# Patient Record
Sex: MALE | Race: WHITE | Employment: UNEMPLOYED | URBAN - METROPOLITAN AREA
[De-identification: names, ages, dates, MRNs, and addresses within clinical notes are randomized per-mention and may not be internally consistent; named-entity substitution may affect disease eponyms.]

---

## 2018-05-03 ENCOUNTER — HOSPITAL ENCOUNTER (OUTPATIENT)
Age: 68
Setting detail: SPECIMEN
Discharge: HOME | End: 2018-05-03
Payer: COMMERCIAL

## 2018-05-03 LAB
ALBUMIN SERPL-MCNC: 3.7 G/DL (ref 3.5–5.2)
ALBUMIN/GLOBULIN RATIO: 1.3 (ref 1–2.5)
ALP BLD-CCNC: 91 U/L (ref 40–129)
ALT SERPL-CCNC: 37 U/L (ref 5–41)
ANION GAP SERPL CALCULATED.3IONS-SCNC: 11 MMOL/L (ref 9–17)
AST SERPL-CCNC: 44 U/L
BILIRUB SERPL-MCNC: 0.43 MG/DL (ref 0.3–1.2)
BILIRUBIN DIRECT: 0.18 MG/DL
BILIRUBIN, INDIRECT: 0.25 MG/DL (ref 0–1)
BUN BLDV-MCNC: 11 MG/DL (ref 8–23)
BUN/CREAT BLD: ABNORMAL (ref 9–20)
CALCIUM SERPL-MCNC: 9.2 MG/DL (ref 8.6–10.4)
CHLORIDE BLD-SCNC: 93 MMOL/L (ref 98–107)
CHOLESTEROL, FASTING: 130 MG/DL
CHOLESTEROL/HDL RATIO: 2.4
CO2: 24 MMOL/L (ref 20–31)
CREAT SERPL-MCNC: 0.94 MG/DL (ref 0.7–1.2)
ESTIMATED AVERAGE GLUCOSE: 108 MG/DL
GFR AFRICAN AMERICAN: >60 ML/MIN
GFR NON-AFRICAN AMERICAN: >60 ML/MIN
GFR SERPL CREATININE-BSD FRML MDRD: ABNORMAL ML/MIN/{1.73_M2}
GFR SERPL CREATININE-BSD FRML MDRD: ABNORMAL ML/MIN/{1.73_M2}
GLOBULIN: ABNORMAL G/DL (ref 1.5–3.8)
GLUCOSE BLD-MCNC: 86 MG/DL (ref 70–99)
HBA1C MFR BLD: 5.4 % (ref 4–6)
HCT VFR BLD CALC: 41.4 % (ref 40.7–50.3)
HDLC SERPL-MCNC: 55 MG/DL
HEMOGLOBIN: 13.7 G/DL (ref 13–17)
LDL CHOLESTEROL: 62 MG/DL (ref 0–130)
MCH RBC QN AUTO: 32.3 PG (ref 25.2–33.5)
MCHC RBC AUTO-ENTMCNC: 33.1 G/DL (ref 28.4–34.8)
MCV RBC AUTO: 97.6 FL (ref 82.6–102.9)
NRBC AUTOMATED: 0 PER 100 WBC
PDW BLD-RTO: 13.2 % (ref 11.8–14.4)
PLATELET # BLD: 231 K/UL (ref 138–453)
PMV BLD AUTO: 9.2 FL (ref 8.1–13.5)
POTASSIUM SERPL-SCNC: 4.8 MMOL/L (ref 3.7–5.3)
PROSTATE SPECIFIC ANTIGEN: 1.14 UG/L
RBC # BLD: 4.24 M/UL (ref 4.21–5.77)
SODIUM BLD-SCNC: 128 MMOL/L (ref 135–144)
TOTAL PROTEIN: 6.6 G/DL (ref 6.4–8.3)
TRIGLYCERIDE, FASTING: 66 MG/DL
TSH SERPL DL<=0.05 MIU/L-ACNC: 1.88 MIU/L (ref 0.3–5)
VLDLC SERPL CALC-MCNC: NORMAL MG/DL (ref 1–30)
WBC # BLD: 6.5 K/UL (ref 3.5–11.3)

## 2018-05-17 ENCOUNTER — HOSPITAL ENCOUNTER (OUTPATIENT)
Age: 68
Setting detail: SPECIMEN
Discharge: HOME | End: 2018-05-17
Payer: COMMERCIAL

## 2018-05-17 LAB
ANION GAP SERPL CALCULATED.3IONS-SCNC: 12 MMOL/L (ref 9–17)
BUN BLDV-MCNC: 8 MG/DL (ref 8–23)
BUN/CREAT BLD: ABNORMAL (ref 9–20)
CALCIUM SERPL-MCNC: 9.3 MG/DL (ref 8.6–10.4)
CHLORIDE BLD-SCNC: 95 MMOL/L (ref 98–107)
CO2: 24 MMOL/L (ref 20–31)
CREAT SERPL-MCNC: 0.81 MG/DL (ref 0.7–1.2)
GFR AFRICAN AMERICAN: >60 ML/MIN
GFR NON-AFRICAN AMERICAN: >60 ML/MIN
GFR SERPL CREATININE-BSD FRML MDRD: ABNORMAL ML/MIN/{1.73_M2}
GFR SERPL CREATININE-BSD FRML MDRD: ABNORMAL ML/MIN/{1.73_M2}
GLUCOSE BLD-MCNC: 92 MG/DL (ref 70–99)
POTASSIUM SERPL-SCNC: 5.1 MMOL/L (ref 3.7–5.3)
SODIUM BLD-SCNC: 131 MMOL/L (ref 135–144)

## 2018-05-31 ENCOUNTER — HOSPITAL ENCOUNTER (OUTPATIENT)
Age: 68
Setting detail: SPECIMEN
Discharge: HOME | End: 2018-05-31
Payer: COMMERCIAL

## 2018-05-31 LAB
ANION GAP SERPL CALCULATED.3IONS-SCNC: 13 MMOL/L (ref 9–17)
BUN BLDV-MCNC: 8 MG/DL (ref 8–23)
BUN/CREAT BLD: ABNORMAL (ref 9–20)
CALCIUM SERPL-MCNC: 9.7 MG/DL (ref 8.6–10.4)
CHLORIDE BLD-SCNC: 97 MMOL/L (ref 98–107)
CO2: 23 MMOL/L (ref 20–31)
CREAT SERPL-MCNC: 0.81 MG/DL (ref 0.7–1.2)
GFR AFRICAN AMERICAN: >60 ML/MIN
GFR NON-AFRICAN AMERICAN: >60 ML/MIN
GFR SERPL CREATININE-BSD FRML MDRD: ABNORMAL ML/MIN/{1.73_M2}
GFR SERPL CREATININE-BSD FRML MDRD: ABNORMAL ML/MIN/{1.73_M2}
GLUCOSE BLD-MCNC: 122 MG/DL (ref 70–99)
POTASSIUM SERPL-SCNC: 5.2 MMOL/L (ref 3.7–5.3)
SODIUM BLD-SCNC: 133 MMOL/L (ref 135–144)

## 2018-06-06 ENCOUNTER — HOSPITAL ENCOUNTER (OUTPATIENT)
Age: 68
Setting detail: SPECIMEN
Discharge: HOME | End: 2018-06-06
Payer: COMMERCIAL

## 2018-06-06 LAB
ANION GAP SERPL CALCULATED.3IONS-SCNC: 11 MMOL/L (ref 9–17)
BUN BLDV-MCNC: 10 MG/DL (ref 8–23)
BUN/CREAT BLD: ABNORMAL (ref 9–20)
CALCIUM SERPL-MCNC: 9.3 MG/DL (ref 8.6–10.4)
CHLORIDE BLD-SCNC: 97 MMOL/L (ref 98–107)
CO2: 25 MMOL/L (ref 20–31)
CREAT SERPL-MCNC: 0.89 MG/DL (ref 0.7–1.2)
GFR AFRICAN AMERICAN: >60 ML/MIN
GFR NON-AFRICAN AMERICAN: >60 ML/MIN
GFR SERPL CREATININE-BSD FRML MDRD: ABNORMAL ML/MIN/{1.73_M2}
GFR SERPL CREATININE-BSD FRML MDRD: ABNORMAL ML/MIN/{1.73_M2}
GLUCOSE BLD-MCNC: 141 MG/DL (ref 70–99)
POTASSIUM SERPL-SCNC: 4.6 MMOL/L (ref 3.7–5.3)
SODIUM BLD-SCNC: 133 MMOL/L (ref 135–144)

## 2019-04-09 ENCOUNTER — HOSPITAL ENCOUNTER (OUTPATIENT)
Age: 69
Setting detail: SPECIMEN
Discharge: HOME | End: 2019-04-09
Payer: COMMERCIAL

## 2019-04-11 LAB — TESTOSTERONE TOTAL: 1071 NG/DL (ref 220–1000)

## 2020-03-23 ENCOUNTER — APPOINTMENT (OUTPATIENT)
Dept: GENERAL RADIOLOGY | Age: 70
DRG: 640 | End: 2020-03-23
Payer: COMMERCIAL

## 2020-03-23 ENCOUNTER — HOSPITAL ENCOUNTER (INPATIENT)
Age: 70
LOS: 8 days | Discharge: NURSING, REHAB OR LONG-TERM CARE | DRG: 640 | End: 2020-03-31
Attending: EMERGENCY MEDICINE
Payer: COMMERCIAL

## 2020-03-23 ENCOUNTER — APPOINTMENT (OUTPATIENT)
Dept: CT IMAGING | Age: 70
DRG: 640 | End: 2020-03-23
Payer: COMMERCIAL

## 2020-03-23 LAB
ABSOLUTE EOS #: 0.3 K/UL (ref 0–0.44)
ABSOLUTE IMMATURE GRANULOCYTE: 0.12 K/UL (ref 0–0.3)
ABSOLUTE LYMPH #: 0.73 K/UL (ref 1.1–3.7)
ABSOLUTE MONO #: 1.06 K/UL (ref 0.1–1.2)
ACETAMINOPHEN LEVEL: <5 UG/ML (ref 10–30)
AMMONIA: 67 UMOL/L (ref 16–60)
ANION GAP SERPL CALCULATED.3IONS-SCNC: 15 MMOL/L (ref 9–17)
ANION GAP SERPL CALCULATED.3IONS-SCNC: 17 MMOL/L (ref 9–17)
ANION GAP SERPL CALCULATED.3IONS-SCNC: 21 MMOL/L (ref 9–17)
BASOPHILS # BLD: 0 % (ref 0–2)
BASOPHILS ABSOLUTE: <0.03 K/UL (ref 0–0.2)
BUN BLDV-MCNC: 5 MG/DL (ref 8–23)
BUN BLDV-MCNC: 6 MG/DL (ref 8–23)
BUN/CREAT BLD: ABNORMAL (ref 9–20)
BUN/CREAT BLD: ABNORMAL (ref 9–20)
CALCIUM SERPL-MCNC: 8.8 MG/DL (ref 8.6–10.4)
CALCIUM SERPL-MCNC: 9.2 MG/DL (ref 8.6–10.4)
CHLORIDE BLD-SCNC: 68 MMOL/L (ref 98–107)
CHLORIDE BLD-SCNC: 69 MMOL/L (ref 98–107)
CHLORIDE BLD-SCNC: 73 MMOL/L (ref 98–107)
CO2: 15 MMOL/L (ref 20–31)
CO2: 18 MMOL/L (ref 20–31)
CO2: 19 MMOL/L (ref 20–31)
CORTISOL COLLECTION INFO: ABNORMAL
CORTISOL: 23.4 UG/DL (ref 2.7–18.4)
CREAT SERPL-MCNC: 0.72 MG/DL (ref 0.7–1.2)
CREAT SERPL-MCNC: 0.82 MG/DL (ref 0.7–1.2)
DIFFERENTIAL TYPE: ABNORMAL
EOSINOPHILS RELATIVE PERCENT: 3 % (ref 1–4)
ETHANOL PERCENT: 0.02 %
ETHANOL: 17 MG/DL
GFR AFRICAN AMERICAN: >60 ML/MIN
GFR AFRICAN AMERICAN: >60 ML/MIN
GFR NON-AFRICAN AMERICAN: >60 ML/MIN
GFR NON-AFRICAN AMERICAN: >60 ML/MIN
GFR SERPL CREATININE-BSD FRML MDRD: ABNORMAL ML/MIN/{1.73_M2}
GLUCOSE BLD-MCNC: 77 MG/DL (ref 70–99)
GLUCOSE BLD-MCNC: 99 MG/DL (ref 70–99)
HCT VFR BLD CALC: 32.4 % (ref 40.7–50.3)
HEMOGLOBIN: 10.2 G/DL (ref 13–17)
IMMATURE GRANULOCYTES: 1 %
LYMPHOCYTES # BLD: 7 % (ref 24–43)
MCH RBC QN AUTO: 23.9 PG (ref 25.2–33.5)
MCHC RBC AUTO-ENTMCNC: 31.5 G/DL (ref 28.4–34.8)
MCV RBC AUTO: 76.1 FL (ref 82.6–102.9)
MONOCYTES # BLD: 10 % (ref 3–12)
MYOGLOBIN: 1480 NG/ML (ref 28–72)
NRBC AUTOMATED: 0 PER 100 WBC
OSMOLALITY URINE: 199 MOSM/KG (ref 80–1300)
PDW BLD-RTO: 19 % (ref 11.8–14.4)
PLATELET # BLD: 213 K/UL (ref 138–453)
PLATELET ESTIMATE: ABNORMAL
PMV BLD AUTO: 8.9 FL (ref 8.1–13.5)
POTASSIUM SERPL-SCNC: 3.6 MMOL/L (ref 3.7–5.3)
POTASSIUM SERPL-SCNC: 3.7 MMOL/L (ref 3.7–5.3)
POTASSIUM SERPL-SCNC: 4 MMOL/L (ref 3.7–5.3)
RBC # BLD: 4.26 M/UL (ref 4.21–5.77)
RBC # BLD: ABNORMAL 10*6/UL
SALICYLATE LEVEL: <1 MG/DL (ref 3–10)
SEG NEUTROPHILS: 79 % (ref 36–65)
SEGMENTED NEUTROPHILS ABSOLUTE COUNT: 8.93 K/UL (ref 1.5–8.1)
SODIUM BLD-SCNC: 104 MMOL/L (ref 135–144)
SODIUM BLD-SCNC: 105 MMOL/L (ref 135–144)
SODIUM BLD-SCNC: 106 MMOL/L (ref 135–144)
SODIUM,UR: <20 MMOL/L
TOTAL CK: 852 U/L (ref 39–308)
TOXIC TRICYCLIC SC,BLOOD: NEGATIVE
TROPONIN INTERP: NORMAL
TROPONIN T: NORMAL NG/ML
TROPONIN, HIGH SENSITIVITY: 7 NG/L (ref 0–22)
TSH SERPL DL<=0.05 MIU/L-ACNC: 0.94 MIU/L (ref 0.3–5)
WBC # BLD: 11.2 K/UL (ref 3.5–11.3)
WBC # BLD: ABNORMAL 10*3/UL

## 2020-03-23 PROCEDURE — 82550 ASSAY OF CK (CPK): CPT

## 2020-03-23 PROCEDURE — 90715 TDAP VACCINE 7 YRS/> IM: CPT | Performed by: STUDENT IN AN ORGANIZED HEALTH CARE EDUCATION/TRAINING PROGRAM

## 2020-03-23 PROCEDURE — 85025 COMPLETE CBC W/AUTO DIFF WBC: CPT

## 2020-03-23 PROCEDURE — 80051 ELECTROLYTE PANEL: CPT

## 2020-03-23 PROCEDURE — 6360000002 HC RX W HCPCS: Performed by: STUDENT IN AN ORGANIZED HEALTH CARE EDUCATION/TRAINING PROGRAM

## 2020-03-23 PROCEDURE — 36620 INSERTION CATHETER ARTERY: CPT

## 2020-03-23 PROCEDURE — 71045 X-RAY EXAM CHEST 1 VIEW: CPT

## 2020-03-23 PROCEDURE — 84443 ASSAY THYROID STIM HORMONE: CPT

## 2020-03-23 PROCEDURE — 36415 COLL VENOUS BLD VENIPUNCTURE: CPT

## 2020-03-23 PROCEDURE — 2580000003 HC RX 258: Performed by: EMERGENCY MEDICINE

## 2020-03-23 PROCEDURE — 51701 INSERT BLADDER CATHETER: CPT

## 2020-03-23 PROCEDURE — 84300 ASSAY OF URINE SODIUM: CPT

## 2020-03-23 PROCEDURE — 2580000003 HC RX 258: Performed by: STUDENT IN AN ORGANIZED HEALTH CARE EDUCATION/TRAINING PROGRAM

## 2020-03-23 PROCEDURE — G0480 DRUG TEST DEF 1-7 CLASSES: HCPCS

## 2020-03-23 PROCEDURE — 72125 CT NECK SPINE W/O DYE: CPT

## 2020-03-23 PROCEDURE — 93005 ELECTROCARDIOGRAM TRACING: CPT | Performed by: STUDENT IN AN ORGANIZED HEALTH CARE EDUCATION/TRAINING PROGRAM

## 2020-03-23 PROCEDURE — 80048 BASIC METABOLIC PNL TOTAL CA: CPT

## 2020-03-23 PROCEDURE — 82140 ASSAY OF AMMONIA: CPT

## 2020-03-23 PROCEDURE — 99285 EMERGENCY DEPT VISIT HI MDM: CPT

## 2020-03-23 PROCEDURE — 90471 IMMUNIZATION ADMIN: CPT | Performed by: STUDENT IN AN ORGANIZED HEALTH CARE EDUCATION/TRAINING PROGRAM

## 2020-03-23 PROCEDURE — 2500000003 HC RX 250 WO HCPCS: Performed by: STUDENT IN AN ORGANIZED HEALTH CARE EDUCATION/TRAINING PROGRAM

## 2020-03-23 PROCEDURE — 82533 TOTAL CORTISOL: CPT

## 2020-03-23 PROCEDURE — 80307 DRUG TEST PRSMV CHEM ANLYZR: CPT

## 2020-03-23 PROCEDURE — 83874 ASSAY OF MYOGLOBIN: CPT

## 2020-03-23 PROCEDURE — 2000000000 HC ICU R&B

## 2020-03-23 PROCEDURE — 83935 ASSAY OF URINE OSMOLALITY: CPT

## 2020-03-23 PROCEDURE — 70450 CT HEAD/BRAIN W/O DYE: CPT

## 2020-03-23 PROCEDURE — 84484 ASSAY OF TROPONIN QUANT: CPT

## 2020-03-23 RX ORDER — ACETAMINOPHEN 325 MG/1
650 TABLET ORAL EVERY 6 HOURS PRN
Status: DISCONTINUED | OUTPATIENT
Start: 2020-03-23 | End: 2020-03-31 | Stop reason: HOSPADM

## 2020-03-23 RX ORDER — SODIUM CHLORIDE 0.9 % (FLUSH) 0.9 %
10 SYRINGE (ML) INJECTION EVERY 12 HOURS SCHEDULED
Status: DISCONTINUED | OUTPATIENT
Start: 2020-03-23 | End: 2020-03-31 | Stop reason: HOSPADM

## 2020-03-23 RX ORDER — SODIUM CHLORIDE 9 MG/ML
INJECTION, SOLUTION INTRAVENOUS CONTINUOUS
Status: DISCONTINUED | OUTPATIENT
Start: 2020-03-23 | End: 2020-03-24

## 2020-03-23 RX ORDER — 0.9 % SODIUM CHLORIDE 0.9 %
1000 INTRAVENOUS SOLUTION INTRAVENOUS ONCE
Status: COMPLETED | OUTPATIENT
Start: 2020-03-23 | End: 2020-03-23

## 2020-03-23 RX ORDER — ONDANSETRON 2 MG/ML
4 INJECTION INTRAMUSCULAR; INTRAVENOUS EVERY 6 HOURS PRN
Status: DISCONTINUED | OUTPATIENT
Start: 2020-03-23 | End: 2020-03-31 | Stop reason: HOSPADM

## 2020-03-23 RX ORDER — PROMETHAZINE HYDROCHLORIDE 25 MG/1
12.5 TABLET ORAL EVERY 6 HOURS PRN
Status: DISCONTINUED | OUTPATIENT
Start: 2020-03-23 | End: 2020-03-31 | Stop reason: HOSPADM

## 2020-03-23 RX ORDER — SODIUM CHLORIDE 0.9 % (FLUSH) 0.9 %
10 SYRINGE (ML) INJECTION PRN
Status: DISCONTINUED | OUTPATIENT
Start: 2020-03-23 | End: 2020-03-31 | Stop reason: HOSPADM

## 2020-03-23 RX ORDER — ACETAMINOPHEN 650 MG/1
650 SUPPOSITORY RECTAL EVERY 6 HOURS PRN
Status: DISCONTINUED | OUTPATIENT
Start: 2020-03-23 | End: 2020-03-31 | Stop reason: HOSPADM

## 2020-03-23 RX ORDER — POLYETHYLENE GLYCOL 3350 17 G/17G
17 POWDER, FOR SOLUTION ORAL DAILY PRN
Status: DISCONTINUED | OUTPATIENT
Start: 2020-03-23 | End: 2020-03-31 | Stop reason: HOSPADM

## 2020-03-23 RX ADMIN — SODIUM CHLORIDE, PRESERVATIVE FREE 10 ML: 5 INJECTION INTRAVENOUS at 21:59

## 2020-03-23 RX ADMIN — TETANUS TOXOID, REDUCED DIPHTHERIA TOXOID AND ACELLULAR PERTUSSIS VACCINE, ADSORBED 0.5 ML: 5; 2.5; 8; 8; 2.5 SUSPENSION INTRAMUSCULAR at 17:39

## 2020-03-23 RX ADMIN — SODIUM CHLORIDE 1000 ML: 9 INJECTION, SOLUTION INTRAVENOUS at 18:35

## 2020-03-23 RX ADMIN — THIAMINE HYDROCHLORIDE: 100 INJECTION, SOLUTION INTRAMUSCULAR; INTRAVENOUS at 23:01

## 2020-03-23 RX ADMIN — SODIUM CHLORIDE: 9 INJECTION, SOLUTION INTRAVENOUS at 21:45

## 2020-03-23 ASSESSMENT — ENCOUNTER SYMPTOMS
COUGH: 0
SHORTNESS OF BREATH: 0
CHEST TIGHTNESS: 0
DIARRHEA: 0
CONSTIPATION: 0
WHEEZING: 0
ABDOMINAL PAIN: 0
BACK PAIN: 0
NAUSEA: 0
VOMITING: 0
PHOTOPHOBIA: 0

## 2020-03-23 ASSESSMENT — PAIN DESCRIPTION - ORIENTATION: ORIENTATION: LEFT

## 2020-03-23 ASSESSMENT — PAIN DESCRIPTION - LOCATION: LOCATION: LEG

## 2020-03-23 ASSESSMENT — PAIN SCALES - GENERAL: PAINLEVEL_OUTOF10: 7

## 2020-03-23 ASSESSMENT — PAIN DESCRIPTION - PAIN TYPE: TYPE: ACUTE PAIN

## 2020-03-23 NOTE — ED PROVIDER NOTES
[facility] ED  Emergency Department Encounter  Emergency Medicine Resident     Pt Name: Rogelio Templeton  MRN: [id number]  [place] 1950  Date of evaluation: 3/23/20  PCP:  Marcela Puentes DO    CHIEF COMPLAINT       Chief Complaint   Patient presents with   Nicolás Brooks     Slid out of wheelchair falling to ground. left leg and toe pain, reports loc. ems arrived for lift assist pt is aox3     Alcohol Intoxication       HISTORY OFPRESENT ILLNESS  (Location/Symptom, Timing/Onset, Context/Setting, Quality, Duration, Modifying Factors,Severity.)      Rogelio Templeton is a 78 y.o. male who presents with fall with loss of conscious. Patient fell out of his wheelchair and was out for an unknown amount of time, however the patient does estimate about 30 minutes. EMS was called for lift help, but found the patient slurring speech and highly intoxicated. They will need to come in to be evaluated. Patient's only complaint is knee pain. There is an abrasion on bilateral knees, on the low left side of his scalp, and a bruise on the left side of his chest.  There are multiple skin tears. Patient slurring his speech and highly intoxicated. When asked about his medical history. Patient states he has narcolepsy and forgets the other things. Patient is a daily drinker. Patient does not take blood thinners other than Plavix. No aspirin. Patient does have a defibrillator that he believes is [employer]    PAST MEDICAL / SURGICAL / SOCIAL / FAMILY HISTORY      has a past medical history of ADHD (attention deficit hyperactivity disorder), ADHD (attention deficit hyperactivity disorder), Atypical chest pain, Chronic bronchitis ([address] Hypertension, Hyponatremia, Meniere's disease, Narcolepsy, Nasal fracture, PND (post-nasal drip), SOB (shortness of breath), Tibia fracture, and Transaminasemia.      has a past surgical history that includes cyst removal; Ankle surgery; knee surgery (Right); and Coronary angioplasty with stent (N/A, 10/10/2015). Social History     Socioeconomic History    Marital status:      Spouse name: Not on file    Number of children: Not on file    Years of education: Not on file    Highest education level: Not on file   Occupational History     Employer: NOT EMPLOYED   Social Needs    Financial resource strain: Not on file    Food insecurity     Worry: Not on file     Inability: Not on file    Transportation needs     Medical: Not on file     Non-medical: Not on file   Tobacco Use    Smoking status: Current Every Day Smoker     Packs/day: 1.00     Years: 38.00     Pack years: 38.00     Types: Cigarettes    Smokeless tobacco: Never Used    Tobacco comment: e-cigs   Substance and Sexual Activity    Alcohol use: Yes     Comment: daily    Drug use: No    Sexual activity: Not on file   Lifestyle    Physical activity     Days per week: Not on file     Minutes per session: Not on file    Stress: Not on file   Relationships    Social connections     Talks on phone: Not on file     Gets together: Not on file     Attends [employer] service: Not on file     Active member of club or organization: Not on file     Attends meetings of clubs or organizations: Not on file     Relationship status: Not on file    Intimate partner violence     Fear of current or ex partner: Not on file     Emotionally abused: Not on file     Physically abused: Not on file     Forced sexual activity: Not on file   Other Topics Concern    Not on file   Social History Narrative    Not on file       Family History   Problem Relation Age of Onset    Heart Disease Mother         heart attack    Heart Disease Father         Allergies:  Motrin [ibuprofen micronized]    Home Medications:  Prior to Admission medications    Medication Sig Start Date End Date Taking?  Authorizing Provider   meclizine (ANTIVERT) 25 MG tablet TAKE 1 TAB BY MOUTH THREE TIMES A DAY AS NEEDED 7/6/16   Simone Lynn PA-C Multiple Vitamins-Minerals (CERTAVITE SENIOR/ANTIOXIDANT) TABS TAKE 1 TAB BY MOUTH ONCE A DAY 6/22/16   Iris Lynn PA-C   GLUCOSAMINE-CHONDROITIN -400 MG tablet TAKE 1 TAB BY MOUTH TWICE A DAY 6/7/16   Iris Lynn PA-C   carvedilol (COREG) 3.125 MG tablet TAKE 1 TAB BY MOUTH TWICE A DAY WITH MEALS 6/7/16   Iris Lynn PA-C   traMADol (ULTRAM) 50 MG tablet TAKE 1 TAB BY MOUTH EVERY 6 HOURS AS NEEDED 4/26/16 Maylon Guest, DO   Armodafinil (NUVIGIL) 150 MG TABS tablet Take 1 tablet by mouth daily 4/26/16 Maylon Guest, DO   loratadine (CLARITIN) 10 MG tablet TAKE 1 TAB BY MOUTH ONCE A DAY 3/24/16   Maylon Guest, DO   spironolactone (ALDACTONE) 25 MG tablet Take 1 tablet by mouth daily 3/24/16   Maylon Guest, DO   levothyroxine (SYNTHROID) 25 MCG tablet Take 1 tablet by mouth Daily 3/24/16   Maylon Guest, DO   amiodarone (CORDARONE) 200 MG tablet Take 1 tablet by mouth 2 times daily 3/24/16   Maylon Guest, DO   QUEtiapine (SEROQUEL) 25 MG tablet Take 1 tablet by mouth nightly 3/24/16   Maylon Guest, DO   HYDROcodone-acetaminophen [facility]) 5-325 MG per tablet Take 1 tablet by mouth every 6 hours as needed for Pain 3/24/16   Maylon Guest, DO   melatonin 3 MG TABS tablet Take 3 mg by mouth daily    Historical Provider, MD   aspirin 81 MG chewable tablet Take 1 tablet by mouth daily 11/10/15   Gutierrez Sheppard MD   atorvastatin (LIPITOR) 40 MG tablet Take 1 tablet by mouth nightly 11/10/15   Gutierrez Sheppard MD   lisinopril (PRINIVIL;ZESTRIL) 5 MG tablet Take 1 tablet by mouth daily 11/10/15   Gutierrez Sheppard MD   cyanocobalamin 50 MCG tablet Take 1 tablet by mouth daily 11/10/15   Gutierrez Sheppard MD   clopidogrel (PLAVIX) 75 MG tablet Take 1 tablet by mouth daily 11/10/15   Gutierrez Sheppard MD   thiamine 100 MG tablet Take 1 tablet by mouth daily 11/10/15   Gutierrez Sheppard MD   Magnesium Oxide 250 MG TABS Take 1 tablet by mouth daily 11/10/15   Gutierrez Sheppard MD   meclizine (ANTIVERT) 25 MG tablet Take 0.5 tablets by mouth 3 times daily as needed 11/10/15   Sven Velez MD   furosemide (LASIX) 20 MG tablet Take 1 tablet by mouth daily 11/10/15   Sven Velez MD   LORazepam (ATIVAN) 0.5 MG tablet Take 1 tablet by mouth every 6 hours as needed for Anxiety 11/10/15   Sven Velez MD   [address] Adult diapers dispense quantity allowed by insurance 3/31/15   Katiana Lynn PA-C   Diapers & Supplies MISC Wipes  dispense quantity allowed by insurance 3/31/15   Iris Lynn PA-C   PROAIR  (90 BASE) MCG/ACT inhaler inhale 2 puffs every 4 to 6 hours if needed 11/29/14   Iris Lynn PA-C   mometasone (NASONEX) 50 MCG/ACT nasal spray 2 sprays by Nasal route daily. 12/23/13   Anjali Messina MD   docusate sodium (COLACE) 100 MG capsule Take 100 mg by mouth 2 times daily. Historical Provider, MD   folic acid (FOLVITE) 1 MG tablet Take 1 mg by mouth daily. Historical Provider, MD   chlorhexidine (PERIDEX) 0.12 % solution Take 15 mLs by mouth 2 times daily. Historical Provider, MD   SALINE MIST SPRAY NA by Nasal route. Historical Provider, MD   Multiple Vitamin (MULTIVITAMIN PO) Take  by mouth. Historical Provider, MD       REVIEW OFSYSTEMS    (2-9 systems for level 4, 10 or more for level 5)      Review of Systems   Constitutional: Positive for fatigue. Negative for chills, diaphoresis and fever. Eyes: Negative for photophobia and visual disturbance. Respiratory: Negative for cough, chest tightness, shortness of breath and wheezing. Cardiovascular: Negative for chest pain, palpitations and leg swelling. Gastrointestinal: Negative for abdominal pain, constipation, diarrhea, nausea and vomiting. Endocrine: Negative for polydipsia, polyphagia and polyuria. Genitourinary: Negative for difficulty urinating, dysuria, flank pain and hematuria. Musculoskeletal: Positive for arthralgias (bilateral knee pain).  Negative for back Neurological:      General: No focal deficit present. Mental Status: He is alert and oriented to person, place, and time. Mental status is at baseline. Cranial Nerves: No cranial nerve deficit. Motor: No weakness. Comments: Slurred speech, but patient able to answer questions appropriately. Psychiatric:         Mood and Affect: Mood normal.         Behavior: Behavior normal.         DIFFERENTIAL  DIAGNOSIS     PLAN (LABS / IMAGING / EKG):  Orders Placed This Encounter   Procedures    XR CHEST PORTABLE    CT Head WO Contrast    CT Cervical Spine WO Contrast    TOX SCR, BLD, ED    CBC Auto Differential    Basic Metabolic Panel w/ Reflex to MG    Troponin    BASIC METABOLIC PANEL    OSMOLALITY, URINE    SODIUM, URINE, RANDOM    CK    Myoglobin, Serum    AMMONIA    BASIC METABOLIC PANEL    Diet NPO Effective Now    Straight cath    Inpatient consult to Nephrology    Inpatient consult to Trauma Surgery    Inpatient consult to Critical Care    Pacer Interrogate    EKG 12 Lead    PATIENT STATUS (FROM ED OR OR/PROCEDURAL) Inpatient       MEDICATIONS ORDERED:  Orders Placed This Encounter   Medications    Tetanus-Diphth-Acell Pertussis (BOOSTRIX) injection 0.5 mL    0.9 % sodium chloride bolus    folic acid 1 mg, thiamine (B-1) 100 mg in dextrose 5 % 50 mL IVPB    0.9 % sodium chloride infusion       DDX: Alcohol intoxication, subdural hematoma, intracranial bleed, cervical spine injury, electrolyte abnormality, cardiac event    Initial MDM/Plan: 78 y.o. male who presents with fall from wheelchair. Patient states he was unconscious, unknown on time and he estimates 30 minutes. Patient only complains of knee pain and mild headache. Patient does have slurred speech and states he feels tired overall. Patient admits to drinking today, but states he only had one 24 ounce beer. Patient states he drinks daily.   Patient arrives alert and oriented and in no acute distress, but has significant slurred speech. Patient answers questions properly, however is difficult to understand at times and has to at be asked to repeat himself. Plan for lab work, EKG, CT head and neck, chest x-ray. We will continue to evaluate. DIAGNOSTIC RESULTS / EMERGENCYDEPARTMENT COURSE / MDM     LABS:  Labs Reviewed   TOX SCR, BLD, ED - Abnormal; Notable for the following components:       Result Value    Ethanol 17 (*)     Ethanol percent 2.296 (*)     Salicylate Lvl <1 (*)     Acetaminophen Level <5 (*)     All other components within normal limits   CBC WITH AUTO DIFFERENTIAL - Abnormal; Notable for the following components:    Hemoglobin 10.2 (*)     Hematocrit 32.4 (*)     MCV 76.1 (*)     MCH 23.9 (*)     RDW 19.0 (*)     Seg Neutrophils 79 (*)     Lymphocytes 7 (*)     Immature Granulocytes 1 (*)     Segs Absolute 8.93 (*)     Absolute Lymph # 0.73 (*)     All other components within normal limits   BASIC METABOLIC PANEL W/ REFLEX TO MG FOR LOW K - Abnormal; Notable for the following components:    BUN 5 (*)     Sodium 104 (*)     Chloride 68 (*)     CO2 15 (*)     Anion Gap 21 (*)     All other components within normal limits   BASIC METABOLIC PANEL - Abnormal; Notable for the following components:    BUN 6 (*)     Sodium 105 (*)     Chloride 69 (*)     CO2 19 (*)     All other components within normal limits   CK - Abnormal; Notable for the following components:     Total  (*)     All other components within normal limits   MYOGLOBIN, SERUM - Abnormal; Notable for the following components:    Myoglobin 1,480 (*)     All other components within normal limits   AMMONIA - Abnormal; Notable for the following components:    Ammonia 67 (*)     All other components within normal limits   TROPONIN   OSMOLALITY, URINE   SODIUM, URINE, RANDOM   BASIC METABOLIC PANEL   BASIC METABOLIC PANEL   BASIC METABOLIC PANEL   BASIC METABOLIC PANEL   BASIC METABOLIC PANEL   BASIC METABOLIC PANEL         RADIOLOGY:  Ct Head Wo Contrast    Result Date: 3/23/2020  EXAMINATION: CT OF THE HEAD WITHOUT CONTRAST; CT OF THE CERVICAL SPINE WITHOUT CONTRAST 3/23/2020 6:01 pm TECHNIQUE: CT of the head was performed without the administration of intravenous contrast. Dose modulation, iterative reconstruction, and/or weight based adjustment of the mA/kV was utilized to reduce the radiation dose to as low as reasonably achievable.; CT of the cervical spine was performed without the administration of intravenous contrast. Multiplanar reformatted images are provided for review. Dose modulation, iterative reconstruction, and/or weight based adjustment of the mA/kV was utilized to reduce the radiation dose to as low as reasonably achievable. COMPARISON: 11/03/2015 HISTORY: ORDERING SYSTEM PROVIDED HISTORY: fall, +LOC, on plavix, alcoholic TECHNOLOGIST PROVIDED HISTORY: fall, +LOC, on plavix, alcoholic Reason for Exam: fall; head injury Acuity: Acute Type of Exam: Initial FINDINGS: BRAIN/VENTRICLES: There is no acute intracranial hemorrhage, mass effect or midline shift. No abnormal extra-axial fluid collection. The gray-white differentiation is maintained without evidence of an acute infarct. There is no evidence of hydrocephalus. Scattered and confluent areas of subcortical/periventricular white matter hypodensities are most compatible with chronic small vessel disease. Bilateral basal ganglia lacunar infarctions. ORBITS: The visualized portion of the orbits demonstrate no acute abnormality. SINUSES: The visualized paranasal sinuses and mastoid air cells demonstrate no acute abnormality. SOFT TISSUES/SKULL:  No acute abnormality of the visualized skull or soft tissues. CERVICAL SPINE: Cervical spine maintains its normal lordotic curvature. Vertebral body heights and intervertebral disc space heights are preserved. No evidence for acute fracture or malalignment. Medial positioning of the bilateral common carotid arteries.   Retroesophageal intervertebral disc space heights are preserved. No evidence for acute fracture or malalignment. Medial positioning of the bilateral common carotid arteries. Retroesophageal soft tissue sampling should be avoided in this patient. No acute intracranial abnormality. No evidence for fracture or malalignment of the cervical spine. Xr Chest Portable    Result Date: 3/23/2020  EXAMINATION: ONE XRAY VIEW OF THE CHEST 3/23/2020 6:38 pm COMPARISON: Chest 11/07/2015 HISTORY: Acute chest pain, patient fell FINDINGS: Calcifications involving the aorta reflect atherosclerosis. The cardiomediastinal and hilar silhouettes appear otherwise unremarkable. Chronic appearing coarse interstitial densities predominate perihilar regions and lung bases, typical of sequela from smoking or other previous infectious/inflammatory process. The lungs appear otherwise clear. No pleural fluid evident. No pneumothorax is seen. No acute osseous abnormality is identified. Pacemaker lead overlies the expected location of the right ventricle with power pack overlying the left axilla. No acute pulmonary disease. Chronic appearing coarse interstitial densities predominate perihilar regions and lung bases, typical of sequela from smoking or other previous infectious/inflammatory process. Calcific atherosclerotic disease aorta.          EKG  EKG Interpretation    Interpreted by emergency department physician    Rhythm: normal sinus   Rate: normal  Axis: normal  Ectopy: none  Conduction: normal  ST Segments: no acute change  T Waves: no acute change  Q Waves: none    Clinical Impression: non-specific EKG    Myra Wilkes    All EKG's are interpreted by the Emergency Department Physicianwho either signs or Co-signs this chart in the absence of a cardiologist.    EMERGENCY DEPARTMENT COURSE:  ED Course as of Mar 23 2050   Mon Mar 23, 2020   1741 No arrythmias since last check in 2017, functioning appropriately    [JG]   1828 Notified by

## 2020-03-23 NOTE — ED PROVIDER NOTES
completed with a voice recognition program.  Efforts were made to edit the dictations but occasionally words are mis-transcribed.)    Yasmeen Calderon.  Fang Devi MD, [facility]  Attending Emergency Medicine Physician        Jada Sarah MD  03/23/20 [id number]

## 2020-03-23 NOTE — ED NOTES
Patient straight cath, urine labeled and sent to lab. Trauma at bedside to eval patient. Patient has redness to right upper thigh and large blister, resident at bedside to eval patient.       Charlie Benitez RN  03/23/20 [id number]

## 2020-03-23 NOTE — CONSULTS
SALINE MIST SPRAY NA by Nasal route. Historical Provider, MD   Multiple Vitamin (MULTIVITAMIN PO) Take  by mouth. Historical Provider, MD       ALLERGIES:   []  None    []   Information not available due to exam limitations documented above   Motrin [ibuprofen micronized]    PAST MEDICAL HISTORY: []  None   []   Information not available due to exam limitations documented above    has a past medical history of ADHD (attention deficit hyperactivity disorder), ADHD (attention deficit hyperactivity disorder), Atypical chest pain, Chronic bronchitis ([address] Hypertension, Hyponatremia, Meniere's disease, Narcolepsy, Nasal fracture, PND (post-nasal drip), SOB (shortness of breath), Tibia fracture, and Transaminasemia. has a past surgical history that includes cyst removal; Ankle surgery; knee surgery (Right); and Coronary angioplasty with stent (N/A, 10/10/2015). FAMILY HISTORY   []   Information not available due to exam limitations documented above    family history includes Heart Disease in his father and mother. SOCIAL HISTORY  []   Information not available due to exam limitations documented above     reports that he has been smoking cigarettes. He has a 38.00 pack-year smoking history. He has never used smokeless tobacco.   reports current alcohol use. reports no history of drug use.     PERTINENT SYSTEMIC REVIEW:    []   Information not available due to exam limitations documented above    Eyes: negative  Ears, nose, mouth, throat, and face: Positive for abrasions  Respiratory: negative  Cardiovascular: negative  Gastrointestinal: negative  Genitourinary:negative  Skin: positive for abrasions   Hematologic/lymphatic: negative    PHYSICAL EXAMINATION:     GLASCOW COMA SCALE  NEUROMUSCULAR BLOCKADE PRIOR TO ARRIVAL     [x]No        []Yes      Variable  Score   Variable  Score  Eye opening [x]Spontaneous 4 Verbal  [x]Oriented  5     []To voice  3   []Confused  4    []To pain  2   []Inapp Hematocrit 32.4 (*)     MCV 76.1 (*)     MCH 23.9 (*)     RDW 19.0 (*)     Seg Neutrophils 79 (*)     Lymphocytes 7 (*)     Immature Granulocytes 1 (*)     Segs Absolute 8.93 (*)     Absolute Lymph # 0.73 (*)     All other components within normal limits   BASIC METABOLIC PANEL W/ REFLEX TO MG FOR LOW K - Abnormal; Notable for the following components:    BUN 5 (*)     Sodium 104 (*)     Chloride 68 (*)     CO2 15 (*)     Anion Gap 21 (*)     All other components within normal limits   TROPONIN   BASIC METABOLIC PANEL   OSMOLALITY, URINE   SODIUM, URINE, RANDOM   CK   MYOGLOBIN, SERUM   AMMONIA         Ashley Obrien,   3/23/20, 7:25 PM         Attending Note    Patient seen as a trauma consult 3/23/20 for skin tear to left elbow and abrasions sustained when he fell out of wheelchair. Serum Na was 104. CT head and neck negative for trauma. Being admitted to medical ICU  I have reviewed the above TECSS note(s) and I either performed the key elements of the medical history and physical exam or was present with the resident when the key elements of the medical history and physical exam were performed. I have discussed the findings, established the care plan and recommendations with Resident Jose Judit.     Fernanda Shay MD  3/24/2020  12:47 AM

## 2020-03-23 NOTE — ED NOTES
Pt to ed from home via ems. Patient originally called out for lift assist after tipping over and sliding out of his wheelchair. Upon ems arrival patient admits to etoh and has ecchymosis to top of head, left chest, skin tear to left posterior elbow, reports loc and abrasion to right knee. Pt is aox3. Pt has slurred speech, pt admits to etoh. Pt has chronic abrasions to toes. Pt denies chest pain or sob.      Kathleen Ledbetter RN  03/23/20 [id number]

## 2020-03-24 PROBLEM — L89.211 PRESSURE INJURY OF RIGHT HIP, STAGE 1: Status: ACTIVE | Noted: 2020-03-24

## 2020-03-24 LAB
-: NORMAL
ABSOLUTE EOS #: 0.75 K/UL (ref 0–0.44)
ABSOLUTE IMMATURE GRANULOCYTE: 0.05 K/UL (ref 0–0.3)
ABSOLUTE LYMPH #: 0.8 K/UL (ref 1.1–3.7)
ABSOLUTE MONO #: 0.9 K/UL (ref 0.1–1.2)
ALBUMIN SERPL-MCNC: 3.3 G/DL (ref 3.5–5.2)
ALBUMIN/GLOBULIN RATIO: 1.3 (ref 1–2.5)
ALP BLD-CCNC: 94 U/L (ref 40–129)
ALT SERPL-CCNC: 18 U/L (ref 5–41)
ANION GAP SERPL CALCULATED.3IONS-SCNC: 10 MMOL/L (ref 9–17)
ANION GAP SERPL CALCULATED.3IONS-SCNC: 12 MMOL/L (ref 9–17)
ANION GAP SERPL CALCULATED.3IONS-SCNC: 14 MMOL/L (ref 9–17)
ANION GAP SERPL CALCULATED.3IONS-SCNC: 15 MMOL/L (ref 9–17)
ANION GAP SERPL CALCULATED.3IONS-SCNC: 15 MMOL/L (ref 9–17)
AST SERPL-CCNC: 48 U/L
BASOPHILS # BLD: 0 % (ref 0–2)
BASOPHILS ABSOLUTE: <0.03 K/UL (ref 0–0.2)
BILIRUB SERPL-MCNC: 0.4 MG/DL (ref 0.3–1.2)
BUN BLDV-MCNC: 7 MG/DL (ref 8–23)
BUN/CREAT BLD: ABNORMAL (ref 9–20)
CALCIUM SERPL-MCNC: 8.7 MG/DL (ref 8.6–10.4)
CHLORIDE BLD-SCNC: 74 MMOL/L (ref 98–107)
CHLORIDE BLD-SCNC: 75 MMOL/L (ref 98–107)
CHLORIDE BLD-SCNC: 75 MMOL/L (ref 98–107)
CHLORIDE BLD-SCNC: 76 MMOL/L (ref 98–107)
CHLORIDE BLD-SCNC: 77 MMOL/L (ref 98–107)
CHLORIDE BLD-SCNC: 78 MMOL/L (ref 98–107)
CO2: 19 MMOL/L (ref 20–31)
CO2: 20 MMOL/L (ref 20–31)
CO2: 21 MMOL/L (ref 20–31)
CREAT SERPL-MCNC: 0.71 MG/DL (ref 0.7–1.2)
DIFFERENTIAL TYPE: ABNORMAL
DIRECT EXAM: NORMAL
EKG ATRIAL RATE: 73 BPM
EKG Q-T INTERVAL: 420 MS
EKG QRS DURATION: 92 MS
EKG QTC CALCULATION (BAZETT): 466 MS
EKG R AXIS: -10 DEGREES
EKG T AXIS: 40 DEGREES
EKG VENTRICULAR RATE: 74 BPM
EOSINOPHILS RELATIVE PERCENT: 8 % (ref 1–4)
FOLATE: >20 NG/ML
GFR AFRICAN AMERICAN: >60 ML/MIN
GFR NON-AFRICAN AMERICAN: >60 ML/MIN
GFR SERPL CREATININE-BSD FRML MDRD: ABNORMAL ML/MIN/{1.73_M2}
GFR SERPL CREATININE-BSD FRML MDRD: ABNORMAL ML/MIN/{1.73_M2}
GLUCOSE BLD-MCNC: 79 MG/DL (ref 70–99)
HCT VFR BLD CALC: 25.5 % (ref 40.7–50.3)
HEMOGLOBIN: 8.7 G/DL (ref 13–17)
IMMATURE GRANULOCYTES: 1 %
IRON SATURATION: 5 % (ref 20–55)
IRON: 16 UG/DL (ref 59–158)
LYMPHOCYTES # BLD: 9 % (ref 24–43)
Lab: NORMAL
MAGNESIUM: 2 MG/DL (ref 1.6–2.6)
MCH RBC QN AUTO: 24.3 PG (ref 25.2–33.5)
MCHC RBC AUTO-ENTMCNC: 34.1 G/DL (ref 28.4–34.8)
MCV RBC AUTO: 71.2 FL (ref 82.6–102.9)
MONOCYTES # BLD: 10 % (ref 3–12)
NRBC AUTOMATED: 0 PER 100 WBC
PDW BLD-RTO: 18.9 % (ref 11.8–14.4)
PLATELET # BLD: 193 K/UL (ref 138–453)
PLATELET ESTIMATE: ABNORMAL
PMV BLD AUTO: 8.5 FL (ref 8.1–13.5)
POTASSIUM SERPL-SCNC: 3.3 MMOL/L (ref 3.7–5.3)
POTASSIUM SERPL-SCNC: 3.6 MMOL/L (ref 3.7–5.3)
POTASSIUM SERPL-SCNC: 3.6 MMOL/L (ref 3.7–5.3)
POTASSIUM SERPL-SCNC: 3.7 MMOL/L (ref 3.7–5.3)
RBC # BLD: 3.58 M/UL (ref 4.21–5.77)
RBC # BLD: ABNORMAL 10*6/UL
REASON FOR REJECTION: NORMAL
SEG NEUTROPHILS: 73 % (ref 36–65)
SEGMENTED NEUTROPHILS ABSOLUTE COUNT: 6.66 K/UL (ref 1.5–8.1)
SODIUM BLD-SCNC: 107 MMOL/L (ref 135–144)
SODIUM BLD-SCNC: 107 MMOL/L (ref 135–144)
SODIUM BLD-SCNC: 108 MMOL/L (ref 135–144)
SODIUM BLD-SCNC: 108 MMOL/L (ref 135–144)
SODIUM BLD-SCNC: 110 MMOL/L (ref 135–144)
SODIUM BLD-SCNC: 110 MMOL/L (ref 135–144)
SODIUM BLD-SCNC: 111 MMOL/L (ref 135–144)
SODIUM BLD-SCNC: 111 MMOL/L (ref 135–144)
SPECIMEN DESCRIPTION: NORMAL
TOTAL IRON BINDING CAPACITY: 333 UG/DL (ref 250–450)
TOTAL PROTEIN: 5.8 G/DL (ref 6.4–8.3)
UNSATURATED IRON BINDING CAPACITY: 317 UG/DL (ref 112–347)
VITAMIN B-12: >2000 PG/ML (ref 232–1245)
WBC # BLD: 9.2 K/UL (ref 3.5–11.3)
WBC # BLD: ABNORMAL 10*3/UL
ZZ NTE CLEAN UP: ORDERED TEST: NORMAL
ZZ NTE WITH NAME CLEAN UP: SPECIMEN SOURCE: NORMAL

## 2020-03-24 PROCEDURE — 82607 VITAMIN B-12: CPT

## 2020-03-24 PROCEDURE — 2700000000 HC OXYGEN THERAPY PER DAY

## 2020-03-24 PROCEDURE — 6360000002 HC RX W HCPCS: Performed by: EMERGENCY MEDICINE

## 2020-03-24 PROCEDURE — 83540 ASSAY OF IRON: CPT

## 2020-03-24 PROCEDURE — 6370000000 HC RX 637 (ALT 250 FOR IP): Performed by: EMERGENCY MEDICINE

## 2020-03-24 PROCEDURE — 2500000003 HC RX 250 WO HCPCS: Performed by: STUDENT IN AN ORGANIZED HEALTH CARE EDUCATION/TRAINING PROGRAM

## 2020-03-24 PROCEDURE — 99291 CRITICAL CARE FIRST HOUR: CPT | Performed by: INTERNAL MEDICINE

## 2020-03-24 PROCEDURE — 85025 COMPLETE CBC W/AUTO DIFF WBC: CPT

## 2020-03-24 PROCEDURE — 2580000003 HC RX 258: Performed by: EMERGENCY MEDICINE

## 2020-03-24 PROCEDURE — 80051 ELECTROLYTE PANEL: CPT

## 2020-03-24 PROCEDURE — 2580000003 HC RX 258: Performed by: STUDENT IN AN ORGANIZED HEALTH CARE EDUCATION/TRAINING PROGRAM

## 2020-03-24 PROCEDURE — 87804 INFLUENZA ASSAY W/OPTIC: CPT

## 2020-03-24 PROCEDURE — 36415 COLL VENOUS BLD VENIPUNCTURE: CPT

## 2020-03-24 PROCEDURE — 2000000000 HC ICU R&B

## 2020-03-24 PROCEDURE — 2500000003 HC RX 250 WO HCPCS: Performed by: EMERGENCY MEDICINE

## 2020-03-24 PROCEDURE — 6370000000 HC RX 637 (ALT 250 FOR IP): Performed by: INTERNAL MEDICINE

## 2020-03-24 PROCEDURE — 6360000002 HC RX W HCPCS: Performed by: STUDENT IN AN ORGANIZED HEALTH CARE EDUCATION/TRAINING PROGRAM

## 2020-03-24 PROCEDURE — 2580000003 HC RX 258: Performed by: INTERNAL MEDICINE

## 2020-03-24 PROCEDURE — 83735 ASSAY OF MAGNESIUM: CPT

## 2020-03-24 PROCEDURE — 83550 IRON BINDING TEST: CPT

## 2020-03-24 PROCEDURE — 82746 ASSAY OF FOLIC ACID SERUM: CPT

## 2020-03-24 PROCEDURE — 93010 ELECTROCARDIOGRAM REPORT: CPT | Performed by: INTERNAL MEDICINE

## 2020-03-24 PROCEDURE — 80053 COMPREHEN METABOLIC PANEL: CPT

## 2020-03-24 RX ORDER — LORAZEPAM 2 MG/ML
1 INJECTION INTRAMUSCULAR
Status: DISCONTINUED | OUTPATIENT
Start: 2020-03-24 | End: 2020-03-31 | Stop reason: HOSPADM

## 2020-03-24 RX ORDER — LORAZEPAM 2 MG/ML
3 INJECTION INTRAMUSCULAR
Status: DISCONTINUED | OUTPATIENT
Start: 2020-03-24 | End: 2020-03-31 | Stop reason: HOSPADM

## 2020-03-24 RX ORDER — NAPROXEN 250 MG/1
250 TABLET ORAL 2 TIMES DAILY WITH MEALS
Status: DISCONTINUED | OUTPATIENT
Start: 2020-03-24 | End: 2020-03-27

## 2020-03-24 RX ORDER — LORAZEPAM 1 MG/1
1 TABLET ORAL
Status: DISCONTINUED | OUTPATIENT
Start: 2020-03-24 | End: 2020-03-31 | Stop reason: HOSPADM

## 2020-03-24 RX ORDER — LABETALOL 20 MG/4 ML (5 MG/ML) INTRAVENOUS SYRINGE
5 EVERY 4 HOURS PRN
Status: DISCONTINUED | OUTPATIENT
Start: 2020-03-24 | End: 2020-03-26

## 2020-03-24 RX ORDER — HYDRALAZINE HYDROCHLORIDE 20 MG/ML
5 INJECTION INTRAMUSCULAR; INTRAVENOUS EVERY 6 HOURS PRN
Status: DISCONTINUED | OUTPATIENT
Start: 2020-03-24 | End: 2020-03-26

## 2020-03-24 RX ORDER — LORAZEPAM 1 MG/1
2 TABLET ORAL
Status: DISCONTINUED | OUTPATIENT
Start: 2020-03-24 | End: 2020-03-31 | Stop reason: HOSPADM

## 2020-03-24 RX ORDER — SODIUM CHLORIDE 0.9 % (FLUSH) 0.9 %
10 SYRINGE (ML) INJECTION EVERY 12 HOURS SCHEDULED
Status: DISCONTINUED | OUTPATIENT
Start: 2020-03-24 | End: 2020-03-31 | Stop reason: HOSPADM

## 2020-03-24 RX ORDER — SODIUM CHLORIDE 450 MG/100ML
INJECTION, SOLUTION INTRAVENOUS CONTINUOUS
Status: DISCONTINUED | OUTPATIENT
Start: 2020-03-24 | End: 2020-03-24

## 2020-03-24 RX ORDER — SODIUM CHLORIDE 0.9 % (FLUSH) 0.9 %
10 SYRINGE (ML) INJECTION PRN
Status: DISCONTINUED | OUTPATIENT
Start: 2020-03-24 | End: 2020-03-31 | Stop reason: HOSPADM

## 2020-03-24 RX ORDER — LORAZEPAM 2 MG/ML
2 INJECTION INTRAMUSCULAR
Status: DISCONTINUED | OUTPATIENT
Start: 2020-03-24 | End: 2020-03-31 | Stop reason: HOSPADM

## 2020-03-24 RX ORDER — LORAZEPAM 2 MG/ML
4 INJECTION INTRAMUSCULAR
Status: DISCONTINUED | OUTPATIENT
Start: 2020-03-24 | End: 2020-03-31 | Stop reason: HOSPADM

## 2020-03-24 RX ORDER — LORAZEPAM 1 MG/1
4 TABLET ORAL
Status: DISCONTINUED | OUTPATIENT
Start: 2020-03-24 | End: 2020-03-31 | Stop reason: HOSPADM

## 2020-03-24 RX ORDER — THIAMINE MONONITRATE (VIT B1) 100 MG
100 TABLET ORAL DAILY
Status: DISCONTINUED | OUTPATIENT
Start: 2020-03-24 | End: 2020-03-31 | Stop reason: HOSPADM

## 2020-03-24 RX ORDER — MULTIVITAMIN WITH FOLIC ACID 400 MCG
1 TABLET ORAL DAILY
Status: DISCONTINUED | OUTPATIENT
Start: 2020-03-24 | End: 2020-03-31 | Stop reason: HOSPADM

## 2020-03-24 RX ORDER — FOLIC ACID 1 MG/1
1 TABLET ORAL DAILY
Status: DISCONTINUED | OUTPATIENT
Start: 2020-03-25 | End: 2020-03-31 | Stop reason: HOSPADM

## 2020-03-24 RX ORDER — POTASSIUM CHLORIDE 20 MEQ/1
40 TABLET, EXTENDED RELEASE ORAL ONCE
Status: COMPLETED | OUTPATIENT
Start: 2020-03-24 | End: 2020-03-24

## 2020-03-24 RX ORDER — LABETALOL 20 MG/4 ML (5 MG/ML) INTRAVENOUS SYRINGE
5 EVERY 4 HOURS
Status: DISCONTINUED | OUTPATIENT
Start: 2020-03-24 | End: 2020-03-24

## 2020-03-24 RX ORDER — LORAZEPAM 1 MG/1
3 TABLET ORAL
Status: DISCONTINUED | OUTPATIENT
Start: 2020-03-24 | End: 2020-03-31 | Stop reason: HOSPADM

## 2020-03-24 RX ADMIN — SODIUM CHLORIDE, PRESERVATIVE FREE 10 ML: 5 INJECTION INTRAVENOUS at 20:54

## 2020-03-24 RX ADMIN — SODIUM CHLORIDE: 4.5 INJECTION, SOLUTION INTRAVENOUS at 20:55

## 2020-03-24 RX ADMIN — SODIUM CHLORIDE: 4.5 INJECTION, SOLUTION INTRAVENOUS at 08:51

## 2020-03-24 RX ADMIN — LABETALOL 20 MG/4 ML (5 MG/ML) INTRAVENOUS SYRINGE 5 MG: at 00:27

## 2020-03-24 RX ADMIN — HYDRALAZINE HYDROCHLORIDE 5 MG: 20 INJECTION INTRAMUSCULAR; INTRAVENOUS at 02:25

## 2020-03-24 RX ADMIN — THERA TABS 1 TABLET: TAB at 11:57

## 2020-03-24 RX ADMIN — NAPROXEN 250 MG: 250 TABLET ORAL at 16:53

## 2020-03-24 RX ADMIN — DEXTROSE MONOHYDRATE 500 ML: 50 INJECTION, SOLUTION INTRAVENOUS at 15:59

## 2020-03-24 RX ADMIN — POTASSIUM CHLORIDE 40 MEQ: 1500 TABLET, EXTENDED RELEASE ORAL at 21:53

## 2020-03-24 RX ADMIN — HYDRALAZINE HYDROCHLORIDE 5 MG: 20 INJECTION INTRAMUSCULAR; INTRAVENOUS at 18:24

## 2020-03-24 RX ADMIN — LORAZEPAM 1 MG: 1 TABLET ORAL at 23:25

## 2020-03-24 RX ADMIN — HYDRALAZINE HYDROCHLORIDE 5 MG: 20 INJECTION INTRAMUSCULAR; INTRAVENOUS at 09:30

## 2020-03-24 RX ADMIN — NAPROXEN 250 MG: 250 TABLET ORAL at 11:57

## 2020-03-24 RX ADMIN — ENOXAPARIN SODIUM 40 MG: 40 INJECTION SUBCUTANEOUS at 09:06

## 2020-03-24 RX ADMIN — Medication 5 MG: at 12:01

## 2020-03-24 RX ADMIN — Medication 5 MG: at 16:53

## 2020-03-24 RX ADMIN — Medication 100 MG: at 11:57

## 2020-03-24 RX ADMIN — THIAMINE HYDROCHLORIDE: 100 INJECTION, SOLUTION INTRAMUSCULAR; INTRAVENOUS at 09:06

## 2020-03-24 ASSESSMENT — PAIN SCALES - GENERAL
PAINLEVEL_OUTOF10: 6
PAINLEVEL_OUTOF10: 0
PAINLEVEL_OUTOF10: 5
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
## 2020-03-24 NOTE — ED PROVIDER NOTES
white matter hypodensities are most compatible with chronic small vessel disease. Bilateral basal ganglia lacunar infarctions. ORBITS: The visualized portion of the orbits demonstrate no acute abnormality. SINUSES: The visualized paranasal sinuses and mastoid air cells demonstrate no acute abnormality. SOFT TISSUES/SKULL:  No acute abnormality of the visualized skull or soft tissues. CERVICAL SPINE: Cervical spine maintains its normal lordotic curvature. Vertebral body heights and intervertebral disc space heights are preserved. No evidence for acute fracture or malalignment. Medial positioning of the bilateral common carotid arteries. Retroesophageal soft tissue sampling should be avoided in this patient. No acute intracranial abnormality. No evidence for fracture or malalignment of the cervical spine. Xr Chest Portable    Result Date: 3/23/2020  EXAMINATION: ONE XRAY VIEW OF THE CHEST 3/23/2020 6:38 pm COMPARISON: Chest 11/07/2015 HISTORY: Acute chest pain, patient fell FINDINGS: Calcifications involving the aorta reflect atherosclerosis. The cardiomediastinal and hilar silhouettes appear otherwise unremarkable. Chronic appearing coarse interstitial densities predominate perihilar regions and lung bases, typical of sequela from smoking or other previous infectious/inflammatory process. The lungs appear otherwise clear. No pleural fluid evident. No pneumothorax is seen. No acute osseous abnormality is identified. Pacemaker lead overlies the expected location of the right ventricle with power pack overlying the left axilla. No acute pulmonary disease. Chronic appearing coarse interstitial densities predominate perihilar regions and lung bases, typical of sequela from smoking or other previous infectious/inflammatory process. Calcific atherosclerotic disease aorta. RECENT VITALS:     Temp: 97.8 °F (36.6 °C),  Pulse: 73, Resp: 13, BP: (!) 185/88, SpO2: 100 %    This patient is a 78 y.o.  Male with fall out of wheelchair and loss of conscious. Unknown exact downtime. Patient called EMS for help getting up and they found he was having slurred speech and went to evaluate the emergency department. Patient arrives in no acute distress alert and oriented. Patient slurring speech significantly. Patient found to be hyponatremic, not intoxicated. Nephrology following, patient to receive 50 mL's of normal saline per hour and blood draws every 2 hours. Patient admitted to critical care. Awaiting bed. Central line in place right IJ. Spoke with Dr. Clint Mora from ICU team and discussed patient's presentation and course in the emergency department. OUTSTANDING TASKS / RECOMMENDATIONS:    1. Admitted. Awaiting transfer to ICU     FINAL IMPRESSION:     1. Hyponatremia    2. Traumatic rhabdomyolysis, initial encounter ([facility] [address]    3. Fall from wheelchair, initial encounter    4. Multiple contusions        DISPOSITION:         DISPOSITION:  []  Discharge   []  Transfer -    [x]  Admission -     []  Against Medical Advice   []  Eloped   FOLLOW-UP: DO Reuben Castillo [address].   [place] [id number]  [phone]           DISCHARGE MEDICATIONS: New Prescriptions    No medications on file           Massiel Bernard DO  Emergency Medicine Resident  [address] [place]  Resident  03/23/20 8648

## 2020-03-24 NOTE — ED NOTES
Report called to [facility] RN, all questions answered at this time.      Colby Good RN  03/23/20 2056

## 2020-03-24 NOTE — CONSULTS
NEPHROLOGY     REASON FOR CONSULT:     HYPONATREMIA 80      HISTORY OF PRESENTING ILLNESS                 This is a 78 y.o. male who has been admitted for evaluation of syncope. He is a poor historian. He presented to the ER with reported fall from a wheelchair with noted loss of consciousness. He was attempting to transfer from wheelchair when he fell down. Downtime was approximately 30 minutes. Patient called EMS for assistance and was noted to be intoxicated with slurred speech. He also complained of left lower extremity pain. He states that this pain is been there since his surgery to knee few months back. Also reported 1 alcoholic beverage in the morning prior to arrival.  Denied any illicit drug abuse. Has a history of multiple falls in the past according to him. In the ER noted to have stable blood pressure. Investigations revealed hyponatremia with sodium 104. Started on IV fluids. Trauma surgery was consulted for fall and pan CT scan did not reveal any acute brain pathology or fractures. Chest x-ray showed no evidence of pneumonia. Urine studies findings noted. He has been managed overnight with IV fluids normal saline 50 mils an hour. Sodium is improved to 1/10/2010 today morning. No reported in-hospital seizures or alteration of mentation. His renal function is fairly stable. Urine output recorded 450 cc since admission. On questioning he reports use of alcohol on a daily basis. Amount unspecified. Also states that he has been drink a lot of water. Cannot quantify it. Review of medication list indicates that he was not on any distal diuretics. Has been on Seroquel. No recent change in medications. Denies any history of malignancy. No reported history of endocrine issues like thyroid issues/cortisol problems. Renal function is fairly stable.         PAST MEDICAL HISTORY         Diagnosis Date    ADHD (attention deficit hyperactivity disorder)     ADHD (attention daily  cyanocobalamin 50 MCG tablet, Take 1 tablet by mouth daily  clopidogrel (PLAVIX) 75 MG tablet, Take 1 tablet by mouth daily  thiamine 100 MG tablet, Take 1 tablet by mouth daily  Magnesium Oxide 250 MG TABS, Take 1 tablet by mouth daily  meclizine (ANTIVERT) 25 MG tablet, Take 0.5 tablets by mouth 3 times daily as needed  furosemide (LASIX) 20 MG tablet, Take 1 tablet by mouth daily  LORazepam (ATIVAN) 0.5 MG tablet, Take 1 tablet by mouth every 6 hours as needed for Anxiety  Diapers & Supplies MISC, Adult diapers dispense quantity allowed by insurance  Diapers & Supplies MISC, Wipes  dispense quantity allowed by insurance  PROAIR  (90 BASE) MCG/ACT inhaler, inhale 2 puffs every 4 to 6 hours if needed  mometasone (NASONEX) 50 MCG/ACT nasal spray, 2 sprays by Nasal route daily. docusate sodium (COLACE) 100 MG capsule, Take 100 mg by mouth 2 times daily. folic acid (FOLVITE) 1 MG tablet, Take 1 mg by mouth daily. chlorhexidine (PERIDEX) 0.12 % solution, Take 15 mLs by mouth 2 times daily. SALINE MIST SPRAY NA, by Nasal route. Multiple Vitamin (MULTIVITAMIN PO), Take  by mouth.     Scheduled Meds:    thiamine  100 mg Oral Daily    multivitamin  1 tablet Oral Daily    naproxen  250 mg Oral BID WC    sodium chloride flush  10 mL Intravenous 2 times per day    thiamine and folic acid IVPB   Intravenous Daily    sodium chloride flush  10 mL Intravenous 2 times per day    enoxaparin  40 mg Subcutaneous Daily     Continuous Infusions:    sodium chloride 50 mL/hr at 03/24/20 0851     PRN Meds:  hydrALAZINE, labetalol, sodium chloride flush, LORazepam **OR** LORazepam **OR** LORazepam **OR** LORazepam **OR** LORazepam **OR** LORazepam **OR** LORazepam **OR** LORazepam, sodium chloride flush, acetaminophen **OR** acetaminophen, polyethylene glycol, promethazine **OR** ondansetron    ALLERGY     Motrin [ibuprofen micronized]       SOCIAL HISTORY     Social History     Socioeconomic History    Gastrointestinal: No abdominal pain/nausea/vomiting/diarrhoea/constipation  Genitourinary: No dysuria/pyuria/hematuria/incomplete emptying of bladder  Musculoskeletal:  No gait disturbance/weakness or joint complaints  Integumentary: No rash or pruritis. Neurological: No headache/diplopia/change in muscle strength/numbness or tingling. No change in gait, balance, coordination, mood, affect, memory, mentation, behavior. Psychiatric: No anxiety/depression. Endocrine: No temperature intolerance. No excessive thirst, fluid intake, or urination. No tremor. Hematologic/Lymphatic: No abnormal bruising or bleeding, blood clots or swolle lymph nodes. Allergic/Immunologic: No nasal congestion or hives      PHYSICAL EXAM     Vitals:    03/24/20 0615 03/24/20 0630 03/24/20 0645 03/24/20 0930   BP:    (!) 165/45   Pulse: 72 73 80    Resp: 10 9 16    Temp:       TempSrc:       SpO2:       Weight:       Height:         24HR INTAKE/OUTPUT:      Intake/Output Summary (Last 24 hours) at 3/24/2020 1017  Last data filed at 3/24/2020 0850  Gross per 24 hour   Intake 773 ml   Output 450 ml   Net 323 ml       General appearance:Awake, alert, in no acute distress  Skin: warm and dry, no rash or erythema  Eyes: conjunctivae normal and sclera anicteric  ENT: no thrush no pharyngeal congestion  orodental hygiene   Neck: No JVD, Lymphadenopathty or thyromegaly  Respiratory: vesicular breath sounds,no wheeze/crackles  Cardiovascular: S1 S2 normal,no gallop or organic murmur. No carotid bruit  Abdomen:Non tender/non distended. Bowel sounds present  Extremities: No Cyanosis or Clubbing,Lower extremity edema  Neurological:Alert and oriented. No abnormalities of mood, affect, memory, mentation, or behavior are noted    INVESTIGATIONS      CBC:   Recent Labs     03/23/20  1743 03/24/20  0438   WBC 11.2 9.2   RBC 4.26 3.58*   HGB 10.2* 8.7*   HCT 32.4* 25.5*   MCV 76.1* 71.2*   MCH 23.9* 24.3*   MCHC 31.5 34.1   RDW 19.0* 18.9*    193
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
## 2020-03-24 NOTE — PLAN OF CARE
Pt seen and evaluated. Current smoker- 2 cigarettes per day. Smoking hx of 48 years. Pt denies dx of COPD. No inhalers used at home. No home O2. No sleep apnea. Cough producing white sputum. Some SOB- pt states he's had \"this cold for while. \"    RR- 14  BS- Rhonchi, no wheezing    No current need for respiratory intervention.
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
## 2020-03-24 NOTE — PROGRESS NOTES
RN updated crit care resident, Dr Allyson Us, regarding new art line placed by RT and BP.  Orders received for PRN labetalol and hydrazine

## 2020-03-24 NOTE — CARE COORDINATION
SBIRT completed    Pt reports daily drinking, one 24oz beer  Last drank yest  He denies drug use  He denies depression hx  He reports no prior tx or AA involvement  Pt declines resources            Alcohol Screening and Brief Intervention        Recent Labs     03/23/20  1743   ALC 17*       Alcohol Pre-screening  (MEN ONLY) How many times in the past year have you had 5 or more drinks in a day?: None       Alcohol Screening Audit       Drug Pre-Screening   How many times in the past year have you used a recreational drug or used a prescription medication for nonmedical reasons?: None    Drug Screening DAST       Mood Pre-Screening (PHQ-2)  During the past two weeks, have you been bothered by little interest or pleasure in doing things?: No  During the past two weeks, have you been bothered by feeling down, depressed, or hopeless?: No    Mood Pre-Screening (PHQ-9)         I have interviewed Steve Vinson, [id number] regarding  Her alcohol consumption/drug use and risk for excessive use. Screenings were negative, pt did have positive tox for alcohol. Patient  Declined intervention at this time. Please see social work note regarding intervention.     Deferred []    Completed on: 3/24/2020   Neeraj Zhou, MSW, LSW

## 2020-03-25 LAB
ANION GAP SERPL CALCULATED.3IONS-SCNC: 10 MMOL/L (ref 9–17)
ANION GAP SERPL CALCULATED.3IONS-SCNC: 10 MMOL/L (ref 9–17)
ANION GAP SERPL CALCULATED.3IONS-SCNC: 11 MMOL/L (ref 9–17)
BUN BLDV-MCNC: 4 MG/DL (ref 8–23)
BUN/CREAT BLD: ABNORMAL (ref 9–20)
CALCIUM SERPL-MCNC: 8.8 MG/DL (ref 8.6–10.4)
CHLORIDE BLD-SCNC: 78 MMOL/L (ref 98–107)
CHLORIDE BLD-SCNC: 78 MMOL/L (ref 98–107)
CHLORIDE BLD-SCNC: 80 MMOL/L (ref 98–107)
CHLORIDE BLD-SCNC: 80 MMOL/L (ref 98–107)
CHLORIDE BLD-SCNC: 81 MMOL/L (ref 98–107)
CO2: 20 MMOL/L (ref 20–31)
CO2: 20 MMOL/L (ref 20–31)
CO2: 21 MMOL/L (ref 20–31)
CREAT SERPL-MCNC: 0.67 MG/DL (ref 0.7–1.2)
GFR AFRICAN AMERICAN: >60 ML/MIN
GFR NON-AFRICAN AMERICAN: >60 ML/MIN
GFR SERPL CREATININE-BSD FRML MDRD: ABNORMAL ML/MIN/{1.73_M2}
GFR SERPL CREATININE-BSD FRML MDRD: ABNORMAL ML/MIN/{1.73_M2}
GLUCOSE BLD-MCNC: 101 MG/DL (ref 70–99)
MYOGLOBIN: 984 NG/ML (ref 28–72)
OSMOLALITY URINE: 278 MOSM/KG (ref 80–1300)
PHOSPHORUS: 2.9 MG/DL (ref 2.5–4.5)
POTASSIUM SERPL-SCNC: 3.9 MMOL/L (ref 3.7–5.3)
POTASSIUM SERPL-SCNC: 4 MMOL/L (ref 3.7–5.3)
POTASSIUM SERPL-SCNC: 4.1 MMOL/L (ref 3.7–5.3)
POTASSIUM SERPL-SCNC: 4.1 MMOL/L (ref 3.7–5.3)
POTASSIUM SERPL-SCNC: 4.2 MMOL/L (ref 3.7–5.3)
SODIUM BLD-SCNC: 109 MMOL/L (ref 135–144)
SODIUM BLD-SCNC: 110 MMOL/L (ref 135–144)
SODIUM BLD-SCNC: 111 MMOL/L (ref 135–144)
SODIUM BLD-SCNC: 111 MMOL/L (ref 135–144)
SODIUM BLD-SCNC: 112 MMOL/L (ref 135–144)
SODIUM,UR: 20 MMOL/L
TOTAL CK: 274 U/L (ref 39–308)

## 2020-03-25 PROCEDURE — 83874 ASSAY OF MYOGLOBIN: CPT

## 2020-03-25 PROCEDURE — 2500000003 HC RX 250 WO HCPCS

## 2020-03-25 PROCEDURE — 6360000002 HC RX W HCPCS: Performed by: EMERGENCY MEDICINE

## 2020-03-25 PROCEDURE — 6370000000 HC RX 637 (ALT 250 FOR IP): Performed by: EMERGENCY MEDICINE

## 2020-03-25 PROCEDURE — 6370000000 HC RX 637 (ALT 250 FOR IP): Performed by: STUDENT IN AN ORGANIZED HEALTH CARE EDUCATION/TRAINING PROGRAM

## 2020-03-25 PROCEDURE — 99291 CRITICAL CARE FIRST HOUR: CPT | Performed by: INTERNAL MEDICINE

## 2020-03-25 PROCEDURE — 84100 ASSAY OF PHOSPHORUS: CPT

## 2020-03-25 PROCEDURE — 84300 ASSAY OF URINE SODIUM: CPT

## 2020-03-25 PROCEDURE — 2000000000 HC ICU R&B

## 2020-03-25 PROCEDURE — 80051 ELECTROLYTE PANEL: CPT

## 2020-03-25 PROCEDURE — 83935 ASSAY OF URINE OSMOLALITY: CPT

## 2020-03-25 PROCEDURE — 2580000003 HC RX 258: Performed by: EMERGENCY MEDICINE

## 2020-03-25 PROCEDURE — 80048 BASIC METABOLIC PNL TOTAL CA: CPT

## 2020-03-25 PROCEDURE — 99213 OFFICE O/P EST LOW 20 MIN: CPT

## 2020-03-25 PROCEDURE — 2500000003 HC RX 250 WO HCPCS: Performed by: STUDENT IN AN ORGANIZED HEALTH CARE EDUCATION/TRAINING PROGRAM

## 2020-03-25 PROCEDURE — 82550 ASSAY OF CK (CPK): CPT

## 2020-03-25 PROCEDURE — 2700000000 HC OXYGEN THERAPY PER DAY

## 2020-03-25 RX ORDER — QUETIAPINE FUMARATE 25 MG/1
25 TABLET, FILM COATED ORAL NIGHTLY
Status: DISCONTINUED | OUTPATIENT
Start: 2020-03-25 | End: 2020-03-31 | Stop reason: HOSPADM

## 2020-03-25 RX ORDER — UREA 10 %
3 LOTION (ML) TOPICAL NIGHTLY PRN
Status: DISCONTINUED | OUTPATIENT
Start: 2020-03-25 | End: 2020-03-31 | Stop reason: HOSPADM

## 2020-03-25 RX ORDER — ARMODAFINIL 150 MG/1
150 TABLET ORAL DAILY
Status: DISCONTINUED | OUTPATIENT
Start: 2020-03-25 | End: 2020-03-25

## 2020-03-25 RX ORDER — SPIRONOLACTONE 25 MG/1
25 TABLET ORAL DAILY
Status: DISCONTINUED | OUTPATIENT
Start: 2020-03-25 | End: 2020-03-31 | Stop reason: HOSPADM

## 2020-03-25 RX ORDER — LEVOTHYROXINE SODIUM 0.03 MG/1
25 TABLET ORAL DAILY
Status: DISCONTINUED | OUTPATIENT
Start: 2020-03-25 | End: 2020-03-31 | Stop reason: HOSPADM

## 2020-03-25 RX ORDER — ATORVASTATIN CALCIUM 40 MG/1
40 TABLET, FILM COATED ORAL NIGHTLY
Status: DISCONTINUED | OUTPATIENT
Start: 2020-03-25 | End: 2020-03-31 | Stop reason: HOSPADM

## 2020-03-25 RX ORDER — ARMODAFINIL 150 MG/1
150 TABLET ORAL DAILY
Status: DISCONTINUED | OUTPATIENT
Start: 2020-03-26 | End: 2020-03-31 | Stop reason: HOSPADM

## 2020-03-25 RX ORDER — CLOPIDOGREL BISULFATE 75 MG/1
75 TABLET ORAL DAILY
Status: DISCONTINUED | OUTPATIENT
Start: 2020-03-25 | End: 2020-03-31 | Stop reason: HOSPADM

## 2020-03-25 RX ORDER — CARVEDILOL 3.12 MG/1
3.12 TABLET ORAL 2 TIMES DAILY WITH MEALS
Status: DISCONTINUED | OUTPATIENT
Start: 2020-03-25 | End: 2020-03-31 | Stop reason: HOSPADM

## 2020-03-25 RX ORDER — ASPIRIN 81 MG/1
81 TABLET, CHEWABLE ORAL DAILY
Status: DISCONTINUED | OUTPATIENT
Start: 2020-03-25 | End: 2020-03-31 | Stop reason: HOSPADM

## 2020-03-25 RX ORDER — DOCUSATE SODIUM 100 MG/1
100 CAPSULE, LIQUID FILLED ORAL 2 TIMES DAILY
Status: DISCONTINUED | OUTPATIENT
Start: 2020-03-25 | End: 2020-03-31 | Stop reason: HOSPADM

## 2020-03-25 RX ORDER — LISINOPRIL 5 MG/1
5 TABLET ORAL DAILY
Status: DISCONTINUED | OUTPATIENT
Start: 2020-03-25 | End: 2020-03-27

## 2020-03-25 RX ORDER — AMIODARONE HYDROCHLORIDE 200 MG/1
200 TABLET ORAL 2 TIMES DAILY
Status: DISCONTINUED | OUTPATIENT
Start: 2020-03-25 | End: 2020-03-31 | Stop reason: HOSPADM

## 2020-03-25 RX ORDER — LABETALOL HYDROCHLORIDE 5 MG/ML
INJECTION, SOLUTION INTRAVENOUS
Status: COMPLETED
Start: 2020-03-25 | End: 2020-03-25

## 2020-03-25 RX ADMIN — AMIODARONE HYDROCHLORIDE 200 MG: 200 TABLET ORAL at 21:46

## 2020-03-25 RX ADMIN — NAPROXEN 250 MG: 250 TABLET ORAL at 08:12

## 2020-03-25 RX ADMIN — FOLIC ACID 1 MG: 1 TABLET ORAL at 08:12

## 2020-03-25 RX ADMIN — SODIUM CHLORIDE, PRESERVATIVE FREE 10 ML: 5 INJECTION INTRAVENOUS at 08:14

## 2020-03-25 RX ADMIN — QUETIAPINE FUMARATE 25 MG: 25 TABLET ORAL at 21:46

## 2020-03-25 RX ADMIN — Medication 5 MG: at 13:59

## 2020-03-25 RX ADMIN — ENOXAPARIN SODIUM 40 MG: 40 INJECTION SUBCUTANEOUS at 08:12

## 2020-03-25 RX ADMIN — CLOPIDOGREL 75 MG: 75 TABLET, FILM COATED ORAL at 11:42

## 2020-03-25 RX ADMIN — DESMOPRESSIN ACETATE 40 MG: 0.2 TABLET ORAL at 21:46

## 2020-03-25 RX ADMIN — ASPIRIN 81 MG: 81 TABLET, CHEWABLE ORAL at 11:41

## 2020-03-25 RX ADMIN — THERA TABS 1 TABLET: TAB at 08:13

## 2020-03-25 RX ADMIN — Medication 2 MCG/MIN: at 23:54

## 2020-03-25 RX ADMIN — SODIUM CHLORIDE, PRESERVATIVE FREE 10 ML: 5 INJECTION INTRAVENOUS at 21:48

## 2020-03-25 RX ADMIN — SODIUM CHLORIDE, PRESERVATIVE FREE 10 ML: 5 INJECTION INTRAVENOUS at 20:10

## 2020-03-25 RX ADMIN — Medication 3 MG: at 22:59

## 2020-03-25 RX ADMIN — DOCUSATE SODIUM 100 MG: 100 CAPSULE, LIQUID FILLED ORAL at 21:46

## 2020-03-25 RX ADMIN — LEVOTHYROXINE SODIUM 25 MCG: 25 TABLET ORAL at 11:42

## 2020-03-25 RX ADMIN — NAPROXEN 250 MG: 250 TABLET ORAL at 17:34

## 2020-03-25 RX ADMIN — CARVEDILOL 3.12 MG: 3.12 TABLET, FILM COATED ORAL at 11:42

## 2020-03-25 RX ADMIN — Medication 100 MG: at 08:12

## 2020-03-25 RX ADMIN — LISINOPRIL 5 MG: 5 TABLET ORAL at 11:42

## 2020-03-25 RX ADMIN — CARVEDILOL 3.12 MG: 3.12 TABLET, FILM COATED ORAL at 17:34

## 2020-03-25 RX ADMIN — SPIRONOLACTONE 25 MG: 25 TABLET ORAL at 11:41

## 2020-03-25 RX ADMIN — AMIODARONE HYDROCHLORIDE 200 MG: 200 TABLET ORAL at 11:41

## 2020-03-25 RX ADMIN — DOCUSATE SODIUM 100 MG: 100 CAPSULE, LIQUID FILLED ORAL at 11:42

## 2020-03-25 ASSESSMENT — PAIN SCALES - GENERAL
PAINLEVEL_OUTOF10: 0
PAINLEVEL_OUTOF10: 0
PAINLEVEL_OUTOF10: 6

## 2020-03-25 NOTE — PROGRESS NOTES
SUBJECTIVE    Patient seen and examined. He was sitting comfortably alert and oriented x3. Morning sodium was 111. Sodium correction was 7 mEq over the. Of last 36 hours. Patient is currently asymptomatic. Patient states he usually drinks 1 can of beer a day. Has a normal TSH  His blood pressure is elevated without any significant tachycardia  OBJECTIVE      CURRENT TEMPERATURE:  Temp: 98.1 °F (36.7 °C)  MAXIMUM TEMPERATURE OVER 24HRS:  Temp (24hrs), Av.2 °F (36.8 °C), Min:97.9 °F (36.6 °C), Max:98.6 °F (37 °C)    CURRENT RESPIRATORY RATE:  Resp: 18  CURRENT PULSE:  Pulse: 71  CURRENT BLOOD PRESSURE:  BP: (!) 166/68  24HR BLOOD PRESSURE RANGE:  Systolic (71TXS), LMX:573 , Min:147 , OAX:186   ; Diastolic (97MFB), IJW:16, Min:59, Max:68    24HR INTAKE/OUTPUT:      Intake/Output Summary (Last 24 hours) at 3/25/2020 1007  Last data filed at 3/25/2020 7397  Gross per 24 hour   Intake 530 ml   Output 580 ml   Net -50 ml     WEIGHT :  Patient Vitals for the past 96 hrs (Last 3 readings):   Weight   20 2125 181 lb (82.1 kg)   20 1718 160 lb (72.6 kg)     PHYSICAL EXAM      GENERAL APPEARANCE: Awake and alert x3   eYES: conjunctivae normal and sclera anicteric  ENT: No thrush or pharyngeal congestion  NECK: No JVD. PULMONARY: lungs are clear to auscultation. No  Wheezing, no ronchi . normal  Breath sounds. CADRDIOVASCULAR: S1 and S2 normally heard NO S3 and NO S4 . No rubs and no murmur.    ABDOMEN: soft nontender, bowel sounds present, no organomegaly,  no ascites   EXTREMITIES: No edema    CURRENT MEDICATIONS      hydrALAZINE (APRESOLINE) injection 5 mg, Q6H PRN  labetalol (NORMODYNE;TRANDATE) injection syringe 5 mg, Q4H PRN  vitamin B-1 (THIAMINE) tablet 100 mg, Daily  multivitamin 1 tablet, Daily  naproxen (NAPROSYN) tablet 250 mg, BID WC  sodium chloride flush 0.9 % injection 10 mL, 2 times per day  sodium chloride flush 0.9 % injection 10 mL, PRN  LORazepam (ATIVAN) tablet 1 mg, Q1H PRN
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
## 2020-03-26 LAB
ABSOLUTE EOS #: 1.46 K/UL (ref 0–0.44)
ABSOLUTE IMMATURE GRANULOCYTE: 0.15 K/UL (ref 0–0.3)
ABSOLUTE LYMPH #: 1.08 K/UL (ref 1.1–3.7)
ABSOLUTE MONO #: 0.98 K/UL (ref 0.1–1.2)
ANION GAP SERPL CALCULATED.3IONS-SCNC: 10 MMOL/L (ref 9–17)
ANION GAP SERPL CALCULATED.3IONS-SCNC: 11 MMOL/L (ref 9–17)
ANION GAP SERPL CALCULATED.3IONS-SCNC: 12 MMOL/L (ref 9–17)
ANION GAP SERPL CALCULATED.3IONS-SCNC: 9 MMOL/L (ref 9–17)
BASOPHILS # BLD: 1 % (ref 0–2)
BASOPHILS ABSOLUTE: 0.06 K/UL (ref 0–0.2)
BUN BLDV-MCNC: 7 MG/DL (ref 8–23)
BUN/CREAT BLD: ABNORMAL (ref 9–20)
CALCIUM IONIZED: 1.14 MMOL/L (ref 1.13–1.33)
CALCIUM SERPL-MCNC: 9 MG/DL (ref 8.6–10.4)
CHLORIDE BLD-SCNC: 82 MMOL/L (ref 98–107)
CHLORIDE BLD-SCNC: 83 MMOL/L (ref 98–107)
CHLORIDE BLD-SCNC: 84 MMOL/L (ref 98–107)
CHLORIDE BLD-SCNC: 84 MMOL/L (ref 98–107)
CHLORIDE BLD-SCNC: 85 MMOL/L (ref 98–107)
CHLORIDE BLD-SCNC: 88 MMOL/L (ref 98–107)
CO2: 19 MMOL/L (ref 20–31)
CO2: 20 MMOL/L (ref 20–31)
CO2: 21 MMOL/L (ref 20–31)
CREAT SERPL-MCNC: 0.9 MG/DL (ref 0.7–1.2)
DIFFERENTIAL TYPE: ABNORMAL
EOSINOPHILS RELATIVE PERCENT: 14 % (ref 1–4)
GFR AFRICAN AMERICAN: >60 ML/MIN
GFR NON-AFRICAN AMERICAN: >60 ML/MIN
GFR SERPL CREATININE-BSD FRML MDRD: ABNORMAL ML/MIN/{1.73_M2}
GFR SERPL CREATININE-BSD FRML MDRD: ABNORMAL ML/MIN/{1.73_M2}
GLUCOSE BLD-MCNC: 122 MG/DL (ref 70–99)
HCT VFR BLD CALC: 25.5 % (ref 40.7–50.3)
HEMOGLOBIN: 8.2 G/DL (ref 13–17)
IMMATURE GRANULOCYTES: 1 %
LYMPHOCYTES # BLD: 10 % (ref 24–43)
MAGNESIUM: 2 MG/DL (ref 1.6–2.6)
MCH RBC QN AUTO: 23.6 PG (ref 25.2–33.5)
MCHC RBC AUTO-ENTMCNC: 32.2 G/DL (ref 28.4–34.8)
MCV RBC AUTO: 73.5 FL (ref 82.6–102.9)
MONOCYTES # BLD: 9 % (ref 3–12)
NRBC AUTOMATED: 0 PER 100 WBC
OSMOLALITY URINE: 240 MOSM/KG (ref 80–1300)
PDW BLD-RTO: 19.3 % (ref 11.8–14.4)
PHOSPHORUS: 2.6 MG/DL (ref 2.5–4.5)
PLATELET # BLD: 250 K/UL (ref 138–453)
PLATELET ESTIMATE: ABNORMAL
PMV BLD AUTO: 8.8 FL (ref 8.1–13.5)
POTASSIUM SERPL-SCNC: 4 MMOL/L (ref 3.7–5.3)
POTASSIUM SERPL-SCNC: 4.1 MMOL/L (ref 3.7–5.3)
POTASSIUM SERPL-SCNC: 4.1 MMOL/L (ref 3.7–5.3)
POTASSIUM SERPL-SCNC: 4.4 MMOL/L (ref 3.7–5.3)
POTASSIUM SERPL-SCNC: 4.4 MMOL/L (ref 3.7–5.3)
POTASSIUM SERPL-SCNC: 4.5 MMOL/L (ref 3.7–5.3)
RBC # BLD: 3.47 M/UL (ref 4.21–5.77)
RBC # BLD: ABNORMAL 10*6/UL
SEG NEUTROPHILS: 65 % (ref 36–65)
SEGMENTED NEUTROPHILS ABSOLUTE COUNT: 7.08 K/UL (ref 1.5–8.1)
SODIUM BLD-SCNC: 111 MMOL/L (ref 135–144)
SODIUM BLD-SCNC: 114 MMOL/L (ref 135–144)
SODIUM BLD-SCNC: 115 MMOL/L (ref 135–144)
SODIUM BLD-SCNC: 115 MMOL/L (ref 135–144)
SODIUM BLD-SCNC: 116 MMOL/L (ref 135–144)
SODIUM BLD-SCNC: 119 MMOL/L (ref 135–144)
SODIUM,UR: <20 MMOL/L
WBC # BLD: 10.8 K/UL (ref 3.5–11.3)
WBC # BLD: ABNORMAL 10*3/UL

## 2020-03-26 PROCEDURE — 2060000000 HC ICU INTERMEDIATE R&B

## 2020-03-26 PROCEDURE — 76937 US GUIDE VASCULAR ACCESS: CPT

## 2020-03-26 PROCEDURE — 6360000002 HC RX W HCPCS: Performed by: INTERNAL MEDICINE

## 2020-03-26 PROCEDURE — 6360000002 HC RX W HCPCS: Performed by: EMERGENCY MEDICINE

## 2020-03-26 PROCEDURE — 37799 UNLISTED PX VASCULAR SURGERY: CPT

## 2020-03-26 PROCEDURE — 97162 PT EVAL MOD COMPLEX 30 MIN: CPT

## 2020-03-26 PROCEDURE — 99211 OFF/OP EST MAY X REQ PHY/QHP: CPT

## 2020-03-26 PROCEDURE — 99291 CRITICAL CARE FIRST HOUR: CPT | Performed by: INTERNAL MEDICINE

## 2020-03-26 PROCEDURE — 6370000000 HC RX 637 (ALT 250 FOR IP): Performed by: STUDENT IN AN ORGANIZED HEALTH CARE EDUCATION/TRAINING PROGRAM

## 2020-03-26 PROCEDURE — 2500000003 HC RX 250 WO HCPCS: Performed by: EMERGENCY MEDICINE

## 2020-03-26 PROCEDURE — 6370000000 HC RX 637 (ALT 250 FOR IP): Performed by: EMERGENCY MEDICINE

## 2020-03-26 PROCEDURE — 83735 ASSAY OF MAGNESIUM: CPT

## 2020-03-26 PROCEDURE — 82330 ASSAY OF CALCIUM: CPT

## 2020-03-26 PROCEDURE — 6370000000 HC RX 637 (ALT 250 FOR IP): Performed by: INTERNAL MEDICINE

## 2020-03-26 PROCEDURE — 85025 COMPLETE CBC W/AUTO DIFF WBC: CPT

## 2020-03-26 PROCEDURE — 2580000003 HC RX 258: Performed by: EMERGENCY MEDICINE

## 2020-03-26 PROCEDURE — 80048 BASIC METABOLIC PNL TOTAL CA: CPT

## 2020-03-26 PROCEDURE — 36415 COLL VENOUS BLD VENIPUNCTURE: CPT

## 2020-03-26 PROCEDURE — 84100 ASSAY OF PHOSPHORUS: CPT

## 2020-03-26 PROCEDURE — 84300 ASSAY OF URINE SODIUM: CPT

## 2020-03-26 PROCEDURE — 2580000003 HC RX 258: Performed by: INTERNAL MEDICINE

## 2020-03-26 PROCEDURE — 97535 SELF CARE MNGMENT TRAINING: CPT | Performed by: OCCUPATIONAL THERAPIST

## 2020-03-26 PROCEDURE — 83935 ASSAY OF URINE OSMOLALITY: CPT

## 2020-03-26 PROCEDURE — 97166 OT EVAL MOD COMPLEX 45 MIN: CPT | Performed by: OCCUPATIONAL THERAPIST

## 2020-03-26 PROCEDURE — 97530 THERAPEUTIC ACTIVITIES: CPT

## 2020-03-26 PROCEDURE — 80051 ELECTROLYTE PANEL: CPT

## 2020-03-26 RX ORDER — SODIUM CHLORIDE 1000 MG
3 TABLET, SOLUBLE MISCELLANEOUS ONCE
Status: COMPLETED | OUTPATIENT
Start: 2020-03-26 | End: 2020-03-26

## 2020-03-26 RX ORDER — SODIUM CHLORIDE 9 MG/ML
INJECTION, SOLUTION INTRAVENOUS CONTINUOUS
Status: DISCONTINUED | OUTPATIENT
Start: 2020-03-26 | End: 2020-03-27

## 2020-03-26 RX ORDER — FUROSEMIDE 10 MG/ML
20 INJECTION INTRAMUSCULAR; INTRAVENOUS ONCE
Status: COMPLETED | OUTPATIENT
Start: 2020-03-26 | End: 2020-03-26

## 2020-03-26 RX ADMIN — SODIUM CHLORIDE, PRESERVATIVE FREE 10 ML: 5 INJECTION INTRAVENOUS at 09:30

## 2020-03-26 RX ADMIN — CLOPIDOGREL 75 MG: 75 TABLET, FILM COATED ORAL at 09:24

## 2020-03-26 RX ADMIN — NAPROXEN 250 MG: 250 TABLET ORAL at 09:23

## 2020-03-26 RX ADMIN — FUROSEMIDE 20 MG: 10 INJECTION, SOLUTION INTRAMUSCULAR; INTRAVENOUS at 01:33

## 2020-03-26 RX ADMIN — Medication 100 MG: at 09:23

## 2020-03-26 RX ADMIN — FOLIC ACID 1 MG: 1 TABLET ORAL at 09:24

## 2020-03-26 RX ADMIN — QUETIAPINE FUMARATE 25 MG: 25 TABLET ORAL at 20:02

## 2020-03-26 RX ADMIN — DOCUSATE SODIUM 100 MG: 100 CAPSULE, LIQUID FILLED ORAL at 09:24

## 2020-03-26 RX ADMIN — DESMOPRESSIN ACETATE 40 MG: 0.2 TABLET ORAL at 20:03

## 2020-03-26 RX ADMIN — AMIODARONE HYDROCHLORIDE 200 MG: 200 TABLET ORAL at 20:03

## 2020-03-26 RX ADMIN — ASPIRIN 81 MG: 81 TABLET, CHEWABLE ORAL at 09:24

## 2020-03-26 RX ADMIN — SODIUM CHLORIDE: 9 INJECTION, SOLUTION INTRAVENOUS at 19:59

## 2020-03-26 RX ADMIN — AMIODARONE HYDROCHLORIDE 200 MG: 200 TABLET ORAL at 09:24

## 2020-03-26 RX ADMIN — NAPROXEN 250 MG: 250 TABLET ORAL at 17:26

## 2020-03-26 RX ADMIN — MUPIROCIN: 20 OINTMENT TOPICAL at 09:29

## 2020-03-26 RX ADMIN — LEVOTHYROXINE SODIUM 25 MCG: 25 TABLET ORAL at 08:03

## 2020-03-26 RX ADMIN — THERA TABS 1 TABLET: TAB at 09:23

## 2020-03-26 RX ADMIN — ENOXAPARIN SODIUM 40 MG: 40 INJECTION SUBCUTANEOUS at 09:24

## 2020-03-26 RX ADMIN — Medication 10 ML: at 17:39

## 2020-03-26 RX ADMIN — SODIUM CHLORIDE TAB 1 GM 3 G: 1 TAB at 17:26

## 2020-03-26 RX ADMIN — SODIUM CHLORIDE, PRESERVATIVE FREE 10 ML: 5 INJECTION INTRAVENOUS at 20:04

## 2020-03-26 RX ADMIN — Medication 5 MG: at 17:37

## 2020-03-26 ASSESSMENT — PAIN SCALES - GENERAL
PAINLEVEL_OUTOF10: 0
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
## 2020-03-26 NOTE — PROGRESS NOTES
Physical Therapy    Facility/Department: [address] MICU  Initial Assessment    NAME: Sharif Henao  : 1950  MRN: [id number]    Date of Service: 3/26/2020    Discharge Recommendations:    Further therapy recommended at discharge. PT Equipment Recommendations  Equipment Needed: (CTA pending progression of mobility)    Assessment   Body structures, Functions, Activity limitations: Decreased functional mobility ; Decreased balance;Decreased safe awareness;Decreased endurance;Decreased ROM; Decreased strength;Decreased posture  Assessment: Pt required modA to perform bed mobility, performed STS CGA. Functional mobility limited due to dizziness this date. Pt is expected to require skilled physical therapy to address functional mobility deficits and return prior level of function. Prognosis: Good  Decision Making: Medium Complexity  PT Education: Plan of Care;PT Role;Goals  REQUIRES PT FOLLOW UP: Yes  Activity Tolerance  Activity Tolerance: Patient limited by endurance; Patient limited by fatigue  Activity Tolerance: Limited due to dizziness       Patient Diagnosis(es): The primary encounter diagnosis was Hyponatremia. Diagnoses of Traumatic rhabdomyolysis, initial encounter [facility]), Fall from wheelchair, initial encounter, and Multiple contusions were also pertinent to this visit. has a past medical history of ADHD (attention deficit hyperactivity disorder), ADHD (attention deficit hyperactivity disorder), Atypical chest pain, Chronic bronchitis ([address] Hypertension, Hyponatremia, Meniere's disease, Narcolepsy, Nasal fracture, PND (post-nasal drip), SOB (shortness of breath), Tibia fracture, and Transaminasemia. has a past surgical history that includes cyst removal; Ankle surgery; knee surgery (Right); and Coronary angioplasty with stent (N/A, 10/10/2015). Restrictions  Position Activity Restriction  Other position/activity restrictions:  Up with assistance   Vision/Hearing  Vision: Impaired  Vision Exceptions: assessment- PT/ OT coeval      Sensation  Overall Sensation Status: WFL  Bed mobility  Supine to Sit: Moderate assistance  Sit to Supine: Moderate assistance;2 Person assistance  Comment: Bed mobility performed with HOB elevated and use of bed rails  Transfers  Sit to Stand: Contact guard assistance  Stand to sit: Contact guard assistance  Comment: Pt reports significant dizziness upon standing with BUE with RW, able to stand ~30 seconds before needing to sit due to dizziness.    Ambulation  Ambulation?: No(Unable to attempt due to dizziness)  Stairs/Curb  Stairs?: No     Balance  Posture: Fair  Sitting - Static: Good;-  Sitting - Dynamic: Good;-  Standing - Static: Fair;+  Standing - Dynamic: Fair;-  Comments: Standing balance assessed w/ RW        Plan   Plan  Times per week: 5-6x/week  Current Treatment Recommendations: Strengthening, Transfer Training, Endurance Training, Patient/Caregiver Education & Training, ROM, Balance Training, Gait Training, Home Exercise Program, Functional Mobility Training, Stair training, Safety Education & Training  Safety Devices  Type of devices: Gait belt, Left in bed, Call light within reach, Nurse notified, Patient at risk for falls  Restraints  Initially in place: No    AM-PAC Score     AM-PAC Inpatient Mobility without Stair Climbing Raw Score : 14 (03/26/20 1617)  AM-PAC Inpatient without Stair Climbing T-Scale Score : 40.85 (03/26/20 1617)  Mobility Inpatient CMS 0-100% Score: 53.33 (03/26/20 1617)  Mobility Inpatient without Stair CMS G-Code Modifier : CK (03/26/20 1617)       Goals  Short term goals  Time Frame for Short term goals: 14  Short term goal 1: Pt to perform bed mobility CGA  Short term goal 2: Demonstrate stand pivot transfer CGA  Short term goal 3: Manual propel standard WC 100ft SBA  Short term goal 4: Tolerate 30 minutes of therapy to demo increased endurance  Patient Goals   Patient goals : Did not state       Therapy Time   Individual Concurrent Group

## 2020-03-26 NOTE — PROGRESS NOTES
Dr. Valeria Beasley with nephrology called and notified sodium level 111. New orders received to give 20 mg IV lasix once and recheck electrolytes at 0500.

## 2020-03-26 NOTE — PROGRESS NOTES
medicine team assigned to the patient by medicine admitting resident will be following up the patient from now onwards on the floor.      Alivia Banuelos DO  Neuro Critical Care  3/26/2020, 4:56 PM

## 2020-03-26 NOTE — PROGRESS NOTES
receives assistance for navigating steps)  Entrance Stairs - Number of Steps: 4  Entrance Stairs - Rails: Left  Bathroom Shower/Tub: Tub/Shower unit  Bathroom Toilet: Standard  Bathroom Equipment: Shower chair, Grab bars in shower  Bathroom Accessibility: Accessible  Home Equipment: Rolling walker, Cane, [address] Help From: Family  ADL Assistance: Independent  Homemaking Assistance: Independent  Homemaking Responsibilities: Yes(pt reports has a friend that assists with homemaking responsibilities)  Ambulation Assistance: Needs assistance(pt reports stand pivot at baseline)  Transfer Assistance: Independent  Active : No  Mode of Transportation: Cab  Additional Comments: meals on wheels     Objective   Vision: Impaired  Vision Exceptions: Wears glasses for reading  Hearing: Within functional limits    Orientation  Overall Orientation Status: Within Functional Limits     Balance  Sitting Balance: Contact guard assistance  Standing Balance: Contact guard assistance  Standing Balance  Time: ~5 mintues  Activity: static standing  Comment: pt reported dizziness and requested to return sitting, unable to attempt functional mobility due to dizziness  ADL  Feeding: Minimal assistance;Setup  Grooming: Setup;Minimal assistance  UE Bathing: Moderate assistance  LE Bathing: Maximum assistance  UE Dressing: Moderate assistance  LE Dressing: Maximum assistance  Toileting: Maximum assistance  Tone RUE  RUE Tone: Normotonic  Tone LUE  LUE Tone: Normotonic  Coordination  Movements Are Fluid And Coordinated: Yes     Bed mobility  Supine to Sit: Moderate assistance;2 Person assistance  Sit to Supine:  Moderate assistance;2 Person assistance  Transfers  Sit to stand: Minimal assistance  Stand to sit: Minimal assistance     Cognition  Overall Cognitive Status: WFL        Sensation  Overall Sensation Status: WFL        LUE AROM (degrees)  LUE AROM : WFL  Left Hand AROM (degrees)  Left Hand AROM: WFL  RUE AROM

## 2020-03-26 NOTE — PROGRESS NOTES
Never Used    Tobacco comment: e-cigs   Substance Use Topics    Alcohol use:  Yes     Alcohol/week: 16.0 standard drinks     Types: 16 Cans of beer per week     Comment: daily    Drug use: No       ALLERGIES    Allergies   Allergen Reactions    Motrin [Ibuprofen Micronized]      Pt states he had blood in stool from motrin           Objective:      /67   Pulse 64   Temp 97.5 °F (36.4 °C) (Oral)   Resp 16   Ht 5' 10\" (1.778 m)   Wt 175 lb 7.8 oz (79.6 kg)   SpO2 96%   BMI 25.18 kg/m²   Reynaldo Risk Score: Reynaldo Scale Score: 12    LABS    CBC:   Lab Results   Component Value Date    WBC 10.8 03/26/2020    RBC 3.47 03/26/2020    RBC 3.71 01/19/2012    HGB 8.2 03/26/2020     CMP:  Albumin:    Lab Results   Component Value Date    LABALBU 3.3 03/24/2020    LABALBU 4.6 01/19/2012     PT/INR:    Lab Results   Component Value Date    PROTIME 10.0 11/09/2015    PROTIME 9.6 12/10/2011    INR 1.0 11/09/2015     HgBA1c:    Lab Results   Component Value Date    LABA1C 5.4 05/03/2018     PTT: No components found for: LABPTT      Assessment:     Patient Active Problem List   Diagnosis    Knee pain, right    Vertigo    Meniere's disease    ADHD (attention deficit hyperactivity disorder)    Narcolepsy    Hyponatremia    PND (post-nasal drip)    Transaminasemia    NSTEMI (non-ST elevated myocardial infarction) ([address]    Altered mental status    Cardiomyopathy ([address]    HTN (hypertension)    Metabolic encephalopathy    Non-traumatic rhabdomyolysis    Urinary tract infection with hematuria    Dysphagia    Chronic obstructive pulmonary disease (HCC)    Dementia with behavioral disturbance (HCC)    Status post insertion of percutaneous endoscopic gastrostomy (PEG) tube ([address]    S/p bare metal coronary artery stent    Pressure injury of right hip, stage 1       Measurements:     03/26/20 0940   Wound 03/24/20 Buttocks Left   Date First Assessed/Time First Assessed: 03/24/20 (c) 1915   Present on Hospital incontinence to the buttocks, cait-area, sacrococcygeal area. Moisture wicking under pads vs cloth backed briefs     Right forearm and lateral hip: silicone foam. Change every 3 days  Right knee, bilateral #1 toes, and scalp abrasions: antibiotic ointment daily. Cover with dry dressing prn drainage.      Specialty Bed Required : Yes: Isolibrium in use   [x]? Low Air Loss   [x]? Pressure Redistribution  []? Fluid Immersion  []? Bariatric  []? Total Pressure Relief  []? Other:      Discharge Plan:  Placement for patient upon discharge: skilled nursing   Hospice Care: No   Patient appropriate for Outpatient [address]: No     Patient/Caregiver Teaching:     []? Indicates understanding                [x]? Needs reinforcement  []? Unsuccessful                                  []? Verbal Understanding  []? Demonstrated understanding        []? No evidence of learning  []?  Refused teaching                           []? N/A             Electronically signed by Noralyn Ahumada, RN, CWON on 3/26/2020 at 11:04 AM

## 2020-03-26 NOTE — PROGRESS NOTES
SUBJECTIVE    Patient seen and examined. Patient was lying flat. He was alert and oriented x3. Exact urine output is not available due to incontinence. Most recent sodium is 114. It peaked up to 116 in last 24 hours  Patient is completely asymptomatic  Patient is on soft diet. Blood pressure is now better controlled and tachycardia improved  OBJECTIVE      CURRENT TEMPERATURE:  Temp: 97.5 °F (36.4 °C)  MAXIMUM TEMPERATURE OVER 24HRS:  Temp (24hrs), Av.7 °F (36.5 °C), Min:97.5 °F (36.4 °C), Max:97.9 °F (36.6 °C)    CURRENT RESPIRATORY RATE:  Resp: 16  CURRENT PULSE:  Pulse: 63  CURRENT BLOOD PRESSURE:  BP: (!) 119/56  24HR BLOOD PRESSURE RANGE:  Systolic (37FIV), GTW:755 , Min:64 , AHK:458   ; Diastolic (04RJJ), PQT:31, Min:34, Max:84    24HR INTAKE/OUTPUT:      Intake/Output Summary (Last 24 hours) at 3/26/2020 1003  Last data filed at 3/26/2020 0930  Gross per 24 hour   Intake 209 ml   Output 400 ml   Net -191 ml     WEIGHT :  Patient Vitals for the past 96 hrs (Last 3 readings):   Weight   20 0556 175 lb 7.8 oz (79.6 kg)   20 2125 181 lb (82.1 kg)   20 1718 160 lb (72.6 kg)     PHYSICAL EXAM      GENERAL APPEARANCE: Alert and awake x3  NECK: No JVD   pULMONARY: Bilateral air entry and clear to auscultation.    CADRDIOVASCULAR: S1 and S2 normally heard NO S3  ABDOMEN: soft nontender, bowel sounds present, no organomegaly,  no ascites   EXTREMITIES: No edema    CURRENT MEDICATIONS      mupirocin (BACTROBAN) 2 % ointment, Daily  [Held by provider] carvedilol (COREG) tablet 3.125 mg, BID WC  clopidogrel (PLAVIX) tablet 75 mg, Daily  levothyroxine (SYNTHROID) tablet 25 mcg, Daily  [Held by provider] lisinopril (PRINIVIL;ZESTRIL) tablet 5 mg, Daily  QUEtiapine (SEROQUEL) tablet 25 mg, Nightly  [Held by provider] spironolactone (ALDACTONE) tablet 25 mg, Daily  docusate sodium (COLACE) capsule 100 mg, BID  amiodarone (CORDARONE) tablet 200 mg, BID  atorvastatin (LIPITOR) tablet 40 mg, Nightly  Armodafinil (NUVIGIL) tablet 150 mg, Daily  aspirin chewable tablet 81 mg, Daily  melatonin tablet 3 mg, Nightly PRN  norepinephrine (LEVOPHED) 16 mg in dextrose 5% 250 mL infusion, Continuous  hydrALAZINE (APRESOLINE) injection 5 mg, Q6H PRN  labetalol (NORMODYNE;TRANDATE) injection syringe 5 mg, Q4H PRN  vitamin B-1 (THIAMINE) tablet 100 mg, Daily  multivitamin 1 tablet, Daily  naproxen (NAPROSYN) tablet 250 mg, BID WC  sodium chloride flush 0.9 % injection 10 mL, 2 times per day  sodium chloride flush 0.9 % injection 10 mL, PRN  LORazepam (ATIVAN) tablet 1 mg, Q1H PRN    Or  LORazepam (ATIVAN) injection 1 mg, Q1H PRN    Or  LORazepam (ATIVAN) tablet 2 mg, Q1H PRN    Or  LORazepam (ATIVAN) injection 2 mg, Q1H PRN    Or  LORazepam (ATIVAN) tablet 3 mg, Q1H PRN    Or  LORazepam (ATIVAN) injection 3 mg, Q1H PRN    Or  LORazepam (ATIVAN) tablet 4 mg, Q1H PRN    Or  LORazepam (ATIVAN) injection 4 mg, W6X PRN  folic acid (FOLVITE) tablet 1 mg, Daily  sodium chloride flush 0.9 % injection 10 mL, 2 times per day  sodium chloride flush 0.9 % injection 10 mL, PRN  acetaminophen (TYLENOL) tablet 650 mg, Q6H PRN    Or  acetaminophen (TYLENOL) suppository 650 mg, Q6H PRN  polyethylene glycol (GLYCOLAX) packet 17 g, Daily PRN  promethazine (PHENERGAN) tablet 12.5 mg, Q6H PRN    Or  ondansetron (ZOFRAN) injection 4 mg, Q6H PRN  enoxaparin (LOVENOX) injection 40 mg, Daily          LABS      CBC:   Recent Labs     03/23/20  1743 03/24/20  0438 03/26/20  0556   WBC 11.2 9.2 10.8   RBC 4.26 3.58* 3.47*   HGB 10.2* 8.7* 8.2*   HCT 32.4* 25.5* 25.5*   MCV 76.1* 71.2* 73.5*   MCH 23.9* 24.3* 23.6*   MCHC 31.5 34.1 32.2   RDW 19.0* 18.9* 19.3*    193 250   MPV 8.9 8.5 8.8      BMP:   Recent Labs     03/24/20  0438  03/25/20  0800  03/26/20  0000 03/26/20  0556 03/26/20  0844   *   < > 111*   < > 111* 116* 114*   K 3.3*   < > 4.0   < > 4.4 4.5 4.0   CL 75*   < > 80*   < > 82* 84* 83*   CO2 19*   < > 20   < > 20 21 19*   BUN 7*  --  4*  --   --  7*  --    CREATININE 0.71  --  0.67*  --   --  0.90  --    GLUCOSE 79  --  101*  --   --  122*  --    CALCIUM 8.7  --  8.8  --   --  9.0  --     < > = values in this interval not displayed. RADIOLOGY      Reviewed as available. ASSESSMENT      1. Hyponatremia multifactorial in nature due to volume depletion further complicated by poor solute intake. Patient receive a dose of Lasix last night. His sodium improved and peaked at 116 although the most recent sodium is 114    2. Syncope due to alcohol intoxication further complicated by dehydration and hyponatremia  3. Mild rhabdo: Improved  4. Ischemic cardiomyopathy  5. Hypertension: Blood pressure is under good control  PLAN      1. Continue to check sodium every 4 hours  2. Start Ensure 1 can 3 times daily with meals  3. We will repeat urine studies this evening  4. Call with sodium results  5. Sodium target by midnight in the range of 120-122   please do not hesitate to call with questions.   This note is created with the assistance of a speech-recognition program. While intending to generate a document that actually reflects the content of the visit, no guarantees can be provided that every mistake has been identified and corrected by editing  Electronically signed by Clara Muse MD on 3/26/2020 at 10:03 AM      Electronically signed by Clara Muse MD on 3/26/2020 at 10:03 AM

## 2020-03-27 PROBLEM — F10.121 ALCOHOL INTOXICATION DELIRIUM (HCC): Status: ACTIVE | Noted: 2020-03-27

## 2020-03-27 PROBLEM — G93.41 ACUTE METABOLIC ENCEPHALOPATHY: Status: ACTIVE | Noted: 2020-03-27

## 2020-03-27 LAB
ABSOLUTE EOS #: 1.35 K/UL (ref 0–0.44)
ABSOLUTE IMMATURE GRANULOCYTE: 0.16 K/UL (ref 0–0.3)
ABSOLUTE LYMPH #: 1.33 K/UL (ref 1.1–3.7)
ABSOLUTE MONO #: 1.16 K/UL (ref 0.1–1.2)
ANION GAP SERPL CALCULATED.3IONS-SCNC: 10 MMOL/L (ref 9–17)
ANION GAP SERPL CALCULATED.3IONS-SCNC: 12 MMOL/L (ref 9–17)
ANION GAP SERPL CALCULATED.3IONS-SCNC: 13 MMOL/L (ref 9–17)
ANION GAP SERPL CALCULATED.3IONS-SCNC: 8 MMOL/L (ref 9–17)
BASOPHILS # BLD: 1 % (ref 0–2)
BASOPHILS ABSOLUTE: 0.07 K/UL (ref 0–0.2)
BUN BLDV-MCNC: 8 MG/DL (ref 8–23)
BUN/CREAT BLD: ABNORMAL (ref 9–20)
CALCIUM IONIZED: 1.25 MMOL/L (ref 1.13–1.33)
CALCIUM SERPL-MCNC: 9.2 MG/DL (ref 8.6–10.4)
CHLORIDE BLD-SCNC: 88 MMOL/L (ref 98–107)
CHLORIDE BLD-SCNC: 89 MMOL/L (ref 98–107)
CHLORIDE BLD-SCNC: 90 MMOL/L (ref 98–107)
CHLORIDE BLD-SCNC: 92 MMOL/L (ref 98–107)
CO2: 20 MMOL/L (ref 20–31)
CO2: 20 MMOL/L (ref 20–31)
CO2: 21 MMOL/L (ref 20–31)
CO2: 22 MMOL/L (ref 20–31)
CO2: 22 MMOL/L (ref 20–31)
CREAT SERPL-MCNC: 0.72 MG/DL (ref 0.7–1.2)
DIFFERENTIAL TYPE: ABNORMAL
EOSINOPHILS RELATIVE PERCENT: 12 % (ref 1–4)
GFR AFRICAN AMERICAN: >60 ML/MIN
GFR NON-AFRICAN AMERICAN: >60 ML/MIN
GFR SERPL CREATININE-BSD FRML MDRD: ABNORMAL ML/MIN/{1.73_M2}
GFR SERPL CREATININE-BSD FRML MDRD: ABNORMAL ML/MIN/{1.73_M2}
GLUCOSE BLD-MCNC: 101 MG/DL (ref 70–99)
HCT VFR BLD CALC: 27.2 % (ref 40.7–50.3)
HEMOGLOBIN: 8.5 G/DL (ref 13–17)
IMMATURE GRANULOCYTES: 1 %
LYMPHOCYTES # BLD: 12 % (ref 24–43)
MAGNESIUM: 2 MG/DL (ref 1.6–2.6)
MCH RBC QN AUTO: 23.9 PG (ref 25.2–33.5)
MCHC RBC AUTO-ENTMCNC: 31.3 G/DL (ref 28.4–34.8)
MCV RBC AUTO: 76.6 FL (ref 82.6–102.9)
MONOCYTES # BLD: 11 % (ref 3–12)
NRBC AUTOMATED: 0 PER 100 WBC
PDW BLD-RTO: 19.9 % (ref 11.8–14.4)
PHOSPHORUS: 2.4 MG/DL (ref 2.5–4.5)
PLATELET # BLD: 251 K/UL (ref 138–453)
PLATELET ESTIMATE: ABNORMAL
PMV BLD AUTO: 8.6 FL (ref 8.1–13.5)
POTASSIUM SERPL-SCNC: 4.2 MMOL/L (ref 3.7–5.3)
POTASSIUM SERPL-SCNC: 4.3 MMOL/L (ref 3.7–5.3)
POTASSIUM SERPL-SCNC: 4.5 MMOL/L (ref 3.7–5.3)
POTASSIUM SERPL-SCNC: 4.5 MMOL/L (ref 3.7–5.3)
RBC # BLD: 3.55 M/UL (ref 4.21–5.77)
RBC # BLD: ABNORMAL 10*6/UL
SEG NEUTROPHILS: 63 % (ref 36–65)
SEGMENTED NEUTROPHILS ABSOLUTE COUNT: 6.99 K/UL (ref 1.5–8.1)
SODIUM BLD-SCNC: 118 MMOL/L (ref 135–144)
SODIUM BLD-SCNC: 120 MMOL/L (ref 135–144)
SODIUM BLD-SCNC: 122 MMOL/L (ref 135–144)
SODIUM BLD-SCNC: 123 MMOL/L (ref 135–144)
SODIUM BLD-SCNC: 123 MMOL/L (ref 135–144)
SODIUM BLD-SCNC: 124 MMOL/L (ref 135–144)
SODIUM BLD-SCNC: 125 MMOL/L (ref 135–144)
WBC # BLD: 11.1 K/UL (ref 3.5–11.3)
WBC # BLD: ABNORMAL 10*3/UL

## 2020-03-27 PROCEDURE — 82330 ASSAY OF CALCIUM: CPT

## 2020-03-27 PROCEDURE — 2700000000 HC OXYGEN THERAPY PER DAY

## 2020-03-27 PROCEDURE — 94760 N-INVAS EAR/PLS OXIMETRY 1: CPT

## 2020-03-27 PROCEDURE — 6360000002 HC RX W HCPCS: Performed by: INTERNAL MEDICINE

## 2020-03-27 PROCEDURE — 85025 COMPLETE CBC W/AUTO DIFF WBC: CPT

## 2020-03-27 PROCEDURE — 36415 COLL VENOUS BLD VENIPUNCTURE: CPT

## 2020-03-27 PROCEDURE — 99233 SBSQ HOSP IP/OBS HIGH 50: CPT | Performed by: INTERNAL MEDICINE

## 2020-03-27 PROCEDURE — 6370000000 HC RX 637 (ALT 250 FOR IP): Performed by: STUDENT IN AN ORGANIZED HEALTH CARE EDUCATION/TRAINING PROGRAM

## 2020-03-27 PROCEDURE — 6360000002 HC RX W HCPCS: Performed by: EMERGENCY MEDICINE

## 2020-03-27 PROCEDURE — 6370000000 HC RX 637 (ALT 250 FOR IP): Performed by: EMERGENCY MEDICINE

## 2020-03-27 PROCEDURE — 83735 ASSAY OF MAGNESIUM: CPT

## 2020-03-27 PROCEDURE — 80051 ELECTROLYTE PANEL: CPT

## 2020-03-27 PROCEDURE — 2060000000 HC ICU INTERMEDIATE R&B

## 2020-03-27 PROCEDURE — 84100 ASSAY OF PHOSPHORUS: CPT

## 2020-03-27 PROCEDURE — 97535 SELF CARE MNGMENT TRAINING: CPT

## 2020-03-27 PROCEDURE — 80048 BASIC METABOLIC PNL TOTAL CA: CPT

## 2020-03-27 PROCEDURE — 2500000003 HC RX 250 WO HCPCS: Performed by: INTERNAL MEDICINE

## 2020-03-27 PROCEDURE — 6370000000 HC RX 637 (ALT 250 FOR IP): Performed by: INTERNAL MEDICINE

## 2020-03-27 PROCEDURE — 2580000003 HC RX 258: Performed by: EMERGENCY MEDICINE

## 2020-03-27 RX ORDER — CARVEDILOL 3.12 MG/1
3.12 TABLET ORAL ONCE
Status: COMPLETED | OUTPATIENT
Start: 2020-03-27 | End: 2020-03-27

## 2020-03-27 RX ORDER — LABETALOL HYDROCHLORIDE 5 MG/ML
20 INJECTION, SOLUTION INTRAVENOUS
Status: DISCONTINUED | OUTPATIENT
Start: 2020-03-27 | End: 2020-03-31 | Stop reason: HOSPADM

## 2020-03-27 RX ORDER — LISINOPRIL 10 MG/1
10 TABLET ORAL DAILY
Status: DISCONTINUED | OUTPATIENT
Start: 2020-03-27 | End: 2020-03-31 | Stop reason: HOSPADM

## 2020-03-27 RX ORDER — FUROSEMIDE 10 MG/ML
20 INJECTION INTRAMUSCULAR; INTRAVENOUS ONCE
Status: COMPLETED | OUTPATIENT
Start: 2020-03-27 | End: 2020-03-27

## 2020-03-27 RX ORDER — LISINOPRIL 5 MG/1
5 TABLET ORAL 2 TIMES DAILY
Status: DISCONTINUED | OUTPATIENT
Start: 2020-03-27 | End: 2020-03-27

## 2020-03-27 RX ADMIN — DOCUSATE SODIUM 100 MG: 100 CAPSULE, LIQUID FILLED ORAL at 08:17

## 2020-03-27 RX ADMIN — Medication 20 MG: at 19:59

## 2020-03-27 RX ADMIN — LISINOPRIL 10 MG: 10 TABLET ORAL at 15:38

## 2020-03-27 RX ADMIN — ENOXAPARIN SODIUM 40 MG: 40 INJECTION SUBCUTANEOUS at 08:20

## 2020-03-27 RX ADMIN — Medication 100 MG: at 08:18

## 2020-03-27 RX ADMIN — Medication 20 MG: at 15:38

## 2020-03-27 RX ADMIN — DOCUSATE SODIUM 100 MG: 100 CAPSULE, LIQUID FILLED ORAL at 21:44

## 2020-03-27 RX ADMIN — SPIRONOLACTONE 25 MG: 25 TABLET ORAL at 08:18

## 2020-03-27 RX ADMIN — MUPIROCIN: 20 OINTMENT TOPICAL at 09:00

## 2020-03-27 RX ADMIN — LISINOPRIL 5 MG: 5 TABLET ORAL at 08:17

## 2020-03-27 RX ADMIN — FUROSEMIDE 20 MG: 10 INJECTION, SOLUTION INTRAMUSCULAR; INTRAVENOUS at 12:11

## 2020-03-27 RX ADMIN — AMIODARONE HYDROCHLORIDE 200 MG: 200 TABLET ORAL at 21:44

## 2020-03-27 RX ADMIN — AMIODARONE HYDROCHLORIDE 200 MG: 200 TABLET ORAL at 08:17

## 2020-03-27 RX ADMIN — CARVEDILOL 3.12 MG: 3.12 TABLET, FILM COATED ORAL at 16:48

## 2020-03-27 RX ADMIN — THERA TABS 1 TABLET: TAB at 08:18

## 2020-03-27 RX ADMIN — FOLIC ACID 1 MG: 1 TABLET ORAL at 08:17

## 2020-03-27 RX ADMIN — NAPROXEN 250 MG: 250 TABLET ORAL at 08:55

## 2020-03-27 RX ADMIN — CARVEDILOL 3.12 MG: 3.12 TABLET, FILM COATED ORAL at 12:21

## 2020-03-27 RX ADMIN — CLOPIDOGREL 75 MG: 75 TABLET, FILM COATED ORAL at 08:17

## 2020-03-27 RX ADMIN — ASPIRIN 81 MG: 81 TABLET, CHEWABLE ORAL at 08:17

## 2020-03-27 RX ADMIN — SODIUM CHLORIDE, PRESERVATIVE FREE 10 ML: 5 INJECTION INTRAVENOUS at 21:43

## 2020-03-27 RX ADMIN — DESMOPRESSIN ACETATE 40 MG: 0.2 TABLET ORAL at 21:44

## 2020-03-27 RX ADMIN — QUETIAPINE FUMARATE 25 MG: 25 TABLET ORAL at 21:44

## 2020-03-27 RX ADMIN — LEVOTHYROXINE SODIUM 25 MCG: 25 TABLET ORAL at 08:10

## 2020-03-27 ASSESSMENT — PAIN SCALES - GENERAL
PAINLEVEL_OUTOF10: 0

## 2020-03-27 NOTE — PROGRESS NOTES
INTERNAL MEDICINE                                                                             ICU PATIENT TRANSFER NOTE        Patient:  Betzy Franco  YOB: 1950  MRN: [id number]     Acct: [de-identified]     Admit date: 3/23/2020    Code Status:-  Full code     Reason for ICU Admission:-   Altered mental status, alcohol intoxication, LOC, fall and hyponatremia l. SUPPORT DEVICES: [] Ventilator [] BIPAP  [x] Nasal Cannula [] Room Air    Consultations:- [] Cardiology [x] Nephrology  [] Hemo onco  [] GI                               [] ID [] ENT  [] Rheum [] Endo   []Physiotherapy                                 Others:-     NUTRITION:  [] NPO [] Tube Feeding (Specify: ) [] TPN  [x] PO    Central Lines:- [x] No   [] Yes           If yes - Days/Date of Insertion. Pt seen,examined and Chart reviewed. ICU COURSE :-     This is a 61-year-old male with past medical history of altered mental status, cardiomyopathy, dementia, dysphagia, hypertension, Ménière's disease, metabolic encephalopathy and vertigo    Patient presented to the ER after a fall from wheelchair with LOC. Downtime: 30 minutes. Patient called EMS. Patient was noted to be intoxicated with slurred speech. Patient reported 1 alcoholic beverage on the morning prior to arrival to the ED. Patient has history of hyponatremia. In the ED, labs: Sodium 104. Chloride 68. Patient alert oriented x3. Trauma consulted for history of fall. CT head, cervical spine were negative. Chest x-ray: No acute changes. CK: 852. Nephrology was consulted in the ED. Patient was started on normal saline 50 mL/hour. With frequent sodium checks. In the ICU, patient's hyponatremia was gradually corrected with free water restriction of 1.2 L / 24 hours.   And was on 50 mL/hour normal saline for

## 2020-03-27 NOTE — PROGRESS NOTES
Senior Note    Pt PMH hyponatremia, pacemaker, transfer from the ICU initially presented as a fall from wheelchair with LOC, intoxicated, alcoholic. In Ed significant hyponatremia Na 104, hypochloremic 68. Pan scan done showed negative for traumatic injury. Nephrology consulted and pt transferred to ICU. NS started at 50, Na check q4. Changed to 1/2NS but back to NS given minimal change in Na. CIWA started. Single dose Lasix given, slow improvement in Na to 119. At this time patient deemed stable for stepdown.      Hyponatremia: likely beer potomania, fluid restriction 1.2L, NS 50, single dose lasix given, NA currently 119, nephrology consulted, continue to monitor, will consider additional lasix dosage 20 IV if sodium refractory, electrolyte q4 for 2 more measurements then will decrease frequency pending nephro recs, target 120-122 this PM  vfib arrest s/p AICD placement  CHFrEF: EF 20%, 10/12/2015, on aldactone,   CAD s/p stent 11/9/2015: on aspirin, plavix, lipitor  Paroxysmal afib: amiodarone CHADSVASC 1, no ac due to fall risk  HTN: lisiopril   Hypothyroidism: on synthroid

## 2020-03-27 NOTE — PLAN OF CARE
Problem: Falls - Risk of:  Goal: Will remain free from falls  Description: Will remain free from falls  3/27/2020 0327 by Foreign Fuchs RN  Outcome: Ongoing  3/26/2020 1357 by Chelsea Rangel RN  Outcome: Met This Shift   Pt assessed as a fall risk this shift. Pt remains free from falls at this time. Fall precautions in place, including falling star sign on door and fall risk band on pt. Floor free from obstacles, and bed is locked and in lowest position. Adequate lighting provided. Call light within reach; pt encouraged to call before getting OOB for any need. Bed alarm activated. Will continue to monitor needs during hourly rounding. Problem: Pain:  Goal: Pain level will decrease  Description: Pain level will decrease  3/27/2020 0327 by Foreign Fuchs RN  Outcome: Ongoing  3/26/2020 1357 by Chelsea Rangel RN  Outcome: Ongoing   Pt's pain assessed frequently with hourly rounding; assessed all pain characteristics including level, type, location, frequency, and onset. Pt medicated by RN per PRN orders. Non-pharmacologic interventions offered to pt as well. Pt states pain is tolerable at this time. Will continue to monitor.   Electronically signed by Foreign Fuchs RN on 3/27/2020 at 3:27 AM

## 2020-03-27 NOTE — PROGRESS NOTES
complete  UE Bathing: Moderate assistance(pt would need assist w/BUE and back)  LE Bathing: Maximum assistance(pt able to wash tops of legs only)  UE Dressing: Moderate assistance(pt would need assist w/gown)  LE Dressing: Maximum assistance(pt would need assist w/footies)  Toileting: Maximum assistance      Pt had completed bathing earlier this date. Pt able to demon ability to complete (see above for LOF). Pt setup for grooming and pt able to wash face and rinse mouth with mouthwash w/SBA. Setup for shaving and pt needed vc's to initiate. BP check going and BP wash 192/96, Rechecked 222/86 RN notified. Tx discontinued. Balance  Sitting Balance:   Sitting assistance: Stand by assistance (sitting up in bed, HOB elevated at tray table)  Standing Balance: Unable to assess (BP elevated)      Plan   Plan  Times per week: 4x/wk    Goals  Short term goals  Time Frame for Short term goals: By discharge pt will. Jocelyne Cargo term goal 1: demo supine<>sit transfers with min A  Short term goal 2: demo sit<>stand transfers with supervision  Short term goal 3: demo UB ADL task with set up and mod I  Short term goal 4: demo LB ADL task with set up and mod A  Short term goal 5: demo 10 minutes sitting balance with supervision      Therapy Time   Individual Concurrent Group Co-treatment   Time In  1350         Time Out  1420         Minutes  30 total tx time                 [address], [place]

## 2020-03-27 NOTE — PROGRESS NOTES
SUBJECTIVE      Patient seen and examined. No acute issues overnight, lying comfortably in bed. Most recent sodium is 120. Patient is completely asymptomatic  Patient is on soft diet. Hemodynamically stable. OBJECTIVE      CURRENT TEMPERATURE:  Temp: 99.2 °F (37.3 °C)  MAXIMUM TEMPERATURE OVER 24HRS:  Temp (24hrs), Av.2 °F (36.8 °C), Min:97.5 °F (36.4 °C), Max:99.2 °F (37.3 °C)    CURRENT RESPIRATORY RATE:  Resp: 17  CURRENT PULSE:  Pulse: 71  CURRENT BLOOD PRESSURE:  BP: (!) 151/69  24HR BLOOD PRESSURE RANGE:  Systolic (28KLJ), IRINEO:156 , Min:109 , OQR:964   ; Diastolic (90WYD), RLO:57, Min:44, Max:89    24HR INTAKE/OUTPUT:      Intake/Output Summary (Last 24 hours) at 3/27/2020 1015  Last data filed at 3/27/2020 0600  Gross per 24 hour   Intake 938 ml   Output 125 ml   Net 813 ml     WEIGHT :  Patient Vitals for the past 96 hrs (Last 3 readings):   Weight   20 0556 175 lb 7.8 oz (79.6 kg)   20 2125 181 lb (82.1 kg)   20 1718 160 lb (72.6 kg)     PHYSICAL EXAM      GENERAL APPEARANCE: Alert and awake x3  NECK: No JVD   pULMONARY: Bilateral air entry and clear to auscultation.    CADRDIOVASCULAR: S1 and S2 normally heard NO S3  ABDOMEN: soft nontender, bowel sounds present, no organomegaly,  no ascites   EXTREMITIES: No edema    CURRENT MEDICATIONS      mupirocin (BACTROBAN) 2 % ointment, Daily  0.9 % sodium chloride infusion, Continuous  [Held by provider] carvedilol (COREG) tablet 3.125 mg, BID WC  clopidogrel (PLAVIX) tablet 75 mg, Daily  levothyroxine (SYNTHROID) tablet 25 mcg, Daily  lisinopril (PRINIVIL;ZESTRIL) tablet 5 mg, Daily  QUEtiapine (SEROQUEL) tablet 25 mg, Nightly  spironolactone (ALDACTONE) tablet 25 mg, Daily  docusate sodium (COLACE) capsule 100 mg, BID  amiodarone (CORDARONE) tablet 200 mg, BID  atorvastatin (LIPITOR) tablet 40 mg, Nightly  Armodafinil (NUVIGIL) tablet 150 mg, Daily  aspirin chewable tablet 81 mg, Daily  melatonin tablet 3 mg, Nightly PRN  vitamin B-1 (THIAMINE) tablet 100 mg, Daily  multivitamin 1 tablet, Daily  naproxen (NAPROSYN) tablet 250 mg, BID WC  sodium chloride flush 0.9 % injection 10 mL, 2 times per day  sodium chloride flush 0.9 % injection 10 mL, PRN  LORazepam (ATIVAN) tablet 1 mg, Q1H PRN    Or  LORazepam (ATIVAN) injection 1 mg, Q1H PRN    Or  LORazepam (ATIVAN) tablet 2 mg, Q1H PRN    Or  LORazepam (ATIVAN) injection 2 mg, Q1H PRN    Or  LORazepam (ATIVAN) tablet 3 mg, Q1H PRN    Or  LORazepam (ATIVAN) injection 3 mg, Q1H PRN    Or  LORazepam (ATIVAN) tablet 4 mg, Q1H PRN    Or  LORazepam (ATIVAN) injection 4 mg, W7Y PRN  folic acid (FOLVITE) tablet 1 mg, Daily  sodium chloride flush 0.9 % injection 10 mL, 2 times per day  sodium chloride flush 0.9 % injection 10 mL, PRN  acetaminophen (TYLENOL) tablet 650 mg, Q6H PRN    Or  acetaminophen (TYLENOL) suppository 650 mg, Q6H PRN  polyethylene glycol (GLYCOLAX) packet 17 g, Daily PRN  promethazine (PHENERGAN) tablet 12.5 mg, Q6H PRN    Or  ondansetron (ZOFRAN) injection 4 mg, Q6H PRN  enoxaparin (LOVENOX) injection 40 mg, Daily          LABS      CBC:   Recent Labs     03/26/20  0556 03/27/20  0410   WBC 10.8 11.1   RBC 3.47* 3.55*   HGB 8.2* 8.5*   HCT 25.5* 27.2*   MCV 73.5* 76.6*   MCH 23.6* 23.9*   MCHC 32.2 31.3   RDW 19.3* 19.9*    251   MPV 8.8 8.6      BMP:   Recent Labs     03/25/20  0800  03/26/20  0556  03/26/20  2330 03/27/20  0410 03/27/20  0841   *   < > 116*   < > 118* 120* 123*   K 4.0   < > 4.5   < > 4.5 4.3 4.2   CL 80*   < > 84*   < > 90* 90* 89*   CO2 20   < > 21   < > 20 20 22   BUN 4*  --  7*  --   --  8  --    CREATININE 0.67*  --  0.90  --   --  0.72  --    GLUCOSE 101*  --  122*  --   --  101*  --    CALCIUM 8.8  --  9.0  --   --  9.2  --     < > = values in this interval not displayed. ASSESSMENT      1. Hyponatremia multifactorial in nature due to volume depletion further complicated by poor solute intake.  the most recent sodium is

## 2020-03-28 LAB
ABSOLUTE EOS #: 0.89 K/UL (ref 0–0.4)
ABSOLUTE IMMATURE GRANULOCYTE: 0 K/UL (ref 0–0.3)
ABSOLUTE LYMPH #: 1.11 K/UL (ref 1–4.8)
ABSOLUTE MONO #: 1.22 K/UL (ref 0.1–0.8)
ANION GAP SERPL CALCULATED.3IONS-SCNC: 13 MMOL/L (ref 9–17)
ANION GAP SERPL CALCULATED.3IONS-SCNC: 13 MMOL/L (ref 9–17)
ANION GAP SERPL CALCULATED.3IONS-SCNC: 14 MMOL/L (ref 9–17)
BASOPHILS # BLD: 1 % (ref 0–2)
BASOPHILS ABSOLUTE: 0.11 K/UL (ref 0–0.2)
BUN BLDV-MCNC: 11 MG/DL (ref 8–23)
BUN/CREAT BLD: ABNORMAL (ref 9–20)
CALCIUM IONIZED: 1.35 MMOL/L (ref 1.13–1.33)
CALCIUM SERPL-MCNC: 9.9 MG/DL (ref 8.6–10.4)
CHLORIDE BLD-SCNC: 91 MMOL/L (ref 98–107)
CHLORIDE BLD-SCNC: 92 MMOL/L (ref 98–107)
CHLORIDE BLD-SCNC: 94 MMOL/L (ref 98–107)
CO2: 19 MMOL/L (ref 20–31)
CO2: 21 MMOL/L (ref 20–31)
CO2: 22 MMOL/L (ref 20–31)
CREAT SERPL-MCNC: 0.75 MG/DL (ref 0.7–1.2)
DIFFERENTIAL TYPE: ABNORMAL
EOSINOPHILS RELATIVE PERCENT: 8 % (ref 1–4)
GFR AFRICAN AMERICAN: >60 ML/MIN
GFR NON-AFRICAN AMERICAN: >60 ML/MIN
GFR SERPL CREATININE-BSD FRML MDRD: ABNORMAL ML/MIN/{1.73_M2}
GFR SERPL CREATININE-BSD FRML MDRD: ABNORMAL ML/MIN/{1.73_M2}
GLUCOSE BLD-MCNC: 100 MG/DL (ref 70–99)
HCT VFR BLD CALC: 32.7 % (ref 40.7–50.3)
HEMOGLOBIN: 9.5 G/DL (ref 13–17)
IMMATURE GRANULOCYTES: 0 %
LYMPHOCYTES # BLD: 10 % (ref 24–44)
MAGNESIUM: 2.1 MG/DL (ref 1.6–2.6)
MCH RBC QN AUTO: 23.6 PG (ref 25.2–33.5)
MCHC RBC AUTO-ENTMCNC: 29.1 G/DL (ref 28.4–34.8)
MCV RBC AUTO: 81.3 FL (ref 82.6–102.9)
MONOCYTES # BLD: 11 % (ref 1–7)
MORPHOLOGY: ABNORMAL
NRBC AUTOMATED: 0 PER 100 WBC
PDW BLD-RTO: 20.8 % (ref 11.8–14.4)
PHOSPHORUS: 3.4 MG/DL (ref 2.5–4.5)
PLATELET # BLD: 279 K/UL (ref 138–453)
PLATELET ESTIMATE: ABNORMAL
PMV BLD AUTO: 8.6 FL (ref 8.1–13.5)
POTASSIUM SERPL-SCNC: 4.3 MMOL/L (ref 3.7–5.3)
POTASSIUM SERPL-SCNC: 4.4 MMOL/L (ref 3.7–5.3)
POTASSIUM SERPL-SCNC: 4.4 MMOL/L (ref 3.7–5.3)
RBC # BLD: 4.02 M/UL (ref 4.21–5.77)
RBC # BLD: ABNORMAL 10*6/UL
SEG NEUTROPHILS: 70 % (ref 36–66)
SEGMENTED NEUTROPHILS ABSOLUTE COUNT: 7.77 K/UL (ref 1.8–7.7)
SODIUM BLD-SCNC: 124 MMOL/L (ref 135–144)
SODIUM BLD-SCNC: 127 MMOL/L (ref 135–144)
SODIUM BLD-SCNC: 128 MMOL/L (ref 135–144)
WBC # BLD: 11.1 K/UL (ref 3.5–11.3)
WBC # BLD: ABNORMAL 10*3/UL

## 2020-03-28 PROCEDURE — 6360000002 HC RX W HCPCS: Performed by: EMERGENCY MEDICINE

## 2020-03-28 PROCEDURE — 6370000000 HC RX 637 (ALT 250 FOR IP): Performed by: NURSE PRACTITIONER

## 2020-03-28 PROCEDURE — 6370000000 HC RX 637 (ALT 250 FOR IP): Performed by: STUDENT IN AN ORGANIZED HEALTH CARE EDUCATION/TRAINING PROGRAM

## 2020-03-28 PROCEDURE — 80051 ELECTROLYTE PANEL: CPT

## 2020-03-28 PROCEDURE — 6370000000 HC RX 637 (ALT 250 FOR IP): Performed by: EMERGENCY MEDICINE

## 2020-03-28 PROCEDURE — 80048 BASIC METABOLIC PNL TOTAL CA: CPT

## 2020-03-28 PROCEDURE — 6370000000 HC RX 637 (ALT 250 FOR IP): Performed by: INTERNAL MEDICINE

## 2020-03-28 PROCEDURE — 85025 COMPLETE CBC W/AUTO DIFF WBC: CPT

## 2020-03-28 PROCEDURE — 36415 COLL VENOUS BLD VENIPUNCTURE: CPT

## 2020-03-28 PROCEDURE — 84100 ASSAY OF PHOSPHORUS: CPT

## 2020-03-28 PROCEDURE — 82330 ASSAY OF CALCIUM: CPT

## 2020-03-28 PROCEDURE — 83735 ASSAY OF MAGNESIUM: CPT

## 2020-03-28 PROCEDURE — 2060000000 HC ICU INTERMEDIATE R&B

## 2020-03-28 PROCEDURE — 2580000003 HC RX 258: Performed by: EMERGENCY MEDICINE

## 2020-03-28 PROCEDURE — 99233 SBSQ HOSP IP/OBS HIGH 50: CPT | Performed by: INTERNAL MEDICINE

## 2020-03-28 RX ORDER — FUROSEMIDE 20 MG/1
20 TABLET ORAL DAILY
Status: DISCONTINUED | OUTPATIENT
Start: 2020-03-28 | End: 2020-03-31 | Stop reason: HOSPADM

## 2020-03-28 RX ADMIN — QUETIAPINE FUMARATE 25 MG: 25 TABLET ORAL at 20:07

## 2020-03-28 RX ADMIN — LISINOPRIL 10 MG: 10 TABLET ORAL at 09:13

## 2020-03-28 RX ADMIN — MUPIROCIN: 20 OINTMENT TOPICAL at 09:15

## 2020-03-28 RX ADMIN — DOCUSATE SODIUM 100 MG: 100 CAPSULE, LIQUID FILLED ORAL at 09:13

## 2020-03-28 RX ADMIN — AMIODARONE HYDROCHLORIDE 200 MG: 200 TABLET ORAL at 20:07

## 2020-03-28 RX ADMIN — ASPIRIN 81 MG: 81 TABLET, CHEWABLE ORAL at 09:12

## 2020-03-28 RX ADMIN — AMIODARONE HYDROCHLORIDE 200 MG: 200 TABLET ORAL at 09:12

## 2020-03-28 RX ADMIN — SODIUM CHLORIDE, PRESERVATIVE FREE 10 ML: 5 INJECTION INTRAVENOUS at 09:15

## 2020-03-28 RX ADMIN — FOLIC ACID 1 MG: 1 TABLET ORAL at 09:12

## 2020-03-28 RX ADMIN — LEVOTHYROXINE SODIUM 25 MCG: 25 TABLET ORAL at 05:54

## 2020-03-28 RX ADMIN — Medication 100 MG: at 09:12

## 2020-03-28 RX ADMIN — FUROSEMIDE 20 MG: 20 TABLET ORAL at 18:07

## 2020-03-28 RX ADMIN — CARVEDILOL 3.12 MG: 3.12 TABLET, FILM COATED ORAL at 09:13

## 2020-03-28 RX ADMIN — DESMOPRESSIN ACETATE 40 MG: 0.2 TABLET ORAL at 20:07

## 2020-03-28 RX ADMIN — ENOXAPARIN SODIUM 40 MG: 40 INJECTION SUBCUTANEOUS at 09:15

## 2020-03-28 RX ADMIN — SPIRONOLACTONE 25 MG: 25 TABLET ORAL at 09:12

## 2020-03-28 RX ADMIN — CARVEDILOL 3.12 MG: 3.12 TABLET, FILM COATED ORAL at 18:06

## 2020-03-28 RX ADMIN — THERA TABS 1 TABLET: TAB at 09:13

## 2020-03-28 RX ADMIN — SODIUM CHLORIDE, PRESERVATIVE FREE 10 ML: 5 INJECTION INTRAVENOUS at 20:07

## 2020-03-28 RX ADMIN — LORAZEPAM 1 MG: 1 TABLET ORAL at 17:07

## 2020-03-28 RX ADMIN — CLOPIDOGREL 75 MG: 75 TABLET, FILM COATED ORAL at 09:13

## 2020-03-28 RX ADMIN — Medication 3 MG: at 20:07

## 2020-03-28 RX ADMIN — SODIUM CHLORIDE, PRESERVATIVE FREE 10 ML: 5 INJECTION INTRAVENOUS at 20:08

## 2020-03-28 ASSESSMENT — PAIN SCALES - GENERAL: PAINLEVEL_OUTOF10: 0

## 2020-03-28 NOTE — PROGRESS NOTES
Primary paged regarding pt BP of 152/87, recheck on other arm 164/83. HR fluctating between 58-60. PRN order state not to admin with HR under 60. Primary ok with continueing to monitor and administering no medications at this time.       Electronically signed by Mark Dong RN on 3/28/2020 at 4:53 AM

## 2020-03-28 NOTE — PROGRESS NOTES
[facility]  Internal Medicine Teaching Residency Program  Inpatient Daily Progress Note  ______________________________________________________________________________    Patient: Rocky Britt  YOB: 1950   [id number]    Acct: [de-identified]     Room: 6993/1048-86  Admit date: 3/23/2020  Today's date: 03/28/20  Number of days in the hospital: 5    SUBJECTIVE   Admitting Diagnosis: Acute metabolic encephalopathy  CC: Altered mental status, alcohol intoxication, LOC, fall and hyponatremia     Patient seen and examined in his room. No acute events overnight. Patient does not have any complaints. Afebrile and hemodynamically stable    Labs: Sodium: 127      BRIEF HISTORY     This is a 82-year-old male with past medical history of altered mental status, cardiomyopathy, dementia, dysphagia, hypertension, Ménière's disease, metabolic encephalopathy and vertigo     Patient presented to the ER after a fall from wheelchair with LOC. Downtime: 30 minutes. Patient called EMS. Patient was noted to be intoxicated with slurred speech. Patient reported 1 alcoholic beverage on the morning prior to arrival to the ED. Patient has history of hyponatremia.     In the ED, labs: Sodium 104. Chloride 68. Patient alert oriented x3. Trauma consulted for history of fall. CT head, cervical spine were negative. Chest x-ray: No acute changes. CK: 852.     Nephrology was consulted in the ED. Patient was started on normal saline 50 mL/hour. With frequent sodium checks.     In the ICU, patient's hyponatremia was gradually corrected with free water restriction of 1.2 L / 24 hours. And was on 50 mL/hour normal saline for rhabdomyolysis. Patient had an ammonia of 67. Patient a dose of 20 mg IV Lasix. Patient started on clear liquid diet with fluid restriction 1.2 L / 24 hours. On day 3, patient was stable. And transferred out to floor.     OBJECTIVE     Vital 10 mL, PRN  acetaminophen, 650 mg, Q6H PRN    Or  acetaminophen, 650 mg, Q6H PRN  polyethylene glycol, 17 g, Daily PRN  promethazine, 12.5 mg, Q6H PRN    Or  ondansetron, 4 mg, Q6H PRN        Diagnostic Labs:  CBC:   Recent Labs     03/26/20  0556 03/27/20  0410 03/28/20  0310   WBC 10.8 11.1 11.1   RBC 3.47* 3.55* 4.02*   HGB 8.2* 8.5* 9.5*   HCT 25.5* 27.2* 32.7*   MCV 73.5* 76.6* 81.3*   RDW 19.3* 19.9* 20.8*    251 279     BMP:   Recent Labs     03/26/20  0556  03/27/20 0410 03/28/20 0310 03/28/20  0726 03/28/20  1125   *   < > 120*   < > 124* 127* 128*   K 4.5   < > 4.3   < > 4.4 4.3 4.4   CL 84*   < > 90*   < > 91* 92* 94*   CO2 21   < > 20   < > 19* 22 21   PHOS 2.6  --  2.4*  --  3.4  --   --    BUN 7*  --  8  --  11  --   --    CREATININE 0.90  --  0.72  --  0.75  --   --     < > = values in this interval not displayed. ASSESSMENT & PLAN   1. Severe hyponatremia: Suspected chronic:  Sodium: 128 today. Free water restriction to 1200 mL/day. Lasix 20 mg IV. 2. Rhabdomyolysis due to fall: Resolved. 3. Metabolic Encephalopathy due to alcohol intoxication. Resolving   4. alcohol abuse with intoxication. CIWA, folate and thiamine   5. Essential hypertension: Lisinopril 5 mg daily. Coreg twice daily. Daily  6. Hypothyroidism: Continue Synthroid 25 mcg daily  7. V. Fib arrest, old s/p AICD  8. Paroxysmal A. Fib: In junctional rhythm. Rate controlled. On amiodarone 200 mg twice daily. Anticoagulation on hold per cardiology due to fall risk. 9. Chronic systolic CHF EF in 7228: 80% on Aldactone 25 mg daily and lisinopril 5 mg daily. Coreg 3.125 mg twice daily.     DVT prophylaxis: Lovenox 40 Mg subcutaneous daily  GI prophylaxis: Not indicated    Refugio Sun MD  Internal Medicine Resident, PGY-1  [facility];  [place], [place]  3/28/2020, 3:07 PM      Attending Physician Statement  I have discussed the case, including pertinent history and exam findings with the

## 2020-03-28 NOTE — PROGRESS NOTES
Physical Therapy  DATE: 3/28/2020    NAME: Marga Bence  MRN: [id number]   : 1950    Patient not seen this date for Physical Therapy due to:  [] Blood transfusion in progress  [] Hemodialysis  [x]  Patient Declined Not feeling well. Will check back as time allows. [] Spine Precautions   [] Strict Bedrest  [] Surgery/ Procedure  [] Testing      [] Other        [] PT being discontinued at this time. Patient independent. No further needs. [] PT being discontinued at this time as the patient has been transferred to palliative care. No further needs.     Marion Estradaing, PTA

## 2020-03-28 NOTE — PROGRESS NOTES
SUBJECTIVE      Following for hyponatremia. . Has normal renal function. SBP elevated 160's. Heart rate tri. Urine output 250 which is likely inaccurate . C/O dry cough    OBJECTIVE      CURRENT TEMPERATURE:  Temp: 98.4 °F (36.9 °C)  MAXIMUM TEMPERATURE OVER 24HRS:  Temp (24hrs), Av °F (36.7 °C), Min:97.7 °F (36.5 °C), Max:98.4 °F (36.9 °C)    CURRENT RESPIRATORY RATE:  Resp: 16  CURRENT PULSE:  Pulse: 58  CURRENT BLOOD PRESSURE:  BP: (!) 149/69  24HR BLOOD PRESSURE RANGE:  Systolic (22JTV), QEM:139 , Min:126 , XVN:869   ; Diastolic (02QDX), QEP:90, Min:67, Max:87    24HR INTAKE/OUTPUT:      Intake/Output Summary (Last 24 hours) at 3/28/2020 1404  Last data filed at 3/28/2020 0450  Gross per 24 hour   Intake 1202 ml   Output 250 ml   Net 952 ml     WEIGHT :  Patient Vitals for the past 96 hrs (Last 3 readings):   Weight   20 0451 187 lb 6.3 oz (85 kg)   20 0556 175 lb 7.8 oz (79.6 kg)     PHYSICAL EXAM      GENERAL APPEARANCE: Alert and awake x3  NECK: No JVD   pULMONARY: Bilateral air entry and clear to auscultation.    CADRDIOVASCULAR: S1 and S2 normally heard NO S3  ABDOMEN: soft nontender, bowel sounds present, no organomegaly,  no ascites   EXTREMITIES: Generalized edema    CURRENT MEDICATIONS      lisinopril (PRINIVIL;ZESTRIL) tablet 10 mg, Daily  labetalol (NORMODYNE;TRANDATE) injection 20 mg, Q2H PRN  mupirocin (BACTROBAN) 2 % ointment, Daily  carvedilol (COREG) tablet 3.125 mg, BID WC  clopidogrel (PLAVIX) tablet 75 mg, Daily  levothyroxine (SYNTHROID) tablet 25 mcg, Daily  QUEtiapine (SEROQUEL) tablet 25 mg, Nightly  spironolactone (ALDACTONE) tablet 25 mg, Daily  docusate sodium (COLACE) capsule 100 mg, BID  amiodarone (CORDARONE) tablet 200 mg, BID  atorvastatin (LIPITOR) tablet 40 mg, Nightly  Armodafinil (NUVIGIL) tablet 150 mg, Daily  aspirin chewable tablet 81 mg, Daily  melatonin tablet 3 mg, Nightly PRN  vitamin B-1 (THIAMINE) tablet 100 mg, Daily  multivitamin 1 tablet, Daily  sodium chloride flush 0.9 % injection 10 mL, 2 times per day  sodium chloride flush 0.9 % injection 10 mL, PRN  LORazepam (ATIVAN) injection 4 mg, P8D PRN  folic acid (FOLVITE) tablet 1 mg, Daily  sodium chloride flush 0.9 % injection 10 mL, 2 times per day  sodium chloride flush 0.9 % injection 10 mL, PRN  acetaminophen (TYLENOL) tablet 650 mg, Q6H PRN       LABS      CBC:   Recent Labs     03/26/20  0556 03/27/20 0410 03/28/20 0310   WBC 10.8 11.1 11.1   RBC 3.47* 3.55* 4.02*   HGB 8.2* 8.5* 9.5*   HCT 25.5* 27.2* 32.7*   MCV 73.5* 76.6* 81.3*   MCH 23.6* 23.9* 23.6*   MCHC 32.2 31.3 29.1   RDW 19.3* 19.9* 20.8*    251 279   MPV 8.8 8.6 8.6      BMP:   Recent Labs     03/26/20  0556  03/27/20 0410 03/28/20 0310 03/28/20  0726 03/28/20  1125   *   < > 120*   < > 124* 127* 128*   K 4.5   < > 4.3   < > 4.4 4.3 4.4   CL 84*   < > 90*   < > 91* 92* 94*   CO2 21   < > 20   < > 19* 22 21   BUN 7*  --  8  --  11  --   --    CREATININE 0.90  --  0.72  --  0.75  --   --    GLUCOSE 122*  --  101*  --  100*  --   --    CALCIUM 9.0  --  9.2  --  9.9  --   --     < > = values in this interval not displayed. ASSESSMENT      1. Hyponatremia multifactorial in nature due to volume depletion further complicated by poor solute intake. the most recent sodium is 128 .  2. Syncope due to alcohol intoxication further complicated by dehydration and hyponatremia  3. Mild rhabdo: Improved  4. Ischemic cardiomyopathy  5. Hypertension: Blood pressure not well controlled    PLAN      1. Change sodium checks to Q 12H  2. Add Lasix 20mg PO daily  3. Will continue to follow. Attending Physician Statement  I have discussed the care of [facility], including pertinent history and exam findings with the resident/fellow. I have reviewed the key elements of all parts of the encounter with the resident/fellow. I have seen and examined the patient with the resident/fellow.   I agree with the assessment

## 2020-03-29 LAB
ABSOLUTE EOS #: 0.7 K/UL (ref 0–0.4)
ABSOLUTE IMMATURE GRANULOCYTE: 0.23 K/UL (ref 0–0.3)
ABSOLUTE LYMPH #: 1.39 K/UL (ref 1–4.8)
ABSOLUTE MONO #: 0.93 K/UL (ref 0.1–0.8)
ANION GAP SERPL CALCULATED.3IONS-SCNC: 10 MMOL/L (ref 9–17)
ANION GAP SERPL CALCULATED.3IONS-SCNC: 11 MMOL/L (ref 9–17)
ANION GAP SERPL CALCULATED.3IONS-SCNC: 9 MMOL/L (ref 9–17)
BASOPHILS # BLD: 1 % (ref 0–2)
BASOPHILS ABSOLUTE: 0.12 K/UL (ref 0–0.2)
BUN BLDV-MCNC: 18 MG/DL (ref 8–23)
BUN/CREAT BLD: ABNORMAL (ref 9–20)
CALCIUM IONIZED: 1.22 MMOL/L (ref 1.13–1.33)
CALCIUM SERPL-MCNC: 9.9 MG/DL (ref 8.6–10.4)
CHLORIDE BLD-SCNC: 92 MMOL/L (ref 98–107)
CHLORIDE BLD-SCNC: 92 MMOL/L (ref 98–107)
CHLORIDE BLD-SCNC: 94 MMOL/L (ref 98–107)
CO2: 20 MMOL/L (ref 20–31)
CO2: 21 MMOL/L (ref 20–31)
CO2: 22 MMOL/L (ref 20–31)
CREAT SERPL-MCNC: 1 MG/DL (ref 0.7–1.2)
DIFFERENTIAL TYPE: ABNORMAL
EOSINOPHILS RELATIVE PERCENT: 6 % (ref 1–4)
GFR AFRICAN AMERICAN: >60 ML/MIN
GFR NON-AFRICAN AMERICAN: >60 ML/MIN
GFR SERPL CREATININE-BSD FRML MDRD: ABNORMAL ML/MIN/{1.73_M2}
GFR SERPL CREATININE-BSD FRML MDRD: ABNORMAL ML/MIN/{1.73_M2}
GLUCOSE BLD-MCNC: 106 MG/DL (ref 70–99)
HCT VFR BLD CALC: 26.7 % (ref 40.7–50.3)
HEMOGLOBIN: 8.3 G/DL (ref 13–17)
IMMATURE GRANULOCYTES: 2 %
LYMPHOCYTES # BLD: 12 % (ref 24–44)
MAGNESIUM: 2.1 MG/DL (ref 1.6–2.6)
MCH RBC QN AUTO: 24.6 PG (ref 25.2–33.5)
MCHC RBC AUTO-ENTMCNC: 31.1 G/DL (ref 28.4–34.8)
MCV RBC AUTO: 79 FL (ref 82.6–102.9)
MONOCYTES # BLD: 8 % (ref 1–7)
MORPHOLOGY: ABNORMAL
NRBC AUTOMATED: 0 PER 100 WBC
PDW BLD-RTO: 20.6 % (ref 11.8–14.4)
PHOSPHORUS: 3.2 MG/DL (ref 2.5–4.5)
PLATELET # BLD: ABNORMAL K/UL (ref 138–453)
PLATELET ESTIMATE: ABNORMAL
PLATELET, FLUORESCENCE: 374 K/UL (ref 138–453)
PLATELET, IMMATURE FRACTION: 1.2 % (ref 1.1–10.3)
PMV BLD AUTO: ABNORMAL FL (ref 8.1–13.5)
POTASSIUM SERPL-SCNC: 4.5 MMOL/L (ref 3.7–5.3)
POTASSIUM SERPL-SCNC: 4.7 MMOL/L (ref 3.7–5.3)
POTASSIUM SERPL-SCNC: 4.8 MMOL/L (ref 3.7–5.3)
RBC # BLD: 3.38 M/UL (ref 4.21–5.77)
RBC # BLD: ABNORMAL 10*6/UL
SEG NEUTROPHILS: 71 % (ref 36–66)
SEGMENTED NEUTROPHILS ABSOLUTE COUNT: 8.23 K/UL (ref 1.8–7.7)
SODIUM BLD-SCNC: 123 MMOL/L (ref 135–144)
SODIUM BLD-SCNC: 123 MMOL/L (ref 135–144)
SODIUM BLD-SCNC: 125 MMOL/L (ref 135–144)
WBC # BLD: 11.6 K/UL (ref 3.5–11.3)
WBC # BLD: ABNORMAL 10*3/UL

## 2020-03-29 PROCEDURE — 97535 SELF CARE MNGMENT TRAINING: CPT

## 2020-03-29 PROCEDURE — 6370000000 HC RX 637 (ALT 250 FOR IP): Performed by: STUDENT IN AN ORGANIZED HEALTH CARE EDUCATION/TRAINING PROGRAM

## 2020-03-29 PROCEDURE — 80051 ELECTROLYTE PANEL: CPT

## 2020-03-29 PROCEDURE — 97530 THERAPEUTIC ACTIVITIES: CPT

## 2020-03-29 PROCEDURE — 82330 ASSAY OF CALCIUM: CPT

## 2020-03-29 PROCEDURE — 85055 RETICULATED PLATELET ASSAY: CPT

## 2020-03-29 PROCEDURE — 6370000000 HC RX 637 (ALT 250 FOR IP): Performed by: NURSE PRACTITIONER

## 2020-03-29 PROCEDURE — 83735 ASSAY OF MAGNESIUM: CPT

## 2020-03-29 PROCEDURE — 2580000003 HC RX 258: Performed by: EMERGENCY MEDICINE

## 2020-03-29 PROCEDURE — 36415 COLL VENOUS BLD VENIPUNCTURE: CPT

## 2020-03-29 PROCEDURE — 6370000000 HC RX 637 (ALT 250 FOR IP): Performed by: INTERNAL MEDICINE

## 2020-03-29 PROCEDURE — 85025 COMPLETE CBC W/AUTO DIFF WBC: CPT

## 2020-03-29 PROCEDURE — 2060000000 HC ICU INTERMEDIATE R&B

## 2020-03-29 PROCEDURE — 84100 ASSAY OF PHOSPHORUS: CPT

## 2020-03-29 PROCEDURE — 99233 SBSQ HOSP IP/OBS HIGH 50: CPT | Performed by: INTERNAL MEDICINE

## 2020-03-29 PROCEDURE — 80048 BASIC METABOLIC PNL TOTAL CA: CPT

## 2020-03-29 PROCEDURE — 6370000000 HC RX 637 (ALT 250 FOR IP): Performed by: EMERGENCY MEDICINE

## 2020-03-29 PROCEDURE — 6360000002 HC RX W HCPCS: Performed by: EMERGENCY MEDICINE

## 2020-03-29 RX ORDER — TOLVAPTAN 15 MG/1
15 TABLET ORAL ONCE
Status: COMPLETED | OUTPATIENT
Start: 2020-03-29 | End: 2020-03-29

## 2020-03-29 RX ADMIN — CARVEDILOL 3.12 MG: 3.12 TABLET, FILM COATED ORAL at 08:06

## 2020-03-29 RX ADMIN — SODIUM CHLORIDE, PRESERVATIVE FREE 10 ML: 5 INJECTION INTRAVENOUS at 20:16

## 2020-03-29 RX ADMIN — MUPIROCIN: 20 OINTMENT TOPICAL at 08:07

## 2020-03-29 RX ADMIN — CLOPIDOGREL 75 MG: 75 TABLET, FILM COATED ORAL at 08:06

## 2020-03-29 RX ADMIN — LISINOPRIL 10 MG: 10 TABLET ORAL at 08:07

## 2020-03-29 RX ADMIN — DOCUSATE SODIUM 100 MG: 100 CAPSULE, LIQUID FILLED ORAL at 20:15

## 2020-03-29 RX ADMIN — LORAZEPAM 1 MG: 1 TABLET ORAL at 13:07

## 2020-03-29 RX ADMIN — AMIODARONE HYDROCHLORIDE 200 MG: 200 TABLET ORAL at 20:15

## 2020-03-29 RX ADMIN — TOLVAPTAN 15 MG: 15 TABLET ORAL at 13:03

## 2020-03-29 RX ADMIN — SPIRONOLACTONE 25 MG: 25 TABLET ORAL at 08:07

## 2020-03-29 RX ADMIN — LEVOTHYROXINE SODIUM 25 MCG: 25 TABLET ORAL at 08:07

## 2020-03-29 RX ADMIN — DOCUSATE SODIUM 100 MG: 100 CAPSULE, LIQUID FILLED ORAL at 08:07

## 2020-03-29 RX ADMIN — Medication 3 MG: at 20:17

## 2020-03-29 RX ADMIN — DESMOPRESSIN ACETATE 40 MG: 0.2 TABLET ORAL at 20:15

## 2020-03-29 RX ADMIN — ASPIRIN 81 MG: 81 TABLET, CHEWABLE ORAL at 08:07

## 2020-03-29 RX ADMIN — THERA TABS 1 TABLET: TAB at 08:07

## 2020-03-29 RX ADMIN — FUROSEMIDE 20 MG: 20 TABLET ORAL at 08:07

## 2020-03-29 RX ADMIN — Medication 100 MG: at 08:07

## 2020-03-29 RX ADMIN — CARVEDILOL 3.12 MG: 3.12 TABLET, FILM COATED ORAL at 16:50

## 2020-03-29 RX ADMIN — QUETIAPINE FUMARATE 25 MG: 25 TABLET ORAL at 20:15

## 2020-03-29 RX ADMIN — ENOXAPARIN SODIUM 40 MG: 40 INJECTION SUBCUTANEOUS at 08:08

## 2020-03-29 RX ADMIN — SODIUM CHLORIDE, PRESERVATIVE FREE 10 ML: 5 INJECTION INTRAVENOUS at 08:08

## 2020-03-29 RX ADMIN — AMIODARONE HYDROCHLORIDE 200 MG: 200 TABLET ORAL at 08:07

## 2020-03-29 RX ADMIN — FOLIC ACID 1 MG: 1 TABLET ORAL at 08:05

## 2020-03-29 ASSESSMENT — PAIN DESCRIPTION - ORIENTATION: ORIENTATION: RIGHT

## 2020-03-29 ASSESSMENT — PAIN DESCRIPTION - LOCATION: LOCATION: KNEE

## 2020-03-29 ASSESSMENT — PAIN SCALES - GENERAL: PAINLEVEL_OUTOF10: 7

## 2020-03-29 ASSESSMENT — PAIN DESCRIPTION - PAIN TYPE: TYPE: ACUTE PAIN

## 2020-03-29 NOTE — PLAN OF CARE
Problem: Falls - Risk of:  Goal: Will remain free from falls  Description: Will remain free from falls  3/29/2020 0314 by Latanya Benitez RN  Outcome: Met This Shift     Problem: Falls - Risk of:  Goal: Absence of physical injury  Description: Absence of physical injury  3/29/2020 0314 by Latanya Benitez RN  Outcome: Met This Shift   Patient remained free from injury. Patient verbalized understanding of need for the safety precautions. Demonstrates proper use of assistive devices. Bed remains in the lowest position. Call light remains within reach. [facility] in use.

## 2020-03-29 NOTE — PROGRESS NOTES
[facility]  Internal Medicine Teaching Residency Program  Inpatient Daily Progress Note  ______________________________________________________________________________    Patient: Kyle Singer  YOB: 1950   [id number]    Acct: [de-identified]     Room: 8022/1609-57  Admit date: 3/23/2020  Today's date: 03/29/20  Number of days in the hospital: 6    SUBJECTIVE   Admitting Diagnosis: Acute metabolic encephalopathy  CC: Altered mental status, alcohol intoxication, LOC, fall and hyponatremia     Patient seen and examined in his room. No acute events overnight. He does not have any complaints. Patient stated that he wants to go home today. Afebrile hemodynamically stable. Sodium: 125     patient received 1 dose of p.o. Lasix    Urine output: 1.4 L / 24 hours. BRIEF HISTORY     This is a 71-year-old male with past medical history of altered mental status, cardiomyopathy, dementia, dysphagia, hypertension, Ménière's disease, metabolic encephalopathy and vertigo     Patient presented to the ER after a fall from wheelchair with LOC. Downtime: 30 minutes. Patient called EMS. Patient was noted to be intoxicated with slurred speech. Patient reported 1 alcoholic beverage on the morning prior to arrival to the ED. Patient has history of hyponatremia.     In the ED, labs: Sodium 104. Chloride 68. Patient alert oriented x3. Trauma consulted for history of fall. CT head, cervical spine were negative. Chest x-ray: No acute changes. CK: 852.     Nephrology was consulted in the ED. Patient was started on normal saline 50 mL/hour. With frequent sodium checks.     In the ICU, patient's hyponatremia was gradually corrected with free water restriction of 1.2 L / 24 hours. And was on 50 mL/hour normal saline for rhabdomyolysis. Patient had an ammonia of 67. Patient a dose of 20 mg IV Lasix.   Patient started on clear liquid diet with fluid restriction PRN    Or  LORazepam, 3 mg, Q1H PRN    Or  LORazepam, 3 mg, Q1H PRN    Or  LORazepam, 4 mg, Q1H PRN    Or  LORazepam, 4 mg, Q1H PRN  sodium chloride flush, 10 mL, PRN  acetaminophen, 650 mg, Q6H PRN    Or  acetaminophen, 650 mg, Q6H PRN  polyethylene glycol, 17 g, Daily PRN  promethazine, 12.5 mg, Q6H PRN    Or  ondansetron, 4 mg, Q6H PRN        Diagnostic Labs:  CBC:   Recent Labs     03/27/20 0410 03/28/20  0310 03/29/20  0505   WBC 11.1 11.1 11.6*   RBC 3.55* 4.02* 3.38*   HGB 8.5* 9.5* 8.3*   HCT 27.2* 32.7* 26.7*   MCV 76.6* 81.3* 79.0*   RDW 19.9* 20.8* 20.6*    279 See Reflexed IPF Result     BMP:   Recent Labs     03/27/20  0410  03/28/20 0310 03/28/20  1125 03/28/20  2335 03/29/20  0505   *   < > 124*   < > 128* 123* 125*   K 4.3   < > 4.4   < > 4.4 4.7 4.5   CL 90*   < > 91*   < > 94* 92* 94*   CO2 20   < > 19*   < > 21 22 21   PHOS 2.4*  --  3.4  --   --   --  3.2   BUN 8  --  11  --   --   --  18   CREATININE 0.72  --  0.75  --   --   --  1.00    < > = values in this interval not displayed. ASSESSMENT & PLAN   1. Severe hyponatremia: Suspected chronic:  Sodium: 125 today trending up. Free water restriction to 1200 mL/day. Lasix 20 mg p.o.  2. Rhabdomyolysis due to fall: Resolved. 3. Metabolic Encephalopathy due to alcohol intoxication. Resolved  4. alcohol abuse with intoxication. CIWA, folate and thiamine   5. Essential hypertension: Continue lisinopril 5 mg daily. Coreg twice daily. 6. Hypothyroidism: Continue Synthroid 25 mcg daily  7. V. Fib arrest, old s/p AICD  8. Paroxysmal A. Fib: In junctional rhythm. Rate controlled. Continue amiodarone 200 mg twice daily. Anticoagulation on hold per cardiology due to fall risk. 9. Chronic systolic CHF EF in 4436: 11% continue Aldactone 25 mg daily and lisinopril 5 mg daily.   Coreg 3.125 mg twice daily.     DVT prophylaxis: Lovenox 40 Mg subcutaneous daily  GI prophylaxis: Not indicated    Discharge planning: Home with home

## 2020-03-29 NOTE — PROGRESS NOTES
Occupational Therapy  Facility/Department: [facility] 4B STEPDOWN  Daily Treatment Note  NAME: Gennaro Dey  : 1950  MRN: [id number]    Date of Service: 3/29/2020    Discharge Recommendations:  Patient would benefit from continued therapy after discharge. Pt unsafe to return to residence sec to decreased activity tolerance and decreased safety awareness. Assessment   Performance deficits / Impairments: Decreased functional mobility ; Decreased safe awareness;Decreased balance;Decreased ADL status; Decreased cognition;Decreased posture;Decreased endurance;Decreased high-level IADLs;Decreased strength  Prognosis: Fair  OT Education: OT Role;Plan of Care;Transfer Training;Energy Conservation  Patient Education: purpose of OT; deep breathing; proper hand and foot placement  REQUIRES OT FOLLOW UP: Yes  Activity Tolerance  Activity Tolerance: Treatment limited secondary to agitation;Patient limited by fatigue  Safety Devices  Safety Devices in place: Yes  Type of devices: Nurse notified; Patient at risk for falls; Left in bed;Call light within reach; Bed alarm in place         Patient Diagnosis(es): The primary encounter diagnosis was Hyponatremia. Diagnoses of Traumatic rhabdomyolysis, initial encounter [facility]), Fall from wheelchair, initial encounter, and Multiple contusions were also pertinent to this visit. has a past medical history of ADHD (attention deficit hyperactivity disorder), ADHD (attention deficit hyperactivity disorder), Atypical chest pain, Chronic bronchitis ([address] Hypertension, Hyponatremia, Meniere's disease, Narcolepsy, Nasal fracture, PND (post-nasal drip), SOB (shortness of breath), Tibia fracture, and Transaminasemia. has a past surgical history that includes cyst removal; Ankle surgery; knee surgery (Right); and Coronary angioplasty with stent (N/A, 10/10/2015).     Restrictions  Restrictions/Precautions  Restrictions/Precautions: Fall Risk, General Precautions, Up as Tolerated  Required Braces or Orthoses?: No  Position Activity Restriction  Other position/activity restrictions: up with assist  Subjective   General  Patient assessed for rehabilitation services?: Yes  Family / Caregiver Present: No  Pain Assessment  Pain Assessment: 0-10  Pain Level: 7  Pain Type: Acute pain  Pain Location: Knee  Pain Orientation: Right  Non-Pharmaceutical Pain Intervention(s): Repositioned;Distraction  Vital Signs  BP: (!) 147/70  Patient Currently in Pain: Yes  Oxygen Therapy  SpO2: 91 %  O2 Device: None (Room air)   Orientation  Orientation  Overall Orientation Status: Within Functional Limits  Objective    ADL  Grooming: Stand by assistance;Setup; Increased time to complete(hair brushing completed supine in bed)  LE Dressing: Maximum assistance;Setup; Increased time to complete;Verbal cueing(to doff/don socks pt declinning attempt)  Balance  Sitting Balance: Stand by assistance(pt tolerated approx 2 min with intermitent rest breaks)  Standing Balance  Comment: pt declined standing after max enocuragement  Bed mobility  Rolling to Left: Moderate assistance  Rolling to Right: Moderate assistance  Supine to Sit: Minimal assistance  Scooting: Moderate assistance  Transfers  Sit to stand: Unable to assess  Stand to sit: Unable to assess  Transfer Comments: pt declinining after max encouragement  Cognition  Overall Cognitive Status: Exceptions  Safety Judgement: Decreased awareness of need for assistance;Decreased awareness of need for safety  Insights: Decreased awareness of deficits    Pt agreeable to therapy at start of session. 2L O2 donned originally, ELMORE removed OT pt maintained above 90% throughout session RN aware. ADL tasks of dressing and grooming completed see above for LOF. Pt req max encouragement throughout session, declining standing or any activity once initially seated at EOB. Increased agitation noted therapeutic use of self utilized.  Pt educated on importance of completing activities independently,

## 2020-03-29 NOTE — CARE COORDINATION
Transitional planning. Pt lives alone and plans on returning to his home with [facility], verified with [facility] that they received referral. Pt refuses SNF placement. He will need transport home- has wheelchair in his room and states that he can take a cab with his wheelchair.

## 2020-03-29 NOTE — PROGRESS NOTES
SUBJECTIVE      Patient was seen and examined. No acute issues overnight. Patient was working with the physical therapy. Denies any chest pain, shortness of breath, nausea, vomiting, diarrhea. Sodium level is fluctuating 125 today, 104 on admission. Patient was a started on Lasix 20 mg p.o. Uncontrolled blood pressure. OBJECTIVE      CURRENT TEMPERATURE:  Temp: 98.4 °F (36.9 °C)  MAXIMUM TEMPERATURE OVER 24HRS:  Temp (24hrs), Av.2 °F (36.8 °C), Min:97.5 °F (36.4 °C), Max:98.8 °F (37.1 °C)    CURRENT RESPIRATORY RATE:  Resp: 19  CURRENT PULSE:  Pulse: 72  CURRENT BLOOD PRESSURE:  BP: (!) 169/81  24HR BLOOD PRESSURE RANGE:  Systolic (61MOA), VRW:288 , Min:102 , JJT:213   ; Diastolic (02MCU), GX, Min:60, Max:81    24HR INTAKE/OUTPUT:      Intake/Output Summary (Last 24 hours) at 3/29/2020 1020  Last data filed at 3/29/2020 6586  Gross per 24 hour   Intake 820 ml   Output 1400 ml   Net -580 ml     WEIGHT :  Patient Vitals for the past 96 hrs (Last 3 readings):   Weight   20 0451 187 lb 6.3 oz (85 kg)   20 0556 175 lb 7.8 oz (79.6 kg)     PHYSICAL EXAM      GENERAL APPEARANCE: Alert and awake x3  NECK: No JVD   pULMONARY: Bilateral air entry and clear to auscultation. CADRDIOVASCULAR: S1 and S2 normally heard NO S3  ABDOMEN: soft nontender, bowel sounds present, no organomegaly,  no ascites   EXTREMITIES: Generalized edema.   Improving    CURRENT MEDICATIONS      lisinopril (PRINIVIL;ZESTRIL) tablet 10 mg, Daily  labetalol (NORMODYNE;TRANDATE) injection 20 mg, Q2H PRN  mupirocin (BACTROBAN) 2 % ointment, Daily  carvedilol (COREG) tablet 3.125 mg, BID WC  clopidogrel (PLAVIX) tablet 75 mg, Daily  levothyroxine (SYNTHROID) tablet 25 mcg, Daily  QUEtiapine (SEROQUEL) tablet 25 mg, Nightly  spironolactone (ALDACTONE) tablet 25 mg, Daily  docusate sodium (COLACE) capsule 100 mg, BID  amiodarone (CORDARONE) tablet 200 mg, BID  atorvastatin (LIPITOR) tablet 40 mg, Nightly  Armodafinil We will follow-up with BMP in a.m. Miller Durán MD  PGY-1, Internal medicine resident  [place], [place]    Attending Physician Statement  I have discussed the care of Sal Chew, including pertinent history and exam findings with the resident/fellow. I have reviewed the key elements of all parts of the encounter with the resident/fellow. I have seen and examined the patient with the resident/fellow. I agree with the assessment and plan and status of the problem list as documented.       Elaine Light MD

## 2020-03-29 NOTE — DISCHARGE INSTR - COC
Continuity of Care Form    Patient Name: Steve Vinson   :  1950  MRN:  [id number]    Admit date:  3/23/2020  Discharge date:  3/31/2020    Code Status Order: Full Code   Advance Directives:   [facility] Documentation     Date/Time Healthcare Directive Type of Healthcare Directive Copy in [address] [place] [address] [address] Agent's Name Healthcare Agent's Phone Number    20 0321  No, patient does not have an advance directive for healthcare treatment -- -- -- -- --          Admitting Physician:  No admitting provider for patient encounter.   PCP: Micheline Jones DO    Discharging Nurse: [facility] Unit/Room#: 7565/4592-53  Discharging Unit Phone Number: [phone]    Emergency Contact:   Extended Emergency Contact Information  Primary Emergency Contact: [address] Phone: [phone]  Relation: Other  Secondary Emergency Contact: Bertin Cherry  Relation: Child    Past Surgical History:  Past Surgical History:   Procedure Laterality Date    ANKLE SURGERY      open reduction internal fixation of the right ankle & tibial plateau fx    CORONARY ANGIOPLASTY WITH STENT PLACEMENT N/A 10/10/2015    CYST REMOVAL      right side of face    KNEE SURGERY Right     total knee       Immunization History:   Immunization History   Administered Date(s) Administered    Influenza Virus Vaccine 2014, 10/15/2015    Pneumococcal Conjugate 13-valent ([employer]) 2016    Tdap (Boostrix, Adacel) 2020       Active Problems:  Patient Active Problem List   Diagnosis Code    Knee pain, right M25.561    Vertigo R42    Meniere's disease H81.09    ADHD (attention deficit hyperactivity disorder) F90.9    Narcolepsy G47.419    Hyponatremia E87.1    PND (post-nasal drip) R09.82    Transaminasemia R74.0    NSTEMI (non-ST elevated myocardial infarction) ([facility] I21.4    Altered mental status R41.82    Cardiomyopathy ([facility] I42.9    HTN (hypertension) I10  Metabolic encephalopathy M48.35    Non-traumatic rhabdomyolysis M62.82    Urinary tract infection with hematuria N39.0, R31.9    Dysphagia R13.10    Chronic obstructive pulmonary disease (HCC) J44.9    Dementia with behavioral disturbance ([facility]) F03.91    Status post insertion of percutaneous endoscopic gastrostomy (PEG) tube ([facility] Z93.1    S/p bare metal coronary artery stent Z95.5    Pressure injury of right hip, stage 1 L89.211    Acute metabolic encephalopathy B84.27    Alcohol intoxication delirium ([facility]) F10.121       Isolation/Infection:   Isolation          No Isolation        Patient Infection Status     None to display          Nurse Assessment:  Last Vital Signs: /69   Pulse 75   Temp 98.2 °F (36.8 °C) (Oral)   Resp 19   Ht 5' 10\" (1.778 m)   Wt 187 lb 6.3 oz (85 kg)   SpO2 95%   BMI 26.89 kg/m²     Last documented pain score (0-10 scale): Pain Level: 7  Last Weight:   Wt Readings from Last 1 Encounters:   03/28/20 187 lb 6.3 oz (85 kg)     Mental Status:  oriented    IV Access:  - None    Nursing Mobility/ADLs:  Walking   Dependent  Transfer  Assisted  Bathing  Dependent  Dressing  Dependent  Toileting  Assisted  Feeding  Independent  Med Admin  Independent  Med Delivery   none    Wound Care Documentation and Therapy:  Pressure Ulcer 10/14/15 Chest Right;Lateral;Distal;Anterior (Active)   Number of days: 1628       Incision 11/02/15 Chest Left (Active)   Number of days: 1609       Puncture 11/09/15 Groin Right (Active)   Number of days: 1602       Wound 03/24/20 Buttocks Left (Active)   Wound Image   3/25/2020 11:05 AM   Wound Pressure Stage  1 3/29/2020  8:00 AM   Dressing Changed Changed/New 3/29/2020  4:00 PM   Dressing/Treatment Protective barrier 3/29/2020  4:00 PM   Wound Cleansed Soap and water 3/27/2020  8:00 PM   Wound Assessment Red;Pink;Bleeding;Epithelialization 3/29/2020  4:00 PM   Drainage Amount None 3/29/2020  4:00 PM   Drainage Description Serosanguinous Swallow with Video (Video Swallowing Test): not done    Treatments at the Time of Hospital Discharge:   Respiratory Treatments:   Oxygen Therapy:  is not on home oxygen therapy. Ventilator:    - No ventilator support    Rehab Therapies: Physical Therapy and Occupational Therapy  Weight Bearing Status/Restrictions: No weight bearing restirctions  Other Medical Equipment (for information only, NOT a DME order):  wheelchair  Other Treatments:     Patient's personal belongings (please select all that are sent with patient): Wheelchair, clothes    RN SIGNATURE:  Electronically signed by Yojana Peoples RN on 3/31/20 at 12:02 PM EDT    CASE MANAGEMENT/SOCIAL WORK SECTION    Inpatient Status Date: 3/23/2020    Readmission Risk Assessment Score:  Readmission Risk              Risk of Unplanned Readmission:        27         Discharging to Facility/Agency  · Name:    [facility]  · Address:  · Phone:   [phone]  · Fax:    Discharging to Facility/ Agency   · Name: [address] [address]         Phone: [phone]       Fax: [phone]          / signature: Electronically signed by Gneesis Power RN on 3/30/20 at 1:50 PM EDT    PHYSICIAN SECTION    Prognosis: Fair    Condition at Discharge: Stable    Rehab Potential (if transferring to Rehab): Fair    Recommended Labs or Other Treatments After Discharge:   - Oxygen through nasal cannula as required  -  Continue Vitamin supplementation    Physician Certification: I certify the above information and transfer of Aimee Fuller  is necessary for the continuing treatment of the diagnosis listed and that he requires [facility] for greater 30 days.      Update Admission H&P: No change in H&P    PHYSICIAN SIGNATURE:  Electronically signed by Gertrudis Pichardo MD on 3/31/20 at 12:50 PM EDT

## 2020-03-30 LAB
ANION GAP SERPL CALCULATED.3IONS-SCNC: 13 MMOL/L (ref 9–17)
BUN BLDV-MCNC: 17 MG/DL (ref 8–23)
BUN/CREAT BLD: ABNORMAL (ref 9–20)
CALCIUM SERPL-MCNC: 10.1 MG/DL (ref 8.6–10.4)
CHLORIDE BLD-SCNC: 96 MMOL/L (ref 98–107)
CO2: 20 MMOL/L (ref 20–31)
CREAT SERPL-MCNC: 0.85 MG/DL (ref 0.7–1.2)
GFR AFRICAN AMERICAN: >60 ML/MIN
GFR NON-AFRICAN AMERICAN: >60 ML/MIN
GFR SERPL CREATININE-BSD FRML MDRD: ABNORMAL ML/MIN/{1.73_M2}
GFR SERPL CREATININE-BSD FRML MDRD: ABNORMAL ML/MIN/{1.73_M2}
GLUCOSE BLD-MCNC: 105 MG/DL (ref 70–99)
POTASSIUM SERPL-SCNC: 4.8 MMOL/L (ref 3.7–5.3)
SODIUM BLD-SCNC: 129 MMOL/L (ref 135–144)

## 2020-03-30 PROCEDURE — 6370000000 HC RX 637 (ALT 250 FOR IP): Performed by: STUDENT IN AN ORGANIZED HEALTH CARE EDUCATION/TRAINING PROGRAM

## 2020-03-30 PROCEDURE — 6370000000 HC RX 637 (ALT 250 FOR IP): Performed by: EMERGENCY MEDICINE

## 2020-03-30 PROCEDURE — 2060000000 HC ICU INTERMEDIATE R&B

## 2020-03-30 PROCEDURE — 36415 COLL VENOUS BLD VENIPUNCTURE: CPT

## 2020-03-30 PROCEDURE — 6370000000 HC RX 637 (ALT 250 FOR IP): Performed by: INTERNAL MEDICINE

## 2020-03-30 PROCEDURE — 94760 N-INVAS EAR/PLS OXIMETRY 1: CPT

## 2020-03-30 PROCEDURE — 80048 BASIC METABOLIC PNL TOTAL CA: CPT

## 2020-03-30 PROCEDURE — 6360000002 HC RX W HCPCS: Performed by: EMERGENCY MEDICINE

## 2020-03-30 PROCEDURE — 99232 SBSQ HOSP IP/OBS MODERATE 35: CPT | Performed by: INTERNAL MEDICINE

## 2020-03-30 PROCEDURE — 97110 THERAPEUTIC EXERCISES: CPT

## 2020-03-30 PROCEDURE — 97530 THERAPEUTIC ACTIVITIES: CPT

## 2020-03-30 PROCEDURE — 2580000003 HC RX 258: Performed by: EMERGENCY MEDICINE

## 2020-03-30 PROCEDURE — 6370000000 HC RX 637 (ALT 250 FOR IP): Performed by: NURSE PRACTITIONER

## 2020-03-30 RX ORDER — LISINOPRIL 10 MG/1
10 TABLET ORAL DAILY
Qty: 30 TABLET | Refills: 3 | Status: ON HOLD | OUTPATIENT
Start: 2020-03-31 | End: 2021-04-12

## 2020-03-30 RX ADMIN — Medication 100 MG: at 07:50

## 2020-03-30 RX ADMIN — SPIRONOLACTONE 25 MG: 25 TABLET ORAL at 07:50

## 2020-03-30 RX ADMIN — LISINOPRIL 10 MG: 10 TABLET ORAL at 07:51

## 2020-03-30 RX ADMIN — AMIODARONE HYDROCHLORIDE 200 MG: 200 TABLET ORAL at 20:01

## 2020-03-30 RX ADMIN — Medication 3 MG: at 20:02

## 2020-03-30 RX ADMIN — FOLIC ACID 1 MG: 1 TABLET ORAL at 07:51

## 2020-03-30 RX ADMIN — DOCUSATE SODIUM 100 MG: 100 CAPSULE, LIQUID FILLED ORAL at 07:51

## 2020-03-30 RX ADMIN — CARVEDILOL 3.12 MG: 3.12 TABLET, FILM COATED ORAL at 16:38

## 2020-03-30 RX ADMIN — DOCUSATE SODIUM 100 MG: 100 CAPSULE, LIQUID FILLED ORAL at 20:01

## 2020-03-30 RX ADMIN — AMIODARONE HYDROCHLORIDE 200 MG: 200 TABLET ORAL at 07:51

## 2020-03-30 RX ADMIN — ENOXAPARIN SODIUM 40 MG: 40 INJECTION SUBCUTANEOUS at 07:51

## 2020-03-30 RX ADMIN — ASPIRIN 81 MG: 81 TABLET, CHEWABLE ORAL at 07:51

## 2020-03-30 RX ADMIN — SODIUM CHLORIDE, PRESERVATIVE FREE 10 ML: 5 INJECTION INTRAVENOUS at 07:51

## 2020-03-30 RX ADMIN — CLOPIDOGREL 75 MG: 75 TABLET, FILM COATED ORAL at 07:51

## 2020-03-30 RX ADMIN — THERA TABS 1 TABLET: TAB at 07:51

## 2020-03-30 RX ADMIN — DESMOPRESSIN ACETATE 40 MG: 0.2 TABLET ORAL at 20:01

## 2020-03-30 RX ADMIN — QUETIAPINE FUMARATE 25 MG: 25 TABLET ORAL at 20:03

## 2020-03-30 RX ADMIN — LEVOTHYROXINE SODIUM 25 MCG: 25 TABLET ORAL at 05:22

## 2020-03-30 RX ADMIN — CARVEDILOL 3.12 MG: 3.12 TABLET, FILM COATED ORAL at 07:51

## 2020-03-30 RX ADMIN — MUPIROCIN: 20 OINTMENT TOPICAL at 07:51

## 2020-03-30 RX ADMIN — FUROSEMIDE 20 MG: 20 TABLET ORAL at 07:51

## 2020-03-30 RX ADMIN — SODIUM CHLORIDE, PRESERVATIVE FREE 10 ML: 5 INJECTION INTRAVENOUS at 20:03

## 2020-03-30 ASSESSMENT — ENCOUNTER SYMPTOMS
RHINORRHEA: 0
ABDOMINAL PAIN: 0
NAUSEA: 0
PHOTOPHOBIA: 0
EYE DISCHARGE: 0
CHEST TIGHTNESS: 0
COUGH: 0
BACK PAIN: 0
VOMITING: 0
DIARRHEA: 0
SHORTNESS OF BREATH: 0

## 2020-03-30 NOTE — PROGRESS NOTES
10/10/2015). Restrictions  Restrictions/Precautions  Restrictions/Precautions: Fall Risk, General Precautions, Up as Tolerated  Required Braces or Orthoses?: No  Position Activity Restriction  Other position/activity restrictions: up with assist     Subjective   General  Chart Reviewed: Yes  Response To Previous Treatment: Patient reporting fatigue but able to participate. Family / Caregiver Present: No  Subjective  Subjective: RN and pt in agreement for PT today; Pt supine in bed upon arrival on; Pt pleasant and cooperative throughout  Pain Screening  Patient Currently in Pain: Denies  Vital Signs  Patient Currently in Pain: Denies       Orientation  Orientation  Overall Orientation Status: Within Normal Limits    Objective   Bed mobility  Supine to Sit: Minimal assistance  Sit to Supine: Minimal assistance  Scooting: Minimal assistance  Comment: HOB elevated  Transfers  Sit to Stand: Minimal Assistance; Moderate Assistance  Stand to sit: Minimal Assistance; Moderate Assistance  Bed to Chair: Unable to assess  Stand Pivot Transfers: Unable to assess  Comment: Pt refused to transfer today.  Pt performed sit<>stand 3 times today  Ambulation  Ambulation?: No(pt refused)     Exercises  Hip Flexion: Seated EOB: BLE x20  Hip Abduction: Seated EOB: BLE x20  Knee Long Arc Quad: Seated EOB: BLE x20  Ankle Pumps: Seated EOB: BLE x20       Goals  Short term goals  Time Frame for Short term goals: 15  Short term goal 1: Pt to perform bed mobility CGA  Short term goal 2: Demonstrate stand pivot transfer CGA  Short term goal 3: Manual propel standard WC 100ft SBA  Short term goal 4: Tolerate 30 minutes of therapy to demo increased endurance  Patient Goals   Patient goals : Did not state    Plan    Plan  Times per week: 5-6x/week  Current Treatment Recommendations: Strengthening, Transfer Training, Endurance Training, Patient/Caregiver Education & Training, ROM, Balance Training, Gait Training, Home Exercise Program, Functional Mobility Training, Stair training, Safety Education & Training  Safety Devices  Type of devices: Gait belt, Left in bed, Call light within reach, Nurse notified, Patient at risk for falls, All fall risk precautions in place, Bed alarm in place  Restraints  Initially in place: No     Therapy Time   Individual Concurrent Group Co-treatment   Time In 1532         Time Out 1557         Minutes 25         Timed Code Treatment Minutes: [facility] [facility], [facility]

## 2020-03-30 NOTE — PLAN OF CARE
Problem: Falls - Risk of:  Goal: Will remain free from falls  Description: Will remain free from falls  3/30/2020 0250 by Long Hwang RN  Outcome: Ongoing     Problem: Falls - Risk of:  Goal: Absence of physical injury  Description: Absence of physical injury  3/30/2020 0250 by Long Hwang RN  Outcome: Ongoing  Pt remains free from falls at this time. Floor free from obstacles, and bed is locked and in lowest position. Adequate lighting provided. Call light within reach; pt encouraged to call before getting OOB for any need. Will continue to monitor needs during hourly rounding.

## 2020-03-30 NOTE — CARE COORDINATION
Transitional Planning  Received call from Luis A Cannon ([phone]), Clemente Louie CM, concerned that patient is unsafe to return home and has been falling at home. She has tried to arrange for HHAs but has been unable to locate an agency with staff available. Uncertain whether patient's \"friends\" are truly able to help him. Notified of plan for [facility] with [facility]. She thinks it might be a better idea for patient to discharge to SNF until home help can be arranged. Notified that patient has refused, but will speak with him again. [phone] - Spoke with patient at bedside. Discussed concerned about safe discharge to home. He is agreeable to SNF for a short while. Given choice, referral sent to [facility]. eReferral sent.

## 2020-03-30 NOTE — PROGRESS NOTES
Occupational Therapy    Occupational Therapy Not Seen Note    DATE: 3/30/2020  Name: Lulú Steward  : 1950  MRN: [id number]    Patient not available for Occupational Therapy due to:    Patient Declined- Pt reports wanting to rest and not interested in doing any therapy currently. Ed pt on importance of moving and being more I, pt becoming irritated, continued to refuse. RN informed.     Electronically signed by GAGE Roach on 3/30/2020 at 9:08 AM

## 2020-03-30 NOTE — PROGRESS NOTES
systolic CHF - Controlled. Continue Aldactone 25mg QD, Lisinopril 5mg QD, Corge 3.125mg BiD. 9. Discharge planning: Patient declines SNF. States he is back to his baseline. Will discharge home with home care tomorrow    DVT prophylaxis: Lovenox 40 Mg SQ  GI prophylaxis: Not indicated       Felicitas Jaquez MD  PGY-1, Internal Medicine Resident  [address]         3/30/2020, 10:12 AM       Attending Physician Statement  I have discussed the case, including pertinent history and exam findings with the resident and the team.  I have seen and examined the patient and the key elements of the encounter have been performed by me. I agree with the assessment, plan and orders as documented by the resident. In Brief:  Doing well. NA trending up to 129. BMP in the morning.   If NA abode 132 tomorrow, ok to DC home    Yen Hernandez MD   Attending Physician, Internal Medicine Residency Program  3/30/2020, 11:20 AM

## 2020-03-31 VITALS
RESPIRATION RATE: 21 BRPM | TEMPERATURE: 98.1 F | OXYGEN SATURATION: 94 % | HEIGHT: 70 IN | WEIGHT: 170.9 LBS | HEART RATE: 69 BPM | SYSTOLIC BLOOD PRESSURE: 153 MMHG | BODY MASS INDEX: 24.47 KG/M2 | DIASTOLIC BLOOD PRESSURE: 73 MMHG

## 2020-03-31 LAB
ANION GAP SERPL CALCULATED.3IONS-SCNC: 13 MMOL/L (ref 9–17)
BUN BLDV-MCNC: 18 MG/DL (ref 8–23)
BUN/CREAT BLD: ABNORMAL (ref 9–20)
CALCIUM SERPL-MCNC: 10.8 MG/DL (ref 8.6–10.4)
CHLORIDE BLD-SCNC: 98 MMOL/L (ref 98–107)
CO2: 23 MMOL/L (ref 20–31)
CREAT SERPL-MCNC: 0.88 MG/DL (ref 0.7–1.2)
GFR AFRICAN AMERICAN: >60 ML/MIN
GFR NON-AFRICAN AMERICAN: >60 ML/MIN
GFR SERPL CREATININE-BSD FRML MDRD: ABNORMAL ML/MIN/{1.73_M2}
GFR SERPL CREATININE-BSD FRML MDRD: ABNORMAL ML/MIN/{1.73_M2}
GLUCOSE BLD-MCNC: 99 MG/DL (ref 70–99)
POTASSIUM SERPL-SCNC: 4.6 MMOL/L (ref 3.7–5.3)
SODIUM BLD-SCNC: 134 MMOL/L (ref 135–144)

## 2020-03-31 PROCEDURE — 99232 SBSQ HOSP IP/OBS MODERATE 35: CPT | Performed by: INTERNAL MEDICINE

## 2020-03-31 PROCEDURE — 6370000000 HC RX 637 (ALT 250 FOR IP): Performed by: INTERNAL MEDICINE

## 2020-03-31 PROCEDURE — 6370000000 HC RX 637 (ALT 250 FOR IP): Performed by: STUDENT IN AN ORGANIZED HEALTH CARE EDUCATION/TRAINING PROGRAM

## 2020-03-31 PROCEDURE — 97535 SELF CARE MNGMENT TRAINING: CPT

## 2020-03-31 PROCEDURE — 6370000000 HC RX 637 (ALT 250 FOR IP): Performed by: NURSE PRACTITIONER

## 2020-03-31 PROCEDURE — 2580000003 HC RX 258: Performed by: EMERGENCY MEDICINE

## 2020-03-31 PROCEDURE — 6360000002 HC RX W HCPCS: Performed by: EMERGENCY MEDICINE

## 2020-03-31 PROCEDURE — 6370000000 HC RX 637 (ALT 250 FOR IP): Performed by: EMERGENCY MEDICINE

## 2020-03-31 PROCEDURE — 80048 BASIC METABOLIC PNL TOTAL CA: CPT

## 2020-03-31 PROCEDURE — 36415 COLL VENOUS BLD VENIPUNCTURE: CPT

## 2020-03-31 RX ADMIN — Medication 100 MG: at 08:03

## 2020-03-31 RX ADMIN — THERA TABS 1 TABLET: TAB at 08:03

## 2020-03-31 RX ADMIN — LISINOPRIL 10 MG: 10 TABLET ORAL at 08:03

## 2020-03-31 RX ADMIN — ASPIRIN 81 MG: 81 TABLET, CHEWABLE ORAL at 08:03

## 2020-03-31 RX ADMIN — FUROSEMIDE 20 MG: 20 TABLET ORAL at 08:03

## 2020-03-31 RX ADMIN — CLOPIDOGREL 75 MG: 75 TABLET, FILM COATED ORAL at 08:03

## 2020-03-31 RX ADMIN — CARVEDILOL 3.12 MG: 3.12 TABLET, FILM COATED ORAL at 08:03

## 2020-03-31 RX ADMIN — ENOXAPARIN SODIUM 40 MG: 40 INJECTION SUBCUTANEOUS at 08:04

## 2020-03-31 RX ADMIN — DOCUSATE SODIUM 100 MG: 100 CAPSULE, LIQUID FILLED ORAL at 08:03

## 2020-03-31 RX ADMIN — SPIRONOLACTONE 25 MG: 25 TABLET ORAL at 08:03

## 2020-03-31 RX ADMIN — LEVOTHYROXINE SODIUM 25 MCG: 25 TABLET ORAL at 05:15

## 2020-03-31 RX ADMIN — AMIODARONE HYDROCHLORIDE 200 MG: 200 TABLET ORAL at 08:03

## 2020-03-31 RX ADMIN — FOLIC ACID 1 MG: 1 TABLET ORAL at 08:03

## 2020-03-31 RX ADMIN — MUPIROCIN: 20 OINTMENT TOPICAL at 11:15

## 2020-03-31 RX ADMIN — SODIUM CHLORIDE, PRESERVATIVE FREE 10 ML: 5 INJECTION INTRAVENOUS at 08:08

## 2020-03-31 ASSESSMENT — ENCOUNTER SYMPTOMS
EYE DISCHARGE: 0
DIARRHEA: 0
PHOTOPHOBIA: 0
COUGH: 0
BACK PAIN: 0
NAUSEA: 0
VOMITING: 0
RHINORRHEA: 0
CHEST TIGHTNESS: 0
SHORTNESS OF BREATH: 0
ABDOMINAL PAIN: 0

## 2020-03-31 ASSESSMENT — PAIN SCALES - GENERAL: PAINLEVEL_OUTOF10: 0

## 2020-03-31 NOTE — PROGRESS NOTES
Occupational Therapy  Facility/Department: [facility] 4B STEPDOWN  Daily Treatment Note  NAME: Pearly Burkitt  : 1950  MRN: [id number]    Date of Service: 3/31/2020    Discharge Recommendations:  Patient would benefit from continued therapy after discharge       Assessment   Performance deficits / Impairments: Decreased functional mobility ; Decreased safe awareness;Decreased balance;Decreased ADL status; Decreased cognition;Decreased posture;Decreased endurance;Decreased high-level IADLs;Decreased strength  Assessment: Pt would benefit from continued acute care and post acute care OT to address moderate deficits in ADL/ functional activities, endurance and functional transfers/ mobility following admission. Pt required mod A to complete sit > stand transfer and max A for LE ADL activities. Prognosis: Fair  Decision Making: Medium Complexity  OT Education: OT Role;Plan of Care;Transfer Training;Energy Conservation  Patient Education: fair return   REQUIRES OT FOLLOW UP: Yes  Activity Tolerance  Activity Tolerance: Patient Tolerated treatment well  Safety Devices  Safety Devices in place: Yes  Type of devices: Left in bed;Bed alarm in place;Call light within reach;Nurse notified;Gait belt;Patient at risk for falls(bed buddies in place)  Restraints  Initially in place: No       Patient Diagnosis(es): The primary encounter diagnosis was Hyponatremia. Diagnoses of Traumatic rhabdomyolysis, initial encounter [facility]), Fall from wheelchair, initial encounter, and Multiple contusions were also pertinent to this visit. has a past medical history of ADHD (attention deficit hyperactivity disorder), ADHD (attention deficit hyperactivity disorder), Atypical chest pain, Chronic bronchitis ([facility] Hypertension, Hyponatremia, Meniere's disease, Narcolepsy, Nasal fracture, PND (post-nasal drip), SOB (shortness of breath), Tibia fracture, and Transaminasemia.    has a past surgical history that includes cyst removal; Ankle surgery;

## 2020-03-31 NOTE — PLAN OF CARE
Problem: Falls - Risk of:  Goal: Will remain free from falls  Description: Will remain free from falls  Outcome: Completed  Goal: Absence of physical injury  Description: Absence of physical injury  Outcome: Completed     Problem: Pain:  Goal: Pain level will decrease  Description: Pain level will decrease  Outcome: Completed  Goal: Control of acute pain  Description: Control of acute pain  Outcome: Completed  Goal: Control of chronic pain  Description: Control of chronic pain  Outcome: Completed     Problem: Discharge Planning:  Goal: Discharged to appropriate level of care  Description: Discharged to appropriate level of care  Outcome: Completed     Problem: Skin Integrity:  Goal: Will show no infection signs and symptoms  Description: Will show no infection signs and symptoms  3/31/2020 1315 by Orlando Hernandez RN  Outcome: Completed  3/31/2020 0402 by Vladimir Poe RN  Outcome: Ongoing  Goal: Absence of new skin breakdown  Description: Absence of new skin breakdown  3/31/2020 1315 by Orlando Hernandez RN  Outcome: Completed  3/31/2020 0402 by Vladimir Poe RN  Outcome: Ongoing     Problem: Respiratory:  Goal: Ability to maintain a clear airway will improve  Description: Ability to maintain a clear airway will improve  Outcome: Completed  Goal: Complications related to the disease process, condition or treatment will be avoided or minimized  Description: Complications related to the disease process, condition or treatment will be avoided or minimized  Outcome: Completed     Problem: Nutrition  Goal: Optimal nutrition therapy  3/31/2020 1315 by Orlando Hernandez RN  Outcome: Completed  3/31/2020 1228 by Shantanu Marinelli RD, LD  Outcome: Ongoing  Note: Nutrition Problem: Increased nutrient needs  Intervention: Food and/or Nutrient Delivery: Continue current diet, Continue current ONS  Nutritional Goals: meet % of estimated nutrition needs with oral diet/supplements       Problem: Musculor/Skeletal Functional Status  Goal: Highest potential functional level  Outcome: Completed  Goal: Absence of falls  Outcome: Completed

## 2020-03-31 NOTE — PROGRESS NOTES
Writer called report to MOLI, LPN at [employer]. Mike Farooq that patient does have an envelope of money to please make sure it is secured and not lost or stolen.

## 2020-03-31 NOTE — PROGRESS NOTES
Orders: Standard High Calorie Oral Supplement  · ONS intake: %  · Anthropometric Measures:  · Ht: 5' 10\" (177.8 cm)   · Current Body Wt: 170 lb (77.1 kg)  · Admission Body Wt: 180 lb (81.6 kg)  · % Weight Change:  ,  5.6% wt reduction x 1 wk   · Ideal Body Wt: 166 lb (75.3 kg), % Ideal Body 108% adm/ideal  · BMI Classification: BMI 25.0 - 29.9 Overweight    Nutrition Interventions:   Continue current diet, Continue current ONS  Continued Inpatient Monitoring, Education Not Indicated    Nutrition Evaluation:   · Evaluation: Goal achieved   · Goals: meet % of estimated nutrition needs with oral diet/supplements     · Monitoring: Nutrition Progression, Meal Intake, Supplement Intake, Diet Tolerance, I&O, Skin Integrity, Wound Healing, Weight, Pertinent Labs, Monitor Bowel Function      Electronically signed by Mo Whitney RD, LD on 3/31/20 at 12:20 PM EDT    Contact Number: [phone]

## 2020-03-31 NOTE — CARE COORDINATION
Transitional Planning  Notified of potential discharge today. Call to [facility] at [facility] ([phone]) and notified of plan to discharge. She will verify with  at facility and return call. States they submitted for authorization today. Return call from [facility] at [facility], informed that insurance would like one more OT note as patient is back and forth with therapy treatments. Spoke to Christina, requested updated note. Received call from Jocelin Cantor ([phone]), Clemente Louie CM, aware of SNF pre-cert request. Will follow patient from SNF. HENS done. Patient approved for admission. [facility] transport arranged. AVS with completed JOANNA faxed to [facility]. RN to call report to [phone].       Discharge [facility] Case Management Department  Written by: Michelle Castro RN    Patient Name: Eriberto Kamara  Attending Provider: Juno Cantu MD  Admit Date: 3/23/2020  5:16 PM  MRN: [id number]  Account: [de-identified]                     : 1950  Discharge Date:  20       Disposition: [facility] [address] [facility], RN

## 2020-03-31 NOTE — PROGRESS NOTES
-  Physical Exam  Vitals signs reviewed. Constitutional:       General: He is not in acute distress. Appearance: Normal appearance. He is normal weight. He is not ill-appearing, toxic-appearing or diaphoretic. HENT:      Head: Normocephalic and atraumatic. Mouth/Throat:      Mouth: Mucous membranes are moist.   Eyes:      Pupils: Pupils are equal, round, and reactive to light. Neck:      Musculoskeletal: Normal range of motion and neck supple. No neck rigidity. Cardiovascular:      Rate and Rhythm: Normal rate and regular rhythm. Heart sounds: Normal heart sounds. No murmur. Pulmonary:      Effort: Pulmonary effort is normal. No respiratory distress. Breath sounds: No wheezing or rhonchi. Abdominal:      General: Bowel sounds are normal. There is no distension. Palpations: Abdomen is soft. Musculoskeletal: Normal range of motion. General: No deformity. Skin:     General: Skin is warm and dry. Neurological:      General: No focal deficit present. Mental Status: He is alert.        Medications:  Scheduled Medications:    furosemide  20 mg Oral Daily    lisinopril  10 mg Oral Daily    mupirocin   Topical Daily    carvedilol  3.125 mg Oral BID WC    clopidogrel  75 mg Oral Daily    levothyroxine  25 mcg Oral Daily    QUEtiapine  25 mg Oral Nightly    spironolactone  25 mg Oral Daily    docusate sodium  100 mg Oral BID    amiodarone  200 mg Oral BID    atorvastatin  40 mg Oral Nightly    Armodafinil  150 mg Oral Daily    aspirin  81 mg Oral Daily    thiamine  100 mg Oral Daily    multivitamin  1 tablet Oral Daily    sodium chloride flush  10 mL Intravenous 2 times per day    folic acid  1 mg Oral Daily    sodium chloride flush  10 mL Intravenous 2 times per day    enoxaparin  40 mg Subcutaneous Daily     Continuous Infusions:   PRN Medicationslabetalol, 20 mg, Q2H PRN  melatonin, 3 mg, Nightly PRN  sodium chloride flush, 10 mL, PRN  LORazepam, 1 mg, indicated    Jeannine Silverio MD  PGY-1, Internal Medicine Resident  [address] [facility]         3/31/2020, 7:32 AM

## 2020-03-31 NOTE — PROGRESS NOTES
40 mg, Nightly  Armodafinil (NUVIGIL) tablet 150 mg, Daily  aspirin chewable tablet 81 mg, Daily  melatonin tablet 3 mg, Nightly PRN  vitamin B-1 (THIAMINE) tablet 100 mg, Daily  multivitamin 1 tablet, Daily  sodium chloride flush 0.9 % injection 10 mL, 2 times per day  sodium chloride flush 0.9 % injection 10 mL, PRN  LORazepam (ATIVAN) injection 4 mg, Q8R PRN  folic acid (FOLVITE) tablet 1 mg, Daily  sodium chloride flush 0.9 % injection 10 mL, 2 times per day  sodium chloride flush 0.9 % injection 10 mL, PRN  acetaminophen (TYLENOL) tablet 650 mg, Q6H PRN       LABS      CBC:   Recent Labs     03/29/20  0505   WBC 11.6*   RBC 3.38*   HGB 8.3*   HCT 26.7*   MCV 79.0*   MCH 24.6*   MCHC 31.1   RDW 20.6*   PLT See Reflexed IPF Result   MPV NOT REPORTED      BMP:   Recent Labs     03/29/20  0505 03/29/20  1222 03/30/20  0628 03/31/20  0541   * 123* 129* 134*   K 4.5 4.8 4.8 4.6   CL 94* 92* 96* 98   CO2 21 20 20 23   BUN 18  --  17 18   CREATININE 1.00  --  0.85 0.88   GLUCOSE 106*  --  105* 99   CALCIUM 9.9  --  10.1 10.8*          ASSESSMENT      1. Hyponatremia multifactorial in nature due to volume depletion further complicated by poor solute intake. the most recent sodium is 134.  2.  Syncope due to alcohol intoxication further complicated by dehydration and hyponatremia  3. Mild rhabdomyolysis: Improved  4. Ischemic cardiomyopathy  5. Hypertension: Blood pressure well controlled    PLAN      1. Sodium level improved. 2.  No changes in medications  3. Continue Lasix 20 mg p.o. daily. .  4. OK for discharge any time from nephrology standpoint    Angelika Ornelas MD  PGY-1, Internal medicine resident  [address] [place] [address]    Attending Physician Statement  I have discussed the care of Meryle Adu, including pertinent history and exam findings with the resident/fellow. I have reviewed the key elements of all parts of the encounter with the resident/fellow.  I have seen and

## 2020-04-03 NOTE — DISCHARGE SUMMARY
discharged to SNF when clinically improved. Procedures/ Significant Interventions:    None    Consults:     Consults:     Final Specialist Recommendations/Findings:   IP CONSULT TO NEPHROLOGY  IP CONSULT TO TRAUMA SURGERY  IP CONSULT TO CRITICAL CARE  IP CONSULT TO IV TEAM      Any Hospital Acquired Infections: none    Discharge Functional Status:  stable    DISCHARGE PLAN     Disposition: [facility]    Patient Instructions:   Discharge Medication List as of 3/31/2020 12:56 PM      START taking these medications    Details   mupirocin (BACTROBAN) 2 % ointment Apply topically 3 times daily. , Disp-1 Tube, R-0, Normal         CONTINUE these medications which have CHANGED    Details   lisinopril (PRINIVIL;ZESTRIL) 10 MG tablet Take 1 tablet by mouth daily, Disp-30 tablet, R-3Normal         CONTINUE these medications which have NOT CHANGED    Details   !! meclizine (ANTIVERT) 25 MG tablet TAKE 1 TAB BY MOUTH THREE TIMES A DAY AS NEEDED, Disp-90 tablet, R-0Normal      Multiple Vitamins-Minerals (CERTAVITE SENIOR/ANTIOXIDANT) TABS TAKE 1 TAB BY MOUTH ONCE A DAY, Disp-30 tablet, R-5Normal      GLUCOSAMINE-CHONDROITIN -400 MG tablet TAKE 1 TAB BY MOUTH TWICE A DAY, Disp-60 tablet, R-0Normal      carvedilol (COREG) 3.125 MG tablet TAKE 1 TAB BY MOUTH TWICE A DAY WITH MEALS, Disp-60 tablet, R-0Normal      traMADol (ULTRAM) 50 MG tablet TAKE 1 TAB BY MOUTH EVERY 6 HOURS AS NEEDED, Disp-120 tablet, R-0Print      Armodafinil (NUVIGIL) 150 MG TABS tablet Take 1 tablet by mouth daily, Disp-30 tablet, R-1Print      loratadine (CLARITIN) 10 MG tablet TAKE 1 TAB BY MOUTH ONCE A DAY, Disp-30 tablet, R-3      spironolactone (ALDACTONE) 25 MG tablet Take 1 tablet by mouth daily, Disp-30 tablet, R-3      levothyroxine (SYNTHROID) 25 MCG tablet Take 1 tablet by mouth Daily, Disp-30 tablet, R-3      amiodarone (CORDARONE) 200 MG tablet Take 1 tablet by mouth 2 times daily, Disp-30 tablet, R-3      QUEtiapine (SEROQUEL) 25 MG tablet Take 1

## 2020-05-05 ENCOUNTER — HOSPITAL ENCOUNTER (OUTPATIENT)
Age: 70
Setting detail: SPECIMEN
Discharge: HOME | End: 2020-05-05
Payer: COMMERCIAL

## 2020-05-05 LAB
ALBUMIN SERPL-MCNC: 3.3 G/DL (ref 3.5–5.2)
ALBUMIN/GLOBULIN RATIO: 1.2 (ref 1–2.5)
ALP BLD-CCNC: 114 U/L (ref 40–129)
ALT SERPL-CCNC: 15 U/L (ref 5–41)
ANION GAP SERPL CALCULATED.3IONS-SCNC: 13 MMOL/L (ref 9–17)
AST SERPL-CCNC: 17 U/L
BILIRUB SERPL-MCNC: 0.17 MG/DL (ref 0.3–1.2)
BILIRUBIN DIRECT: 0.09 MG/DL
BILIRUBIN, INDIRECT: 0.08 MG/DL (ref 0–1)
BUN BLDV-MCNC: 8 MG/DL (ref 8–23)
BUN/CREAT BLD: ABNORMAL (ref 9–20)
CALCIUM SERPL-MCNC: 9.3 MG/DL (ref 8.6–10.4)
CHLORIDE BLD-SCNC: 97 MMOL/L (ref 98–107)
CHOLESTEROL/HDL RATIO: 2.4
CHOLESTEROL: 134 MG/DL
CO2: 18 MMOL/L (ref 20–31)
CREAT SERPL-MCNC: 0.8 MG/DL (ref 0.7–1.2)
ESTIMATED AVERAGE GLUCOSE: 137 MG/DL
GFR AFRICAN AMERICAN: >60 ML/MIN
GFR NON-AFRICAN AMERICAN: >60 ML/MIN
GFR SERPL CREATININE-BSD FRML MDRD: ABNORMAL ML/MIN/{1.73_M2}
GFR SERPL CREATININE-BSD FRML MDRD: ABNORMAL ML/MIN/{1.73_M2}
GLOBULIN: ABNORMAL G/DL (ref 1.5–3.8)
GLUCOSE BLD-MCNC: 99 MG/DL (ref 70–99)
HBA1C MFR BLD: 6.4 % (ref 4–6)
HCT VFR BLD CALC: 30 % (ref 40.7–50.3)
HDLC SERPL-MCNC: 56 MG/DL
HEMOGLOBIN: 9.3 G/DL (ref 13–17)
LDL CHOLESTEROL: 56 MG/DL (ref 0–130)
MCH RBC QN AUTO: 23.7 PG (ref 25.2–33.5)
MCHC RBC AUTO-ENTMCNC: 31 G/DL (ref 28.4–34.8)
MCV RBC AUTO: 76.5 FL (ref 82.6–102.9)
NRBC AUTOMATED: 0 PER 100 WBC
PDW BLD-RTO: 20.3 % (ref 11.8–14.4)
PLATELET # BLD: 301 K/UL (ref 138–453)
PMV BLD AUTO: 8.7 FL (ref 8.1–13.5)
POTASSIUM SERPL-SCNC: 5.1 MMOL/L (ref 3.7–5.3)
RBC # BLD: 3.92 M/UL (ref 4.21–5.77)
SODIUM BLD-SCNC: 128 MMOL/L (ref 135–144)
TOTAL PROTEIN: 6.1 G/DL (ref 6.4–8.3)
TRIGL SERPL-MCNC: 108 MG/DL
TSH SERPL DL<=0.05 MIU/L-ACNC: 0.77 MIU/L (ref 0.3–5)
VLDLC SERPL CALC-MCNC: NORMAL MG/DL (ref 1–30)
WBC # BLD: 7.6 K/UL (ref 3.5–11.3)

## 2021-04-11 ENCOUNTER — APPOINTMENT (OUTPATIENT)
Dept: CT IMAGING | Age: 71
DRG: 464 | End: 2021-04-11
Payer: COMMERCIAL

## 2021-04-11 ENCOUNTER — APPOINTMENT (OUTPATIENT)
Dept: GENERAL RADIOLOGY | Age: 71
DRG: 464 | End: 2021-04-11
Payer: COMMERCIAL

## 2021-04-11 ENCOUNTER — HOSPITAL ENCOUNTER (INPATIENT)
Age: 71
LOS: 5 days | Discharge: NURSING, REHAB OR LONG-TERM CARE | DRG: 464 | End: 2021-04-16
Attending: EMERGENCY MEDICINE | Admitting: SURGERY
Payer: COMMERCIAL

## 2021-04-11 DIAGNOSIS — S72.401A CLOSED FRACTURE OF DISTAL END OF RIGHT FEMUR, UNSPECIFIED FRACTURE MORPHOLOGY, INITIAL ENCOUNTER (HCC): Primary | ICD-10-CM

## 2021-04-11 DIAGNOSIS — E87.1 DILUTIONAL HYPONATREMIA: ICD-10-CM

## 2021-04-11 DIAGNOSIS — S72.453S SUPRACONDYLAR FRACTURE OF DISTAL END OF FEMUR WITHOUT INTRACONDYLAR EXTENSION, SEQUELA: ICD-10-CM

## 2021-04-11 DIAGNOSIS — F41.9 ANXIETY: ICD-10-CM

## 2021-04-11 LAB
ABSOLUTE EOS #: 0.43 K/UL (ref 0–0.44)
ABSOLUTE IMMATURE GRANULOCYTE: 0.04 K/UL (ref 0–0.3)
ABSOLUTE LYMPH #: 1.65 K/UL (ref 1.1–3.7)
ABSOLUTE MONO #: 1.44 K/UL (ref 0.1–1.2)
ANION GAP SERPL CALCULATED.3IONS-SCNC: 12 MMOL/L (ref 9–17)
BASOPHILS # BLD: 0 % (ref 0–2)
BASOPHILS ABSOLUTE: 0.03 K/UL (ref 0–0.2)
BUN BLDV-MCNC: 7 MG/DL (ref 8–23)
BUN/CREAT BLD: ABNORMAL (ref 9–20)
C-REACTIVE PROTEIN: 89.4 MG/L (ref 0–5)
CALCIUM SERPL-MCNC: 9.1 MG/DL (ref 8.6–10.4)
CHLORIDE BLD-SCNC: 93 MMOL/L (ref 98–107)
CO2: 20 MMOL/L (ref 20–31)
CREAT SERPL-MCNC: 0.84 MG/DL (ref 0.7–1.2)
DIFFERENTIAL TYPE: ABNORMAL
EOSINOPHILS RELATIVE PERCENT: 4 % (ref 1–4)
GFR AFRICAN AMERICAN: >60 ML/MIN
GFR NON-AFRICAN AMERICAN: >60 ML/MIN
GFR SERPL CREATININE-BSD FRML MDRD: ABNORMAL ML/MIN/{1.73_M2}
GFR SERPL CREATININE-BSD FRML MDRD: ABNORMAL ML/MIN/{1.73_M2}
GLUCOSE BLD-MCNC: 107 MG/DL (ref 70–99)
HCT VFR BLD CALC: 27.1 % (ref 40.7–50.3)
HEMOGLOBIN: 8.2 G/DL (ref 13–17)
IMMATURE GRANULOCYTES: 0 %
LYMPHOCYTES # BLD: 15 % (ref 24–43)
MCH RBC QN AUTO: 25.3 PG (ref 25.2–33.5)
MCHC RBC AUTO-ENTMCNC: 30.3 G/DL (ref 28.4–34.8)
MCV RBC AUTO: 83.6 FL (ref 82.6–102.9)
MONOCYTES # BLD: 13 % (ref 3–12)
MYOGLOBIN: 62 NG/ML (ref 28–72)
NRBC AUTOMATED: 0 PER 100 WBC
PDW BLD-RTO: 19.5 % (ref 11.8–14.4)
PLATELET # BLD: 282 K/UL (ref 138–453)
PLATELET ESTIMATE: ABNORMAL
PMV BLD AUTO: 8.8 FL (ref 8.1–13.5)
POTASSIUM SERPL-SCNC: 5.1 MMOL/L (ref 3.7–5.3)
RBC # BLD: 3.24 M/UL (ref 4.21–5.77)
RBC # BLD: ABNORMAL 10*6/UL
SARS-COV-2, RAPID: NOT DETECTED
SEDIMENTATION RATE, ERYTHROCYTE: 23 MM (ref 0–20)
SEG NEUTROPHILS: 68 % (ref 36–65)
SEGMENTED NEUTROPHILS ABSOLUTE COUNT: 7.3 K/UL (ref 1.5–8.1)
SODIUM BLD-SCNC: 125 MMOL/L (ref 135–144)
SPECIMEN DESCRIPTION: NORMAL
TOTAL CK: 195 U/L (ref 39–308)
WBC # BLD: 10.9 K/UL (ref 3.5–11.3)
WBC # BLD: ABNORMAL 10*3/UL

## 2021-04-11 PROCEDURE — 73552 X-RAY EXAM OF FEMUR 2/>: CPT

## 2021-04-11 PROCEDURE — 30233N1 TRANSFUSION OF NONAUTOLOGOUS RED BLOOD CELLS INTO PERIPHERAL VEIN, PERCUTANEOUS APPROACH: ICD-10-PCS | Performed by: ORTHOPAEDIC SURGERY

## 2021-04-11 PROCEDURE — 85652 RBC SED RATE AUTOMATED: CPT

## 2021-04-11 PROCEDURE — 73502 X-RAY EXAM HIP UNI 2-3 VIEWS: CPT

## 2021-04-11 PROCEDURE — 80048 BASIC METABOLIC PNL TOTAL CA: CPT

## 2021-04-11 PROCEDURE — 83874 ASSAY OF MYOGLOBIN: CPT

## 2021-04-11 PROCEDURE — 93005 ELECTROCARDIOGRAM TRACING: CPT | Performed by: STUDENT IN AN ORGANIZED HEALTH CARE EDUCATION/TRAINING PROGRAM

## 2021-04-11 PROCEDURE — 2060000002 HC BURN ICU INTERMEDIATE R&B

## 2021-04-11 PROCEDURE — 86140 C-REACTIVE PROTEIN: CPT

## 2021-04-11 PROCEDURE — 87635 SARS-COV-2 COVID-19 AMP PRB: CPT

## 2021-04-11 PROCEDURE — 6370000000 HC RX 637 (ALT 250 FOR IP): Performed by: STUDENT IN AN ORGANIZED HEALTH CARE EDUCATION/TRAINING PROGRAM

## 2021-04-11 PROCEDURE — 71045 X-RAY EXAM CHEST 1 VIEW: CPT

## 2021-04-11 PROCEDURE — 70450 CT HEAD/BRAIN W/O DYE: CPT

## 2021-04-11 PROCEDURE — 99285 EMERGENCY DEPT VISIT HI MDM: CPT

## 2021-04-11 PROCEDURE — BW28ZZZ COMPUTERIZED TOMOGRAPHY (CT SCAN) OF HEAD: ICD-10-PCS | Performed by: EMERGENCY MEDICINE

## 2021-04-11 PROCEDURE — 73562 X-RAY EXAM OF KNEE 3: CPT

## 2021-04-11 PROCEDURE — 96374 THER/PROPH/DIAG INJ IV PUSH: CPT

## 2021-04-11 PROCEDURE — 85025 COMPLETE CBC W/AUTO DIFF WBC: CPT

## 2021-04-11 PROCEDURE — 6360000002 HC RX W HCPCS: Performed by: STUDENT IN AN ORGANIZED HEALTH CARE EDUCATION/TRAINING PROGRAM

## 2021-04-11 PROCEDURE — 82550 ASSAY OF CK (CPK): CPT

## 2021-04-11 PROCEDURE — 2580000003 HC RX 258: Performed by: STUDENT IN AN ORGANIZED HEALTH CARE EDUCATION/TRAINING PROGRAM

## 2021-04-11 RX ORDER — OXYCODONE HYDROCHLORIDE 5 MG/1
5 TABLET ORAL EVERY 6 HOURS PRN
Status: DISCONTINUED | OUTPATIENT
Start: 2021-04-11 | End: 2021-04-15

## 2021-04-11 RX ORDER — SODIUM CHLORIDE 0.9 % (FLUSH) 0.9 %
5-40 SYRINGE (ML) INJECTION EVERY 12 HOURS SCHEDULED
Status: DISCONTINUED | OUTPATIENT
Start: 2021-04-11 | End: 2021-04-16 | Stop reason: HOSPADM

## 2021-04-11 RX ORDER — LORAZEPAM 0.5 MG/1
0.5 TABLET ORAL EVERY 8 HOURS
Status: DISCONTINUED | OUTPATIENT
Start: 2021-04-11 | End: 2021-04-16 | Stop reason: HOSPADM

## 2021-04-11 RX ORDER — SODIUM CHLORIDE 9 MG/ML
25 INJECTION, SOLUTION INTRAVENOUS PRN
Status: DISCONTINUED | OUTPATIENT
Start: 2021-04-11 | End: 2021-04-16 | Stop reason: HOSPADM

## 2021-04-11 RX ORDER — LANOLIN ALCOHOL/MO/W.PET/CERES
100 CREAM (GRAM) TOPICAL DAILY
Status: DISCONTINUED | OUTPATIENT
Start: 2021-04-11 | End: 2021-04-12

## 2021-04-11 RX ORDER — SODIUM CHLORIDE 0.9 % (FLUSH) 0.9 %
5-40 SYRINGE (ML) INJECTION PRN
Status: DISCONTINUED | OUTPATIENT
Start: 2021-04-11 | End: 2021-04-16 | Stop reason: HOSPADM

## 2021-04-11 RX ORDER — ARMODAFINIL 150 MG/1
150 TABLET ORAL DAILY
Status: DISCONTINUED | OUTPATIENT
Start: 2021-04-12 | End: 2021-04-11

## 2021-04-11 RX ORDER — CARVEDILOL 3.12 MG/1
3.12 TABLET ORAL 2 TIMES DAILY
Status: DISCONTINUED | OUTPATIENT
Start: 2021-04-11 | End: 2021-04-16 | Stop reason: HOSPADM

## 2021-04-11 RX ORDER — LANOLIN ALCOHOL/MO/W.PET/CERES
3 CREAM (GRAM) TOPICAL DAILY
Status: DISCONTINUED | OUTPATIENT
Start: 2021-04-11 | End: 2021-04-14

## 2021-04-11 RX ORDER — MORPHINE SULFATE 4 MG/ML
8 INJECTION, SOLUTION INTRAMUSCULAR; INTRAVENOUS ONCE
Status: COMPLETED | OUTPATIENT
Start: 2021-04-11 | End: 2021-04-11

## 2021-04-11 RX ORDER — ACETAMINOPHEN 500 MG
1000 TABLET ORAL EVERY 8 HOURS SCHEDULED
Status: DISCONTINUED | OUTPATIENT
Start: 2021-04-11 | End: 2021-04-16 | Stop reason: HOSPADM

## 2021-04-11 RX ORDER — POLYETHYLENE GLYCOL 3350 17 G/17G
17 POWDER, FOR SOLUTION ORAL DAILY
Status: DISCONTINUED | OUTPATIENT
Start: 2021-04-11 | End: 2021-04-16 | Stop reason: HOSPADM

## 2021-04-11 RX ORDER — IPRATROPIUM BROMIDE AND ALBUTEROL SULFATE 2.5; .5 MG/3ML; MG/3ML
1 SOLUTION RESPIRATORY (INHALATION) EVERY 4 HOURS PRN
Status: DISCONTINUED | OUTPATIENT
Start: 2021-04-11 | End: 2021-04-16 | Stop reason: HOSPADM

## 2021-04-11 RX ORDER — AMIODARONE HYDROCHLORIDE 200 MG/1
200 TABLET ORAL 2 TIMES DAILY
Status: DISCONTINUED | OUTPATIENT
Start: 2021-04-11 | End: 2021-04-11

## 2021-04-11 RX ORDER — QUETIAPINE FUMARATE 25 MG/1
25 TABLET, FILM COATED ORAL NIGHTLY
Status: DISCONTINUED | OUTPATIENT
Start: 2021-04-11 | End: 2021-04-11

## 2021-04-11 RX ORDER — SODIUM CHLORIDE, SODIUM LACTATE, POTASSIUM CHLORIDE, CALCIUM CHLORIDE 600; 310; 30; 20 MG/100ML; MG/100ML; MG/100ML; MG/100ML
INJECTION, SOLUTION INTRAVENOUS CONTINUOUS
Status: DISCONTINUED | OUTPATIENT
Start: 2021-04-12 | End: 2021-04-12

## 2021-04-11 RX ORDER — ATORVASTATIN CALCIUM 40 MG/1
40 TABLET, FILM COATED ORAL NIGHTLY
Status: DISCONTINUED | OUTPATIENT
Start: 2021-04-11 | End: 2021-04-16 | Stop reason: HOSPADM

## 2021-04-11 RX ORDER — LEVOTHYROXINE SODIUM 0.03 MG/1
25 TABLET ORAL DAILY
Status: DISCONTINUED | OUTPATIENT
Start: 2021-04-12 | End: 2021-04-16 | Stop reason: HOSPADM

## 2021-04-11 RX ORDER — ONDANSETRON 2 MG/ML
4 INJECTION INTRAMUSCULAR; INTRAVENOUS EVERY 6 HOURS PRN
Status: DISCONTINUED | OUTPATIENT
Start: 2021-04-11 | End: 2021-04-16 | Stop reason: HOSPADM

## 2021-04-11 RX ORDER — DOCUSATE SODIUM 100 MG/1
100 CAPSULE, LIQUID FILLED ORAL 2 TIMES DAILY
Status: DISCONTINUED | OUTPATIENT
Start: 2021-04-11 | End: 2021-04-16 | Stop reason: HOSPADM

## 2021-04-11 RX ADMIN — SODIUM CHLORIDE, POTASSIUM CHLORIDE, SODIUM LACTATE AND CALCIUM CHLORIDE: 600; 310; 30; 20 INJECTION, SOLUTION INTRAVENOUS at 23:53

## 2021-04-11 RX ADMIN — SODIUM CHLORIDE, PRESERVATIVE FREE 10 ML: 5 INJECTION INTRAVENOUS at 20:57

## 2021-04-11 RX ADMIN — MORPHINE SULFATE 8 MG: 4 INJECTION INTRAVENOUS at 17:33

## 2021-04-11 RX ADMIN — ACETAMINOPHEN 1000 MG: 500 TABLET ORAL at 20:51

## 2021-04-11 RX ADMIN — Medication 100 MG: at 20:56

## 2021-04-11 RX ADMIN — Medication 3 MG: at 20:52

## 2021-04-11 RX ADMIN — LORAZEPAM 0.5 MG: 0.5 TABLET ORAL at 20:51

## 2021-04-11 RX ADMIN — DESMOPRESSIN ACETATE 40 MG: 0.2 TABLET ORAL at 20:52

## 2021-04-11 RX ADMIN — CARVEDILOL 3.12 MG: 3.12 TABLET, FILM COATED ORAL at 20:51

## 2021-04-11 RX ADMIN — DOCUSATE SODIUM 100 MG: 100 CAPSULE, LIQUID FILLED ORAL at 20:52

## 2021-04-11 RX ADMIN — OXYCODONE HYDROCHLORIDE 5 MG: 5 TABLET ORAL at 20:52

## 2021-04-11 ASSESSMENT — ENCOUNTER SYMPTOMS
VOMITING: 0
DIARRHEA: 0
SORE THROAT: 0
ABDOMINAL PAIN: 0
SHORTNESS OF BREATH: 0
EYE PAIN: 0
NAUSEA: 0
COUGH: 0
SINUS PAIN: 0

## 2021-04-11 ASSESSMENT — PAIN SCALES - GENERAL: PAINLEVEL_OUTOF10: 8

## 2021-04-11 NOTE — ED PROVIDER NOTES
[address] [facility]     Emergency Department     Faculty Note/ Attestation      Pt Name: Latoya Interiano                                       MRN: [id number]  Acegfjaime 1950  Date of evaluation: 4/11/2021    Patients PCP:    Deniz Dejesus, DO    Attestation  I performed a history and physical examination of the patient and discussed management with the resident. I reviewed the residents note and agree with the documented findings and plan of care. Any areas of disagreement are noted on the chart. I was personally present for the key portions of any procedures. I have documented in the chart those procedures where I was not present during the key portions. I have reviewed the emergency nurses triage note. I agree with the chief complaint, past medical history, past surgical history, allergies, medications, social and family history as documented unless otherwise noted below. For Physician Assistant/ Nurse Practitioner cases/documentation I have personally evaluated this patient and have completed at least one if not all key elements of the E/M (history, physical exam, and MDM). Additional findings are as noted. Initial Screens:        [place] Coma Scale  Eye Opening: Spontaneous  Best Verbal Response: Oriented  Best Motor Response: Obeys commands  [place] Coma Scale Score: 15    Vitals:    Vitals:    04/11/21 1552 04/11/21 1602   BP: (!) 143/72 130/87   Pulse: 74    Resp: 18    Temp: 98 °F (36.7 °C)    TempSrc: Oral    SpO2: 95% 94%   Weight: 180 lb (81.6 kg)    Height: 5' 10\" (1.778 m)        CHIEF COMPLAINT       Chief Complaint   Patient presents with    Fall    Knee Pain       The pt is an intoxicated confused 72 YO who was found by EMS the pt had fallen yesterday and injured himself noting primarily knee pain but unable to elaborate more unsure about head injury but noted he laid on the ground for some time.   No fevers chills no headache no vomiting or nausea    DIAGNOSTIC RESULTS RADIOLOGY:   XR KNEE RIGHT (3 VIEWS)   Final Result   Pelvis: Para changes in the hips. No acute fracture or dislocation. Significant vascular calcification. Right knee: Total knee arthroplasty. Impacted fracture supracondylar area of   the distal right femur with dorsal displacement of the distal fracture   fragment by 11 mm. Right femur: Supracondylar fracture distal femur with dorsal displacement   distal fracture fragment by 11 mm. However, there appears be a vertical   component of a distal femoral fracture faintly visualized, nondisplaced and   suggesting a hairline fracture. XR HIP 2-3 VW W PELVIS RIGHT   Final Result   Pelvis: Para changes in the hips. No acute fracture or dislocation. Significant vascular calcification. Right knee: Total knee arthroplasty. Impacted fracture supracondylar area of   the distal right femur with dorsal displacement of the distal fracture   fragment by 11 mm. Right femur: Supracondylar fracture distal femur with dorsal displacement   distal fracture fragment by 11 mm. However, there appears be a vertical   component of a distal femoral fracture faintly visualized, nondisplaced and   suggesting a hairline fracture. XR FEMUR RIGHT (MIN 2 VIEWS)   Final Result   Pelvis: Para changes in the hips. No acute fracture or dislocation. Significant vascular calcification. Right knee: Total knee arthroplasty. Impacted fracture supracondylar area of   the distal right femur with dorsal displacement of the distal fracture   fragment by 11 mm. Right femur: Supracondylar fracture distal femur with dorsal displacement   distal fracture fragment by 11 mm. However, there appears be a vertical   component of a distal femoral fracture faintly visualized, nondisplaced and   suggesting a hairline fracture.          CT HEAD WO CONTRAST    (Results Pending)   XR CHEST (SINGLE VIEW FRONTAL)    (Results Pending)   CT KNEE RIGHT WO CONTRAST (Results Pending)     EKG Interpretation   Interpreted by Bj Nelson DO    Rhythm: Sinus with prolonged ME first AV block possible  Rate: normal  Axis: normal  Ectopy: none  Conduction: no QTC prolongation  ST Segments: normal  T Waves: normal  Q Waves: none    Clinical Impression: nonspecific      LABS:  Labs Reviewed   SEDIMENTATION RATE - Abnormal; Notable for the following components:       Result Value    Sed Rate 23 (*)     All other components within normal limits   C-REACTIVE PROTEIN - Abnormal; Notable for the following components:    CRP 89.4 (*)     All other components within normal limits   CBC WITH AUTO DIFFERENTIAL - Abnormal; Notable for the following components:    RBC 3.24 (*)     Hemoglobin 8.2 (*)     Hematocrit 27.1 (*)     RDW 19.5 (*)     Seg Neutrophils 68 (*)     Lymphocytes 15 (*)     Monocytes 13 (*)     Absolute Mono # 1.44 (*)     All other components within normal limits   BASIC METABOLIC PANEL W/ REFLEX TO MG FOR LOW K - Abnormal; Notable for the following components:    Glucose 107 (*)     BUN 7 (*)     Sodium 125 (*)     Chloride 93 (*)     All other components within normal limits   COVID-19, RAPID   CK   MYOGLOBIN, SERUM       EMERGENCY DEPARTMENT COURSE:     -------------------------  BP: 130/87, Temp: 98 °F (36.7 °C), Pulse: 74, Resp: 18  Physical Exam  Constitutional:       Appearance: He is well-developed. He is not diaphoretic. HENT:      Head: Normocephalic and atraumatic. Right Ear: External ear normal.      Left Ear: External ear normal.   Eyes:      General: No scleral icterus. Right eye: No discharge. Left eye: No discharge. Neck:      Musculoskeletal: Normal range of motion. Trachea: No tracheal deviation. Cardiovascular:      Rate and Rhythm: Normal rate. Pulses: Normal pulses. Comments: Cooler lower ext but pulses present  Pulmonary:      Effort: Pulmonary effort is normal. No respiratory distress. Breath sounds:  No

## 2021-04-11 NOTE — ED NOTES
Bed: 15  Expected date:   Expected time:   Means of arrival:   Comments:  Medic [facility], ROHIT  04/11/21 8476

## 2021-04-11 NOTE — H&P
TRAUMA HISTORY AND PHYSICAL EXAMINATION    PATIENT NAME: Latoya Interiano  YOB: 1950  MEDICAL RECORD NO. [id number]   DATE: 4/11/2021  PRIMARY CARE PHYSICIAN: Deniz Dejesus DO  PATIENT EVALUATED AT THE REQUEST OF DR.: Dr. Oscar Elizabeth     [] Trauma Priority     [x]Trauma Consult. IMPRESSION:     Patient Active Problem List   Diagnosis    Knee pain, right    Vertigo    Meniere's disease    ADHD (attention deficit hyperactivity disorder)    Narcolepsy    Hyponatremia    PND (post-nasal drip)    Transaminasemia    NSTEMI (non-ST elevated myocardial infarction) ([address]    Altered mental status    Cardiomyopathy ([address]    HTN (hypertension)    Traumatic rhabdomyolysis ([address]    Metabolic encephalopathy    Non-traumatic rhabdomyolysis    Urinary tract infection with hematuria    Dysphagia    Chronic obstructive pulmonary disease (HCC)    Dementia with behavioral disturbance (HCC)    Status post insertion of percutaneous endoscopic gastrostomy (PEG) tube ([address]    S/p bare metal coronary artery stent    Pressure injury of right hip, stage 1    Acute metabolic encephalopathy    Alcohol intoxication delirium ([address]    Accidental fall from wheelchair       MEDICAL DECISION MAKING AND PLAN:     1. Mechanical ground level fall   - Patient fell when transferring from wheelchair, landed on right side, complaining of right knee pain   - Denies hitting head or LOC   - CT head negative   - CXR negative    - X-ray with right supracondylar femur fracture   - CK, myoglobin negative    - Admit primary    2. Right supracondylar femur fracture   - Periprosthetic w/ history of right TKA 5 years ago   - Ortho consulted - follow recommendations   - Plan for OR tomorrow - NPO at midnight    - Knee immobilizer   - Multimodal pain control     - PT/OT    3. Cardiac clearance prior to OR    4. Hold AC per ortho, SCDs    5.  Hx of alcohol use   - Alcohol assessment   - Monitor for signs of withdrawal    6. COPD   - Home medications   - Duoneb    7. Cardiac history   - hx of cardiomyopathy, HTN, NSTEMI   - Cardiac clearance prior to OR   - Home meds - Amiodarone, armodafinil, lipitor, corec    8. Hypothyroid   - Synthroid 25 mcg daily          CONSULT SERVICES    [] Neurosurgery     [x] Orthopedic Surgery    [] Cardiothoracic     [] Facial Trauma    [] Plastic Surgery (Burn)    [] Pediatric Surgery     [] Internal Medicine    [] Pulmonary Medicine       HISTORY:     Chief Complaint:   \" Right leg pain\"    INJURY SUMMARY  Extremity -  fracture; closed and femur Right    GENERAL DATA  Age 73 y.o.  male   Patient information was obtained from patient. History/Exam limitations: none. Patient presented to the Emergency Department by ambulance where the patient received see Ambulance Run Sheet prior to arrival.  Injury Date: 4/11/2021   Approximate Injury Time: Last night        Transport mode:   [x]Ambulance      [] Helicopter     []Car       [] Other  Referring Hospital: None    INJURY LOCATION, (e.g., home, farm, industry, street)  Specific Details of Location (e.g., bedroom, kitchen, garage): Living room  Type of Residence (if occurred in home setting) (e.g., apartment, mobile home, single family home): single house    MECHANISM OF INJURY  N/A      Pediatric Consideration:      N/A    HISTORY:     Tamica Christian is a 73 y.o. male that presented to the Emergency Department following mechanical ground-level fall last night. Patient was transferring from his wheelchair when he tried to grab the arm of the wheelchair and miscalculated the placement. He landed on carpeted floor but was unable to get himself up. His roommate refused to help him up and called EMS after the patient had laid on the ground for several hours. EMS arrived and helped him back in his wheelchair but did not transport him to the hospital at that time.   Patient reports he had increasing right leg pain today and called EMS to bring him to the emergency department. Patient denies hitting his head or losing consciousness. He denies any other injuries and is only complaining of right leg pain. Patient reports a history of total knee arthroplasty with prosthetic 5 years ago. Loss of Consciousness [x]No   []Yes Duration(min)       [] Unknown     Total Fluids Given Prior To Arrival 0 mL    MEDICATIONS:   []  None     []  Information not available due to exam limitations documented above  Prior to Admission medications    Medication Sig Start Date End Date Taking?  Authorizing Provider   lisinopril (PRINIVIL;ZESTRIL) 10 MG tablet Take 1 tablet by mouth daily 3/31/20   Henrique Saldivar MD   meclizine (ANTIVERT) 25 MG tablet TAKE 1 TAB BY MOUTH THREE TIMES A DAY AS NEEDED 7/6/16   Iris Lynn PA-C   Multiple Vitamins-Minerals (CERTAVITE SENIOR/ANTIOXIDANT) TABS TAKE 1 TAB BY MOUTH ONCE A DAY 6/22/16   Iris Lynn PA-C   GLUCOSAMINE-CHONDROITIN -400 MG tablet TAKE 1 TAB BY MOUTH TWICE A DAY 6/7/16   Iris Lynn PA-C   carvedilol (COREG) 3.125 MG tablet TAKE 1 TAB BY MOUTH TWICE A DAY WITH MEALS 6/7/16   Iris Lynn PA-C   traMADol (ULTRAM) 50 MG tablet TAKE 1 TAB BY MOUTH EVERY 6 HOURS AS NEEDED 4/26/16   Agustina Lara, DO   Armodafinil (NUVIGIL) 150 MG TABS tablet Take 1 tablet by mouth daily 4/26/16   Agustina Lara, DO   loratadine (CLARITIN) 10 MG tablet TAKE 1 TAB BY MOUTH ONCE A DAY 3/24/16   Agustina Lara, DO   spironolactone (ALDACTONE) 25 MG tablet Take 1 tablet by mouth daily 3/24/16   Agustina Lara, DO   levothyroxine (SYNTHROID) 25 MCG tablet Take 1 tablet by mouth Daily 3/24/16   Agustina Lara, DO   amiodarone (CORDARONE) 200 MG tablet Take 1 tablet by mouth 2 times daily 3/24/16   Agustina Lara, DO   QUEtiapine (SEROQUEL) 25 MG tablet Take 1 tablet by mouth nightly 3/24/16   Agustina Lara, DO   HYDROcodone-acetaminophen (NORCO) 5-325 MG per tablet Take 1 tablet by mouth every 6 hours as needed for Pain 3/24/16   German Corrigan, DO   melatonin 3 MG TABS tablet Take 3 mg by mouth daily    Historical Provider, MD   aspirin 81 MG chewable tablet Take 1 tablet by mouth daily 11/10/15   Alcira Canchola MD   atorvastatin (LIPITOR) 40 MG tablet Take 1 tablet by mouth nightly 11/10/15   Alcira Canchola MD   cyanocobalamin 50 MCG tablet Take 1 tablet by mouth daily 11/10/15   Alcira Canchola MD   clopidogrel (PLAVIX) 75 MG tablet Take 1 tablet by mouth daily 11/10/15   Alcira Canchola MD   thiamine 100 MG tablet Take 1 tablet by mouth daily 11/10/15   Alcira Canchola MD   Magnesium Oxide 250 MG TABS Take 1 tablet by mouth daily 11/10/15   Alcira Canchola MD   meclizine (ANTIVERT) 25 MG tablet Take 0.5 tablets by mouth 3 times daily as needed 11/10/15   Alcira Canchola MD   furosemide (LASIX) 20 MG tablet Take 1 tablet by mouth daily 11/10/15   Alcira Canchola MD   LORazepam (ATIVAN) 0.5 MG tablet Take 1 tablet by mouth every 6 hours as needed for Anxiety 11/10/15   Alcira Canchola MD   [address] Adult diapers dispense quantity allowed by insurance 3/31/15   Danna Kayser Rohrs, PA-C   Diapers & Supplies MISC Wipes  dispense quantity allowed by insurance 3/31/15   Iris Lynn PA-C   PROAIR  (90 BASE) MCG/ACT inhaler inhale 2 puffs every 4 to 6 hours if needed 11/29/14   Iris Lynn PA-C   mometasone (NASONEX) 50 MCG/ACT nasal spray 2 sprays by Nasal route daily. 12/23/13   Shelby Smith MD   docusate sodium (COLACE) 100 MG capsule Take 100 mg by mouth 2 times daily. Historical Provider, MD   folic acid (FOLVITE) 1 MG tablet Take 1 mg by mouth daily. Historical Provider, MD   chlorhexidine (PERIDEX) 0.12 % solution Take 15 mLs by mouth 2 times daily. Historical Provider, MD   SALINE MIST SPRAY NA by Nasal route. Historical Provider, MD   Multiple Vitamin (MULTIVITAMIN PO) Take  by mouth.       Historical Provider, MD       ALLERGIES:   []  None    [] Information not available due to exam limitations documented above   Motrin [ibuprofen micronized]    PAST MEDICAL HISTORY: []  None   []   Information not available due to exam limitations documented above    has a past medical history of ADHD (attention deficit hyperactivity disorder), ADHD (attention deficit hyperactivity disorder), Atypical chest pain, Chronic bronchitis ([address] Hypertension, Hyponatremia, Meniere's disease, Narcolepsy, Nasal fracture, PND (post-nasal drip), SOB (shortness of breath), Tibia fracture, and Transaminasemia. has a past surgical history that includes cyst removal; Ankle surgery; knee surgery (Right); and Coronary angioplasty with stent (N/A, 10/10/2015). FAMILY HISTORY   []   Information not available due to exam limitations documented above    family history includes Heart Disease in his father and mother. SOCIAL HISTORY  []   Information not available due to exam limitations documented above     reports that he has been smoking cigarettes. He has a 38.00 pack-year smoking history. He has never used smokeless tobacco.   reports current alcohol use of about 16.0 standard drinks of alcohol per week. reports no history of drug use.     PERTINENT SYSTEMIC REVIEW:    []   Information not available due to exam limitations documented above    Constitutional: negative  Eyes: negative  Ears, nose, mouth, throat, and face: negative  Respiratory: negative  Cardiovascular: negative  Gastrointestinal: negative  Genitourinary:negative  Integument/breast: negative  Hematologic/lymphatic: negative  Musculoskeletal:negative  Neurological: negative  Behavioral/Psych: negative    PHYSICAL EXAMINATION:     GLASCOW COMA SCALE  NEUROMUSCULAR BLOCKADE PRIOR TO ARRIVAL     [x]No        []Yes      Variable  Score   Variable  Score  Eye opening [x]Spontaneous 4 Verbal  [x]Oriented  5     []To voice  3   []Confused  4    []To pain  2   []Inapp words  3    []None  1   []Incomp words 2       []None  1   Motor   [x]Obeys  6    []Localizes pain 5    []Withdraws(pain) 4    []Flexion(pain) 3  []Extension(pain) 2    []None  1     GCS Total = 15    PHYSICAL EXAMINATION    VITAL SIGNS:   Vitals:    04/11/21 1552   BP: (!) 143/72   Pulse: 74   Resp: 18   Temp: 98 °F (36.7 °C)   SpO2: 95%       Physical Exam  Vitals signs and nursing note reviewed. Constitutional:       General: He is not in acute distress. Appearance: Normal appearance. He is not toxic-appearing. Comments: Adult male lying in bed in no acute distress. He speaks in full sentences and answers questions appropriately. HENT:      Head: Normocephalic and atraumatic. Comments: No tenderness to palpation of occiput. No france signs or raccoon eyes. Right Ear: External ear normal.      Left Ear: External ear normal.      Nose: Nose normal. No congestion or rhinorrhea. Mouth/Throat:      Mouth: Mucous membranes are moist.      Pharynx: Oropharynx is clear. No oropharyngeal exudate or posterior oropharyngeal erythema. Eyes:      Extraocular Movements: Extraocular movements intact. Conjunctiva/sclera: Conjunctivae normal.      Pupils: Pupils are equal, round, and reactive to light. Neck:      Musculoskeletal: Normal range of motion and neck supple. No neck rigidity or muscular tenderness. Comments: No midline cervical spine tenderness to palpation  Cardiovascular:      Rate and Rhythm: Normal rate and regular rhythm. Heart sounds: No murmur. No friction rub. No gallop. Pulmonary:      Effort: Pulmonary effort is normal. No respiratory distress. Breath sounds: Normal breath sounds. No stridor. No wheezing, rhonchi or rales. Abdominal:      General: Abdomen is flat. There is no distension. Palpations: Abdomen is soft. There is no mass. Tenderness: There is no abdominal tenderness. There is no guarding. Musculoskeletal:         General: Tenderness and signs of injury present. No swelling. Right lower leg: No edema. Left lower leg: No edema. Comments: Patient is holding the right lower extremity and abduction and and external rotation. Lower extremity is slightly swollen from the proximal knee to mid tibia. There is associated tenderness to palpation. The right knee region is cooler to palpation when compared to the left. 2+ DP pulses bilaterally. Patient is able to wiggle his toes. There is a well-healed vertical midline surgical scar to the right knee  No midline thoracic or lumbar spinal tenderness to palpation   Skin:     General: Skin is warm and dry. Capillary Refill: Capillary refill takes less than 2 seconds. Findings: No rash. Neurological:      General: No focal deficit present. Mental Status: He is alert and oriented to person, place, and time. Comments: Alert and oriented x3, answers questions appropriately, speech clear, no facial droop  Moves all extremities spontaneously  5/5 strength flexion and extension upper extremities bilaterally.  strength 5/5 and equal  5/5 strength hip flexion, knee extension, dorsi and plantar flexion on the left. 4/5 strength dorsi and plantar flexion on the right. Patient is unable to flex at the hip on the right. Sensation intact to light touch extremities x4   Psychiatric:         Mood and Affect: Mood normal.         Behavior: Behavior normal.            RADIOLOGY  XR KNEE RIGHT (3 VIEWS)   Final Result   Pelvis: Para changes in the hips. No acute fracture or dislocation. Significant vascular calcification. Right knee: Total knee arthroplasty. Impacted fracture supracondylar area of   the distal right femur with dorsal displacement of the distal fracture   fragment by 11 mm. Right femur: Supracondylar fracture distal femur with dorsal displacement   distal fracture fragment by 11 mm.   However, there appears be a vertical   component of a distal femoral fracture faintly visualized, nondisplaced and   suggesting a hairline fracture. XR HIP 2-3 VW W PELVIS RIGHT   Final Result   Pelvis: Para changes in the hips. No acute fracture or dislocation. Significant vascular calcification. Right knee: Total knee arthroplasty. Impacted fracture supracondylar area of   the distal right femur with dorsal displacement of the distal fracture   fragment by 11 mm. Right femur: Supracondylar fracture distal femur with dorsal displacement   distal fracture fragment by 11 mm. However, there appears be a vertical   component of a distal femoral fracture faintly visualized, nondisplaced and   suggesting a hairline fracture. XR FEMUR RIGHT (MIN 2 VIEWS)   Final Result   Pelvis: Para changes in the hips. No acute fracture or dislocation. Significant vascular calcification. Right knee: Total knee arthroplasty. Impacted fracture supracondylar area of   the distal right femur with dorsal displacement of the distal fracture   fragment by 11 mm. Right femur: Supracondylar fracture distal femur with dorsal displacement   distal fracture fragment by 11 mm. However, there appears be a vertical   component of a distal femoral fracture faintly visualized, nondisplaced and   suggesting a hairline fracture.          CT HEAD WO CONTRAST    (Results Pending)         LABS    Labs Reviewed   SEDIMENTATION RATE - Abnormal; Notable for the following components:       Result Value    Sed Rate 23 (*)     All other components within normal limits   C-REACTIVE PROTEIN - Abnormal; Notable for the following components:    CRP 89.4 (*)     All other components within normal limits   CBC WITH AUTO DIFFERENTIAL - Abnormal; Notable for the following components:    RBC 3.24 (*)     Hemoglobin 8.2 (*)     Hematocrit 27.1 (*)     RDW 19.5 (*)     Seg Neutrophils 68 (*)     Lymphocytes 15 (*)     Monocytes 13 (*)     Absolute Mono # 1.44 (*)     All other components within normal limits BASIC METABOLIC PANEL W/ REFLEX TO MG FOR LOW K - Abnormal; Notable for the following components:    Glucose 107 (*)     BUN 7 (*)     Sodium 125 (*)     Chloride 93 (*)     All other components within normal limits   COVID-19, RAPID   CK   MYOGLOBIN, SERUM         Alfredo Jarvis DO  4/11/21, 5:41 PM              Trauma Attending Attestation      I have reviewed the above GCS note(s) and confirmed the key elements of the medical history and physical exam. I have seen and examined the pt. I have discussed the findings, established the care plan and recommendations with Resident, GCS RN, bedside nurse.         Mag Wolff,   4/11/2021  9:31 PM

## 2021-04-11 NOTE — FLOWSHEET NOTE
[facility] CARE DEPARTMENT - [facility]     Emergency/Trauma Note    PATIENT NAME: Massiel Cherry    Shift date: 04/11/21  Shift day: Sunday   Shift # 2    Room # 15/15   Name: Latoya Knutson            Age: 73 y.o. Gender: male          Evangelical: Mosque       Trauma/Incident type: Adult Trauma Consult  Admit Date & Time: 4/11/2021  3:41 PM    PATIENT/EVENT DESCRIPTION:  Latoya Knutson is a 73 y.o. male who arrived via EMSas a Trauma Consust. Pt to be admitted to 15/15. SPIRITUAL ASSESSMENT/INTERVENTION:   The patient was calm and approachable.  engaged in active listening.  asked if they would like prayer and informed them chaplains are available 24/7. They engaged in the conversation. Chaplains will remain available to offer spiritual and emotional support as needed. PATIENT BELONGINGS:  With patient    ANY BELONGINGS OF SIGNIFICANT VALUE NOTED:  None     REGISTRATION STAFF NOTIFIED? Yes      WHAT IS YOUR SPIRITUAL CARE PLAN FOR THIS PATIENT?:   Chaplains will remain available to offer spiritual and emotional support as needed. Electronically signed by Chace Vitale Resident, on 4/11/2021 at 6:43 PM.  [facility]  [phone]     04/11/21 1843   Encounter Summary   Services provided to: Patient   Referral/Consult From: [address] [facility] Family members   Continue Visiting   (04/11/21)   Complexity of Encounter Moderate   Length of Encounter 15 minutes   Spiritual Assessment Completed Yes   Crisis   Type Trauma   Assessment Calm; Approachable   Intervention Active listening   Outcome Engaged in conversation

## 2021-04-11 NOTE — ED NOTES
Pt to ED s/p fall. Pt was transferring from wheelchair to bed and fell on right knee. Pt had total knee replacement 5 years ago and states it is the same knee that is hurting. Pt states he has not been able to straighten knee since surgery. Pt states he fell last night and laid on ground until his roommate came home last night. Pt called 911 yesterday and declined to be seen. Pt called today d/t increased pain. Pt placed on pulse ox and BP cuff. Dr. Svitlana Renteria at bedside.      Nida Gorman, RN  04/11/21 2669

## 2021-04-11 NOTE — ED PROVIDER NOTES
strain: Not on file    Food insecurity     Worry: Not on file     Inability: Not on file    Transportation needs     Medical: Not on file     Non-medical: Not on file   Tobacco Use    Smoking status: Current Every Day Smoker     Packs/day: 1.00     Years: 38.00     Pack years: 38.00     Types: Cigarettes    Smokeless tobacco: Never Used    Tobacco comment: e-cigs   Substance and Sexual Activity    Alcohol use: Yes     Alcohol/week: 16.0 standard drinks     Types: 16 Cans of beer per week     Comment: daily    Drug use: No    Sexual activity: Not Currently   Lifestyle    Physical activity     Days per week: Not on file     Minutes per session: Not on file    Stress: Not on file   Relationships    Social connections     Talks on phone: Not on file     Gets together: Not on file     Attends Jew service: Not on file     Active member of club or organization: Not on file     Attends meetings of clubs or organizations: Not on file     Relationship status: Not on file    Intimate partner violence     Fear of current or ex partner: Not on file     Emotionally abused: Not on file     Physically abused: Not on file     Forced sexual activity: Not on file   Other Topics Concern    Not on file   Social History Narrative    Not on file       Family History   Problem Relation Age of Onset    Heart Disease Mother         heart attack    Heart Disease Father        Allergies:  Motrin [ibuprofen micronized]    Home Medications:  Prior to Admission medications    Medication Sig Start Date End Date Taking?  Authorizing Provider   lisinopril (PRINIVIL;ZESTRIL) 10 MG tablet Take 1 tablet by mouth daily 3/31/20   Henrique Ace MD   meclizine (ANTIVERT) 25 MG tablet TAKE 1 TAB BY MOUTH THREE TIMES A DAY AS NEEDED 7/6/16   Iris Lynn PA-C   Multiple Vitamins-Minerals (CERTAVITE SENIOR/ANTIOXIDANT) TABS TAKE 1 TAB BY MOUTH ONCE A DAY 6/22/16   Iris Lynn PA-C   GLUCOSAMINE-CHONDROITIN DS 500-400 MG tablet TAKE 1 TAB BY MOUTH TWICE A DAY 6/7/16   Iris Lynn PA-C   carvedilol (COREG) 3.125 MG tablet TAKE 1 TAB BY MOUTH TWICE A DAY WITH MEALS 6/7/16   Iris Lynn PA-C   traMADol (ULTRAM) 50 MG tablet TAKE 1 TAB BY MOUTH EVERY 6 HOURS AS NEEDED 4/26/16   Анна Trujillo, DO   Armodafinil (NUVIGIL) 150 MG TABS tablet Take 1 tablet by mouth daily 4/26/16 Linna Chase, DO   loratadine (CLARITIN) 10 MG tablet TAKE 1 TAB BY MOUTH ONCE A DAY 3/24/16   Анна Ronnie, DO   spironolactone (ALDACTONE) 25 MG tablet Take 1 tablet by mouth daily 3/24/16   Анна Ronnie, DO   levothyroxine (SYNTHROID) 25 MCG tablet Take 1 tablet by mouth Daily 3/24/16   Анна Ronnie, DO   amiodarone (CORDARONE) 200 MG tablet Take 1 tablet by mouth 2 times daily 3/24/16   Анна Ronnie, DO   QUEtiapine (SEROQUEL) 25 MG tablet Take 1 tablet by mouth nightly 3/24/16   Анна Ronnie, DO   HYDROcodone-acetaminophen [facility]) 5-325 MG per tablet Take 1 tablet by mouth every 6 hours as needed for Pain 3/24/16   Аннаna Chase, DO   melatonin 3 MG TABS tablet Take 3 mg by mouth daily    Historical Provider, MD   aspirin 81 MG chewable tablet Take 1 tablet by mouth daily 11/10/15   Luciano Corona MD   atorvastatin (LIPITOR) 40 MG tablet Take 1 tablet by mouth nightly 11/10/15   Luciano Corona MD   cyanocobalamin 50 MCG tablet Take 1 tablet by mouth daily 11/10/15   Luciano Corona MD   clopidogrel (PLAVIX) 75 MG tablet Take 1 tablet by mouth daily 11/10/15   Luciano Corona MD   thiamine 100 MG tablet Take 1 tablet by mouth daily 11/10/15   Luciano Corona MD   Magnesium Oxide 250 MG TABS Take 1 tablet by mouth daily 11/10/15   Luciano Corona MD   meclizine (ANTIVERT) 25 MG tablet Take 0.5 tablets by mouth 3 times daily as needed 11/10/15   Luciano Corona MD   furosemide (LASIX) 20 MG tablet Take 1 tablet by mouth daily 11/10/15   Luciano Corona MD   LORazepam (ATIVAN) 0.5 MG tablet Take 1 tablet by mouth every 6 hours as needed for Anxiety 11/10/15   Whitney Cueva MD   [address] Adult diapers dispense quantity allowed by insurance 3/31/15   Gio Lynn PA-C   Diapers & Supplies MISC Wipes  dispense quantity allowed by insurance 3/31/15   Iris Lynn PA-C   PROAIR  (90 BASE) MCG/ACT inhaler inhale 2 puffs every 4 to 6 hours if needed 11/29/14   Iris Lynn PA-C   mometasone (NASONEX) 50 MCG/ACT nasal spray 2 sprays by Nasal route daily. 12/23/13   Miranda Devi MD   docusate sodium (COLACE) 100 MG capsule Take 100 mg by mouth 2 times daily. Historical Provider, MD   folic acid (FOLVITE) 1 MG tablet Take 1 mg by mouth daily. Historical Provider, MD   chlorhexidine (PERIDEX) 0.12 % solution Take 15 mLs by mouth 2 times daily. Historical Provider, MD   SALINE MIST SPRAY NA by Nasal route. Historical Provider, MD   Multiple Vitamin (MULTIVITAMIN PO) Take  by mouth. Historical Provider, MD       REVIEW OF SYSTEMS    (2-9 systems for level 4, 10 or more forlevel 5)      Review of Systems   Constitutional: Negative for activity change, chills and fever. HENT: Negative for congestion, sinus pain and sore throat. Eyes: Negative for pain and visual disturbance. Respiratory: Negative for cough and shortness of breath. Cardiovascular: Negative for chest pain. Gastrointestinal: Negative for abdominal pain, diarrhea, nausea and vomiting. Genitourinary: Negative for difficulty urinating, dysuria and hematuria. Musculoskeletal:        Right knee pain. Skin: Negative for rash and wound. Neurological: Negative for dizziness, light-headedness and headaches. Psychiatric/Behavioral: Negative for agitation and confusion.        PHYSICAL EXAM   (up to 7 for level 4, 8 or more forlevel 5)      ED TRIAGE VITALS BP: (!) 143/72, Temp: 98 °F (36.7 °C), Pulse: 74, Resp: 18, SpO2: 95 %    Vitals:    04/11/21 1552 04/11/21 1602   BP: (!) 143/72 130/87   Pulse: 74    Resp: 18 Temp: 98 °F (36.7 °C)    TempSrc: Oral    SpO2: 95% 94%   Weight: 180 lb (81.6 kg)    Height: 5' 10\" (1.778 m)          Physical Exam  Vitals signs and nursing note reviewed. Constitutional:       Appearance: Normal appearance. HENT:      Head: Normocephalic and atraumatic. Nose: Nose normal.      Mouth/Throat:      Mouth: Mucous membranes are moist.   Eyes:      Extraocular Movements: Extraocular movements intact. Pupils: Pupils are equal, round, and reactive to light. Neck:      Musculoskeletal: Normal range of motion. Cardiovascular:      Rate and Rhythm: Normal rate and regular rhythm. Pulses: Normal pulses. Heart sounds: Normal heart sounds. Pulmonary:      Effort: Pulmonary effort is normal.      Breath sounds: Normal breath sounds. Abdominal:      General: Abdomen is flat. Palpations: Abdomen is soft. Musculoskeletal:      Comments: Gross deformity of the right knee, swelling, popliteal pulse intact, DP PT intact, strength 2 out of 5 right lower extremity, no sensory deficits right lower extremity. Skin:     General: Skin is warm and dry. Capillary Refill: Capillary refill takes less than 2 seconds. Neurological:      General: No focal deficit present. Mental Status: He is alert and oriented to person, place, and time.    Psychiatric:         Mood and Affect: Mood normal.         Behavior: Behavior normal.           DIFFERENTIAL  DIAGNOSIS     PLAN (LABS / IMAGING / EKG):  Orders Placed This Encounter   Procedures    COVID-19, Rapid    CT HEAD WO CONTRAST    XR KNEE RIGHT (3 VIEWS)    XR HIP 2-3 VW W PELVIS RIGHT    XR FEMUR RIGHT (MIN 2 VIEWS)    XR CHEST (SINGLE VIEW FRONTAL)    CT KNEE RIGHT WO CONTRAST    Sedimentation Rate    C-Reactive Protein    CBC Auto Differential    Basic Metabolic Panel w/ Reflex to MG    CK    MYOGLOBIN, SERUM    Diet NPO, After Midnight    Non weight bearing    Inpatient consult to Trauma Surgery   [facility] Inpatient consult to Orthopedic Surgery    EKG 12 Lead    PATIENT STATUS (FROM ED OR OR/PROCEDURAL) Inpatient       MEDICATIONS ORDERED:  ED Medication Orders (From admission, onward)    Start Ordered     Status Ordering Provider    04/11/21 1930 04/11/21 1858  ceFAZolin (ANCEF) 2000 mg in dextrose 5 % 50 mL IVPB  ON CALL TO O.R.     Question:  Antimicrobial Indications  Answer:  Surgical Prophylaxis    Acknowledged LIMA DIAZ    04/11/21 1730 04/11/21 1724  morphine (PF) injection 8 mg  ONCE      Last MAR action: Given - by Deno Babinski on 04/11/21 [address] [place]          DDX: Right knee dislocation, right knee pain, right femur/tibia fracture, right hip fracture    DIAGNOSTIC RESULTS / EMERGENCY DEPARTMENT COURSE / MDM     IMPRESSION & INITIAL PLAN:  10year-old male brought in by EMS today for evaluation of right knee pain status post fall at home. Unknown downtime. Patient mildly intoxicated upon arrival reports alcohol use a.m. Norco use today. Physical exam the patient's right leg is obviously deformed compared to the left. He is unable to extend it is grossly swollen with restricted range of motion secondary to pain. Popliteal pulse intact. DP PT pulses intact. Due to the patient being intoxicated patient received CT head without contrast, right hip x-ray right knee x-ray and laboratory work-up including CK and myoglobin to assess for rhabdomyolysis in setting of downtime. Lab work came back grossly normal aside from the patient being hyponatremic at 125. This is likely secondary to beer potomania. Will likely correct once patient improves diet and stop drinking alcohol in the hospital.    Right knee x-ray showed periprosthetic fracture of the right distal femur. Ortho consulted at bedside. Trauma to admit the patient. Trauma at bedside discussed case with both teams. Covid swab obtained.     LABS:  Results for orders placed or performed during the hospital encounter of 04/11/21   COVID-19, Rapid    Specimen: Nasopharyngeal Swab   Result Value Ref Range    Specimen Description . NASOPHARYNGEAL SWAB     SARS-CoV-2, Rapid Not Detected Not Detected   Sedimentation Rate   Result Value Ref Range    Sed Rate 23 (H) 0 - 20 mm   C-Reactive Protein   Result Value Ref Range    CRP 89.4 (H) 0.0 - 5.0 mg/L   CBC Auto Differential   Result Value Ref Range    WBC 10.9 3.5 - 11.3 k/uL    RBC 3.24 (L) 4.21 - 5.77 m/uL    Hemoglobin 8.2 (L) 13.0 - 17.0 g/dL    Hematocrit 27.1 (L) 40.7 - 50.3 %    MCV 83.6 82.6 - 102.9 fL    MCH 25.3 25.2 - 33.5 pg    MCHC 30.3 28.4 - 34.8 g/dL    RDW 19.5 (H) 11.8 - 14.4 %    Platelets 045 582 - 349 k/uL    MPV 8.8 8.1 - 13.5 fL    NRBC Automated 0.0 0.0 per 100 WBC    Differential Type NOT REPORTED     Seg Neutrophils 68 (H) 36 - 65 %    Lymphocytes 15 (L) 24 - 43 %    Monocytes 13 (H) 3 - 12 %    Eosinophils % 4 1 - 4 %    Basophils 0 0 - 2 %    Immature Granulocytes 0 0 %    Segs Absolute 7.30 1.50 - 8.10 k/uL    Absolute Lymph # 1.65 1.10 - 3.70 k/uL    Absolute Mono # 1.44 (H) 0.10 - 1.20 k/uL    Absolute Eos # 0.43 0.00 - 0.44 k/uL    Basophils Absolute 0.03 0.00 - 0.20 k/uL    Absolute Immature Granulocyte 0.04 0.00 - 0.30 k/uL    WBC Morphology NOT REPORTED     RBC Morphology ANISOCYTOSIS PRESENT     Platelet Estimate NOT REPORTED    Basic Metabolic Panel w/ Reflex to MG   Result Value Ref Range    Glucose 107 (H) 70 - 99 mg/dL    BUN 7 (L) 8 - 23 mg/dL    CREATININE 0.84 0.70 - 1.20 mg/dL    Bun/Cre Ratio NOT REPORTED 9 - 20    Calcium 9.1 8.6 - 10.4 mg/dL    Sodium 125 (L) 135 - 144 mmol/L    Potassium 5.1 3.7 - 5.3 mmol/L    Chloride 93 (L) 98 - 107 mmol/L    CO2 20 20 - 31 mmol/L    Anion Gap 12 9 - 17 mmol/L    GFR Non-African American >60 >60 mL/min    GFR African American >60 >60 mL/min    GFR Comment          GFR Staging NOT REPORTED    CK   Result Value Ref Range    Total  39 - 308 U/L   MYOGLOBIN, SERUM   Result Value Ref Range    Myoglobin 62 28 - 72 ng/mL       RADIOLOGY:  XR KNEE RIGHT (3 VIEWS)   Final Result   Pelvis: Para changes in the hips. No acute fracture or dislocation. Significant vascular calcification. Right knee: Total knee arthroplasty. Impacted fracture supracondylar area of   the distal right femur with dorsal displacement of the distal fracture   fragment by 11 mm. Right femur: Supracondylar fracture distal femur with dorsal displacement   distal fracture fragment by 11 mm. However, there appears be a vertical   component of a distal femoral fracture faintly visualized, nondisplaced and   suggesting a hairline fracture. XR HIP 2-3 VW W PELVIS RIGHT   Final Result   Pelvis: Para changes in the hips. No acute fracture or dislocation. Significant vascular calcification. Right knee: Total knee arthroplasty. Impacted fracture supracondylar area of   the distal right femur with dorsal displacement of the distal fracture   fragment by 11 mm. Right femur: Supracondylar fracture distal femur with dorsal displacement   distal fracture fragment by 11 mm. However, there appears be a vertical   component of a distal femoral fracture faintly visualized, nondisplaced and   suggesting a hairline fracture. XR FEMUR RIGHT (MIN 2 VIEWS)   Final Result   Pelvis: Para changes in the hips. No acute fracture or dislocation. Significant vascular calcification. Right knee: Total knee arthroplasty. Impacted fracture supracondylar area of   the distal right femur with dorsal displacement of the distal fracture   fragment by 11 mm. Right femur: Supracondylar fracture distal femur with dorsal displacement   distal fracture fragment by 11 mm. However, there appears be a vertical   component of a distal femoral fracture faintly visualized, nondisplaced and   suggesting a hairline fracture.          CT HEAD WO CONTRAST    (Results Pending)   XR CHEST (SINGLE VIEW FRONTAL)    (Results Pending)   CT KNEE RIGHT WO CONTRAST    (Results Pending)     ED Course as of Apr 11 1919   Sun Apr 11, 2021   [address] [place] last night transferring from wheelchair to couch, periprosthetic knee fracture, orthopedics consulted, trauma to admit. Administered 8 of morphine, resolution of pain. Pending admit. [CC]      ED Course User Index  [CC] Yahir Douglass MD        CONSULTS:  IP CONSULT TO TRAUMA SURGERY  IP CONSULT TO ORTHOPEDIC SURGERY    CRITICAL CARE:  See attending physician note    FINAL IMPRESSION      1. Closed fracture of distal end of right femur, unspecified fracture morphology, initial encounter ([facility] [address]    2. Dilutional hyponatremia          DISPOSITION / PLAN     DISPOSITION Admitted 04/11/2021 07:19:13 PM      PATIENT REFERRED TO:  No follow-up provider specified.     DISCHARGE MEDICATIONS:  New Prescriptions    No medications on file     Modified Medications    No medications on file        Nallely Arizmendi MD  Emergency Medicine Resident    (Please note that portions of this note were completed with a voice recognition program.  Efforts were made to edit the dictations but occasionally words are mis-transcribed.)       Nallely Arizmendi MD  Resident  04/11/21 Liam Mayers MD  Resident  04/11/21 0920

## 2021-04-11 NOTE — ED PROVIDER NOTES
[facility]  Emergency Department  Emergency Medicine Resident Sign-out     Care of Lissy Aviles was assumed from Dr. Sabrina Alarcon and is being seen for Fall and Knee Pain  . The patient's initial evaluation and plan have been discussed with the prior provider who initially evaluated the patient.      EMERGENCY DEPARTMENT COURSE / MEDICAL DECISION MAKING:       MEDICATIONS GIVEN:  Orders Placed This Encounter   Medications    morphine (PF) injection 8 mg    ceFAZolin (ANCEF) 2000 mg in dextrose 5 % 50 mL IVPB     Order Specific Question:   Antimicrobial Indications     Answer:   Surgical Prophylaxis    DISCONTD: Armodafinil (NUVIGIL) tablet 150 mg    DISCONTD: amiodarone (CORDARONE) tablet 200 mg    atorvastatin (LIPITOR) tablet 40 mg    carvedilol (COREG) tablet 3.125 mg    levothyroxine (SYNTHROID) tablet 25 mcg    melatonin tablet 3 mg    ipratropium-albuterol (DUONEB) nebulizer solution 1 ampule    DISCONTD: QUEtiapine (SEROQUEL) tablet 25 mg    thiamine tablet 100 mg    sodium chloride flush 0.9 % injection 5-40 mL    sodium chloride flush 0.9 % injection 5-40 mL    0.9 % sodium chloride infusion    polyethylene glycol (GLYCOLAX) packet 17 g    docusate sodium (COLACE) capsule 100 mg    ondansetron (ZOFRAN) injection 4 mg    acetaminophen (TYLENOL) tablet 1,000 mg    oxyCODONE (ROXICODONE) immediate release tablet 5 mg    lactated ringers infusion    LORazepam (ATIVAN) tablet 0.5 mg       LABS / RADIOLOGY:     Labs Reviewed   SEDIMENTATION RATE - Abnormal; Notable for the following components:       Result Value    Sed Rate 23 (*)     All other components within normal limits   C-REACTIVE PROTEIN - Abnormal; Notable for the following components:    CRP 89.4 (*)     All other components within normal limits   CBC WITH AUTO DIFFERENTIAL - Abnormal; Notable for the following components:    RBC 3.24 (*)     Hemoglobin 8.2 (*)     Hematocrit 27.1 (*)     RDW 19.5 (*)     Seg Neutrophils 68 (*)     Lymphocytes 15 (*)     Monocytes 13 (*)     Absolute Mono # 1.44 (*)     All other components within normal limits   BASIC METABOLIC PANEL W/ REFLEX TO MG FOR LOW K - Abnormal; Notable for the following components:    Glucose 107 (*)     BUN 7 (*)     Sodium 125 (*)     Chloride 93 (*)     All other components within normal limits   COVID-19, RAPID   CK   MYOGLOBIN, SERUM   CBC WITH AUTO DIFFERENTIAL   BASIC METABOLIC PANEL   PROTIME-INR       Xr Femur Right (min 2 Views)    Result Date: 4/11/2021  EXAMINATION: ONE XRAY VIEW OF THE PELVIS AND TWO XRAY VIEWS RIGHT HIP; THREE XRAY VIEWS OF THE RIGHT KNEE; 2 XRAY VIEWS OF THE RIGHT FEMUR 4/11/2021 5:13 pm COMPARISON: Right knee of 20 January 2014 HISTORY: ORDERING SYSTEM PROVIDED HISTORY: fall TECHNOLOGIST PROVIDED HISTORY: fall FINDINGS: Pelvis: Osteopenia complicates assessment. Both femoral heads are well circumscribed and situated within the acetabula. Mild degenerative changes are present in the hips. No acute fracture or dislocation is evident. SI joints are symmetrical.  Significant atherosclerotic calcification of the iliac and femoral arteries is noted. Right knee: A right total knee arthroplasty is noted. The patient has a supracondylar impacted fracture of the distal femur with offset of 11 mm; distal fracture fragment is displaced dorsally. No joint dislocation. Significant atherosclerotic vascular calcification is present. Right femur: Mild degenerative changes are present in the hip. A right total knee arthroplasty is noted. The patient has is fracture of the supracondylar area of the femur. Distal fracture fragment is displaced dorsally by 11 mm in relation the proximal fracture fragment. Additionally, there appears be a faint vertical lucency extending cranially into the distal femoral shaft from the fracture site best seen on the frontal view. No dislocation. Pelvis: Para changes in the hips.   No acute fracture or dislocation. Significant vascular calcification. Right knee: Total knee arthroplasty. Impacted fracture supracondylar area of the distal right femur with dorsal displacement of the distal fracture fragment by 11 mm. Right femur: Supracondylar fracture distal femur with dorsal displacement distal fracture fragment by 11 mm. However, there appears be a vertical component of a distal femoral fracture faintly visualized, nondisplaced and suggesting a hairline fracture. Xr Knee Right (3 Views)    Result Date: 4/11/2021  EXAMINATION: ONE XRAY VIEW OF THE PELVIS AND TWO XRAY VIEWS RIGHT HIP; THREE XRAY VIEWS OF THE RIGHT KNEE; 2 XRAY VIEWS OF THE RIGHT FEMUR 4/11/2021 5:13 pm COMPARISON: Right knee of 20 January 2014 HISTORY: ORDERING SYSTEM PROVIDED HISTORY: fall TECHNOLOGIST PROVIDED HISTORY: fall FINDINGS: Pelvis: Osteopenia complicates assessment. Both femoral heads are well circumscribed and situated within the acetabula. Mild degenerative changes are present in the hips. No acute fracture or dislocation is evident. SI joints are symmetrical.  Significant atherosclerotic calcification of the iliac and femoral arteries is noted. Right knee: A right total knee arthroplasty is noted. The patient has a supracondylar impacted fracture of the distal femur with offset of 11 mm; distal fracture fragment is displaced dorsally. No joint dislocation. Significant atherosclerotic vascular calcification is present. Right femur: Mild degenerative changes are present in the hip. A right total knee arthroplasty is noted. The patient has is fracture of the supracondylar area of the femur. Distal fracture fragment is displaced dorsally by 11 mm in relation the proximal fracture fragment. Additionally, there appears be a faint vertical lucency extending cranially into the distal femoral shaft from the fracture site best seen on the frontal view. No dislocation. Pelvis: Para changes in the hips.   No acute fracture or dislocation. Significant vascular calcification. Right knee: Total knee arthroplasty. Impacted fracture supracondylar area of the distal right femur with dorsal displacement of the distal fracture fragment by 11 mm. Right femur: Supracondylar fracture distal femur with dorsal displacement distal fracture fragment by 11 mm. However, there appears be a vertical component of a distal femoral fracture faintly visualized, nondisplaced and suggesting a hairline fracture. Xr Hip 2-3 Vw W Pelvis Right    Result Date: 4/11/2021  EXAMINATION: ONE XRAY VIEW OF THE PELVIS AND TWO XRAY VIEWS RIGHT HIP; THREE XRAY VIEWS OF THE RIGHT KNEE; 2 XRAY VIEWS OF THE RIGHT FEMUR 4/11/2021 5:13 pm COMPARISON: Right knee of 20 January 2014 HISTORY: ORDERING SYSTEM PROVIDED HISTORY: fall TECHNOLOGIST PROVIDED HISTORY: fall FINDINGS: Pelvis: Osteopenia complicates assessment. Both femoral heads are well circumscribed and situated within the acetabula. Mild degenerative changes are present in the hips. No acute fracture or dislocation is evident. SI joints are symmetrical.  Significant atherosclerotic calcification of the iliac and femoral arteries is noted. Right knee: A right total knee arthroplasty is noted. The patient has a supracondylar impacted fracture of the distal femur with offset of 11 mm; distal fracture fragment is displaced dorsally. No joint dislocation. Significant atherosclerotic vascular calcification is present. Right femur: Mild degenerative changes are present in the hip. A right total knee arthroplasty is noted. The patient has is fracture of the supracondylar area of the femur. Distal fracture fragment is displaced dorsally by 11 mm in relation the proximal fracture fragment. Additionally, there appears be a faint vertical lucency extending cranially into the distal femoral shaft from the fracture site best seen on the frontal view. No dislocation. Pelvis: Para changes in the hips.   No acute fracture or dislocation. Significant vascular calcification. Right knee: Total knee arthroplasty. Impacted fracture supracondylar area of the distal right femur with dorsal displacement of the distal fracture fragment by 11 mm. Right femur: Supracondylar fracture distal femur with dorsal displacement distal fracture fragment by 11 mm. However, there appears be a vertical component of a distal femoral fracture faintly visualized, nondisplaced and suggesting a hairline fracture. RECENT VITALS:     Temp: 98 °F (36.7 °C),  Pulse: 80, Resp: 18, BP: 135/71, SpO2: 94 %    This patient is a 73 y.o. Male with fall last night transferring from wheelchair to couch, periprosthetic knee fracture, orthopedics consulted, trauma to admit. Administered 8 of morphine, resolution of pain. Pending admit. OUTSTANDING TASKS / RECOMMENDATIONS:    1. Pending admit     FINAL IMPRESSION:     1. Closed fracture of distal end of right femur, unspecified fracture morphology[facility]    2. Dilutional hyponatremia        DISPOSITION:         DISPOSITION:  []  Discharge   []  Transfer -    [x]  Admission -  Trauma   []  Against Medical Advice   []  Eloped   FOLLOW-UP: No follow-up provider specified.    DISCHARGE MEDICATIONS: Current Discharge Medication List              Jazz Ch MD  Emergency Medicine Resident  [id number]       Jazz Ch MD  Resident  04/12/21 3318

## 2021-04-12 ENCOUNTER — APPOINTMENT (OUTPATIENT)
Dept: NUCLEAR MEDICINE | Age: 71
DRG: 464 | End: 2021-04-12
Payer: COMMERCIAL

## 2021-04-12 ENCOUNTER — APPOINTMENT (OUTPATIENT)
Dept: CT IMAGING | Age: 71
DRG: 464 | End: 2021-04-12
Payer: COMMERCIAL

## 2021-04-12 PROBLEM — F10.121 ALCOHOL INTOXICATION DELIRIUM (HCC): Status: RESOLVED | Noted: 2020-03-27 | Resolved: 2021-04-12

## 2021-04-12 PROBLEM — G93.41 ACUTE METABOLIC ENCEPHALOPATHY: Status: RESOLVED | Noted: 2020-03-27 | Resolved: 2021-04-12

## 2021-04-12 PROBLEM — L89.211 PRESSURE INJURY OF RIGHT HIP, STAGE 1: Status: RESOLVED | Noted: 2020-03-24 | Resolved: 2021-04-12

## 2021-04-12 LAB
ABSOLUTE EOS #: 1.15 K/UL (ref 0–0.44)
ABSOLUTE IMMATURE GRANULOCYTE: 0.05 K/UL (ref 0–0.3)
ABSOLUTE LYMPH #: 1.17 K/UL (ref 1.1–3.7)
ABSOLUTE MONO #: 1.09 K/UL (ref 0.1–1.2)
ALBUMIN SERPL-MCNC: 3.3 G/DL (ref 3.5–5.2)
ALBUMIN/GLOBULIN RATIO: 1.2 (ref 1–2.5)
ALP BLD-CCNC: 91 U/L (ref 40–129)
ALT SERPL-CCNC: 12 U/L (ref 5–41)
ANION GAP SERPL CALCULATED.3IONS-SCNC: 8 MMOL/L (ref 9–17)
AST SERPL-CCNC: 18 U/L
BASOPHILS # BLD: 1 % (ref 0–2)
BASOPHILS ABSOLUTE: 0.05 K/UL (ref 0–0.2)
BILIRUB SERPL-MCNC: 0.38 MG/DL (ref 0.3–1.2)
BILIRUBIN DIRECT: 0.16 MG/DL
BILIRUBIN URINE: NEGATIVE
BILIRUBIN, INDIRECT: 0.22 MG/DL (ref 0–1)
BLOOD BANK SPECIMEN: NORMAL
BUN BLDV-MCNC: 11 MG/DL (ref 8–23)
BUN/CREAT BLD: ABNORMAL (ref 9–20)
CALCIUM SERPL-MCNC: 9.2 MG/DL (ref 8.6–10.4)
CHLORIDE BLD-SCNC: 95 MMOL/L (ref 98–107)
CO2: 24 MMOL/L (ref 20–31)
COLOR: YELLOW
COMMENT UA: NORMAL
CREAT SERPL-MCNC: 0.92 MG/DL (ref 0.7–1.2)
DIFFERENTIAL TYPE: ABNORMAL
EKG ATRIAL RATE: 73 BPM
EKG P AXIS: 99 DEGREES
EKG P-R INTERVAL: 242 MS
EKG Q-T INTERVAL: 418 MS
EKG QRS DURATION: 104 MS
EKG QTC CALCULATION (BAZETT): 460 MS
EKG R AXIS: -26 DEGREES
EKG T AXIS: 66 DEGREES
EKG VENTRICULAR RATE: 73 BPM
EOSINOPHILS RELATIVE PERCENT: 13 % (ref 1–4)
GFR AFRICAN AMERICAN: >60 ML/MIN
GFR NON-AFRICAN AMERICAN: >60 ML/MIN
GFR SERPL CREATININE-BSD FRML MDRD: ABNORMAL ML/MIN/{1.73_M2}
GFR SERPL CREATININE-BSD FRML MDRD: ABNORMAL ML/MIN/{1.73_M2}
GLOBULIN: ABNORMAL G/DL (ref 1.5–3.8)
GLUCOSE BLD-MCNC: 100 MG/DL (ref 70–99)
GLUCOSE URINE: NEGATIVE
HCT VFR BLD CALC: 24.7 % (ref 40.7–50.3)
HEMOGLOBIN: 7.4 G/DL (ref 13–17)
IMMATURE GRANULOCYTES: 1 %
INR BLD: 0.9
KETONES, URINE: NEGATIVE
LEUKOCYTE ESTERASE, URINE: NEGATIVE
LV EF: 85 %
LVEF MODALITY: NORMAL
LYMPHOCYTES # BLD: 13 % (ref 24–43)
MCH RBC QN AUTO: 25.3 PG (ref 25.2–33.5)
MCHC RBC AUTO-ENTMCNC: 30 G/DL (ref 28.4–34.8)
MCV RBC AUTO: 84.3 FL (ref 82.6–102.9)
MONOCYTES # BLD: 12 % (ref 3–12)
NITRITE, URINE: NEGATIVE
NRBC AUTOMATED: 0 PER 100 WBC
PDW BLD-RTO: 19.6 % (ref 11.8–14.4)
PH UA: 5.5 (ref 5–8)
PLATELET # BLD: 258 K/UL (ref 138–453)
PLATELET ESTIMATE: ABNORMAL
PMV BLD AUTO: 8.9 FL (ref 8.1–13.5)
POTASSIUM SERPL-SCNC: 5.1 MMOL/L (ref 3.7–5.3)
PREALBUMIN: 14.4 MG/DL (ref 20–40)
PROTEIN UA: NEGATIVE
PROTHROMBIN TIME: 9.9 SEC (ref 9.1–12.3)
RBC # BLD: 2.93 M/UL (ref 4.21–5.77)
RBC # BLD: ABNORMAL 10*6/UL
SEG NEUTROPHILS: 61 % (ref 36–65)
SEGMENTED NEUTROPHILS ABSOLUTE COUNT: 5.58 K/UL (ref 1.5–8.1)
SODIUM BLD-SCNC: 127 MMOL/L (ref 135–144)
SPECIFIC GRAVITY UA: 1.01 (ref 1–1.03)
TOTAL PROTEIN: 6.1 G/DL (ref 6.4–8.3)
TURBIDITY: CLEAR
URINE HGB: NEGATIVE
UROBILINOGEN, URINE: NORMAL
VITAMIN D 25-HYDROXY: 77.7 NG/ML (ref 30–100)
WBC # BLD: 9.1 K/UL (ref 3.5–11.3)
WBC # BLD: ABNORMAL 10*3/UL

## 2021-04-12 PROCEDURE — 99221 1ST HOSP IP/OBS SF/LOW 40: CPT | Performed by: ORTHOPAEDIC SURGERY

## 2021-04-12 PROCEDURE — 85610 PROTHROMBIN TIME: CPT

## 2021-04-12 PROCEDURE — 2580000003 HC RX 258: Performed by: STUDENT IN AN ORGANIZED HEALTH CARE EDUCATION/TRAINING PROGRAM

## 2021-04-12 PROCEDURE — 36430 TRANSFUSION BLD/BLD COMPNT: CPT

## 2021-04-12 PROCEDURE — 2060000002 HC BURN ICU INTERMEDIATE R&B

## 2021-04-12 PROCEDURE — 6370000000 HC RX 637 (ALT 250 FOR IP): Performed by: NURSE PRACTITIONER

## 2021-04-12 PROCEDURE — 86900 BLOOD TYPING SEROLOGIC ABO: CPT

## 2021-04-12 PROCEDURE — 6370000000 HC RX 637 (ALT 250 FOR IP): Performed by: STUDENT IN AN ORGANIZED HEALTH CARE EDUCATION/TRAINING PROGRAM

## 2021-04-12 PROCEDURE — 93010 ELECTROCARDIOGRAM REPORT: CPT | Performed by: INTERNAL MEDICINE

## 2021-04-12 PROCEDURE — 6360000002 HC RX W HCPCS: Performed by: INTERNAL MEDICINE

## 2021-04-12 PROCEDURE — 86901 BLOOD TYPING SEROLOGIC RH(D): CPT

## 2021-04-12 PROCEDURE — 2580000003 HC RX 258: Performed by: INTERNAL MEDICINE

## 2021-04-12 PROCEDURE — 93017 CV STRESS TEST TRACING ONLY: CPT

## 2021-04-12 PROCEDURE — 80076 HEPATIC FUNCTION PANEL: CPT

## 2021-04-12 PROCEDURE — 73700 CT LOWER EXTREMITY W/O DYE: CPT

## 2021-04-12 PROCEDURE — 80048 BASIC METABOLIC PNL TOTAL CA: CPT

## 2021-04-12 PROCEDURE — 81003 URINALYSIS AUTO W/O SCOPE: CPT

## 2021-04-12 PROCEDURE — 78452 HT MUSCLE IMAGE SPECT MULT: CPT

## 2021-04-12 PROCEDURE — 82306 VITAMIN D 25 HYDROXY: CPT

## 2021-04-12 PROCEDURE — A9500 TC99M SESTAMIBI: HCPCS | Performed by: INTERNAL MEDICINE

## 2021-04-12 PROCEDURE — 84134 ASSAY OF PREALBUMIN: CPT

## 2021-04-12 PROCEDURE — 6360000002 HC RX W HCPCS: Performed by: NURSE PRACTITIONER

## 2021-04-12 PROCEDURE — 3430000000 HC RX DIAGNOSTIC RADIOPHARMACEUTICAL: Performed by: INTERNAL MEDICINE

## 2021-04-12 PROCEDURE — 85025 COMPLETE CBC W/AUTO DIFF WBC: CPT

## 2021-04-12 PROCEDURE — 36415 COLL VENOUS BLD VENIPUNCTURE: CPT

## 2021-04-12 PROCEDURE — 86850 RBC ANTIBODY SCREEN: CPT

## 2021-04-12 PROCEDURE — 86920 COMPATIBILITY TEST SPIN: CPT

## 2021-04-12 RX ORDER — SODIUM CHLORIDE 9 MG/ML
INJECTION, SOLUTION INTRAVENOUS CONTINUOUS
Status: DISCONTINUED | OUTPATIENT
Start: 2021-04-12 | End: 2021-04-12

## 2021-04-12 RX ORDER — METOPROLOL TARTRATE 5 MG/5ML
5 INJECTION INTRAVENOUS EVERY 5 MIN PRN
Status: DISCONTINUED | OUTPATIENT
Start: 2021-04-12 | End: 2021-04-12 | Stop reason: ALTCHOICE

## 2021-04-12 RX ORDER — FENTANYL CITRATE 50 UG/ML
25 INJECTION, SOLUTION INTRAMUSCULAR; INTRAVENOUS ONCE
Status: COMPLETED | OUTPATIENT
Start: 2021-04-12 | End: 2021-04-12

## 2021-04-12 RX ORDER — SODIUM CHLORIDE 9 MG/ML
500 INJECTION, SOLUTION INTRAVENOUS CONTINUOUS PRN
Status: DISCONTINUED | OUTPATIENT
Start: 2021-04-12 | End: 2021-04-12 | Stop reason: ALTCHOICE

## 2021-04-12 RX ORDER — SODIUM CHLORIDE 0.9 % (FLUSH) 0.9 %
5-40 SYRINGE (ML) INJECTION PRN
Status: DISCONTINUED | OUTPATIENT
Start: 2021-04-12 | End: 2021-04-12 | Stop reason: ALTCHOICE

## 2021-04-12 RX ORDER — ATROPINE SULFATE 0.1 MG/ML
0.5 INJECTION INTRAVENOUS EVERY 5 MIN PRN
Status: DISCONTINUED | OUTPATIENT
Start: 2021-04-12 | End: 2021-04-12 | Stop reason: ALTCHOICE

## 2021-04-12 RX ORDER — SODIUM CHLORIDE 9 MG/ML
INJECTION, SOLUTION INTRAVENOUS CONTINUOUS
Status: DISCONTINUED | OUTPATIENT
Start: 2021-04-13 | End: 2021-04-12

## 2021-04-12 RX ORDER — NITROGLYCERIN 0.4 MG/1
0.4 TABLET SUBLINGUAL EVERY 5 MIN PRN
Status: DISCONTINUED | OUTPATIENT
Start: 2021-04-12 | End: 2021-04-12 | Stop reason: ALTCHOICE

## 2021-04-12 RX ORDER — SODIUM CHLORIDE 9 MG/ML
INJECTION, SOLUTION INTRAVENOUS CONTINUOUS
Status: DISCONTINUED | OUTPATIENT
Start: 2021-04-12 | End: 2021-04-15

## 2021-04-12 RX ORDER — OXYCODONE HYDROCHLORIDE 5 MG/1
5 TABLET ORAL ONCE
Status: DISCONTINUED | OUTPATIENT
Start: 2021-04-12 | End: 2021-04-16 | Stop reason: HOSPADM

## 2021-04-12 RX ORDER — LANOLIN ALCOHOL/MO/W.PET/CERES
250 CREAM (GRAM) TOPICAL DAILY
Status: DISCONTINUED | OUTPATIENT
Start: 2021-04-13 | End: 2021-04-14 | Stop reason: SDUPTHER

## 2021-04-12 RX ORDER — METHOCARBAMOL 500 MG/1
500 TABLET, FILM COATED ORAL EVERY 6 HOURS
Status: DISCONTINUED | OUTPATIENT
Start: 2021-04-12 | End: 2021-04-16 | Stop reason: HOSPADM

## 2021-04-12 RX ORDER — FUROSEMIDE 20 MG/1
20 TABLET ORAL DAILY
Status: DISCONTINUED | OUTPATIENT
Start: 2021-04-12 | End: 2021-04-16 | Stop reason: HOSPADM

## 2021-04-12 RX ORDER — SODIUM CHLORIDE 9 MG/ML
INJECTION, SOLUTION INTRAVENOUS PRN
Status: DISCONTINUED | OUTPATIENT
Start: 2021-04-12 | End: 2021-04-16 | Stop reason: HOSPADM

## 2021-04-12 RX ORDER — CETIRIZINE HYDROCHLORIDE 10 MG/1
10 TABLET ORAL DAILY
Status: DISCONTINUED | OUTPATIENT
Start: 2021-04-12 | End: 2021-04-16 | Stop reason: HOSPADM

## 2021-04-12 RX ORDER — SODIUM CHLORIDE 0.9 % (FLUSH) 0.9 %
10 SYRINGE (ML) INJECTION PRN
Status: DISCONTINUED | OUTPATIENT
Start: 2021-04-12 | End: 2021-04-16 | Stop reason: HOSPADM

## 2021-04-12 RX ORDER — AMINOPHYLLINE DIHYDRATE 25 MG/ML
50 INJECTION, SOLUTION INTRAVENOUS PRN
Status: DISCONTINUED | OUTPATIENT
Start: 2021-04-12 | End: 2021-04-12 | Stop reason: ALTCHOICE

## 2021-04-12 RX ORDER — FOLIC ACID 1 MG/1
1 TABLET ORAL DAILY
Status: DISCONTINUED | OUTPATIENT
Start: 2021-04-12 | End: 2021-04-16 | Stop reason: HOSPADM

## 2021-04-12 RX ADMIN — ACETAMINOPHEN 1000 MG: 500 TABLET ORAL at 15:51

## 2021-04-12 RX ADMIN — SODIUM CHLORIDE, PRESERVATIVE FREE 10 ML: 5 INJECTION INTRAVENOUS at 12:45

## 2021-04-12 RX ADMIN — OXYCODONE HYDROCHLORIDE 5 MG: 5 TABLET ORAL at 17:31

## 2021-04-12 RX ADMIN — FOLIC ACID 1 MG: 1 TABLET ORAL at 15:51

## 2021-04-12 RX ADMIN — OXYCODONE HYDROCHLORIDE 5 MG: 5 TABLET ORAL at 23:32

## 2021-04-12 RX ADMIN — REGADENOSON 0.4 MG: 0.08 INJECTION, SOLUTION INTRAVENOUS at 12:45

## 2021-04-12 RX ADMIN — ACETAMINOPHEN 1000 MG: 500 TABLET ORAL at 20:20

## 2021-04-12 RX ADMIN — LORAZEPAM 0.5 MG: 0.5 TABLET ORAL at 15:50

## 2021-04-12 RX ADMIN — CETIRIZINE HYDROCHLORIDE 10 MG: 10 TABLET ORAL at 15:51

## 2021-04-12 RX ADMIN — Medication 100 MG: at 09:40

## 2021-04-12 RX ADMIN — TETRAKIS(2-METHOXYISOBUTYLISOCYANIDE)COPPER(I) TETRAFLUOROBORATE 11.6 MILLICURIE: 1 INJECTION, POWDER, LYOPHILIZED, FOR SOLUTION INTRAVENOUS at 15:18

## 2021-04-12 RX ADMIN — OXYCODONE HYDROCHLORIDE 5 MG: 5 TABLET ORAL at 09:38

## 2021-04-12 RX ADMIN — CARVEDILOL 3.12 MG: 3.12 TABLET, FILM COATED ORAL at 09:37

## 2021-04-12 RX ADMIN — SODIUM CHLORIDE, PRESERVATIVE FREE 10 ML: 5 INJECTION INTRAVENOUS at 20:43

## 2021-04-12 RX ADMIN — ACETAMINOPHEN 1000 MG: 500 TABLET ORAL at 06:05

## 2021-04-12 RX ADMIN — TETRAKIS(2-METHOXYISOBUTYLISOCYANIDE)COPPER(I) TETRAFLUOROBORATE 44 MILLICURIE: 1 INJECTION, POWDER, LYOPHILIZED, FOR SOLUTION INTRAVENOUS at 15:18

## 2021-04-12 RX ADMIN — LORAZEPAM 0.5 MG: 0.5 TABLET ORAL at 20:20

## 2021-04-12 RX ADMIN — FUROSEMIDE 20 MG: 20 TABLET ORAL at 15:51

## 2021-04-12 RX ADMIN — FENTANYL CITRATE 25 MCG: 50 INJECTION, SOLUTION INTRAMUSCULAR; INTRAVENOUS at 15:00

## 2021-04-12 RX ADMIN — SODIUM CHLORIDE: 9 INJECTION, SOLUTION INTRAVENOUS at 09:38

## 2021-04-12 RX ADMIN — SODIUM CHLORIDE, PRESERVATIVE FREE 10 ML: 5 INJECTION INTRAVENOUS at 14:18

## 2021-04-12 RX ADMIN — SODIUM CHLORIDE: 9 INJECTION, SOLUTION INTRAVENOUS at 22:47

## 2021-04-12 RX ADMIN — OXYCODONE HYDROCHLORIDE 5 MG: 5 TABLET ORAL at 02:52

## 2021-04-12 RX ADMIN — DESMOPRESSIN ACETATE 40 MG: 0.2 TABLET ORAL at 20:43

## 2021-04-12 RX ADMIN — DOCUSATE SODIUM 100 MG: 100 CAPSULE, LIQUID FILLED ORAL at 20:20

## 2021-04-12 RX ADMIN — METHOCARBAMOL TABLETS 500 MG: 500 TABLET, COATED ORAL at 17:31

## 2021-04-12 RX ADMIN — METHOCARBAMOL TABLETS 500 MG: 500 TABLET, COATED ORAL at 15:50

## 2021-04-12 RX ADMIN — CARVEDILOL 3.12 MG: 3.12 TABLET, FILM COATED ORAL at 20:20

## 2021-04-12 RX ADMIN — SODIUM CHLORIDE, PRESERVATIVE FREE 10 ML: 5 INJECTION INTRAVENOUS at 12:50

## 2021-04-12 RX ADMIN — LORAZEPAM 0.5 MG: 0.5 TABLET ORAL at 05:55

## 2021-04-12 RX ADMIN — LEVOTHYROXINE SODIUM 25 MCG: 25 TABLET ORAL at 06:05

## 2021-04-12 ASSESSMENT — PAIN DESCRIPTION - ONSET: ONSET: ON-GOING

## 2021-04-12 ASSESSMENT — PAIN SCALES - GENERAL
PAINLEVEL_OUTOF10: 9
PAINLEVEL_OUTOF10: 9
PAINLEVEL_OUTOF10: 10
PAINLEVEL_OUTOF10: 8
PAINLEVEL_OUTOF10: 9

## 2021-04-12 ASSESSMENT — PAIN DESCRIPTION - ORIENTATION: ORIENTATION: RIGHT

## 2021-04-12 ASSESSMENT — PAIN - FUNCTIONAL ASSESSMENT: PAIN_FUNCTIONAL_ASSESSMENT: PREVENTS OR INTERFERES WITH ALL ACTIVE AND SOME PASSIVE ACTIVITIES

## 2021-04-12 ASSESSMENT — PAIN DESCRIPTION - FREQUENCY: FREQUENCY: CONTINUOUS

## 2021-04-12 ASSESSMENT — PAIN DESCRIPTION - PROGRESSION: CLINICAL_PROGRESSION: NOT CHANGED

## 2021-04-12 NOTE — PROGRESS NOTES
PROGRESS NOTE      PATIENT NAME: Rodrick Torrez  MEDICAL RECORD NO. [id number]  DATE: 2021  SURGEON: Eva Zhao  PRIMARY CARE PHYSICIAN: Анна Trujillo,     HD: # 1    ASSESSMENT    Patient Active Problem List   Diagnosis    Knee pain, right    Vertigo    Meniere's disease    ADHD (attention deficit hyperactivity disorder)    Narcolepsy    Hyponatremia    PND (post-nasal drip)    Transaminasemia    NSTEMI (non-ST elevated myocardial infarction) ([address]    Altered mental status    Cardiomyopathy ([address]    HTN (hypertension)    Traumatic rhabdomyolysis ([address]    Metabolic encephalopathy    Non-traumatic rhabdomyolysis    Urinary tract infection with hematuria    Dysphagia    Chronic obstructive pulmonary disease (HCC)    Dementia with behavioral disturbance (HCC)    Status post insertion of percutaneous endoscopic gastrostomy (PEG) tube ([address]    S/p bare metal coronary artery stent    Pressure injury of right hip, stage 1    Acute metabolic encephalopathy    Alcohol intoxication delirium ([address]    Accidental fall from wheelchair    Supracondylar fracture of distal end of femur without intracondylar extension, sequela       MEDICAL DECISION MAKING AND PLAN    1. Neuro  1. Pain: tylenol, Alea  2. Cont home Ativan  3. Cont home melatonin  4. CIWA  2. Right distal femur fracture, to OR with ortho today  3. Cards  1. Cont home coreg and Lipitor  2. Holding home lasix  3. Consult cardiology for risk stratification  4. NPO fpr procedure  5. IVF while NPO  6. IS  7. Dispo planning      SUBJECTIVE    Delphine Cherry was seen and examined at bedside. Tolerated some p.o. intake before midnight. No nausea or emesis. Passing flatus.        OBJECTIVE  VITALS: Temp: Temp: 98.1 °F (36.7 °C)Temp  Av °F (36.7 °C)  Min: 98 °F (36.7 °C)  Max: 98.1 °F (34.2 °C) BP Systolic (33AHK), IAH:729 , Min:129 , PVI:135   Diastolic (08DAY), CW, Min:71, Max:87   Pulse Pulse  Av.3  Min: 66  Max: 80 Resp Resp Av  Min: 18  Max: 18 Pulse ox SpO2  Av.8 %  Min: 94 %  Max: 96 %  GENERAL: alert, no distress  NEURO: CNII-XII grossly intact; moving all extremities  LUNGS: normal effort; symmetric rise and fall of chest wall  HEART: regular rate and rhythm  ABDOMEN: soft, nondistended, not tender to palpation; no guarding, rebound or rigidity  EXTREMITY: Tenderness to palpation with edema of the right distal femur, decreased range of motion of right knee and hip secondary to pain    I/O last 3 completed shifts:  In: -   Out: 700 [Urine:700]    Drain/tube output: In: -   Out: 700 [Urine:700]    LAB:  CBC:   Recent Labs     21  1633 21  0536   WBC 10.9 9.1   HGB 8.2* 7.4*   HCT 27.1* 24.7*   MCV 83.6 84.3    258     BMP:   Recent Labs     21  1633 21  0536   * 127*   K 5.1 5.1   CL 93* 95*   CO2 20 24   BUN 7* 11   CREATININE 0.84 0.92   GLUCOSE 107* 100*     COAGS:   Recent Labs     21  0536   INR 0.9       RADIOLOGY:  Xr Chest (single View Frontal)    Result Date: 2021  No acute cardiopulmonary disease     Xr Femur Right (min 2 Views)    Result Date: 2021  Pelvis: Para changes in the hips. No acute fracture or dislocation. Significant vascular calcification. Right knee: Total knee arthroplasty. Impacted fracture supracondylar area of the distal right femur with dorsal displacement of the distal fracture fragment by 11 mm. Right femur: Supracondylar fracture distal femur with dorsal displacement distal fracture fragment by 11 mm. However, there appears be a vertical component of a distal femoral fracture faintly visualized, nondisplaced and suggesting a hairline fracture. Xr Knee Right (3 Views)    Result Date: 2021  Pelvis: Para changes in the hips. No acute fracture or dislocation. Significant vascular calcification. Right knee: Total knee arthroplasty.   Impacted fracture supracondylar area of the distal right femur with dorsal displacement of the distal fracture fragment by 11 mm. Right femur: Supracondylar fracture distal femur with dorsal displacement distal fracture fragment by 11 mm. However, there appears be a vertical component of a distal femoral fracture faintly visualized, nondisplaced and suggesting a hairline fracture. Ct Head Wo Contrast    Result Date: 4/11/2021  Mild central and cortical cerebral atrophy. Moderate chronic deep white matter ischemic changes No acute intracranial abnormalities are noted. Xr Hip 2-3 Vw W Pelvis Right    Result Date: 4/11/2021  Pelvis: Para changes in the hips. No acute fracture or dislocation. Significant vascular calcification. Right knee: Total knee arthroplasty. Impacted fracture supracondylar area of the distal right femur with dorsal displacement of the distal fracture fragment by 11 mm. Right femur: Supracondylar fracture distal femur with dorsal displacement distal fracture fragment by 11 mm. However, there appears be a vertical component of a distal femoral fracture faintly visualized, nondisplaced and suggesting a hairline fracture.          Sue Allred DO  4/12/2021, 7:37 AM

## 2021-04-12 NOTE — PROGRESS NOTES
Occupational Therapy Not Seen Note    DATE: 2021  Name: Elijah Mcwilliams  : 1950  MRN: [id number]    Patient not available for Occupational Therapy due to:    Strict Bedrest: Plan for OR at noon for right distal femur replacement vs ORIF/IMN    Next Scheduled Treatment: check back 2021    Electronically signed by GAGE Parish on 2021 at 10:39 AM

## 2021-04-12 NOTE — CONSULTS
Attestation signed by      Attending Physician Statement:    I have discussed the care of  Oscar Stacy , including pertinent history and exam findings, with the Cardiology fellow/resident. I have seen and examined the patient and the key elements of all parts of the encounter have been performed by me. I agree with the assessment, plan and orders as documented by the fellow/resident, after I modified exam findings and plan of treatments, and the final version is my approved version of the assessment. Additional Comments: The patient was seen and examined, agree with above. Presented with mechanical fall and hip fracture. Previously history of CABG and ischemic cardiomyopathy in 2015 s/p AICD placement. No work up and did not follow since then. Not physically active and not able to assess level of activity. ECG no acute changes. Will plan for Cardiolite stress test for risk stratification. [facility]    Consult / H&P               Today's Date: 4/12/2021  Patient Name: Oscar Stacy  Date of admission: 4/11/2021  3:41 PM  Patient's age: 73 y.o., 1950  Admission Dx: Supracondylar fracture of distal end of femur without intracondylar extension, sequela [S72.453S]    Reason for Consult:  Cardiac evaluation-preop evaluation and optimization. History of cardiomyopathy, CHF, stent, AICD    Requesting Physician: Oanh Sanabria DO    CHIEF COMPLAINT:  fall    History Obtained From: Patient and EMR    HISTORY OF PRESENT ILLNESS:      The patient is a 73 y.o. male presented to the ER following mechanical ground-level fall. Patient was being transferred from his wheelchair when he tried to grab the arm of the wheelchair and miscalculated the placement, he landed on a carpeted floor but was unable to get himself up. EMS was called.   EMS arrived and helped him back to his wheelchair but did not transport him to the hospital.  Patient had increasing leg pain and called EMS to bring him to the ER. He denied hitting his head or losing any consciousness. Patient had right supracondylar femur fracture. Plan for ORIF today. Cardiac history:  CAD: multivessel CAD (proximal OM-1, D-1 and mid- RCA) post PTCA - BMS RCA and OM  PAf- on Amiodarone  S/p AICD for V. fib arrest (2015)    ECHO 2015:   ejection fraction is 20%  Severe global hypokinesis of the left ventricle.           Past Medical History:   has a past medical history of ADHD (attention deficit hyperactivity disorder), ADHD (attention deficit hyperactivity disorder), Atypical chest pain, Chronic bronchitis ([address] Hypertension, Hyponatremia, Meniere's disease, Narcolepsy, Nasal fracture, PND (post-nasal drip), SOB (shortness of breath), Tibia fracture, and Transaminasemia. Past Surgical History:   has a past surgical history that includes cyst removal; Ankle surgery; knee surgery (Right); and Coronary angioplasty with stent (N/A, 10/10/2015). Home Medications:    Prior to Admission medications    Medication Sig Start Date End Date Taking?  Authorizing Provider   Multiple Vitamins-Minerals (CERTAVITE SENIOR/ANTIOXIDANT) TABS TAKE 1 TAB BY MOUTH ONCE A DAY 6/22/16   Iris Lynn PA-C   GLUCOSAMINE-CHONDROITIN -400 MG tablet TAKE 1 TAB BY MOUTH TWICE A DAY 6/7/16   Iris Lynn PA-C   carvedilol (COREG) 3.125 MG tablet TAKE 1 TAB BY MOUTH TWICE A DAY WITH MEALS 6/7/16   Iris Lynn PA-C   Armodafinil (NUVIGIL) 150 MG TABS tablet Take 1 tablet by mouth daily 4/26/16   Bob Alvarez DO   loratadine (CLARITIN) 10 MG tablet TAKE 1 TAB BY MOUTH ONCE A DAY 3/24/16   Bob Alvarez DO   levothyroxine (SYNTHROID) 25 MCG tablet Take 1 tablet by mouth Daily  Patient taking differently: Take 50 mcg by mouth Daily  3/24/16   Bob Alvarez, DO   QUEtiapine (SEROQUEL) 25 MG tablet Take 1 tablet by mouth nightly 3/24/16   Bob Alvarez DO   HYDROcodone-acetaminophen (NORCO) 5-325 MG per tablet Take 1 tablet by mouth every 6 hours as needed for Pain 3/24/16   Loyda Toney, DO   melatonin 3 MG TABS tablet Take 3 mg by mouth daily    Historical Provider, MD   aspirin 81 MG chewable tablet Take 1 tablet by mouth daily 11/10/15   Keyona Lambert MD   atorvastatin (LIPITOR) 40 MG tablet Take 1 tablet by mouth nightly 11/10/15   Keyona Lambert MD   clopidogrel (PLAVIX) 75 MG tablet Take 1 tablet by mouth daily 11/10/15   Keyona Lambert MD   thiamine 100 MG tablet Take 1 tablet by mouth daily 11/10/15   Keyona Lambert MD   Magnesium Oxide 250 MG TABS Take 1 tablet by mouth daily  Patient taking differently: Take 500 mg by mouth daily  11/10/15   Keyona Lambert MD   meclizine (ANTIVERT) 25 MG tablet Take 0.5 tablets by mouth 3 times daily as needed 11/10/15   Keyona Lambert MD   furosemide (LASIX) 20 MG tablet Take 1 tablet by mouth daily 11/10/15   Keyona Lambert MD   LORazepam (ATIVAN) 0.5 MG tablet Take 1 tablet by mouth every 6 hours as needed for Anxiety 11/10/15   Keyona Lambert MD   [address] Adult diapers dispense quantity allowed by insurance 3/31/15   Marilin Lynn PA-C   Diapers & Supplies MISC Wipes  dispense quantity allowed by insurance 3/31/15   Iris Lynn PA-C   PROAIR  (90 BASE) MCG/ACT inhaler inhale 2 puffs every 4 to 6 hours if needed 11/29/14   Iris Lynn PA-C   mometasone (NASONEX) 50 MCG/ACT nasal spray 2 sprays by Nasal route daily. 12/23/13   Kim Hines MD   docusate sodium (COLACE) 100 MG capsule Take 100 mg by mouth 2 times daily. Historical Provider, MD   folic acid (FOLVITE) 1 MG tablet Take 1 mg by mouth daily. Historical Provider, MD   chlorhexidine (PERIDEX) 0.12 % solution Take 15 mLs by mouth 2 times daily. Historical Provider, MD   SALINE MIST SPRAY NA by Nasal route. Historical Provider, MD   Multiple Vitamin (MULTIVITAMIN PO) Take  by mouth.       Historical Provider, MD         Allergies:  Motrin [ibuprofen micronized]    Social History:   reports that he has been smoking cigarettes. He has a 38.00 pack-year smoking history. He has never used smokeless tobacco. He reports current alcohol use of about 16.0 standard drinks of alcohol per week. He reports that he does not use drugs. Family History: family history includes Heart Disease in his father and mother. REVIEW OF SYSTEMS:    · Constitutional: there has been no unanticipated weight loss. There's been No change in energy level, No change in activity level. · Eyes: No visual changes or diplopia. No scleral icterus. · ENT: No Headaches  · Cardiovascular: No cardiac history  · Respiratory: No previous pulmonary problems, No cough  · Gastrointestinal: No abdominal pain. No change in bowel or bladder habits. · Genitourinary: No dysuria, trouble voiding, or hematuria. · Musculoskeletal: +right LE pain  ·       PHYSICAL EXAM:      /80   Pulse 66   Temp 98.1 °F (36.7 °C) (Oral)   Resp 18   Ht 5' 10\" (1.778 m)   Wt 176 lb 12.8 oz (80.2 kg)   SpO2 96%   BMI 25.37 kg/m²    Constitutional and General Appearance: alert, cooperative, no distress and appears stated age  HEENT: PERRL, no cervical lymphadenopathy. No masses palpable. Normal oral mucosa  Respiratory:  · Normal excursion and expansion without use of accessory muscles  · Resp Auscultation: Good respiratory effort. No for increased work of breathing.  On auscultation: clear to auscultation bilaterally  Cardiovascular:  · The apical impulse is not displaced  · Heart tones are crisp and normal. regular S1 and S2.  · Jugular venous pulsation Normal  · The carotid upstroke is normal in amplitude and contour without delay or bruit  · Peripheral pulses are symmetrical and full   Abdomen:   · No masses or tenderness  · Bowel sounds present  ·         Labs:     CBC:   Recent Labs     04/11/21  1633 04/12/21  0536   WBC 10.9 9.1   HGB 8.2* 7.4*   HCT 27.1* 24.7*    258     BMP:   Recent Labs     04/11/21  1633 04/12/21  0536   * 127*   K 5.1 5.1   CO2 20 24   BUN 7* 11   CREATININE 0.84 0.92   LABGLOM >60 >60   GLUCOSE 107* 100*     BNP: No results for input(s): BNP in the last 72 hours. PT/INR:   Recent Labs     04/12/21  0536   PROTIME 9.9   INR 0.9     APTT:No results for input(s): APTT in the last 72 hours. CARDIAC ENZYMES:  Recent Labs     04/11/21  1633   CKTOTAL 195     FASTING LIPID PANEL:  Lab Results   Component Value Date    HDL 56 05/05/2020    TRIG 108 05/05/2020     LIVER PROFILE:No results for input(s): AST, ALT, LABALBU in the last 72 hours.     IMPRESSION:    Mechanical fall  Right supracondylar femur fracture  S/p AICD  History of alcohol abuse  COPD    Patient Active Problem List   Diagnosis    Knee pain, right    Vertigo    Meniere's disease    ADHD (attention deficit hyperactivity disorder)    Narcolepsy    Hyponatremia    PND (post-nasal drip)    Transaminasemia    NSTEMI (non-ST elevated myocardial infarction) ([address]    Altered mental status    Cardiomyopathy ([address]    HTN (hypertension)    Traumatic rhabdomyolysis ([address]    Metabolic encephalopathy    Non-traumatic rhabdomyolysis    Urinary tract infection with hematuria    Dysphagia    Chronic obstructive pulmonary disease (HCC)    Dementia with behavioral disturbance (HCC)    Status post insertion of percutaneous endoscopic gastrostomy (PEG) tube ([address]    S/p bare metal coronary artery stent    Pressure injury of right hip, stage 1    Acute metabolic encephalopathy    Alcohol intoxication delirium ([address]    Accidental fall from wheelchair    Supracondylar fracture of distal end of femur without intracondylar extension, sequela       RECOMMENDATIONS:    Mechanical fall  Right supracondylar femur fracture  S/p AICD  Stress test.         Electronically signed by Adis Lou MD on 4/12/2021 at 8:27 [address] [place] [address] [facility]      [phone]

## 2021-04-12 NOTE — CARE COORDINATION
Case Management Initial Discharge Plan  [address],             Met with:patient to discuss discharge plans. Information verified: address, contacts, phone number, , insurance Yes    Emergency Contact/Next of Kin name & number: Isis Mora    PCP: Margoth Varela DO  Date of last visit: 2 weeks ago    Insurance Provider: Neeru Crump Phelps    Discharge Planning    Living Arrangements:    roomate  Support Systems:  Family Members    Home has 1 story  4 stairs to climb to get into front door, Location of bedroom/bathroom in home: Main    Patient able to perform ADL's:Independent    Current Services (outpatient & in home) none  DME equipment: Wheelchair, walker, cane, shower chair  DME provider:     Receiving oral anticoagulation therapy? No    If indicated:   Physician managing anticoagulation treatment: n/a  Where does patient obtain lab work for ATC treatment? n/a      Potential Assistance Needed:  N/A    Patient agreeable to home care: Yes  Freedom of choice provided:  yes    Prior SNF/Rehab Placement and Facility: Yes, [address] [place] [address] to SNF/Rehab: No  [place] of choice provided: n/a     Evaluation: no    Expected Discharge date:  21    Patient expects to be discharged to:  Home  Follow Up Appointment: Best Day/ Time:      Transportation provider: Will call cab on discharge  Transportation arrangements needed for discharge: No    Readmission Risk              Risk of Unplanned Readmission:        18             Does patient have a readmission risk score greater than 14?: Yes  If yes, follow-up appointment must be made within 7 days of discharge. Goals of Care:       Discharge Plan: Pt states goal is to go home with home care (freedom of choice list provided) however pt states he does not have a preference to home care service. Has wheelchair, walker, cane and shower chair at home. Awaiting surgery d/t needing stress on .  Will need follow up appointment with PCP        Electronically signed by Supriya Ortega RN on 4/12/21 at 5:27 PM EDT

## 2021-04-12 NOTE — CARE COORDINATION
Met with pt to complete an SBIRT. Pt is alert and oriented. He denies drinking or drug use. SBIRT is negative. Alcohol Screening and Brief Intervention        No results for input(s): ALC in the last 72 hours. Alcohol Pre-screening          Alcohol Screening Audit       Drug Pre-Screening        Drug Screening DAST       Mood Pre-Screening (PHQ-2)          Mood Pre-Screening (PHQ-9)         I have interviewed Brianna Styles, [id number] regarding  His alcohol consumption/drug use and risk for excessive use. Screenings were negative. Patient  N/A intervention at this time.    Deferred []    Completed on: 4/12/2021   CAORL Min

## 2021-04-12 NOTE — PROGRESS NOTES
Orthopedic Progress Note    Patient:  Jackie Caban  YOB: 1950     73 y.o. male    Subjective:  Patient seen and examined. Plan for surgery today with orthopedics, right femur ORIF/IMN vs DFR. No complaints or concerns. No issue overnight. Pain controlled. Denies fever, chills, HA, CP, cough, SOB, N/V. Vitals reviewed, afebrile. Objective:   Vitals:    04/11/21 2011   BP: 135/71   Pulse: 80   Resp: 18   Temp: 98 °F (36.7 °C)   SpO2: 94%     General: NAD, cooperative    Cardiovascular: Regular rate    Respiratory: Chest symmetric, no accessory muscle use, normal respirations, no audible wheezes    Right lower extremity: Patient lying comfortably on right side with knee slightly flexed. Knee immobilizer in place. Skin intact. Tender to palpation and minimal ROM. Compartments soft. EHL/FHL/TA/GS complex motor intact. Sural, saphenous, superificial/deep peroneal, and plantar nerve distribution SILT. Dorsalis pedis pulse 2+ with BCR. Recent Labs     04/11/21  1633   WBC 10.9   HGB 8.2*   HCT 27.1*      *   K 5.1   BUN 7*   CREATININE 0.84   GLUCOSE 107*      Meds: No chem AC or abx. Impression 73 y.o. male being seen after a fall from his wheel chair resulting in the following:      - Right periprosthetic distal femur fracture      -Plan for OR today pending surgical clearance/stratification by cardiology. Plan for ORIF/IMN versus distal femur replacement   -Non-weightbearing to right lower extremity   -Hgb 7.4. Typed and crossed 2u for surgery  -NPO since midnight  -Ancef on call to OR   -Knee immobilizer on  -Consent obtained and surgical site marked   -Trauma team primary  -Cardiology consulted  -Pain control per primary team recommendations.  Would recommend multimodal pain management protocol   Acetaminophen 500 mg tablet, 2 Q6h   Naproxen 500 mg tablet 1 q12h   Gabapentin 100 mg TID   Cyclobenzaprine 1 tablet q6h   Oxycodone 5 mg tablet 1-2 Q4-6h prn   Docusate

## 2021-04-12 NOTE — PROGRESS NOTES
Speech Language Pathology  [address]  Speech Language Pathology    SPEECH/COGNITIVE ASSESSMENT    NO LOC,[facility] OR CVA/TIA - ST TO DEFER AT THIS TIME      Date: 4/12/2021  Patient Name: Rodrick Torrez  YOB: 1950   AGE: 73 y.o. MRN: [id number]        PT NOT SEEN FOR SPEECH OR COGNITIVE ASSESSMENT AT THIS TIME AS NO LOC, [facility] OR CVA/TIA IS DOCUMENTED. ST TO DEFER AT THIS TIME. PLEASE RE-COSULT AS NEEDED.       Eunice Madison  4/12/2021  6:45 AM

## 2021-04-12 NOTE — CONSULTS
Bruna Charles Draft      CC/Reason for consult:  Right periprosthetic distal femur fracture    HPI: The patient is a 73 y.o. male who fell while transferring from his wheelchair on 4/10. He had acute right knee pain at that time. Patient arrived to [facility] via EMS hemodynamically stable and with no other complaints. Initial imaging by the emergency department demonstrated a right periprosthetic distal femur fracture. He denies numbness or tingling. Patient is a relatively poor historian but appears competent and fully alert and oriented. He does have a significant history for alcoholism, CHF, cardiomyopathy, AICD placement, and coronary stent. He is wheel chair dependent and notes that he had a 10-15 deg flexion contracture prior to his recent injury. Per chart history he takes Plavix and ASA, despite him stating that he has no medical problems. Past Medical History:   Diagnosis Date    ADHD (attention deficit hyperactivity disorder)     ADHD (attention deficit hyperactivity disorder)     Atypical chest pain     Chronic bronchitis (HCC)     Hypertension     Hyponatremia     Meniere's disease     Narcolepsy     Nasal fracture     PND (post-nasal drip) 4/21/2014    SOB (shortness of breath)     Tibia fracture     right tibial plateau fx, right syndesmotic fx    Transaminasemia 4/21/2014       Past Surgical History:   Procedure Laterality Date    ANKLE SURGERY      open reduction internal fixation of the right ankle & tibial plateau fx    CORONARY ANGIOPLASTY WITH STENT PLACEMENT N/A 10/10/2015    CYST REMOVAL      right side of face    KNEE SURGERY Right     total knee      Prior to Admission medications    Medication Sig Start Date End Date Taking?  Authorizing Provider   Multiple Vitamins-Minerals (CERTAVITE SENIOR/ANTIOXIDANT) TABS TAKE 1 TAB BY MOUTH ONCE A DAY 6/22/16   Iris Lynn PA-C   GLUCOSAMINE-CHONDROITIN -400 MG tablet TAKE 1 TAB BY MOUTH TWICE A DAY 6/7/16   Iris Lynn PA-C   carvedilol (COREG) 3.125 MG tablet TAKE 1 TAB BY MOUTH TWICE A DAY WITH MEALS 6/7/16   Iris Lynn PA-C   Armodafinil (NUVIGIL) 150 MG TABS tablet Take 1 tablet by mouth daily 4/26/16   Michele Cuff, DO   loratadine (CLARITIN) 10 MG tablet TAKE 1 TAB BY MOUTH ONCE A DAY 3/24/16   Michele Cuff, DO   levothyroxine (SYNTHROID) 25 MCG tablet Take 1 tablet by mouth Daily  Patient taking differently: Take 50 mcg by mouth Daily  3/24/16   Michele Cuff, DO   QUEtiapine (SEROQUEL) 25 MG tablet Take 1 tablet by mouth nightly 3/24/16   Michele Cuff, DO   HYDROcodone-acetaminophen [facility]) 5-325 MG per tablet Take 1 tablet by mouth every 6 hours as needed for Pain 3/24/16   Michele Cuff, DO   melatonin 3 MG TABS tablet Take 3 mg by mouth daily    Historical Provider, MD   aspirin 81 MG chewable tablet Take 1 tablet by mouth daily 11/10/15   Luciana Marquez MD   atorvastatin (LIPITOR) 40 MG tablet Take 1 tablet by mouth nightly 11/10/15   Luciana Marquez MD   clopidogrel (PLAVIX) 75 MG tablet Take 1 tablet by mouth daily 11/10/15   Luciana Marquez MD   thiamine 100 MG tablet Take 1 tablet by mouth daily 11/10/15   Luciana Marquez MD   Magnesium Oxide 250 MG TABS Take 1 tablet by mouth daily  Patient taking differently: Take 500 mg by mouth daily  11/10/15   Luciana Marquez MD   meclizine (ANTIVERT) 25 MG tablet Take 0.5 tablets by mouth 3 times daily as needed 11/10/15   Luciana Marquez MD   furosemide (LASIX) 20 MG tablet Take 1 tablet by mouth daily 11/10/15   Luciana Marquez MD   LORazepam (ATIVAN) 0.5 MG tablet Take 1 tablet by mouth every 6 hours as needed for Anxiety 11/10/15   Luciana Marquez MD   [address] Adult diapers dispense quantity allowed by insurance 3/31/15   Ted Lynn PA-C   Diapers & Supplies MISC Wipes  dispense quantity allowed by insurance 3/31/15   Iris Lynn PA-C   PROAIR  (90 BASE) MCG/ACT inhaler inhale 2 puffs every 4 to 6 hours if needed 11/29/14   Iris Lynn PA-C   mometasone (NASONEX) 50 MCG/ACT nasal spray 2 sprays by Nasal route daily. 12/23/13   Chucho Griffin MD   docusate sodium (COLACE) 100 MG capsule Take 100 mg by mouth 2 times daily. Historical Provider, MD   folic acid (FOLVITE) 1 MG tablet Take 1 mg by mouth daily. Historical Provider, MD   chlorhexidine (PERIDEX) 0.12 % solution Take 15 mLs by mouth 2 times daily. Historical Provider, MD   SALINE MIST SPRAY NA by Nasal route. Historical Provider, MD   Multiple Vitamin (MULTIVITAMIN PO) Take  by mouth. Historical Provider, MD      Social History     Socioeconomic History    Marital status:      Spouse name: None    Number of children: None    Years of education: None    Highest education level: None   Occupational History     Employer: NOT EMPLOYED   Social Needs    Financial resource strain: None    Food insecurity     Worry: None     Inability: None    Transportation needs     Medical: None     Non-medical: None   Tobacco Use    Smoking status: Current Every Day Smoker     Packs/day: 1.00     Years: 38.00     Pack years: 38.00     Types: Cigarettes    Smokeless tobacco: Never Used    Tobacco comment: e-cigs   Substance and Sexual Activity    Alcohol use:  Yes     Alcohol/week: 16.0 standard drinks     Types: 16 Cans of beer per week     Comment: daily    Drug use: No    Sexual activity: Not Currently   Lifestyle    Physical activity     Days per week: None     Minutes per session: None    Stress: None   Relationships    Social connections     Talks on phone: None     Gets together: None     Attends Cheondoism service: None     Active member of club or organization: None     Attends meetings of clubs or organizations: None     Relationship status: None    Intimate partner violence     Fear of current or ex partner: None     Emotionally abused: None     Physically abused: None     Forced sexual activity: None   Other Topics Concern    None   Social History Narrative    None     Family History   Problem Relation Age of Onset    Heart Disease Mother         heart attack    Heart Disease Father        Allergies: Motrin [ibuprofen micronized]    ROS:  General: - LOC. CV: No chest pain. Resp: No SOB. MSK: Right knee pain and swelling. 10 point ROS negative other than stated above    PE:  Blood pressure 129/80, pulse 66, temperature 98.1 °F (36.7 °C), temperature source Oral, resp. rate 18, height 5' 10\" (1.778 m), weight 176 lb 12.8 oz (80.2 kg), SpO2 96 %. Gen: Alert and oriented, NAD, cooperative. Head: Normocephalic, atraumatic. Cardiovascular: Regular rate. Respiratory: Chest symmetric, no accessory muscle use. RLE: Skin intact about the knee but there is noticeable discoloration over the anterolateral distal femur that appears scarred. Swelling to the distal femur with an associated effusion. TTP about the knee. Compartments soft. Ulcerations to the foot that appear chronic, non infected. EHL/FHL/TA/GS motor intact. Sural/Saph/SPN/DPN SILT. Non tender to the ipsilateral hip. DP/PT pulses 2+ with BCR. Recent Labs     04/11/21  1633 04/12/21  0536   WBC 10.9 9.1   HGB 8.2* 7.4*   HCT 27.1* 24.7*    258   INR  --  0.9   * 127*   K 5.1 5.1   BUN 7* 11   CREATININE 0.84 0.92   GLUCOSE 107* 100*   SEDRATE 23*  --    CRP 89.4*  --       Imaging   RIGHT KNEE CT/RADIOGRAPHS demonstrate a right periprosthetic distal femur fracture. Appears to be a PS knee implant, non constrained. No obvious loosening of the tibial component but this appears to have anterior slope, similar to prior images in the past. Distal femur has less that 3cm of bone seated within the distal femur component. Assessment/Plan: 73 y.o. male who fell from his wheel chair, with the following problems:    1. Right periprosthetic distal femur fracture  2. Alcoholism  3.  Prior EF <25% w/ history of VF and AICD placement      - Awaiting cardiology risk stratification/clearance for surgery today  - Plan for OR at noon for right distal femur replacement vs ORIF/IMN  - NWB to RLE  - Knee immobilizer in place  - NPO since midnight  - Pre-albumin/total protein, CMP to assess healing potential  - Recommend high protein diet after surgery  - Ancef on call to the OR  - Consent in chart, extremity marked  - Hold chemical AC in prep for surgery. INR 0.9  - Na 127  - Hgb 7.4. Patient typed and crossed 2u for surgery  - Pain control MMT  - VitD level  - Ice and elevate for pain and swelling reduction  - PT/OT post op eval and treat.  Will likely need SNF/ECF    Electronically signed by Vicente Castaneda DO, on 4/12/2021 at 9:36 AM.

## 2021-04-12 NOTE — PROGRESS NOTES
[facility]  Documentation Note                Admission Dx: Supracondylar fracture of distal end of femur without intracondylar extension, sequela [S72.878Z]    Past Medical History:   has a past medical history of ADHD (attention deficit hyperactivity disorder), ADHD (attention deficit hyperactivity disorder), Atypical chest pain, Chronic bronchitis ([facility] [address] Hypertension, Hyponatremia, Meniere's disease, Narcolepsy, Nasal fracture, PND (post-nasal drip), SOB (shortness of breath), Tibia fracture, and Transaminasemia. Previous Testing:     CATH 11/9/15: PTCA/BMS to OM and RCA     ICD 11/2/15: [employer] device placed by Dr. J Carlos Rodriguez     CATH 10/30/15: MVD. EF 25%. ECHO 10/14/15: EF 20%, severe global hypokinesis, DD, small anteiror PCE that has a chronic appearance, fibrinous clot formation is seen in this pericardial space, trivial-mild TR. Previous office/hospital visit:   Dr. Everardo Rea 11/10/15:   1. VF arrest: in the setting of multivessel CAD. He is s/p AICD placement. Plan for PCI in AM rest as discussed below. 2. CAD: multivessel CAD (proximal OM-1, D-1 and mid- RCA) post PTCA - BMS RCA and OM  3. ICMP: will need revascularization for his CAD. He is s/p AICD placement for VF arrest. On appropriate medications as above. Will need to be on Aldactone as well with CMP. 4. PAF: [id number] score of 1. Currently on Amiodarone and continues to be in NSR. Also due to his risk of falls will not add anticoagulation  5. HTN: well controlled on current medications. 6. Anemia: macrocytic. Work up per primary team.  7. Hypokalemia: keep K >4.  8. Serum magnesium: serum Mg wnl but would like to keep it >2 in current setting.     Plan --   Same meds  No Coumadin for now due to risk fall  Ok to plan DC when OK with all services    [facility]

## 2021-04-12 NOTE — PROGRESS NOTES
[I.V.:119.5]  Out: -     Radiology: See radiology report     PHYSICAL EXAM:   GCS:  4 - Opens eyes on own   6 - Follows simple motor commands  5 - Alert and oriented    Pupil size:  Left 2 mm Right 2 mm  Pupil reaction: Yes  Wiggles fingers: Left Yes Right Yes  Hand grasp:   Left normal   Right normal  Wiggles toes: Left Yes    Right Yes  Plantar flexion: Left normal  Right normal    General appearance: alert, appears stated age and cooperative  Head: Normocephalic, without obvious abnormality, atraumatic  Eyes: conjunctivae/corneas clear. PERRL, EOM's intact. Fundi benign. Neck: supple, symmetrical, trachea midline  Back: symmetric, no curvature. ROM normal. No CVA tenderness.   Lungs: clear to auscultation bilaterally  Chest wall: no tenderness  Abdomen: soft, non-tender; bowel sounds normal; no masses,  no organomegaly  Extremities: RLE in brace per ortho  Neurologic: Grossly normal    Spine:     Spine Tenderness ROM   Cervical 0 /10 Normal   Thoracic 0 /10 Normal   Lumbar 0 /10 Normal     Musculoskeletal    Joint Tenderness Swelling ROM   Right shoulder absent absent normal   Left shoulder absent absent normal   Right elbow absent absent normal   Left elbow absent absent normal   Right wrist absent absent normal   Left wrist absent absent normal   Right hand grasp absent absent normal   Left hand grasp absent absent normal   Right hip absent absent normal   Left hip absent absent normal   Right knee present absent Limited due to brace and pain   Left knee absent absent normal   Right ankle absent absent normal   Left ankle absent absent normal   Right foot absent absent normal   Left foot absent absent normal       CONSULTS: Orthopedic surgery    PROCEDURES: OR planning    INJURIES:  R femur fx      Patient Active Problem List   Diagnosis    Meniere's disease    ADHD (attention deficit hyperactivity disorder)    Narcolepsy    Hyponatremia    PND (post-nasal drip)    Transaminasemia    Cardiomyopathy ([facility] [address]  HTN (hypertension)    Chronic obstructive pulmonary disease (HCC)    Dementia with behavioral disturbance (HCC)    Status post insertion of percutaneous endoscopic gastrostomy (PEG) tube ([facility] [address]    S/p bare metal coronary artery stent    Supracondylar fracture of distal end of femur without intracondylar extension, sequela         Assessment/Plan:     1. Neuro  1. Pain: tylenol, Alea  2. Cont home Ativan  3. Cont home melatonin  4. CIWA  2. Right distal femur fracture, to OR with ortho today  3. Cards  1. Cont home coreg and Lipitor  2. Holding home lasix  3. Consult cardiology for risk stratification  4. NPO fpr procedure  5. IVF while NPO  6. IS  7.  Dispo planning

## 2021-04-12 NOTE — PROGRESS NOTES
PROGRESS NOTE/TERTIARY EXAM          PATIENT NAME: Oscar Stacy  MEDICAL RECORD NO. [id number]  DATE: 2021  SURGEON: Dr. Loren Hanna: Zaina Zepeda,     HD: # 1    ASSESSMENT    Patient Active Problem List   Diagnosis    Meniere's disease    ADHD (attention deficit hyperactivity disorder)    Narcolepsy    Hyponatremia    PND (post-nasal drip)    Transaminasemia    Cardiomyopathy ([address]    HTN (hypertension)    Chronic obstructive pulmonary disease ([address]    Dementia with behavioral disturbance ([address]    Status post insertion of percutaneous endoscopic gastrostomy (PEG) tube ([facility] [address]    S/p bare metal coronary artery stent    Supracondylar fracture of distal end of femur without intracondylar extension, sequela       MEDICAL DECISION MAKING AND PLAN    R periprosthetic distal femur fracture   Ortho consulted   Plan for OR when risk stratified per cardiology   NWB RLE with knee immobilizer in place    Alcoholism  Hyponatremia   Monitor for etoh withdrawal   Start thiamine/folic acid   Continue home ativan    CAD  Ischemic cardiomyopathy  Hx of VF arrest  PAF  HTN   Cardiology consulted   Plan for Cardiolite stress test for risk stratification for OR   Follow-up Device interrogation   Continue Lipitor, Coreg   Restart home lasix    Pain management   Continue Tylenol, Ativan   Continue Alea PRN   Will start robaxin    Hypothyroidism   Continue synthroid    GI   NPO   Will start diet after stress test/OR    DVT proph   Pending plan for OR later today         Chief Complaint: \"I have pain\"    SUBJECTIVE    Oscar Stacy was seen and examined at bedside. Patient states he is having pain. States the pain to his right thigh feels better when he is laying on his right leg.   States he is also tired because people have been coming in and out of his room since 6am.      OBJECTIVE  VITALS: Temp: Temp: 97.2 °F (36.2 °C)Temp  Av.8 °F (36.6 °C)  Min: 97.2 °F (36.2 °C)  Max: 98.1 °F (14.7 °C) BP Systolic (17TNO), JYD:941 , Min:117 , HVS:818   Diastolic (22VCQ), JMX:35, Min:46, Max:87   Pulse Pulse  Av.4  Min: 66  Max: 80 Resp Resp  Av  Min: 18  Max: 18 Pulse ox SpO2  Av.7 %  Min: 90 %  Max: 96 %  Physical Exam  Constitutional:        HENT:      Head: Normocephalic. Eyes:      Pupils: Pupils are equal, round, and reactive to light. Cardiovascular:      Rate and Rhythm: Normal rate and regular rhythm. Pulses: Normal pulses. Pulmonary:      Breath sounds: Normal breath sounds. Abdominal:      General: Bowel sounds are normal.      Palpations: Abdomen is soft. Musculoskeletal:      Right upper leg: He exhibits tenderness. Skin:     General: Skin is warm. Neurological:      Mental Status: He is alert. GCS: GCS eye subscore is 4. GCS verbal subscore is 5. GCS motor subscore is 6. Psychiatric:         Behavior: Behavior is cooperative. Spine:     Spine Tenderness ROM   Cervical 0 /10 Normal   Thoracic 0 /10 Normal   Lumbar 0 /10 Normal     Musculoskeletal    Joint Tenderness Swelling ROM   Right shoulder absent absent normal   Left shoulder absent absent normal   Right elbow absent absent normal   Left elbow absent absent normal   Right wrist absent absent normal   Left wrist absent absent normal   Right hand grasp absent absent normal   Left hand grasp absent absent normal   Right hip KENNY KENNY abnormal - KENNY   Left hip absent absent normal   Right knee KENNY KENNY abnormal - KENNY   Left knee absent absent normal   Right ankle absent absent normal   Left ankle absent absent normal   Right foot absent absent normal   Left foot absent absent normal       I/O last 3 completed shifts:  In: -   Out: 700 [Urine:700]    Drain/tube output:   In: -   Out: 1300 [Urine:1300]    LAB:  CBC:   Recent Labs     21  1633 21  0536   WBC 10.9 9.1   HGB 8.2* 7.4*   HCT 27.1* 24.7*   MCV 83.6 84.3    258     BMP:   Recent Labs     21  1633 21  0536 * 127*   K 5.1 5.1   CL 93* 95*   CO2 20 24   BUN 7* 11   CREATININE 0.84 0.92   GLUCOSE 107* 100*     COAGS:   Recent Labs     04/12/21  0536   PROT 6.1*   INR 0.9       RADIOLOGY:  CT Right knee:  Impression   1.  Redemonstration of comminuted mildly displaced periprosthetic fracture of   the distal femur as above.  Nondisplaced fracture plane extends proximally to   disrupt the posterior cortex of the distal femoral diaphysis.       2.  Small knee joint effusion.            Yobany Cardozo, TIFFANIE - CNP  4/12/21, 12:33 PM

## 2021-04-13 ENCOUNTER — ANESTHESIA EVENT (OUTPATIENT)
Dept: OPERATING ROOM | Age: 71
DRG: 464 | End: 2021-04-13
Payer: COMMERCIAL

## 2021-04-13 ENCOUNTER — ANESTHESIA (OUTPATIENT)
Dept: OPERATING ROOM | Age: 71
DRG: 464 | End: 2021-04-13
Payer: COMMERCIAL

## 2021-04-13 ENCOUNTER — APPOINTMENT (OUTPATIENT)
Dept: GENERAL RADIOLOGY | Age: 71
DRG: 464 | End: 2021-04-13
Payer: COMMERCIAL

## 2021-04-13 VITALS — DIASTOLIC BLOOD PRESSURE: 60 MMHG | OXYGEN SATURATION: 94 % | TEMPERATURE: 97.2 F | SYSTOLIC BLOOD PRESSURE: 111 MMHG

## 2021-04-13 LAB
ABSOLUTE EOS #: 0.99 K/UL (ref 0–0.44)
ABSOLUTE IMMATURE GRANULOCYTE: 0.05 K/UL (ref 0–0.3)
ABSOLUTE LYMPH #: 1.1 K/UL (ref 1.1–3.7)
ABSOLUTE MONO #: 0.89 K/UL (ref 0.1–1.2)
ANION GAP SERPL CALCULATED.3IONS-SCNC: 7 MMOL/L (ref 9–17)
BASOPHILS # BLD: 1 % (ref 0–2)
BASOPHILS ABSOLUTE: 0.05 K/UL (ref 0–0.2)
BUN BLDV-MCNC: 8 MG/DL (ref 8–23)
BUN/CREAT BLD: ABNORMAL (ref 9–20)
CALCIUM SERPL-MCNC: 9 MG/DL (ref 8.6–10.4)
CHLORIDE BLD-SCNC: 100 MMOL/L (ref 98–107)
CO2: 23 MMOL/L (ref 20–31)
CREAT SERPL-MCNC: 0.69 MG/DL (ref 0.7–1.2)
DIFFERENTIAL TYPE: ABNORMAL
EOSINOPHILS RELATIVE PERCENT: 12 % (ref 1–4)
GFR AFRICAN AMERICAN: >60 ML/MIN
GFR NON-AFRICAN AMERICAN: >60 ML/MIN
GFR SERPL CREATININE-BSD FRML MDRD: ABNORMAL ML/MIN/{1.73_M2}
GFR SERPL CREATININE-BSD FRML MDRD: ABNORMAL ML/MIN/{1.73_M2}
GLUCOSE BLD-MCNC: 106 MG/DL (ref 70–99)
HCT VFR BLD CALC: 28 % (ref 40.7–50.3)
HEMOGLOBIN: 9 G/DL (ref 13–17)
IMMATURE GRANULOCYTES: 1 %
INR BLD: 0.9
LYMPHOCYTES # BLD: 13 % (ref 24–43)
MCH RBC QN AUTO: 26.1 PG (ref 25.2–33.5)
MCHC RBC AUTO-ENTMCNC: 32.1 G/DL (ref 28.4–34.8)
MCV RBC AUTO: 81.2 FL (ref 82.6–102.9)
MONOCYTES # BLD: 11 % (ref 3–12)
NRBC AUTOMATED: 0 PER 100 WBC
PDW BLD-RTO: 18.8 % (ref 11.8–14.4)
PLATELET # BLD: ABNORMAL K/UL (ref 138–453)
PLATELET ESTIMATE: ABNORMAL
PLATELET, FLUORESCENCE: 215 K/UL (ref 138–453)
PLATELET, IMMATURE FRACTION: 1.6 % (ref 1.1–10.3)
PMV BLD AUTO: ABNORMAL FL (ref 8.1–13.5)
POTASSIUM SERPL-SCNC: 4.9 MMOL/L (ref 3.7–5.3)
PROTHROMBIN TIME: 9.7 SEC (ref 9.1–12.3)
RBC # BLD: 3.45 M/UL (ref 4.21–5.77)
RBC # BLD: ABNORMAL 10*6/UL
SEG NEUTROPHILS: 63 % (ref 36–65)
SEGMENTED NEUTROPHILS ABSOLUTE COUNT: 5.24 K/UL (ref 1.5–8.1)
SODIUM BLD-SCNC: 130 MMOL/L (ref 135–144)
WBC # BLD: 8.3 K/UL (ref 3.5–11.3)
WBC # BLD: ABNORMAL 10*3/UL

## 2021-04-13 PROCEDURE — 6370000000 HC RX 637 (ALT 250 FOR IP): Performed by: STUDENT IN AN ORGANIZED HEALTH CARE EDUCATION/TRAINING PROGRAM

## 2021-04-13 PROCEDURE — 2580000003 HC RX 258: Performed by: STUDENT IN AN ORGANIZED HEALTH CARE EDUCATION/TRAINING PROGRAM

## 2021-04-13 PROCEDURE — 2500000003 HC RX 250 WO HCPCS

## 2021-04-13 PROCEDURE — 0SRC0N9 REPLACEMENT OF RIGHT KNEE JOINT WITH PATELLOFEMORAL SYNTHETIC SUBSTITUTE, CEMENTED, OPEN APPROACH: ICD-10-PCS | Performed by: ORTHOPAEDIC SURGERY

## 2021-04-13 PROCEDURE — 6360000002 HC RX W HCPCS: Performed by: ORTHOPAEDIC SURGERY

## 2021-04-13 PROCEDURE — 86900 BLOOD TYPING SEROLOGIC ABO: CPT

## 2021-04-13 PROCEDURE — 7100000000 HC PACU RECOVERY - FIRST 15 MIN: Performed by: ORTHOPAEDIC SURGERY

## 2021-04-13 PROCEDURE — 6370000000 HC RX 637 (ALT 250 FOR IP): Performed by: NURSE PRACTITIONER

## 2021-04-13 PROCEDURE — 27487 REVISE/REPLACE KNEE JOINT: CPT | Performed by: ORTHOPAEDIC SURGERY

## 2021-04-13 PROCEDURE — 6360000002 HC RX W HCPCS: Performed by: STUDENT IN AN ORGANIZED HEALTH CARE EDUCATION/TRAINING PROGRAM

## 2021-04-13 PROCEDURE — 36415 COLL VENOUS BLD VENIPUNCTURE: CPT

## 2021-04-13 PROCEDURE — 3700000000 HC ANESTHESIA ATTENDED CARE: Performed by: ORTHOPAEDIC SURGERY

## 2021-04-13 PROCEDURE — 85610 PROTHROMBIN TIME: CPT

## 2021-04-13 PROCEDURE — 85055 RETICULATED PLATELET ASSAY: CPT

## 2021-04-13 PROCEDURE — C1776 JOINT DEVICE (IMPLANTABLE): HCPCS | Performed by: ORTHOPAEDIC SURGERY

## 2021-04-13 PROCEDURE — 2720000010 HC SURG SUPPLY STERILE: Performed by: ORTHOPAEDIC SURGERY

## 2021-04-13 PROCEDURE — 80048 BASIC METABOLIC PNL TOTAL CA: CPT

## 2021-04-13 PROCEDURE — 3600000014 HC SURGERY LEVEL 4 ADDTL 15MIN: Performed by: ORTHOPAEDIC SURGERY

## 2021-04-13 PROCEDURE — 3700000001 HC ADD 15 MINUTES (ANESTHESIA): Performed by: ORTHOPAEDIC SURGERY

## 2021-04-13 PROCEDURE — C1713 ANCHOR/SCREW BN/BN,TIS/BN: HCPCS | Performed by: ORTHOPAEDIC SURGERY

## 2021-04-13 PROCEDURE — C1769 GUIDE WIRE: HCPCS | Performed by: ORTHOPAEDIC SURGERY

## 2021-04-13 PROCEDURE — P9016 RBC LEUKOCYTES REDUCED: HCPCS

## 2021-04-13 PROCEDURE — 0QWB04Z REVISION OF INTERNAL FIXATION DEVICE IN RIGHT LOWER FEMUR, OPEN APPROACH: ICD-10-PCS | Performed by: ORTHOPAEDIC SURGERY

## 2021-04-13 PROCEDURE — 6360000002 HC RX W HCPCS: Performed by: ANESTHESIOLOGY

## 2021-04-13 PROCEDURE — 2500000003 HC RX 250 WO HCPCS: Performed by: NURSE ANESTHETIST, CERTIFIED REGISTERED

## 2021-04-13 PROCEDURE — 73562 X-RAY EXAM OF KNEE 3: CPT

## 2021-04-13 PROCEDURE — 2580000003 HC RX 258: Performed by: ORTHOPAEDIC SURGERY

## 2021-04-13 PROCEDURE — 3600000004 HC SURGERY LEVEL 4 BASE: Performed by: ORTHOPAEDIC SURGERY

## 2021-04-13 PROCEDURE — 7100000001 HC PACU RECOVERY - ADDTL 15 MIN: Performed by: ORTHOPAEDIC SURGERY

## 2021-04-13 PROCEDURE — 85025 COMPLETE CBC W/AUTO DIFF WBC: CPT

## 2021-04-13 PROCEDURE — 2060000002 HC BURN ICU INTERMEDIATE R&B

## 2021-04-13 PROCEDURE — 6360000002 HC RX W HCPCS: Performed by: NURSE ANESTHETIST, CERTIFIED REGISTERED

## 2021-04-13 PROCEDURE — 2709999900 HC NON-CHARGEABLE SUPPLY: Performed by: ORTHOPAEDIC SURGERY

## 2021-04-13 PROCEDURE — 0SPC0NZ REMOVAL OF PATELLOFEMORAL SYNTHETIC SUBSTITUTE FROM RIGHT KNEE JOINT, OPEN APPROACH: ICD-10-PCS | Performed by: ORTHOPAEDIC SURGERY

## 2021-04-13 PROCEDURE — 6360000002 HC RX W HCPCS

## 2021-04-13 DEVICE — IMPLANTABLE DEVICE: Type: IMPLANTABLE DEVICE | Site: KNEE | Status: FUNCTIONAL

## 2021-04-13 DEVICE — PIN FIX POLY LOK OSS: Type: IMPLANTABLE DEVICE | Site: KNEE | Status: FUNCTIONAL

## 2021-04-13 DEVICE — COMPONENT FEM KNEE YOKE REINF ORTH SALV SYS: Type: IMPLANTABLE DEVICE | Site: KNEE | Status: FUNCTIONAL

## 2021-04-13 DEVICE — CEMENT BONE 40GM HI VISC PALACOS R: Type: IMPLANTABLE DEVICE | Site: KNEE | Status: FUNCTIONAL

## 2021-04-13 DEVICE — BUSHING FEM KNEE ARCM POLY LO FRIC INTFACE AXLE AND REINF: Type: IMPLANTABLE DEVICE | Site: KNEE | Status: FUNCTIONAL

## 2021-04-13 DEVICE — BUSHING TIB POLY LO FRIC INTFACE OSS: Type: IMPLANTABLE DEVICE | Site: KNEE | Status: FUNCTIONAL

## 2021-04-13 DEVICE — AXLE FEM STD KNEE OSS: Type: IMPLANTABLE DEVICE | Site: KNEE | Status: FUNCTIONAL

## 2021-04-13 DEVICE — BEARING TIB THK12MM KNEE UHMWPE ORTH SALV SYS: Type: IMPLANTABLE DEVICE | Site: KNEE | Status: FUNCTIONAL

## 2021-04-13 RX ORDER — ETOMIDATE 2 MG/ML
INJECTION INTRAVENOUS PRN
Status: DISCONTINUED | OUTPATIENT
Start: 2021-04-13 | End: 2021-04-13 | Stop reason: SDUPTHER

## 2021-04-13 RX ORDER — ONDANSETRON 2 MG/ML
4 INJECTION INTRAMUSCULAR; INTRAVENOUS
Status: DISCONTINUED | OUTPATIENT
Start: 2021-04-13 | End: 2021-04-13 | Stop reason: HOSPADM

## 2021-04-13 RX ORDER — TRANEXAMIC ACID 10 MG/ML
INJECTION, SOLUTION INTRAVENOUS PRN
Status: DISCONTINUED | OUTPATIENT
Start: 2021-04-13 | End: 2021-04-13 | Stop reason: SDUPTHER

## 2021-04-13 RX ORDER — ONDANSETRON 2 MG/ML
INJECTION INTRAMUSCULAR; INTRAVENOUS PRN
Status: DISCONTINUED | OUTPATIENT
Start: 2021-04-13 | End: 2021-04-13 | Stop reason: SDUPTHER

## 2021-04-13 RX ORDER — MEPERIDINE HYDROCHLORIDE 50 MG/ML
12.5 INJECTION INTRAMUSCULAR; INTRAVENOUS; SUBCUTANEOUS EVERY 5 MIN PRN
Status: DISCONTINUED | OUTPATIENT
Start: 2021-04-13 | End: 2021-04-13 | Stop reason: HOSPADM

## 2021-04-13 RX ORDER — FENTANYL CITRATE 50 UG/ML
50 INJECTION, SOLUTION INTRAMUSCULAR; INTRAVENOUS EVERY 5 MIN PRN
Status: DISCONTINUED | OUTPATIENT
Start: 2021-04-13 | End: 2021-04-13 | Stop reason: HOSPADM

## 2021-04-13 RX ORDER — FENTANYL CITRATE 50 UG/ML
INJECTION, SOLUTION INTRAMUSCULAR; INTRAVENOUS PRN
Status: DISCONTINUED | OUTPATIENT
Start: 2021-04-13 | End: 2021-04-13 | Stop reason: SDUPTHER

## 2021-04-13 RX ORDER — PROPOFOL 10 MG/ML
INJECTION, EMULSION INTRAVENOUS PRN
Status: DISCONTINUED | OUTPATIENT
Start: 2021-04-13 | End: 2021-04-13 | Stop reason: SDUPTHER

## 2021-04-13 RX ORDER — GLYCOPYRROLATE 1 MG/5 ML
SYRINGE (ML) INTRAVENOUS PRN
Status: DISCONTINUED | OUTPATIENT
Start: 2021-04-13 | End: 2021-04-13 | Stop reason: SDUPTHER

## 2021-04-13 RX ORDER — ROCURONIUM BROMIDE 10 MG/ML
INJECTION, SOLUTION INTRAVENOUS PRN
Status: DISCONTINUED | OUTPATIENT
Start: 2021-04-13 | End: 2021-04-13 | Stop reason: SDUPTHER

## 2021-04-13 RX ORDER — LIDOCAINE HYDROCHLORIDE 10 MG/ML
INJECTION, SOLUTION EPIDURAL; INFILTRATION; INTRACAUDAL; PERINEURAL PRN
Status: DISCONTINUED | OUTPATIENT
Start: 2021-04-13 | End: 2021-04-13 | Stop reason: SDUPTHER

## 2021-04-13 RX ORDER — HYDRALAZINE HYDROCHLORIDE 20 MG/ML
INJECTION INTRAMUSCULAR; INTRAVENOUS PRN
Status: DISCONTINUED | OUTPATIENT
Start: 2021-04-13 | End: 2021-04-13 | Stop reason: SDUPTHER

## 2021-04-13 RX ORDER — NEOSTIGMINE METHYLSULFATE 5 MG/5 ML
SYRINGE (ML) INTRAVENOUS PRN
Status: DISCONTINUED | OUTPATIENT
Start: 2021-04-13 | End: 2021-04-13 | Stop reason: SDUPTHER

## 2021-04-13 RX ORDER — MAGNESIUM HYDROXIDE 1200 MG/15ML
LIQUID ORAL CONTINUOUS PRN
Status: COMPLETED | OUTPATIENT
Start: 2021-04-13 | End: 2021-04-13

## 2021-04-13 RX ORDER — VANCOMYCIN HYDROCHLORIDE 1 G/20ML
INJECTION, POWDER, LYOPHILIZED, FOR SOLUTION INTRAVENOUS PRN
Status: DISCONTINUED | OUTPATIENT
Start: 2021-04-13 | End: 2021-04-13 | Stop reason: HOSPADM

## 2021-04-13 RX ORDER — DEXAMETHASONE SODIUM PHOSPHATE 10 MG/ML
INJECTION INTRAMUSCULAR; INTRAVENOUS PRN
Status: DISCONTINUED | OUTPATIENT
Start: 2021-04-13 | End: 2021-04-13 | Stop reason: SDUPTHER

## 2021-04-13 RX ORDER — FENTANYL CITRATE 50 UG/ML
25 INJECTION, SOLUTION INTRAMUSCULAR; INTRAVENOUS EVERY 5 MIN PRN
Status: DISCONTINUED | OUTPATIENT
Start: 2021-04-13 | End: 2021-04-13 | Stop reason: HOSPADM

## 2021-04-13 RX ORDER — CEFAZOLIN SODIUM 1 G/3ML
INJECTION, POWDER, FOR SOLUTION INTRAMUSCULAR; INTRAVENOUS PRN
Status: DISCONTINUED | OUTPATIENT
Start: 2021-04-13 | End: 2021-04-13 | Stop reason: SDUPTHER

## 2021-04-13 RX ADMIN — ACETAMINOPHEN 1000 MG: 500 TABLET ORAL at 21:03

## 2021-04-13 RX ADMIN — PHENYLEPHRINE HYDROCHLORIDE 100 MCG: 10 INJECTION INTRAVENOUS at 16:31

## 2021-04-13 RX ADMIN — PHENYLEPHRINE HYDROCHLORIDE 200 MCG: 10 INJECTION INTRAVENOUS at 17:17

## 2021-04-13 RX ADMIN — ONDANSETRON 4 MG: 2 INJECTION INTRAMUSCULAR; INTRAVENOUS at 17:20

## 2021-04-13 RX ADMIN — LEVOTHYROXINE SODIUM 25 MCG: 25 TABLET ORAL at 07:46

## 2021-04-13 RX ADMIN — DESMOPRESSIN ACETATE 40 MG: 0.2 TABLET ORAL at 21:03

## 2021-04-13 RX ADMIN — ROCURONIUM BROMIDE 50 MG: 10 INJECTION INTRAVENOUS at 15:27

## 2021-04-13 RX ADMIN — DOCUSATE SODIUM 100 MG: 100 CAPSULE, LIQUID FILLED ORAL at 21:03

## 2021-04-13 RX ADMIN — PHENYLEPHRINE HYDROCHLORIDE 100 MCG: 10 INJECTION INTRAVENOUS at 15:46

## 2021-04-13 RX ADMIN — FUROSEMIDE 20 MG: 20 TABLET ORAL at 07:46

## 2021-04-13 RX ADMIN — OXYCODONE HYDROCHLORIDE 5 MG: 5 TABLET ORAL at 23:29

## 2021-04-13 RX ADMIN — OXYCODONE HYDROCHLORIDE 5 MG: 5 TABLET ORAL at 07:50

## 2021-04-13 RX ADMIN — DEXAMETHASONE SODIUM PHOSPHATE 10 MG: 10 INJECTION INTRAMUSCULAR; INTRAVENOUS at 17:20

## 2021-04-13 RX ADMIN — TRANEXAMIC ACID 1 G: 10 INJECTION, SOLUTION INTRAVENOUS at 17:18

## 2021-04-13 RX ADMIN — Medication 3 MG: at 17:23

## 2021-04-13 RX ADMIN — CEFAZOLIN 2000 MG: 1 INJECTION, POWDER, FOR SOLUTION INTRAMUSCULAR; INTRAVENOUS at 15:35

## 2021-04-13 RX ADMIN — TRANEXAMIC ACID 1 G: 10 INJECTION, SOLUTION INTRAVENOUS at 16:13

## 2021-04-13 RX ADMIN — PROPOFOL 50 MG: 10 INJECTION, EMULSION INTRAVENOUS at 16:08

## 2021-04-13 RX ADMIN — FENTANYL CITRATE 50 MCG: 50 INJECTION, SOLUTION INTRAMUSCULAR; INTRAVENOUS at 17:55

## 2021-04-13 RX ADMIN — METHOCARBAMOL TABLETS 500 MG: 500 TABLET, COATED ORAL at 23:29

## 2021-04-13 RX ADMIN — SODIUM CHLORIDE: 0.9 INJECTION, SOLUTION INTRAVENOUS at 15:19

## 2021-04-13 RX ADMIN — PHENYLEPHRINE HYDROCHLORIDE 100 MCG: 10 INJECTION INTRAVENOUS at 17:11

## 2021-04-13 RX ADMIN — SODIUM CHLORIDE: 0.9 INJECTION, SOLUTION INTRAVENOUS at 16:07

## 2021-04-13 RX ADMIN — FOLIC ACID 1 MG: 1 TABLET ORAL at 07:46

## 2021-04-13 RX ADMIN — PHENYLEPHRINE HYDROCHLORIDE 100 MCG: 10 INJECTION INTRAVENOUS at 15:41

## 2021-04-13 RX ADMIN — LORAZEPAM 0.5 MG: 0.5 TABLET ORAL at 21:03

## 2021-04-13 RX ADMIN — CETIRIZINE HYDROCHLORIDE 10 MG: 10 TABLET ORAL at 07:46

## 2021-04-13 RX ADMIN — CARVEDILOL 3.12 MG: 3.12 TABLET, FILM COATED ORAL at 21:03

## 2021-04-13 RX ADMIN — FENTANYL CITRATE 50 MCG: 50 INJECTION, SOLUTION INTRAMUSCULAR; INTRAVENOUS at 18:30

## 2021-04-13 RX ADMIN — CARVEDILOL 3.12 MG: 3.12 TABLET, FILM COATED ORAL at 07:46

## 2021-04-13 RX ADMIN — PROPOFOL 50 MG: 10 INJECTION, EMULSION INTRAVENOUS at 15:32

## 2021-04-13 RX ADMIN — LIDOCAINE HYDROCHLORIDE 50 MG: 10 INJECTION, SOLUTION EPIDURAL; INFILTRATION; INTRACAUDAL; PERINEURAL at 15:27

## 2021-04-13 RX ADMIN — LORAZEPAM 0.5 MG: 0.5 TABLET ORAL at 05:32

## 2021-04-13 RX ADMIN — HYDRALAZINE HYDROCHLORIDE 2.5 MG: 20 INJECTION INTRAMUSCULAR; INTRAVENOUS at 16:27

## 2021-04-13 RX ADMIN — ACETAMINOPHEN 1000 MG: 500 TABLET ORAL at 06:09

## 2021-04-13 RX ADMIN — SODIUM CHLORIDE: 9 INJECTION, SOLUTION INTRAVENOUS at 10:50

## 2021-04-13 RX ADMIN — DEXTROSE MONOHYDRATE 2000 MG: 50 INJECTION, SOLUTION INTRAVENOUS at 23:30

## 2021-04-13 RX ADMIN — Medication 250 MG: at 10:51

## 2021-04-13 RX ADMIN — ETOMIDATE 20 MG: 2 INJECTION, SOLUTION INTRAVENOUS at 15:27

## 2021-04-13 RX ADMIN — Medication 0.6 MG: at 17:23

## 2021-04-13 RX ADMIN — FENTANYL CITRATE 100 MCG: 50 INJECTION, SOLUTION INTRAMUSCULAR; INTRAVENOUS at 15:27

## 2021-04-13 RX ADMIN — PHENYLEPHRINE HYDROCHLORIDE 200 MCG: 10 INJECTION INTRAVENOUS at 15:38

## 2021-04-13 RX ADMIN — METHOCARBAMOL TABLETS 500 MG: 500 TABLET, COATED ORAL at 06:09

## 2021-04-13 ASSESSMENT — PULMONARY FUNCTION TESTS
PIF_VALUE: 16
PIF_VALUE: 17
PIF_VALUE: 0
PIF_VALUE: 3
PIF_VALUE: 0
PIF_VALUE: 18
PIF_VALUE: 1
PIF_VALUE: 27
PIF_VALUE: 17
PIF_VALUE: 17
PIF_VALUE: 23
PIF_VALUE: 16
PIF_VALUE: 16
PIF_VALUE: 24
PIF_VALUE: 17
PIF_VALUE: 18
PIF_VALUE: 16
PIF_VALUE: 17
PIF_VALUE: 16
PIF_VALUE: 17
PIF_VALUE: 1
PIF_VALUE: 16
PIF_VALUE: 16
PIF_VALUE: 19
PIF_VALUE: 18
PIF_VALUE: 16
PIF_VALUE: 17
PIF_VALUE: 17
PIF_VALUE: 21
PIF_VALUE: 16
PIF_VALUE: 2
PIF_VALUE: 19
PIF_VALUE: 17
PIF_VALUE: 17
PIF_VALUE: 16
PIF_VALUE: 31
PIF_VALUE: 10
PIF_VALUE: 16
PIF_VALUE: 8
PIF_VALUE: 17
PIF_VALUE: 17
PIF_VALUE: 16
PIF_VALUE: 17
PIF_VALUE: 18
PIF_VALUE: 16
PIF_VALUE: 18
PIF_VALUE: 3
PIF_VALUE: 16
PIF_VALUE: 18
PIF_VALUE: 16
PIF_VALUE: 17
PIF_VALUE: 16
PIF_VALUE: 0
PIF_VALUE: 0
PIF_VALUE: 18
PIF_VALUE: 17
PIF_VALUE: 16
PIF_VALUE: 17
PIF_VALUE: 16
PIF_VALUE: 19
PIF_VALUE: 17
PIF_VALUE: 18
PIF_VALUE: 16
PIF_VALUE: 17
PIF_VALUE: 20
PIF_VALUE: 16

## 2021-04-13 ASSESSMENT — PAIN DESCRIPTION - PROGRESSION: CLINICAL_PROGRESSION: NOT CHANGED

## 2021-04-13 ASSESSMENT — PAIN - FUNCTIONAL ASSESSMENT
PAIN_FUNCTIONAL_ASSESSMENT: PREVENTS OR INTERFERES WITH MANY ACTIVE NOT PASSIVE ACTIVITIES
PAIN_FUNCTIONAL_ASSESSMENT: 0-10

## 2021-04-13 ASSESSMENT — PAIN SCALES - GENERAL
PAINLEVEL_OUTOF10: 6
PAINLEVEL_OUTOF10: 8
PAINLEVEL_OUTOF10: 9
PAINLEVEL_OUTOF10: 8
PAINLEVEL_OUTOF10: 10
PAINLEVEL_OUTOF10: 8

## 2021-04-13 ASSESSMENT — ENCOUNTER SYMPTOMS: SHORTNESS OF BREATH: 1

## 2021-04-13 ASSESSMENT — PAIN DESCRIPTION - DESCRIPTORS: DESCRIPTORS: ACHING;CONSTANT

## 2021-04-13 ASSESSMENT — PAIN DESCRIPTION - PAIN TYPE: TYPE: SURGICAL PAIN

## 2021-04-13 ASSESSMENT — PAIN DESCRIPTION - LOCATION: LOCATION: LEG

## 2021-04-13 NOTE — PROGRESS NOTES
PROGRESS NOTE          PATIENT NAME: Kobe Johnston  MEDICAL RECORD NO. [id number]  DATE: 4/13/2021  SURGEON: Dr. David Zhou: Sarita Tena,     HD: # 2    ASSESSMENT    Patient Active Problem List   Diagnosis    Meniere's disease    ADHD (attention deficit hyperactivity disorder)    Narcolepsy    Hyponatremia    PND (post-nasal drip)    Transaminasemia    Cardiomyopathy ([address]    HTN (hypertension)    Chronic obstructive pulmonary disease ([address]    Dementia with behavioral disturbance ([address]    Status post insertion of percutaneous endoscopic gastrostomy ([facility] tube [facility]    S/p bare metal coronary artery stent    Supracondylar fracture of distal end of femur without intracondylar extension, sequela       MEDICAL DECISION MAKING AND PLAN    1. Mechanical ground level fall              - Patient fell when transferring from wheelchair, landed on right side, complaining of right knee pain   - X-ray with right supracondylar femur fracture   -  Additional scans negative              - CK, myoglobin negative               - Admit primary     2. Right supracondylar femur fracture              - Periprosthetic w/ history of right TKA 5 years ago              - Ortho consulted - plan for OR when cleared by cardiology likely today              - Knee immobilizer              - Multimodal pain control                - PT/OT     3. Cardiac clearance prior to OR   - Stress test low risk   - F/u pacemaker interrogation       - Cont home coreg and Lipitor   - Holding home lasix    4. Anemia   - Patient required 1 units PRBC overnight   - Hgb 8.2 > 7.4 > 9.0     5. Hx of alcohol use              - CIWA              - Monitor for signs of withdrawal     6. COPD              - Home medications              - Duoneb    8. Hypothyroid              - Synthroid 25 mcg daily                                SUBJECTIVE    Read Stephanie Comes is unchanged since yesterday.   Not complaining of a lot of pain Date: 4/11/2021  EXAMINATION: ONE XRAY VIEW OF THE CHEST 4/11/2021 4:21 pm COMPARISON: 03/23/2020 HISTORY: ORDERING SYSTEM PROVIDED HISTORY: surgical clearance TECHNOLOGIST PROVIDED HISTORY: surgical clearance FINDINGS: Stable ICD lead. Mild chronic bibasal interstitial lung changes. The cardiac size is normal. No acute infiltrates or pleural effusions are seen. Pulmonary vascularity appears normal. There is mild ectasia of the thoracic aorta. There are degenerative changes in the spine . No acute bony abnormalities. The hilar structures are normal.     No acute cardiopulmonary disease     Xr Femur Right (min 2 Views)    Result Date: 4/11/2021  EXAMINATION: ONE XRAY VIEW OF THE PELVIS AND TWO XRAY VIEWS RIGHT HIP; THREE XRAY VIEWS OF THE RIGHT KNEE; 2 XRAY VIEWS OF THE RIGHT FEMUR 4/11/2021 5:13 pm COMPARISON: Right knee of 20 January 2014 HISTORY: ORDERING SYSTEM PROVIDED HISTORY: fall TECHNOLOGIST PROVIDED HISTORY: fall FINDINGS: Pelvis: Osteopenia complicates assessment. Both femoral heads are well circumscribed and situated within the acetabula. Mild degenerative changes are present in the hips. No acute fracture or dislocation is evident. SI joints are symmetrical.  Significant atherosclerotic calcification of the iliac and femoral arteries is noted. Right knee: A right total knee arthroplasty is noted. The patient has a supracondylar impacted fracture of the distal femur with offset of 11 mm; distal fracture fragment is displaced dorsally. No joint dislocation. Significant atherosclerotic vascular calcification is present. Right femur: Mild degenerative changes are present in the hip. A right total knee arthroplasty is noted. The patient has is fracture of the supracondylar area of the femur. Distal fracture fragment is displaced dorsally by 11 mm in relation the proximal fracture fragment.   Additionally, there appears be a faint vertical lucency extending cranially into the distal femoral shaft from the fracture site best seen on the frontal view. No dislocation. Pelvis: Para changes in the hips. No acute fracture or dislocation. Significant vascular calcification. Right knee: Total knee arthroplasty. Impacted fracture supracondylar area of the distal right femur with dorsal displacement of the distal fracture fragment by 11 mm. Right femur: Supracondylar fracture distal femur with dorsal displacement distal fracture fragment by 11 mm. However, there appears be a vertical component of a distal femoral fracture faintly visualized, nondisplaced and suggesting a hairline fracture. Xr Knee Right (3 Views)    Result Date: 4/11/2021  EXAMINATION: ONE XRAY VIEW OF THE PELVIS AND TWO XRAY VIEWS RIGHT HIP; THREE XRAY VIEWS OF THE RIGHT KNEE; 2 XRAY VIEWS OF THE RIGHT FEMUR 4/11/2021 5:13 pm COMPARISON: Right knee of 20 January 2014 HISTORY: ORDERING SYSTEM PROVIDED HISTORY: fall TECHNOLOGIST PROVIDED HISTORY: fall FINDINGS: Pelvis: Osteopenia complicates assessment. Both femoral heads are well circumscribed and situated within the acetabula. Mild degenerative changes are present in the hips. No acute fracture or dislocation is evident. SI joints are symmetrical.  Significant atherosclerotic calcification of the iliac and femoral arteries is noted. Right knee: A right total knee arthroplasty is noted. The patient has a supracondylar impacted fracture of the distal femur with offset of 11 mm; distal fracture fragment is displaced dorsally. No joint dislocation. Significant atherosclerotic vascular calcification is present. Right femur: Mild degenerative changes are present in the hip. A right total knee arthroplasty is noted. The patient has is fracture of the supracondylar area of the femur. Distal fracture fragment is displaced dorsally by 11 mm in relation the proximal fracture fragment.   Additionally, there appears be a faint vertical lucency extending cranially into the distal femoral shaft from the fracture site best seen on the frontal view. No dislocation. Pelvis: Para changes in the hips. No acute fracture or dislocation. Significant vascular calcification. Right knee: Total knee arthroplasty. Impacted fracture supracondylar area of the distal right femur with dorsal displacement of the distal fracture fragment by 11 mm. Right femur: Supracondylar fracture distal femur with dorsal displacement distal fracture fragment by 11 mm. However, there appears be a vertical component of a distal femoral fracture faintly visualized, nondisplaced and suggesting a hairline fracture. Ct Head Wo Contrast    Result Date: 4/11/2021  EXAMINATION: CT OF THE HEAD WITHOUT CONTRAST  4/11/2021 3:46 pm TECHNIQUE: CT of the head was performed without the administration of intravenous contrast. Dose modulation, iterative reconstruction, and/or weight based adjustment of the mA/kV was utilized to reduce the radiation dose to as low as reasonably achievable. COMPARISON: 03/23/2020 HISTORY: ORDERING SYSTEM PROVIDED HISTORY: fall, etoh, elderly, intoxicated TECHNOLOGIST PROVIDED HISTORY: fall, etoh, elderly, intoxicated Decision Support Exception->Emergency Medical Condition (MA) Reason for Exam: fall, etoh, elderly, intoxicated Acuity: Acute Type of Exam: Initial FINDINGS: BRAIN/VENTRICLES: The cerebral hemispheres, brainstem, and cerebellum have a normal appearance for the patient's age. The falx is midline. The ventricles and peripheral sulci are mildly dilated. There is decreased attenuation in the periventricular white matter. There is no sign of a space occupying lesion, infarction, or hemorrhage. Partially empty sella. Orbits: Portion of the orbits demonstrate no acute abnormality. SINUSES: Soft tissue opacification in the ethmoid and frontal sinuses. The remaining imaged portions of the paranasal sinuses are clear. The mastoids and the middle ear chambers are clear.  SOFT TISSUES/SKULL:  No acute abnormality of the visualized skull or soft tissues. Vascular calcifications are seen compatible with atherosclerotic disease. Mild central and cortical cerebral atrophy. Moderate chronic deep white matter ischemic changes No acute intracranial abnormalities are noted. Ct Knee Right Wo Contrast    Result Date: 4/12/2021  EXAMINATION: CT OF THE RIGHT KNEE WITHOUT CONTRAST 4/12/2021 8:01 am TECHNIQUE: CT of the right knee was performed without the administration of intravenous contrast.  Multiplanar reformatted images are provided for review. Dose modulation, iterative reconstruction, and/or weight based adjustment of the mA/kV was utilized to reduce the radiation dose to as low as reasonably achievable. COMPARISON: Radiograph dated 04/11/2021. HISTORY ORDERING SYSTEM PROVIDED HISTORY: Pre-operative planning. TECHNOLOGIST PROVIDED HISTORY: Thin cuts and metal suppression CT, please. Pre-operative planning. Decision Support Exception->Emergency Medical Condition (MA) Reason for Exam: pre op planning FINDINGS: Bones: As seen on the radiograph dated 04/11/2021, there are components of a right total knee arthroplasty situated in gross anatomic alignment. Streak artifact emanating from the components of a right total knee arthroplasty partially limits evaluation. There is redemonstration of comminuted mildly displaced periprosthetic suprachondral fracture of the distal femur with approximately 2 cm of impaction and 1.2 cm posterior displacement of the main distal fracture fragment. The fracture plane extends to the level of the femoral component. The fracture plane extends proximally along the posterior cortex of the distal femoral diaphysis seen up to the level of the first axial image of the study. Soft Tissue: There is moderate diffuse soft tissue swelling about the knee. Diffuse vascular calcification changes are seen. There is moderate to severe atrophy of the musculature of the knee.  Joint:  Within the limitations of the study, there appears to be a small knee joint effusion. 1.  Redemonstration of comminuted mildly displaced periprosthetic fracture of the distal femur as above. Nondisplaced fracture plane extends proximally to disrupt the posterior cortex of the distal femoral diaphysis. 2.  Small knee joint effusion. Nm Cardiac Stress Test Nuclear Imaging    Result Date: 4/12/2021  EXAMINATION: MYOCARDIAL PERFUSION IMAGING 4/12/2021 2:16 pm TECHNIQUE: For the rest study, 44 mCi of Tc 99 labeled sestamibi were injected. SPECT images were acquired. Under cardiology supervision, 0.4mg [place] was infused. After pharmacologic stress, 11.6 mCi of Tc 99 labeled sestamibi were injected. SPECT images with ECG gating were acquired. COMPARISON: None Available. HISTORY: ORDERING SYSTEM PROVIDED HISTORY: Chest pain TECHNOLOGIST PROVIDED HISTORY: Reason for Exam: Chest pain Procedure Type->Rx CP Reason for Exam: chest pain ,  hypertension FINDINGS: Images interpreted utilizing [employer] system and [employer]. No significant fixed or reversible perfusion defect. The gated images show no wall motion abnormalities. Normal myocardial thickening. Perfusion scores are visually adjusted to account for artifact. Summed stress score:  0 Summed rest score:  0 Summed reversibility score:  0 Function: End diastolic volume:  30MP Left ventricular ejection fraction:  85% TID score:  0.93 (scores greater than 1.39 are considered elevated for Lexiscan stress with Tc99m) Notes concerning risk stratification: Risk stratification incorporates both clinical history and some testing results. Final risk determination is the responsibility of the ordering provider as other patient information and test results may increase or decrease the risk assessment reported for this examination. Risk stratification criteria are adapted from \"Noninvasive Risk Stratification\" criteria from [employer].   Al, ACC/AATS/AHA/ASE/ASNC/SCAI/SCCT/STS 2017 MD Sadia  4/13/2021  11:16 AM

## 2021-04-13 NOTE — OP NOTE
Operative Note      Patient: Marta Reis  YOB: 1950  MRN: [id number]    Date of Procedure: 4/13/2021    Pre-Op Diagnosis: RIGHT PERIPROSTHETIC DISTAL FEMUR FRACTURE    Post-Op Diagnosis: Right periprosthetic distal femur fracture       Procedure(s):  DISTAL FEMUR REPLACEMENT total knee arthroplasty hinged prosthesis, revision total knee arthroplasty    Surgeon(s):  Mary Pitts DO    Assistant:   Surgical Assistant: Krupa Lake  Resident: Ghada Croft DO; Reza Dickerson DO    Anesthesia: General    Estimated Blood Loss (mL): 684     Complications: None    Specimens:   * No specimens in log *    Implants:  Implant Name Type Inv. Item Serial No.  Lot No. LRB No. Used Action   CEMENT BONE 40GM HI VISC PALACOS R  CEMENT BONE 40GM HI VISC PALACOS R  [facility] [id number] Right 1 Implanted   CEMENT BONE 40GM HI VISC PALACOS R  CEMENT BONE 40GM HI VISC PALACOS R  [facility] [id number] Right 1 Implanted   CEMENT BONE 40GM HI VISC PALACOS R  CEMENT BONE 40GM HI VISC PALACOS R  [facility] [id number] Right 1 Implanted   FEMUR       Right 1 Explanted   CABLE ORTH L36IN DIA1. 8MM S STL W/ CRMP CERCLAGE CBL RDY  CABLE ORTH L36IN DIA1. 8MM S STL W/ CRMP CERCLAGE CBL RDY  [id number]  Right 1 Implanted   BEARING TIB [id number] KNEE UHMWPE ORTH SALV SYS  BEARING TIB [id number] KNEE UHMWPE ORTH SALV SYS  [employer]- [id number] Right 1 Implanted   BUSHING FEM KNEE ARCM POLY LO FRIC INTFACE AXLE AND REINF  BUSHING FEM KNEE ARCM POLY LO FRIC INTFACE AXLE AND REINF  [employer]- [id number] Right 1 Implanted   PIN FIX POLY LIBRADO OSS  PIN FIX POLY LIBRADO OSS  [employer]- [id number] Right 1 Implanted   COMPONENT FEM L7CM ELP SEG R DST KNEE RESURF ORTH SALV SYS  COMPONENT FEM L7CM ELP SEG R DST KNEE RESURF ORTH SALV SYS  [employer]- [id number] Right 1 Implanted   BASEPLATE TIB [id number] LNG KNEE CO CHROM NONMODULAR OSS  BASEPLATE TIB F98GO LNG KNEE CO CHROM NONMODULAR OSS  [facility] [employer]- [id number] Right 1 Implanted   STEM FEM L150MM OD17.5MM BOW TI POR IM REV NEUT MOD LNG  STEM FEM L150MM OD17.5MM BOW TI POR IM REV NEUT MOD LNG  [facility] [id number] Right 1 Implanted   BUSHING TIB POLY LO FRIC INTFACE OSS  BUSHING TIB POLY LO FRIC INTFACE OSS  [facility] [id number] Right 1 Implanted   AXLE FEM STD KNEE OSS  AXLE FEM STD KNEE OSS  [facility] [id number] Right 1 Implanted   COMPONENT FEM KNEE YOKE REINF ORTH SALV SYS  COMPONENT FEM KNEE YOKE REINF ORTH SALV SYS  [facility] [employer]- [id number] Right 1 Implanted         Drains:   Urethral Catheter Non-latex; Double-lumen (Active)       Findings: Per operative note    Detailed Description of Procedure:   Mr. Zuleima العراقي is a 82-year-old male who presented to [facility] surgical department for right revision total knee arthroplasty system with a distal femoral replacing hinged prosthesis. The patient sustained a periprosthetic distal femur fracture with significant displacement. Is been found to have significant osteopenia/osteoporosis and is felt that he is best served with operative intervention in the form of a revision total knee arthroplasty system with a distal femoral replacing hinged arthroplasty. The patient was identified preoperatively where consent was obtained signed and placed on the chart. The procedure was described. His questions were answered. All details of the procedure, as well as risks, benefits and alternatives, including the option of non operative versus operative treatment were discussed.   The patient understands that risks of the surgery include but are not limited to: bleeding, malunion/nonunion, loss of fixation, loss of reduction, hardware failure, angular or rotational deformity, length discrepancy, limp, transfusion, skin blistering or breakdown, progressive post traumatic degenerative joint disease, possible need for further surgery, bone grafting, infection, nerve injury, paralysis, numbness, blood vessel injury, excessive scaring, wound complication or breakdown, failure of symptoms to improve or actual deterioration in condition, significant acute and/or chronic pain, possible need for amputation, permanent loss of motion, and permanent loss of function. As well as the general complications of anesthesia, which include but are not limited to: myocardial infarction and/or heart attack, stroke, multi organ system failure or even possible death, prolonged hospital stay, blood clots, pulmonary embolism, abnormal reaction to medication, visual and neurological disturbances, constipation, ischemic bowel, bowel obstruction, bowel perforation, ileus and mental status changes. No guarantees were made. He was administered IV antibiotics preoperatively. He was also administered 1 g of IV tranexamic acid just prior to incision and a second 1 g IV tranexamic acid just after closure of incision. Patient was taken the operative suite where is placed upon upon the operative table. General anesthesia was affected. The right lower extremity was prepped and draped in usual sterile fashion. After an appropriate surgical timeout incision was made over the previous incisional scar. Sharp incision was carried through the skin and subcutaneous tissue. Full-thickness skin flaps were raised over the extensor mechanism and a medial parapatellar arthrotomy was made to the knee. A complete synovectomy was affected and a subperiosteal dissection over the proximal medial tibia was carried to the posterior medial corner. Tibial polyethylene was removed. A cable was wrapped around the distal femur approximately 3 cm proximal to the fracture in a submuscular fashion. The cable was tensioned and appropriate distal femoral cut was made.   The femoral component along with the distal femoral bone was then removed carefully dissecting it away from all soft tissue. Attention was then drawn to the rest of the prep of the femur. Sequential reaming and then calcar planing of the femur was made. Attention was then drawn to the tibia. The tibial component was removed carefully using small sawblade's and osteotomes to interrupt the bone cement interface. Once the tibial was removed preparation of the tibia was made by an appropriate tibial skin cut and then preparation of the tibial canal with the appropriate tibial drill. Trial femoral and tibial components were then put into place and the hinge was connected the knee was put through range of motion was found to be stable to arthritic range of motion. The joint line appeared to be restored appropriately. All trials were then removed and the implantable components were assembled on the back table. 3 bags of cement were mixed on the back table and the components were coated along the backside as well as cement on the tibial bone bed. This was all done after thorough irrigation and suction and drying of the bony interfaces. The implants were then placed and impacted till fully seated in the appropriate rotation. The trial polyethylene was placed the knee was put in full extension until the cement fully polymerized. The implantable Hanish polycombination were then placed and impacted till fully seated and connected at the hands with a hinged connection mechanism. The knee was put through range of motion was found to be stable throughout the arc of range of motion a full range of motion of the knee could be obtained. The knee was then thoroughly irrigated and suction. 1/2 L of irrisept irrigation was placed within the knee and kept there for 90 seconds and then suction. 1 g of vancomycin powder was placed within the knee and then knee closure occurred in layers with absorbable suture. The cutaneous layer was closed with Dermabond. Sterile dressing was applied. Anesthesia was reversed. Patient was taken to postop recovery room in stable condition. There were no immediate postoperative complications and the procedure was tolerated well.     Electronically signed by Melissa Campuzano DO on 4/13/2021 at 5:24 PM

## 2021-04-13 NOTE — CARE COORDINATION
Dispo planning     Met with pt at the bedside. Pt would like to return home at discharge. Pt normally mobilizes via wheelchair and lives with a friend Silke Kumar [phone]) who can help him 24/7 at home. Pt is also open to a referral to [facility] (S[facility]) for more rehab prior to returning home but would like to go home if he can safely do so. Will continue to follow progress.

## 2021-04-13 NOTE — ANESTHESIA PROCEDURE NOTES
Arterial Line:    An arterial line was placed using surface landmarks, in the OR for the following indication(s): continuous blood pressure monitoring and blood sampling needed. A 20 gauge (size), 4.45 cm (length), Arrow (type) catheter was placed, Seldinger technique used, into the right radial artery, secured by tape and Tegaderm. Anesthesia type: General    Events:  patient tolerated procedure well with no complications.   4/13/2021 3:45 PM4/13/2021 3:49 PM  Anesthesiologist: Garth Figueroa MD  Performed: Anesthesiologist   Preanesthetic Checklist  Completed: patient identified, IV checked, site marked, risks and benefits discussed, surgical consent, monitors and equipment checked, pre-op evaluation, timeout performed, anesthesia consent given, oxygen available and patient being monitored

## 2021-04-13 NOTE — PROGRESS NOTES
consulted. AICD tested and cardiac stress tested yesterday. Awaiting final interpretations by nuclear medicine, and final interpretations by cardiology.  -Pain control per primary team recommendations. Would recommend multimodal pain management protocol   Acetaminophen 500 mg tablet, 2 Q6h   Naproxen 500 mg tablet 1 q12h   Gabapentin 100 mg TID   Cyclobenzaprine 1 tablet q6h   Oxycodone 5 mg tablet 1-2 Q4-6h prn   Docusate 100 mg BID  -Ice and elevation for pain/swelling  -DVT ppx: EPC. Hold chemical anticoagulation today if possible  -Please page ortho with any questions or concerns    Zac Mata MD  Orthopaedic Surgery, PGY-1  [facility] [address], [place]    PGY-2 Addendum    Patient seen and examined. Agree with above assessment and exam by Dr. Governor Young. Patient has a right periprosthetic distal femur fracture. Awaiting final cards clearance after stress test yesterday. If cleared today may possibly go to OR this afternoon for a right distal femur replacement. Received 1uprbc overnight due to drop in Hgb to 7.4. Sodium 127 which appears stable over the past year. Pain controlled this morning. No acute events overnight per nursing. Patient takes off his knee immobilizer stating it feels better with it off. NPO since MN. Consent in chart, extremity marked. Please page ortho with questions.     Edson Gaxiola DO  Orthopedic Surgery Resident, PGY-2  [facility]

## 2021-04-13 NOTE — PROGRESS NOTES
[facility]  Progress Note                   Date:   4/13/2021  Patient name: Cat French  Date of admission:  4/11/2021  3:41 PM  MRN:   [id number]  YOB: 1950  PCP: Aminah Crawford DO    Reason for Admission: Supracondylar fracture of distal end of femur without intracondylar extension, sequela [R53.728S]    Subjective:       Clinical Changes /Abnormalities:  Patient seen and examined in room after discussion with RN. Denies chest pain or SOB. S/p Fall  Cardiac clearance requested for ortho surgery r/t fall and right supracondylar femur fracture. Stress and Medtronic ICD interrogation reviewed with Dr Codey Melendrez.      Review of Systems    Medications:   Scheduled Meds:   thiamine  250 mg Oral Daily    folic acid  1 mg Oral Daily    cetirizine  10 mg Oral Daily    furosemide  20 mg Oral Daily    methocarbamol  500 mg Oral Q6H    oxyCODONE  5 mg Oral Once    ceFAZolin  2,000 mg Intravenous On Call to OR    atorvastatin  40 mg Oral Nightly    carvedilol  3.125 mg Oral BID    levothyroxine  25 mcg Oral Daily    melatonin  3 mg Oral Daily    sodium chloride flush  5-40 mL Intravenous 2 times per day    polyethylene glycol  17 g Oral Daily    docusate sodium  100 mg Oral BID    acetaminophen  1,000 mg Oral 3 times per day    LORazepam  0.5 mg Oral Q8H     Continuous Infusions:   sodium chloride 125 mL/hr at 04/12/21 2247    sodium chloride      sodium chloride       CBC:   Recent Labs     04/11/21  1633 04/12/21  0536 04/13/21  0549   WBC 10.9 9.1 8.3   HGB 8.2* 7.4* 9.0*    258 See Reflexed IPF Result     BMP:    Recent Labs     04/11/21  1633 04/12/21  0536 04/13/21  0549   * 127* 130*   K 5.1 5.1 4.9   CL 93* 95* 100   CO2 20 24 23   BUN 7* 11 8   CREATININE 0.84 0.92 0.69*   GLUCOSE 107* 100* 106*     Hepatic:  Recent Labs     04/12/21  0536   AST 18   ALT 12   BILITOT 0.38   ALKPHOS 91     Troponin: No results for input(s): TROPHS in the last 72 sequela      Plan of Treatment:   1. Stress results and ICD interrogation reviewed with Dr Qamar Riggs clearance with intermediate risk for ortho surgery for right supracondylar femur fracture. 2. Keep K > 4.0 and Mag > 2.0  K 4.9  Will order AM mag  3. Will follow post op.      Electronically signed by TIFFANIE Lenz NP on 4/13/2021 at 8:48 AM  [address]  [phone]

## 2021-04-13 NOTE — ANESTHESIA PRE PROCEDURE
Department of Anesthesiology  Preprocedure Note       Name:  Cat French   Age:  73 y.o.  :  1950                                          MRN:  [id number]         Date:  2021      Surgeon: Beau Carlos):  Preston Zuleta DO    Procedure: Procedure(s):  DISTAL FEMUR REPLACEMENT    Medications prior to admission:   Prior to Admission medications    Medication Sig Start Date End Date Taking?  Authorizing Provider   Multiple Vitamins-Minerals (CERTAVITE SENIOR/ANTIOXIDANT) TABS TAKE 1 TAB BY MOUTH ONCE A DAY 16   Iris Lynn PA-C   GLUCOSAMINE-CHONDROITIN -400 MG tablet TAKE 1 TAB BY MOUTH TWICE A DAY 16   Iris Lynn PA-C   carvedilol (COREG) 3.125 MG tablet TAKE 1 TAB BY MOUTH TWICE A DAY WITH MEALS 16   Iris Lynn PA-C   Armodafinil (NUVIGIL) 150 MG TABS tablet Take 1 tablet by mouth daily 16   Aminah Ty, DO   loratadine (CLARITIN) 10 MG tablet TAKE 1 TAB BY MOUTH ONCE A DAY 3/24/16   Aminah Ty, DO   levothyroxine (SYNTHROID) 25 MCG tablet Take 1 tablet by mouth Daily  Patient taking differently: Take 50 mcg by mouth Daily  3/24/16   Aminah Ty, DO   QUEtiapine (SEROQUEL) 25 MG tablet Take 1 tablet by mouth nightly 3/24/16   Aminah Trenton, DO   HYDROcodone-acetaminophen [facility]) 5-325 MG per tablet Take 1 tablet by mouth every 6 hours as needed for Pain 3/24/16   Aminah Ty, DO   melatonin 3 MG TABS tablet Take 3 mg by mouth daily    Historical Provider, MD   aspirin 81 MG chewable tablet Take 1 tablet by mouth daily 11/10/15   Brionna Cobos MD   atorvastatin (LIPITOR) 40 MG tablet Take 1 tablet by mouth nightly 11/10/15   Brionna Cobos MD   clopidogrel (PLAVIX) 75 MG tablet Take 1 tablet by mouth daily 11/10/15   Brionna Cobos MD   thiamine 100 MG tablet Take 1 tablet by mouth daily 11/10/15   Brionna Cobos MD   Magnesium Oxide 250 MG TABS Take 1 tablet by mouth daily  Patient taking differently: Take 500 mg by mouth daily  11/10/15 APRN - CNP   500 mg at 04/13/21 0609    [MAR Hold] oxyCODONE (ROXICODONE) immediate release tablet 5 mg  5 mg Oral Once Yoli Burgess, DO        [facility] Hold] sodium chloride flush 0.9 % injection 10 mL  10 mL Intravenous PRN Fer Weeks MD   10 mL at 04/12/21 1418    [MAR Hold] sodium chloride flush 0.9 % injection 10 mL  10 mL Intravenous PRN Fer Weeks MD   10 mL at 04/12/21 1250    [MAR Hold] ceFAZolin (ANCEF) 2000 mg in dextrose 5 % 50 mL IVPB  2,000 mg Intravenous On Call to [address], DO        [facility] Hold] 0.9 % sodium chloride infusion   Intravenous Continuous E Tono Clemens  mL/hr at 04/13/21 1050 New Bag at 04/13/21 1050    [MAR Hold] 0.9 % sodium chloride infusion   Intravenous PRN Polina Mcgregor MD        [facility] Hold] atorvastatin (LIPITOR) tablet 40 mg  40 mg Oral Nightly Sheeba Cruise, DO   40 mg at 04/12/21 2043    [MAR Hold] carvedilol (COREG) tablet 3.125 mg  3.125 mg Oral BID Sheeba Cruise, DO   3.125 mg at 04/13/21 0746    [MAR Hold] levothyroxine (SYNTHROID) tablet 25 mcg  25 mcg Oral Daily Sheeba Cruise, DO   25 mcg at 04/13/21 0746    [MAR Hold] melatonin tablet 3 mg  3 mg Oral Daily Sheeba Cruise, DO   3 mg at 04/11/21 2052    [MAR Hold] ipratropium-albuterol (DUONEB) nebulizer solution 1 ampule  1 ampule Inhalation Q4H PRN Sheeba Cruise, DO        [facility] Hold] sodium chloride flush 0.9 % injection 5-40 mL  5-40 mL Intravenous 2 times per day Sheeba Cruise, DO   10 mL at 04/12/21 2043    [MAR Hold] sodium chloride flush 0.9 % injection 5-40 mL  5-40 mL Intravenous PRN Sheeba Cruise, DO        [facility] Hold] 0.9 % sodium chloride infusion  25 mL Intravenous PRN Sheeba Cruise, DO        [MAR Hold] polyethylene glycol (GLYCOLAX) packet 17 g  17 g Oral Daily Sheeba Cruise, DO        [facility] Hold] docusate sodium (COLACE) capsule 100 mg  100 mg Oral BID Sheeba Cruise, DO   100 mg at 04/12/21 2020    [MAR Hold] ondansetron (ZOFRAN) injection side of face    KNEE SURGERY Right     total knee       Social History:    Social History     Tobacco Use    Smoking status: Current Every Day Smoker     Packs/day: 1.00     Years: 38.00     Pack years: 38.00     Types: Cigarettes    Smokeless tobacco: Never Used    Tobacco comment: e-cigs   Substance Use Topics    Alcohol use: Yes     Alcohol/week: 16.0 standard drinks     Types: 16 Cans of beer per week     Comment: daily                                Ready to quit: Not Answered  Counseling given: Not Answered  Comment: e-cigs      Vital Signs (Current):   Vitals:    04/13/21 0600 04/13/21 0730 04/13/21 1142 04/13/21 1318   BP:  (!) 131/56 (!) 156/77 (!) 144/77   Pulse:  63 68 65   Resp:  18 18 20   Temp:  97.2 °F (36.2 °C) 98.5 °F (36.9 °C) 97.9 °F (36.6 °C)   TempSrc:  Oral Oral Temporal   SpO2:   92% 95%   Weight: 176 lb (79.8 kg)      Height:                                                  BP Readings from Last 3 Encounters:   04/13/21 (!) 144/77   03/31/20 (!) 153/73   03/24/16 118/80       NPO Status: Time of last liquid consumption: 2200                        Time of last solid consumption: 2100                        Date of last liquid consumption: 04/12/21                        Date of last solid food consumption: 04/12/21    BMI:   Wt Readings from Last 3 Encounters:   04/13/21 176 lb (79.8 kg)   03/31/20 170 lb 14.4 oz (77.5 kg)   03/24/16 160 lb (72.6 kg)     Body mass index is 25.25 kg/m².     CBC:   Lab Results   Component Value Date    WBC 8.3 04/13/2021    RBC 3.45 04/13/2021    RBC 3.71 01/19/2012    HGB 9.0 04/13/2021    HCT 28.0 04/13/2021    MCV 81.2 04/13/2021    RDW 18.8 04/13/2021    PLT See Reflexed IPF Result 04/13/2021     01/19/2012       CMP:   Lab Results   Component Value Date     04/13/2021    K 4.9 04/13/2021     04/13/2021    CO2 23 04/13/2021    BUN 8 04/13/2021    CREATININE 0.69 04/13/2021    GFRAA >60 04/13/2021    LABGLOM >60 04/13/2021    GLUCOSE 106 04/13/2021    GLUCOSE 95 01/19/2012    PROT 6.1 04/12/2021    CALCIUM 9.0 04/13/2021    BILITOT 0.38 04/12/2021    ALKPHOS 91 04/12/2021    AST 18 04/12/2021    ALT 12 04/12/2021       POC Tests: No results for input(s): POCGLU, POCNA, POCK, POCCL, POCBUN, POCHEMO, POCHCT in the last 72 hours. Coags:   Lab Results   Component Value Date    PROTIME 9.7 04/13/2021    PROTIME 9.6 12/10/2011    INR 0.9 04/13/2021    APTT 25.3 11/03/2015       HCG (If Applicable): No results found for: PREGTESTUR, PREGSERUM, HCG, HCGQUANT     ABGs: No results found for: PHART, PO2ART, KGD9UQX, UVB5HUF, BEART, O1AGRYGA     Type & Screen (If Applicable):  No results found for: LABABO, LABRH    Drug/Infectious Status (If Applicable):  Lab Results   Component Value Date    HEPCAB NONREACTIVE 04/21/2014       COVID-19 Screening (If Applicable):   Lab Results   Component Value Date    COVID19 Not Detected 04/11/2021     ECHO 2015:   ejection fraction is 20%  Severe global hypokinesis of the left ventricle.       MEDTRONIC ICD INTERROGATION 4/13/2021  No arrhythmias/high rate episodes detected since last interrogation 3/25/2020      EKG 4/11/2021  Sinus rhythm with 1st degree A-V block  Otherwise normal ECG  When compared with ECG of 23-MAR-2020 17:29,  Sinus rhythm has replaced Junctional rhythm        Anesthesia Evaluation    Airway: Mallampati: III  TM distance: >3 FB   Neck ROM: full  Mouth opening: > = 3 FB Dental:    (+) edentulous      Pulmonary:   (+) COPD:  shortness of breath:  decreased breath sounds,                             Cardiovascular:    (+) hypertension:, CAD:, CABG/stent:,         Rhythm: irregular                      Neuro/Psych:   (+) psychiatric history:            GI/Hepatic/Renal: Neg GI/Hepatic/Renal ROS            Endo/Other: Negative Endo/Other ROS                    Abdominal:           Vascular:                                      Anesthesia Plan      general     ASA 4     (Cardiac history:  CAD:

## 2021-04-13 NOTE — BRIEF OP NOTE
Brief Postoperative Note      Patient: Rodrick Torrez  YOB: 1950  MRN: [id number]    Date of Procedure: 4/13/2021    Pre-Op Diagnosis: RIGHT PERIPROSTHETIC DISTAL FEMUR FRACTURE    Post-Op Diagnosis: RIGHT PERIPROSTHETIC DISTAL FEMUR FRACTURE       Procedure(s):  RIGHT DISTAL FEMUR REPLACEMENT    Surgeon(s):  Mehdi Solorio DO    Assistant:  Surgical Assistant: Kevin Bermudez  Resident: Massiel Arzola DO; Yeimy Madden DO    Anesthesia: General    Estimated Blood Loss (mL): 300     Fluids: 1800cc crystalloid    UO: 046UZ     Complications: None    Specimens:   * No specimens in log *    Implants:  Implant Name Type Inv. Item Serial No.  Lot No. LRB No. Used Action   CEMENT BONE 40GM HI VISC PALACOS R  CEMENT BONE 40GM HI VISC PALACOS R  [employer] [facility] [id number] Right 1 Implanted   CEMENT BONE 40GM HI VISC PALACOS R  CEMENT BONE 40GM HI VISC PALACOS R  [facility] [id number] Right 1 Implanted   CEMENT BONE 40GM HI VISC PALACOS R  CEMENT BONE 40GM HI VISC PALACOS R  [employer] [facility] [id number] Right 1 Implanted   FEMUR       Right 1 Explanted   CABLE ORTH L36IN DIA1. 8MM S STL W/ CRMP CERCLAGE CBL RDY  CABLE ORTH L36IN DIA1. 8MM S STL W/ CRMP CERCLAGE CBL RDY  [employer]-  Right 1 Implanted   BEARING TIB [id number] KNEE UHMWPE ORTH SALV SYS  BEARING TIB ABA75MP KNEE UHMWPE ORTH SALV SYS  [employer]- [id number] Right 1 Implanted   BUSHING FEM KNEE ARCM POLY LO FRIC INTFACE AXLE AND REINF  BUSHING FEM KNEE ARCM POLY LO FRIC INTFACE AXLE AND REINF  [employer]- [id number] Right 1 Implanted   PIN FIX POLY LIBRADO OSS  PIN FIX POLY LIBRADO OSS  [employer]- [id number] Right 1 Implanted   COMPONENT FEM L7CM ELP SEG R DST KNEE RESURF ORTH SALV SYS  COMPONENT FEM L7CM ELP SEG R DST KNEE RESURF ORTH SALV SYS  [employer]- [id number] Right 1 Implanted   BASEPLATE TIB A24TV LNG KNEE CO CHROM NONMODULAR OSS  BASEPLATE TIB E71AT LNG KNEE CO CHROM NONMODULAR OSS  [employer]- [id number] Right 1 Implanted   STEM FEM L150MM OD17.5MM BOW TI POR IM REV NEUT MOD LNG  STEM FEM L150MM OD17.5MM BOW TI POR IM REV NEUT MOD LNG  [employer]- [id number] Right 1 Implanted   BUSHING TIB POLY LO FRIC INTFACE OSS  BUSHING TIB POLY LO FRIC INTFACE OSS  [employer]- [id number] Right 1 Implanted   AXLE FEM STD KNEE OSS  AXLE FEM STD KNEE OSS  [employer]- [id number] Right 1 Implanted   COMPONENT FEM KNEE YOKE REINF ORTH SALV SYS  COMPONENT FEM KNEE YOKE REINF ORTH SALV SYS  [employer]- [id number] Right 1 Implanted   POLY        Right 1 Explanted   TIBIA       Right 1 Explanted         Drains:   Urethral Catheter Non-latex; Double-lumen (Active)       Findings: See op note for details.      Electronically signed by Steffi An DO on 4/13/2021 at 5:37 PM

## 2021-04-13 NOTE — PROGRESS NOTES
Physical Therapy    DATE: 2021    NAME: Massiel Lutz  MRN: [id number]   : 1950      Patient not seen this date for Physical Therapy due to:    Surgery/Procedure: OR today with ortho per chart. PT will check back 21 for post-op needs.        Electronically signed by Nydia Jones PT on 2021 at 8:36 AM

## 2021-04-13 NOTE — DISCHARGE INSTR - COC
Continuity of Care Form    Patient Name: Lauri Palm   :  1950  MRN:  [id number]    Admit date:  2021  Discharge date:  21    Code Status Order: Full Code   Advance Directives:      Admitting Physician:  Griffin Calero DO  PCP: Arthur Francisco DO    Discharging Nurse:  Amy Rouse Unit/Room#: 0549/8565-20  Discharging Unit Phone Number: [phone]    Emergency Contact:   Extended Emergency Contact Information  Primary Emergency Contact: [address] Phone: [phone]  Relation: Other  Secondary Emergency Contact: Jo Ann, Bambi Lyon  Relation: Child    Past Surgical History:  Past Surgical History:   Procedure Laterality Date    ANKLE SURGERY      open reduction internal fixation of the right ankle & tibial plateau fx    CORONARY ANGIOPLASTY WITH STENT PLACEMENT N/A 10/10/2015    CYST REMOVAL      right side of face    KNEE SURGERY Right     total knee       Immunization History:   Immunization History   Administered Date(s) Administered    Influenza Virus Vaccine 2014, 10/15/2015    Pneumococcal Conjugate 13-valent (Sharran Leisure) 2016    Tdap (Boostrix, Adacel) 2020       Active Problems:  Patient Active Problem List   Diagnosis Code    Meniere's disease H81.09    ADHD (attention deficit hyperactivity disorder) F90.9    Narcolepsy G47.419    Hyponatremia E87.1    PND (post-nasal drip) R09.82    Transaminasemia R74.01    Cardiomyopathy ([address] I42.9    HTN (hypertension) I10    Chronic obstructive pulmonary disease ([address] J44.9    Dementia with behavioral disturbance ([address] F03.91    Status post insertion of percutaneous endoscopic gastrostomy (PEG) tube ([address] Z93.1    S/p bare metal coronary artery stent Z95.5    Supracondylar fracture of distal end of femur without intracondylar extension, sequela S72.453S       Isolation/Infection:   Isolation            No Isolation          Patient Infection Status       None to display Nurse Assessment:  Last Vital Signs: BP (!) 131/56   Pulse 63   Temp 97.2 °F (36.2 °C) (Oral)   Resp 18   Ht 5' 10\" (1.778 m)   Wt 176 lb (79.8 kg)   SpO2 92%   BMI 25.25 kg/m²     Last documented pain score (0-10 scale): Pain Level: 9  Last Weight:   Wt Readings from Last 1 Encounters:   04/13/21 176 lb (79.8 kg)     Mental Status:  oriented, alert and ANXIOUS AT TIMES    IV Access:  - None    Nursing Mobility/ADLs:  Walking   Dependent  Transfer  Dependent  Bathing  Dependent  Dressing  Dependent  Toileting  Dependent  Feeding  Assisted  Med Admin  Assisted  Med Delivery   whole    Wound Care Documentation and Therapy:  Pressure Ulcer 10/14/15 Chest Right;Lateral;Distal;Anterior (Active)   Number of days: 2007       Incision 11/02/15 Chest Left (Active)   Number of days: 1989       Puncture 11/09/15 Groin Right (Active)   Number of days: 1981       Wound 03/24/20 Buttocks Left (Active)   Number of days: 384       Wound 03/24/20 Thigh Right;Lateral (Active)   Number of days: 384       Wound 03/24/20 Knee Anterior;Right (Active)   Number of days: 384       Wound 03/25/20 Arm Right (Active)   Number of days: 383       Wound 03/25/20 Toe (Comment  which one) Right #1 (Active)   Number of days: 383       Wound 03/25/20 Toe (Comment  which one) Left #1 (Active)   Number of days: 383       Wound 03/25/20 Head Mid;Left (Active)   Number of days: 383        Elimination:  Continence:   · Bowel: Yes  · Bladder: Yes and INCONT AT TIMES  Urinary Catheter: None   Colostomy/Ileostomy/Ileal Conduit: No       Date of Last BM: 4/13/21    Intake/Output Summary (Last 24 hours) at 4/13/2021 0816  Last data filed at 4/13/2021 0600  Gross per 24 hour   Intake 119.5 ml   Output 1300 ml   Net -1180.5 ml     I/O last 3 completed shifts:   In: 119.5 [I.V.:119.5]  Out: 1300 [Urine:1300]    Safety Concerns:     History of Falls (last 30 days) and At Risk for Falls    Impairments/Disabilities:      WEAKNESS, GREATER ON RIGHT LE    Nutrition Therapy:  Current Nutrition Therapy:   - Oral Diet:  General  - NEEDS TO BE SET UP    Routes of Feeding: Oral  Liquids: No Restrictions  Daily Fluid Restriction: no  Last Modified Barium Swallow with Video (Video Swallowing Test): not done    Treatments at the Time of Hospital Discharge:   Respiratory Treatments: NONE  Oxygen Therapy:  is on oxygen at 3 L/min per nasal cannula. Ventilator:    - No ventilator support    Rehab Therapies: Physical Therapy and Occupational Therapy  Weight Bearing Status/Restrictions: No weight bearing restirctions  Other Medical Equipment (for information only, NOT a DME order):  wheelchair, walker, bath bench, bedside commode and hospital bed  Other Treatments: RIGHT KNEE DRESSING INTACT.  SHOULD REMAIN INTACT UNTIL FOLLOW-UP WITH ORTHO    Patient's personal belongings (please select all that are sent with patient):  CELL PHONE AND CELL PHONE     RN SIGNATURE:  Electronically signed by Flora Lake RN on 4/16/21 at 10:15 AM EDT    CASE MANAGEMENT/SOCIAL WORK SECTION    Inpatient Status Date: 04/11/2021    Readmission Risk Assessment Score:  Readmission Risk              Risk of Unplanned Readmission:        18           Discharging to Facility/ Agency   Name:   [facility] Details  FAX            [address] [facility] [address] [id number]       Phone: [phone]       Fax: [phone]        ·   · Address:  · Phone:  · Fax:    Dialysis Facility (if applicable)   · Name:  · Address:  · Dialysis Schedule:  · Phone:  · Fax:    / signature: Electronically signed by Qamar Hernández RN on 4/16/21 at 9:26 AM EDT    PHYSICIAN SECTION    Prognosis: Good    Condition at Discharge: Stable    Rehab Potential (if transferring to Rehab): {Prognosis:[id number]}    Recommended Labs or Other Treatments After Discharge: ***    Physician Certification: I certify the above information and transfer of John Small  is necessary for the continuing treatment of the diagnosis listed and that he requires [place] for less 30 days.      Update Admission H&P: No change in H&P    PHYSICIAN SIGNATURE:  Electronically signed by TIFFANIE Persaud CNP on 4/14/21 at 12:08 PM EDT

## 2021-04-13 NOTE — ANESTHESIA POSTPROCEDURE EVALUATION
Department of Anesthesiology  Postprocedure Note    Patient: Jarrod Medina  MRN: [id number]  YOB: 1950  Date of evaluation: 4/13/2021  Time:  7:42 PM     Procedure Summary     Date: 04/13/21 Room / Location: [address]    Anesthesia Start: 0172 Anesthesia Stop: 7904    Procedure: DISTAL FEMUR REPLACEMENT (Right ) Diagnosis: (RIGHT PERIPROSTHETIC DISTAL FEMUR FRACTURE)    Surgeons: Ludmila Tucker DO Responsible Provider: Justin Rollins MD    Anesthesia Type: general ASA Status: 4          Anesthesia Type: general    Riya Phase I: Riya Score: 9    Riya Phase II:      Last vitals: Reviewed and per EMR flowsheets.    POST-OP ANESTHESIA NOTE       BP (!) 164/86   Pulse 75   Temp 95.9 °F (35.5 °C)   Resp 15   Ht 5' 10\" (1.778 m)   Wt 176 lb (79.8 kg)   SpO2 92%   BMI 25.25 kg/m²    Pain Assessment: 0-10  Pain Level: 7           Anesthesia Post Evaluation    Patient location during evaluation: PACU  Patient participation: complete - patient participated  Level of consciousness: awake  Pain score: 7  Airway patency: patent  Nausea & Vomiting: no vomiting and no nausea  Complications: no  Cardiovascular status: hemodynamically stable  Respiratory status: acceptable  Hydration status: stable

## 2021-04-14 LAB
ABSOLUTE EOS #: <0.03 K/UL (ref 0–0.44)
ABSOLUTE IMMATURE GRANULOCYTE: 0.08 K/UL (ref 0–0.3)
ABSOLUTE LYMPH #: 0.85 K/UL (ref 1.1–3.7)
ABSOLUTE MONO #: 0.55 K/UL (ref 0.1–1.2)
ANION GAP SERPL CALCULATED.3IONS-SCNC: 9 MMOL/L (ref 9–17)
BASOPHILS # BLD: 0 % (ref 0–2)
BASOPHILS ABSOLUTE: <0.03 K/UL (ref 0–0.2)
BUN BLDV-MCNC: 7 MG/DL (ref 8–23)
BUN/CREAT BLD: ABNORMAL (ref 9–20)
CALCIUM SERPL-MCNC: 8.6 MG/DL (ref 8.6–10.4)
CHLORIDE BLD-SCNC: 100 MMOL/L (ref 98–107)
CO2: 21 MMOL/L (ref 20–31)
CREAT SERPL-MCNC: 0.73 MG/DL (ref 0.7–1.2)
DIFFERENTIAL TYPE: ABNORMAL
EOSINOPHILS RELATIVE PERCENT: 0 % (ref 1–4)
GFR AFRICAN AMERICAN: >60 ML/MIN
GFR NON-AFRICAN AMERICAN: >60 ML/MIN
GFR SERPL CREATININE-BSD FRML MDRD: ABNORMAL ML/MIN/{1.73_M2}
GFR SERPL CREATININE-BSD FRML MDRD: ABNORMAL ML/MIN/{1.73_M2}
GLUCOSE BLD-MCNC: 144 MG/DL (ref 70–99)
HCT VFR BLD CALC: 23.8 % (ref 40.7–50.3)
HCT VFR BLD CALC: 24.5 % (ref 40.7–50.3)
HCT VFR BLD CALC: 25.8 % (ref 40.7–50.3)
HEMOGLOBIN: 7 G/DL (ref 13–17)
HEMOGLOBIN: 7.2 G/DL (ref 13–17)
HEMOGLOBIN: 8 G/DL (ref 13–17)
IMMATURE GRANULOCYTES: 1 %
LYMPHOCYTES # BLD: 7 % (ref 24–43)
MCH RBC QN AUTO: 26.1 PG (ref 25.2–33.5)
MCHC RBC AUTO-ENTMCNC: 30.3 G/DL (ref 28.4–34.8)
MCV RBC AUTO: 86.2 FL (ref 82.6–102.9)
MONOCYTES # BLD: 4 % (ref 3–12)
NRBC AUTOMATED: 0 PER 100 WBC
PDW BLD-RTO: 19.1 % (ref 11.8–14.4)
PLATELET # BLD: 247 K/UL (ref 138–453)
PLATELET ESTIMATE: ABNORMAL
PMV BLD AUTO: 9.3 FL (ref 8.1–13.5)
POTASSIUM SERPL-SCNC: 4.9 MMOL/L (ref 3.7–5.3)
RBC # BLD: 2.76 M/UL (ref 4.21–5.77)
RBC # BLD: ABNORMAL 10*6/UL
SEG NEUTROPHILS: 88 % (ref 36–65)
SEGMENTED NEUTROPHILS ABSOLUTE COUNT: 11.14 K/UL (ref 1.5–8.1)
SODIUM BLD-SCNC: 130 MMOL/L (ref 135–144)
WBC # BLD: 12.6 K/UL (ref 3.5–11.3)
WBC # BLD: ABNORMAL 10*3/UL

## 2021-04-14 PROCEDURE — 97535 SELF CARE MNGMENT TRAINING: CPT

## 2021-04-14 PROCEDURE — P9016 RBC LEUKOCYTES REDUCED: HCPCS

## 2021-04-14 PROCEDURE — 2060000002 HC BURN ICU INTERMEDIATE R&B

## 2021-04-14 PROCEDURE — 97166 OT EVAL MOD COMPLEX 45 MIN: CPT

## 2021-04-14 PROCEDURE — 86900 BLOOD TYPING SEROLOGIC ABO: CPT

## 2021-04-14 PROCEDURE — 85018 HEMOGLOBIN: CPT

## 2021-04-14 PROCEDURE — 36415 COLL VENOUS BLD VENIPUNCTURE: CPT

## 2021-04-14 PROCEDURE — 6370000000 HC RX 637 (ALT 250 FOR IP): Performed by: STUDENT IN AN ORGANIZED HEALTH CARE EDUCATION/TRAINING PROGRAM

## 2021-04-14 PROCEDURE — 85025 COMPLETE CBC W/AUTO DIFF WBC: CPT

## 2021-04-14 PROCEDURE — 6360000002 HC RX W HCPCS: Performed by: NURSE PRACTITIONER

## 2021-04-14 PROCEDURE — 85014 HEMATOCRIT: CPT

## 2021-04-14 PROCEDURE — 2580000003 HC RX 258: Performed by: STUDENT IN AN ORGANIZED HEALTH CARE EDUCATION/TRAINING PROGRAM

## 2021-04-14 PROCEDURE — 6360000002 HC RX W HCPCS: Performed by: STUDENT IN AN ORGANIZED HEALTH CARE EDUCATION/TRAINING PROGRAM

## 2021-04-14 PROCEDURE — 6370000000 HC RX 637 (ALT 250 FOR IP): Performed by: NURSE PRACTITIONER

## 2021-04-14 PROCEDURE — 80048 BASIC METABOLIC PNL TOTAL CA: CPT

## 2021-04-14 RX ORDER — SODIUM CHLORIDE 9 MG/ML
INJECTION, SOLUTION INTRAVENOUS PRN
Status: DISCONTINUED | OUTPATIENT
Start: 2021-04-14 | End: 2021-04-16 | Stop reason: HOSPADM

## 2021-04-14 RX ORDER — LANOLIN ALCOHOL/MO/W.PET/CERES
9 CREAM (GRAM) TOPICAL NIGHTLY PRN
Status: DISCONTINUED | OUTPATIENT
Start: 2021-04-14 | End: 2021-04-16 | Stop reason: HOSPADM

## 2021-04-14 RX ORDER — GUAIFENESIN 600 MG/1
600 TABLET, EXTENDED RELEASE ORAL 2 TIMES DAILY
Status: DISCONTINUED | OUTPATIENT
Start: 2021-04-14 | End: 2021-04-16 | Stop reason: HOSPADM

## 2021-04-14 RX ORDER — GAUZE BANDAGE 2" X 2"
250 BANDAGE TOPICAL DAILY
Status: DISCONTINUED | OUTPATIENT
Start: 2021-04-14 | End: 2021-04-16 | Stop reason: HOSPADM

## 2021-04-14 RX ORDER — LANOLIN ALCOHOL/MO/W.PET/CERES
3 CREAM (GRAM) TOPICAL NIGHTLY PRN
Status: DISCONTINUED | OUTPATIENT
Start: 2021-04-14 | End: 2021-04-14

## 2021-04-14 RX ADMIN — GUAIFENESIN 600 MG: 600 TABLET, EXTENDED RELEASE ORAL at 20:00

## 2021-04-14 RX ADMIN — ACETAMINOPHEN 1000 MG: 500 TABLET ORAL at 13:00

## 2021-04-14 RX ADMIN — DOCUSATE SODIUM 100 MG: 100 CAPSULE, LIQUID FILLED ORAL at 07:59

## 2021-04-14 RX ADMIN — OXYCODONE HYDROCHLORIDE 5 MG: 5 TABLET ORAL at 07:58

## 2021-04-14 RX ADMIN — METHOCARBAMOL TABLETS 500 MG: 500 TABLET, COATED ORAL at 12:00

## 2021-04-14 RX ADMIN — CARVEDILOL 3.12 MG: 3.12 TABLET, FILM COATED ORAL at 20:01

## 2021-04-14 RX ADMIN — DEXTROSE MONOHYDRATE 2000 MG: 50 INJECTION, SOLUTION INTRAVENOUS at 09:00

## 2021-04-14 RX ADMIN — OXYCODONE HYDROCHLORIDE 5 MG: 5 TABLET ORAL at 14:03

## 2021-04-14 RX ADMIN — FOLIC ACID 1 MG: 1 TABLET ORAL at 07:59

## 2021-04-14 RX ADMIN — POLYETHYLENE GLYCOL 3350 17 G: 17 POWDER, FOR SOLUTION ORAL at 08:00

## 2021-04-14 RX ADMIN — FUROSEMIDE 20 MG: 20 TABLET ORAL at 07:59

## 2021-04-14 RX ADMIN — DESMOPRESSIN ACETATE 40 MG: 0.2 TABLET ORAL at 19:47

## 2021-04-14 RX ADMIN — GUAIFENESIN 600 MG: 600 TABLET, EXTENDED RELEASE ORAL at 09:00

## 2021-04-14 RX ADMIN — SODIUM CHLORIDE: 9 INJECTION, SOLUTION INTRAVENOUS at 19:36

## 2021-04-14 RX ADMIN — LORAZEPAM 0.5 MG: 0.5 TABLET ORAL at 06:00

## 2021-04-14 RX ADMIN — LORAZEPAM 0.5 MG: 0.5 TABLET ORAL at 20:00

## 2021-04-14 RX ADMIN — METHOCARBAMOL TABLETS 500 MG: 500 TABLET, COATED ORAL at 18:20

## 2021-04-14 RX ADMIN — ACETAMINOPHEN 1000 MG: 500 TABLET ORAL at 06:00

## 2021-04-14 RX ADMIN — ENOXAPARIN SODIUM 30 MG: 30 INJECTION SUBCUTANEOUS at 10:34

## 2021-04-14 RX ADMIN — CETIRIZINE HYDROCHLORIDE 10 MG: 10 TABLET ORAL at 07:59

## 2021-04-14 RX ADMIN — CARVEDILOL 3.12 MG: 3.12 TABLET, FILM COATED ORAL at 07:59

## 2021-04-14 RX ADMIN — LORAZEPAM 0.5 MG: 0.5 TABLET ORAL at 12:00

## 2021-04-14 RX ADMIN — Medication 9 MG: at 22:03

## 2021-04-14 RX ADMIN — DOCUSATE SODIUM 100 MG: 100 CAPSULE, LIQUID FILLED ORAL at 20:01

## 2021-04-14 RX ADMIN — Medication 250 MG: at 07:59

## 2021-04-14 RX ADMIN — OXYCODONE HYDROCHLORIDE 5 MG: 5 TABLET ORAL at 19:47

## 2021-04-14 RX ADMIN — SODIUM CHLORIDE, PRESERVATIVE FREE 10 ML: 5 INJECTION INTRAVENOUS at 19:48

## 2021-04-14 RX ADMIN — SODIUM CHLORIDE, PRESERVATIVE FREE 10 ML: 5 INJECTION INTRAVENOUS at 08:00

## 2021-04-14 RX ADMIN — METHOCARBAMOL TABLETS 500 MG: 500 TABLET, COATED ORAL at 06:00

## 2021-04-14 RX ADMIN — LEVOTHYROXINE SODIUM 25 MCG: 25 TABLET ORAL at 07:59

## 2021-04-14 ASSESSMENT — PAIN DESCRIPTION - LOCATION
LOCATION: KNEE
LOCATION: KNEE
LOCATION: LEG

## 2021-04-14 ASSESSMENT — PAIN - FUNCTIONAL ASSESSMENT: PAIN_FUNCTIONAL_ASSESSMENT: PREVENTS OR INTERFERES SOME ACTIVE ACTIVITIES AND ADLS

## 2021-04-14 ASSESSMENT — PAIN DESCRIPTION - ORIENTATION
ORIENTATION: RIGHT
ORIENTATION: RIGHT

## 2021-04-14 ASSESSMENT — PAIN SCALES - GENERAL
PAINLEVEL_OUTOF10: 8
PAINLEVEL_OUTOF10: 0
PAINLEVEL_OUTOF10: 8
PAINLEVEL_OUTOF10: 5
PAINLEVEL_OUTOF10: 8

## 2021-04-14 ASSESSMENT — PAIN DESCRIPTION - PAIN TYPE
TYPE: ACUTE PAIN
TYPE: ACUTE PAIN
TYPE: SURGICAL PAIN
TYPE: SURGICAL PAIN

## 2021-04-14 ASSESSMENT — PAIN DESCRIPTION - DESCRIPTORS
DESCRIPTORS: ACHING
DESCRIPTORS: ACHING;DISCOMFORT

## 2021-04-14 ASSESSMENT — PAIN DESCRIPTION - PROGRESSION: CLINICAL_PROGRESSION: NOT CHANGED

## 2021-04-14 ASSESSMENT — PAIN DESCRIPTION - FREQUENCY
FREQUENCY: CONTINUOUS
FREQUENCY: CONTINUOUS

## 2021-04-14 NOTE — PROGRESS NOTES
POST OP NOTE    SUBJECTIVE  Pt s/p R total knee arthroplasty . OBJECTIVE  VITALS:  BP (!) 148/66   Pulse 82   Temp 97.9 °F (36.6 °C) (Oral)   Resp 18   Ht 5' 10\" (1.778 m)   Wt 176 lb (79.8 kg)   SpO2 92%   BMI 25.25 kg/m²         GENERAL:  fatigued and arousable. No acute distress  CARDIOVASCULAR:  regular rate and rhythm   LUNGS:  CTA Bilaterally  ABDOMEN:   Abdomen soft, non-tender, non-distended  INCISION: Incision clean/dry/intact    ASSESSMENT  1. POD# 0 s/p R total knee arthroplasty    PLAN  1. Pain management-MMT  2. DVT proph-per ortho recs  3. GI proph-general diet  4.  PT/OT per ortho [address]  Trauma/Surgery Service  4/14/2021 at 2:17 AM

## 2021-04-14 NOTE — PROGRESS NOTES
Orthopedic Progress Note    Patient:  Lissy Aviles  YOB: 1950     73 y.o. male    Subjective:  Patient seen and examined. No concerns, or issue overnight. Patient endorses pain could be better controlled. Tolerating PO intake. Denies fever, chills, HA, CP, cough, SOB, N/V.  -BM/-Flatus/+urination  Vitals reviewed, afebrile. Objective:   Vitals:    04/13/21 2030   BP: (!) 148/66   Pulse: 82   Resp: 18   Temp: 97.9 °F (36.6 °C)   SpO2: 92%     General: NAD, cooperative    Cardiovascular: Regular rate    Respiratory: Chest symmetric, no accessory muscle use, normal respirations, no audible wheezes    Right lower extremity: Patient lying comfortably on back with margarette hose on extending to thigh and SCD equipped to calf. Knee was resting on a pillow, slightly flexed. Optifoam on, clean, dry and intact. Tolerates ROM well. Compartments soft. EHL/FHL/TA/GS complex motor intact. Sural, saphenous, superificial/deep peroneal, and plantar nerve distribution SILT. Dorsalis pedis pulse 2+ with BCR. Recent Labs     04/13/21  0549   WBC 8.3   HGB 9.0*   HCT 28.0*   PLT See Reflexed IPF Result   INR 0.9   *   K 4.9   BUN 8   CREATININE 0.69*   GLUCOSE 106*      Meds: Ancef. See chart for complete list.    Impression 73 y.o. male being seen after a fall from his wheel chair resulting in the following:      - Right periprosthetic distal femur fracture s/p distal femoral replacement on 4/13/21, POD#1      -Complete post-op antibiotics  -Weight bear as tolerated right lower extremity   -Hgb 9.0 pre-op, f/u CBC this AM  -Okay for general diet  -Optifoam on, maintain clean, dry and intact. -Trauma team primary  -Patient in pain, managed by trauma surgery.  Recommend multimodal pain management protocol:   Acetaminophen 500 mg tablet, 2 Q6h   Naproxen 500 mg tablet 1 q12h   Gabapentin 100 mg TID   Cyclobenzaprine 1 tablet q6h   Oxycodone 5 mg tablet 1-2 Q4-6h prn   Docusate 100 mg BID  -Ice and elevation for pain/swelling  -PT/OT  -DVT ppx: Okay to resume anticoagulation as medically indicated. Recommend two weeks eliquis 2.5 bid.  -Please page ortho with any questions or concerns      Elida Villarreal MD  Orthopaedic Surgery, PGY-1  [address] [place] [address]    PGY 3 Addendum  Pt seen and examined. Agree with above. Pain controlled. No events overnight. Exam as above. POD#1 right distal femur replacement. Complete abx. WBAT RLE. PT/OT. Maintain optifoam. Pain control per primary. DVT ppx: ok to resume POD#1, recommend two weeks eliquis 2.5mg bid. Dispo: Will follow. F/u Dr. Anthony Cole 10-14 days after surgery.     Britta Davis DO  5:34 AM 4/14/2021

## 2021-04-14 NOTE — PROGRESS NOTES
Max: 82 Resp Resp  Av.5  Min: 0  Max: 20 Pulse ox SpO2  Av.9 %  Min: 87 %  Max: 100 %  Physical Exam  Constitutional:        HENT:      Head: Normocephalic. Eyes:      Pupils: Pupils are equal, round, and reactive to light. Cardiovascular:      Rate and Rhythm: Normal rate and regular rhythm. Pulses: Normal pulses. Pulmonary:      Breath sounds: Normal breath sounds. Abdominal:      General: Bowel sounds are normal.      Palpations: Abdomen is soft. Musculoskeletal:      Right upper leg: He exhibits tenderness. Skin:     General: Skin is warm. Neurological:      Mental Status: He is alert. GCS: GCS eye subscore is 4. GCS verbal subscore is 5. GCS motor subscore is 6. Psychiatric:         Behavior: Behavior is cooperative. Spine:     Spine Tenderness ROM   Cervical 0 /10 Normal   Thoracic 0 /10 Normal   Lumbar 0 /10 Normal     Musculoskeletal    Joint Tenderness Swelling ROM   Right shoulder absent absent normal   Left shoulder absent absent normal   Right elbow absent absent normal   Left elbow absent absent normal   Right wrist absent absent normal   Left wrist absent absent normal   Right hand grasp absent absent normal   Left hand grasp absent absent normal   Right hip KENNY KENNY abnormal - KENNY   Left hip absent absent normal   Right knee KENNY KENNY abnormal - KENNY   Left knee absent absent normal   Right ankle absent absent normal   Left ankle absent absent normal   Right foot absent absent normal   Left foot absent absent normal       I/O last 3 completed shifts: In: 1000 [I.V.:1000]  Out: 1700 [Urine:1400; Blood:300]    Drain/tube output:   In: 1000 [I.V.:1000]  Out: 1200 [Urine:900]    LAB:  CBC:   Recent Labs     2136 21  0549 21  0615   WBC 9.1 8.3 12.6*   HGB 7.4* 9.0* 7.2*   HCT 24.7* 28.0* 23.8*   MCV 84.3 81.2* 86.2    See Reflexed IPF Result 247     BMP:   Recent Labs     21  0536 21  0549 21  0417   * 130* 130* K 5.1 4.9 4.9   CL 95* 100 100   CO2 24 23 21   BUN 11 8 7*   CREATININE 0.92 0.69* 0.73   GLUCOSE 100* 106* 144*     COAGS:   Recent Labs     04/12/21  0536 04/13/21  0549   PROT 6.1*  --    INR 0.9 0.9       RADIOLOGY:  CT Right knee:  Impression   1.  Redemonstration of comminuted mildly displaced periprosthetic fracture of   the distal femur as above.  Nondisplaced fracture plane extends proximally to   disrupt the posterior cortex of the distal femoral diaphysis.       2.  Small knee joint effusion. TIFFANIE Ball - CNP  4/14/21, 8:51 AM       Attending Note      I have reviewed the above GCS note(s) and I either performed the key elements of the medical history and physical exam or was present with the trauma team when the key elements of the medical history and physical exam were performed. I have discussed the findings, established the care plan and recommendations with the trauma service. C/o leg pain, Hb 7 range. Will recheck.     Kandace Goodson MD  4/14/2021  2:10 PM

## 2021-04-14 NOTE — PROGRESS NOTES
Physician Progress Note      Jeramie Baptiste  CSN #:                  [id number]  :                       1950  ADMIT DATE:       2021 3:41 PM  DISCH DATE:  RESPONDING  PROVIDER #:        Zamzam Goddard CNP          QUERY TEXT:    Pt admitted with periprosthetic rt femur fracture . Pt noted to have fall from   wheelchair and operative note on  documents found to have significant   osteopenia/osteoporosis . If possible, please document in progress notes and   discharge summary if you are evaluating and/or treating any of the following: The medical record reflects the following:  Risk Factors: age, fall from wheelchair  Clinical Indicators: operative report from  states found to have   significant osteopenia/osteoporosis. noted fall from wheelchair  Treatment: monitoring    thank you Luis A [facility] CCDS  [phone]  Options provided:  -- Osteoporotic rt femur periprosthetic  Fracture  -- Osteoporotic rt  femur periprosthetic   fracture following fall which would   not usually break a normal, healthy bone  -- Traumatic rt femur periprosthetic fracture  -- Other - I will add my own diagnosis  -- Disagree - Not applicable / Not valid  -- Disagree - Clinically unable to determine / Unknown  -- Refer to Clinical Documentation Reviewer    PROVIDER RESPONSE TEXT:    This patient has a traumatic fracture of rt femur . Query created by:  Ayse Adam on 2021 1:36 PM      Electronically signed by:  Zamzam Goddard CNP 2021 2:04 PM

## 2021-04-14 NOTE — CARE COORDINATION
Dispo planning     Spoke with pt at the bedside. Pt would like to go to [facility] for more rehab instead of going right home for more therapy due to his recent falls to get stronger. Updated CM for referral and to start precert once accepted.

## 2021-04-14 NOTE — PROGRESS NOTES
[facility]  Progress Note                   Date:   4/14/2021  Patient name: Jackie Caban  Date of admission:  4/11/2021  3:41 PM  MRN:   [id number]  YOB: 1950  PCP: Loyda Toney DO    Reason for Admission: Supracondylar fracture of distal end of femur without intracondylar extension, sequela [V58.489S]    Subjective:       Clinical Changes /Abnormalities:  Patient seen and examined in room after discussion with RN. No post-op CV issues/concerns. Not on tele. Denies chest pain or SOB. Review of Systems    Medications:   Scheduled Meds:   thiamine  250 mg Oral Daily    guaiFENesin  600 mg Oral BID    enoxaparin  30 mg Subcutaneous BID    folic acid  1 mg Oral Daily    cetirizine  10 mg Oral Daily    furosemide  20 mg Oral Daily    methocarbamol  500 mg Oral Q6H    oxyCODONE  5 mg Oral Once    atorvastatin  40 mg Oral Nightly    carvedilol  3.125 mg Oral BID    levothyroxine  25 mcg Oral Daily    sodium chloride flush  5-40 mL Intravenous 2 times per day    polyethylene glycol  17 g Oral Daily    docusate sodium  100 mg Oral BID    acetaminophen  1,000 mg Oral 3 times per day    LORazepam  0.5 mg Oral Q8H     Continuous Infusions:   sodium chloride 125 mL/hr at 04/13/21 1050    sodium chloride      sodium chloride       CBC:   Recent Labs     04/12/21  0536 04/13/21  0549 04/14/21  0615   WBC 9.1 8.3 12.6*   HGB 7.4* 9.0* 7.2*    See Reflexed IPF Result 247     BMP:    Recent Labs     04/12/21  0536 04/13/21  0549 04/14/21  0417   * 130* 130*   K 5.1 4.9 4.9   CL 95* 100 100   CO2 24 23 21   BUN 11 8 7*   CREATININE 0.92 0.69* 0.73   GLUCOSE 100* 106* 144*     Hepatic:  Recent Labs     04/12/21  0536   AST 18   ALT 12   BILITOT 0.38   ALKPHOS 91     Troponin: No results for input(s): TROPHS in the last 72 hours. BNP: No results for input(s): BNP in the last 72 hours.   Lipids: No results for input(s): CHOL, HDL in the last 72 mag  3. Recommend HGB >9  4. F/U in clinic upon discharge in 2 weeks.      Electronically signed by TIFFANIE Bravo CNP on 4/14/2021 at 12:17 PM  [address]  [phone]

## 2021-04-14 NOTE — PROGRESS NOTES
Physical Therapy    Facility/Department: [address] BURN UNIT  Initial Assessment    NAME: Latoya Knutson  : 1950  MRN: [id number]    Date of Service: 2021  Chief Complaint   Patient presents with   [facility] Fall    Knee Pain       Discharge Recommendations:    Further therapy recommended at discharge. PT Equipment Recommendations  Equipment Needed: No    Assessment   Body structures, Functions, Activity limitations: Decreased functional mobility ; Decreased cognition;Decreased posture;Decreased endurance;Decreased ROM; Decreased strength;Decreased balance;Decreased safe awareness; Increased pain  Assessment: Pt required maxAx2 to perform bed mobility and functional transfers while standing at the bedside. Pt is a high fall risk and is currently unsafe to perform functional mobility unassisted due to high level of assistance required and balance deficits. Recommending continued skilled physical therapy to address these deficits and return pt to prior level of function. Prognosis: Fair  Decision Making: Medium Complexity  PT Education: Goals;PT Role;Plan of Care  REQUIRES PT FOLLOW UP: Yes  Activity Tolerance  Activity Tolerance: Patient limited by endurance; Patient limited by fatigue       Patient Diagnosis(es): The primary encounter diagnosis was Closed fracture of distal end of right femur, unspecified fracture morphology, initial encounter ([facility] [address] A diagnosis of Dilutional hyponatremia was also pertinent to this visit. has a past medical history of ADHD (attention deficit hyperactivity disorder), ADHD (attention deficit hyperactivity disorder), Atypical chest pain, Chronic bronchitis ([facility] [address] Hypertension, Hyponatremia, Meniere's disease, Narcolepsy, Nasal fracture, PND (post-nasal drip), SOB (shortness of breath), Tibia fracture, and Transaminasemia. has a past surgical history that includes cyst removal; Ankle surgery; knee surgery (Right); Coronary angioplasty with stent (N/A, 10/10/2015);  Femur Surgery (Right, 04/13/2021); and Knee Arthroplasty (Right, 4/13/2021).     Restrictions  Restrictions/Precautions  Restrictions/Precautions: Weight Bearing, Fall Risk  Required Braces or Orthoses?: Yes  Lower Extremity Weight Bearing Restrictions  Right Lower Extremity Weight Bearing: Weight Bearing As Tolerated  Partial Weight Bearing Percentage Or Pounds: 100%  Position Activity Restriction  Other position/activity restrictions: S/p RIGHT DISTAL FEMUR REPLACEMENT (4/13/21)  Vision/Hearing  Vision: Impaired  Vision Exceptions: Wears glasses for reading  Hearing: Within functional limits     Subjective  General  Patient assessed for rehabilitation services?: Yes  Response To Previous Treatment: Not applicable  Family / Caregiver Present: No  Follows Commands: Within Functional Limits  Subjective  Subjective: RN and pt in agreement for PT eval; pt supine in bed upon PT arrival, pt pleasant and cooperative throughout  Pain Screening  Patient Currently in Pain: Yes  Pain Assessment  Pain Assessment: 0-10  Pain Level: 8  Pain Type: Acute pain;Surgical pain  Pain Location: Knee  Pain Descriptors: Discomfort  Non-Pharmaceutical Pain Intervention(s): Ambulation/Increased Activity;Repositioned  Response to Pain Intervention: Patient Satisfied  Multiple Pain Sites: No  Vital Signs  Patient Currently in Pain: Yes       Orientation  Orientation  Overall Orientation Status: Within Functional Limits  Social/Functional History  Social/Functional History  Lives With: Other (comment)(Pt reports living with a tenant of pt's rental home)  Type of Home: House  Home Layout: One level  Home Access: Stairs to enter with rails  Entrance Stairs - Number of Steps: 4  Entrance Stairs - Rails: Right  Bathroom Shower/Tub: Tub/Shower unit  Bathroom Toilet: Handicap height  Bathroom Equipment: Grab bars in shower, Shower chair  Bathroom Accessibility: Accessible  Home Equipment: Cane, Rolling walker, [employer]  ADL Assistance: Independent  Homemaking Assistance: Needs assistance(Pt reports an aide that assists with IADLs 5x/week for 2 hours. Pt reports aides assist with laundry and cleaning, can assist with ADLS)  Homemaking Responsibilities: No  Ambulation Assistance: Needs assistance(pt non-ambulatory at baseline, pt reports propelling manual WC independently)  Transfer Assistance: Independent  Active : No  Mode of Transportation: Cab  Occupation: Retired  Leisure & Hobbies: Watching [email or web address]  Additional Comments: [facility] aide 5x/wk 2 hours each  Cognition   Cognition  Overall Cognitive Status: WFL    Objective  Joint Mobility  Spine: WFL  ROM RLE: hip WFL; Knee 10- 90 degrees; Ankle WFL  ROM LLE: WFL  ROM RUE: WFL  ROM LUE: WFL  Strength RLE  Strength RLE: Exception  Comment: Did not formally assess due to sx intervention- AROM against gravity noted at hip and ankle  Strength LLE  Strength LLE: Exception  Comment: Grossly 4/5 at hip, knee, and ankle  Strength RUE  Strength RUE: WFL  Strength LUE  Strength LUE: WFL     Sensation  Overall Sensation Status: WFL(pt denies numbness/tingling)  Bed mobility  Supine to Sit: Maximum assistance;2 Person assistance  Sit to Supine: Maximum assistance;2 Person assistance  Comment: HOB flat to simulate home enviroment. Pt requried mod VC's for all sequencing with fair return demo. Transfers  Sit to Stand: 2 Person Assistance;Maximum Assistance  Stand to sit: 2 Person Assistance;Maximum Assistance  Comment: Pt demonstrates significant difficulty with WB on RLE due to pain and weakness. Pt required Lila to maintain standing balance for ~30 seconds. Pt reports dizziness with functional transfers this date. Pt safely returned to seated position with subsession of symptoms.   Ambulation  Ambulation?: No(unable to attempt due to poor tolerance to standing)  Stairs/Curb  Stairs?: No     Balance  Posture: Fair  Sitting - Static: Good;-  Sitting - Dynamic: Good;-  Standing - Static: Fair;-  Standing - Dynamic: Poor;+  Comments: Standing balance assessed w/ RW; pt able to sit EOB SBA        Plan   Plan  Times per week: 7x/week; 1-2/day  Current Treatment Recommendations: Strengthening, Endurance Training, Transfer Training, Patient/Caregiver Education & Training, ROM, Balance Training, Gait Training, Home Exercise Program, Functional Mobility Training, Stair training, Safety Education & Training  Safety Devices  Type of devices: Left in bed, Patient at risk for falls, Bed alarm in place, Call light within reach, Gait belt, Nurse notified  Restraints  Initially in place: No  AM-PAC Score     AM-PAC Inpatient Mobility without Stair Climbing Raw Score : 9 (04/14/21 1544)  AM-PAC Inpatient without Stair Climbing T-Scale Score : 32.44 (04/14/21 1544)  Mobility Inpatient CMS 0-100% Score: 76.07 (04/14/21 1544)  Mobility Inpatient without Stair CMS G-Code Modifier : CL (04/14/21 1544)       Goals  Short term goals  Time Frame for Short term goals: 14  Short term goal 1: Pt to perform bed mobility Lila  Short term goal 2: Pt to demonstrate functional transfers [address] [place] [address] Drive term goal 3: Propel manual WC for 100ft w/ BUE with supervision  Short term goal 4: Demonstrate standing balance of fair + to decrease fall risk  Patient Goals   Patient goals :  To get stronger       Therapy Time   Individual Concurrent Group Co-treatment   Time In [address] [place] [address] Drive         Time Out 1353         Minutes 25         Timed Code Treatment Minutes: 8 Minutes       Lidya Landeros, PT

## 2021-04-14 NOTE — PROGRESS NOTES
Occupational Therapy   Occupational Therapy Initial Assessment  Date: 2021   Patient Name: Kofi Wright  MRN: [id number]     : 1950     Chief Complaint   Patient presents with   [facility] Fall    Knee Pain     Date of Service: 2021    Discharge Recommendations:  Further therapy recommended at discharge. Patient would benefit from continued therapy after discharge  OT Equipment Recommendations  Equipment Needed: No(Pt already owns all warranted DME at this time.)    Assessment   Performance deficits / Impairments: Decreased functional mobility ; Decreased endurance;Decreased ADL status; Decreased balance;Decreased high-level IADLs  Assessment: Pt awake, alert, and agreeable to OT evaluation. Pt demo's bed mobility max A x 2 for BLE and trunk progression. Pt demo's sit <> stand transfers max A x 2 with use of RW. Pt limited by dizziness throughout OT evaluation this date. Pts RN notified of personal medication for dizziness. RN to follow up with pt. Pt requires min-max A for all ADLs at this time. Pt would benefit from skilled occupational therapy services to address safe functional transfers, safe self care, and activity tolerance to increase independence and safety with ADL and IADL tasks upon discharge. Treatment Diagnosis: Fall  Prognosis: Good  Decision Making: Medium Complexity  Patient Education: OT role, OT POC, OT goals, DME training, AE training, energy conservations training, breathing techniques, and bed mobility training. Pt was receptive to education and verbalized understanding of training completed.   REQUIRES OT FOLLOW UP: Yes  Activity Tolerance  Activity Tolerance: Patient Tolerated treatment well;Patient limited by pain  Safety Devices  Safety Devices in place: Yes  Type of devices: Left in bed;Bed alarm in place;Nurse notified;Call light within reach;Gait belt  Restraints  Initially in place: No           Patient Diagnosis(es): The primary encounter diagnosis was Closed fracture of distal end of right femur, unspecified fracture morphology, initial encounter ([facility] [address] A diagnosis of Dilutional hyponatremia was also pertinent to this visit. has a past medical history of ADHD (attention deficit hyperactivity disorder), ADHD (attention deficit hyperactivity disorder), Atypical chest pain, Chronic bronchitis ([facility] [address] Hypertension, Hyponatremia, Meniere's disease, Narcolepsy, Nasal fracture, PND (post-nasal drip), SOB (shortness of breath), Tibia fracture, and Transaminasemia. has a past surgical history that includes cyst removal; Ankle surgery; knee surgery (Right); Coronary angioplasty with stent (N/A, 10/10/2015); Femur Surgery (Right, 04/13/2021); and Knee Arthroplasty (Right, 4/13/2021). Treatment Diagnosis: Fall/R TKA      Restrictions  Restrictions/Precautions  Restrictions/Precautions: Weight Bearing, Fall Risk, Seizure  Required Braces or Orthoses?: Yes  Lower Extremity Weight Bearing Restrictions  Right Lower Extremity Weight Bearing: Weight Bearing As Tolerated  Position Activity Restriction  Other position/activity restrictions: S/p RIGHT DISTAL FEMUR REPLACEMENT (4/13/21)    Subjective   General  Patient assessed for rehabilitation services?: Yes  Family / Caregiver Present: No  Diagnosis: Fall  General Comment  Comments: Pts primary RN OK'd pt to be seen by OT prior to entry into pts room. Pt awake, alert, and agreeable to OT evaluation this date.   Patient Currently in Pain: Yes  Pain Assessment  Pain Assessment: 0-10  Pain Level: 8  Pain Type: Acute pain;Surgical pain  Pain Location: Knee  Pain Orientation: Right  Pain Descriptors: Discomfort  Non-Pharmaceutical Pain Intervention(s): Ambulation/Increased Activity;Repositioned  Response to Pain Intervention: Patient Satisfied    Social/Functional History  Social/Functional History  Lives With: Other (comment)(Pt reports living with a tenant of pt's rental home)  Type of Home: House  Home Layout: One level  Home Access: Stairs to enter with rails  Entrance Stairs - Number of Steps: 4  Entrance Stairs - Rails: Right  Bathroom Shower/Tub: Tub/Shower unit  Bathroom Toilet: Handicap height  Bathroom Equipment: Grab bars in shower, Shower chair  Bathroom Accessibility: Accessible  Home Equipment: Cane, Rolling walker, [address]  ADL Assistance: Independent  Homemaking Assistance: Needs assistance(Pt reports an aide that assists with IADLs 5x/week for 2 hours. Pt reports aides assist with laundry and cleaning, can assist with ADLS)  Homemaking Responsibilities: No  Ambulation Assistance: Needs assistance(pt non-ambulatory at baseline, pt reports propelling manual WC independently)  Transfer Assistance: Independent  Active : No  Mode of Transportation: Cab  Occupation: Retired  Leisure & Hobbies: Watching [employer]  Additional Comments: [facility] aide 5x/wk 2 hours each     Objective   Vision: Impaired  Vision Exceptions: Wears glasses for reading  Hearing: Within functional limits          Balance  Sitting Balance: Stand by assistance(Pt demo's ability to sit unsupported EOB ~15 minutes SBA for safety d/t dizziness.)  Standing Balance: Minimal assistance  Standing Balance  Time: ~1-2 minutes  Activity: static standing EOB  Comment: Pt requires RW for BUE support in standing position. Pt noted dizziness in standing position. No noted LOB. ADL  Feeding: Independent  Grooming: Modified independent ;Setup; Increased time to complete  UE Bathing: Contact guard assistance;Setup; Increased time to complete  LE Bathing: Moderate assistance;Setup; Increased time to complete  UE Dressing: Minimal assistance;Setup; Increased time to complete  LE Dressing: Moderate assistance;Setup; Increased time to complete(Pt demo's ability to don L sock using figure-4 positioning. Pt requires A for donning R sock.)  Toileting: Maximum assistance;Setup; Increased time to complete  Tone RUE  RUE Tone: Normotonic  Tone LUE  LUE Tone: Normotonic  Coordination  Movements Are Fluid And Coordinated: Yes     Bed mobility  Supine to Sit: Maximum assistance;2 Person assistance  Sit to Supine: Maximum assistance;2 Person assistance  Comment: HOB flat to simulate home environment. Pt requries max A x 2 for trunk and BLE progression. Transfers  Sit to stand: Maximum assistance;2 Person assistance  Stand to sit: Maximum assistance;2 Person assistance  Transfer Comments: Pt requires verbal cues for appropriate hand placement. Pt requires RW during standing phases of transfers.      Cognition  Overall Cognitive Status: WFL        Sensation  Overall Sensation Status: WFL(pt denies numbness/tingling)        LUE AROM (degrees)  LUE AROM : WFL  Left Hand AROM (degrees)  Left Hand AROM: WFL  RUE AROM (degrees)  RUE AROM : WFL  Right Hand AROM (degrees)  Right Hand AROM: WFL  LUE Strength  Gross LUE Strength: WFL  L Hand General: 4/5  LUE Strength Comment: Grossly 4/5  RUE Strength  Gross RUE Strength: WFL  R Hand General: 4/5  RUE Strength Comment: Grossly 4/5     Plan   Plan  Times per week: 3-5 x/wk  Current Treatment Recommendations: Patient/Caregiver Education & Training, Equipment Evaluation, Education, & procurement, Balance Training, Functional Mobility Training, Endurance Training, Safety Education & Training, Self-Care / ADL      AM-PAC Score        AM-[facility] Inpatient Daily Activity Raw Score: 17 (04/14/21 1508)  AM-PAC Inpatient ADL T-Scale Score : 37.26 (04/14/21 1508)  ADL Inpatient CMS 0-100% Score: 50.11 (04/14/21 1508)  ADL Inpatient CMS G-Code Modifier : CK (04/14/21 1508)    Goals  Short term goals  Time Frame for Short term goals: By discharge, pt will:  Short term goal 1: demo bed mobility mod A to improve positioning for participation in seated ADL tasks  Short term goal 2: demo static standing tolerance x 5 minutes with use of LRD to improve balance needed for IADL tasks  Short term goal 3: demo UB ADLs and grooming tasks mod I with setup  Short term goal 4: demo LB ADLs and toileting tasks min A with use of AE PRN and appropriate DME  Short term goal 5: demo participation in functional task x 20 minutes with < 3 rest breaks to improve activity tolerance  Short term goal 6: demo functional transfers mod A with use of LRD to improve with transfers in home environment       Therapy Time   Individual Concurrent Group Co-treatment   Time In 1327         Time Out 1356         Minutes 29         Timed Code Treatment Minutes: 15 Minutes     Co-eval with PT  Gabby Castle, S/OT

## 2021-04-15 LAB
ABO/RH: NORMAL
ABSOLUTE EOS #: 0.04 K/UL (ref 0–0.44)
ABSOLUTE IMMATURE GRANULOCYTE: 0.09 K/UL (ref 0–0.3)
ABSOLUTE LYMPH #: 1.6 K/UL (ref 1.1–3.7)
ABSOLUTE MONO #: 1.18 K/UL (ref 0.1–1.2)
ANION GAP SERPL CALCULATED.3IONS-SCNC: 8 MMOL/L (ref 9–17)
ANTIBODY SCREEN: NEGATIVE
ARM BAND NUMBER: NORMAL
BASOPHILS # BLD: 0 % (ref 0–2)
BASOPHILS ABSOLUTE: 0.03 K/UL (ref 0–0.2)
BLD PROD TYP BPU: NORMAL
BLD PROD TYP BPU: NORMAL
BUN BLDV-MCNC: 6 MG/DL (ref 8–23)
BUN/CREAT BLD: ABNORMAL (ref 9–20)
CALCIUM SERPL-MCNC: 9.2 MG/DL (ref 8.6–10.4)
CHLORIDE BLD-SCNC: 104 MMOL/L (ref 98–107)
CO2: 22 MMOL/L (ref 20–31)
CREAT SERPL-MCNC: 0.73 MG/DL (ref 0.7–1.2)
CROSSMATCH RESULT: NORMAL
CROSSMATCH RESULT: NORMAL
DIFFERENTIAL TYPE: ABNORMAL
DISPENSE STATUS BLOOD BANK: NORMAL
DISPENSE STATUS BLOOD BANK: NORMAL
EOSINOPHILS RELATIVE PERCENT: 0 % (ref 1–4)
EXPIRATION DATE: NORMAL
GFR AFRICAN AMERICAN: >60 ML/MIN
GFR NON-AFRICAN AMERICAN: >60 ML/MIN
GFR SERPL CREATININE-BSD FRML MDRD: ABNORMAL ML/MIN/{1.73_M2}
GFR SERPL CREATININE-BSD FRML MDRD: ABNORMAL ML/MIN/{1.73_M2}
GLUCOSE BLD-MCNC: 110 MG/DL (ref 70–99)
HCT VFR BLD CALC: 26.3 % (ref 40.7–50.3)
HEMOGLOBIN: 8 G/DL (ref 13–17)
IMMATURE GRANULOCYTES: 1 %
LYMPHOCYTES # BLD: 15 % (ref 24–43)
MCH RBC QN AUTO: 26.6 PG (ref 25.2–33.5)
MCHC RBC AUTO-ENTMCNC: 30.4 G/DL (ref 28.4–34.8)
MCV RBC AUTO: 87.4 FL (ref 82.6–102.9)
MONOCYTES # BLD: 11 % (ref 3–12)
NRBC AUTOMATED: 0.3 PER 100 WBC
PDW BLD-RTO: 19 % (ref 11.8–14.4)
PLATELET # BLD: 231 K/UL (ref 138–453)
PLATELET ESTIMATE: ABNORMAL
PMV BLD AUTO: 8.9 FL (ref 8.1–13.5)
POTASSIUM SERPL-SCNC: 4.2 MMOL/L (ref 3.7–5.3)
RBC # BLD: 3.01 M/UL (ref 4.21–5.77)
RBC # BLD: ABNORMAL 10*6/UL
SEG NEUTROPHILS: 73 % (ref 36–65)
SEGMENTED NEUTROPHILS ABSOLUTE COUNT: 7.92 K/UL (ref 1.5–8.1)
SODIUM BLD-SCNC: 134 MMOL/L (ref 135–144)
TRANSFUSION STATUS: NORMAL
TRANSFUSION STATUS: NORMAL
UNIT DIVISION: 0
UNIT DIVISION: 0
UNIT NUMBER: NORMAL
UNIT NUMBER: NORMAL
WBC # BLD: 10.9 K/UL (ref 3.5–11.3)
WBC # BLD: ABNORMAL 10*3/UL

## 2021-04-15 PROCEDURE — 6370000000 HC RX 637 (ALT 250 FOR IP): Performed by: STUDENT IN AN ORGANIZED HEALTH CARE EDUCATION/TRAINING PROGRAM

## 2021-04-15 PROCEDURE — 85025 COMPLETE CBC W/AUTO DIFF WBC: CPT

## 2021-04-15 PROCEDURE — 2060000002 HC BURN ICU INTERMEDIATE R&B

## 2021-04-15 PROCEDURE — 36415 COLL VENOUS BLD VENIPUNCTURE: CPT

## 2021-04-15 PROCEDURE — 6370000000 HC RX 637 (ALT 250 FOR IP): Performed by: NURSE PRACTITIONER

## 2021-04-15 PROCEDURE — 2580000003 HC RX 258: Performed by: STUDENT IN AN ORGANIZED HEALTH CARE EDUCATION/TRAINING PROGRAM

## 2021-04-15 PROCEDURE — 80048 BASIC METABOLIC PNL TOTAL CA: CPT

## 2021-04-15 PROCEDURE — 6360000002 HC RX W HCPCS: Performed by: NURSE PRACTITIONER

## 2021-04-15 RX ORDER — OXYCODONE HYDROCHLORIDE 5 MG/1
5 TABLET ORAL EVERY 8 HOURS PRN
Status: DISCONTINUED | OUTPATIENT
Start: 2021-04-15 | End: 2021-04-16

## 2021-04-15 RX ORDER — LORAZEPAM 0.5 MG/1
0.5 TABLET ORAL EVERY 6 HOURS PRN
Qty: 56 TABLET | Refills: 0 | Status: SHIPPED | OUTPATIENT
Start: 2021-04-15 | End: 2021-04-29

## 2021-04-15 RX ORDER — ACETAMINOPHEN 500 MG
500 TABLET ORAL 4 TIMES DAILY PRN
Qty: 360 TABLET | Refills: 1 | Status: SHIPPED | OUTPATIENT
Start: 2021-04-15

## 2021-04-15 RX ORDER — MECLIZINE HCL 12.5 MG/1
25 TABLET ORAL 3 TIMES DAILY PRN
Status: DISCONTINUED | OUTPATIENT
Start: 2021-04-15 | End: 2021-04-16 | Stop reason: HOSPADM

## 2021-04-15 RX ADMIN — POLYETHYLENE GLYCOL 3350 17 G: 17 POWDER, FOR SOLUTION ORAL at 09:47

## 2021-04-15 RX ADMIN — GUAIFENESIN 600 MG: 600 TABLET, EXTENDED RELEASE ORAL at 09:47

## 2021-04-15 RX ADMIN — APIXABAN 2.5 MG: 2.5 TABLET, FILM COATED ORAL at 13:37

## 2021-04-15 RX ADMIN — DESMOPRESSIN ACETATE 40 MG: 0.2 TABLET ORAL at 21:14

## 2021-04-15 RX ADMIN — DOCUSATE SODIUM 100 MG: 100 CAPSULE, LIQUID FILLED ORAL at 21:14

## 2021-04-15 RX ADMIN — FOLIC ACID 1 MG: 1 TABLET ORAL at 09:47

## 2021-04-15 RX ADMIN — OXYCODONE HYDROCHLORIDE 5 MG: 5 TABLET ORAL at 18:12

## 2021-04-15 RX ADMIN — METHOCARBAMOL TABLETS 500 MG: 500 TABLET, COATED ORAL at 00:44

## 2021-04-15 RX ADMIN — MECLIZINE HYDROCHLORIDE 25 MG: 12.5 TABLET, FILM COATED ORAL at 21:11

## 2021-04-15 RX ADMIN — METHOCARBAMOL TABLETS 500 MG: 500 TABLET, COATED ORAL at 18:12

## 2021-04-15 RX ADMIN — Medication 250 MG: at 09:46

## 2021-04-15 RX ADMIN — OXYCODONE HYDROCHLORIDE 5 MG: 5 TABLET ORAL at 01:53

## 2021-04-15 RX ADMIN — LORAZEPAM 0.5 MG: 0.5 TABLET ORAL at 21:15

## 2021-04-15 RX ADMIN — CETIRIZINE HYDROCHLORIDE 10 MG: 10 TABLET ORAL at 09:47

## 2021-04-15 RX ADMIN — OXYCODONE HYDROCHLORIDE 5 MG: 5 TABLET ORAL at 09:36

## 2021-04-15 RX ADMIN — LORAZEPAM 0.5 MG: 0.5 TABLET ORAL at 12:31

## 2021-04-15 RX ADMIN — METHOCARBAMOL TABLETS 500 MG: 500 TABLET, COATED ORAL at 06:17

## 2021-04-15 RX ADMIN — SODIUM CHLORIDE: 9 INJECTION, SOLUTION INTRAVENOUS at 01:59

## 2021-04-15 RX ADMIN — MECLIZINE HYDROCHLORIDE 25 MG: 12.5 TABLET, FILM COATED ORAL at 10:05

## 2021-04-15 RX ADMIN — LORAZEPAM 0.5 MG: 0.5 TABLET ORAL at 05:34

## 2021-04-15 RX ADMIN — CARVEDILOL 3.12 MG: 3.12 TABLET, FILM COATED ORAL at 09:47

## 2021-04-15 RX ADMIN — APIXABAN 2.5 MG: 2.5 TABLET, FILM COATED ORAL at 21:10

## 2021-04-15 RX ADMIN — DOCUSATE SODIUM 100 MG: 100 CAPSULE, LIQUID FILLED ORAL at 09:47

## 2021-04-15 RX ADMIN — METHOCARBAMOL TABLETS 500 MG: 500 TABLET, COATED ORAL at 12:31

## 2021-04-15 RX ADMIN — CARVEDILOL 3.12 MG: 3.12 TABLET, FILM COATED ORAL at 21:13

## 2021-04-15 RX ADMIN — LEVOTHYROXINE SODIUM 25 MCG: 25 TABLET ORAL at 06:17

## 2021-04-15 RX ADMIN — FUROSEMIDE 20 MG: 20 TABLET ORAL at 09:47

## 2021-04-15 RX ADMIN — GUAIFENESIN 600 MG: 600 TABLET, EXTENDED RELEASE ORAL at 21:11

## 2021-04-15 RX ADMIN — ENOXAPARIN SODIUM 30 MG: 30 INJECTION SUBCUTANEOUS at 09:47

## 2021-04-15 ASSESSMENT — PAIN SCALES - GENERAL
PAINLEVEL_OUTOF10: 9
PAINLEVEL_OUTOF10: 4
PAINLEVEL_OUTOF10: 8
PAINLEVEL_OUTOF10: 9
PAINLEVEL_OUTOF10: 7
PAINLEVEL_OUTOF10: 8
PAINLEVEL_OUTOF10: 8
PAINLEVEL_OUTOF10: 9

## 2021-04-15 ASSESSMENT — PAIN - FUNCTIONAL ASSESSMENT
PAIN_FUNCTIONAL_ASSESSMENT: PREVENTS OR INTERFERES SOME ACTIVE ACTIVITIES AND ADLS
PAIN_FUNCTIONAL_ASSESSMENT: PREVENTS OR INTERFERES SOME ACTIVE ACTIVITIES AND ADLS

## 2021-04-15 ASSESSMENT — PAIN DESCRIPTION - PROGRESSION
CLINICAL_PROGRESSION: NOT CHANGED
CLINICAL_PROGRESSION: GRADUALLY WORSENING
CLINICAL_PROGRESSION: NOT CHANGED

## 2021-04-15 ASSESSMENT — PAIN DESCRIPTION - ORIENTATION: ORIENTATION: RIGHT

## 2021-04-15 ASSESSMENT — PAIN DESCRIPTION - DESCRIPTORS
DESCRIPTORS: ACHING;DISCOMFORT
DESCRIPTORS: ACHING;DISCOMFORT

## 2021-04-15 ASSESSMENT — PAIN DESCRIPTION - FREQUENCY
FREQUENCY: CONTINUOUS
FREQUENCY: CONTINUOUS

## 2021-04-15 ASSESSMENT — PAIN DESCRIPTION - LOCATION
LOCATION: KNEE

## 2021-04-15 ASSESSMENT — PAIN DESCRIPTION - ONSET: ONSET: ON-GOING

## 2021-04-15 NOTE — CARE COORDINATION
Transitional Planning:    Called [facility] to discuss referral. Spoke with admissions, she stated that they need updated PT/OT notes and for pt to be off of ativan for atleast 24 hours prior to them being able to accept. Updated her that pt has PT/OT notes from yesterday afternoon, and that pt is not on CIWA protocol but did get IV ativan dose this am. She stated that she was going to review the patient again and call me back. 1315- spoke with Daxa Cho with [facility], updated her that the pt takes po ativan at home as a home med. She stated that she will start precert.

## 2021-04-15 NOTE — PROGRESS NOTES
Occupational Therapy    Occupational Therapy Not Seen Note    DATE: 4/15/2021  Name: Latoya Interiano  : 1950  MRN: [id number]    Patient not available for Occupational Therapy due to:    Patient Declined: after max encouragement noted wanting to sleep    Next Scheduled Treatment: Attempt at later date    Electronically signed by KENDAL Fajardo on 4/15/2021 at 1:18 PM

## 2021-04-15 NOTE — PROGRESS NOTES
PROGRESS NOTE    PATIENT NAME: Marta Reis  MEDICAL RECORD NO. [id number]  DATE: 4/15/2021  SURGEON: Parris Mac  PRIMARY CARE PHYSICIAN: Sun Fontanez DO    HD: # 4    ASSESSMENT  Patient Active Problem List   Diagnosis    Meniere's disease    ADHD (attention deficit hyperactivity disorder)    Narcolepsy    Hyponatremia    PND (post-nasal drip)    Transaminasemia    Cardiomyopathy ([address]    HTN (hypertension)    Chronic obstructive pulmonary disease ([address]    Dementia with [facility] disturbance ([facility] [address]    Status post insertion of percutaneous endoscopic gastrostomy (PEG) tube ([facility] [address]    S/p bare metal coronary artery stent    Supracondylar fracture of distal end of femur without intracondylar extension, sequela     New diagnoses: none    PLAN  1. S/p knee replacement due to periprosthetic fracture. Will resume home medications. Discharge planning      SUBJECTIVE  Patient is doing well. Pain is controlled. he is tolerating a DIET GENERAL; diet. Patient is tolerating up with assistance. Patient is passing flatus and has had a bowel movement. Patient denies nausea or vomiting.      OBJECTIVE  VITALS   Patient Vitals for the past 24 hrs:   BP Temp Temp src Pulse Resp SpO2 Weight   04/15/21 0821 (!) 166/85 98 °F (36.7 °C) Oral 69 15 91 % --   04/15/21 0600 -- -- -- -- -- -- 176 lb (79.8 kg)   04/15/21 0515 (!) 165/85 98.2 °F (36.8 °C) Oral 63 16 97 % --   04/15/21 0030 (!) 160/83 98.2 °F (36.8 °C) Oral 65 16 95 % --   04/14/21 1930 (!) 158/75 98.2 °F (36.8 °C) Oral 98 18 96 % --   04/14/21 1830 (!) 148/80 98.6 °F (37 °C) Oral 79 16 -- --   04/14/21 1815 (!) 145/76 98.7 °F (37.1 °C) Oral 74 14 95 % --   04/14/21 1800 136/72 98.9 °F (37.2 °C) Oral 75 16 94 % --   04/14/21 1513 (!) 161/78 96.9 °F (36.1 °C) Axillary 72 16 95 % --   04/14/21 1216 (!) 148/78 97.8 °F (36.6 °C) Axillary 66 16 95 % --     GENERAL: alert  NEUROLOGIC: alert, oriented, normal speech, no focal findings or movement disorder

## 2021-04-15 NOTE — PROGRESS NOTES
Gabapentin 100 mg TID              Cyclobenzaprine 1 tablet q6h              Oxycodone 5 mg tablet 1-2 Q4-6h prn              Docusate 100 mg BID  -Ice and elevation for pain/swelling  -PT/OT  -DVT ppx: Okay to resume anticoagulation as medically indicated. Recommend two weeks eliquis 2.5 bid.  -Follow up Dr. Wilbert Barber in 10-14 days   -Please page ortho with any questions or concerns    Curt Andrews DO  PGY-1, Department of [address]  5:19 AM 4/15/2021      PGY 3 Addendum  Pt seen and examined. Agree with above. Pain controlled. Exam as above. WBAT RLE. PT/OT. Pain control per primary. DVT ppx: recommend 2 weeks eliquis 2.5 bid at discharge. Dispo: ok to DC from ortho standpoint.      March Slade, DO  5:48 AM 4/15/2021

## 2021-04-15 NOTE — PROGRESS NOTES
Physical Therapy    DATE: 4/15/2021    NAME: Bharati Almonte  MRN: [id number]   : 1950      Patient not seen this date for Physical Therapy due to:    Patient Declined: PT refused despite max encouragement. RN present at the time. will check back if time allows.       Electronically signed by Aristeo Hawkins PTA on 4/15/2021 at 11:21 AM

## 2021-04-16 VITALS
BODY MASS INDEX: 25.2 KG/M2 | TEMPERATURE: 98 F | HEIGHT: 70 IN | OXYGEN SATURATION: 96 % | WEIGHT: 176 LBS | RESPIRATION RATE: 16 BRPM | DIASTOLIC BLOOD PRESSURE: 85 MMHG | SYSTOLIC BLOOD PRESSURE: 153 MMHG | HEART RATE: 69 BPM

## 2021-04-16 LAB
ABSOLUTE EOS #: 0.56 K/UL (ref 0–0.44)
ABSOLUTE IMMATURE GRANULOCYTE: 0.08 K/UL (ref 0–0.3)
ABSOLUTE LYMPH #: 1.58 K/UL (ref 1.1–3.7)
ABSOLUTE MONO #: 1.16 K/UL (ref 0.1–1.2)
ANION GAP SERPL CALCULATED.3IONS-SCNC: 7 MMOL/L (ref 9–17)
BASOPHILS # BLD: 1 % (ref 0–2)
BASOPHILS ABSOLUTE: 0.05 K/UL (ref 0–0.2)
BUN BLDV-MCNC: 7 MG/DL (ref 8–23)
BUN/CREAT BLD: ABNORMAL (ref 9–20)
CALCIUM SERPL-MCNC: 9 MG/DL (ref 8.6–10.4)
CHLORIDE BLD-SCNC: 99 MMOL/L (ref 98–107)
CO2: 24 MMOL/L (ref 20–31)
CREAT SERPL-MCNC: 0.68 MG/DL (ref 0.7–1.2)
DIFFERENTIAL TYPE: ABNORMAL
EOSINOPHILS RELATIVE PERCENT: 6 % (ref 1–4)
GFR AFRICAN AMERICAN: >60 ML/MIN
GFR NON-AFRICAN AMERICAN: >60 ML/MIN
GFR SERPL CREATININE-BSD FRML MDRD: ABNORMAL ML/MIN/{1.73_M2}
GFR SERPL CREATININE-BSD FRML MDRD: ABNORMAL ML/MIN/{1.73_M2}
GLUCOSE BLD-MCNC: 105 MG/DL (ref 70–99)
HCT VFR BLD CALC: 27.5 % (ref 40.7–50.3)
HEMOGLOBIN: 8.1 G/DL (ref 13–17)
IMMATURE GRANULOCYTES: 1 %
LYMPHOCYTES # BLD: 16 % (ref 24–43)
MCH RBC QN AUTO: 26.5 PG (ref 25.2–33.5)
MCHC RBC AUTO-ENTMCNC: 29.5 G/DL (ref 28.4–34.8)
MCV RBC AUTO: 89.9 FL (ref 82.6–102.9)
MONOCYTES # BLD: 12 % (ref 3–12)
NRBC AUTOMATED: 0.6 PER 100 WBC
PDW BLD-RTO: 19.6 % (ref 11.8–14.4)
PLATELET # BLD: 218 K/UL (ref 138–453)
PLATELET ESTIMATE: ABNORMAL
PMV BLD AUTO: 9.1 FL (ref 8.1–13.5)
POTASSIUM SERPL-SCNC: 4.2 MMOL/L (ref 3.7–5.3)
RBC # BLD: 3.06 M/UL (ref 4.21–5.77)
RBC # BLD: ABNORMAL 10*6/UL
SARS-COV-2, RAPID: NOT DETECTED
SEG NEUTROPHILS: 64 % (ref 36–65)
SEGMENTED NEUTROPHILS ABSOLUTE COUNT: 6.52 K/UL (ref 1.5–8.1)
SODIUM BLD-SCNC: 130 MMOL/L (ref 135–144)
SPECIMEN DESCRIPTION: NORMAL
WBC # BLD: 10 K/UL (ref 3.5–11.3)
WBC # BLD: ABNORMAL 10*3/UL

## 2021-04-16 PROCEDURE — 85025 COMPLETE CBC W/AUTO DIFF WBC: CPT

## 2021-04-16 PROCEDURE — 6370000000 HC RX 637 (ALT 250 FOR IP): Performed by: STUDENT IN AN ORGANIZED HEALTH CARE EDUCATION/TRAINING PROGRAM

## 2021-04-16 PROCEDURE — 87635 SARS-COV-2 COVID-19 AMP PRB: CPT

## 2021-04-16 PROCEDURE — 97535 SELF CARE MNGMENT TRAINING: CPT

## 2021-04-16 PROCEDURE — 80048 BASIC METABOLIC PNL TOTAL CA: CPT

## 2021-04-16 PROCEDURE — 2580000003 HC RX 258: Performed by: STUDENT IN AN ORGANIZED HEALTH CARE EDUCATION/TRAINING PROGRAM

## 2021-04-16 PROCEDURE — 36415 COLL VENOUS BLD VENIPUNCTURE: CPT

## 2021-04-16 PROCEDURE — 6370000000 HC RX 637 (ALT 250 FOR IP): Performed by: NURSE PRACTITIONER

## 2021-04-16 RX ORDER — OXYCODONE HYDROCHLORIDE 5 MG/1
5 TABLET ORAL EVERY 12 HOURS PRN
Status: DISCONTINUED | OUTPATIENT
Start: 2021-04-16 | End: 2021-04-16 | Stop reason: HOSPADM

## 2021-04-16 RX ORDER — OXYCODONE HYDROCHLORIDE 5 MG/1
5 TABLET ORAL EVERY 12 HOURS PRN
Qty: 10 TABLET | Refills: 0 | Status: SHIPPED | OUTPATIENT
Start: 2021-04-16 | End: 2021-04-23

## 2021-04-16 RX ORDER — METHOCARBAMOL 500 MG/1
500 TABLET, FILM COATED ORAL EVERY 6 HOURS
Qty: 40 TABLET | Refills: 0
Start: 2021-04-16 | End: 2021-04-26

## 2021-04-16 RX ADMIN — CETIRIZINE HYDROCHLORIDE 10 MG: 10 TABLET ORAL at 08:48

## 2021-04-16 RX ADMIN — APIXABAN 2.5 MG: 2.5 TABLET, FILM COATED ORAL at 08:48

## 2021-04-16 RX ADMIN — Medication 250 MG: at 08:48

## 2021-04-16 RX ADMIN — LEVOTHYROXINE SODIUM 25 MCG: 25 TABLET ORAL at 07:13

## 2021-04-16 RX ADMIN — SODIUM CHLORIDE, PRESERVATIVE FREE 10 ML: 5 INJECTION INTRAVENOUS at 08:48

## 2021-04-16 RX ADMIN — FOLIC ACID 1 MG: 1 TABLET ORAL at 08:48

## 2021-04-16 RX ADMIN — LORAZEPAM 0.5 MG: 0.5 TABLET ORAL at 07:13

## 2021-04-16 RX ADMIN — MECLIZINE HYDROCHLORIDE 25 MG: 12.5 TABLET, FILM COATED ORAL at 08:48

## 2021-04-16 RX ADMIN — METHOCARBAMOL TABLETS 500 MG: 500 TABLET, COATED ORAL at 07:13

## 2021-04-16 RX ADMIN — POLYETHYLENE GLYCOL 3350 17 G: 17 POWDER, FOR SOLUTION ORAL at 09:04

## 2021-04-16 RX ADMIN — CARVEDILOL 3.12 MG: 3.12 TABLET, FILM COATED ORAL at 08:48

## 2021-04-16 RX ADMIN — OXYCODONE HYDROCHLORIDE 5 MG: 5 TABLET ORAL at 07:12

## 2021-04-16 RX ADMIN — GUAIFENESIN 600 MG: 600 TABLET, EXTENDED RELEASE ORAL at 08:48

## 2021-04-16 RX ADMIN — METHOCARBAMOL TABLETS 500 MG: 500 TABLET, COATED ORAL at 12:56

## 2021-04-16 RX ADMIN — FUROSEMIDE 20 MG: 20 TABLET ORAL at 08:48

## 2021-04-16 RX ADMIN — LORAZEPAM 0.5 MG: 0.5 TABLET ORAL at 12:56

## 2021-04-16 RX ADMIN — DOCUSATE SODIUM 100 MG: 100 CAPSULE, LIQUID FILLED ORAL at 08:48

## 2021-04-16 ASSESSMENT — PAIN DESCRIPTION - PROGRESSION
CLINICAL_PROGRESSION: NOT CHANGED

## 2021-04-16 ASSESSMENT — PAIN DESCRIPTION - FREQUENCY: FREQUENCY: CONTINUOUS

## 2021-04-16 ASSESSMENT — PAIN DESCRIPTION - ORIENTATION
ORIENTATION: RIGHT
ORIENTATION: RIGHT

## 2021-04-16 ASSESSMENT — PAIN DESCRIPTION - DESCRIPTORS: DESCRIPTORS: ACHING;DISCOMFORT;CONSTANT

## 2021-04-16 ASSESSMENT — PAIN DESCRIPTION - PAIN TYPE: TYPE: ACUTE PAIN;SURGICAL PAIN;CHRONIC PAIN

## 2021-04-16 ASSESSMENT — PAIN SCALES - GENERAL
PAINLEVEL_OUTOF10: 8
PAINLEVEL_OUTOF10: 7

## 2021-04-16 ASSESSMENT — PAIN DESCRIPTION - LOCATION
LOCATION: KNEE
LOCATION: KNEE

## 2021-04-16 ASSESSMENT — PAIN - FUNCTIONAL ASSESSMENT: PAIN_FUNCTIONAL_ASSESSMENT: PREVENTS OR INTERFERES WITH MANY ACTIVE NOT PASSIVE ACTIVITIES

## 2021-04-16 NOTE — PLAN OF CARE
Discussed POC with patient. Patient made aware and was agreeable to a SNF for rehab. Aware that expected time for transfer to SNF is 12pm today.
Problem: Pain:  Goal: Pain level will decrease  Description: Pain level will decrease  Outcome: Ongoing  Goal: Control of acute pain  Description: Control of acute pain  Outcome: Ongoing  Goal: Control of chronic pain  Description: Control of chronic pain  Outcome: Ongoing     Problem: Skin Integrity:  Goal: Will show no infection signs and symptoms  Description: Will show no infection signs and symptoms  Outcome: Ongoing  Goal: Absence of new skin breakdown  Description: Absence of new skin breakdown  Outcome: Ongoing     Problem: Falls - Risk of:  Goal: Will remain free from falls  Description: Will remain free from falls  Outcome: Ongoing  Goal: Absence of physical injury  Description: Absence of physical injury  Outcome: Ongoing
Problem: Pain:  Goal: Pain level will decrease  Description: Pain level will decrease  Outcome: Ongoing  Goal: Control of acute pain  Description: Control of acute pain  Outcome: Ongoing  Goal: Control of chronic pain  Description: Control of chronic pain  Outcome: Ongoing     Problem: Skin Integrity:  Goal: Will show no infection signs and symptoms  Description: Will show no infection signs and symptoms  Outcome: Ongoing  Goal: Absence of new skin breakdown  Description: Absence of new skin breakdown  Outcome: Ongoing     Problem: Falls - Risk of:  Goal: Will remain free from falls  Description: Will remain free from falls  Outcome: Ongoing  Goal: Absence of physical injury  Description: Absence of physical injury  Outcome: Ongoing     Problem: Discharge Planning:  Goal: Discharged to appropriate level of care  Description: Discharged to appropriate level of care  Outcome: Ongoing     Problem: Fluid Volume - Deficit:  Goal: Absence of fluid volume deficit signs and symptoms  Description: Absence of fluid volume deficit signs and symptoms  Outcome: Ongoing     Problem: Nutrition Deficit:  Goal: Ability to achieve adequate nutritional intake will improve  Description: Ability to achieve adequate nutritional intake will improve  Outcome: Ongoing     Problem: Sleep Pattern Disturbance:  Goal: Appears well-rested  Description: Appears well-rested  Outcome: Ongoing     Problem: Violence - Risk of, Self/Other-Directed:  Goal: Knowledge of developmental care interventions  Description: Absence of violence  Outcome: Ongoing
Problem: Pain:  Goal: Pain level will decrease  Description: Pain level will decrease  Outcome: Ongoing  Goal: Control of acute pain  Description: Control of acute pain  Outcome: Ongoing  Goal: Control of chronic pain  Description: Control of chronic pain  Outcome: Ongoing     Problem: Skin Integrity:  Goal: Will show no infection signs and symptoms  Description: Will show no infection signs and symptoms  Outcome: Ongoing  Goal: Absence of new skin breakdown  Description: Absence of new skin breakdown  Outcome: Ongoing     Problem: Falls - Risk of:  Goal: Will remain free from falls  Description: Will remain free from falls  Outcome: Ongoing  Goal: Absence of physical injury  Description: Absence of physical injury  Outcome: Ongoing     Problem: Discharge Planning:  Goal: Discharged to appropriate level of care  Description: Discharged to appropriate level of care  Outcome: Ongoing     Problem: Fluid Volume - Deficit:  Goal: Absence of fluid volume deficit signs and symptoms  Description: Absence of fluid volume deficit signs and symptoms  Outcome: Ongoing     Problem: Nutrition Deficit:  Goal: Ability to achieve adequate nutritional intake will improve  Description: Ability to achieve adequate nutritional intake will improve  Outcome: Ongoing     Problem: Sleep Pattern Disturbance:  Goal: Appears well-rested  Description: Appears well-rested  Outcome: Ongoing     Problem: Violence - Risk of, Self/Other-Directed:  Goal: Knowledge of developmental care interventions  Description: Absence of violence  Outcome: Ongoing
achieve adequate nutritional intake will improve  Description: Ability to achieve adequate nutritional intake will improve  4/13/2021 0124 by Delgado No RN  Outcome: Ongoing  4/12/2021 1830 by Comfort Schneider RN  Outcome: Ongoing     Problem: Sleep Pattern Disturbance:  Goal: Appears well-rested  Description: Appears well-rested  4/13/2021 0124 by Delgado No RN  Outcome: Ongoing  4/12/2021 1830 by Comfort Schneider RN  Outcome: Ongoing     Problem: Violence - Risk of, Self/Other-Directed:  Goal: Knowledge of developmental care interventions  Description: Absence of violence  4/13/2021 0124 by Delgado No RN  Outcome: Ongoing  4/12/2021 1830 by Comfort Schneider RN  Outcome: Ongoing

## 2021-04-16 NOTE — FLOWSHEET NOTE
DATE: 2021    NAME: Lauri Palm  MRN: [id number]   : 1950      Patient not seen this date for Physical Therapy due to:    Patient Declined: Pt discharging soon and adamantly refusing PT before he leaves      Electronically signed by Zuleyka Anand PTA on 2021 at 11:55 AM

## 2021-04-16 NOTE — CARE COORDINATION
PS to [facility] from [facility] for rapid covid, precert obtained from [facility], [facility] notified for rapid covid test    PS to [facility] for med reconcilliation, [facility], Laila Winn RN notified to contact respiratory for rapid covid    [facility] notified of transport time, MAGED UGARTE in paperwork along with face sheet and H&P, RN provided with report number, transport 1pm, patients son notified of transport time (ML on VM)    Discharge Report    [facility] Case Management Department  Written by: Claudine Vazquez RN    Patient Name: Gelacio Napoles  Attending Provider: Faisal Gunderson DO  Admit Date: 2021  3:41 PM  MRN: [id number]  Account: [de-identified]                     : 1950  Discharge Date: 2021      Disposition: SNF Skilled [facility]    Claudine Vazquez RN

## 2021-04-16 NOTE — DISCHARGE SUMMARY
DISCHARGE SUMMARY:    PATIENT NAME:  Jarrod Medina  YOB: 1950  MEDICAL RECORD NO. [id number]  DATE: 04/16/21  PRIMARY CARE PHYSICIAN: German Corrigan DO  ADMIT DATE:  4/11/2021    DISCHARGE DATE:  4/16/21  DISPOSITION:  SNF  ADMITTING DIAGNOSIS:   Fall    DIAGNOSIS:   Patient Active Problem List   Diagnosis    Meniere's disease    ADHD (attention deficit hyperactivity disorder)    Narcolepsy    Hyponatremia    PND (post-nasal drip)    Transaminasemia    [facility] [facility]    HTN (hypertension)    Chronic obstructive pulmonary disease ([facility]    Dementia with behavioral disturbance ([facility]    Status post insertion of percutaneous endoscopic gastrostomy (PEG) tube ([facility]    S/p bare metal coronary artery stent    Supracondylar fracture of distal end of femur without intracondylar extension, sequela       CONSULTANTS:  Ortho    PROCEDURES:   4/13: OR-right distal femur replacement    HOSPITAL COURSE:   Jarrod Medina is a 73 y.o. male who was admitted on 4/11/2021  Hospital Course:  Fall yesterday transfer out of wheelchair, down for few hours, EMS called put him back in chair, increasing R knee pain today so presented to ED    XR impacted supracondylar distal right femur fx near prosthetic (hx of TKA)    CK, myoglobin negative  4/12: restart home meds, stress test per cardiology--> low risk. 1 u pRBC given for hbg 7.4  4/13: cardiac clearance, OR with ortho for distal femoral replacement   4/14: Hgb 8.2 > 7.4 > 9.0 > 7.2 > 7.0, transfuse 1 unit, start lovenox  4/15: started eliquis    Labs and imaging were followed daily. On day of discharge Jarrod Medina  was tolerating a regular diet  had adequate analgeia on oral medications  had no signs of complication. He was deemed medically stable for discharged to [address] [place], [place] [address]ia:        Discharge Vitals:  height is 5' 10\" (1.778 m) and weight is 176 lb (79.8 kg). His oral temperature is 98 °F (36.7 °C).  His blood pressure is 153/85 (abnormal) and his pulse is 69. His respiration is 16 and oxygen saturation is 96%. Exam on day of discharge: HENT:      Head: Normocephalic. Eyes:      Pupils: Pupils are equal, round, and reactive to light. Cardiovascular:      Rate and Rhythm: Normal rate and regular rhythm. Pulses: Normal pulses. Pulmonary:      Breath sounds: Normal breath sounds. Abdominal:      General: Bowel sounds are normal.      Palpations: Abdomen is soft. Musculoskeletal:      Right upper leg: He exhibits tenderness. Skin:     General: Skin is warm. Neurological:      Mental Status: He is alert. GCS: GCS eye subscore is 4. GCS verbal subscore is 5. GCS motor subscore is 6. Psychiatric:         Behavior: Behavior is cooperative. LABS:     Recent Labs     04/14/21  0417 04/14/21  0615 04/14/21  0615 04/14/21  1942 04/15/21  0409 04/16/21  0348   WBC  --  12.6*  --   --  10.9 10.0   HGB  --  7.2*   < > 8.0* 8.0* 8.1*   HCT  --  23.8*   < > 25.8* 26.3* 27.5*   PLT  --  247  --   --  231 218   *  --   --   --  134* 130*   K 4.9  --   --   --  4.2 4.2     --   --   --  104 99   CO2 21  --   --   --  22 24   BUN 7*  --   --   --  6* 7*   CREATININE 0.73  --   --   --  0.73 0.68*    < > = values in this interval not displayed. DIAGNOSTIC TESTS:    Xr Chest (single View Frontal)    Result Date: 4/11/2021  EXAMINATION: ONE XRAY VIEW OF THE CHEST 4/11/2021 4:21 pm COMPARISON: 03/23/2020 HISTORY: ORDERING SYSTEM PROVIDED HISTORY: surgical clearance TECHNOLOGIST PROVIDED HISTORY: surgical clearance FINDINGS: Stable ICD lead. Mild chronic bibasal interstitial lung changes. The cardiac size is normal. No acute infiltrates or pleural effusions are seen. Pulmonary vascularity appears normal. There is mild ectasia of the thoracic aorta. There are degenerative changes in the spine . No acute bony abnormalities.  The hilar structures are normal.     No acute cardiopulmonary disease     Xr Xr Knee Right (3 Views)    Result Date: 4/11/2021  EXAMINATION: ONE XRAY VIEW OF THE PELVIS AND TWO XRAY VIEWS RIGHT HIP; THREE XRAY VIEWS OF THE RIGHT KNEE; 2 XRAY VIEWS OF THE RIGHT FEMUR 4/11/2021 5:13 pm COMPARISON: Right knee of 20 January 2014 HISTORY: ORDERING SYSTEM PROVIDED HISTORY: fall TECHNOLOGIST PROVIDED HISTORY: fall FINDINGS: Pelvis: Osteopenia complicates assessment. Both femoral heads are well circumscribed and situated within the acetabula. Mild degenerative changes are present in the hips. No acute fracture or dislocation is evident. SI joints are symmetrical.  Significant atherosclerotic calcification of the iliac and femoral arteries is noted. Right knee: A right total knee arthroplasty is noted. The patient has a supracondylar impacted fracture of the distal femur with offset of 11 mm; distal fracture fragment is displaced dorsally. No joint dislocation. Significant atherosclerotic vascular calcification is present. Right femur: Mild degenerative changes are present in the hip. A right total knee arthroplasty is noted. The patient has is fracture of the supracondylar area of the femur. Distal fracture fragment is displaced dorsally by 11 mm in relation the proximal fracture fragment. Additionally, there appears be a faint vertical lucency extending cranially into the distal femoral shaft from the fracture site best seen on the frontal view. No dislocation. Pelvis: Para changes in the hips. No acute fracture or dislocation. Significant vascular calcification. Right knee: Total knee arthroplasty. Impacted fracture supracondylar area of the distal right femur with dorsal displacement of the distal fracture fragment by 11 mm. Right femur: Supracondylar fracture distal femur with dorsal displacement distal fracture fragment by 11 mm. However, there appears be a vertical component of a distal femoral fracture faintly visualized, nondisplaced and suggesting a hairline fracture. based adjustment of the mA/kV was utilized to reduce the radiation dose to as low as reasonably achievable. COMPARISON: Radiograph dated 04/11/2021. HISTORY ORDERING SYSTEM PROVIDED HISTORY: Pre-operative planning. TECHNOLOGIST PROVIDED HISTORY: Thin cuts and metal suppression CT, please. Pre-operative planning. Decision Support Exception->Emergency Medical Condition (MA) Reason for Exam: pre op planning FINDINGS: Bones: As seen on the radiograph dated 04/11/2021, there are components of a right total knee arthroplasty situated in gross anatomic alignment. Streak artifact emanating from the components of a right total knee arthroplasty partially limits evaluation. There is redemonstration of comminuted mildly displaced periprosthetic suprachondral fracture of the distal femur with approximately 2 cm of impaction and 1.2 cm posterior displacement of the main distal fracture fragment. The fracture plane extends to the level of the femoral component. The fracture plane extends proximally along the posterior cortex of the distal femoral diaphysis seen up to the level of the first axial image of the study. Soft Tissue: There is moderate diffuse soft tissue swelling about the knee. Diffuse vascular calcification changes are seen. There is moderate to severe atrophy of the musculature of the knee. Joint:  Within the limitations of the study, there appears to be a small knee joint effusion. 1.  Redemonstration of comminuted mildly displaced periprosthetic fracture of the distal femur as above. Nondisplaced fracture plane extends proximally to disrupt the posterior cortex of the distal femoral diaphysis. 2.  Small knee joint effusion. Nm Cardiac Stress Test Nuclear Imaging    Result Date: 4/12/2021  EXAMINATION: MYOCARDIAL PERFUSION IMAGING 4/12/2021 2:16 pm TECHNIQUE: For the rest study, 44 mCi of Tc 99 labeled sestamibi were injected. SPECT images were acquired.  Under cardiology supervision, 0.4mg Lark Brood was infused. After pharmacologic stress, 11.6 mCi of Tc 99 labeled sestamibi were injected. SPECT images with ECG gating were acquired. COMPARISON: None Available. HISTORY: ORDERING SYSTEM PROVIDED HISTORY: Chest pain TECHNOLOGIST PROVIDED HISTORY: Reason for Exam: Chest pain Procedure Type->Rx CP Reason for Exam: chest pain ,  hypertension FINDINGS: Images interpreted utilizing [employer] and [employer]. No significant fixed or reversible perfusion defect. The gated images show no wall motion abnormalities. Normal myocardial thickening. Perfusion scores are visually adjusted to account for artifact. Summed stress score:  0 Summed rest score:  0 Summed reversibility score:  0 Function: End diastolic volume:  76ZA Left ventricular ejection fraction:  85% TID score:  0.93 (scores greater than 1.39 are considered elevated for Lexiscan stress with Tc99m) Notes concerning risk stratification: Risk stratification incorporates both clinical history and some testing results. Final risk determination is the responsibility of the ordering provider as other patient information and test results may increase or decrease the risk assessment reported for this examination. Risk stratification criteria are adapted from \"Noninvasive Risk Stratification\" criteria from [employer]. Al, ACC/AATS/AHA/ASE/ASNC/SCAI/SCCT/STS 2017 Appropriate Use Criteria For Coronary Revascularization in Patients With Stable Ischemic Heart Disease [facility] Volume 69, Issue 17, May 2017 High risk (>3% annual death or MI) 1. Severe resting LV dysfunction (LVEF <35%) not readily explained by non coronary causes 2. Resting perfusion abnormalities greater than 10% of the myocardium in patients without prior history or evidence of MI 3. Stress-induced perfusion abnormalities encumbering greater than or equal to 10% myocardium or stress segmental scores indicating multiple vascular territories with abnormalities 4.  Stress-induced LV dilatation (TID ratio greater than 1.19 for exercise and greater than 1.39 for regadenoson) Intermediate risk (1% to 3% annual death or MI) 1. Mild/moderate resting LV dysfunction (LVEF 35% to 49%) not readily explained by non coronary causes. 2. Resting perfusion abnormalities in 5%-9.9% of the myocardium in patients without a history or prior evidence of MI 3. Stress-induced perfusion abnormality encumbering 5%-9.9% of the myocardium or stress segmental scores indicating 1 vascular territory with abnormalities but without LV dilation 4. Small wall motion abnormality involving 1-2 segments and only 1 coronary bed. Low Risk (Less than 1% annual death or MI) 1. Normal or small myocardial perfusion defect at rest or with stress encumbering less than 5% of the myocardium. No stress-induced ischemia. No infarct. Normal LVEF. Risk stratification: Low     Xr Hip 2-3 Vw W Pelvis Right    Result Date: 4/11/2021  EXAMINATION: ONE XRAY VIEW OF THE PELVIS AND TWO XRAY VIEWS RIGHT HIP; THREE XRAY VIEWS OF THE RIGHT KNEE; 2 XRAY VIEWS OF THE RIGHT FEMUR 4/11/2021 5:13 pm COMPARISON: Right knee of 20 January 2014 HISTORY: ORDERING SYSTEM PROVIDED HISTORY: fall TECHNOLOGIST PROVIDED HISTORY: fall FINDINGS: Pelvis: Osteopenia complicates assessment. Both femoral heads are well circumscribed and situated within the acetabula. Mild degenerative changes are present in the hips. No acute fracture or dislocation is evident. SI joints are symmetrical.  Significant atherosclerotic calcification of the iliac and femoral arteries is noted. Right knee: A right total knee arthroplasty is noted. The patient has a supracondylar impacted fracture of the distal femur with offset of 11 mm; distal fracture fragment is displaced dorsally. No joint dislocation. Significant atherosclerotic vascular calcification is present. Right femur: Mild degenerative changes are present in the hip. A right total knee arthroplasty is noted.   The patient has is fracture of the supracondylar area of the femur. Distal fracture fragment is displaced dorsally by 11 mm in relation the proximal fracture fragment. Additionally, there appears be a faint vertical lucency extending cranially into the distal femoral shaft from the fracture site best seen on the frontal view. No dislocation. Pelvis: Para changes in the hips. No acute fracture or dislocation. Significant vascular calcification. Right knee: Total knee arthroplasty. Impacted fracture supracondylar area of the distal right femur with dorsal displacement of the distal fracture fragment by 11 mm. Right femur: Supracondylar fracture distal femur with dorsal displacement distal fracture fragment by 11 mm. However, there appears be a vertical component of a distal femoral fracture faintly visualized, nondisplaced and suggesting a hairline fracture. DISCHARGE INSTRUCTIONS     Discharge Medications:        Medication List      START taking these medications    acetaminophen 500 MG tablet  Commonly known as: TYLENOL  Take 1 tablet by mouth 4 times daily as needed for Pain     apixaban 2.5 MG Tabs tablet  Commonly known as: Eliquis  Take 1 tablet by mouth 2 times daily for 14 days     methocarbamol 500 MG tablet  Commonly known as: ROBAXIN  Take 1 tablet by mouth every 6 hours for 10 days     oxyCODONE 5 MG immediate release tablet  Commonly known as: ROXICODONE  Take 1 tablet by mouth every 12 hours as needed for Pain for up to 7 days.         CHANGE how you take these medications    levothyroxine 25 MCG tablet  Commonly known as: SYNTHROID  Take 1 tablet by mouth Daily  What changed: how much to take     Magnesium Oxide 250 MG Tabs tablet  Commonly known as: MAGNESIUM-OXIDE  Take 1 tablet by mouth daily  What changed: how much to take        CONTINUE taking these medications    Armodafinil 150 MG Tabs tablet  Commonly known as: NUVIGIL  Take 1 tablet by mouth daily     aspirin 81 MG chewable tablet  Take 1 tablet by mouth daily     atorvastatin 40 MG tablet  Commonly known as: LIPITOR  Take 1 tablet by mouth nightly     carvedilol 3.125 MG tablet  Commonly known as: COREG  TAKE 1 TAB BY MOUTH TWICE A DAY WITH MEALS     [employer] Senior/Antioxidant Tabs  TAKE 1 TAB BY MOUTH ONCE A DAY     chlorhexidine 0.12 % solution  Commonly known as: PERIDEX     clopidogrel 75 MG tablet  Commonly known as: PLAVIX  Take 1 tablet by mouth daily     * Diapers & Supplies Misc  Adult diapers dispense quantity allowed by insurance     * Diapers & Supplies Misc  Wipes  dispense quantity allowed by insurance     docusate sodium 100 MG capsule  Commonly known as: COLACE     folic acid 1 MG tablet  Commonly known as: FOLVITE     furosemide 20 MG tablet  Commonly known as: LASIX  Take 1 tablet by mouth daily     Glucosamine-Chondroitin -400 MG tablet  Generic drug: glucosamine-chondroitin  TAKE 1 TAB BY MOUTH TWICE A DAY     loratadine 10 MG tablet  Commonly known as: CLARITIN  TAKE 1 TAB BY MOUTH ONCE A DAY     LORazepam 0.5 MG tablet  Commonly known as: ATIVAN  Take 1 tablet by mouth every 6 hours as needed for Anxiety for up to 14 days. meclizine 25 MG tablet  Commonly known as: ANTIVERT  Take 0.5 tablets by mouth 3 times daily as needed     melatonin 3 MG Tabs tablet     mometasone 50 MCG/ACT nasal spray  Commonly known as: Nasonex  2 sprays by Nasal route daily. MULTIVITAMIN PO     ProAir  (90 Base) MCG/ACT inhaler  Generic drug: albuterol sulfate HFA  inhale 2 puffs every 4 to 6 hours if needed     QUEtiapine 25 MG tablet  Commonly known as: SEROQUEL  Take 1 tablet by mouth nightly     SALINE MIST SPRAY NA     thiamine 100 MG tablet  Take 1 tablet by mouth daily         * This list has 2 medication(s) that are the same as other medications prescribed for you. Read the directions carefully, and ask your doctor or other care provider to review them with you.             STOP taking these medications    HYDROcodone-acetaminophen 5-325

## 2021-04-16 NOTE — PROGRESS NOTES
Occupational Therapy   Occupational Therapy Treatment Note  Date: 2021   Patient Name: Preston Lei  MRN: [id number]     : 1950    Date of Service: 2021    Discharge Recommendations:  Patient would benefit from continued therapy after discharge  OT Equipment Recommendations  Equipment Needed: No  Copied from Chart: The pt is an intoxicated confused 72 YO who was found by EMS the pt had fallen yesterday and injured himself noting primarily knee pain but unable to elaborate more unsure about head injury but noted he laid on the ground for some time. No fevers chills no headache no vomiting or nausea  Right periprosthetic distal femur fracture s/p distal femoral replacement on 21,  Assessment    Pt initially refused tx due to leaving for SNF later this date. Pt called out wanting to use the urinal, OT assisted. Pt lying supine in bed upon entrance to room. Pt completed bed mobility with max assistance. Pt sat at eob 10 minutes with fair unsupported sitting balance using BUE's for support. Please refer to below assist levels for adl completion. Pt ed on OT POC, safety awareness tech, proper hand placement for transfers, with fair return. Pt retired supine in bed with call light and phone in reach, bed alarm activated. All needs met upon exit. Performance deficits / Impairments: Decreased functional mobility ; Decreased endurance;Decreased ADL status; Decreased balance;Decreased high-level IADLs  Treatment Diagnosis: Fall/R TKA  OT Education: OT Role;Plan of Care  Patient Education: OT role, OT POC, OT goals, DME training, AE training, energy conservations training, breathing techniques, and bed mobility training. Pt was receptive to education and verbalized understanding of training completed.   REQUIRES OT FOLLOW UP: Yes  Activity Tolerance  Activity Tolerance: Patient Tolerated treatment well;Patient limited by pain  Safety Devices  Safety Devices in place: Yes  Type of devices: Left in bed;Bed alarm in place;Call light within reach;Gait belt  Restraints  Initially in place: No         Patient Diagnosis(es): The primary encounter diagnosis was Closed fracture of distal end of right femur, unspecified fracture morphology, initial encounter ([facility] [address] Diagnoses of Dilutional hyponatremia, Anxiety, and Supracondylar fracture of distal end of femur without intracondylar extension, sequela were also pertinent to this visit. has a past medical history of ADHD (attention deficit hyperactivity disorder), ADHD (attention deficit hyperactivity disorder), Atypical chest pain, Chronic bronchitis ([facility] [address] Hypertension, Hyponatremia, Meniere's disease, Narcolepsy, Nasal fracture, PND (post-nasal drip), SOB (shortness of breath), Tibia fracture, and Transaminasemia. has a past surgical history that includes cyst removal; Ankle surgery; knee surgery (Right); Coronary angioplasty with stent (N/A, 10/10/2015); Femur Surgery (Right, 04/13/2021); and Knee Arthroplasty (Right, 4/13/2021). Treatment Diagnosis: Fall/R TKA      Restrictions  Restrictions/Precautions  Restrictions/Precautions: Weight Bearing, Fall Risk, Seizure  Required Braces or Orthoses?: Yes  Lower Extremity Weight Bearing Restrictions  Right Lower Extremity Weight Bearing: Weight Bearing As Tolerated  Partial Weight Bearing Percentage Or Pounds: 100%  Position Activity Restriction  Other position/activity restrictions: S/p RIGHT DISTAL FEMUR REPLACEMENT (4/13/21)    Subjective   General  Patient assessed for rehabilitation services?: Yes  Family / Caregiver Present: No  Diagnosis: Fall  General Comment  Comments: Pts primary RN OK'd pt to be seen by OT prior to entry into pts room. Pt awake, alert, and agreeable to OT evaluation this date.   Patient Currently in Pain: Yes  Pain Assessment  Pain Assessment: 0-10  Pain Level: 7  Pain Type: Acute pain;Surgical pain  Pain Location: Knee  Pain Orientation: Right  Non-Pharmaceutical Pain Intervention(s): Repositioned; Ambulation/Increased Activity; Therapeutic presence;Distraction  Vital Signs  Patient Currently in Pain: Yes  Oxygen Therapy  O2 Device: Nasal cannula  O2 Flow Rate (L/min): 3 L/min  Social/Functional History  Social/Functional History  Lives With: Other (comment)(Pt reports living with a tenant of pt's rental home)  Type of Home: House  Home Layout: One level  Home Access: Stairs to enter with rails  Entrance Stairs - Number of Steps: 4  Entrance Stairs - Rails: Right  Bathroom Shower/Tub: Tub/Shower unit  Bathroom Toilet: Handicap height  Bathroom Equipment: Grab bars in shower, Shower chair  Bathroom Accessibility: Accessible  Home Equipment: Cane, Rolling walker, [employer]  ADL Assistance: Independent  Homemaking Assistance: Needs assistance(Pt reports an aide that assists with IADLs 5x/week for 2 hours. Pt reports aides assist with laundry and cleaning, can assist with ADLS)  Homemaking Responsibilities: No  Ambulation Assistance: Needs assistance(pt non-ambulatory at baseline, pt reports propelling manual WC independently)  Transfer Assistance: Independent  Active : No  Mode of Transportation: Cab  Occupation: Retired  Leisure & Hobbies: Watching [employer]  Additional Comments: [place] aide 5x/wk 2 hours each day     Objective        Orientation  Overall Orientation Status: Within Functional Limits     Balance  Sitting Balance: Contact guard assistance  Standing Balance  Comment: pt refused to stand this date due to not wanting to fatigue prior to transferring to SNF  ADL  Feeding: Independent  Grooming: Modified independent ;Setup; Increased time to complete  UE Bathing: Contact guard assistance;Setup; Increased time to complete  Additional Comments: pt declined to complete lower body bathing, does not want to fatigue for transfer to SNF later this date     Bed mobility  Supine to Sit: Maximum assistance  Sit to Supine: Maximum assistance  Comment: HOB flat to simulate home enviroment.  Pt

## 2021-04-29 DIAGNOSIS — S72.453S SUPRACONDYLAR FRACTURE OF DISTAL END OF FEMUR WITHOUT INTRACONDYLAR EXTENSION, SEQUELA: Primary | ICD-10-CM

## 2021-04-30 ENCOUNTER — OFFICE VISIT (OUTPATIENT)
Dept: ORTHOPEDIC SURGERY | Age: 71
End: 2021-04-30

## 2021-04-30 VITALS — HEIGHT: 70 IN | BODY MASS INDEX: 25.2 KG/M2 | WEIGHT: 176 LBS

## 2021-04-30 DIAGNOSIS — S72.453S SUPRACONDYLAR FRACTURE OF DISTAL END OF FEMUR WITHOUT INTRACONDYLAR EXTENSION, SEQUELA: Primary | ICD-10-CM

## 2021-04-30 PROCEDURE — 99024 POSTOP FOLLOW-UP VISIT: CPT | Performed by: ORTHOPAEDIC SURGERY

## 2021-04-30 ASSESSMENT — ENCOUNTER SYMPTOMS
APNEA: 0
CHEST TIGHTNESS: 0
ABDOMINAL PAIN: 0
COUGH: 0
VOMITING: 0

## 2021-04-30 NOTE — PROGRESS NOTES
[facility]  [address] [address]  Dept: [phone]  Dept Fax: [phone]        Postoperative follow-up note    Subjective:   Tamica Christian is a 73y.o. year old male who presents to our office today for postoperative followup regarding his   1. Supracondylar fracture of distal end of femur without intracondylar extension, sequela    . Chief Complaint   Patient presents with    Post-Op Check     right distal femur replacement DOS:4/13/2021       Tamica Christian  is a 73y.o. year old male who presents to our office today for postoperative follow up after having undergone a right distal femur replacement on 4/13/21. The patient denies fevers, chills, nausea, vomiting, diarrhea. The patient has started physical therapy. The patient states that he was not able to bed his knee prior to surgery. After surgery he has been working on range of motion of the right knee. The patient states that therapy can get his right knee full straight. Patient feels like he would like his pain medications decreased in frequency. Patient currently staying at [facility] rehab facility. Review of Systems   Constitutional: Negative for chills and fever. Respiratory: Negative for apnea, cough and chest tightness. Cardiovascular: Negative for chest pain and palpitations. Gastrointestinal: Negative for abdominal pain and vomiting. Genitourinary: Negative for difficulty urinating. Musculoskeletal: Positive for arthralgias (right knee). Negative for gait problem, joint swelling and myalgias. Neurological: Negative for dizziness, weakness and numbness. I have reviewed the CC, HPI, ROS, PMH, FHX, Social History, and if not present in this note, I have reviewed in the patient's chart. I agree with the documentation provided by other staff and have reviewed their documentation prior to providing my signature indicating agreement.     Objective : situated within the acetabula. Mild degenerative changes are present in the hips. No acute fracture or dislocation is evident. SI joints are symmetrical.  Significant atherosclerotic calcification of the iliac and femoral arteries is noted. Right knee: A right total knee arthroplasty is noted. The patient has a supracondylar impacted fracture of the distal femur with offset of 11 mm; distal fracture fragment is displaced dorsally. No joint dislocation. Significant atherosclerotic vascular calcification is present. Right femur: Mild degenerative changes are present in the hip. A right total knee arthroplasty is noted. The patient has is fracture of the supracondylar area of the femur. Distal fracture fragment is displaced dorsally by 11 mm in relation the proximal fracture fragment. Additionally, there appears be a faint vertical lucency extending cranially into the distal femoral shaft from the fracture site best seen on the frontal view. No dislocation. Pelvis: Para changes in the hips. No acute fracture or dislocation. Significant vascular calcification. Right knee: Total knee arthroplasty. Impacted fracture supracondylar area of the distal right femur with dorsal displacement of the distal fracture fragment by 11 mm. Right femur: Supracondylar fracture distal femur with dorsal displacement distal fracture fragment by 11 mm. However, there appears be a vertical component of a distal femoral fracture faintly visualized, nondisplaced and suggesting a hairline fracture. Xr Knee Right (3 Views)    Result Date: 4/13/2021  EXAMINATION: THREE XRAY VIEWS OF THE RIGHT KNEE 4/13/2021 6:57 pm COMPARISON: April 11, 2021 HISTORY: ORDERING SYSTEM PROVIDED HISTORY: post op, PACU please TECHNOLOGIST PROVIDED HISTORY: AP, lateral, sunrise post op, PACU please FINDINGS: New total knee arthroplasty in anatomic alignment. Distal femur fracture fragment has been resected.   Subcutaneous gas and gas in the joint vertical component of a distal femoral fracture faintly visualized, nondisplaced and suggesting a hairline fracture. Xr Knee Right (min 4 Views)    Result Date: 4/30/2021  History: Right distal femoral replacing hinged total knee arthroplasty revision secondary to fracture Findings: Nonweightbearing AP, lateral, merchant view x-ray of the right knee done the office today shows distal femoral replacing hinged arthroplasty in good position without complications. No further evidence of fracture, subluxation, dislocation, radiopaque foreign body, radiopaque tumors noted. Atherosclerotic disease is seen pervasively. Impression: Right distal femoral replacing hinged total knee arthroplasty revision in good position as described above. Ct Head Wo Contrast    Result Date: 4/11/2021  EXAMINATION: CT OF THE HEAD WITHOUT CONTRAST  4/11/2021 3:46 pm TECHNIQUE: CT of the head was performed without the administration of intravenous contrast. Dose modulation, iterative reconstruction, and/or weight based adjustment of the mA/kV was utilized to reduce the radiation dose to as low as reasonably achievable. COMPARISON: 03/23/2020 HISTORY: ORDERING SYSTEM PROVIDED HISTORY: fall, etoh, elderly, intoxicated TECHNOLOGIST PROVIDED HISTORY: fall, etoh, elderly, intoxicated Decision Support Exception->Emergency Medical Condition (MA) Reason for Exam: fall, etoh, elderly, intoxicated Acuity: Acute Type of Exam: Initial FINDINGS: BRAIN/VENTRICLES: The cerebral hemispheres, brainstem, and cerebellum have a normal appearance for the patient's age. The falx is midline. The ventricles and peripheral sulci are mildly dilated. There is decreased attenuation in the periventricular white matter. There is no sign of a space occupying lesion, infarction, or hemorrhage. Partially empty sella. Orbits: Portion of the orbits demonstrate no acute abnormality. SINUSES: Soft tissue opacification in the ethmoid and frontal sinuses.   The remaining imaged portions of the paranasal sinuses are clear. The mastoids and the middle ear chambers are clear. SOFT TISSUES/SKULL:  No acute abnormality of the visualized skull or soft tissues. Vascular calcifications are seen compatible with atherosclerotic disease. Mild central and cortical cerebral atrophy. Moderate chronic deep white matter ischemic changes No acute intracranial abnormalities are noted. Ct Knee Right Wo Contrast    Result Date: 4/15/2021  EXAMINATION: CT OF THE RIGHT KNEE WITHOUT CONTRAST 4/12/2021 8:01 am TECHNIQUE: CT of the right knee was performed without the administration of intravenous contrast.  Multiplanar reformatted images are provided for review. Dose modulation, iterative reconstruction, and/or weight based adjustment of the mA/kV was utilized to reduce the radiation dose to as low as reasonably achievable. COMPARISON: Radiograph dated 04/11/2021. HISTORY ORDERING SYSTEM PROVIDED HISTORY: Pre-operative planning. TECHNOLOGIST PROVIDED HISTORY: Thin cuts and metal suppression CT, please. Pre-operative planning. Decision Support Exception->Emergency Medical Condition (MA) Reason for Exam: pre op planning FINDINGS: Bones: As seen on the radiograph dated 04/11/2021, there are components of a right total knee arthroplasty situated in gross anatomic alignment. Streak artifact emanating from the components of a right total knee arthroplasty partially limits evaluation. There is redemonstration of comminuted mildly displaced periprosthetic suprachondral fracture of the distal femur with approximately 2 cm of impaction and 1.2 cm posterior displacement of the main distal fracture fragment. The fracture plane extends to the level of the femoral component. The fracture plane extends proximally along the posterior cortex of the distal femoral diaphysis seen up to the level of the first axial image of the study. Soft Tissue: There is moderate diffuse soft tissue swelling about the knee. Diffuse vascular calcification changes are seen. There is moderate to severe atrophy of the musculature of the knee. Joint:  Within the limitations of the study, there appears to be a small knee joint effusion. 1.  Redemonstration of comminuted mildly displaced periprosthetic fracture of the distal femur as above. Nondisplaced fracture plane extends proximally to disrupt the posterior cortex of the distal femoral diaphysis. 2.  Small knee joint effusion. Nm Cardiac Stress Test Nuclear Imaging    Result Date: 4/12/2021  EXAMINATION: MYOCARDIAL PERFUSION IMAGING 4/12/2021 2:16 pm TECHNIQUE: For the rest study, 44 mCi of Tc 99 labeled sestamibi were injected. SPECT images were acquired. Under cardiology supervision, 0.4mg Luciana Zurita was infused. After pharmacologic stress, 11.6 mCi of Tc 99 labeled sestamibi were injected. SPECT images with ECG gating were acquired. COMPARISON: None Available. HISTORY: ORDERING SYSTEM PROVIDED HISTORY: Chest pain TECHNOLOGIST PROVIDED HISTORY: Reason for Exam: Chest pain Procedure Type->Rx CP Reason for Exam: chest pain ,  hypertension FINDINGS: Images interpreted utilizing [employer] system and [employer]. No significant fixed or reversible perfusion defect. The gated images show no wall motion abnormalities. Normal myocardial thickening. Perfusion scores are visually adjusted to account for artifact. Summed stress score:  0 Summed rest score:  0 Summed reversibility score:  0 Function: End diastolic volume:  85UE Left ventricular ejection fraction:  85% TID score:  0.93 (scores greater than 1.39 are considered elevated for Lexiscan stress with Tc99m) Notes concerning risk stratification: Risk stratification incorporates both clinical history and some testing results. Final risk determination is the responsibility of the ordering provider as other patient information and test results may increase or decrease the risk assessment reported for this examination.  Risk stratification criteria are adapted from \"Noninvasive Risk Stratification\" criteria from [facility]. Al, ACC/AATS/AHA/ASE/ASNC/SCAI/SCCT/STS 2017 Appropriate Use Criteria For Coronary Revascularization in Patients With Stable Ischemic Heart Disease [facility] Volume 69, Issue 17, May 2017 High risk (>3% annual death or MI) 1. Severe resting LV dysfunction (LVEF <35%) not readily explained by non coronary causes 2. Resting perfusion abnormalities greater than 10% of the myocardium in patients without prior history or evidence of MI 3. Stress-induced perfusion abnormalities encumbering greater than or equal to 10% myocardium or stress segmental scores indicating multiple vascular territories with abnormalities 4. Stress-induced LV dilatation (TID ratio greater than 1.19 for exercise and greater than 1.39 for regadenoson) Intermediate risk (1% to 3% annual death or MI) 1. Mild/moderate resting LV dysfunction (LVEF 35% to 49%) not readily explained by non coronary causes. 2. Resting perfusion abnormalities in 5%-9.9% of the myocardium in patients without a history or prior evidence of MI 3. Stress-induced perfusion abnormality encumbering 5%-9.9% of the myocardium or stress segmental scores indicating 1 vascular territory with abnormalities but without LV dilation 4. Small wall motion abnormality involving 1-2 segments and only 1 coronary bed. Low Risk (Less than 1% annual death or MI) 1. Normal or small myocardial perfusion defect at rest or with stress encumbering less than 5% of the myocardium. No stress-induced ischemia. No infarct. Normal LVEF.  Risk stratification: Low     Xr Hip 2-3 Vw W Pelvis Right    Result Date: 4/11/2021  EXAMINATION: ONE XRAY VIEW OF THE PELVIS AND TWO XRAY VIEWS RIGHT HIP; THREE XRAY VIEWS OF THE RIGHT KNEE; 2 XRAY VIEWS OF THE RIGHT FEMUR 4/11/2021 5:13 pm COMPARISON: Right knee of 20 January 2014 HISTORY: ORDERING SYSTEM PROVIDED HISTORY: fall TECHNOLOGIST PROVIDED HISTORY: fall FINDINGS: Pelvis: Osteopenia complicates assessment. Both femoral heads are well circumscribed and situated within the acetabula. Mild degenerative changes are present in the hips. No acute fracture or dislocation is evident. SI joints are symmetrical.  Significant atherosclerotic calcification of the iliac and femoral arteries is noted. Right knee: A right total knee arthroplasty is noted. The patient has a supracondylar impacted fracture of the distal femur with offset of 11 mm; distal fracture fragment is displaced dorsally. No joint dislocation. Significant atherosclerotic vascular calcification is present. Right femur: Mild degenerative changes are present in the hip. A right total knee arthroplasty is noted. The patient has is fracture of the supracondylar area of the femur. Distal fracture fragment is displaced dorsally by 11 mm in relation the proximal fracture fragment. Additionally, there appears be a faint vertical lucency extending cranially into the distal femoral shaft from the fracture site best seen on the frontal view. No dislocation. Pelvis: Para changes in the hips. No acute fracture or dislocation. Significant vascular calcification. Right knee: Total knee arthroplasty. Impacted fracture supracondylar area of the distal right femur with dorsal displacement of the distal fracture fragment by 11 mm. Right femur: Supracondylar fracture distal femur with dorsal displacement distal fracture fragment by 11 mm. However, there appears be a vertical component of a distal femoral fracture faintly visualized, nondisplaced and suggesting a hairline fracture. Assessment:      1. Supracondylar fracture of distal end of femur without intracondylar extension, sequela         Plan:     Reviewed x-rays with the patient today in the office. Discussed with the patient that his hardware is stable at this time.  I advised the patient to continue to work with formal therapy aggressively on extension of the

## 2022-02-19 ENCOUNTER — HOSPITAL ENCOUNTER (OUTPATIENT)
Age: 72
Setting detail: SPECIMEN
Discharge: HOME | End: 2022-02-19

## 2022-02-19 LAB
ALBUMIN SERPL-MCNC: 3.3 G/DL (ref 3.5–5.2)
ALBUMIN/GLOBULIN RATIO: 1.2 (ref 1–2.5)
ALP BLD-CCNC: 173 U/L (ref 40–129)
ALT SERPL-CCNC: 14 U/L (ref 5–41)
ANION GAP SERPL CALCULATED.3IONS-SCNC: 12 MMOL/L (ref 9–17)
AST SERPL-CCNC: 32 U/L
BILIRUB SERPL-MCNC: 0.33 MG/DL (ref 0.3–1.2)
BILIRUBIN DIRECT: 0.17 MG/DL
BILIRUBIN, INDIRECT: 0.16 MG/DL (ref 0–1)
BUN BLDV-MCNC: 7 MG/DL (ref 8–23)
BUN/CREAT BLD: ABNORMAL (ref 9–20)
CALCIUM SERPL-MCNC: 9 MG/DL (ref 8.6–10.4)
CHLORIDE BLD-SCNC: 100 MMOL/L (ref 98–107)
CHOLESTEROL, FASTING: 168 MG/DL
CHOLESTEROL/HDL RATIO: 3.7
CO2: 23 MMOL/L (ref 20–31)
CREAT SERPL-MCNC: 0.78 MG/DL (ref 0.7–1.2)
GFR AFRICAN AMERICAN: >60 ML/MIN
GFR NON-AFRICAN AMERICAN: >60 ML/MIN
GFR SERPL CREATININE-BSD FRML MDRD: ABNORMAL ML/MIN/{1.73_M2}
GFR SERPL CREATININE-BSD FRML MDRD: ABNORMAL ML/MIN/{1.73_M2}
GLOBULIN: ABNORMAL G/DL (ref 1.5–3.8)
GLUCOSE BLD-MCNC: 95 MG/DL (ref 70–99)
HCT VFR BLD CALC: 31.8 % (ref 40.7–50.3)
HDLC SERPL-MCNC: 45 MG/DL
HEMOGLOBIN: 9.8 G/DL (ref 13–17)
LDL CHOLESTEROL: 97 MG/DL (ref 0–130)
MCH RBC QN AUTO: 26.7 PG (ref 25.2–33.5)
MCHC RBC AUTO-ENTMCNC: 30.8 G/DL (ref 28.4–34.8)
MCV RBC AUTO: 86.6 FL (ref 82.6–102.9)
NRBC AUTOMATED: 0 PER 100 WBC
PDW BLD-RTO: 18.6 % (ref 11.8–14.4)
PLATELET # BLD: 317 K/UL (ref 138–453)
PMV BLD AUTO: 9.5 FL (ref 8.1–13.5)
POTASSIUM SERPL-SCNC: 6 MMOL/L (ref 3.7–5.3)
RBC # BLD: 3.67 M/UL (ref 4.21–5.77)
SODIUM BLD-SCNC: 135 MMOL/L (ref 135–144)
TOTAL PROTEIN: 6.1 G/DL (ref 6.4–8.3)
TRIGLYCERIDE, FASTING: 128 MG/DL
TSH SERPL DL<=0.05 MIU/L-ACNC: 5.18 MIU/L (ref 0.3–5)
VITAMIN B-12: 626 PG/ML (ref 232–1245)
VITAMIN D 25-HYDROXY: 106.6 NG/ML (ref 30–100)
VLDLC SERPL CALC-MCNC: NORMAL MG/DL (ref 1–30)
WBC # BLD: 8.5 K/UL (ref 3.5–11.3)

## 2022-02-20 LAB
ESTIMATED AVERAGE GLUCOSE: 126 MG/DL
HBA1C MFR BLD: 6 % (ref 4–6)

## 2022-03-05 ENCOUNTER — APPOINTMENT (OUTPATIENT)
Dept: CT IMAGING | Age: 72
DRG: 480 | End: 2022-03-05
Payer: COMMERCIAL

## 2022-03-05 ENCOUNTER — HOSPITAL ENCOUNTER (INPATIENT)
Age: 72
LOS: 9 days | Discharge: NURSING, REHAB OR LONG-TERM CARE | DRG: 480 | End: 2022-03-14
Attending: EMERGENCY MEDICINE | Admitting: INTERNAL MEDICINE
Payer: COMMERCIAL

## 2022-03-05 ENCOUNTER — APPOINTMENT (OUTPATIENT)
Dept: GENERAL RADIOLOGY | Age: 72
DRG: 480 | End: 2022-03-05
Payer: COMMERCIAL

## 2022-03-05 DIAGNOSIS — S72.141A CLOSED INTERTROCHANTERIC FRACTURE OF HIP, RIGHT, INITIAL ENCOUNTER (HCC): ICD-10-CM

## 2022-03-05 DIAGNOSIS — E87.1 HYPONATREMIA: Primary | ICD-10-CM

## 2022-03-05 DIAGNOSIS — R53.1 GENERALIZED WEAKNESS: ICD-10-CM

## 2022-03-05 DIAGNOSIS — G47.429 NARCOLEPSY DUE TO UNDERLYING CONDITION WITHOUT CATAPLEXY: ICD-10-CM

## 2022-03-05 DIAGNOSIS — R09.02 OXYGEN DESATURATION: ICD-10-CM

## 2022-03-05 DIAGNOSIS — N20.0 NEPHROLITHIASIS: ICD-10-CM

## 2022-03-05 DIAGNOSIS — F41.9 ANXIETY: ICD-10-CM

## 2022-03-05 LAB
-: ABNORMAL
ABSOLUTE EOS #: 0.29 K/UL (ref 0–0.44)
ABSOLUTE IMMATURE GRANULOCYTE: 0.13 K/UL (ref 0–0.3)
ABSOLUTE LYMPH #: 1.88 K/UL (ref 1.1–3.7)
ABSOLUTE MONO #: 1.4 K/UL (ref 0.1–1.2)
ALBUMIN SERPL-MCNC: 3.4 G/DL (ref 3.5–5.2)
ALBUMIN/GLOBULIN RATIO: 1 (ref 1–2.5)
ALP BLD-CCNC: 200 U/L (ref 40–129)
ALT SERPL-CCNC: 15 U/L (ref 5–41)
ANION GAP SERPL CALCULATED.3IONS-SCNC: 12 MMOL/L (ref 9–17)
ANION GAP SERPL CALCULATED.3IONS-SCNC: 15 MMOL/L (ref 9–17)
AST SERPL-CCNC: 32 U/L
BASOPHILS # BLD: 0 % (ref 0–2)
BASOPHILS ABSOLUTE: 0.05 K/UL (ref 0–0.2)
BILIRUB SERPL-MCNC: 0.28 MG/DL (ref 0.3–1.2)
BILIRUBIN URINE: NEGATIVE
BUN BLDV-MCNC: 4 MG/DL (ref 8–23)
BUN BLDV-MCNC: 5 MG/DL (ref 8–23)
CALCIUM SERPL-MCNC: 8.8 MG/DL (ref 8.6–10.4)
CALCIUM SERPL-MCNC: 8.9 MG/DL (ref 8.6–10.4)
CHLORIDE BLD-SCNC: 96 MMOL/L (ref 98–107)
CHLORIDE BLD-SCNC: 97 MMOL/L (ref 98–107)
CO2: 16 MMOL/L (ref 20–31)
CO2: 19 MMOL/L (ref 20–31)
COLOR: YELLOW
CREAT SERPL-MCNC: 0.79 MG/DL (ref 0.7–1.2)
CREAT SERPL-MCNC: 0.8 MG/DL (ref 0.7–1.2)
EOSINOPHILS RELATIVE PERCENT: 2 % (ref 1–4)
EPITHELIAL CELLS UA: ABNORMAL /HPF (ref 0–5)
GFR AFRICAN AMERICAN: >60 ML/MIN
GFR AFRICAN AMERICAN: >60 ML/MIN
GFR NON-AFRICAN AMERICAN: >60 ML/MIN
GFR NON-AFRICAN AMERICAN: >60 ML/MIN
GFR SERPL CREATININE-BSD FRML MDRD: ABNORMAL ML/MIN/{1.73_M2}
GFR SERPL CREATININE-BSD FRML MDRD: ABNORMAL ML/MIN/{1.73_M2}
GLUCOSE BLD-MCNC: 119 MG/DL (ref 70–99)
GLUCOSE BLD-MCNC: 130 MG/DL (ref 70–99)
GLUCOSE URINE: NEGATIVE
HCT VFR BLD CALC: 31.7 % (ref 40.7–50.3)
HEMOGLOBIN: 9.8 G/DL (ref 13–17)
IMMATURE GRANULOCYTES: 1 %
KETONES, URINE: NEGATIVE
LEUKOCYTE ESTERASE, URINE: ABNORMAL
LIPASE: 13 U/L (ref 13–60)
LYMPHOCYTES # BLD: 15 % (ref 24–43)
MCH RBC QN AUTO: 26.2 PG (ref 25.2–33.5)
MCHC RBC AUTO-ENTMCNC: 30.9 G/DL (ref 28.4–34.8)
MCV RBC AUTO: 84.8 FL (ref 82.6–102.9)
MONOCYTES # BLD: 11 % (ref 3–12)
NITRITE, URINE: NEGATIVE
NRBC AUTOMATED: 0 PER 100 WBC
PDW BLD-RTO: 18.9 % (ref 11.8–14.4)
PH UA: 5 (ref 5–8)
PLATELET # BLD: 329 K/UL (ref 138–453)
PMV BLD AUTO: 8.7 FL (ref 8.1–13.5)
POTASSIUM SERPL-SCNC: 4.7 MMOL/L (ref 3.7–5.3)
POTASSIUM SERPL-SCNC: 4.7 MMOL/L (ref 3.7–5.3)
PRO-BNP: 454 PG/ML
PROTEIN UA: NEGATIVE
RBC # BLD: 3.74 M/UL (ref 4.21–5.77)
RBC # BLD: ABNORMAL 10*6/UL
RBC UA: ABNORMAL /HPF (ref 0–4)
SARS-COV-2, RAPID: NOT DETECTED
SEG NEUTROPHILS: 71 % (ref 36–65)
SEGMENTED NEUTROPHILS ABSOLUTE COUNT: 8.51 K/UL (ref 1.5–8.1)
SODIUM BLD-SCNC: 127 MMOL/L (ref 135–144)
SODIUM BLD-SCNC: 128 MMOL/L (ref 135–144)
SPECIFIC GRAVITY UA: 1.04 (ref 1–1.03)
SPECIMEN DESCRIPTION: NORMAL
TOTAL PROTEIN: 6.8 G/DL (ref 6.4–8.3)
TROPONIN, HIGH SENSITIVITY: 6 NG/L (ref 0–22)
TURBIDITY: CLEAR
URINE HGB: NEGATIVE
UROBILINOGEN, URINE: NORMAL
WBC # BLD: 12.3 K/UL (ref 3.5–11.3)
WBC UA: ABNORMAL /HPF (ref 0–5)

## 2022-03-05 PROCEDURE — 36415 COLL VENOUS BLD VENIPUNCTURE: CPT

## 2022-03-05 PROCEDURE — 6370000000 HC RX 637 (ALT 250 FOR IP): Performed by: STUDENT IN AN ORGANIZED HEALTH CARE EDUCATION/TRAINING PROGRAM

## 2022-03-05 PROCEDURE — 1200000000 HC SEMI PRIVATE

## 2022-03-05 PROCEDURE — 84439 ASSAY OF FREE THYROXINE: CPT

## 2022-03-05 PROCEDURE — 80048 BASIC METABOLIC PNL TOTAL CA: CPT

## 2022-03-05 PROCEDURE — 6360000004 HC RX CONTRAST MEDICATION: Performed by: STUDENT IN AN ORGANIZED HEALTH CARE EDUCATION/TRAINING PROGRAM

## 2022-03-05 PROCEDURE — 80053 COMPREHEN METABOLIC PANEL: CPT

## 2022-03-05 PROCEDURE — 85025 COMPLETE CBC W/AUTO DIFF WBC: CPT

## 2022-03-05 PROCEDURE — 2580000003 HC RX 258: Performed by: STUDENT IN AN ORGANIZED HEALTH CARE EDUCATION/TRAINING PROGRAM

## 2022-03-05 PROCEDURE — 71260 CT THORAX DX C+: CPT

## 2022-03-05 PROCEDURE — 87635 SARS-COV-2 COVID-19 AMP PRB: CPT

## 2022-03-05 PROCEDURE — 94761 N-INVAS EAR/PLS OXIMETRY MLT: CPT

## 2022-03-05 PROCEDURE — 84484 ASSAY OF TROPONIN QUANT: CPT

## 2022-03-05 PROCEDURE — 93005 ELECTROCARDIOGRAM TRACING: CPT | Performed by: STUDENT IN AN ORGANIZED HEALTH CARE EDUCATION/TRAINING PROGRAM

## 2022-03-05 PROCEDURE — 83690 ASSAY OF LIPASE: CPT

## 2022-03-05 PROCEDURE — 99285 EMERGENCY DEPT VISIT HI MDM: CPT

## 2022-03-05 PROCEDURE — 99223 1ST HOSP IP/OBS HIGH 75: CPT | Performed by: INTERNAL MEDICINE

## 2022-03-05 PROCEDURE — 71045 X-RAY EXAM CHEST 1 VIEW: CPT

## 2022-03-05 PROCEDURE — 81001 URINALYSIS AUTO W/SCOPE: CPT

## 2022-03-05 PROCEDURE — 83880 ASSAY OF NATRIURETIC PEPTIDE: CPT

## 2022-03-05 RX ORDER — ONDANSETRON 4 MG/1
4 TABLET, ORALLY DISINTEGRATING ORAL EVERY 8 HOURS PRN
Status: DISCONTINUED | OUTPATIENT
Start: 2022-03-05 | End: 2022-03-14 | Stop reason: HOSPADM

## 2022-03-05 RX ORDER — M-VIT,TX,IRON,MINS/CALC/FOLIC 27MG-0.4MG
1 TABLET ORAL DAILY
Status: DISCONTINUED | OUTPATIENT
Start: 2022-03-06 | End: 2022-03-14 | Stop reason: HOSPADM

## 2022-03-05 RX ORDER — LEVOTHYROXINE SODIUM 0.05 MG/1
50 TABLET ORAL DAILY
Status: DISCONTINUED | OUTPATIENT
Start: 2022-03-06 | End: 2022-03-14 | Stop reason: HOSPADM

## 2022-03-05 RX ORDER — HYDROCODONE BITARTRATE AND ACETAMINOPHEN 5; 325 MG/1; MG/1
1 TABLET ORAL ONCE
Status: COMPLETED | OUTPATIENT
Start: 2022-03-05 | End: 2022-03-05

## 2022-03-05 RX ORDER — CARVEDILOL 3.12 MG/1
3.12 TABLET ORAL 2 TIMES DAILY WITH MEALS
Status: DISCONTINUED | OUTPATIENT
Start: 2022-03-06 | End: 2022-03-13

## 2022-03-05 RX ORDER — LANOLIN ALCOHOL/MO/W.PET/CERES
100 CREAM (GRAM) TOPICAL DAILY
Status: DISCONTINUED | OUTPATIENT
Start: 2022-03-06 | End: 2022-03-14 | Stop reason: HOSPADM

## 2022-03-05 RX ORDER — ATORVASTATIN CALCIUM 40 MG/1
40 TABLET, FILM COATED ORAL NIGHTLY
Status: DISCONTINUED | OUTPATIENT
Start: 2022-03-05 | End: 2022-03-14 | Stop reason: HOSPADM

## 2022-03-05 RX ORDER — SODIUM CHLORIDE 9 MG/ML
25 INJECTION, SOLUTION INTRAVENOUS PRN
Status: DISCONTINUED | OUTPATIENT
Start: 2022-03-05 | End: 2022-03-14 | Stop reason: HOSPADM

## 2022-03-05 RX ORDER — ONDANSETRON 2 MG/ML
4 INJECTION INTRAMUSCULAR; INTRAVENOUS EVERY 6 HOURS PRN
Status: DISCONTINUED | OUTPATIENT
Start: 2022-03-05 | End: 2022-03-14 | Stop reason: HOSPADM

## 2022-03-05 RX ORDER — ACETAMINOPHEN 650 MG/1
650 SUPPOSITORY RECTAL EVERY 6 HOURS PRN
Status: DISCONTINUED | OUTPATIENT
Start: 2022-03-05 | End: 2022-03-14 | Stop reason: HOSPADM

## 2022-03-05 RX ORDER — ALBUTEROL SULFATE 90 UG/1
2 AEROSOL, METERED RESPIRATORY (INHALATION) EVERY 4 HOURS PRN
Status: DISCONTINUED | OUTPATIENT
Start: 2022-03-05 | End: 2022-03-14 | Stop reason: HOSPADM

## 2022-03-05 RX ORDER — POTASSIUM CHLORIDE 7.45 MG/ML
10 INJECTION INTRAVENOUS PRN
Status: DISCONTINUED | OUTPATIENT
Start: 2022-03-05 | End: 2022-03-14 | Stop reason: HOSPADM

## 2022-03-05 RX ORDER — CLOPIDOGREL BISULFATE 75 MG/1
75 TABLET ORAL DAILY
Status: DISCONTINUED | OUTPATIENT
Start: 2022-03-06 | End: 2022-03-14 | Stop reason: HOSPADM

## 2022-03-05 RX ORDER — 0.9 % SODIUM CHLORIDE 0.9 %
1000 INTRAVENOUS SOLUTION INTRAVENOUS ONCE
Status: COMPLETED | OUTPATIENT
Start: 2022-03-05 | End: 2022-03-05

## 2022-03-05 RX ORDER — ACETAMINOPHEN 500 MG
500 TABLET ORAL 4 TIMES DAILY PRN
Status: DISCONTINUED | OUTPATIENT
Start: 2022-03-05 | End: 2022-03-05

## 2022-03-05 RX ORDER — SODIUM CHLORIDE 0.9 % (FLUSH) 0.9 %
10 SYRINGE (ML) INJECTION PRN
Status: DISCONTINUED | OUTPATIENT
Start: 2022-03-05 | End: 2022-03-14 | Stop reason: HOSPADM

## 2022-03-05 RX ORDER — POLYETHYLENE GLYCOL 3350 17 G/17G
17 POWDER, FOR SOLUTION ORAL DAILY PRN
Status: DISCONTINUED | OUTPATIENT
Start: 2022-03-05 | End: 2022-03-14 | Stop reason: HOSPADM

## 2022-03-05 RX ORDER — MAGNESIUM SULFATE 1 G/100ML
1000 INJECTION INTRAVENOUS PRN
Status: DISCONTINUED | OUTPATIENT
Start: 2022-03-05 | End: 2022-03-14 | Stop reason: HOSPADM

## 2022-03-05 RX ORDER — ACETAMINOPHEN 325 MG/1
650 TABLET ORAL EVERY 6 HOURS PRN
Status: DISCONTINUED | OUTPATIENT
Start: 2022-03-05 | End: 2022-03-14 | Stop reason: HOSPADM

## 2022-03-05 RX ORDER — FUROSEMIDE 20 MG/1
20 TABLET ORAL DAILY
Status: DISCONTINUED | OUTPATIENT
Start: 2022-03-06 | End: 2022-03-06

## 2022-03-05 RX ORDER — ARMODAFINIL 150 MG/1
150 TABLET ORAL DAILY
Status: DISCONTINUED | OUTPATIENT
Start: 2022-03-06 | End: 2022-03-14 | Stop reason: HOSPADM

## 2022-03-05 RX ORDER — QUETIAPINE FUMARATE 25 MG/1
25 TABLET, FILM COATED ORAL NIGHTLY
Status: DISCONTINUED | OUTPATIENT
Start: 2022-03-05 | End: 2022-03-14 | Stop reason: HOSPADM

## 2022-03-05 RX ORDER — SODIUM CHLORIDE 0.9 % (FLUSH) 0.9 %
5-40 SYRINGE (ML) INJECTION EVERY 12 HOURS SCHEDULED
Status: DISCONTINUED | OUTPATIENT
Start: 2022-03-05 | End: 2022-03-14 | Stop reason: HOSPADM

## 2022-03-05 RX ORDER — SODIUM CHLORIDE 9 MG/ML
INJECTION, SOLUTION INTRAVENOUS CONTINUOUS
Status: DISCONTINUED | OUTPATIENT
Start: 2022-03-05 | End: 2022-03-09

## 2022-03-05 RX ORDER — POTASSIUM CHLORIDE 20 MEQ/1
40 TABLET, EXTENDED RELEASE ORAL PRN
Status: DISCONTINUED | OUTPATIENT
Start: 2022-03-05 | End: 2022-03-14 | Stop reason: HOSPADM

## 2022-03-05 RX ADMIN — SODIUM CHLORIDE 1000 ML: 9 INJECTION, SOLUTION INTRAVENOUS at 18:55

## 2022-03-05 RX ADMIN — HYDROCODONE BITARTRATE AND ACETAMINOPHEN 1 TABLET: 5; 325 TABLET ORAL at 18:56

## 2022-03-05 RX ADMIN — IOPAMIDOL 75 ML: 755 INJECTION, SOLUTION INTRAVENOUS at 18:06

## 2022-03-05 ASSESSMENT — ENCOUNTER SYMPTOMS
DIARRHEA: 0
SORE THROAT: 0
ABDOMINAL PAIN: 0
VOMITING: 0
RHINORRHEA: 0
NAUSEA: 0
BACK PAIN: 0
SHORTNESS OF BREATH: 0
COUGH: 0

## 2022-03-05 ASSESSMENT — PAIN DESCRIPTION - PAIN TYPE
TYPE: ACUTE PAIN
TYPE: ACUTE PAIN

## 2022-03-05 ASSESSMENT — PAIN - FUNCTIONAL ASSESSMENT: PAIN_FUNCTIONAL_ASSESSMENT: 0-10

## 2022-03-05 ASSESSMENT — PAIN SCALES - GENERAL
PAINLEVEL_OUTOF10: 8
PAINLEVEL_OUTOF10: 5
PAINLEVEL_OUTOF10: 7

## 2022-03-05 ASSESSMENT — PAIN DESCRIPTION - LOCATION
LOCATION: KNEE
LOCATION: BUTTOCKS;KNEE

## 2022-03-05 NOTE — ED NOTES
Patient returned from CT, patient resting comfortably with no complaints of pain or discomfort. Will continue to monitor.       Earl MoyExcela [facility]  03/05/22 0795

## 2022-03-05 NOTE — ED NOTES
The following labs labeled with pt sticker and tubed to lab:     [] Blue     [] Lavender   [] on ice  [] Green/yellow  [] Green/black [] on ice  [] Yellow  [] Red  [] Pink      [x] COVID-19 swab    [x] Rapid  [] PCR  [] Flu swab  [] Peds Viral Panel     [] Urine Sample  [] Pelvic Cultures  [] Blood Cultures            Junior Peoples RN  03/05/22 4522

## 2022-03-05 NOTE — ED PROVIDER NOTES
[facility] HANDOFF       Handoff taken on the following patient from prior Attending Physician:Dr. Gerri Richter  Pt Name: Herberth Murray  PCP:  Blanca Rouse DO    Attestation  I was available and discussed any additional care issues that arose and coordinated the management plans with the resident(s) caring for the patient during my duty period. Any areas of disagreement with resident's documentation of care or procedures are noted on the chart. I was personally present for the key portions of any/all procedures during my duty period. I have documented in the chart those procedures where I was not present during the key portions. CHIEF COMPLAINT       Chief Complaint   Patient presents with    Fatigue         CURRENT MEDICATIONS     Previous Medications  Previous Medications    ACETAMINOPHEN (TYLENOL) 500 MG TABLET    Take 1 tablet by mouth 4 times daily as needed for Pain    ARMODAFINIL (NUVIGIL) 150 MG TABS TABLET    Take 1 tablet by mouth daily    ATORVASTATIN (LIPITOR) 40 MG TABLET    Take 1 tablet by mouth nightly    CARVEDILOL (COREG) 3.125 MG TABLET    TAKE 1 TAB BY MOUTH TWICE A DAY WITH MEALS    CHLORHEXIDINE (PERIDEX) 0.12 % SOLUTION    Take 15 mLs by mouth 2 times daily. CLOPIDOGREL (PLAVIX) 75 MG TABLET    Take 1 tablet by mouth daily    DIAPERS & SUPPLIES [facility]    Adult diapers dispense quantity allowed by insurance    DIAPERS & SUPPLIES MISC    Wipes  dispense quantity allowed by insurance    DOCUSATE SODIUM (COLACE) 100 MG CAPSULE    Take 100 mg by mouth 2 times daily. FOLIC ACID (FOLVITE) 1 MG TABLET    Take 1 mg by mouth daily.       FUROSEMIDE (LASIX) 20 MG TABLET    Take 1 tablet by mouth daily    GLUCOSAMINE-CHONDROITIN -400 MG TABLET    TAKE 1 TAB BY MOUTH TWICE A DAY    LEVOTHYROXINE (SYNTHROID) 25 MCG TABLET    Take 1 tablet by mouth Daily    LORATADINE (CLARITIN) 10 MG TABLET    TAKE 1 TAB BY MOUTH ONCE A DAY    MAGNESIUM OXIDE 250 MG TABS    Take 1 tablet by mouth daily    MECLIZINE (ANTIVERT) 25 MG TABLET    Take 0.5 tablets by mouth 3 times daily as needed    MELATONIN 3 MG TABS TABLET    Take 3 mg by mouth daily    MOMETASONE (NASONEX) 50 MCG/ACT NASAL SPRAY    2 sprays by Nasal route daily. MULTIPLE VITAMIN (MULTIVITAMIN PO)    Take  by mouth. MULTIPLE VITAMINS-MINERALS (CERTAVITE SENIOR/ANTIOXIDANT) TABS    TAKE 1 TAB BY MOUTH ONCE A DAY    PROAIR  (90 BASE) MCG/ACT INHALER    inhale 2 puffs every 4 to 6 hours if needed    QUETIAPINE (SEROQUEL) 25 MG TABLET    Take 1 tablet by mouth nightly    SALINE MIST SPRAY NA    by Nasal route. THIAMINE 100 MG TABLET    Take 1 tablet by mouth daily       Encounter Medications  No orders of the defined types were placed in this encounter. ALLERGIES     is allergic to motrin [ibuprofen micronized].       RECENT VITALS:   Temp: 98.3 °F (36.8 °C),  Pulse: 88, Resp: 22, BP: (!) 161/74    RADIOLOGY:   XR CHEST PORTABLE   Final Result   Marginal inspiration, without evidence of acute cardiopulmonary disease         CT CHEST PULMONARY EMBOLISM W CONTRAST    (Results Pending)       LABS:  Labs Reviewed   CBC WITH AUTO DIFFERENTIAL - Abnormal; Notable for the following components:       Result Value    WBC 12.3 (*)     RBC 3.74 (*)     Hemoglobin 9.8 (*)     Hematocrit 31.7 (*)     RDW 18.9 (*)     Seg Neutrophils 71 (*)     Lymphocytes 15 (*)     Immature Granulocytes 1 (*)     Segs Absolute 8.51 (*)     Absolute Mono # 1.40 (*)     All other components within normal limits   COMPREHENSIVE METABOLIC PANEL - Abnormal; Notable for the following components:    Glucose 119 (*)     BUN 4 (*)     Sodium 128 (*)     Chloride 97 (*)     CO2 16 (*)     Alkaline Phosphatase 200 (*)     Total Bilirubin 0.28 (*)     Albumin 3.4 (*)     All other components within normal limits   BRAIN NATRIURETIC PEPTIDE - Abnormal; Notable for the following components:    Pro- (*)     All other components within normal limits   COVID-19, RAPID   LIPASE   TROPONIN   URINALYSIS WITH MICROSCOPIC           PLAN/ TASKS OUTSTANDING     Pt with Fatigue, R knee pain, low spo2, requiring o2. Admit after CT PE.        (Please note that portions of this note were completed with a voice recognition program.  Efforts were made to edit the dictations but occasionally words are mis-transcribed.)    Joseluis Cartwright MD, MD,   Attending Emergency Physician       Joseluis Cartwright MD  03/05/22 [phone]

## 2022-03-05 NOTE — ED NOTES
Pt. To ER room 27 via stretcher with EMS from home  Pt. Presents following falling out of bed onto his bottom x2; pt states he is wheelchair bound and usually transfers himself without difficulty  Pt. States he has been feeling weak; has a oxygen saturation of 86% on arrival to ED, pt placed on [email or web address] maintaining well with a saturation of 97% ; pt states he has a pulse ox at home that reads 86-88%  Pt. States his insurance will not cover home help for him and his girlfriend is an LPN that helps him when she is not working  Per EMS, pt was in [place] living conditions and deemed his home unsafe   Pt. Arrives A/Ox4, RR even and unlabored, NAD  Pt. C/o buttocks and right knee pain  Pt.  Vitals stable  Awaiting orders  Will continue to monitor and reassess      Debora Medeiros RN  03/05/22 2 Holly Del Toro RN  03/05/22 0786

## 2022-03-05 NOTE — ED PROVIDER NOTES
[facility] ED  Emergency Department Encounter  EmergencyMedicine Resident     Pt Name:Cristino Modi  MRN: [id number]  Birthdate 1950  Date of evaluation: 3/5/22  PCP:  Kwame Carpenter DO    This patient was evaluated in the Emergency Department for symptoms described in the history of present illness. The patient was evaluated in the context of the global COVID-19 pandemic, which necessitated consideration that the patient might be at risk for infection with the SARS-CoV-2 virus that causes COVID-19. Institutional protocols and algorithms that pertain to the evaluation of patients at risk for COVID-19 are in a state of rapid change based on information released by regulatory bodies including the CDC and federal and state organizations. These policies and algorithms were followed during the patient's care in the ED. CHIEF COMPLAINT       Chief Complaint   Patient presents with    Fatigue       HISTORY OF PRESENT ILLNESS  (Location/Symptom, Timing/Onset, Context/Setting, Quality, Duration, Modifying Factors, Severity.)      Sis Jones is a 81 y.o. male who presents as a wellness check. Per EMS, they were called as patient was suspected to be living in an unsafe conditions. Patient states he has been having generalized weakness and fatigue for the past several days. He had a right knee surgery last month, and states has not been very mobile since that time. He uses a wheelchair at home. He does endorse that he has had several falls onto his backside recently, denies head trauma or LOC. Patient states he has a portable pulse oximeter at home, and that his pulse ox at home has been between 85 and 87% on room air. He was not evaluated by a physician for this, as he stated he believed this was normal.  Patient is on anticoagulation.   He denies chest pain, shortness of breath, headache, lightheadedness, changes in vision, syncope, abdominal pain, nausea, vomiting, diarrhea, fevers, chills, dysuria, hematuria, numbness, tingling, weakness. PAST MEDICAL / SURGICAL / SOCIAL / FAMILY HISTORY      has a past medical history of ADHD (attention deficit hyperactivity disorder), ADHD (attention deficit hyperactivity disorder), Atypical chest pain, Chronic bronchitis ([facility] [address] Hypertension, Hyponatremia, Meniere's disease, Narcolepsy, Nasal fracture, PND (post-nasal drip), SOB (shortness of breath), Tibia fracture, and Transaminasemia. has a past surgical history that includes cyst removal; Ankle surgery; knee surgery (Right); Coronary angioplasty with stent (N/A, 10/10/2015); Femur Surgery (Right, 04/13/2021); and Knee Arthroplasty (Right, 4/13/2021). Social History     Socioeconomic History    Marital status:      Spouse name: Not on file    Number of children: Not on file    Years of education: Not on file    Highest education level: Not on file   Occupational History     Employer: NOT EMPLOYED   Tobacco Use    Smoking status: Current Every Day Smoker     Packs/day: 1.00     Years: 38.00     Pack years: 38.00     Types: Cigarettes    Smokeless tobacco: Never Used    Tobacco comment: e-cigs   Vaping Use    Vaping Use: Every day    Substances: Always   Substance and Sexual Activity    Alcohol use: Yes     Alcohol/week: 16.0 standard drinks     Types: 16 Cans of beer per week     Comment: daily    Drug use: No    Sexual activity: Not Currently   Other Topics Concern    Not on file   Social History Narrative    Not on file     Social Determinants of Health     Financial Resource Strain:     Difficulty of Paying Living Expenses: Not on file   Food Insecurity:     Worried About Running Out of Food in the Last Year: Not on file    Diane of Food in the Last Year: Not on file   Transportation Needs:     Lack of Transportation (Medical): Not on file    Lack of Transportation (Non-Medical):  Not on file   Physical Activity:     Days of Exercise per Week: Not on file    Minutes of Exercise per Session: Not on file   Stress:     Feeling of Stress : Not on file   Social Connections:     Frequency of Communication with Friends and Family: Not on file    Frequency of Social Gatherings with Friends and Family: Not on file    Attends Jehovah's witness Services: Not on file    Active Member of [address] [place] [address] or Organizations: Not on file    Attends Club or Organization Meetings: Not on file    Marital Status: Not on file   Intimate Partner Violence:     Fear of Current or Ex-Partner: Not on file    Emotionally Abused: Not on file    Physically Abused: Not on file    Sexually Abused: Not on file   Housing Stability:     Unable to Pay for Housing in the Last Year: Not on file    Number of [place] in the Last Year: Not on file    Unstable Housing in the Last Year: Not on file       Family History   Problem Relation Age of Onset    Heart Disease Mother         heart attack    Heart Disease Father        Allergies:  Motrin [ibuprofen micronized]    Home Medications:  Prior to Admission medications    Medication Sig Start Date End Date Taking?  Authorizing Provider   acetaminophen (TYLENOL) 500 MG tablet Take 1 tablet by mouth 4 times daily as needed for Pain 4/15/21   Lee Bautista,    Multiple Vitamins-Minerals (CERTAVITE SENIOR/ANTIOXIDANT) TABS TAKE 1 TAB BY MOUTH ONCE A DAY 6/22/16   Iris Lynn PA-C   GLUCOSAMINE-CHONDROITIN -400 MG tablet TAKE 1 TAB BY MOUTH TWICE A DAY 6/7/16   Iris Lynn PA-C   carvedilol (COREG) 3.125 MG tablet TAKE 1 TAB BY MOUTH TWICE A DAY WITH MEALS 6/7/16   Iris Lynn PA-C   Armodafinil (NUVIGIL) 150 MG TABS tablet Take 1 tablet by mouth daily 4/26/16   Darryl Blake DO   loratadine (CLARITIN) 10 MG tablet TAKE 1 TAB BY MOUTH ONCE A DAY 3/24/16   Darryl Blake DO   levothyroxine (SYNTHROID) 25 MCG tablet Take 1 tablet by mouth Daily  Patient taking differently: Take 50 mcg by mouth Daily  3/24/16   Darryl Blake DO QUEtiapine (SEROQUEL) 25 MG tablet Take 1 tablet by mouth nightly 3/24/16   Deniz Dejesus, DO   melatonin 3 MG TABS tablet Take 3 mg by mouth daily    Historical Provider, MD   atorvastatin (LIPITOR) 40 MG tablet Take 1 tablet by mouth nightly 11/10/15   Kaur Still MD   clopidogrel (PLAVIX) 75 MG tablet Take 1 tablet by mouth daily 11/10/15   Kaur Still MD   thiamine 100 MG tablet Take 1 tablet by mouth daily 11/10/15   Kaur Still MD   Magnesium Oxide 250 MG TABS Take 1 tablet by mouth daily  Patient taking differently: Take 500 mg by mouth daily  11/10/15   Kaur Still MD   meclizine (ANTIVERT) 25 MG tablet Take 0.5 tablets by mouth 3 times daily as needed 11/10/15   Kaur Still MD   furosemide (LASIX) 20 MG tablet Take 1 tablet by mouth daily 11/10/15   Kaur Still MD   [address] Adult diapers dispense quantity allowed by insurance 3/31/15   Gracie Lynn PA-C   Diapers & Supplies MISC Wipes  dispense quantity allowed by insurance 3/31/15   Iris Lynn PA-C   PROAIR  (90 BASE) MCG/ACT inhaler inhale 2 puffs every 4 to 6 hours if needed 11/29/14   Iris Lynn PA-C   mometasone (NASONEX) 50 MCG/ACT nasal spray 2 sprays by Nasal route daily. 12/23/13   Gayathri Suh MD   docusate sodium (COLACE) 100 MG capsule Take 100 mg by mouth 2 times daily. Historical Provider, MD   folic acid (FOLVITE) 1 MG tablet Take 1 mg by mouth daily. Historical Provider, MD   chlorhexidine (PERIDEX) 0.12 % solution Take 15 mLs by mouth 2 times daily. Historical Provider, MD   SALINE MIST SPRAY NA by Nasal route. Historical Provider, MD   Multiple Vitamin (MULTIVITAMIN PO) Take  by mouth. Historical Provider, MD       REVIEW OF SYSTEMS    (2-9 systems for level 4, 10 or more for level 5)      Review of Systems   Constitutional: Positive for fatigue. Negative for activity change, appetite change, chills and fever.    HENT: Negative for congestion, rhinorrhea and sore throat. Eyes: Negative for visual disturbance. Respiratory: Negative for cough and shortness of breath. Cardiovascular: Positive for leg swelling. Negative for chest pain and palpitations. Gastrointestinal: Negative for abdominal pain, diarrhea, nausea and vomiting. Genitourinary: Negative for dysuria. Musculoskeletal: Negative for arthralgias, back pain, gait problem and myalgias. Skin: Negative for pallor and rash. Neurological: Positive for weakness. Negative for dizziness, syncope, light-headedness, numbness and headaches. All other systems reviewed and are negative. PHYSICAL EXAM   (up to 7 for level 4, 8 or more for level 5)      INITIAL VITALS:   BP (!) 161/74   Pulse 88   Temp 98.3 °F (36.8 °C) (Oral)   Resp 22   SpO2 92%     Physical Exam  Vitals reviewed. Constitutional:       Appearance: He is not toxic-appearing. Comments: SPO2 86% on room air. Based on 2 L O2, SPO2 increased to 92-94%   HENT:      Head: Normocephalic and atraumatic. Right Ear: Tympanic membrane normal.      Left Ear: Tympanic membrane normal.      Nose: Nose normal.      Mouth/Throat:      Mouth: Mucous membranes are dry. Pharynx: Oropharynx is clear. Eyes:      Extraocular Movements: Extraocular movements intact. Pupils: Pupils are equal, round, and reactive to light. Cardiovascular:      Rate and Rhythm: Normal rate and regular rhythm. Pulses: Normal pulses. Heart sounds: Normal heart sounds. Pulmonary:      Effort: Pulmonary effort is normal.      Breath sounds: Normal breath sounds. No stridor. No wheezing or rhonchi. Abdominal:      Palpations: Abdomen is soft. Tenderness: There is no abdominal tenderness. There is no guarding or rebound. Musculoskeletal:         General: Normal range of motion. Cervical back: Normal range of motion and neck supple. Right lower leg: Edema (2+) present. Left lower leg: Edema (2+) present. Skin:     Capillary Refill: Capillary refill takes less than 2 seconds. Coloration: Skin is not pale. Findings: No rash. Neurological:      General: No focal deficit present. Mental Status: He is alert and oriented to person, place, and time. Sensory: No sensory deficit. Motor: Weakness (Bilateral upper extremities 4/5, bilateral lower extremities 4/5. Normal tone. Neurovascularly intact. Sensation intact.) present. DIFFERENTIAL  DIAGNOSIS     PLAN (LABS / IMAGING / EKG):  Orders Placed This Encounter   Procedures    COVID-19, Rapid    XR CHEST PORTABLE    CT CHEST PULMONARY EMBOLISM W CONTRAST    CBC with Auto Differential    Comprehensive Metabolic Panel    Lipase    Troponin    Urinalysis with Microscopic    Brain Natriuretic Peptide    Inpatient consult to Hospitalist    Initiate Oxygen Therapy Protocol    Pacer Interrogate    EKG 12 Lead       MEDICATIONS ORDERED:  Orders Placed This Encounter   Medications    iopamidol (ISOVUE-370) 76 % injection 75 mL    0.9 % sodium chloride bolus    HYDROcodone-acetaminophen (NORCO) 5-325 MG per tablet 1 tablet     DDX: Cardiac, anemia, electrolytes, infection, change in medications, hypothyroid, rheumatalgic, depression, dehydration    Evaluate for: orthostatic symptoms, conjunctiva pallor, PERRLA, EOMI, lung crackles, neuro exam, reflexes, guaiac stool    DIAGNOSTIC RESULTS / EMERGENCY DEPARTMENT COURSE / MDM   LAB RESULTS:  Results for orders placed or performed during the hospital encounter of 03/05/22   COVID-19, Rapid    Specimen: Nasopharyngeal Swab   Result Value Ref Range    Specimen Description . NASOPHARYNGEAL SWAB     SARS-CoV-2, Rapid Not Detected Not Detected   CBC with Auto Differential   Result Value Ref Range    WBC 12.3 (H) 3.5 - 11.3 k/uL    RBC 3.74 (L) 4.21 - 5.77 m/uL    Hemoglobin 9.8 (L) 13.0 - 17.0 g/dL    Hematocrit 31.7 (L) 40.7 - 50.3 %    MCV 84.8 82.6 - 102.9 fL    MCH 26.2 25.2 - 33.5 pg MCHC 30.9 28.4 - 34.8 g/dL    RDW 18.9 (H) 11.8 - 14.4 %    Platelets 160 547 - 944 k/uL    MPV 8.7 8.1 - 13.5 fL    NRBC Automated 0.0 0.0 per 100 WBC    Seg Neutrophils 71 (H) 36 - 65 %    Lymphocytes 15 (L) 24 - 43 %    Monocytes 11 3 - 12 %    Eosinophils % 2 1 - 4 %    Basophils 0 0 - 2 %    Immature Granulocytes 1 (H) 0 %    Segs Absolute 8.51 (H) 1.50 - 8.10 k/uL    Absolute Lymph # 1.88 1.10 - 3.70 k/uL    Absolute Mono # 1.40 (H) 0.10 - 1.20 k/uL    Absolute Eos # 0.29 0.00 - 0.44 k/uL    Basophils Absolute 0.05 0.00 - 0.20 k/uL    Absolute Immature Granulocyte 0.13 0.00 - 0.30 k/uL    RBC Morphology ANISOCYTOSIS PRESENT    Comprehensive Metabolic Panel   Result Value Ref Range    Glucose 119 (H) 70 - 99 mg/dL    BUN 4 (L) 8 - 23 mg/dL    CREATININE 0.80 0.70 - 1.20 mg/dL    Calcium 8.9 8.6 - 10.4 mg/dL    Sodium 128 (L) 135 - 144 mmol/L    Potassium 4.7 3.7 - 5.3 mmol/L    Chloride 97 (L) 98 - 107 mmol/L    CO2 16 (L) 20 - 31 mmol/L    Anion Gap 15 9 - 17 mmol/L    Alkaline Phosphatase 200 (H) 40 - 129 U/L    ALT 15 5 - 41 U/L    AST 32 <40 U/L    Total Bilirubin 0.28 (L) 0.3 - 1.2 mg/dL    Total Protein 6.8 6.4 - 8.3 g/dL    Albumin 3.4 (L) 3.5 - 5.2 g/dL    Albumin/Globulin Ratio 1.0 1.0 - 2.5    GFR Non-African American >60 >60 mL/min    GFR African American >60 >60 mL/min    GFR Comment         Lipase   Result Value Ref Range    Lipase 13 13 - 60 U/L   Troponin   Result Value Ref Range    Troponin, High Sensitivity 6 0 - 22 ng/L   Brain Natriuretic Peptide   Result Value Ref Range    Pro- (H) <300 pg/mL       IMPRESSION: 74-year-old male who presents by EMS after a wellness check and concern for safety at home. Patient states he has had generalized fatigue and weakness for the past several weeks. He states his portable pulse oximeter at home has been measuring his oxygenation at 86 to 87% on room air, he was not evaluated by physician for this.   On arrival, patient 86% on room air, placed on 2 L O2, subsequent increase in SPO2 to 94%. Patient did have recent knee operation, states has been taking his anticoagulation regularly. Patient found to be hyponatremic with a sodium of 128. Leukocytosis with WBC 12.3. Vitals otherwise stable. Patient is nontoxic. Lung sounds diminished bilaterally, no wheezing crackles rhonchi or rales. Lower extremity edema 2+ bilaterally. EKG normal sinus with left axis deviation. Covid negative. CT PE showing no PE, but positive for emphysematous changes, atelectasis, possible cirrhosis, and nephrolithiasis. Will admit to [facility] for management of hyponatremia and exploration of leukocytosis with new onset oxygen requirement. RADIOLOGY:  XR CHEST PORTABLE    Result Date: 3/5/2022  EXAMINATION: ONE XRAY VIEW OF THE CHEST 3/5/2022 1:43 pm COMPARISON: April levin 2021 HISTORY: ORDERING SYSTEM PROVIDED HISTORY: sob TECHNOLOGIST PROVIDED HISTORY: sob Reason for Exam: port upright FINDINGS: Marginal inspiration is present. No focal area of consolidation, pleural effusion, or pneumothorax is present. Heart size appears normal.  ICD is in place. Vascular markings are distinct. No acute osseous abnormality is present. Marginal inspiration, without evidence of acute cardiopulmonary disease     CT CHEST PULMONARY EMBOLISM W CONTRAST    Result Date: 3/5/2022  EXAMINATION: CTA OF THE CHEST 3/5/2022 3:07 pm TECHNIQUE: CTA of the chest was performed after the administration of intravenous contrast.  Multiplanar reformatted images are provided for review. MIP images are provided for review. Dose modulation, iterative reconstruction, and/or weight based adjustment of the mA/kV was utilized to reduce the radiation dose to as low as reasonably achievable.  COMPARISON: Chest x-ray dated March 5, 2022 HISTORY: ORDERING SYSTEM PROVIDED HISTORY: sob, new O2 requirement TECHNOLOGIST PROVIDED HISTORY: sob, new O2 requirement Decision Support Exception - unselect if not a suspected Diffuse emphysematous changes present throughout the lungs, with an upper lobe predominance  4. Extensive coronary arterial calcifications  5. Cholelithiasis, without evidence of cholecystitis  6. Nodular contour to the liver, suggesting cirrhosis [JG]      ED Course User Index  [JG] Ting Goncalves MD     CONSULTS:  IP CONSULT TO HOSPITALIST      FINAL IMPRESSION      1. Hyponatremia    2. Generalized weakness    3. Nephrolithiasis    4. Oxygen desaturation          DISPOSITION / PLAN     DISPOSITION Decision To Admit 03/05/2022 07:17:07 PM      PATIENT REFERRED TO:  No follow-up provider specified.     DISCHARGE MEDICATIONS:  New Prescriptions    No medications on file       Ting Goncalves MD  Emergency Medicine Resident    (Please note that portions of thisnote were completed with a voice recognition program.  Efforts were made to edit the dictations but occasionally words are mis-transcribed.)       Ting Goncalves MD  Resident  03/05/22 [id number]

## 2022-03-05 NOTE — ED PROVIDER NOTES
[facility]     Emergency Department     Faculty Attestation    I performed a history and physical examination of the patient and discussed management with the resident. I reviewed the residents note and agree with the documented findings and plan of care. Any areas of disagreement are noted on the chart. I was personally present for the key portions of any procedures. I have documented in the chart those procedures where I was not present during the key portions. I have reviewed the emergency nurses triage note. I agree with the chief complaint, past medical history, past surgical history, allergies, medications, social and family history as documented unless otherwise noted below. For Physician Assistant/ Nurse Practitioner cases/documentation I have personally evaluated this patient and have completed at least one if not all key elements of the E/M (history, physical exam, and MDM). Additional findings are as noted. This patient was evaluated in the Emergency Department for symptoms described in the history of present illness. He/she was evaluated in the context of the global COVID-19 pandemic, which necessitated consideration that the patient might be at risk for infection with the SARS-CoV-2 virus that causes COVID-19. Institutional protocols and algorithms that pertain to the evaluation of patients at risk for COVID-19 are in a state of rapid change based on information released by regulatory bodies including the CDC and federal and state organizations. These policies and algorithms were followed during the patient's care in the ED. Patient here with weakness fatigue for the past several days. States he had knee surgery last month and has not been moving around much. He does have a portable pox oximeter at home and has been checking his sats they have been 85-87 but did not come to the emergency department. No chest pain with this. No fevers.   On exam nontoxic well-appearing watching TV. Lungs diminished throughout but no respiratory distress speaking full sentences sats have improved on supplemental oxygen to 94-95%. Abdomen soft no focal tenderness. Does have right anterior knee scar. Distal edema greater on the right than the left but pitting bilaterally. No focal calf tenderness. Will check labs EKG chest x-ray COVID less obvious etiology for hypoxia will do PE study    EKG interpretation: Narrow complex regular rhythm at 82 with a left axis deviation. Wavy baseline throughout precludes definitive P wave evaluation. No acute ST or T changes.       Critical Care     none    Bonnie Chapa MD, [facility]  Attending Emergency  Physician             Bonnie Chapa MD  03/05/22 [id number]

## 2022-03-05 NOTE — ED NOTES
interrogation completed by this nurse. Patient tolerated procedure, no c/o pain or discomfort at this time.       Heydi CrespoEn[facility]  03/05/22 [id number]

## 2022-03-06 ENCOUNTER — APPOINTMENT (OUTPATIENT)
Dept: GENERAL RADIOLOGY | Age: 72
DRG: 480 | End: 2022-03-06
Payer: COMMERCIAL

## 2022-03-06 PROBLEM — S72.001A FRACTURE OF FEMORAL NECK, RIGHT, CLOSED (HCC): Status: ACTIVE | Noted: 2022-03-06

## 2022-03-06 LAB
ANION GAP SERPL CALCULATED.3IONS-SCNC: 10 MMOL/L (ref 9–17)
ANION GAP SERPL CALCULATED.3IONS-SCNC: 13 MMOL/L (ref 9–17)
BUN BLDV-MCNC: 7 MG/DL (ref 8–23)
BUN BLDV-MCNC: 8 MG/DL (ref 8–23)
CALCIUM SERPL-MCNC: 8.8 MG/DL (ref 8.6–10.4)
CALCIUM SERPL-MCNC: 9.1 MG/DL (ref 8.6–10.4)
CHLORIDE BLD-SCNC: 100 MMOL/L (ref 98–107)
CHLORIDE BLD-SCNC: 98 MMOL/L (ref 98–107)
CO2: 18 MMOL/L (ref 20–31)
CO2: 19 MMOL/L (ref 20–31)
CORTISOL: 18.9 UG/DL (ref 2.7–18.4)
CREAT SERPL-MCNC: 0.8 MG/DL (ref 0.7–1.2)
CREAT SERPL-MCNC: 0.81 MG/DL (ref 0.7–1.2)
GFR AFRICAN AMERICAN: >60 ML/MIN
GFR AFRICAN AMERICAN: >60 ML/MIN
GFR NON-AFRICAN AMERICAN: >60 ML/MIN
GFR NON-AFRICAN AMERICAN: >60 ML/MIN
GFR SERPL CREATININE-BSD FRML MDRD: ABNORMAL ML/MIN/{1.73_M2}
GFR SERPL CREATININE-BSD FRML MDRD: ABNORMAL ML/MIN/{1.73_M2}
GLUCOSE BLD-MCNC: 109 MG/DL (ref 70–99)
GLUCOSE BLD-MCNC: 112 MG/DL (ref 70–99)
INR BLD: 1.1
POTASSIUM SERPL-SCNC: 5.2 MMOL/L (ref 3.7–5.3)
POTASSIUM SERPL-SCNC: 5.4 MMOL/L (ref 3.7–5.3)
PROTHROMBIN TIME: 11.9 SEC (ref 9.1–12.3)
SODIUM BLD-SCNC: 129 MMOL/L (ref 135–144)
SODIUM BLD-SCNC: 129 MMOL/L (ref 135–144)
THYROXINE, FREE: 1.47 NG/DL (ref 0.93–1.7)
VITAMIN D 25-HYDROXY: 72.3 NG/ML

## 2022-03-06 PROCEDURE — 73552 X-RAY EXAM OF FEMUR 2/>: CPT

## 2022-03-06 PROCEDURE — 6360000002 HC RX W HCPCS: Performed by: NURSE PRACTITIONER

## 2022-03-06 PROCEDURE — 6370000000 HC RX 637 (ALT 250 FOR IP): Performed by: NURSE PRACTITIONER

## 2022-03-06 PROCEDURE — 6370000000 HC RX 637 (ALT 250 FOR IP): Performed by: INTERNAL MEDICINE

## 2022-03-06 PROCEDURE — 73502 X-RAY EXAM HIP UNI 2-3 VIEWS: CPT

## 2022-03-06 PROCEDURE — 99222 1ST HOSP IP/OBS MODERATE 55: CPT | Performed by: NURSE PRACTITIONER

## 2022-03-06 PROCEDURE — 93005 ELECTROCARDIOGRAM TRACING: CPT | Performed by: INTERNAL MEDICINE

## 2022-03-06 PROCEDURE — 80048 BASIC METABOLIC PNL TOTAL CA: CPT

## 2022-03-06 PROCEDURE — 99223 1ST HOSP IP/OBS HIGH 75: CPT | Performed by: INTERNAL MEDICINE

## 2022-03-06 PROCEDURE — 99233 SBSQ HOSP IP/OBS HIGH 50: CPT | Performed by: INTERNAL MEDICINE

## 2022-03-06 PROCEDURE — 2580000003 HC RX 258: Performed by: NURSE PRACTITIONER

## 2022-03-06 PROCEDURE — 1200000000 HC SEMI PRIVATE

## 2022-03-06 PROCEDURE — 82306 VITAMIN D 25 HYDROXY: CPT

## 2022-03-06 PROCEDURE — 36415 COLL VENOUS BLD VENIPUNCTURE: CPT

## 2022-03-06 PROCEDURE — 6360000002 HC RX W HCPCS: Performed by: INTERNAL MEDICINE

## 2022-03-06 PROCEDURE — 73610 X-RAY EXAM OF ANKLE: CPT

## 2022-03-06 PROCEDURE — 73630 X-RAY EXAM OF FOOT: CPT

## 2022-03-06 PROCEDURE — 82533 TOTAL CORTISOL: CPT

## 2022-03-06 PROCEDURE — 85610 PROTHROMBIN TIME: CPT

## 2022-03-06 PROCEDURE — 73560 X-RAY EXAM OF KNEE 1 OR 2: CPT

## 2022-03-06 RX ORDER — HYDROCODONE BITARTRATE AND ACETAMINOPHEN 5; 325 MG/1; MG/1
1 TABLET ORAL EVERY 4 HOURS PRN
Status: DISCONTINUED | OUTPATIENT
Start: 2022-03-06 | End: 2022-03-06

## 2022-03-06 RX ORDER — HYDROCODONE BITARTRATE AND ACETAMINOPHEN 5; 325 MG/1; MG/1
1 TABLET ORAL EVERY 6 HOURS PRN
Status: DISCONTINUED | OUTPATIENT
Start: 2022-03-06 | End: 2022-03-06

## 2022-03-06 RX ORDER — MEGESTROL ACETATE 40 MG/ML
200 SUSPENSION ORAL DAILY
Status: DISPENSED | OUTPATIENT
Start: 2022-03-06 | End: 2022-03-09

## 2022-03-06 RX ORDER — MORPHINE SULFATE 4 MG/ML
2 INJECTION, SOLUTION INTRAMUSCULAR; INTRAVENOUS
Status: DISCONTINUED | OUTPATIENT
Start: 2022-03-06 | End: 2022-03-14 | Stop reason: HOSPADM

## 2022-03-06 RX ORDER — MORPHINE SULFATE 2 MG/ML
2 INJECTION, SOLUTION INTRAMUSCULAR; INTRAVENOUS
Status: DISCONTINUED | OUTPATIENT
Start: 2022-03-06 | End: 2022-03-06

## 2022-03-06 RX ORDER — OXYCODONE HYDROCHLORIDE AND ACETAMINOPHEN 5; 325 MG/1; MG/1
1 TABLET ORAL EVERY 4 HOURS PRN
Status: DISCONTINUED | OUTPATIENT
Start: 2022-03-06 | End: 2022-03-14 | Stop reason: HOSPADM

## 2022-03-06 RX ADMIN — CARVEDILOL 3.12 MG: 3.12 TABLET, FILM COATED ORAL at 08:44

## 2022-03-06 RX ADMIN — HYDROCODONE BITARTRATE AND ACETAMINOPHEN 1 TABLET: 5; 325 TABLET ORAL at 08:45

## 2022-03-06 RX ADMIN — CARVEDILOL 3.12 MG: 3.12 TABLET, FILM COATED ORAL at 16:31

## 2022-03-06 RX ADMIN — QUETIAPINE FUMARATE 25 MG: 25 TABLET ORAL at 20:13

## 2022-03-06 RX ADMIN — MORPHINE SULFATE 2 MG: 4 INJECTION INTRAVENOUS at 21:43

## 2022-03-06 RX ADMIN — SODIUM CHLORIDE, PRESERVATIVE FREE 10 ML: 5 INJECTION INTRAVENOUS at 20:13

## 2022-03-06 RX ADMIN — QUETIAPINE FUMARATE 25 MG: 25 TABLET ORAL at 00:47

## 2022-03-06 RX ADMIN — MEGESTROL ACETATE 200 MG: 40 SUSPENSION ORAL at 06:32

## 2022-03-06 RX ADMIN — CLOPIDOGREL 75 MG: 75 TABLET, FILM COATED ORAL at 08:44

## 2022-03-06 RX ADMIN — SODIUM CHLORIDE, PRESERVATIVE FREE 10 ML: 5 INJECTION INTRAVENOUS at 21:33

## 2022-03-06 RX ADMIN — SODIUM CHLORIDE, PRESERVATIVE FREE 5 ML: 5 INJECTION INTRAVENOUS at 08:44

## 2022-03-06 RX ADMIN — OXYCODONE HYDROCHLORIDE AND ACETAMINOPHEN 1 TABLET: 5; 325 TABLET ORAL at 13:38

## 2022-03-06 RX ADMIN — DESMOPRESSIN ACETATE 40 MG: 0.2 TABLET ORAL at 20:13

## 2022-03-06 RX ADMIN — ENOXAPARIN SODIUM 40 MG: 100 INJECTION SUBCUTANEOUS at 08:43

## 2022-03-06 RX ADMIN — LEVOTHYROXINE SODIUM 50 MCG: 50 TABLET ORAL at 06:32

## 2022-03-06 RX ADMIN — DESMOPRESSIN ACETATE 40 MG: 0.2 TABLET ORAL at 00:46

## 2022-03-06 RX ADMIN — Medication 1 TABLET: at 08:44

## 2022-03-06 RX ADMIN — SODIUM CHLORIDE, PRESERVATIVE FREE 10 ML: 5 INJECTION INTRAVENOUS at 00:46

## 2022-03-06 RX ADMIN — OXYCODONE HYDROCHLORIDE AND ACETAMINOPHEN 1 TABLET: 5; 325 TABLET ORAL at 20:12

## 2022-03-06 RX ADMIN — Medication 100 MG: at 08:44

## 2022-03-06 ASSESSMENT — PAIN SCALES - GENERAL
PAINLEVEL_OUTOF10: 8
PAINLEVEL_OUTOF10: 9
PAINLEVEL_OUTOF10: 7
PAINLEVEL_OUTOF10: 10
PAINLEVEL_OUTOF10: 8

## 2022-03-06 ASSESSMENT — PAIN DESCRIPTION - LOCATION: LOCATION: KNEE

## 2022-03-06 ASSESSMENT — PAIN DESCRIPTION - PAIN TYPE: TYPE: ACUTE PAIN

## 2022-03-06 NOTE — ED NOTES
Pt provided with urinal, urine sample collected & sent to lab  Pt placed on bedpan     Josie Winn RN  03/05/22 [id number]

## 2022-03-06 NOTE — CONSULTS
Orthopaedic Surgery Consult  (Dr. Jono Arias)      CC/Reason for consult:  Right hip pain s/p wheelchair fall    HPI:      The patient is a 81 y.o.  male who currently is  in the inpatient unit at [facility]. David's after a fall from his wheelchair, now complains about right hip pain. According to the patient and charting, the patient presented to the [facility] emergency department on 3/5/2022 after falling from his wheelchair onto his bottom. Patient states that this the second time this happened in the past few days. Patient is currently wheelchair dependent and states that he is not ambulated without assistance of roughly 5 years. Patient states that he usually transfers himself without any difficulty, but after the fall he was unable to move his right leg due to the significant bout of pain. Patient is currently admitted to the [facility] inpatient unit for hyponatremia. Patient states that any movement or touching the hip causes significant pain. Patient is a past orthopedic surgery history of a distal femoral replacement by Dr. Saran Bell  For a periprosthetic fracture on 4/13/2021 after right total knee was done by Dr Fara Babcock in 2013. Patient is currently on Lovenox and clopidogrel for anticoagulation. Past medical history of the patient includes CHF, defibrillator placement, COPD, hypertension, malnutrition. Patient endorses dysesthesias to the right lower extremity, specifically at the knee and foot, but is at baseline.     Past Medical History:    Past Medical History:   Diagnosis Date    ADHD (attention deficit hyperactivity disorder)     ADHD (attention deficit hyperactivity disorder)     Atypical chest pain     Chronic bronchitis (HCC)     Hypertension     Hyponatremia     Meniere's disease     Narcolepsy     Nasal fracture     PND (post-nasal drip) 4/21/2014    SOB (shortness of breath)     Tibia fracture     right tibial plateau fx, right syndesmotic fx    Transaminasemia 4/21/2014     Past Surgical History:    Past Surgical History:   Procedure Laterality Date    ANKLE SURGERY      open reduction internal fixation of the right ankle & tibial plateau fx    CORONARY ANGIOPLASTY WITH STENT PLACEMENT N/A 10/10/2015    CYST REMOVAL      right side of face    FEMUR SURGERY Right 04/13/2021     DISTAL FEMUR REPLACEMENT (Right )    KNEE ARTHROPLASTY Right 4/13/2021    DISTAL FEMUR REPLACEMENT performed by Michael Jung DO at [address] [place] [address] Right     total knee     Medications Prior to Admission:   Prior to Admission medications    Medication Sig Start Date End Date Taking?  Authorizing Provider   acetaminophen (TYLENOL) 500 MG tablet Take 1 tablet by mouth 4 times daily as needed for Pain 4/15/21   Vandana Bennett DO   Multiple Vitamins-Minerals (CERTAVITE SENIOR/ANTIOXIDANT) TABS TAKE 1 TAB BY MOUTH ONCE A DAY 6/22/16   Iris Lynn PA-C   GLUCOSAMINE-CHONDROITIN -400 MG tablet TAKE 1 TAB BY MOUTH TWICE A DAY 6/7/16   Iris Lynn PA-C   carvedilol (COREG) 3.125 MG tablet TAKE 1 TAB BY MOUTH TWICE A DAY WITH MEALS 6/7/16   Iris Lynn PA-C   Armodafinil (NUVIGIL) 150 MG TABS tablet Take 1 tablet by mouth daily 4/26/16   Waylan Borne, DO   loratadine (CLARITIN) 10 MG tablet TAKE 1 TAB BY MOUTH ONCE A DAY 3/24/16   Dimalan Borne, DO   levothyroxine (SYNTHROID) 25 MCG tablet Take 1 tablet by mouth Daily  Patient taking differently: Take 50 mcg by mouth Daily  3/24/16   Waylan Borne, DO   QUEtiapine (SEROQUEL) 25 MG tablet Take 1 tablet by mouth nightly 3/24/16   Waylan Borne, DO   melatonin 3 MG TABS tablet Take 3 mg by mouth daily    Historical Provider, MD   atorvastatin (LIPITOR) 40 MG tablet Take 1 tablet by mouth nightly 11/10/15   Meena Wilcox MD   clopidogrel (PLAVIX) 75 MG tablet Take 1 tablet by mouth daily 11/10/15   Meena Wilcox MD   thiamine 100 MG tablet Take 1 tablet by mouth daily 11/10/15   Meena Wilcox MD   Magnesium Oxide 250 MG TABS Take 1 tablet by mouth daily  Patient taking differently: Take 500 mg by mouth daily  11/10/15   Leticia Black MD   meclizine (ANTIVERT) 25 MG tablet Take 0.5 tablets by mouth 3 times daily as needed 11/10/15   Leticia Black MD   furosemide (LASIX) 20 MG tablet Take 1 tablet by mouth daily 11/10/15   Leticia Black MD   [address] Adult diapers dispense quantity allowed by insurance 3/31/15   Luzma Lynn PA-C   Diapers & Supplies MISC Wipes  dispense quantity allowed by insurance 3/31/15   Iris Lynn PA-C   PROAIR  (90 BASE) MCG/ACT inhaler inhale 2 puffs every 4 to 6 hours if needed 11/29/14   Iris Lynn PA-C   mometasone (NASONEX) 50 MCG/ACT nasal spray 2 sprays by Nasal route daily. 12/23/13   Parvez Vilchis MD   docusate sodium (COLACE) 100 MG capsule Take 100 mg by mouth 2 times daily. Historical Provider, MD   folic acid (FOLVITE) 1 MG tablet Take 1 mg by mouth daily. Historical Provider, MD   chlorhexidine (PERIDEX) 0.12 % solution Take 15 mLs by mouth 2 times daily. Historical Provider, MD   SALINE MIST SPRAY NA by Nasal route. Historical Provider, MD   Multiple Vitamin (MULTIVITAMIN PO) Take  by mouth. Historical Provider, MD     Allergies:    Motrin [ibuprofen micronized]  Social History:   Social History     Socioeconomic History    Marital status:      Spouse name: None    Number of children: None    Years of education: None    Highest education level: None   Occupational History     Employer: NOT EMPLOYED   Tobacco Use    Smoking status: Current Every Day Smoker     Packs/day: 1.00     Years: 38.00     Pack years: 38.00     Types: Cigarettes    Smokeless tobacco: Never Used    Tobacco comment: e-cigs   Vaping Use    Vaping Use: Every day    Substances: Always   Substance and Sexual Activity    Alcohol use:  Yes     Alcohol/week: 16.0 standard drinks     Types: 16 Cans of beer per week     Comment: daily    Drug use: No    Sexual activity: Not Currently   Other Topics Concern    None   Social History Narrative    None     Social Determinants of Health     Financial Resource Strain:     Difficulty of Paying Living Expenses: Not on file   Food Insecurity:     Worried About [address] in the Last Year: Not on file    Diane of Food in the Last Year: Not on file   Transportation Needs:     Lack of Transportation (Medical): Not on file    Lack of Transportation (Non-Medical): Not on file   Physical Activity:     Days of Exercise per Week: Not on file    Minutes of Exercise per Session: Not on file   Stress:     Feeling of Stress : Not on file   Social Connections:     Frequency of Communication with Friends and Family: Not on file    Frequency of Social Gatherings with Friends and Family: Not on file    Attends Anabaptist Services: Not on file    Active Member of Clubs or Organizations: Not on file    Attends Club or Organization Meetings: Not on file    Marital Status: Not on file   Intimate Partner Violence:     Fear of Current or Ex-Partner: Not on file    Emotionally Abused: Not on file    Physically Abused: Not on file    Sexually Abused: Not on file   Housing Stability:     Unable to Pay for Housing in the Last Year: Not on file    Number of [place] in the Last Year: Not on file    Unstable Housing in the Last Year: Not on file     Family History:  Family History   Problem Relation Age of Onset    Heart Disease Mother         heart attack    Heart Disease Father        ROS:   Constitutional: Negative for fever and chills. Respiratory: Negative for cough. Cardiovascular: Negative for chest pain. Musculoskeletal: Positive for right hip pain. Skin: Negative for itching and rash. Neurological: Negative for numbness, tingling, weakness. PE:  Blood pressure 125/60, pulse 86, temperature 98.4 °F (36.9 °C), temperature source Oral, resp. rate 18, height 5' 10\" (1.778 m), weight 180 lb (81.6 kg), SpO2 92 %.     Gen: Alert and oriente, NAD, cooperative. Head: Normocephalic, atraumatic. Cardiovascular: Regular rate. Respiratory: Chest symmetric, no accessory muscle use. Pelvis: Stable to anterior and lateral compression. RLE: Right lower extremity shortened and externally rotated. Bony crepitus and significant tenderness palpation of the right hip. Patient unable to perform any range of motion examination to the right lower extremity due to pain. Compartments soft and easily compressible. EHL/FHL/TA/GS complex motor intact. Sural/saphenous/SPN/DPN/plantar nerve distribution SILT. Patient has significant groin pain with log roll maneuver, knee appears stable to varus and valgus stress test at 0 and 30 degrees. DP and PT pulses 2+ with BCR. LLE: Skin intact. No ecchymoses, abrasions, deformity, or lacerations. Tenderness to palpation about the hip and foot No bony crepitus. Compartments soft and easily compressible. EHL/FHL/TA/GS complex motor intact. Sural/saphenous/SPN/DPN/plantar nerve distribution SILT. Patient has no groin pain with log roll maneuver. Patient is able to perform a striaght leg test. Lachman 1a, knee appears stable to varus and valgus stress test at 0 and 30 degrees. DP and PT pulses 2+ with BCR. Labs:  Recent Labs     03/05/22  1604 03/05/22  2318 03/06/22  0432   WBC 12.3*  --   --    HGB 9.8*  --   --    HCT 31.7*  --   --      --   --    INR  --   --  1.1   *   < > 129*   K 4.7   < > 5.4*   BUN 4*   < > 7*   CREATININE 0.80   < > 0.80   GLUCOSE 119*   < > 112*    < > = values in this interval not displayed. Imaging:   Multiple views of the right hip demonstrating a comminuted displaced intertrochanteric fracture. No reverse obliquity, subtrochanteric extension, or lateral wall comminution.       Assessment/Plan: 81 y.o. male who fell from wheelchair, being seen for:    -Right femur intertrochanteric fracture    -To OR with Dr. Jojo Hallman 3/7 for cephalomedullary nail fixation  -Will appreciate a clearance note by cardiology team prior to surgery  -WB status: NWB RLE  -Diet: Okay for diet per orthopedic standpoint, NPO at midnight  -Please hold all chemical anticoagulation for surgical intervention tomorrow, okay to continue POD1  -Consent in chart, extremity marked for surgery  -Ancef OCTOR  -F/u VitD level  -Pain control/DVT/ABX per primary  -Ice and elevate extremity for pain and swelling  -PT/OT after surgical intervention  -Please contact ortho with any questions    José Manuel Bronson DO  Resident Physician, PGY-1   Orthopaedic Surgery  2:36 PM 3/6/2022      PGY-3 Addendum    Patient seen and examined. Agree with subjective and objective portions from Dr. Mildred Orona. The patient is a 81 y.o. male with a right IT fracture after having sustained a fall from wheelchair. Patient has significant medical history requiring cardiac clearance. Patient does not ambulate and is wheelchair dependent. Presents a significant fall risk as he has had a periprosthetic distal femur fracture in the past for a fall as well. NPO MN. Consent obtained and marked. Patient will go to OR on 3/7/22 for operative fixation of his right hip. Catarina Yap DO PGY-3  Orthopedic Surgery Resident  [facility], [facility]    Attending Physician Statement  I have discussed the care of Tom Powell  , including pertinent history and exam findings with the resident. I have seen and examined the patient and the key elements of all parts of the encounter have been performed by me. I agree with the assessment, plan and orders as documented by the resident. Will need surgical IT nailing after medical clearance and stabilization.

## 2022-03-06 NOTE — H&P
[facility]@    [address] [place] [address]    HISTORY AND PHYSICAL EXAMINATION            Date:   3/5/2022  Patient name:  Bharati Almonte  Date of admission:  3/5/2022  3:47 PM  MRN:   [id number]  Account:  [de-identified]  YOB: 1950  PCP:    Sanju Pedraza DO  Room:   [address] [place] [address]  Code Status:    Full Code    Chief Complaint:     Patient's chief complaint is generalized weakness leading to mechanical fall  History Obtained From:     Patient at bedside    History of Present Illness:     53-year-old wheelchair-bound  male with underlying HFrEF, Severe knee OA, narcolepsy who presents to the hospital with concern for generalized weakness. Patient reports since his knee replacement surgery a few years ago he was never able to ambulate at baseline requiring him to be overall wheelchair-bound. He says over the past month his appetite's been poor and he feels he is becoming weaker. He says every time he goes from the wheelchair to his bed he struggles to lift himself over and ends up falling. He denies any loss of consciousness, head trauma, joint swelling, shortness of breath, palpitations or dizziness. He does not report any weight loss, night sweats and says over the past few years his appetite is not like it used to be. He pays out-of-pocket for home aide to come and cook for him, he reports he has access to food and can manage himself but just does not have energy to do so. Patient reports compliance to all his meds. Says he needs physical therapy but does not want placement in a nursing home. Patient denies any other symptoms at this time.     Past Medical History:     Past Medical History:   Diagnosis Date    ADHD (attention deficit hyperactivity disorder)     ADHD (attention deficit hyperactivity disorder)     Atypical chest pain     Chronic bronchitis (HCC)     Hypertension     Hyponatremia     Meniere's disease     Narcolepsy     Nasal fracture     PND (post-nasal drip) 4/21/2014    SOB (shortness of breath)     Tibia fracture     right tibial plateau fx, right syndesmotic fx    Transaminasemia 4/21/2014        Past Surgical History:     Past Surgical History:   Procedure Laterality Date    ANKLE SURGERY      open reduction internal fixation of the right ankle & tibial plateau fx    CORONARY ANGIOPLASTY WITH STENT PLACEMENT N/A 10/10/2015    CYST REMOVAL      right side of face    FEMUR SURGERY Right 04/13/2021     DISTAL FEMUR REPLACEMENT (Right )    KNEE ARTHROPLASTY Right 4/13/2021    DISTAL FEMUR REPLACEMENT performed by Brannon Watkins DO at [place] [id number] Right     total knee        Medications Prior to Admission:     Prior to Admission medications    Medication Sig Start Date End Date Taking?  Authorizing Provider   acetaminophen (TYLENOL) 500 MG tablet Take 1 tablet by mouth 4 times daily as needed for Pain 4/15/21   Jose Antonio Urban, DO   Multiple Vitamins-Minerals (CERTAVITE SENIOR/ANTIOXIDANT) TABS TAKE 1 TAB BY MOUTH ONCE A DAY 6/22/16   Iris Lynn PA-C   GLUCOSAMINE-CHONDROITIN -400 MG tablet TAKE 1 TAB BY MOUTH TWICE A DAY 6/7/16   Iris Lynn PA-C   carvedilol (COREG) 3.125 MG tablet TAKE 1 TAB BY MOUTH TWICE A DAY WITH MEALS 6/7/16   Iris Lynn PA-C   Armodafinil (NUVIGIL) 150 MG TABS tablet Take 1 tablet by mouth daily 4/26/16   Arthur Regulus, DO   loratadine (CLARITIN) 10 MG tablet TAKE 1 TAB BY MOUTH ONCE A DAY 3/24/16   Arthur Regulus, DO   levothyroxine (SYNTHROID) 25 MCG tablet Take 1 tablet by mouth Daily  Patient taking differently: Take 50 mcg by mouth Daily  3/24/16   [place] Regulus, DO   QUEtiapine (SEROQUEL) 25 MG tablet Take 1 tablet by mouth nightly 3/24/16   Arthur Regulus, DO   melatonin 3 MG TABS tablet Take 3 mg by mouth daily    Historical Provider, MD   atorvastatin (LIPITOR) 40 MG tablet Take 1 tablet by mouth nightly 11/10/15   [place] Maxim Gonzalez MD   clopidogrel (PLAVIX) 75 MG tablet Take 1 tablet by mouth daily 11/10/15   Carrie Villarreal MD   thiamine 100 MG tablet Take 1 tablet by mouth daily 11/10/15   Carrie Villarreal MD   Magnesium Oxide 250 MG TABS Take 1 tablet by mouth daily  Patient taking differently: Take 500 mg by mouth daily  11/10/15   Carrie Villarreal MD   meclizine (ANTIVERT) 25 MG tablet Take 0.5 tablets by mouth 3 times daily as needed 11/10/15   Carrie Villarreal MD   furosemide (LASIX) 20 MG tablet Take 1 tablet by mouth daily 11/10/15   Carrie Villarreal MD   [address] Adult diapers dispense quantity allowed by insurance 3/31/15   Sal Lynn PA-C   Diapers & Supplies MISC Wipes  dispense quantity allowed by insurance 3/31/15   Iris Lynn PA-C   PROAIR  (90 BASE) MCG/ACT inhaler inhale 2 puffs every 4 to 6 hours if needed 11/29/14   Iris Lynn PA-C   mometasone (NASONEX) 50 MCG/ACT nasal spray 2 sprays by Nasal route daily. 12/23/13   Dinora Anne MD   docusate sodium (COLACE) 100 MG capsule Take 100 mg by mouth 2 times daily. Historical Provider, MD   folic acid (FOLVITE) 1 MG tablet Take 1 mg by mouth daily. Historical Provider, MD   chlorhexidine (PERIDEX) 0.12 % solution Take 15 mLs by mouth 2 times daily. Historical Provider, MD   SALINE MIST SPRAY NA by Nasal route. Historical Provider, MD   Multiple Vitamin (MULTIVITAMIN PO) Take  by mouth. Historical Provider, MD        Allergies:     Motrin [ibuprofen micronized]    Social History:     Tobacco:    reports that he has been smoking cigarettes. He has a 38.00 pack-year smoking history. He has never used smokeless tobacco.  Alcohol:      reports current alcohol use of about 16.0 standard drinks of alcohol per week. Drug Use:  reports no history of drug use.     Family History:     Family History   Problem Relation Age of Onset    Heart Disease Mother         heart attack    Heart Disease Father        Review of Systems:     Positive and Negative as described in HPI. CONSTITUTIONAL:  negative for fevers, chills, sweats, fatigue, weight loss  HEENT:  negative for vision, hearing changes, runny nose, throat pain  RESPIRATORY:  negative for shortness of breath, cough, congestion, wheezing  CARDIOVASCULAR:  negative for chest pain, palpitations  GASTROINTESTINAL:  negative for nausea, vomiting, diarrhea, constipation, change in bowel habits, abdominal pain   GENITOURINARY:  negative for difficulty of urination, burning with urination, frequency   INTEGUMENT:  negative for rash, skin lesions, easy bruising   HEMATOLOGIC/LYMPHATIC:  negative for swelling/edema   ALLERGIC/IMMUNOLOGIC:  negative for urticaria , itching  ENDOCRINE:  negative increase in drinking, increase in urination, hot or cold intolerance  MUSCULOSKELETAL:  negative joint pains, muscle aches, swelling of joints  NEUROLOGICAL:  negative for headaches, dizziness, lightheadedness, numbness, pain, tingling extremities  BEHAVIOR/PSYCH:  negative for depression, anxiety    Physical Exam:   BP (!) 163/83   Pulse 83   Temp 97.7 °F (36.5 °C) (Axillary)   Resp 17   Ht 5' 10\" (1.778 m)   Wt 180 lb (81.6 kg)   SpO2 93%   BMI 25.83 kg/m²   Temp (24hrs), Av °F (36.7 °C), Min:97.7 °F (36.5 °C), Max:98.3 °F (36.8 °C)    No results for input(s): POCGLU in the last 72 hours. Intake/Output Summary (Last 24 hours) at 3/5/2022 2307  Last data filed at 3/5/2022 1955  Gross per 24 hour   Intake 1000 ml   Output --   Net 1000 ml       General Appearance: Chronically ill and frail appearing. Soft-spoken but completing full sentences without difficulty. Has a hard time hearing. Appears slightly disheveled.   Pleasant and AAO 3  Mental status: oriented to person, place, and time  Head: normocephalic, atraumatic  Eye: no icterus, redness, pupils equal and reactive, extraocular eye movements intact, conjunctiva clear  Ear: normal external ear, no discharge, hearing intact  Nose: no drainage noted  Mouth: mucous membranes dry, missing teeth, dentures are not in. Neck: supple, no carotid bruits, thyroid not palpable  Lungs: Bilateral equal air entry, clear to ausculation, no wheezing, rales or rhonchi, normal effort  Cardiovascular: normal rate, regular rhythm, no murmur, gallop, rub. Limbs nonedematous and normothermic  Abdomen: Soft, nontender, nondistended, normal bowel sounds, no hepatomegaly or splenomegaly  Neurologic: There are no new focal motor or sensory deficits, normal muscle tone and bulk, no abnormal sensation, normal speech, cranial nerves II through XII grossly intact  Skin: No gross lesions, rashes, bruising or bleeding on exposed skin area. No pressure sores on the backside  Extremities: peripheral pulses palpable, no pedal edema or calf pain with palpation. Right knee has a well-healed scar, there is some chronic skin changes that are at baseline. No joint swelling, crepitus or restricted range of motion different than baseline.   Psych: Mood congruent    Investigations:      Laboratory Testing:  Recent Results (from the past 24 hour(s))   CBC with Auto Differential    Collection Time: 03/05/22  4:04 PM   Result Value Ref Range    WBC 12.3 (H) 3.5 - 11.3 k/uL    RBC 3.74 (L) 4.21 - 5.77 m/uL    Hemoglobin 9.8 (L) 13.0 - 17.0 g/dL    Hematocrit 31.7 (L) 40.7 - 50.3 %    MCV 84.8 82.6 - 102.9 fL    MCH 26.2 25.2 - 33.5 pg    MCHC 30.9 28.4 - 34.8 g/dL    RDW 18.9 (H) 11.8 - 14.4 %    Platelets 272 124 - 685 k/uL    MPV 8.7 8.1 - 13.5 fL    NRBC Automated 0.0 0.0 per 100 WBC    Seg Neutrophils 71 (H) 36 - 65 %    Lymphocytes 15 (L) 24 - 43 %    Monocytes 11 3 - 12 %    Eosinophils % 2 1 - 4 %    Basophils 0 0 - 2 %    Immature Granulocytes 1 (H) 0 %    Segs Absolute 8.51 (H) 1.50 - 8.10 k/uL    Absolute Lymph # 1.88 1.10 - 3.70 k/uL    Absolute Mono # 1.40 (H) 0.10 - 1.20 k/uL    Absolute Eos # 0.29 0.00 - 0.44 k/uL    Basophils Absolute 0.05 0.00 - 0.20 k/uL    Absolute Immature Granulocyte 0.13 0.00 - 0.30 k/uL    RBC Morphology ANISOCYTOSIS PRESENT    Comprehensive Metabolic Panel    Collection Time: 03/05/22  4:04 PM   Result Value Ref Range    Glucose 119 (H) 70 - 99 mg/dL    BUN 4 (L) 8 - 23 mg/dL    CREATININE 0.80 0.70 - 1.20 mg/dL    Calcium 8.9 8.6 - 10.4 mg/dL    Sodium 128 (L) 135 - 144 mmol/L    Potassium 4.7 3.7 - 5.3 mmol/L    Chloride 97 (L) 98 - 107 mmol/L    CO2 16 (L) 20 - 31 mmol/L    Anion Gap 15 9 - 17 mmol/L    Alkaline Phosphatase 200 (H) 40 - 129 U/L    ALT 15 5 - 41 U/L    AST 32 <40 U/L    Total Bilirubin 0.28 (L) 0.3 - 1.2 mg/dL    Total Protein 6.8 6.4 - 8.3 g/dL    Albumin 3.4 (L) 3.5 - 5.2 g/dL    Albumin/Globulin Ratio 1.0 1.0 - 2.5    GFR Non-African American >60 >60 mL/min    GFR African American >60 >60 mL/min    GFR Comment         Lipase    Collection Time: 03/05/22  4:04 PM   Result Value Ref Range    Lipase 13 13 - 60 U/L   Troponin    Collection Time: 03/05/22  4:04 PM   Result Value Ref Range    Troponin, High Sensitivity 6 0 - 22 ng/L   Brain Natriuretic Peptide    Collection Time: 03/05/22  4:04 PM   Result Value Ref Range    Pro- (H) <300 pg/mL   COVID-19, Rapid    Collection Time: 03/05/22  4:11 PM    Specimen: Nasopharyngeal Swab   Result Value Ref Range    Specimen Description . NASOPHARYNGEAL SWAB     SARS-CoV-2, Rapid Not Detected Not Detected   Urinalysis with Microscopic    Collection Time: 03/05/22  9:23 PM   Result Value Ref Range    Color, UA Yellow Yellow    Turbidity UA Clear Clear    Glucose, Ur NEGATIVE NEGATIVE    Bilirubin Urine NEGATIVE NEGATIVE    Ketones, Urine NEGATIVE NEGATIVE    Specific Gravity, UA 1.044 (H) 1.005 - 1.030    Urine Hgb NEGATIVE NEGATIVE    pH, UA 5.0 5.0 - 8.0    Protein, UA NEGATIVE NEGATIVE    Urobilinogen, Urine Normal Normal    Nitrite, Urine NEGATIVE NEGATIVE    Leukocyte Esterase, Urine SMALL (A) NEGATIVE    -          WBC, UA 20 TO 50 0 - 5 /HPF    RBC, UA 0 TO 2 0 - 4 /HPF    Epithelial Cells UA None 0 - 5 /HPF       Imaging/Diagnostics:  XR CHEST PORTABLE    Result Date: 3/5/2022  Marginal inspiration, without evidence of acute cardiopulmonary disease     CT CHEST PULMONARY EMBOLISM W CONTRAST    Result Date: 3/5/2022  1. No evidence of pulmonary embolism 2. Dependent atelectasis, right lung base 3. Diffuse emphysematous changes present throughout the lungs, with an upper lobe predominance 4. Extensive coronary arterial calcifications 5. Cholelithiasis, without evidence of cholecystitis 6. Nodular contour to the liver, suggesting cirrhosis       Assessment :      #Generalized weakness  -Etiology multifactorial; 81ear-old chorniclly ill appearing male with significant comorbidities, living alone barely eating, sedentary lifestyle, sits in a wheelchair majority of the day struggling to keep up with his ADLs and IADLs. Pays out-of-pocket for an aide to come cook for him during the week. -Dehydrated in appearance on exam  -Albumin 3.4, sodium 127 but was on Lasix, appears dry and is near his baseline  -Calcium within normal limit  -His last TSH was slightly above high limit of normal, will follow up on free T4 []  PLAN  -Nutritional consult, PT/OT , case mgt []  -Hold patient's Lasix temporarily and give gentle hydration, however will need to be cautious given his reduced EF. Avoid standing IVFs. -We start patient on megestrol to increase his appetite. Denies any history of strokes or hormonal malignancy that he knows of. [x]  -F/U on AM cortisol []  -Investigate for underlying malignancy? CT chest did not reveal any nodules or findings to suggest lung malignancy. He did report nodularity of the liver however LFTs and albumin showing normal synthetic function. US of liver in 2012 report Mildly coarsened echotexture of the liver without focal sonographic   abnormality identified. Calcium wnml. PSA 2018 and 2012 unrevealing . Anemia is at baseline.  denies weight loss, dysphagia, change in color stool. Unsure if any family hx of cancer. Resume work up as outpatient but will continue to evaluate peripherally while being managed. #Ambulatory gait dysfunction  -Etiology is multifactorial in the setting of chronic knee arthritis status post 2 right knee replacement.  -There could be an element of dysautonomia given that he is always in a wheelchair however he denies any orthostatic symptoms.  -Reports that his knee is chronically discolored and that the appearance is no different than baseline. Physical exam unremarkable. No focal deficits appreciated. -Patient says he uses Norco at home but has been out.  -Fall precautions  PLAN  -Palliative care consult for symptom control, goals of care discussion given the fact that he needs pain meds as significant comorbidities and underlying heart failure. Patient would benefit from being managed with a palliative philosophy of care while he still able to make these decisions. #Heart failure with reduced EF  -Compensated, not in exacerbation at this time. Euvolemic to dry on exam.   -Echo in 2015 EF 20%, hypokinesis, but no reports of valvular abnormalities  -Status post ICD  -carvedilol 3.125 twice daily, atorvastatin 40 mg, Lasix 20 mg  -On Plavix 75 mg and amiodarone. PLAN  -Concern for bleed potential given fall risk and on plavix. Will need to discuss risks with patient and have his PCP evaluate further on discharge []  -May Benefit from low-dose lisinopril for guideline directed medical therapy given low EF  -stable, monitor volume status-->resume lasix (consider discharging on lower home dose to avoid dehydration?) and stop gentle iVF when applicable []      #Emphysema  -Stable on room air.  Home meds include ProAir air inhaler and fluticasone  -Chronic long standing tobacco smoker  -CT chest showing a upper lobe predominance for emphysematous changes  -Patient is on amiodarone, monitor for amiodarone-induced toxicities/adverse effect []  -Outpatient referral to pulmonologist if he doesn't have one  -Counseled the patient's on the importance of nicotine cessation and provided a pamphlet for him to read. As needed nicotine patch if needed      #Hypothyroidism  -On Levothyroxine 50 mg at home  -Recent TSH was slightly over normal limits.   Follow-up on T4 []  -Patient is on amiodarone, need to monitor thyroid function []      #Narcolepsy  -on Methylphenidate 10 mg, resume     VTE ppx: lovenox

## 2022-03-06 NOTE — ED NOTES
The following labs labeled with pt sticker and tubed to lab:     [] Blue     [] Lavender   [] on ice  [] Green/yellow  [] Green/black [] on ice  [] Yellow  [] Red  [] Pink      [] COVID-19 swab    [] Rapid  [] PCR  [] Flu swab  [] Peds Viral Panel     [x] Urine Sample  [] Pelvic Cultures  [] Blood Cultures            Eddie Kumar RN  03/05/22 1084

## 2022-03-06 NOTE — CONSULTS
Palliative Care Inpatient Consult    NAME:  Jose Dunbar  MEDICAL RECORD NUMBER:  [id number]  AGE: 81 y.o. GENDER: male  : 1950  TODAY'S DATE:  3/6/2022    Reasons for Consultation:    Goals of care   Code status discussion  Symptom management    Members of PC team contributing to this consultation are :  Hua De Los Santos CNP  Palliative care   Plan      Palliative Interaction:  I went to bedside and seen patient. I introduced myself and my palliative care role. Patient is  and has 5 biological children. 1 child is , then 2 sons Serafin Hatfield and Chen Cummings and 2 daughter Jesus Junior and Robert Nunes. He has not completed DPOA or living will paperwork but he wants to and to make his girl friend Bernarda Reiser his POA. Spiritual care is consulted. I discussed code status levels and explained each level completely. Patient states he wants to remain a full code status. Patient lives alone and has aid that helps to cook him meals. He is very weak and has fallen a couple of times getting into bed. Emotional support provided and Palliative care will continue to follow for emotional support and updates as needed. Education/support to patient  Discharge planning/helping to coordinate care  Communications with primary service  Pharmacologic pain management  Providing support for coping/adaptation/distress of patient  Discussing meaning/purpose   Caregiver support/education  Continue with current plan of care  Code status clarified: Full Code  Code status clarified: [facility]  Code status clarified: DNRCCA      History of Present Illness     The patient is a 81 y.o. Non- / non  male who presents with Fatigue      Referred to Palliative Care by   [x] Physician   [] Nursing  [] Family Request   [] Other:       He was admitted to the primary service for Hyponatremia [E87.1]  Nephrolithiasis [N20.0]  Generalized weakness [R53.1]  Oxygen desaturation [R09.02].  His hospital course has been associated with Hyponatremia. The patient has a complicated medical history and has been hospitalized since 3/5/2022  3:47 PM.    Karmen Cason is a 81year old male that has ADHD, COPD, Hypertension, hyponatremia, meniere's disease, narcolepsy. Patient SP right knee replacement a couple years ago patient has not returned to baseline and uses wheelchair. Patient with generalized weakness and poor appetite. He was admitted to hospital for hyponatremia and a fall trying to get from wheel chair to bed. Patient lives alone and hires an aid to cook meals for him. He has AICD and is on plavix which is a concern due to his frequent falls. Patient lives at home alone and does not want to go to a facility for strengthening. Patient labs from today include result 18.9, PT 11.9, INR 1.1, glucose 112, BUN 7, creatinine 0.80, calcium 8.8, sodium 129, potassium 5.4, chloride 98. Patient has the following scans during hospitalization right knee x-ray right hip x-ray right femur x-ray CT chest, and chest x-ray and results are listed below. Patient has the following consults hospitalist due to hyponatremia and new oxygen demand, palliative care due to goals of care CODE STATUS discussion and symptom management and pulmonary due to new oxygen demands. Palliative care will continue to follow patient and reach out to patient's family to provide emotional support and updates as needed. Right knee x-ray  Comminuted intertrochanteric fracture right femoral neck       No other acute bony or joint abnormality         Right hip x-ray  Comminuted intertrochanteric fracture right femoral neck       No other acute bony or joint abnormality             Right femur x-ray  Comminuted intertrochanteric fracture right femoral neck       No other acute bony or joint abnormality           CT chest  1. No evidence of pulmonary embolism   2. Dependent atelectasis, right lung base   3.  Diffuse emphysematous changes present throughout the lungs, with an upper   lobe predominance   4. Extensive coronary arterial calcifications   5. Cholelithiasis, without evidence of cholecystitis   6. Nodular contour to the liver, suggesting cirrhosis             Chest x-ray  Marginal inspiration, without evidence of acute cardiopulmonary disease               Active Hospital Problems    Diagnosis Date Noted    Hyponatremia [E87.1]      Priority: High       PAST MEDICAL HISTORY      Diagnosis Date    ADHD (attention deficit hyperactivity disorder)     ADHD (attention deficit hyperactivity disorder)     Atypical chest pain     Chronic bronchitis (HCC)     Hypertension     Hyponatremia     Meniere's disease     Narcolepsy     Nasal fracture     PND (post-nasal drip) 4/21/2014    SOB (shortness of breath)     Tibia fracture     right tibial plateau fx, right syndesmotic fx    Transaminasemia 4/21/2014       PAST SURGICAL HISTORY  Past Surgical History:   Procedure Laterality Date    ANKLE SURGERY      open reduction internal fixation of the right ankle & tibial plateau fx    CORONARY ANGIOPLASTY WITH STENT PLACEMENT N/A 10/10/2015    CYST REMOVAL      right side of face    FEMUR SURGERY Right 04/13/2021     DISTAL FEMUR REPLACEMENT (Right )    KNEE ARTHROPLASTY Right 4/13/2021    DISTAL FEMUR REPLACEMENT performed by Christy Parsons DO at [facility] Right     total knee       SOCIAL HISTORY  Social History     Tobacco Use    Smoking status: Current Every Day Smoker     Packs/day: 1.00     Years: 38.00     Pack years: 38.00     Types: Cigarettes    Smokeless tobacco: Never Used    Tobacco comment: e-cigs   Vaping Use    Vaping Use: Every day    Substances: Always   Substance Use Topics    Alcohol use:  Yes     Alcohol/week: 16.0 standard drinks     Types: 16 Cans of beer per week     Comment: daily    Drug use: No       ALLERGIES  Allergies   Allergen Reactions    Motrin [Ibuprofen Micronized]      Pt states he had blood in stool from motrin MEDICATIONS  Current Medications    megestrol  200 mg Oral Daily    Armodafinil  150 mg Oral Daily    atorvastatin  40 mg Oral Nightly    carvedilol  3.125 mg Oral BID WC    clopidogrel  75 mg Oral Daily    levothyroxine  50 mcg Oral Daily    therapeutic multivitamin-minerals  1 tablet Oral Daily    QUEtiapine  25 mg Oral Nightly    thiamine  100 mg Oral Daily    sodium chloride flush  5-40 mL IntraVENous 2 times per day    enoxaparin  40 mg SubCUTAneous Daily     HYDROcodone 5 mg - acetaminophen, albuterol sulfate HFA, sodium chloride flush, sodium chloride, potassium chloride **OR** potassium alternative oral replacement **OR** potassium chloride, magnesium sulfate, ondansetron **OR** ondansetron, polyethylene glycol, acetaminophen **OR** acetaminophen  IV Drips/Infusions   sodium chloride      [Held by provider] sodium chloride Stopped (03/06/22 0048)     Home Medications  No current facility-administered medications on file prior to encounter.      Current Outpatient Medications on File Prior to Encounter   Medication Sig Dispense Refill    acetaminophen (TYLENOL) 500 MG tablet Take 1 tablet by mouth 4 times daily as needed for Pain 360 tablet 1    Multiple Vitamins-Minerals (CERTAVITE SENIOR/ANTIOXIDANT) TABS TAKE 1 TAB BY MOUTH ONCE A DAY 30 tablet 5    GLUCOSAMINE-CHONDROITIN -400 MG tablet TAKE 1 TAB BY MOUTH TWICE A DAY 60 tablet 0    carvedilol (COREG) 3.125 MG tablet TAKE 1 TAB BY MOUTH TWICE A DAY WITH MEALS 60 tablet 0    Armodafinil (NUVIGIL) 150 MG TABS tablet Take 1 tablet by mouth daily 30 tablet 1    loratadine (CLARITIN) 10 MG tablet TAKE 1 TAB BY MOUTH ONCE A DAY 30 tablet 3    levothyroxine (SYNTHROID) 25 MCG tablet Take 1 tablet by mouth Daily (Patient taking differently: Take 50 mcg by mouth Daily ) 30 tablet 3    QUEtiapine (SEROQUEL) 25 MG tablet Take 1 tablet by mouth nightly 60 tablet 3    melatonin 3 MG TABS tablet Take 3 mg by mouth daily      atorvastatin (LIPITOR) 40 MG tablet Take 1 tablet by mouth nightly 30 tablet 3    clopidogrel (PLAVIX) 75 MG tablet Take 1 tablet by mouth daily 30 tablet 3    thiamine 100 MG tablet Take 1 tablet by mouth daily 30 tablet 3    Magnesium Oxide 250 MG TABS Take 1 tablet by mouth daily (Patient taking differently: Take 500 mg by mouth daily ) 30 tablet 3    meclizine (ANTIVERT) 25 MG tablet Take 0.5 tablets by mouth 3 times daily as needed 90 tablet 3    furosemide (LASIX) 20 MG tablet Take 1 tablet by mouth daily 60 tablet 3    Diapers & Supplies MISC Adult diapers dispense quantity allowed by insurance 100 each 11    Diapers & Supplies MISC Wipes  dispense quantity allowed by insurance 100 each 11    PROAIR  (90 BASE) MCG/ACT inhaler inhale 2 puffs every 4 to 6 hours if needed 8.5 g 2    mometasone (NASONEX) 50 MCG/ACT nasal spray 2 sprays by Nasal route daily. 1 Inhaler 3    docusate sodium (COLACE) 100 MG capsule Take 100 mg by mouth 2 times daily.  folic acid (FOLVITE) 1 MG tablet Take 1 mg by mouth daily.  chlorhexidine (PERIDEX) 0.12 % solution Take 15 mLs by mouth 2 times daily.  SALINE MIST SPRAY NA by Nasal route.  Multiple Vitamin (MULTIVITAMIN PO) Take  by mouth.            Data         /60   Pulse 86   Temp 98.4 °F (36.9 °C) (Oral)   Resp 18   Ht 5' 10\" (1.778 m)   Wt 180 lb (81.6 kg)   SpO2 92%   BMI 25.83 kg/m²     Wt Readings from Last 3 Encounters:   03/05/22 180 lb (81.6 kg)   04/30/21 176 lb (79.8 kg)   04/15/21 176 lb (79.8 kg)        Code Status: Full Code     ADVANCED CARE PLANNING:  Patient has capacity for medical decisions: yes  [address]: wants to complete spiritual care consulted  Living Will: no     Personal, Social, and Family History  Marital Status:   Living situation: alone  Importance of gabriela/Quaker/spiritual beliefs: [] Very [] Somewhat [] Not   Psychological Distress: moderate  Does patient understand diagnosis/treatment? yes  Does caregiver understand diagnosis/treatment? not asked    Past Medical History:   Diagnosis Date    ADHD (attention deficit hyperactivity disorder)     ADHD (attention deficit hyperactivity disorder)     Atypical chest pain     Chronic bronchitis (HCC)     Hypertension     Hyponatremia     Meniere's disease     Narcolepsy     Nasal fracture     PND (post-nasal drip) 4/21/2014    SOB (shortness of breath)     Tibia fracture     right tibial plateau fx, right syndesmotic fx    Transaminasemia 4/21/2014         Family History   Problem Relation Age of Onset    Heart Disease Mother         heart attack    Heart Disease Father        Social History     Tobacco Use    Smoking status: Current Every Day Smoker     Packs/day: 1.00     Years: 38.00     Pack years: 38.00     Types: Cigarettes    Smokeless tobacco: Never Used    Tobacco comment: e-cigs   Vaping Use    Vaping Use: Every day    Substances: Always   Substance Use Topics    Alcohol use: Yes     Alcohol/week: 16.0 standard drinks     Types: 16 Cans of beer per week     Comment: daily    Drug use: No           Assessment        REVIEW OF SYSTEMS  Constitutional: alert and oriented follow commands and answers questions   Eyes: no eye pain or blurred vision  ENT: no hearing loss, congestion, or difficulty swallowing   Respiratory: Lungs diminished respirations relaxed oxygen per NC   Cardiovascular: no chest pain or pressure, no palpitations, no diaphoresis   Gastrointestinal: no nausea, vomiting,abdominal pain, diarrhea or constipation, no melena   Genitourinary: per urinal and some incontinence   Musculoskeletal: generalized weakness and discomfort   Skin: right knee discoloration Right leg with edema   Neurological: alert and oriented follow commands and answers questions     PHYSICAL ASSESSMENT:  Constitutional:alert and oriented follow commands and answers questions   Head: Normocephalic and atraumatic. Eyes: EOM are normal. Pupils are equal, round. Neck: Normal range of motion. Neck supple. No tracheal deviation present. Cardiovascular: Normal rate and regular rhythm, S1, S2, no murmur. AICD  Pulmonary/Chest: lungs diminished respirations relaxed oxygen per nc   Abdomen: Soft. No tenderness, not distended, no ascites, no organomegaly. Musculoskeletal: generalized weakness and discomfort   Neurological: CN II-XII grossly intact, no focal neurological deficits. Skin: right knee discoloration Right leg with edema     Palliative Performance Scale:  ___60%  Ambulation reduced; Significant disease; Can't do hobbies/housework; intake normal or reduced; occasional assist; LOC full/confusion  ___50%  Mainly sit/lie; Extensive disease; Can't do any work; Considerable assist; intake normal or reduced; LOC full/confusion  _x__40%  Mainly in bed; Extensive disease; Mainly assist; intake normal or reduced; LOC full/confusion   ___30%  Bed Bound; Extensive disease; Total care; intake reduced; LOC full/confusion  ___20%  Bed Bound; Extensive disease; Total care; intake minimal; Drowsy/coma  ___10%  Bed Bound; Extensive disease; Total care; Mouth care only; Drowsy/coma  ___0       Death      Principle Problem/Diagnosis:  Hyponatremia    Additional Assessments:   Principal Problem:    Hyponatremia  Active Problems:    Narcolepsy    HTN (hypertension)    Dementia with behavioral disturbance (HCC)    Fracture of femoral neck, right, closed ([facility] [address]  Resolved Problems:    * No resolved hospital problems.  *    1- Symptom management/ pain control     Pain Assessment:  Tylenol and norco               Anxiety:  none                          Dyspnea:  oxygen per NC                           Fatigue:  generalized weakness and discomfort     Other:      2- Goals of care evaluation   The patient goals of care are improve or maintain function/quality of life   Goals of care discussed with:    [x] Patient independently    [] Patient and Family    [] Family or Healthcare DPOA independently    [] Unable to discuss with patient, family/DPOA not present    3- Code Status  Full Code    4- Other recommendations   - We will continue to provide comfort and support to the patient and the family    Palliative Care will continue to follow Mr. Cherry's care as needed. Thank you for allowing Palliative Care to participate in the care of Mr. Cherry . This note has been dictated by dragon, typing errors may be a possibility. The total time I spent in seeing the patient, discussing goals of care, advanced directives, code status, greater than 50% time in counseling and other major issues was more than 60 minutes      Electronically signed by   TIFFANIE Price NP  Palliative Care Team  on 3/6/2022 at 1:21 PM    Palliative care office: [phone]    Please call with any palliative questions or concerns. Palliative Care Team is available via perfect serve or via phone.

## 2022-03-06 NOTE — CONSULTS
PULMONARY & CRITICAL CARE MEDICINE CONSULT NOTE     Patient:  Rivas Simmons  MRN: [id number]  Admit date: 3/5/2022  Primary Care Physician: Jaxson Robison DO  Consulting Physician: Remington Onofre MD    HISTORY     CHIEF COMPLAINT/REASON FOR CONSULT:    New oxygen requirement concern for undiagnosed COPD    HISTORY OF PRESENT ILLNESS:  The patient is a 81 y.o. male with past medical history of heart failure with reduced ejection fraction EF 20% s/p AICD, knee osteoarthritis presents with complaints of generalized weakness. Patient is wheelchair-bound since his knee surgery for knee osteoarthritis. Pulmonology consulted for new oxygen requirement and suspicion for COPD    Patient has cough with whitish sputum, exertional shortness of breath. Denies hemoptysis/loss of weight loss of appetite/chest pain/fever    Afebrile hemodynamically stable on 2 L oxygen via nasal cannula    WBC 12.3    On my examination, clear bilateral air entry. No pedal edema. Not in respiratory distress. Chest x-ray: No acute pulmonary disease  CT PE negative for PE.   Diffuse emphysematous changes with upper lobe predominance    PAST MEDICAL HISTORY:        Diagnosis Date    ADHD (attention deficit hyperactivity disorder)     ADHD (attention deficit hyperactivity disorder)     Atypical chest pain     Chronic bronchitis (HCC)     Hypertension     Hyponatremia     Meniere's disease     Narcolepsy     Nasal fracture     PND (post-nasal drip) 4/21/2014    SOB (shortness of breath)     Tibia fracture     right tibial plateau fx, right syndesmotic fx    Transaminasemia 4/21/2014     PAST SURGICAL HISTORY:        Procedure Laterality Date    ANKLE SURGERY      open reduction internal fixation of the right ankle & tibial plateau fx    CORONARY ANGIOPLASTY WITH STENT PLACEMENT N/A 10/10/2015    CYST REMOVAL      right side of face    FEMUR SURGERY Right 04/13/2021     DISTAL FEMUR REPLACEMENT (Right )    KNEE ARTHROPLASTY Right 4/13/2021    DISTAL FEMUR REPLACEMENT performed by Jhonatan Aviles DO at [facility] [place] [id number] Right     total knee     FAMILY HISTORY:       Problem Relation Age of Onset    Heart Disease Mother         heart attack    Heart Disease Father      SOCIAL HISTORY:   TOBACCO:   reports that he has been smoking cigarettes. He has a 38.00 pack-year smoking history. He has never used smokeless tobacco.  ETOH:  reports current alcohol use of about 16.0 standard drinks of alcohol per week. DRUGS: reports no history of drug use. ALLERGIES:    Allergies   Allergen Reactions    Motrin [Ibuprofen Micronized]      Pt states he had blood in stool from motrin         HOME MEDICATIONS:  Prior to Admission medications    Medication Sig Start Date End Date Taking?  Authorizing Provider   acetaminophen (TYLENOL) 500 MG tablet Take 1 tablet by mouth 4 times daily as needed for Pain 4/15/21   Lit Fuentes, DO   Multiple Vitamins-Minerals (CERTAVITE SENIOR/ANTIOXIDANT) TABS TAKE 1 TAB BY MOUTH ONCE A DAY 6/22/16   Iris Lynn PA-C   GLUCOSAMINE-CHONDROITIN -400 MG tablet TAKE 1 TAB BY MOUTH TWICE A DAY 6/7/16   Iris Lynn PA-C   carvedilol (COREG) 3.125 MG tablet TAKE 1 TAB BY MOUTH TWICE A DAY WITH MEALS 6/7/16   Iris Lynn PA-C   Armodafinil (NUVIGIL) 150 MG TABS tablet Take 1 tablet by mouth daily 4/26/16   Betito Cason DO   loratadine (CLARITIN) 10 MG tablet TAKE 1 TAB BY MOUTH ONCE A DAY 3/24/16   Betito Cason DO   levothyroxine (SYNTHROID) 25 MCG tablet Take 1 tablet by mouth Daily  Patient taking differently: Take 50 mcg by mouth Daily  3/24/16   Betito Cason DO   QUEtiapine (SEROQUEL) 25 MG tablet Take 1 tablet by mouth nightly 3/24/16   Betito Cason DO   melatonin 3 MG TABS tablet Take 3 mg by mouth daily    Historical Provider, MD   atorvastatin (LIPITOR) 40 MG tablet Take 1 tablet by mouth nightly 11/10/15   Tee Angel MD   clopidogrel (PLAVIX) 75 MG tablet Take 1 tablet by mouth daily 11/10/15   Chuy Dangelo MD   thiamine 100 MG tablet Take 1 tablet by mouth daily 11/10/15   Chuy Dangelo MD   Magnesium Oxide 250 MG TABS Take 1 tablet by mouth daily  Patient taking differently: Take 500 mg by mouth daily  11/10/15   Chuy Dangelo MD   meclizine (ANTIVERT) 25 MG tablet Take 0.5 tablets by mouth 3 times daily as needed 11/10/15   Chuy Dangelo MD   furosemide (LASIX) 20 MG tablet Take 1 tablet by mouth daily 11/10/15   Chuy Dangelo MD   [address] Adult diapers dispense quantity allowed by insurance 3/31/15   Caroline Lynn PA-C   Diapers & Supplies MISC Wipes  dispense quantity allowed by insurance 3/31/15   Iris Lynn PA-C   PROAIR  (90 BASE) MCG/ACT inhaler inhale 2 puffs every 4 to 6 hours if needed 11/29/14   Iris Lynn PA-C   mometasone (NASONEX) 50 MCG/ACT nasal spray 2 sprays by Nasal route daily. 12/23/13   Yasmany Mckeon MD   docusate sodium (COLACE) 100 MG capsule Take 100 mg by mouth 2 times daily. Historical Provider, MD   folic acid (FOLVITE) 1 MG tablet Take 1 mg by mouth daily. Historical Provider, MD   chlorhexidine (PERIDEX) 0.12 % solution Take 15 mLs by mouth 2 times daily. Historical Provider, MD   SALINE MIST SPRAY NA by Nasal route. Historical Provider, MD   Multiple Vitamin (MULTIVITAMIN PO) Take  by mouth.       Historical Provider, MD     IMMUNIZATIONS:  Most Recent Immunizations   Administered Date(s) Administered    Influenza Virus Vaccine 10/15/2015    Pneumococcal Conjugate 13-valent (Hayde Crape) 02/23/2016    Tdap (Boostrix, Adacel) 03/23/2020       REVIEW OF SYSTEMS:  General: negative for chills, fatigue or fever  ENT: negative for headaches, nasal congestion, sore throat or visual changes  Allergy and Immunology: negative for postnasal drip or seasonal allergies  Hematological and Lymphatic: negative for bleeding problems, swollen lymph nodes  Respiratory: swelling   Extremities - peripheral pulses normal, no pedal edema, no clubbing or cyanosis   Skin - normal coloration and turgor, no rashes, no suspicious skin lesions noted    DATA REVIEW     Medications: Current Inpatient  Scheduled Meds:   megestrol  200 mg Oral Daily    Armodafinil  150 mg Oral Daily    atorvastatin  40 mg Oral Nightly    carvedilol  3.125 mg Oral BID WC    clopidogrel  75 mg Oral Daily    levothyroxine  50 mcg Oral Daily    therapeutic multivitamin-minerals  1 tablet Oral Daily    QUEtiapine  25 mg Oral Nightly    thiamine  100 mg Oral Daily    sodium chloride flush  5-40 mL IntraVENous 2 times per day    enoxaparin  40 mg SubCUTAneous Daily     Continuous Infusions:   sodium chloride      [Held by provider] sodium chloride Stopped (03/06/22 0048)     INPUT/OUTPUT:  In: 1000   Out: -     LABS:-  ABGs:   No results found for: PH, PCO2, PO2, HCO3, O2SAT  No results for input(s): PHART, PO2ART, PPA0WSQ, TRS6QJB, BEART, Y4LGWJAP in the last 72 hours. Lab Results   Component Value Date    POCPH 7.54 (H) 10/24/2015    POCPCO2 35 10/24/2015    POCPO2 71 (L) 10/24/2015    POCHCO3 29.9 (H) 10/24/2015    AFUN1VYZ 96 10/24/2015     CBC:   Recent Labs     03/05/22  1604   WBC 12.3*   HGB 9.8*   HCT 31.7*   MCV 84.8      LYMPHOPCT 15*   RBC 3.74*   MCH 26.2   MCHC 30.9   RDW 18.9*     BMP:   Recent Labs     03/05/22  1604 03/05/22  2318 03/06/22  0432   * 127* 129*   K 4.7 4.7 5.4*   CL 97* 96* 98   CO2 16* 19* 18*   BUN 4* 5* 7*   CREATININE 0.80 0.79 0.80   GLUCOSE 119* 130* 112*     Liver Function Test:   Recent Labs     03/05/22  1604   PROT 6.8   LABALBU 3.4*   ALT 15   AST 32   ALKPHOS 200*   BILITOT 0.28*     Amylase/Lipase:  Recent Labs     03/05/22  1604   LIPASE 13     Coagulation Profile:   Recent Labs     03/06/22  0432   INR 1.1   PROTIME 11.9     Cardiac Enzymes:  No results for input(s): CKTOTAL, CKMB, CKMBINDEX, TROPONINI in the last 72 hours.   Lactic Acid:  No results found for: LACTA  BNP:   Lab Results   Component Value Date     (H) 10/09/2012     D-Dimer:  No results found for: DDIMER  Others:   Lab Results   Component Value Date    TSH 5.18 (H) 02/19/2022    Z1OFIKM 7.1 02/18/2016    FT3 1.97 (L) 11/03/2015     Lab Results   Component Value Date    SEDRATE 23 (H) 04/11/2021    CRP 89.4 (H) 04/11/2021     Lab Results   Component Value Date    URICACID 3.6 10/09/2012     Lab Results   Component Value Date    IRON 16 (L) 03/24/2020    TIBC 333 03/24/2020    FERRITIN 1,182 (H) 10/14/2015     No results found for: SPEP, UPEP  Lab Results   Component Value Date    PSA 1.14 05/03/2018     Microbiology:    Pathology:    Radiology:  XR CHEST PORTABLE    Result Date: 3/5/2022  EXAMINATION: ONE XRAY VIEW OF THE CHEST 3/5/2022 1:43 pm COMPARISON: April levin 2021 HISTORY: ORDERING SYSTEM PROVIDED HISTORY: sob TECHNOLOGIST PROVIDED HISTORY: sob Reason for Exam: port upright FINDINGS: Marginal inspiration is present. No focal area of consolidation, pleural effusion, or pneumothorax is present. Heart size appears normal.  ICD is in place. Vascular markings are distinct. No acute osseous abnormality is present. Marginal inspiration, without evidence of acute cardiopulmonary disease     CT CHEST PULMONARY EMBOLISM W CONTRAST    Result Date: 3/5/2022  EXAMINATION: CTA OF THE CHEST 3/5/2022 3:07 pm TECHNIQUE: CTA of the chest was performed after the administration of intravenous contrast.  Multiplanar reformatted images are provided for review. MIP images are provided for review. Dose modulation, iterative reconstruction, and/or weight based adjustment of the mA/kV was utilized to reduce the radiation dose to as low as reasonably achievable.  COMPARISON: Chest x-ray dated March 5, 2022 HISTORY: ORDERING SYSTEM PROVIDED HISTORY: sob, new O2 requirement TECHNOLOGIST PROVIDED HISTORY: sob, new O2 requirement Decision Support Exception - unselect if not a suspected or confirmed emergency medical condition->Emergency Medical Condition (MA) Reason for Exam: fatigue FINDINGS: Pulmonary Arteries: Pulmonary arteries are adequately opacified for evaluation. No evidence of intraluminal filling defect to suggest pulmonary embolism. Main pulmonary artery is enlarged, measuring 32 mm, suggesting pulmonary arterial hypertension. Mediastinum: No evidence of mediastinal lymphadenopathy. Extensive coronary arterial calcifications are present. Small pericardial effusion is noted. The heart and pericardium demonstrate no acute abnormality. There is no acute abnormality of the thoracic aorta. Lungs/pleura: Diffuse emphysematous changes are present throughout the lungs. Subsegmental atelectasis is present within the right lung base. No focal area of consolidation, pleural effusion, or pneumothorax is present. Upper Abdomen: Images through the upper abdomen demonstrate cholelithiasis, without evidence of cholecystitis. Nodular contour to the liver is present, suggesting cirrhosis. Soft Tissues/Bones: No acute bone or soft tissue abnormality. 1. No evidence of pulmonary embolism 2. Dependent atelectasis, right lung base 3. Diffuse emphysematous changes present throughout the lungs, with an upper lobe predominance 4. Extensive coronary arterial calcifications 5. Cholelithiasis, without evidence of cholecystitis 6. Nodular contour to the liver, suggesting cirrhosis       Pulmonary Function test:    Polysomnogram:    Echocardiogram:   No results found for this or any previous visit. No results found for this or any previous visit. Cardiac Catheterization:   No results found for this or any previous visit. ASSESSMENT AND PLAN     Assessment:     This is a 62-year-old male with past medical history of systolic CHF EF 39% 7352, s/p AICD, knee osteoarthritis s/p knee replacement hypothyroidism and narcolepsy presents with generalized weakness    Pulmonology consulted for new oxygen requirement and suspicion for undiagnosed COPD      Plan:    I personally interviewed/examined the patient; reviewed interval history, interpreted all available radiographic and laboratory data at the time of service. Not in acute exacerbation of COPD  Recommend DuoNeb and albuterol as needed  Pulmonary function test outpatient  Establish care with pulmonologist outpatient  Oxygen supplementation if needed to maintain SPO2 88-92%      It was my pleasure to evaluate [address] today. We will continue to follow. I would like to thank you for allowing me to participate in the care of this patient. Please feel free to call with any further questions or concerns. Mariya King MD  PGY-3, Internal Medicine Resident  [address] [place] [address] St  3/6/2022 11:22 AM    Please note that this chart was generated using voice recognition Dragon dictation software. Although every effort was made to ensure the accuracy of this automated transcription, some errors in transcription may have occurred.

## 2022-03-06 NOTE — CARE COORDINATION
Case Management Initial Discharge Plan  [address],             Met with:patient to discuss discharge plans. Information verified: address, contacts, phone number, , insurance Yes  Insurance Provider: [facility]    Emergency Contact/Next of Kin name & number: Skylar Kessler, friend at [phone]  Who are involved in patient's support system? Friend and \"son lives away\"    PCP: Jose De Jesus Art DO  Date of last visit: last week      Discharge Planning    Living Arrangements:  Alone     Home has 1 stories  4 stairs to climb to get into front door, Location of bedroom/bathroom in home main    Patient able to perform ADL's:Assisted    Current Services (outpatient & in home) none  DME equipment: walker, w/c   DME provider: unknown    Is patient receiving oral anticoagulation therapy? Yes    If indicated:   Physician managing anticoagulation treatment: Evelio Krueger  Where does patient obtain lab work for ATC treatment? [facility]      Potential Assistance Needed:  Other ([facility])    Patient agreeable to home care: No  [place] of choice provided:  no    Prior SNF/Rehab Placement and Facility: yes,  A [facility], does not want to return, encouraged to choose another SNF  Agreeable to SNF/Rehab: Yes  [place] of choice provided: yes     Evaluation: yes    Expected Discharge date:       Patient expects to be discharged to:        If home: is the family and/or caregiver wiling & able to provide support at home? no  Who will be providing this support? none    Follow Up Appointment: Best Day/ Time:      Transportation provider: [facility] or [facility]  Transportation arrangements needed for discharge: Yes    Readmission Risk              Risk of Unplanned Readmission:  20             Does patient have a readmission risk score greater than 14?: Yes  If yes, follow-up appointment must be made within 7 days of

## 2022-03-06 NOTE — ACP (ADVANCE CARE PLANNING)
Advance Care Planning     Advance Care Planning (ACP) Physician/NP/PA Conversation    Date of Conversation: 3/6/2022  Conducted with: Patient with Decision Making Capacity    Healthcare Decision Maker:  Patient is  and has 5 biological children. 1 child is , then 2 sons Kassidy Lacey and Michaela Cisneros and 2 daughter Jake Ojeda. He has not completed DPOA or living will paperwork but he wants to and to make his girl friend Enid Fowler his POA. Spiritual care is consulted. Care Preferences:    Hospitalization: \"If your health worsens and it becomes clear that your chance of recovery is unlikely, what would be your preference regarding hospitalization? \"  Hospitalized     Ventilation: \"If you were unable to breath on your own and your chance of recovery was unlikely, what would be your preference about the use of a ventilator (breathing machine) if it was available to you? \"  Full code     Resuscitation: \"In the event your heart stopped as a result of an underlying serious health condition, would you want attempts made to restart your heart, or would you prefer a natural death? \"  Full code     Conversation Outcomes / Follow-Up Plan:  Palliative Interaction:  I went to bedside and seen patient. I introduced myself and my palliative care role.     Patient is  and has 5 biological children. 1 child is , then 2 sons Kassidy Lacey and Michaela Cisneros and 2 daughter Jake Ojeda. He has not completed DPOA or living will paperwork but he wants to and to make his girl friend Enid Fowler his POA. Spiritual care is consulted.     I discussed code status levels and explained each level completely. Patient states he wants to remain a full code status.     Patient lives alone and has aid that helps to cook him meals. He is very weak and has fallen a couple of times getting into bed.     Emotional support provided and Palliative care will continue to follow for emotional support and updates as needed.     Length of Voluntary ACP Conversation in minutes:  16 minutes    TIFFANIE Mena NP

## 2022-03-06 NOTE — ED NOTES
Report given to floor Nurse [facility]  All questions and concerns answered by this nurse. Patient update on care plan.         Thien Diop, [facility]  03/05/22 2830
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
## 2022-03-06 NOTE — PROGRESS NOTES
Comprehensive Nutrition Assessment    Type and Reason for Visit:  Consult (poor po intake)    Nutrition Recommendations/Plan:   -Continue with current diet, monitor/encourage adequate intake  -Add Ensure ONS to meal trays to maximize nutrition    Nutrition Assessment:  Admitted d/t weakness, s/p fall. Per notes, pt has home aide that cooks meals. At time of visit, breakfast at bedside untouched, pt reports doesn't feel like eating. States appetite has been decreased for months. Reports no weight changes. Would like nutrition supplement on trays    Malnutrition Assessment:  Malnutrition Status: At risk for malnutrition (Comment)    Context:  Acute Illness     Findings of the 6 clinical characteristics of malnutrition:  Energy Intake:  7 - 50% or less of estimated energy requirements for 5 or more days  Weight Loss:  No significant weight loss     Body Fat Loss:  No significant body fat loss     Muscle Mass Loss:  No significant muscle mass loss    Fluid Accumulation:  1 - Mild Extremities   Strength:  Not Performed    Estimated Daily Nutrient Needs:  Energy (kcal):  5920-4944 kcal/d (22-25 kcal/kg); Weight Used for Energy Requirements:  Current     Protein (g):   gm pro/d (1.3-1.5gm/kg); Weight Used for Protein Requirements:  Ideal        Fluid (ml/day):  or per MD; Method Used for Fluid Requirements:  1 ml/kcal      Nutrition Related Findings:  labs/meds reviewed, trace edema LE      Wounds:   (necrotic toes)       Current Nutrition Therapies:    ADULT DIET; Regular  ADULT ORAL NUTRITION SUPPLEMENT; Breakfast, Lunch, Dinner; Standard High Calorie/High Protein Oral Supplement    Anthropometric Measures:  · Height: 5' 10\" (177.8 cm)  · Current Body Weight: 179 lb 14.3 oz (81.6 kg)     · Ideal Body Weight: 166 lbs; % Ideal Body Weight 108.4 %   · BMI: 25.8   · BMI Categories: Overweight (BMI 25.0-29. 9)       Nutrition Diagnosis:   · Inadequate oral intake related to other (comment) (decreased appetite reported) as evidenced by poor intake prior to admission      Nutrition Interventions:   Food and/or Nutrient Delivery:  Continue Current Diet,Start Oral Nutrition Supplement  Nutrition Education/Counseling:  No recommendation at this time   Coordination of Nutrition Care:  Continue to monitor while inpatient    Goals:  Meet 50% or greater of estimated nutrition needs       Nutrition Monitoring and Evaluation:   Behavioral-Environmental Outcomes:  None Identified   Food/Nutrient Intake Outcomes:  Food and Nutrient Intake,Supplement Intake  Physical Signs/Symptoms Outcomes:  Biochemical Data,Nutrition Focused Physical Findings,Skin,Weight,Meal Time Behavior     Discharge Planning:     Too soon to determine     Electronically signed by Kathrine Puente RD, LD on 3/6/22 at 10:35 AM EST    Contact: [phone]

## 2022-03-06 NOTE — PLAN OF CARE
Nutrition Problem #1: Inadequate oral intake  Intervention: Food and/or Nutrient Delivery: Continue Current Diet,Start Oral Nutrition Supplement  Nutritional Goals: Meet 50% or greater of estimated nutrition needs

## 2022-03-07 ENCOUNTER — TELEPHONE (OUTPATIENT)
Dept: PULMONOLOGY | Age: 72
End: 2022-03-07

## 2022-03-07 LAB
ANION GAP SERPL CALCULATED.3IONS-SCNC: 9 MMOL/L (ref 9–17)
BUN BLDV-MCNC: 10 MG/DL (ref 8–23)
CALCIUM SERPL-MCNC: 9.3 MG/DL (ref 8.6–10.4)
CHLORIDE BLD-SCNC: 100 MMOL/L (ref 98–107)
CO2: 22 MMOL/L (ref 20–31)
CREAT SERPL-MCNC: 0.86 MG/DL (ref 0.7–1.2)
EKG ATRIAL RATE: 81 BPM
EKG ATRIAL RATE: 86 BPM
EKG Q-T INTERVAL: 408 MS
EKG Q-T INTERVAL: 416 MS
EKG QRS DURATION: 100 MS
EKG QRS DURATION: 92 MS
EKG QTC CALCULATION (BAZETT): 470 MS
EKG QTC CALCULATION (BAZETT): 476 MS
EKG R AXIS: -45 DEGREES
EKG R AXIS: -57 DEGREES
EKG T AXIS: 46 DEGREES
EKG T AXIS: 59 DEGREES
EKG VENTRICULAR RATE: 77 BPM
EKG VENTRICULAR RATE: 82 BPM
GFR AFRICAN AMERICAN: >60 ML/MIN
GFR NON-AFRICAN AMERICAN: >60 ML/MIN
GFR SERPL CREATININE-BSD FRML MDRD: ABNORMAL ML/MIN/{1.73_M2}
GLUCOSE BLD-MCNC: 99 MG/DL (ref 70–99)
HCT VFR BLD CALC: 29 % (ref 40.7–50.3)
HEMOGLOBIN: 8.9 G/DL (ref 13–17)
LV EF: 68 %
LVEF MODALITY: NORMAL
MCH RBC QN AUTO: 26.3 PG (ref 25.2–33.5)
MCHC RBC AUTO-ENTMCNC: 30.7 G/DL (ref 28.4–34.8)
MCV RBC AUTO: 85.8 FL (ref 82.6–102.9)
NRBC AUTOMATED: 0 PER 100 WBC
PDW BLD-RTO: 19.4 % (ref 11.8–14.4)
PLATELET # BLD: 286 K/UL (ref 138–453)
PMV BLD AUTO: 9 FL (ref 8.1–13.5)
POTASSIUM SERPL-SCNC: 4.5 MMOL/L (ref 3.7–5.3)
RBC # BLD: 3.38 M/UL (ref 4.21–5.77)
SODIUM BLD-SCNC: 131 MMOL/L (ref 135–144)
WBC # BLD: 11.4 K/UL (ref 3.5–11.3)

## 2022-03-07 PROCEDURE — 2580000003 HC RX 258: Performed by: INTERNAL MEDICINE

## 2022-03-07 PROCEDURE — 93970 EXTREMITY STUDY: CPT

## 2022-03-07 PROCEDURE — 93010 ELECTROCARDIOGRAM REPORT: CPT | Performed by: INTERNAL MEDICINE

## 2022-03-07 PROCEDURE — 2580000003 HC RX 258: Performed by: NURSE PRACTITIONER

## 2022-03-07 PROCEDURE — 6370000000 HC RX 637 (ALT 250 FOR IP): Performed by: NURSE PRACTITIONER

## 2022-03-07 PROCEDURE — 86920 COMPATIBILITY TEST SPIN: CPT

## 2022-03-07 PROCEDURE — 85027 COMPLETE CBC AUTOMATED: CPT

## 2022-03-07 PROCEDURE — 6370000000 HC RX 637 (ALT 250 FOR IP): Performed by: INTERNAL MEDICINE

## 2022-03-07 PROCEDURE — 6360000002 HC RX W HCPCS: Performed by: INTERNAL MEDICINE

## 2022-03-07 PROCEDURE — 36415 COLL VENOUS BLD VENIPUNCTURE: CPT

## 2022-03-07 PROCEDURE — 86901 BLOOD TYPING SEROLOGIC RH(D): CPT

## 2022-03-07 PROCEDURE — 94640 AIRWAY INHALATION TREATMENT: CPT

## 2022-03-07 PROCEDURE — 1200000000 HC SEMI PRIVATE

## 2022-03-07 PROCEDURE — 93306 TTE W/DOPPLER COMPLETE: CPT

## 2022-03-07 PROCEDURE — 80048 BASIC METABOLIC PNL TOTAL CA: CPT

## 2022-03-07 PROCEDURE — 86900 BLOOD TYPING SEROLOGIC ABO: CPT

## 2022-03-07 PROCEDURE — 99232 SBSQ HOSP IP/OBS MODERATE 35: CPT | Performed by: INTERNAL MEDICINE

## 2022-03-07 PROCEDURE — 86850 RBC ANTIBODY SCREEN: CPT

## 2022-03-07 RX ORDER — TRAMADOL HYDROCHLORIDE 50 MG/1
50 TABLET ORAL EVERY 6 HOURS PRN
Status: DISCONTINUED | OUTPATIENT
Start: 2022-03-07 | End: 2022-03-14 | Stop reason: HOSPADM

## 2022-03-07 RX ORDER — SODIUM CHLORIDE 1000 MG
1 TABLET, SOLUBLE MISCELLANEOUS 2 TIMES DAILY WITH MEALS
Status: DISCONTINUED | OUTPATIENT
Start: 2022-03-07 | End: 2022-03-14 | Stop reason: HOSPADM

## 2022-03-07 RX ORDER — CHOLECALCIFEROL (VITAMIN D3) 125 MCG
1000 CAPSULE ORAL DAILY
Status: DISCONTINUED | OUTPATIENT
Start: 2022-03-07 | End: 2022-03-14 | Stop reason: HOSPADM

## 2022-03-07 RX ORDER — FLUTICASONE PROPIONATE 50 MCG
1 SPRAY, SUSPENSION (ML) NASAL DAILY
Status: ON HOLD | COMMUNITY
End: 2022-03-07

## 2022-03-07 RX ORDER — GUAIFENESIN 400 MG/1
400 TABLET ORAL
Status: ON HOLD | COMMUNITY
End: 2022-03-07

## 2022-03-07 RX ORDER — LORAZEPAM 1 MG/1
1 TABLET ORAL EVERY 8 HOURS PRN
Status: DISCONTINUED | OUTPATIENT
Start: 2022-03-07 | End: 2022-03-14 | Stop reason: HOSPADM

## 2022-03-07 RX ORDER — FLUTICASONE PROPIONATE 50 MCG
1 SPRAY, SUSPENSION (ML) NASAL DAILY
Status: DISCONTINUED | OUTPATIENT
Start: 2022-03-07 | End: 2022-03-14 | Stop reason: HOSPADM

## 2022-03-07 RX ORDER — FOLIC ACID 1 MG/1
1 TABLET ORAL DAILY
Status: DISCONTINUED | OUTPATIENT
Start: 2022-03-07 | End: 2022-03-14 | Stop reason: HOSPADM

## 2022-03-07 RX ORDER — ASCORBIC ACID 500 MG
500 TABLET ORAL
COMMUNITY

## 2022-03-07 RX ORDER — LORAZEPAM 1 MG/1
1 TABLET ORAL 3 TIMES DAILY PRN
Status: ON HOLD | COMMUNITY
End: 2022-03-07

## 2022-03-07 RX ORDER — SODIUM CHLORIDE 1000 MG
1 TABLET, SOLUBLE MISCELLANEOUS DAILY
COMMUNITY

## 2022-03-07 RX ORDER — TRAMADOL HYDROCHLORIDE 50 MG/1
50 TABLET ORAL
Status: ON HOLD | COMMUNITY
End: 2022-03-10 | Stop reason: SDUPTHER

## 2022-03-07 RX ORDER — AMIODARONE HYDROCHLORIDE 200 MG/1
TABLET ORAL
COMMUNITY

## 2022-03-07 RX ORDER — HYDROCODONE BITARTRATE AND ACETAMINOPHEN 5; 325 MG/1; MG/1
1 TABLET ORAL
Status: ON HOLD | COMMUNITY
End: 2022-03-07

## 2022-03-07 RX ORDER — ASCORBIC ACID 500 MG
500 TABLET ORAL DAILY
Status: DISCONTINUED | OUTPATIENT
Start: 2022-03-07 | End: 2022-03-14 | Stop reason: HOSPADM

## 2022-03-07 RX ORDER — MECLIZINE HCL 12.5 MG/1
12.5 TABLET ORAL 3 TIMES DAILY PRN
Status: DISCONTINUED | OUTPATIENT
Start: 2022-03-07 | End: 2022-03-14 | Stop reason: HOSPADM

## 2022-03-07 RX ORDER — LANOLIN ALCOHOL/MO/W.PET/CERES
3 CREAM (GRAM) TOPICAL NIGHTLY PRN
Status: DISCONTINUED | OUTPATIENT
Start: 2022-03-07 | End: 2022-03-14 | Stop reason: HOSPADM

## 2022-03-07 RX ORDER — FLUTICASONE PROPIONATE 110 UG/1
1 AEROSOL, METERED RESPIRATORY (INHALATION) 2 TIMES DAILY
Status: DISCONTINUED | OUTPATIENT
Start: 2022-03-07 | End: 2022-03-14 | Stop reason: HOSPADM

## 2022-03-07 RX ORDER — METHYLPHENIDATE HYDROCHLORIDE 10 MG/1
10 TABLET ORAL
COMMUNITY
End: 2022-03-17 | Stop reason: ALTCHOICE

## 2022-03-07 RX ORDER — CHLORHEXIDINE GLUCONATE 0.12 MG/ML
15 RINSE ORAL 2 TIMES DAILY
Status: DISCONTINUED | OUTPATIENT
Start: 2022-03-07 | End: 2022-03-14 | Stop reason: HOSPADM

## 2022-03-07 RX ORDER — PANTOPRAZOLE SODIUM 40 MG/1
40 TABLET, DELAYED RELEASE ORAL
Status: DISCONTINUED | OUTPATIENT
Start: 2022-03-08 | End: 2022-03-14 | Stop reason: HOSPADM

## 2022-03-07 RX ORDER — GUAIFENESIN 600 MG/1
1200 TABLET, EXTENDED RELEASE ORAL 2 TIMES DAILY
Status: DISCONTINUED | OUTPATIENT
Start: 2022-03-07 | End: 2022-03-14 | Stop reason: HOSPADM

## 2022-03-07 RX ORDER — DOCUSATE SODIUM 100 MG/1
100 CAPSULE, LIQUID FILLED ORAL 2 TIMES DAILY
Status: DISCONTINUED | OUTPATIENT
Start: 2022-03-07 | End: 2022-03-14 | Stop reason: HOSPADM

## 2022-03-07 RX ADMIN — SODIUM CHLORIDE: 9 INJECTION, SOLUTION INTRAVENOUS at 18:38

## 2022-03-07 RX ADMIN — SODIUM CHLORIDE, PRESERVATIVE FREE 10 ML: 5 INJECTION INTRAVENOUS at 10:00

## 2022-03-07 RX ADMIN — CARVEDILOL 3.12 MG: 3.12 TABLET, FILM COATED ORAL at 17:31

## 2022-03-07 RX ADMIN — CARVEDILOL 3.12 MG: 3.12 TABLET, FILM COATED ORAL at 10:21

## 2022-03-07 RX ADMIN — MORPHINE SULFATE 2 MG: 4 INJECTION INTRAVENOUS at 09:13

## 2022-03-07 RX ADMIN — LORAZEPAM 1 MG: 1 TABLET ORAL at 22:17

## 2022-03-07 RX ADMIN — QUETIAPINE FUMARATE 25 MG: 25 TABLET ORAL at 22:17

## 2022-03-07 RX ADMIN — SODIUM CHLORIDE, PRESERVATIVE FREE 10 ML: 5 INJECTION INTRAVENOUS at 00:51

## 2022-03-07 RX ADMIN — FLUTICASONE PROPIONATE 1 PUFF: 110 AEROSOL, METERED RESPIRATORY (INHALATION) at 19:20

## 2022-03-07 RX ADMIN — SODIUM CHLORIDE, PRESERVATIVE FREE 10 ML: 5 INJECTION INTRAVENOUS at 22:12

## 2022-03-07 RX ADMIN — MORPHINE SULFATE 2 MG: 4 INJECTION INTRAVENOUS at 00:51

## 2022-03-07 RX ADMIN — DOCUSATE SODIUM 100 MG: 100 CAPSULE ORAL at 22:18

## 2022-03-07 RX ADMIN — CHLORHEXIDINE GLUCONATE 0.12% ORAL RINSE 15 ML: 1.2 LIQUID ORAL at 22:16

## 2022-03-07 RX ADMIN — MORPHINE SULFATE 2 MG: 4 INJECTION INTRAVENOUS at 19:13

## 2022-03-07 RX ADMIN — Medication 3 MG: at 22:17

## 2022-03-07 RX ADMIN — SODIUM CHLORIDE: 9 INJECTION, SOLUTION INTRAVENOUS at 09:00

## 2022-03-07 RX ADMIN — MORPHINE SULFATE 2 MG: 4 INJECTION INTRAVENOUS at 04:20

## 2022-03-07 RX ADMIN — MORPHINE SULFATE 2 MG: 4 INJECTION INTRAVENOUS at 14:38

## 2022-03-07 RX ADMIN — SODIUM CHLORIDE, PRESERVATIVE FREE 10 ML: 5 INJECTION INTRAVENOUS at 04:21

## 2022-03-07 RX ADMIN — LEVOTHYROXINE SODIUM 50 MCG: 50 TABLET ORAL at 10:21

## 2022-03-07 RX ADMIN — MORPHINE SULFATE 2 MG: 4 INJECTION INTRAVENOUS at 22:12

## 2022-03-07 ASSESSMENT — PAIN SCALES - GENERAL
PAINLEVEL_OUTOF10: 10
PAINLEVEL_OUTOF10: 8
PAINLEVEL_OUTOF10: 8
PAINLEVEL_OUTOF10: 10
PAINLEVEL_OUTOF10: 10
PAINLEVEL_OUTOF10: 8

## 2022-03-07 NOTE — TELEPHONE ENCOUNTER
----- Message from Hector Ross sent at 3/7/2022  8:19 AM EST -----  Benny Scales, please see message from Dr Fortunato Cast and call to schedule. Thank you.   ----- Message -----  From: Chin Ritter MD  Sent: 3/6/2022   4:06 PM EST  To: Hcetor Ross, #    Hi Delfina and Clinic team, I hope you're well! Please call this patient for follow-up visit.     Patient seen previously by none of the docs  Diagnosis: dyspnea, r/o COPD  Seen by me (y/n): inpatient  Recommended follow up timeline: 6-8 weeks  Other tests needed: PFT      Other history:  CHF, CT shows emphysematous changes

## 2022-03-07 NOTE — PROGRESS NOTES
[facility])  Occupational Therapy Not Seen Note    DATE: 3/7/2022    NAME: Rivas Calabrese  MRN: [id number]   : 1950      Patient not seen this date for Occupational Therapy due to:     Other: dopplers ordered, and plan for OR with ortho today     Next Scheduled Treatment: check back 3/8/2022    Electronically signed by GAGE Grider on 3/7/2022 at 11:00 AM

## 2022-03-07 NOTE — PROGRESS NOTES
Physical Therapy        Physical Therapy Cancel Note      DATE: 3/7/2022    NAME: Stephanie Peñaloza  MRN: [id number]   : 1950      Patient not seen this date for Physical Therapy due to: Other: BLE venous dopplers ordered, await results. OR today with ortho.       Electronically signed by Víctor Garcia PT on 5536 at 9:16 AM

## 2022-03-07 NOTE — CONSULTS
Attestation signed by      Attending Physician Statement:    I have discussed the care of  Saba Bills , including pertinent history and exam findings, with the Cardiology fellow/resident. I have seen and examined the patient and the key elements of all parts of the encounter have been performed by me. I agree with the assessment, plan and orders as documented by the fellow/resident, after I modified exam findings and plan of treatments, and the final version is my approved version of the assessment. Additional Comments:   No chest pain or SOB  Limited functional capacity  Normal LVEF on TTE  Negative stress test a year ago  Junctional rhythm on ECG  Device interrogation prior to surgery to turn therapies off, VVI 50 bpm  Patient is at intermediate risk for MACE. [facility]   Consultation Note               Today's Date: 3/7/2022  Patient Name: Saba Bills  Date of admission: 3/5/2022  3:47 PM  Patient's age: 81 y. o., 1950  Admission Dx: Hyponatremia [E87.1]  Nephrolithiasis [N20.0]  Generalized weakness [R53.1]  Oxygen desaturation [R09.02]    Reason for Consult:  Pre-op risk stratification    Requesting Physician: No admitting provider for patient encounter. CHIEF COMPLAINT:    Chief Complaint   Patient presents with    Fatigue       History Obtained From:  patient, electronic medical record    HISTORY OF PRESENT ILLNESS:      The patient is a 81 y.o.  male who was admitted to the hospital for generalized weakness, loss of appetite since his knee replacement surgery few years ago. He denies any chest pain, shortness of breath, orthopnea, dizziness. Patient also reports a fall from his wheelchair and sustained a right IT fracture. Plan is to go to the OR for operative fixation of his right hip. Cardiology consultation was requested for preoperative stratification.       Past Medical History:   has a past medical history of ADHD (attention deficit hyperactivity disorder), ADHD (attention deficit hyperactivity disorder), Atypical chest pain, Chronic bronchitis ([address] Hypertension, Hyponatremia, Meniere's disease, Narcolepsy, Nasal fracture, PND (post-nasal drip), SOB (shortness of breath), Tibia fracture, and Transaminasemia. Past Surgical History:   has a past surgical history that includes cyst removal; Ankle surgery; knee surgery (Right); Coronary angioplasty with stent (N/A, 10/10/2015); Femur Surgery (Right, 04/13/2021); and Knee Arthroplasty (Right, 4/13/2021). Home Medications:    Prior to Admission medications    Medication Sig Start Date End Date Taking?  Authorizing Provider   acetaminophen (TYLENOL) 500 MG tablet Take 1 tablet by mouth 4 times daily as needed for Pain 4/15/21   Otto Piña, DO   Multiple Vitamins-Minerals (CERTAVITE SENIOR/ANTIOXIDANT) TABS TAKE 1 TAB BY MOUTH ONCE A DAY 6/22/16   Iris Lynn PA-C   GLUCOSAMINE-CHONDROITIN -400 MG tablet TAKE 1 TAB BY MOUTH TWICE A DAY 6/7/16   Iris Lynn PA-C   carvedilol (COREG) 3.125 MG tablet TAKE 1 TAB BY MOUTH TWICE A DAY WITH MEALS 6/7/16   Iris Lynn PA-C   Armodafinil (NUVIGIL) 150 MG TABS tablet Take 1 tablet by mouth daily 4/26/16   Eusebio Vu DO   loratadine (CLARITIN) 10 MG tablet TAKE 1 TAB BY MOUTH ONCE A DAY 3/24/16   Eusebio Vu DO   levothyroxine (SYNTHROID) 25 MCG tablet Take 1 tablet by mouth Daily  Patient taking differently: Take 50 mcg by mouth Daily  3/24/16   Eusebio Vu DO   QUEtiapine (SEROQUEL) 25 MG tablet Take 1 tablet by mouth nightly 3/24/16   Eusebio Vu DO   melatonin 3 MG TABS tablet Take 3 mg by mouth daily    Historical Provider, MD   atorvastatin (LIPITOR) 40 MG tablet Take 1 tablet by mouth nightly 11/10/15   Tam Padgett MD   clopidogrel (PLAVIX) 75 MG tablet Take 1 tablet by mouth daily 11/10/15   Tam Padgett MD   thiamine 100 MG tablet Take 1 tablet by mouth daily 11/10/15   Tam Padgett MD Magnesium Oxide 250 MG TABS Take 1 tablet by mouth daily  Patient taking differently: Take 500 mg by mouth daily  11/10/15   Carrie Villarreal MD   meclizine (ANTIVERT) 25 MG tablet Take 0.5 tablets by mouth 3 times daily as needed 11/10/15   Carrie Villarreal MD   furosemide (LASIX) 20 MG tablet Take 1 tablet by mouth daily 11/10/15   Carrie Villarreal MD   [address] Adult diapers dispense quantity allowed by insurance 3/31/15   Sal Lynn PA-C   Diapers & Supplies MISC Wipes  dispense quantity allowed by insurance 3/31/15   Iris Lynn PA-C   PROAIR  (90 BASE) MCG/ACT inhaler inhale 2 puffs every 4 to 6 hours if needed 11/29/14   Iris Lynn PA-C   mometasone (NASONEX) 50 MCG/ACT nasal spray 2 sprays by Nasal route daily. 12/23/13   Dinora Anne MD   docusate sodium (COLACE) 100 MG capsule Take 100 mg by mouth 2 times daily. Historical Provider, MD   folic acid (FOLVITE) 1 MG tablet Take 1 mg by mouth daily. Historical Provider, MD   chlorhexidine (PERIDEX) 0.12 % solution Take 15 mLs by mouth 2 times daily. Historical Provider, MD   SALINE MIST SPRAY NA by Nasal route. Historical Provider, MD   Multiple Vitamin (MULTIVITAMIN PO) Take  by mouth.       Historical Provider, MD      Current Facility-Administered Medications: megestrol (MEGACE) 40 MG/ML suspension 200 mg, 200 mg, Oral, Daily  oxyCODONE-acetaminophen (PERCOCET) 5-325 MG per tablet 1 tablet, 1 tablet, Oral, Q4H PRN  ceFAZolin (ANCEF) 2000 mg in dextrose 5 % 50 mL IVPB, 2,000 mg, IntraVENous, On Call to OR  morphine (PF) injection 2 mg, 2 mg, IntraVENous, Q3H PRN  Armodafinil (NUVIGIL) tablet 150 mg, 150 mg, Oral, Daily  atorvastatin (LIPITOR) tablet 40 mg, 40 mg, Oral, Nightly  carvedilol (COREG) tablet 3.125 mg, 3.125 mg, Oral, BID WC  [Held by provider] clopidogrel (PLAVIX) tablet 75 mg, 75 mg, Oral, Daily  levothyroxine (SYNTHROID) tablet 50 mcg, 50 mcg, Oral, Daily  therapeutic multivitamin-minerals 1 tablet, 1 tablet, Oral, Daily  albuterol sulfate  (90 Base) MCG/ACT inhaler 2 puff, 2 puff, Inhalation, Q4H PRN  QUEtiapine (SEROQUEL) tablet 25 mg, 25 mg, Oral, Nightly  thiamine tablet 100 mg, 100 mg, Oral, Daily  sodium chloride flush 0.9 % injection 5-40 mL, 5-40 mL, IntraVENous, 2 times per day  sodium chloride flush 0.9 % injection 10 mL, 10 mL, IntraVENous, PRN  0.9 % sodium chloride infusion, 25 mL, IntraVENous, PRN  potassium chloride (KLOR-CON M) extended release tablet 40 mEq, 40 mEq, Oral, PRN **OR** potassium bicarb-citric acid (EFFER-K) effervescent tablet 40 mEq, 40 mEq, Oral, PRN **OR** potassium chloride 10 mEq/100 mL IVPB (Peripheral Line), 10 mEq, IntraVENous, PRN  magnesium sulfate 1000 mg in dextrose 5% 100 mL IVPB, 1,000 mg, IntraVENous, PRN  enoxaparin (LOVENOX) injection 40 mg, 40 mg, SubCUTAneous, Daily  ondansetron (ZOFRAN-ODT) disintegrating tablet 4 mg, 4 mg, Oral, Q8H PRN **OR** ondansetron (ZOFRAN) injection 4 mg, 4 mg, IntraVENous, Q6H PRN  polyethylene glycol (GLYCOLAX) packet 17 g, 17 g, Oral, Daily PRN  acetaminophen (TYLENOL) tablet 650 mg, 650 mg, Oral, Q6H PRN **OR** acetaminophen (TYLENOL) suppository 650 mg, 650 mg, Rectal, Q6H PRN  0.9 % sodium chloride infusion, , IntraVENous, Continuous      Allergies:  Motrin [ibuprofen micronized]      Social History:   reports that he has been smoking cigarettes. He has a 38.00 pack-year smoking history. He has never used smokeless tobacco. He reports current alcohol use of about 16.0 standard drinks of alcohol per week. He reports that he does not use drugs. Family History: family history includes Heart Disease in his father and mother. No h/o sudden cardiac death. No for premature CAD      REVIEW OF SYSTEMS:    · Constitutional: there has been no unanticipated weight loss. · Eyes: No visual changes or diplopia.    · ENT: No Headaches  · Cardiovascular: see above  · Respiratory: No previous pulmonary problems, No cough  · Gastrointestinal: No abdominal pain. No change in bowel or bladder habits. · Genitourinary: No dysuria, trouble voiding, or hematuria. · Musculoskeletal:  No gait disturbance, No weakness or joint complaints. · Integumentary: No rash or pruritis. · Neurological: No headache, diplopia      PHYSICAL EXAM:      /62   Pulse 84   Temp 97.3 °F (36.3 °C) (Axillary)   Resp 18   Ht 5' 10\" (1.778 m)   Wt 180 lb (81.6 kg)   SpO2 94%   BMI 25.83 kg/m²    Constitutional and General Appearance: Alert, no distress  Respiratory:  · No increased work of breathing. · Clear to auscultation bilaterally. No wheeze or crackles. Cardiovascular:  · Normal S1 and S2.   · Jugular venous pulsation Normal  Abdomen:   · Soft  · No tenderness  Extremities:  · No lower extremity edema  Neurologic:  · Alert and oriented. · Moves all extremities well      DATA:    Diagnostics:    Labs:     CBC:   Recent Labs     03/05/22  1604 03/07/22  0332   WBC 12.3* 11.4*   HGB 9.8* 8.9*   HCT 31.7* 29.0*    286     BMP:   Recent Labs     03/06/22  1411 03/07/22  0332   * 131*   K 5.2 4.5   CO2 19* 22   BUN 8 10   CREATININE 0.81 0.86   LABGLOM >60 >60   GLUCOSE 109* 99     BNP: No results for input(s): BNP in the last 72 hours. PT/INR:   Recent Labs     03/06/22  0432   PROTIME 11.9   INR 1.1     APTT:No results for input(s): APTT in the last 72 hours. CARDIAC ENZYMES:  Recent Labs     03/05/22  1604   TROPHS 6     No results for input(s): CKTOTAL, CKMB, CKMBINDEX, TROPONINI in the last 72 hours. Invalid input(s):  TROPONINT  No results for input(s): TROPONINT in the last 72 hours. FASTING LIPID PANEL:  Lab Results   Component Value Date    HDL 45 02/19/2022    TRIG 108 05/05/2020     LIVER PROFILE:  Recent Labs     03/05/22  1604   AST 32   ALT 15   LABALBU 3.4*         EKG: junctional rhythm in 70s, no acute ischemic changes    STRESS 4/12/2021: No ischemia/infarct. EF 85%.       ECHO 2/16/16: EF 50%, mild TR. CATH 11/9/15: PTCA/BMS to OM and RCA     ICD 11/2/15: [employer] device implanted by Dr. Zulma Gonzalez for ICM. CATH 10/30/15: MVD. EF 25%. IMPRESSION:    1. Right femur intertrochanteric fracture  2. MV CAD s/p CABG  3. PAF  4. Accelerated junctional rhythm  5. HFrEF  6. Hx of VF arrest s/p AICD      RECOMMENDATIONS:  1. Patient currently is asymptomatic with limited mobility. 2. Device interrogation. 3. Formal echo read pending. Stress test <1 year ago with no ischemia. 4. If echo is stable, patient would be at intermediate risk of MACE during orthopedic surgery. Thank you for allowing us to participate in Luciano Cherry's care. Will follow with you.       Electronically signed on 03/07/22 at 8:59 AM by:    Ted Mittal MD,   Fellow, [id number] [facility]
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
## 2022-03-07 NOTE — FLOWSHEET NOTE
Advance Care Planning     Advance Care Planning Inpatient Note  [facility] Department    Today's Date: 3/6/2022  Unit: [facility] 5A NEURO    Received request from patient. Upon review of chart and communication with care team, patient's decision making abilities are not in question. . Patient and Nurse was/were present in the room during visit. Goals of ACP Conversation:  Discuss advance care planning documents    Health Care Decision Makers:       Primary Decision Maker: Bangcandida Rojas Nitza Choudhary - [phone]    Summary:  Completed New Documents    Advance Care Planning Documents (Patient Wishes):  Healthcare Power of /Advance Directive Appointment of Health Care Agent     Assessment: In consultation with nursing staff patient's decision making abilities are not in question. Patient has discussed wishes with agent and is confident the agent will speak on the patient's behalf if necessary. Interventions:  Provided education on documents for clarity and greater understanding  Assisted in the completion of documents according to patient's wishes at this time  Encouraged ongoing ACP conversation with future decision makers and loved ones    Care Preferences Communicated:   No    Outcomes/Plan:  New advance directive completed. Returned original document(s) to patient, as well as copies for distribution to appointed agents  Copy of advance directive given to staff to scan into medical record.   Teach Back Method used to verify the patient's and/or Healthcare Decision Maker's understanding of key information in the advance directive documents    Electronically signed by Bob White, Chaplain Resident on 3/6/2022 at 7:46 PM

## 2022-03-07 NOTE — PROGRESS NOTES
[facility]  Documentation Note                Admission Dx: Hyponatremia [E87.1]  Nephrolithiasis [N20.0]  Generalized weakness [R53.1]  Oxygen desaturation [R09.02]    Past Medical History:   has a past medical history of ADHD (attention deficit hyperactivity disorder), ADHD (attention deficit hyperactivity disorder), Atypical chest pain, Chronic bronchitis ([address] Hypertension, Hyponatremia, Meniere's disease, Narcolepsy, Nasal fracture, PND (post-nasal drip), SOB (shortness of breath), Tibia fracture, and Transaminasemia. Previous Testing:     STRESS 4/12/2021: No ischemia/infarct. EF 85%. ECHO 2/16/16: EF 50%, mild TR. CATH 11/9/15: PTCA/BMS to OM and RCA    ICD 11/2/15: [employer] device implanted by Dr. Gutierrez Nurse for ICM. CATH 10/30/15: MVD. EF 25%. Previous office/hospital visit:   VALARIE Thacker NP 4/14/2021:   Mechanical fall  Right supracondylar femur fracture  S/p AICD  History of alcohol abuse  COPD    Plan --   1. Stable post=op from CV standpoint. 2. Keep K > 4.0 and Mag > 2.0  K 4.9  Will order AM mag  3. Recommend HGB >9  4. F/U in clinic upon discharge in 2 weeks.      Nando Collins [facility]

## 2022-03-07 NOTE — PLAN OF CARE
Problem: Nutrition  Goal: Optimal nutrition therapy  Description: Nutrition Problem #1: Inadequate oral intake  Intervention: Food and/or Nutrient Delivery: Continue Current Diet,Start Oral Nutrition Supplement  Nutritional Goals: Meet 50% or greater of estimated nutrition needs     Outcome: Ongoing     Problem: Discharge Planning:  Goal: Discharged to appropriate level of care  Description: Discharged to appropriate level of care  Outcome: Ongoing     Problem: Infection - Surgical Site:  Goal: Will show no infection signs and symptoms  Description: Will show no infection signs and symptoms  Outcome: Ongoing     Problem: Injury - Risk of, Postfracture Complications:  Goal: Absence of fat embolism  Description: Absence of fat embolism  Outcome: Ongoing  Goal: Absence of compartment syndrome signs and symptoms  Description: Absence of compartment syndrome signs and symptoms  Outcome: Ongoing     Problem: Mobility - Impaired:  Goal: Mobility will improve to maximum level  Description: Mobility will improve to maximum level  Outcome: Ongoing     Problem: Pain - Acute:  Goal: Pain level will decrease  Description: Pain level will decrease  Outcome: Ongoing     Problem: Venous Thromboembolism:  Goal: Absence of deep vein thrombosis  Description: Absence of deep vein thrombosis  Outcome: Ongoing  Goal: Absence of signs or symptoms of impaired coagulation  Description: Absence of signs or symptoms of impaired coagulation  Outcome: Ongoing  Goal: Will show no signs or symptoms of venous thromboembolism  Description: Will show no signs or symptoms of venous thromboembolism  Outcome: Ongoing

## 2022-03-07 NOTE — FLOWSHEET NOTE
SPIRITUAL CARE DEPARTMENT - [facility] [address]  PROGRESS NOTE    Shift date: 3/6/2022  Shift day: Sunday    Shift # 2    Room # 0512/0512-01   Name: John Small            Age: 81 y.o. Gender: male          Mormon: Religious   Place of Orthodoxy:      Referral: Routine Visit    Admit Date & Time: 3/5/2022  3:47 PM    PATIENT/EVENT DESCRIPTION:  John Small is a 81 y.o. male           SPIRITUAL ASSESSMENT/INTERVENTION:  's girlfriend Lori Haris called and asked for medical update.  explained that I could not provide medical updates and encouraged her to contact pt's floor      SPIRITUAL CARE FOLLOW-UP PLAN:  Chaplains will remain available to offer spiritual and emotional support as needed. 03/06/22 2045   Encounter Summary   Services provided to: Significant other   Referral/Consult From: Family   Continue Visiting   (3/6/22)   Complexity of Encounter Low   Length of Encounter 15 minutes   Routine   Type Initial   Assessment Calm; Approachable   Intervention Active listening   Outcome Expressed gratitude       Electronically signed by Delgado De La Torre MDiv.on 3/6/2022 at 8:46 PM   [facility]  [phone]

## 2022-03-08 ENCOUNTER — ANESTHESIA (OUTPATIENT)
Dept: OPERATING ROOM | Age: 72
DRG: 480 | End: 2022-03-08
Payer: COMMERCIAL

## 2022-03-08 ENCOUNTER — ANESTHESIA EVENT (OUTPATIENT)
Dept: OPERATING ROOM | Age: 72
DRG: 480 | End: 2022-03-08
Payer: COMMERCIAL

## 2022-03-08 ENCOUNTER — APPOINTMENT (OUTPATIENT)
Dept: GENERAL RADIOLOGY | Age: 72
DRG: 480 | End: 2022-03-08
Payer: COMMERCIAL

## 2022-03-08 VITALS — SYSTOLIC BLOOD PRESSURE: 178 MMHG | TEMPERATURE: 95.3 F | OXYGEN SATURATION: 98 % | DIASTOLIC BLOOD PRESSURE: 79 MMHG

## 2022-03-08 PROBLEM — I49.8 ACCELERATED JUNCTIONAL RHYTHM: Status: ACTIVE | Noted: 2022-03-08

## 2022-03-08 PROBLEM — Z95.0 PRESENCE OF PERMANENT CARDIAC PACEMAKER: Status: ACTIVE | Noted: 2022-03-08

## 2022-03-08 LAB
ANION GAP SERPL CALCULATED.3IONS-SCNC: 11 MMOL/L (ref 9–17)
BUN BLDV-MCNC: 11 MG/DL (ref 8–23)
CALCIUM SERPL-MCNC: 9.5 MG/DL (ref 8.6–10.4)
CHLORIDE BLD-SCNC: 102 MMOL/L (ref 98–107)
CO2: 21 MMOL/L (ref 20–31)
CREAT SERPL-MCNC: 0.78 MG/DL (ref 0.7–1.2)
GFR AFRICAN AMERICAN: >60 ML/MIN
GFR NON-AFRICAN AMERICAN: >60 ML/MIN
GFR SERPL CREATININE-BSD FRML MDRD: ABNORMAL ML/MIN/{1.73_M2}
GLUCOSE BLD-MCNC: 102 MG/DL (ref 70–99)
HCT VFR BLD CALC: 31.2 % (ref 40.7–50.3)
HEMOGLOBIN: 9.2 G/DL (ref 13–17)
MCH RBC QN AUTO: 26.3 PG (ref 25.2–33.5)
MCHC RBC AUTO-ENTMCNC: 29.5 G/DL (ref 28.4–34.8)
MCV RBC AUTO: 89.1 FL (ref 82.6–102.9)
NRBC AUTOMATED: 0 PER 100 WBC
PDW BLD-RTO: 19.7 % (ref 11.8–14.4)
PLATELET # BLD: 261 K/UL (ref 138–453)
PMV BLD AUTO: 9.2 FL (ref 8.1–13.5)
POTASSIUM SERPL-SCNC: 4.6 MMOL/L (ref 3.7–5.3)
RBC # BLD: 3.5 M/UL (ref 4.21–5.77)
SODIUM BLD-SCNC: 134 MMOL/L (ref 135–144)
WBC # BLD: 13.5 K/UL (ref 3.5–11.3)

## 2022-03-08 PROCEDURE — 85027 COMPLETE CBC AUTOMATED: CPT

## 2022-03-08 PROCEDURE — 2709999900 HC NON-CHARGEABLE SUPPLY: Performed by: ORTHOPAEDIC SURGERY

## 2022-03-08 PROCEDURE — 94660 CPAP INITIATION&MGMT: CPT

## 2022-03-08 PROCEDURE — 87641 MR-STAPH DNA AMP PROBE: CPT

## 2022-03-08 PROCEDURE — 7100000001 HC PACU RECOVERY - ADDTL 15 MIN: Performed by: ORTHOPAEDIC SURGERY

## 2022-03-08 PROCEDURE — 6360000002 HC RX W HCPCS: Performed by: STUDENT IN AN ORGANIZED HEALTH CARE EDUCATION/TRAINING PROGRAM

## 2022-03-08 PROCEDURE — 76937 US GUIDE VASCULAR ACCESS: CPT

## 2022-03-08 PROCEDURE — 2720000010 HC SURG SUPPLY STERILE: Performed by: ORTHOPAEDIC SURGERY

## 2022-03-08 PROCEDURE — 2580000003 HC RX 258: Performed by: STUDENT IN AN ORGANIZED HEALTH CARE EDUCATION/TRAINING PROGRAM

## 2022-03-08 PROCEDURE — 2580000003 HC RX 258: Performed by: NURSE PRACTITIONER

## 2022-03-08 PROCEDURE — 3209999900 FLUORO FOR SURGICAL PROCEDURES

## 2022-03-08 PROCEDURE — 6370000000 HC RX 637 (ALT 250 FOR IP): Performed by: NURSE PRACTITIONER

## 2022-03-08 PROCEDURE — 2000000000 HC ICU R&B

## 2022-03-08 PROCEDURE — 3600000014 HC SURGERY LEVEL 4 ADDTL 15MIN: Performed by: ORTHOPAEDIC SURGERY

## 2022-03-08 PROCEDURE — 2500000003 HC RX 250 WO HCPCS: Performed by: NURSE ANESTHETIST, CERTIFIED REGISTERED

## 2022-03-08 PROCEDURE — C1769 GUIDE WIRE: HCPCS | Performed by: ORTHOPAEDIC SURGERY

## 2022-03-08 PROCEDURE — 6360000002 HC RX W HCPCS: Performed by: NURSE ANESTHETIST, CERTIFIED REGISTERED

## 2022-03-08 PROCEDURE — 2580000003 HC RX 258: Performed by: NURSE ANESTHETIST, CERTIFIED REGISTERED

## 2022-03-08 PROCEDURE — C1713 ANCHOR/SCREW BN/BN,TIS/BN: HCPCS | Performed by: ORTHOPAEDIC SURGERY

## 2022-03-08 PROCEDURE — 6360000002 HC RX W HCPCS: Performed by: ANESTHESIOLOGY

## 2022-03-08 PROCEDURE — 3700000001 HC ADD 15 MINUTES (ANESTHESIA): Performed by: ORTHOPAEDIC SURGERY

## 2022-03-08 PROCEDURE — 94640 AIRWAY INHALATION TREATMENT: CPT

## 2022-03-08 PROCEDURE — 2780000010 HC IMPLANT OTHER: Performed by: ORTHOPAEDIC SURGERY

## 2022-03-08 PROCEDURE — 3600000004 HC SURGERY LEVEL 4 BASE: Performed by: ORTHOPAEDIC SURGERY

## 2022-03-08 PROCEDURE — 99232 SBSQ HOSP IP/OBS MODERATE 35: CPT | Performed by: NURSE PRACTITIONER

## 2022-03-08 PROCEDURE — 27245 TREAT THIGH FRACTURE: CPT | Performed by: ORTHOPAEDIC SURGERY

## 2022-03-08 PROCEDURE — 6370000000 HC RX 637 (ALT 250 FOR IP): Performed by: STUDENT IN AN ORGANIZED HEALTH CARE EDUCATION/TRAINING PROGRAM

## 2022-03-08 PROCEDURE — C1776 JOINT DEVICE (IMPLANTABLE): HCPCS | Performed by: ORTHOPAEDIC SURGERY

## 2022-03-08 PROCEDURE — 2700000000 HC OXYGEN THERAPY PER DAY

## 2022-03-08 PROCEDURE — 99232 SBSQ HOSP IP/OBS MODERATE 35: CPT | Performed by: INTERNAL MEDICINE

## 2022-03-08 PROCEDURE — 83935 ASSAY OF URINE OSMOLALITY: CPT

## 2022-03-08 PROCEDURE — 73552 X-RAY EXAM OF FEMUR 2/>: CPT

## 2022-03-08 PROCEDURE — 80048 BASIC METABOLIC PNL TOTAL CA: CPT

## 2022-03-08 PROCEDURE — 2580000003 HC RX 258: Performed by: INTERNAL MEDICINE

## 2022-03-08 PROCEDURE — 3700000000 HC ANESTHESIA ATTENDED CARE: Performed by: ORTHOPAEDIC SURGERY

## 2022-03-08 PROCEDURE — 51702 INSERT TEMP BLADDER CATH: CPT

## 2022-03-08 PROCEDURE — 71045 X-RAY EXAM CHEST 1 VIEW: CPT

## 2022-03-08 PROCEDURE — 84300 ASSAY OF URINE SODIUM: CPT

## 2022-03-08 PROCEDURE — 99232 SBSQ HOSP IP/OBS MODERATE 35: CPT | Performed by: ORTHOPAEDIC SURGERY

## 2022-03-08 PROCEDURE — 94761 N-INVAS EAR/PLS OXIMETRY MLT: CPT

## 2022-03-08 PROCEDURE — 6370000000 HC RX 637 (ALT 250 FOR IP): Performed by: INTERNAL MEDICINE

## 2022-03-08 PROCEDURE — 6360000002 HC RX W HCPCS

## 2022-03-08 PROCEDURE — 0QS604Z REPOSITION RIGHT UPPER FEMUR WITH INTERNAL FIXATION DEVICE, OPEN APPROACH: ICD-10-PCS | Performed by: ORTHOPAEDIC SURGERY

## 2022-03-08 PROCEDURE — 73502 X-RAY EXAM HIP UNI 2-3 VIEWS: CPT

## 2022-03-08 PROCEDURE — 36415 COLL VENOUS BLD VENIPUNCTURE: CPT

## 2022-03-08 PROCEDURE — 7100000000 HC PACU RECOVERY - FIRST 15 MIN: Performed by: ORTHOPAEDIC SURGERY

## 2022-03-08 PROCEDURE — 2580000003 HC RX 258: Performed by: ORTHOPAEDIC SURGERY

## 2022-03-08 DEVICE — BLADE IM L105MM DIA11MM TI HELI FOR TROCHANTERIC NAIL FIX: Type: IMPLANTABLE DEVICE | Site: HIP | Status: FUNCTIONAL

## 2022-03-08 DEVICE — SCREW BNE L18MM DIA5MM S STL ST LOK FULL THRD T25 STARDRV: Type: IMPLANTABLE DEVICE | Site: HIP | Status: FUNCTIONAL

## 2022-03-08 DEVICE — IMPLANTABLE DEVICE: Type: IMPLANTABLE DEVICE | Site: HIP | Status: FUNCTIONAL

## 2022-03-08 DEVICE — SCREW BNE L14MM DIA5MM S STL ST LOK FULL THRD T25 STARDRV: Type: IMPLANTABLE DEVICE | Site: HIP | Status: FUNCTIONAL

## 2022-03-08 DEVICE — CABLE SURG DIA1.7MM S STL HA CERCLAGE W/ CRMP 29880101S] DEPUY SYNTHES USA]: Type: IMPLANTABLE DEVICE | Site: HIP | Status: FUNCTIONAL

## 2022-03-08 DEVICE — SET CBL SL SM DIA2MM VIT BEAD DALL-M: Type: IMPLANTABLE DEVICE | Site: HIP | Status: FUNCTIONAL

## 2022-03-08 DEVICE — SCREW BNE L34MM DIA4.5MM PROX CORT TIB S STL ST LOK FULL: Type: IMPLANTABLE DEVICE | Site: HIP | Status: FUNCTIONAL

## 2022-03-08 DEVICE — SCREW BNE L34MM DIA5MM CORT GRN TI ST LOK FULL THRD TRCR: Type: IMPLANTABLE DEVICE | Site: HIP | Status: FUNCTIONAL

## 2022-03-08 RX ORDER — LORAZEPAM 2 MG/ML
1 INJECTION INTRAMUSCULAR EVERY 6 HOURS PRN
Status: DISCONTINUED | OUTPATIENT
Start: 2022-03-08 | End: 2022-03-14 | Stop reason: HOSPADM

## 2022-03-08 RX ORDER — ALBUTEROL SULFATE 2.5 MG/3ML
SOLUTION RESPIRATORY (INHALATION)
Status: COMPLETED
Start: 2022-03-08 | End: 2022-03-08

## 2022-03-08 RX ORDER — GLYCOPYRROLATE 1 MG/5 ML
SYRINGE (ML) INTRAVENOUS PRN
Status: DISCONTINUED | OUTPATIENT
Start: 2022-03-08 | End: 2022-03-08 | Stop reason: SDUPTHER

## 2022-03-08 RX ORDER — MAGNESIUM HYDROXIDE 1200 MG/15ML
LIQUID ORAL CONTINUOUS PRN
Status: COMPLETED | OUTPATIENT
Start: 2022-03-08 | End: 2022-03-08

## 2022-03-08 RX ORDER — PROPOFOL 10 MG/ML
INJECTION, EMULSION INTRAVENOUS PRN
Status: DISCONTINUED | OUTPATIENT
Start: 2022-03-08 | End: 2022-03-08 | Stop reason: SDUPTHER

## 2022-03-08 RX ORDER — SODIUM CHLORIDE, SODIUM LACTATE, POTASSIUM CHLORIDE, CALCIUM CHLORIDE 600; 310; 30; 20 MG/100ML; MG/100ML; MG/100ML; MG/100ML
INJECTION, SOLUTION INTRAVENOUS CONTINUOUS PRN
Status: DISCONTINUED | OUTPATIENT
Start: 2022-03-08 | End: 2022-03-08 | Stop reason: SDUPTHER

## 2022-03-08 RX ORDER — NEOSTIGMINE METHYLSULFATE 5 MG/5 ML
SYRINGE (ML) INTRAVENOUS PRN
Status: DISCONTINUED | OUTPATIENT
Start: 2022-03-08 | End: 2022-03-08 | Stop reason: SDUPTHER

## 2022-03-08 RX ORDER — FUROSEMIDE 10 MG/ML
20 INJECTION INTRAMUSCULAR; INTRAVENOUS ONCE
Status: COMPLETED | OUTPATIENT
Start: 2022-03-08 | End: 2022-03-08

## 2022-03-08 RX ORDER — FENTANYL CITRATE 50 UG/ML
INJECTION, SOLUTION INTRAMUSCULAR; INTRAVENOUS PRN
Status: DISCONTINUED | OUTPATIENT
Start: 2022-03-08 | End: 2022-03-08 | Stop reason: SDUPTHER

## 2022-03-08 RX ORDER — DEXAMETHASONE SODIUM PHOSPHATE 10 MG/ML
INJECTION INTRAMUSCULAR; INTRAVENOUS PRN
Status: DISCONTINUED | OUTPATIENT
Start: 2022-03-08 | End: 2022-03-08 | Stop reason: SDUPTHER

## 2022-03-08 RX ORDER — ONDANSETRON 2 MG/ML
INJECTION INTRAMUSCULAR; INTRAVENOUS PRN
Status: DISCONTINUED | OUTPATIENT
Start: 2022-03-08 | End: 2022-03-08 | Stop reason: SDUPTHER

## 2022-03-08 RX ADMIN — OXYCODONE HYDROCHLORIDE AND ACETAMINOPHEN 1 TABLET: 5; 325 TABLET ORAL at 23:28

## 2022-03-08 RX ADMIN — ALBUTEROL SULFATE: 2.5 SOLUTION RESPIRATORY (INHALATION) at 19:45

## 2022-03-08 RX ADMIN — Medication 0.4 MG: at 18:42

## 2022-03-08 RX ADMIN — CARVEDILOL 3.12 MG: 3.12 TABLET, FILM COATED ORAL at 08:17

## 2022-03-08 RX ADMIN — DEXTROSE MONOHYDRATE 2000 MG: 50 INJECTION, SOLUTION INTRAVENOUS at 22:18

## 2022-03-08 RX ADMIN — GUAIFENESIN 1200 MG: 600 TABLET, EXTENDED RELEASE ORAL at 23:22

## 2022-03-08 RX ADMIN — FLUTICASONE PROPIONATE 1 SPRAY: 50 SPRAY, METERED NASAL at 08:22

## 2022-03-08 RX ADMIN — PROPOFOL 150 MG: 10 INJECTION, EMULSION INTRAVENOUS at 16:29

## 2022-03-08 RX ADMIN — SODIUM CHLORIDE: 9 INJECTION, SOLUTION INTRAVENOUS at 21:37

## 2022-03-08 RX ADMIN — SODIUM CHLORIDE: 9 INJECTION, SOLUTION INTRAVENOUS at 08:31

## 2022-03-08 RX ADMIN — FLUTICASONE PROPIONATE 1 PUFF: 110 AEROSOL, METERED RESPIRATORY (INHALATION) at 09:10

## 2022-03-08 RX ADMIN — Medication 0.2 MG: at 17:05

## 2022-03-08 RX ADMIN — FENTANYL CITRATE 100 MCG: 50 INJECTION, SOLUTION INTRAMUSCULAR; INTRAVENOUS at 18:18

## 2022-03-08 RX ADMIN — SODIUM CHLORIDE, POTASSIUM CHLORIDE, SODIUM LACTATE AND CALCIUM CHLORIDE: 600; 310; 30; 20 INJECTION, SOLUTION INTRAVENOUS at 16:21

## 2022-03-08 RX ADMIN — LEVOTHYROXINE SODIUM 50 MCG: 50 TABLET ORAL at 08:17

## 2022-03-08 RX ADMIN — ONDANSETRON 4 MG: 2 INJECTION INTRAMUSCULAR; INTRAVENOUS at 18:34

## 2022-03-08 RX ADMIN — DEXTROSE MONOHYDRATE 2000 MG: 50 INJECTION, SOLUTION INTRAVENOUS at 16:36

## 2022-03-08 RX ADMIN — FENTANYL CITRATE 100 MCG: 50 INJECTION, SOLUTION INTRAMUSCULAR; INTRAVENOUS at 16:29

## 2022-03-08 RX ADMIN — MAGNESIUM GLUCONATE 500 MG ORAL TABLET 400 MG: 500 TABLET ORAL at 08:17

## 2022-03-08 RX ADMIN — SODIUM CHLORIDE, PRESERVATIVE FREE 10 ML: 5 INJECTION INTRAVENOUS at 08:24

## 2022-03-08 RX ADMIN — GUAIFENESIN 1200 MG: 600 TABLET, EXTENDED RELEASE ORAL at 08:17

## 2022-03-08 RX ADMIN — FUROSEMIDE 20 MG: 10 INJECTION, SOLUTION INTRAMUSCULAR; INTRAVENOUS at 20:00

## 2022-03-08 RX ADMIN — SODIUM CHLORIDE, PRESERVATIVE FREE 10 ML: 5 INJECTION INTRAVENOUS at 21:05

## 2022-03-08 RX ADMIN — Medication 3 MG: at 18:42

## 2022-03-08 RX ADMIN — DESMOPRESSIN ACETATE 40 MG: 0.2 TABLET ORAL at 23:22

## 2022-03-08 RX ADMIN — PANTOPRAZOLE SODIUM 40 MG: 40 TABLET, DELAYED RELEASE ORAL at 08:18

## 2022-03-08 RX ADMIN — DEXAMETHASONE SODIUM PHOSPHATE 10 MG: 10 INJECTION INTRAMUSCULAR; INTRAVENOUS at 16:33

## 2022-03-08 ASSESSMENT — PULMONARY FUNCTION TESTS
PIF_VALUE: 17
PIF_VALUE: 17
PIF_VALUE: 16
PIF_VALUE: 17
PIF_VALUE: 17
PIF_VALUE: 15
PIF_VALUE: 16
PIF_VALUE: 18
PIF_VALUE: 15
PIF_VALUE: 14
PIF_VALUE: 16
PIF_VALUE: 17
PIF_VALUE: 16
PIF_VALUE: 16
PIF_VALUE: 1
PIF_VALUE: 12
PIF_VALUE: 18
PIF_VALUE: 17
PIF_VALUE: 17
PIF_VALUE: 18
PIF_VALUE: 16
PIF_VALUE: 17
PIF_VALUE: 18
PIF_VALUE: 17
PIF_VALUE: 16
PIF_VALUE: 17
PIF_VALUE: 16
PIF_VALUE: 17
PIF_VALUE: 27
PIF_VALUE: 16
PIF_VALUE: 17
PIF_VALUE: 2
PIF_VALUE: 12
PIF_VALUE: 1
PIF_VALUE: 16
PIF_VALUE: 16
PIF_VALUE: 11
PIF_VALUE: 16
PIF_VALUE: 17
PIF_VALUE: 16
PIF_VALUE: 17
PIF_VALUE: 17
PIF_VALUE: 1
PIF_VALUE: 8
PIF_VALUE: 17
PIF_VALUE: 16
PIF_VALUE: 14
PIF_VALUE: 17
PIF_VALUE: 14
PIF_VALUE: 18
PIF_VALUE: 17
PIF_VALUE: 17
PIF_VALUE: 16
PIF_VALUE: 17
PIF_VALUE: 18
PIF_VALUE: 11
PIF_VALUE: 17
PIF_VALUE: 18
PIF_VALUE: 16
PIF_VALUE: 0
PIF_VALUE: 17
PIF_VALUE: 2
PIF_VALUE: 15
PIF_VALUE: 16
PIF_VALUE: 17
PIF_VALUE: 6
PIF_VALUE: 17
PIF_VALUE: 2
PIF_VALUE: 16
PIF_VALUE: 17
PIF_VALUE: 4
PIF_VALUE: 2
PIF_VALUE: 0
PIF_VALUE: 14
PIF_VALUE: 16
PIF_VALUE: 16
PIF_VALUE: 14
PIF_VALUE: 16
PIF_VALUE: 4
PIF_VALUE: 1
PIF_VALUE: 17
PIF_VALUE: 18
PIF_VALUE: 17
PIF_VALUE: 14
PIF_VALUE: 18
PIF_VALUE: 17
PIF_VALUE: 16
PIF_VALUE: 17
PIF_VALUE: 16
PIF_VALUE: 18
PIF_VALUE: 16
PIF_VALUE: 17
PIF_VALUE: 16
PIF_VALUE: 1
PIF_VALUE: 17
PIF_VALUE: 18
PIF_VALUE: 17
PIF_VALUE: 17
PIF_VALUE: 18
PIF_VALUE: 17
PIF_VALUE: 16
PIF_VALUE: 16
PIF_VALUE: 23
PIF_VALUE: 17
PIF_VALUE: 16
PIF_VALUE: 17
PIF_VALUE: 17
PIF_VALUE: 21
PIF_VALUE: 17
PIF_VALUE: 17
PIF_VALUE: 14
PIF_VALUE: 16
PIF_VALUE: 17
PIF_VALUE: 16
PIF_VALUE: 16
PIF_VALUE: 14
PIF_VALUE: 17
PIF_VALUE: 17
PIF_VALUE: 16
PIF_VALUE: 17
PIF_VALUE: 16
PIF_VALUE: 17
PIF_VALUE: 16
PIF_VALUE: 14
PIF_VALUE: 15
PIF_VALUE: 16
PIF_VALUE: 17
PIF_VALUE: 17
PIF_VALUE: 18
PIF_VALUE: 16
PIF_VALUE: 17
PIF_VALUE: 16
PIF_VALUE: 18
PIF_VALUE: 16
PIF_VALUE: 17
PIF_VALUE: 16
PIF_VALUE: 17
PIF_VALUE: 17
PIF_VALUE: 13
PIF_VALUE: 14
PIF_VALUE: 14
PIF_VALUE: 16
PIF_VALUE: 16
PIF_VALUE: 2
PIF_VALUE: 19
PIF_VALUE: 5
PIF_VALUE: 18
PIF_VALUE: 16
PIF_VALUE: 17
PIF_VALUE: 16
PIF_VALUE: 15
PIF_VALUE: 16
PIF_VALUE: 26
PIF_VALUE: 17
PIF_VALUE: 17
PIF_VALUE: 16
PIF_VALUE: 16

## 2022-03-08 ASSESSMENT — ENCOUNTER SYMPTOMS
ABDOMINAL PAIN: 0
NAUSEA: 0
VOMITING: 0
SHORTNESS OF BREATH: 0
COUGH: 0
SHORTNESS OF BREATH: 1

## 2022-03-08 ASSESSMENT — PAIN DESCRIPTION - PAIN TYPE
TYPE: ACUTE PAIN;SURGICAL PAIN
TYPE: ACUTE PAIN;SURGICAL PAIN

## 2022-03-08 ASSESSMENT — PAIN SCALES - GENERAL
PAINLEVEL_OUTOF10: 10
PAINLEVEL_OUTOF10: 7

## 2022-03-08 ASSESSMENT — PAIN - FUNCTIONAL ASSESSMENT: PAIN_FUNCTIONAL_ASSESSMENT: 0-10

## 2022-03-08 ASSESSMENT — PAIN DESCRIPTION - LOCATION
LOCATION: LEG
LOCATION: LEG

## 2022-03-08 ASSESSMENT — PAIN DESCRIPTION - ORIENTATION: ORIENTATION: RIGHT

## 2022-03-08 ASSESSMENT — COPD QUESTIONNAIRES: CAT_SEVERITY: NO INTERVAL CHANGE

## 2022-03-08 NOTE — ANESTHESIA PRE PROCEDURE
Department of Anesthesiology  Preprocedure Note       Name:  Clifton Briones   Age:  81 y.o.  :  1950                                          MRN:  [id number]         Date:  3/8/2022      Surgeon: Marce Cao):  Main Nelson DO    Procedure: Procedure(s):  **ADD ON WANTS TO FOLLOW SELF**RIGHT TFN VERSUS HIP OPEN REDUCTION INTERNAL FIXATION, FRACTURE TABLE, C-ARM, SYNTHES SHORT TFN, CERCLAGE WIRES, COLLINEAR REDUCTION CLAMPS, TRAUMA TOOLBOX, 4.5 PLATES AND LOCKING PLATES-SYNTHES NOTIFIED PER RESIDENT    Medications prior to admission:   Prior to Admission medications    Medication Sig Start Date End Date Taking? Authorizing Provider   amiodarone (CORDARONE) 200 MG tablet Take by mouth   Yes Historical Provider, MD   cyanocobalamin 1000 MCG tablet Take 1,000 mcg by mouth   Yes Historical Provider, MD   traMADol (ULTRAM) 50 MG tablet Take 50 mg by mouth. Yes Historical Provider, MD   sodium chloride 1 g tablet Take 1 g by mouth   Yes Historical Provider, MD   vitamin C (ASCORBIC ACID) 500 MG tablet Take 500 mg by mouth   Yes Historical Provider, MD   methylphenidate (RITALIN) 10 MG tablet Take 10 mg by mouth.    Yes Historical Provider, MD   fluticasone propionate (FLOVENT) 50 MCG/BLIST AEPB inhaler Inhale into the lungs daily   Yes Historical Provider, MD   acetaminophen (TYLENOL) 500 MG tablet Take 1 tablet by mouth 4 times daily as needed for Pain 4/15/21   Nazia Doll, DO   Multiple Vitamins-Minerals (CERTAVITE SENIOR/ANTIOXIDANT) TABS TAKE 1 TAB BY MOUTH ONCE A DAY 16   Iris Lynn PA-C   carvedilol (COREG) 3.125 MG tablet TAKE 1 TAB BY MOUTH TWICE A DAY WITH MEALS 16   Iris Lynn PA-C   levothyroxine (SYNTHROID) 25 MCG tablet Take 1 tablet by mouth Daily  Patient taking differently: Take 50 mcg by mouth Daily  3/24/16   Armando Shirts, DO   QUEtiapine (SEROQUEL) 25 MG tablet Take 1 tablet by mouth nightly 3/24/16   Armando Shirts, DO   melatonin 3 MG TABS tablet Take 3 mg by mouth daily    Historical Provider, MD   atorvastatin (LIPITOR) 40 MG tablet Take 1 tablet by mouth nightly 11/10/15   Meena Wilcox MD   clopidogrel (PLAVIX) 75 MG tablet Take 1 tablet by mouth daily 11/10/15   Meena Wilcox MD   Magnesium Oxide 250 MG TABS Take 1 tablet by mouth daily  Patient taking differently: Take 500 mg by mouth daily  11/10/15   Meena Wilcox MD   meclizine (ANTIVERT) 25 MG tablet Take 0.5 tablets by mouth 3 times daily as needed 11/10/15   Meena Wilcox MD   furosemide (LASIX) 20 MG tablet Take 1 tablet by mouth daily 11/10/15   Meena Wilcox MD   [address] Adult diapers dispense quantity allowed by insurance 3/31/15   Rafy Lynn PA-C   Diapers & Supplies MISC Wipes  dispense quantity allowed by insurance 3/31/15   Iris Lynn PA-C   PROAIR  (90 BASE) MCG/ACT inhaler inhale 2 puffs every 4 to 6 hours if needed 11/29/14   Iris Lynn PA-C   mometasone (NASONEX) 50 MCG/ACT nasal spray 2 sprays by Nasal route daily. 12/23/13   Elliott Flores MD   docusate sodium (COLACE) 100 MG capsule Take 100 mg by mouth 2 times daily. Historical Provider, MD   folic acid (FOLVITE) 1 MG tablet Take 1 mg by mouth daily. Historical Provider, MD   chlorhexidine (PERIDEX) 0.12 % solution Take 15 mLs by mouth 2 times daily. Historical Provider, MD   SALINE MIST SPRAY NA by Nasal route. Historical Provider, MD   Multiple Vitamin (MULTIVITAMIN PO) Take  by mouth.       Historical Provider, MD       Current medications:    Current Facility-Administered Medications   Medication Dose Route Frequency Provider Last Rate Last Admin    [MAR Hold] LORazepam (ATIVAN) injection 1 mg  1 mg IntraVENous Q6H PRN TIFFANIE Rojas - CNP        [MAR Hold] LORazepam (ATIVAN) tablet 1 mg  1 mg Oral Q8H PRN Naima Reyez MD   1 mg at 03/07/22 2217    [MAR Hold] meclizine (ANTIVERT) tablet 12.5 mg  12.5 mg Oral TID PRN Naima Reyez MD        [facility] Hold] [MAR Hold] Armodafinil (NUVIGIL) tablet 150 mg  150 mg Oral Daily TIFFANIE Knott - [place]        [MAR Hold] atorvastatin (LIPITOR) tablet 40 mg  40 mg Oral Nightly TIFFANIE Knott - CNP   40 mg at 03/06/22 2013    [MAR Hold] carvedilol (COREG) tablet 3.125 mg  3.125 mg Oral BID WC TIFFANIE Knott - CNP   3.125 mg at 03/08/22 0817    [Held by provider] clopidogrel (PLAVIX) tablet 75 mg  75 mg Oral Daily TIFFANIE Knott - CNP   75 mg at 03/06/22 0844    [MAR Hold] levothyroxine (SYNTHROID) tablet 50 mcg  50 mcg Oral Daily TIFFANIE Knott - CNP   50 mcg at 03/08/22 0817    [MAR Hold] therapeutic multivitamin-minerals 1 tablet  1 tablet Oral Daily TIFFANIE Knott - CNP   1 tablet at 03/06/22 0844    [MAR Hold] albuterol sulfate  (90 Base) MCG/ACT inhaler 2 puff  2 puff Inhalation Q4H PRN TIFFANIE Knott - [place]        [MAR Hold] QUEtiapine (SEROQUEL) tablet 25 mg  25 mg Oral Nightly TIFFANIE Knott CNP   25 mg at 03/07/22 2217    [MAR Hold] thiamine tablet 100 mg  100 mg Oral Daily TIFFANIE Knott - CNP   100 mg at 03/06/22 0844    [MAR Hold] sodium chloride flush 0.9 % injection 5-40 mL  5-40 mL IntraVENous 2 times per day TIFFANIE Knott CNP   10 mL at 03/08/22 0824    [MAR Hold] sodium chloride flush 0.9 % injection 10 mL  10 mL IntraVENous PRN TIFFANIE Knott - CNP   10 mL at 03/07/22 0421    [MAR Hold] 0.9 % sodium chloride infusion  25 mL IntraVENous PRN TIFFANIE Knott - [place]        [facility] Hold] potassium chloride (KLOR-CON M) extended release tablet 40 mEq  40 mEq Oral PRN TIFFANIE Knott CNP        Or   Tay Sandra [facility] Hold] potassium bicarb-citric acid (EFFER-K) effervescent tablet 40 mEq  40 mEq Oral PRN TIFFANIE Knott CNP        Or   Tay Flores [facility] Hold] potassium chloride 10 mEq/100 mL IVPB (Peripheral Line)  10 mEq IntraVENous PRN TIFFANIE Knott CNP        [facility] Hold] magnesium sulfate 1000 mg in dextrose 5% 100 mL IVPB  1,000 mg IntraVENous PRN Honey Garcia TIFFANIE Zee - CNP        [MAR Hold] enoxaparin (LOVENOX) injection 40 mg  40 mg SubCUTAneous Daily Kevin Datatiana, APRN - CNP   40 mg at 03/06/22 0843    [MAR Hold] ondansetron (ZOFRAN-ODT) disintegrating tablet 4 mg  4 mg Oral Q8H PRN Kevin Daub, APRN - CNP        Or    [MAR Hold] ondansetron [facility]) injection 4 mg  4 mg IntraVENous Q6H PRN Kevin Daub, APRN - CNP        [MAR Hold] polyethylene glycol (GLYCOLAX) packet 17 g  17 g Oral Daily PRN Kevin Daub, APRN - CNP        [MAR Hold] acetaminophen (TYLENOL) tablet 650 mg  650 mg Oral Q6H PRN Kevin Daub, APRN - CNP        Or    [MAR Hold] acetaminophen (TYLENOL) suppository 650 mg  650 mg Rectal Q6H PRN Kevin Daub, APRN - CNP        [facility] Hold] 0.9 % sodium chloride infusion   IntraVENous Continuous Umm Galvan MD 75 mL/hr at 03/08/22 1330 Rate Verify at 03/08/22 1330       Allergies:     Allergies   Allergen Reactions    Motrin [Ibuprofen Micronized]      Pt states he had blood in stool from motrin       Problem List:    Patient Active Problem List   Diagnosis Code    Meniere's disease H81.09    ADHD (attention deficit hyperactivity disorder) F90.9    Narcolepsy G47.419    Hyponatremia E87.1    PND (post-nasal drip) R09.82    Transaminasemia R74.01    Cardiomyopathy ([address] I42.9    HTN (hypertension) I10    Chronic obstructive pulmonary disease ([address] J44.9    Dementia with behavioral disturbance ([address] F03.91    Status post insertion of percutaneous endoscopic gastrostomy (PEG) tube ([address] Z93.1    S/p bare metal coronary artery stent Z95.5    Supracondylar fracture of distal end of femur without intracondylar extension, sequela S72.453S    Closed fracture of right distal femur ([address] S72.401A    Fracture of femoral neck, right, closed ([address] S72.001A       Past Medical History:        Diagnosis Date    ADHD (attention deficit hyperactivity disorder)     ADHD (attention deficit hyperactivity disorder)     Atypical chest pain     [facility]     Hypertension     Hyponatremia     Meniere's disease     Narcolepsy     Nasal fracture     PND (post-nasal drip) 4/21/2014    SOB (shortness of breath)     Tibia fracture     right tibial plateau fx, right syndesmotic fx    Transaminasemia 4/21/2014       Past Surgical History:        Procedure Laterality Date    ANKLE SURGERY      open reduction internal fixation of the right ankle & tibial plateau fx    CORONARY ANGIOPLASTY WITH STENT PLACEMENT N/A 10/10/2015    CYST REMOVAL      right side of face    FEMUR SURGERY Right 04/13/2021     DISTAL FEMUR REPLACEMENT (Right )    KNEE ARTHROPLASTY Right 4/13/2021    DISTAL FEMUR REPLACEMENT performed by Brannon Watkins DO at [place] [address] Right     total knee       Social History:    Social History     Tobacco Use    Smoking status: Current Every Day Smoker     Packs/day: 1.00     Years: 38.00     Pack years: 38.00     Types: Cigarettes    Smokeless tobacco: Never Used    Tobacco comment: e-cigs   Substance Use Topics    Alcohol use:  Yes     Alcohol/week: 16.0 standard drinks     Types: 16 Cans of beer per week     Comment: daily                                Ready to quit: Not Answered  Counseling given: Not Answered  Comment: e-cigs      Vital Signs (Current):   Vitals:    03/07/22 2039 03/08/22 0440 03/08/22 0807 03/08/22 1354   BP: 135/62 (!) 149/81 (!) 167/76 (!) 164/87   Pulse: 69 71 75 68   Resp: 20 18  16   Temp: 98.3 °F (36.8 °C) 98.6 °F (37 °C) 98.8 °F (37.1 °C) 97.9 °F (36.6 °C)   TempSrc: Oral Oral Oral Temporal   SpO2: 98% 92%  94%   Weight:    180 lb (81.6 kg)   Height:    5' 10\" (1.778 m)                                              BP Readings from Last 3 Encounters:   03/08/22 (!) 164/87   04/16/21 (!) 153/85   04/13/21 111/60       NPO Status: Time of last liquid consumption: 2100                        Time of last solid consumption: 2100                        Date of last liquid consumption: 03/07/22                        Date of last solid food consumption: 03/07/22    BMI:   Wt Readings from Last 3 Encounters:   03/08/22 180 lb (81.6 kg)   04/30/21 176 lb (79.8 kg)   04/15/21 176 lb (79.8 kg)     Body mass index is 25.83 kg/m². CBC:   Lab Results   Component Value Date    WBC 13.5 03/08/2022    RBC 3.50 03/08/2022    RBC 3.71 01/19/2012    HGB 9.2 03/08/2022    HCT 31.2 03/08/2022    MCV 89.1 03/08/2022    RDW 19.7 03/08/2022     03/08/2022     01/19/2012       CMP:   Lab Results   Component Value Date     03/08/2022    K 4.6 03/08/2022     03/08/2022    CO2 21 03/08/2022    BUN 11 03/08/2022    CREATININE 0.78 03/08/2022    GFRAA >60 03/08/2022    LABGLOM >60 03/08/2022    GLUCOSE 102 03/08/2022    GLUCOSE 95 01/19/2012    PROT 6.8 03/05/2022    CALCIUM 9.5 03/08/2022    BILITOT 0.28 03/05/2022    ALKPHOS 200 03/05/2022    AST 32 03/05/2022    ALT 15 03/05/2022       POC Tests: No results for input(s): POCGLU, POCNA, POCK, POCCL, POCBUN, POCHEMO, POCHCT in the last 72 hours.     Coags:   Lab Results   Component Value Date    PROTIME 11.9 03/06/2022    PROTIME 9.6 12/10/2011    INR 1.1 03/06/2022    APTT 25.3 11/03/2015       HCG (If Applicable): No results found for: PREGTESTUR, PREGSERUM, HCG, HCGQUANT     ABGs: No results found for: PHART, PO2ART, TSM5MSB, QRN0NMJ, BEART, J4RZBCHI     Type & Screen (If Applicable):  No results found for: LABABO, LABRH    Drug/Infectious Status (If Applicable):  Lab Results   Component Value Date    HEPCAB NONREACTIVE 04/21/2014       COVID-19 Screening (If Applicable):   Lab Results   Component Value Date    COVID19 Not Detected 03/05/2022           Anesthesia Evaluation  Patient summary reviewed no history of anesthetic complications:   Airway: Mallampati: II  TM distance: >3 FB   Neck ROM: full  Mouth opening: > = 3 FB Dental:          Pulmonary:normal exam    (+) COPD: no interval change,  shortness of breath: no interval change,                             Cardiovascular:    (+) hypertension: no interval change,       ECG reviewed  Rhythm: regular  Rate: normal  Echocardiogram reviewed                  Neuro/Psych:   (+) psychiatric history:depression/anxiety             GI/Hepatic/Renal: Neg GI/Hepatic/Renal ROS            Endo/Other: Negative Endo/Other ROS                    Abdominal:             Vascular: negative vascular ROS. Other Findings:             Anesthesia Plan      general     ASA 3       Induction: intravenous. Anesthetic plan and risks discussed with patient. Use of blood products discussed with patient whom consented to blood products. Plan discussed with CRNA.                   Tremaine Juárez MD   3/8/2022

## 2022-03-08 NOTE — PROGRESS NOTES
Palliative Care Progress Note    NAME:  Genora Lanes  MEDICAL RECORD NUMBER:  [id number]  AGE: 81 y.o. GENDER: male  : 1950  TODAY'S DATE:  3/8/2022    Reason for Consult:  goals of care  History of Present Illness     The patient is a 81 y.o. Non- / non  male who presents with Fatigue      Referred to Palliative Care by  [x] Physician   [] Nursing  [] Family Request   [] Other:       He was admitted to the Primary care service for Hyponatremia [E87.1]  Nephrolithiasis [N20.0]  Generalized weakness [R53.1]  Oxygen desaturation [R09.02]. His hospital course has been associated with Fall, hyponatremia, Right intro trochanteric fracture . The patient has a complicated medical history and has been hospitalized since 3/5/2022  3:47 PM.    Patient awake and oriented this am. He has IVF infusing and has marshall catheter in place. Patient scheduled for surgery today to repair right hip. Patient had some confusion during night but seems alert and oriented presently. Patient may need SNF for strengthening at discharge. Since labs from today include WBC 13.5, RBC 3.50, hemoglobin 9.2, hematocrit 31.2, platelets 789, glucose 102, BUN 11, creatinine 0.78, calcium 9.5, sodium 134, potassium 4.6, chloride 102. Patient had a venous Doppler Summary  No evidence of superficial or deep venous thrombosis in both lower extremities. Palliative care will continue to follow patient and provide emotional support and updates as needed     OVERNIGHT EVENTS:  Patient is a full code   Patient going for surgery today for Right hip repair  Palliative care will continue to follow      BP (!) 167/76   Pulse 75   Temp 98.8 °F (37.1 °C) (Oral)   Resp 18   Ht 5' 10\" (1.778 m)   Wt 180 lb (81.6 kg)   SpO2 92%   BMI 25.83 kg/m²     Assessment        REVIEW OF SYSTEMS    []   UNABLE TO OBTAIN:     Constitutional:  []   Chills   [x]  Fatigue   []  Fevers   [x]  Malaise   []  Weight loss   [] Other:     Respiratory:   [] normal or reduced; LOC full/confusion  __x_40%  Mainly in bed; Extensive disease; Mainly assist; intake normal or reduced; LOC full/confusion   ___30%  Bed Bound; Extensive disease; Total care; intake reduced; LOCfull/confusion  ___20%  Bed Bound; Extensive disease; Total care; intake minimal; Drowsy/coma  ___10%  Bed Bound; Extensive disease; Total care; Mouth care only; Drowsy/coma  ___0       Death      Plan      Palliative Interaction:  Spoke with patient at bedside. Patient going to surgery today for repair of right hip. Patient states wants to get surgery completed so he can focus on getting better and home. Patient may need SNF at discharge for strengthening. Patient denies any questions or concerns presently. Palliative care will continue to follow     Education/support to family  Education/support to patient  Discharge planning/helping to coordinate care  Communications with primary service  Pharmacologic pain management  Providing support for coping/adaptation/distress of family  Providing support for coping/adaptation/distress of patient  Discussing meaning/purpose   Caregiver support/education  Continue with current plan of care  Code status clarified: Full Code  Principle Problem/Diagnosis:  Hyponatremia    Additional Assessments:  Principal Problem:    Hyponatremia  Active Problems:    Narcolepsy    Cardiomyopathy ([facility]    HTN (hypertension)    Chronic obstructive pulmonary disease ([facility]    Dementia with behavioral disturbance ([facility] [facility]    Fracture of femoral neck, right, closed ([facility] [facility]  Resolved Problems:    * No resolved hospital problems.  *  1- Symptom management/ pain control     Pain Assessment:  Tylenol and morphine                Anxiety:  ativan                           Dyspnea:  oxygen per NC                           Fatigue:  generalized weakness and discomfort     Other:      2- Goals of care evaluation   The patient goals of care are improve or maintain function/quality of life   Goals of care discussed with:    [] Patient independently    [] Patient and Family    [x] Family or Healthcare DPOA independently    [] Unable to discuss with patient, family/DPOA not present    3- Code Status  Full Code    4- Other recommendations  - We will continue to provide comfort and support to the patient and the family        Palliative Care will continue to follow Mr. Cherry's care as needed. The note has been dictated by dragon, typing errors may be a possibility     Thank you for allowing Palliative Care to participate in the care of Mr. Cherry . Electronically signed by   TIFFANIE Mckeon NP  Palliative Care Team  on 3/8/2022 at 11:21 AM    Please call with any palliative questions or concerns. Palliative Care Team is available via perfect serve or via phone.      Palliative care number [phone]

## 2022-03-08 NOTE — PROGRESS NOTES
Physical Therapy        Physical Therapy Cancel Note      DATE: 3/8/2022    NAME: Saba Bills  MRN: [id number]   : 1950      Patient not seen this date for Physical Therapy due to:    Surgery/Procedure: pt is to have ortho surgery to fix broken femur today; check status 3/9/22 and evaluate as appropriate      Electronically signed by Aniyah Gorman PT on 3/8/2022 at 9:45 AM

## 2022-03-08 NOTE — CARE COORDINATION
Transitional planning-talked with patient-he is having surgery today. Talked with him about SNF-he states he wants to go home. W/C bound.

## 2022-03-08 NOTE — PROGRESS NOTES
New IV was placed by ultrasound at approximately 3 am.  6 am the IV needed to be removed it had infiltrated. the IV tubing was under the patients elbow, its seems as though the catheter was slightly dislodged from vein which may have caused it to be infiltrated. At start of shift there were approximately 200 ml in marshall bag, at 6 am there 550 ml in marshall Bag . .. Bladder scan results not very unconclusive ranging from 100 to 400. Bladder does feel distended. The patient had been pulling on the Catheter earlier this evening. Pt education provided to about dangers of pulling on marshall. Because of excessive sediment in marshall tubing, flushed marshall tubing with 50 mls normal saline the patient screamed when the marshall catheter was flushed, facial expressions indicated pt was in considerable pain, pt requested RN remov Marshall, RN removed per pt request and  to prevent any potential complication.

## 2022-03-08 NOTE — CARE COORDINATION
Placed call to APS spoke with [facility] casewkr assigned is Almita Segovia [phone].

## 2022-03-08 NOTE — PROGRESS NOTES
During hourly rounding walked into patient room and found that he had ripped his IV out, had taken of his gown, and had thrown his sheets, blanket and pillow on the ground. He was also pulling on his catheter. Patient stated something along the lines of he needed to get his place and in his own bed. Clearly he was confused and didn't know where he was.

## 2022-03-08 NOTE — CONSULTS
Orthopaedic Surgery Consult  (Dr. Trung Jonas)      CC/Reason for consult:  Right hip pain s/p wheelchair fall     Interval History:    The patient is a 81 y.o.  male who currently is in the inpatient unit at [facility] after a fall from his wheelchair. Orthopaedic surgery was consulted for a right femoral IT fracture. Plan was to go to the OR yesterday, but emergent cases delayed his treatment. Plan for OR today. Patient denies fever, CP, SOB. Had an event overnight where he ripped his IV lines out and took off his gown, in an attempt to leave his bed. Otherwise, no issues overnight. Alert and oriented this AM.    HPI:  Patient presented to the [facility] emergency department on 3/5/2022 after falling from his wheelchair onto his bottom. Patient states that this the second time this happened in the past few days. Patient is currently wheelchair dependent and states that he is not ambulated without assistance of roughly 5 years. Patient states that he usually transfers himself without any difficulty, but after the fall he was unable to move his right leg due to the significant bout of pain. Patient is currently admitted to the [facility] inpatient unit for hyponatremia. Patient states that any movement or touching the hip causes significant pain. Patient is a past orthopedic surgery history of a distal femoral replacement by Dr. Trung Jonas  For a periprosthetic fracture on 4/13/2021 after right total knee was done by Dr Sola German in 2013. Patient is currently on Lovenox and clopidogrel for anticoagulation. Past medical history of the patient includes CHF, defibrillator placement, COPD, hypertension, malnutrition. Patient endorses dysesthesias to the right lower extremity, specifically at the knee and foot, but is at baseline.     Past Medical History:    Past Medical History:   Diagnosis Date    ADHD (attention deficit hyperactivity disorder)     ADHD (attention deficit hyperactivity disorder)     Atypical chest pain  Chronic bronchitis (HCC)     Hypertension     Hyponatremia     Meniere's disease     Narcolepsy     Nasal fracture     PND (post-nasal drip) 4/21/2014    SOB (shortness of breath)     Tibia fracture     right tibial plateau fx, right syndesmotic fx    Transaminasemia 4/21/2014     Past Surgical History:    Past Surgical History:   Procedure Laterality Date    ANKLE SURGERY      open reduction internal fixation of the right ankle & tibial plateau fx    CORONARY ANGIOPLASTY WITH STENT PLACEMENT N/A 10/10/2015    CYST REMOVAL      right side of face    FEMUR SURGERY Right 04/13/2021     DISTAL FEMUR REPLACEMENT (Right )    KNEE ARTHROPLASTY Right 4/13/2021    DISTAL FEMUR REPLACEMENT performed by York Bamberger, DO at [place] [id number] Right     total knee     Medications Prior to Admission:   Prior to Admission medications    Medication Sig Start Date End Date Taking? Authorizing Provider   amiodarone (CORDARONE) 200 MG tablet Take by mouth   Yes Historical Provider, MD   cyanocobalamin 1000 MCG tablet Take 1,000 mcg by mouth   Yes Historical Provider, MD   traMADol (ULTRAM) 50 MG tablet Take 50 mg by mouth. Yes Historical Provider, MD   sodium chloride 1 g tablet Take 1 g by mouth   Yes Historical Provider, MD   vitamin C (ASCORBIC ACID) 500 MG tablet Take 500 mg by mouth   Yes Historical Provider, MD   methylphenidate (RITALIN) 10 MG tablet Take 10 mg by mouth.    Yes Historical Provider, MD   fluticasone propionate (FLOVENT) 50 MCG/BLIST AEPB inhaler Inhale into the lungs daily   Yes Historical Provider, MD   acetaminophen (TYLENOL) 500 MG tablet Take 1 tablet by mouth 4 times daily as needed for Pain 4/15/21   Armandoyahir Rodríguezning, DO   Multiple Vitamins-Minerals (CERTAVITE SENIOR/ANTIOXIDANT) TABS TAKE 1 TAB BY MOUTH ONCE A DAY 6/22/16   Iris Lynn PA-C   carvedilol (COREG) 3.125 MG tablet TAKE 1 TAB BY MOUTH TWICE A DAY WITH MEALS 6/7/16   Iris Lynn PA-C   levothyroxine (SYNTHROID) 25 MCG tablet Take 1 tablet by mouth Daily  Patient taking differently: Take 50 mcg by mouth Daily  3/24/16   Opal Franco DO   QUEtiapine (SEROQUEL) 25 MG tablet Take 1 tablet by mouth nightly 3/24/16   Opal Franco DO   melatonin 3 MG TABS tablet Take 3 mg by mouth daily    Historical Provider, MD   atorvastatin (LIPITOR) 40 MG tablet Take 1 tablet by mouth nightly 11/10/15   River Gomes MD   clopidogrel (PLAVIX) 75 MG tablet Take 1 tablet by mouth daily 11/10/15   River Gomes MD   Magnesium Oxide 250 MG TABS Take 1 tablet by mouth daily  Patient taking differently: Take 500 mg by mouth daily  11/10/15   River Gomes MD   meclizine (ANTIVERT) 25 MG tablet Take 0.5 tablets by mouth 3 times daily as needed 11/10/15   River Gomes MD   furosemide (LASIX) 20 MG tablet Take 1 tablet by mouth daily 11/10/15   River Gomes MD   [address] Adult diapers dispense quantity allowed by insurance 3/31/15   Malvin yLnn PA-C   Diapers & Supplies MISC Wipes  dispense quantity allowed by insurance 3/31/15   Iris Lynn PA-C   PROAIR  (90 BASE) MCG/ACT inhaler inhale 2 puffs every 4 to 6 hours if needed 11/29/14   Iris Lynn PA-C   mometasone (NASONEX) 50 MCG/ACT nasal spray 2 sprays by Nasal route daily. 12/23/13   Isreal Reagan MD   docusate sodium (COLACE) 100 MG capsule Take 100 mg by mouth 2 times daily. Historical Provider, MD   folic acid (FOLVITE) 1 MG tablet Take 1 mg by mouth daily. Historical Provider, MD   chlorhexidine (PERIDEX) 0.12 % solution Take 15 mLs by mouth 2 times daily. Historical Provider, MD   SALINE MIST SPRAY NA by Nasal route. Historical Provider, MD   Multiple Vitamin (MULTIVITAMIN PO) Take  by mouth.       Historical Provider, MD     Allergies:    Motrin [ibuprofen micronized]  Social History:   Social History     Socioeconomic History    Marital status:      Spouse name: None    Number of children: None Problem Relation Age of Onset    Heart Disease Mother         heart attack    Heart Disease Father        ROS:   Constitutional: Negative for fever and chills. Respiratory: Negative for cough. Cardiovascular: Negative for chest pain. Musculoskeletal: Positive for right leg pain. Skin: Negative for itching and rash. Neurological: Negative for numbness, tingling, weakness. PE:  Blood pressure (!) 149/81, pulse 71, temperature 98.6 °F (37 °C), temperature source Oral, resp. rate 18, height 5' 10\" (1.778 m), weight 180 lb (81.6 kg), SpO2 92 %. Gen: Alert and oriented, NAD, cooperative. Head: Normocephalic, atraumatic. Cardiovascular: Rate regular. Respiratory: Chest symmetric, no accessory muscle use. RLE: Skin intact. TTP about greater trochanter. EHL/FHL/TA/GS complex motor intact. Sural/saphenous/SPN/DPN/plantar nerve distribution SILT. DP pulse 2+ with BCR. Labs:  Recent Labs     03/06/22  0432 03/06/22  1411 03/07/22  0332 03/07/22  0332 03/08/22  0421   WBC  --   --  11.4*   < > 13.5*   HGB  --   --  8.9*   < > 9.2*   HCT  --   --  29.0*   < > 31.2*   PLT  --   --  286   < > 261   INR 1.1  --   --   --   --    *   < > 131*  --   --    K 5.4*   < > 4.5  --   --    BUN 7*   < > 10  --   --    CREATININE 0.80   < > 0.86  --   --    GLUCOSE 112*   < > 99  --   --     < > = values in this interval not displayed.         Imaging:   XR HIP RIGHT (2-3 VIEWS)    Result Date: 3/6/2022  Comminuted intertrochanteric fracture right femoral neck No other acute bony or joint abnormality     XR FEMUR RIGHT (MIN 2 VIEWS)    Result Date: 3/6/2022  Comminuted intertrochanteric fracture right femoral neck No other acute bony or joint abnormality     XR KNEE RIGHT (1-2 VIEWS)    Result Date: 3/6/2022  Comminuted intertrochanteric fracture right femoral neck No other acute bony or joint abnormality     XR ANKLE LEFT (MIN 3 VIEWS)    Result Date: 3/6/2022  No acute osseous or soft tissue abnormality. XR FOOT LEFT (MIN 3 VIEWS)    Result Date: 3/6/2022  Irregularity of the 1st distal phalanx that may relate to an acute fracture and correlation with point tenderness is needed. XR HIP 2-3 VW W PELVIS LEFT    Result Date: 3/6/2022  Comminuted intertrochanteric fracture of the right proximal femur. Assessment/Plan: 81 y.o. male who fell from a wheel chair, being seen for:    -Right femur fracture    -To OR with Dr. Marcella Borjas 3/7 for cephalomedullary nail fixation  -Patient intermediate risk per cardiology  -WB status: NWB RLE  -NPO, Ancef OCTOR, operative site marked, consent in chart, typed and crossed for 2 units pRBC  -Please hold DVT ppx if medically safe  -PT/OT after surgical intervention  -Vitamin D levels normal; no intervention needed. Tyrese Simmons MD  Resident Physician, PGY-2   Orthopaedic Surgery  5:29 AM 3/8/2022    This note is created with the assistance of a speech recognition program. While intending to generate a document that actually reflects the content of the visit, the document can still have some errors including those of syntax and sound a like substitutions which may escape proof reading. In such instances, actual meaning can be extrapolated by contextual diversion.

## 2022-03-08 NOTE — PROGRESS NOTES
[facility]   Progress Note                   Date:   3/8/2022  Patient name: Vijay Mace  Date of admission:  3/5/2022  3:47 PM  MRN:   [id number]  YOB: 1950  PCP: Joe Dos Santos DO    Reason for Admission: Hyponatremia [E87.1]  Nephrolithiasis [N20.0]  Generalized weakness [R53.1]  Oxygen desaturation [R09.02]    Subjective:       Clinical Changes / Abnormalities: Pt seen and examined in the room. Pt NPO for OR today.   No cp or SOB       Medications:   Scheduled Meds:   guaiFENesin  1,200 mg Oral BID    pantoprazole  40 mg Oral QAM AC    chlorhexidine  15 mL Mouth/Throat BID    cyanocobalamin  1,000 mcg Oral Daily    docusate sodium  100 mg Oral BID    fluticasone  1 spray Each Nostril Daily    fluticasone  1 puff Inhalation BID    folic acid  1 mg Oral Daily    magnesium oxide  400 mg Oral Daily    sodium chloride  1 g Oral BID WC    vitamin C  500 mg Oral Daily    ceFAZolin  2,000 mg IntraVENous On Call to OR    megestrol  200 mg Oral Daily    Armodafinil  150 mg Oral Daily    atorvastatin  40 mg Oral Nightly    carvedilol  3.125 mg Oral BID WC    [Held by provider] clopidogrel  75 mg Oral Daily    levothyroxine  50 mcg Oral Daily    therapeutic multivitamin-minerals  1 tablet Oral Daily    QUEtiapine  25 mg Oral Nightly    thiamine  100 mg Oral Daily    sodium chloride flush  5-40 mL IntraVENous 2 times per day    enoxaparin  40 mg SubCUTAneous Daily     Continuous Infusions:   sodium chloride      sodium chloride 75 mL/hr at 03/08/22 0831     CBC:   Recent Labs     03/05/22  1604 03/07/22  0332 03/08/22  0421   WBC 12.3* 11.4* 13.5*   HGB 9.8* 8.9* 9.2*    286 261     BMP:    Recent Labs     03/06/22  1411 03/07/22  0332 03/08/22  0421   * 131* 134*   K 5.2 4.5 4.6    100 102   CO2 19* 22 21   BUN 8 10 11   CREATININE 0.81 0.86 0.78   GLUCOSE 109* 99 102*     Hepatic:   Recent Labs     03/05/22  1604   AST 32   ALT 15   BILITOT 0.28*   ALKPHOS 200*     Troponin:   Recent Labs     03/05/22  1604   TROPHS 6     BNP: No results for input(s): BNP in the last 72 hours. Lipids: No results for input(s): CHOL, HDL in the last 72 hours. Invalid input(s): LDLCALCU  INR:   Recent Labs     03/06/22  0432   INR 1.1     EKG: junctional rhythm in 70s, no acute ischemic changes     STRESS 4/12/2021: No ischemia/infarct. EF 85%.      ECHO 2/16/16: EF 50%, mild TR.      CATH 11/9/15: PTCA/BMS to OM and RCA     ICD 11/2/15: [employer] device implanted by Dr. Ivis Henry for ICM.    CATH 10/30/15: MVD. EF 25%.   Objective:   Vitals: BP (!) 167/76   Pulse 75   Temp 98.8 °F (37.1 °C) (Oral)   Resp 18   Ht 5' 10\" (1.778 m)   Wt 180 lb (81.6 kg)   SpO2 92%   BMI 25.83 kg/m²   General appearance: alert and cooperative with exam  HEENT: Head: Normocephalic, no lesions, without obvious abnormality. Neck: no JVD, trachea midline, no adenopathy  Lungs: Clear to auscultation  Heart: Regular rate and rhythm, s1/s2 auscultated, no murmurs  Abdomen: soft, non-tender, bowel sounds active  Extremities: no edema  Neurologic: not done        Assessment / Acute Cardiac Problems:   1. Right femur intertrochanteric fracture  2. MV CAD s/p CABG  3. PAF  4. Accelerated junctional rhythm  5. HFrEF  6. Hx of VF arrest s/p AICD  7.      Patient Active Problem List:     Meniere's disease     ADHD (attention deficit hyperactivity disorder)     Narcolepsy     Hyponatremia     PND (post-nasal drip)     Transaminasemia     Cardiomyopathy ([address]     HTN (hypertension)     Chronic obstructive pulmonary disease (HCC)     Dementia with behavioral disturbance ([address]     Status post insertion of percutaneous endoscopic gastrostomy (PEG) tube ([address]     S/p bare metal coronary artery stent     Supracondylar fracture of distal end of femur without intracondylar extension, sequela     Closed fracture of right distal femur ([address]     Fracture of femoral neck, right, closed ([address]      Plan of Treatment:   1. Intermediate risk for OR per Dr. Manju Carballo today   2. No further workup from cards standpoint.   If no CV issues in OR, will sign off     Electronically signed by TIFFANIE Harp CNP on 3/8/2022 at 12:58 PM  [id number]  [phone]

## 2022-03-08 NOTE — PROGRESS NOTES
[facility]  Office: [address], DO, Evelyn Home, DO, Vianca Handley, DO, Janeth Cantor Blood, DO, Jackie Muro MD, Shay Roth MD, Bertin Cortes MD, Duke Gifford MD, Pratik Canales MD, Meagan Correa MD, Sury Ruiz MD, Mackenzie Boone, DO, Angela Ojeda, DO, Jacinto Wolff MD,  Jorge A Tran, DO, Rob Bailey MD, Rachael Antony MD, Marybeth Rouse MD, Kathrine Gibson MD, Kike Leyva MD, Joi Brooks, [facility], [facility] Dedra, CNP, Barbara Durham, CNP, Екатерина Mckay, CNS, Britany Martinez, CNP, Hanna Reyes, CNP, Toribio eMi, CNP, Bobby Carr, CNP, Pricila Myrick, CNP, Jeff Corbett PA-C, Roxanna Burleson [facility], [facility], [facility], Almarazkenna Isaacs, CNP, Anand Milligan, CNP, Shaka Townsend, CNP, Anali Zuleta, CNP, Katina Dove, [facility], Dagmar Ideal, [address] [facility]    Progress Note    3/8/2022    4:05 PM    Name:   Jackie Caban  MRN:     [id number]     Acct:      [de-identified]   Room:   [facility] Day:  3  Admit Date:  3/5/2022  3:47 PM    PCP:   Loyda Toney DO  Code Status:  Full Code    Subjective:     C/C:   Chief Complaint   Patient presents with    Fatigue     Interval History Status:   Confused  Having trouble maintaining IVs and Day  NPO   On call for OR  Hip pain controlled    Data Base Updates:  WBC13.5 High k/uL RBC3.50 Low m/uL Hemoglobin9. 2 Low      [id number] High mg/dL     TWV16hm/dL   CREATININE0.78mg/dL     Calcium9.5mg/dL   [id number] Low mmol/L   Potassium4.6       Brief History:     As documented in the medical record:  \"\" 79-year-old wheelchair-bound  male with underlying HFrEF, Severe knee OA, narcolepsy who presents to the hospital with concern for generalized weakness. Patient reports since his knee replacement surgery a few years ago he was never able to ambulate at baseline requiring him to be overall wheelchair-bound.   He says over the past month his appetite's been poor and he feels he is becoming weaker. He says every time he goes from the wheelchair to his bed he struggles to lift himself over and ends up falling. He denies any loss of consciousness, head trauma, joint swelling, shortness of breath, palpitations or dizziness. He does not report any weight loss, night sweats and says over the past few years his appetite is not like it used to be. He pays out-of-pocket for home aide to come and cook for him, he reports he has access to food and can manage himself but just does not have energy to do so. Patient reports compliance to all his meds. Says he needs physical therapy but does not want placement in a nursing home. Patient denies any other symptoms at this time. \"    The intitial assessment and plan included:  \"  Hospital Problems            Last Modified POA     * (Principal) Hyponatremia 3/5/2022 Yes     Narcolepsy 3/6/2022 Yes     Cardiomyopathy ([address] 3/6/2022 Yes     HTN (hypertension) 3/6/2022 Yes     Chronic obstructive pulmonary disease ([address] 3/6/2022 Yes     Dementia with behavioral disturbance ([address] 3/6/2022 Yes     Fracture of femoral neck, right, closed ([address] 3/6/2022 Yes           Plan:   1. Hyponatremia- patient appears dry, will resume IVF 0.9% NS at 50 ml/hr, monitor sodium tomorrow. He had diarrhea for days before presentation  2. Right femoral neck fracture- on Xrays, change Norco to Percocet, consulted Ortho, appreciate Cardiac risk stratification. NPO for OR today, negative LE dopplers  3. CMP- EF 20% on previous Echo, appreciate Cardiology consult, AICD interrogation, hold Lasix for now, recent stress test 4/2021 normal, repeat Echo- poor images, but EF improved 68%, intermediate risk for surgery  4. COPD- stable, oxygen prn, Dubonebs prn, resume Mucinex  5. HTN- BP stable  6. Narcolepsy- Nuvigil  7. Meniere's disease- resume Antivert and Ativan 1mg po q 8 hrs prn  8. Dementia- pt unable to care for self, needs SNF  9. Gi/ DVT proph  10.  Will need PT/OT\" Additional database has included:  Echocardiogram:  Summary  Technically very challenging, there is poor endocardial definition, cannot  comment on wall motion analysis, within above limitations:  Left ventricle is normal in size. Global left ventricular systolic function  is normal. Calculated ejection fraction 68% by Rashid's method. Valves not well visualized. No obvious significant valvular regurgitation or stenosis seen. Results for Bj Bowman (MRN [id number]) as of 3/8/2022 15:49   Ref. Range 3/5/2022 23:18 3/6/2022 04:32 3/6/2022 14:11 3/7/2022 03:32 3/8/2022 04:21   Sodium Latest Ref Range: 135 - 144 mmol/L 127 (L) 129 (L) 129 (L) 131 (L) 134 (L)       Past Medical History:   has a past medical history of ADHD (attention deficit hyperactivity disorder), ADHD (attention deficit hyperactivity disorder), Atypical chest pain, Chronic bronchitis ([address] Hypertension, Hyponatremia, Meniere's disease, Narcolepsy, Nasal fracture, PND (post-nasal drip), SOB (shortness of breath), Tibia fracture, and Transaminasemia. Social History:   reports that he has been smoking cigarettes. He has a 38.00 pack-year smoking history. He has never used smokeless tobacco. He reports current alcohol use of about 16.0 standard drinks of alcohol per week. He reports that he does not use drugs. Family History:   Family History   Problem Relation Age of Onset    Heart Disease Mother         heart attack    Heart Disease Father        Review of Systems:     Review of Systems   Constitutional: Positive for activity change (Decreased). Respiratory: Negative for cough and shortness of breath. Cardiovascular: Negative for chest pain and palpitations. Gastrointestinal: Negative for abdominal pain, nausea and vomiting. Genitourinary: Negative for flank pain and hematuria. Musculoskeletal: Positive for arthralgias, gait problem (Unsteady, patient reports frequent falls) and myalgias.         His right hip is still stiff and sore   Neurological: Positive for dizziness (He has been taking Mysoline for his dizziness?). Psychiatric/Behavioral: Positive for confusion (He acknowledges that he is still confused). Physical Examination:        Vitals  BP (!) 164/87   Pulse 68   Temp 97.9 °F (36.6 °C) (Temporal)   Resp 16   Ht 5' 10\" (1.778 m)   Wt 180 lb (81.6 kg)   SpO2 94%   BMI 25.83 kg/m²   Temp (24hrs), Av.4 °F (36.9 °C), Min:97.9 °F (36.6 °C), Max:98.8 °F (37.1 °C)    No results for input(s): POCGLU in the last 72 hours. Physical Exam  Vitals reviewed. Constitutional:       General: He is not in acute distress. Appearance: He is not diaphoretic. HENT:      Head: Normocephalic. Nose: Nose normal.   Eyes:      General: No scleral icterus. Conjunctiva/sclera: Conjunctivae normal.   Neck:      Trachea: No tracheal deviation. Cardiovascular:      Rate and Rhythm: Normal rate and regular rhythm. Pulmonary:      Effort: Pulmonary effort is normal. No respiratory distress. Breath sounds: Normal breath sounds. No wheezing or rales. Chest:      Chest wall: No tenderness. Abdominal:      General: There is no distension. Palpations: Abdomen is soft. Tenderness: There is no abdominal tenderness. Musculoskeletal:         General: Deformity ([place]) present. No tenderness. Cervical back: Neck supple. Comments: Right leg is somewhat externally rotated and foreshortened   Skin:     General: Skin is warm and dry. Findings: Rash (Onychomycosis) present. Neurological:      Comments: The patient is having trouble providing historical data          Medications: Allergies:     Allergies   Allergen Reactions    Motrin [Ibuprofen Micronized]      Pt states he had blood in stool from motrin       Current Meds:   Scheduled Meds:    [MAR Hold] guaiFENesin  1,200 mg Oral BID    [MAR Hold] pantoprazole  40 mg Oral QAM AC    [MAR Hold] chlorhexidine  15 mL Mouth/Throat BID    [MAR Hold] cyanocobalamin  1,000 mcg Oral Daily    [MAR Hold] docusate sodium  100 mg Oral BID    [MAR Hold] fluticasone  1 spray Each Nostril Daily    [MAR Hold] fluticasone  1 puff Inhalation BID    [MAR Hold] folic acid  1 mg Oral Daily    [MAR Hold] magnesium oxide  400 mg Oral Daily    [MAR Hold] sodium chloride  1 g Oral BID WC    [MAR Hold] vitamin C  500 mg Oral Daily    [MAR Hold] ceFAZolin  2,000 mg IntraVENous On Call to OR    megestrol  200 mg Oral Daily    [MAR Hold] Armodafinil  150 mg Oral Daily    [MAR Hold] atorvastatin  40 mg Oral Nightly    [MAR Hold] carvedilol  3.125 mg Oral BID WC    [Held by provider] clopidogrel  75 mg Oral Daily    [MAR Hold] levothyroxine  50 mcg Oral Daily    [MAR Hold] therapeutic multivitamin-minerals  1 tablet Oral Daily    [MAR Hold] QUEtiapine  25 mg Oral Nightly    [MAR Hold] thiamine  100 mg Oral Daily    [MAR Hold] sodium chloride flush  5-40 mL IntraVENous 2 times per day    [MAR Hold] enoxaparin  40 mg SubCUTAneous Daily     Continuous Infusions:    [MAR Hold] sodium chloride      [MAR Hold] sodium chloride 75 mL/hr at 03/08/22 1330     PRN Meds: [MAR Hold] LORazepam, [MAR Hold] LORazepam, [MAR Hold] meclizine, [MAR Hold] melatonin, [MAR Hold] traMADol, [MAR Hold] oxyCODONE-acetaminophen, [MAR Hold] morphine, [MAR Hold] albuterol sulfate HFA, [MAR Hold] sodium chloride flush, [MAR Hold] sodium chloride, [MAR Hold] potassium chloride **OR** [MAR Hold] potassium alternative oral replacement **OR** [MAR Hold] potassium chloride, [MAR Hold] magnesium sulfate, [MAR Hold] ondansetron **OR** [MAR Hold] ondansetron, [MAR Hold] polyethylene glycol, [MAR Hold] acetaminophen **OR** [MAR Hold] acetaminophen    Data:     I/O (24Hr):     Intake/Output Summary (Last 24 hours) at 3/8/2022 1605  Last data filed at 3/8/2022 1330  Gross per 24 hour   Intake 1329 ml   Output --   Net 1329 ml       Labs:  Hematology:  Recent Labs 03/06/22  0432 03/07/22  0332 03/08/22  0421   WBC  --  11.4* 13.5*   RBC  --  3.38* 3.50*   HGB  --  8.9* 9.2*   HCT  --  29.0* 31.2*   MCV  --  85.8 89.1   MCH  --  26.3 26.3   MCHC  --  30.7 29.5   RDW  --  19.4* 19.7*   PLT  --  286 261   MPV  --  9.0 9.2   INR 1.1  --   --      Chemistry:  Recent Labs     03/06/22  1411 03/07/22  0332 03/08/22  0421   * 131* 134*   K 5.2 4.5 4.6    100 102   CO2 19* 22 21   GLUCOSE 109* 99 102*   BUN 8 10 11   CREATININE 0.81 0.86 0.78   ANIONGAP 10 9 11   LABGLOM >60 >60 >60   GFRAA >60 >60 >60   CALCIUM 9.1 9.3 9.5     No results for input(s): PROT, LABALBU, LABA1C, M5GKEQU, Q2WUZZT, FT4, TSH, AST, ALT, LDH, GGT, ALKPHOS, LABGGT, BILITOT, BILIDIR, AMMONIA, AMYLASE, LIPASE, LACTATE, CHOL, HDL, LDLCHOLESTEROL, CHOLHDLRATIO, TRIG, VLDL, PXC74XU, PHENYTOIN, PHENYF, URICACID, POCGLU in the last 72 hours.   ABG:  Lab Results   Component Value Date    POCPH 7.54 10/24/2015    POCPCO2 35 10/24/2015    POCPO2 71 10/24/2015    POCHCO3 29.9 10/24/2015    NBEA NOT REPORTED 10/24/2015    PBEA 7 10/24/2015    DLF6SNL 31 10/24/2015    VPUM9PNS 96 10/24/2015    FIO2 30.0 10/26/2015     Lab Results   Component Value Date/Time    SPECIAL NOT REPORTED 03/24/2020 10:15 PM     Lab Results   Component Value Date/Time    CULTURE NORMAL RESPIRATORY ORI LIGHT GROWTH 10/23/2015 04:04 PM    CULTURE  10/23/2015 04:04 PM     [address], [address] [place] [address] [phone]       Radiology:  XR HIP RIGHT (2-3 VIEWS)    Result Date: 3/6/2022  Comminuted intertrochanteric fracture right femoral neck No other acute bony or joint abnormality     XR FEMUR RIGHT (MIN 2 VIEWS)    Result Date: 3/6/2022  Comminuted intertrochanteric fracture right femoral neck No other acute bony or joint abnormality     XR KNEE RIGHT (1-2 VIEWS)    Result Date: 3/6/2022  Comminuted intertrochanteric fracture right femoral neck No other acute bony or joint abnormality     XR ANKLE LEFT (MIN 3 VIEWS)    Result Date: 3/6/2022  No acute osseous or soft tissue abnormality. XR FOOT LEFT (MIN 3 VIEWS)    Result Date: 3/6/2022  Irregularity of the 1st distal phalanx that may relate to an acute fracture and correlation with point tenderness is needed. XR CHEST PORTABLE    Result Date: 3/5/2022  Marginal inspiration, without evidence of acute cardiopulmonary disease     CT CHEST PULMONARY EMBOLISM W CONTRAST    Result Date: 3/5/2022  1. No evidence of pulmonary embolism 2. Dependent atelectasis, right lung base 3. Diffuse emphysematous changes present throughout the lungs, with an upper lobe predominance 4. Extensive coronary arterial calcifications 5. Cholelithiasis, without evidence of cholecystitis 6. Nodular contour to the liver, suggesting cirrhosis     XR HIP 2-3 VW W PELVIS LEFT    Result Date: 3/6/2022  Comminuted intertrochanteric fracture of the right proximal femur.        Assessment:        Primary Problem  Fracture of femoral neck, right, closed [facility])    Active Hospital Problems    Diagnosis Date Noted    Hyponatremia [E87.1]      Priority: High    Accelerated junctional rhythm [I49.8] 03/08/2022    Presence of permanent cardiac pacemaker [Z95.0] 03/08/2022    Anemia, normocytic normochromic [D64.9]     Fracture of femoral neck, right, closed ([facility] [S72.001A] 03/06/2022    S/p bare metal coronary artery stent [Z95.5] 11/10/2015    Dementia with behavioral disturbance (HCC) [F03.91]     Chronic obstructive pulmonary disease ([address] [J44.9]     HTN (hypertension) [I10] 10/12/2015    Cardiomyopathy ([address] [I42.9] 10/12/2015    Narcolepsy [G47.419]     Meniere's disease [H81.09] 01/22/2014         Plan:        N.p.o.  Ortho eval  On call for OR  Cardiology evaluation  noted  Correct electrolyte abnormalities  Blood Pressure - Monitor and control  PT/OT  Transfuse as needed  Respiratory Therapy and Bronchodilators prn   Fall precautions  Palliative care consult  DVT prophylaxis    IP CONSULT TO HOSPITALIST  IP CONSULT TO PULMONOLOGY  IP CONSULT TO DIETITIAN  IP CONSULT TO PALLIATIVE CARE  IP CONSULT TO SOCIAL WORK  IP CONSULT TO ORTHOPEDIC SURGERY  IP CONSULT TO SPIRITUAL SERVICES  IP CONSULT TO CARDIOLOGY  IP CONSULT TO IV TEAM    David Goodwin DO  3/8/2022  4:05 PM

## 2022-03-08 NOTE — PROGRESS NOTES
Occupational [address]  Occupational Therapy Not Seen Note    DATE: 3/8/2022    NAME: Kofi Wright  MRN: [id number]   : 1950      Patient not seen this date for Occupational Therapy due to: Other: Pt with R femur fracture. Patient's surgery last night was canceled due to emergent cases in the OR. Pt to OR today for RLE cephalo medullary nail fixation. Will evaluate pt post op.     Electronically signed by LUIS Bingham/TEMO on 3/8/2022 at 11:50 AM

## 2022-03-08 NOTE — PROGRESS NOTES
Orthopedic Progress Note    Patient:  Stephanie Peñaloza  YOB: 1950     81 y.o. male    Patient's surgery tonight was canceled due to emergent cases in the OR. We will plan to take patient to the OR tomorrow for management of his right hip fracture. Patient is to be n.p.o. at midnight tonight. Please hold chemical anticoagulation tomorrow morning if medically appropriate per the medicine team. Patient was seen and evaluated by the cardiology team here and was deemed intermediate risk for surgery. Antibiotics on-call to the OR tomorrow. Patient is typed and crossed for 2 units for surgery.     Gris Armas DO  Orthopedic Surgery Resident, PGY-3  [facility]

## 2022-03-09 ENCOUNTER — APPOINTMENT (OUTPATIENT)
Dept: GENERAL RADIOLOGY | Age: 72
DRG: 480 | End: 2022-03-09
Payer: COMMERCIAL

## 2022-03-09 PROBLEM — J96.01 ACUTE RESPIRATORY FAILURE WITH HYPOXEMIA (HCC): Status: ACTIVE | Noted: 2022-03-09

## 2022-03-09 PROBLEM — J98.11 ATELECTASIS: Status: ACTIVE | Noted: 2022-03-09

## 2022-03-09 LAB
ANION GAP SERPL CALCULATED.3IONS-SCNC: 11 MMOL/L (ref 9–17)
BUN BLDV-MCNC: 13 MG/DL (ref 8–23)
BUN BLDV-MCNC: 16 MG/DL (ref 8–23)
CALCIUM SERPL-MCNC: 8.7 MG/DL (ref 8.6–10.4)
CHLORIDE BLD-SCNC: 102 MMOL/L (ref 98–107)
CO2: 19 MMOL/L (ref 20–31)
CREAT SERPL-MCNC: 0.86 MG/DL (ref 0.7–1.2)
CREAT SERPL-MCNC: 1.01 MG/DL (ref 0.7–1.2)
GFR AFRICAN AMERICAN: >60 ML/MIN
GFR AFRICAN AMERICAN: >60 ML/MIN
GFR NON-AFRICAN AMERICAN: >60 ML/MIN
GFR NON-AFRICAN AMERICAN: >60 ML/MIN
GFR SERPL CREATININE-BSD FRML MDRD: ABNORMAL ML/MIN/{1.73_M2}
GFR SERPL CREATININE-BSD FRML MDRD: NORMAL ML/MIN/{1.73_M2}
GLUCOSE BLD-MCNC: 138 MG/DL (ref 70–99)
HCT VFR BLD CALC: 28.6 % (ref 40.7–50.3)
HEMOGLOBIN: 8 G/DL (ref 13–17)
MCH RBC QN AUTO: 26.1 PG (ref 25.2–33.5)
MCHC RBC AUTO-ENTMCNC: 28 G/DL (ref 28.4–34.8)
MCV RBC AUTO: 93.5 FL (ref 82.6–102.9)
MRSA, DNA, NASAL: NEGATIVE
NRBC AUTOMATED: 0 PER 100 WBC
OSMOLALITY URINE: 337 MOSM/KG (ref 80–1300)
PDW BLD-RTO: 19.7 % (ref 11.8–14.4)
PLATELET # BLD: 273 K/UL (ref 138–453)
PMV BLD AUTO: 9.2 FL (ref 8.1–13.5)
POTASSIUM SERPL-SCNC: 4.8 MMOL/L (ref 3.7–5.3)
PRO-BNP: 1271 PG/ML
RBC # BLD: 3.06 M/UL (ref 4.21–5.77)
SODIUM BLD-SCNC: 132 MMOL/L (ref 135–144)
SODIUM,UR: 22 MMOL/L
SPECIMEN DESCRIPTION: NORMAL
WBC # BLD: 18.1 K/UL (ref 3.5–11.3)

## 2022-03-09 PROCEDURE — 6370000000 HC RX 637 (ALT 250 FOR IP): Performed by: STUDENT IN AN ORGANIZED HEALTH CARE EDUCATION/TRAINING PROGRAM

## 2022-03-09 PROCEDURE — 84520 ASSAY OF UREA NITROGEN: CPT

## 2022-03-09 PROCEDURE — 83880 ASSAY OF NATRIURETIC PEPTIDE: CPT

## 2022-03-09 PROCEDURE — 99232 SBSQ HOSP IP/OBS MODERATE 35: CPT | Performed by: NURSE PRACTITIONER

## 2022-03-09 PROCEDURE — 82565 ASSAY OF CREATININE: CPT

## 2022-03-09 PROCEDURE — 80048 BASIC METABOLIC PNL TOTAL CA: CPT

## 2022-03-09 PROCEDURE — 2580000003 HC RX 258: Performed by: INTERNAL MEDICINE

## 2022-03-09 PROCEDURE — 93005 ELECTROCARDIOGRAM TRACING: CPT | Performed by: STUDENT IN AN ORGANIZED HEALTH CARE EDUCATION/TRAINING PROGRAM

## 2022-03-09 PROCEDURE — 36415 COLL VENOUS BLD VENIPUNCTURE: CPT

## 2022-03-09 PROCEDURE — 71045 X-RAY EXAM CHEST 1 VIEW: CPT

## 2022-03-09 PROCEDURE — 94761 N-INVAS EAR/PLS OXIMETRY MLT: CPT

## 2022-03-09 PROCEDURE — 99232 SBSQ HOSP IP/OBS MODERATE 35: CPT | Performed by: INTERNAL MEDICINE

## 2022-03-09 PROCEDURE — 2580000003 HC RX 258: Performed by: STUDENT IN AN ORGANIZED HEALTH CARE EDUCATION/TRAINING PROGRAM

## 2022-03-09 PROCEDURE — 51798 US URINE CAPACITY MEASURE: CPT

## 2022-03-09 PROCEDURE — 2700000000 HC OXYGEN THERAPY PER DAY

## 2022-03-09 PROCEDURE — 85027 COMPLETE CBC AUTOMATED: CPT

## 2022-03-09 PROCEDURE — 6360000002 HC RX W HCPCS: Performed by: STUDENT IN AN ORGANIZED HEALTH CARE EDUCATION/TRAINING PROGRAM

## 2022-03-09 PROCEDURE — 2060000000 HC ICU INTERMEDIATE R&B

## 2022-03-09 PROCEDURE — 94640 AIRWAY INHALATION TREATMENT: CPT

## 2022-03-09 PROCEDURE — 6370000000 HC RX 637 (ALT 250 FOR IP): Performed by: INTERNAL MEDICINE

## 2022-03-09 RX ORDER — 0.9 % SODIUM CHLORIDE 0.9 %
250 INTRAVENOUS SOLUTION INTRAVENOUS ONCE
Status: COMPLETED | OUTPATIENT
Start: 2022-03-09 | End: 2022-03-09

## 2022-03-09 RX ORDER — IPRATROPIUM BROMIDE AND ALBUTEROL SULFATE 2.5; .5 MG/3ML; MG/3ML
1 SOLUTION RESPIRATORY (INHALATION) ONCE
Status: COMPLETED | OUTPATIENT
Start: 2022-03-09 | End: 2022-03-09

## 2022-03-09 RX ORDER — SODIUM CHLORIDE 9 MG/ML
INJECTION, SOLUTION INTRAVENOUS CONTINUOUS
Status: DISCONTINUED | OUTPATIENT
Start: 2022-03-09 | End: 2022-03-12

## 2022-03-09 RX ORDER — MORPHINE SULFATE 2 MG/ML
1 INJECTION, SOLUTION INTRAMUSCULAR; INTRAVENOUS EVERY 4 HOURS PRN
Status: CANCELLED | OUTPATIENT
Start: 2022-03-09

## 2022-03-09 RX ADMIN — Medication 3 MG: at 03:03

## 2022-03-09 RX ADMIN — OXYCODONE HYDROCHLORIDE AND ACETAMINOPHEN 1 TABLET: 5; 325 TABLET ORAL at 06:06

## 2022-03-09 RX ADMIN — Medication 1000 MCG: at 08:41

## 2022-03-09 RX ADMIN — ACETAMINOPHEN 650 MG: 325 TABLET ORAL at 23:22

## 2022-03-09 RX ADMIN — GUAIFENESIN 1200 MG: 600 TABLET, EXTENDED RELEASE ORAL at 08:46

## 2022-03-09 RX ADMIN — OXYCODONE HYDROCHLORIDE AND ACETAMINOPHEN 1 TABLET: 5; 325 TABLET ORAL at 11:21

## 2022-03-09 RX ADMIN — FOLIC ACID 1 MG: 1 TABLET ORAL at 08:43

## 2022-03-09 RX ADMIN — LEVOTHYROXINE SODIUM 50 MCG: 50 TABLET ORAL at 06:58

## 2022-03-09 RX ADMIN — SODIUM CHLORIDE: 9 INJECTION, SOLUTION INTRAVENOUS at 16:39

## 2022-03-09 RX ADMIN — GUAIFENESIN 1200 MG: 600 TABLET, EXTENDED RELEASE ORAL at 21:22

## 2022-03-09 RX ADMIN — DESMOPRESSIN ACETATE 40 MG: 0.2 TABLET ORAL at 21:22

## 2022-03-09 RX ADMIN — FLUTICASONE PROPIONATE 1 PUFF: 110 AEROSOL, METERED RESPIRATORY (INHALATION) at 10:28

## 2022-03-09 RX ADMIN — PANTOPRAZOLE SODIUM 40 MG: 40 TABLET, DELAYED RELEASE ORAL at 06:58

## 2022-03-09 RX ADMIN — SODIUM CHLORIDE TAB 1 GM 1 G: 1 TAB at 10:23

## 2022-03-09 RX ADMIN — MAGNESIUM GLUCONATE 500 MG ORAL TABLET 400 MG: 500 TABLET ORAL at 09:12

## 2022-03-09 RX ADMIN — DOCUSATE SODIUM 100 MG: 100 CAPSULE ORAL at 08:43

## 2022-03-09 RX ADMIN — DEXTROSE MONOHYDRATE 2000 MG: 50 INJECTION, SOLUTION INTRAVENOUS at 06:03

## 2022-03-09 RX ADMIN — Medication 1 TABLET: at 08:41

## 2022-03-09 RX ADMIN — SODIUM CHLORIDE TAB 1 GM 1 G: 1 TAB at 19:17

## 2022-03-09 RX ADMIN — SODIUM CHLORIDE 250 ML: 9 INJECTION, SOLUTION INTRAVENOUS at 16:41

## 2022-03-09 RX ADMIN — OXYCODONE HYDROCHLORIDE AND ACETAMINOPHEN 500 MG: 500 TABLET ORAL at 08:41

## 2022-03-09 RX ADMIN — Medication 3 MG: at 21:22

## 2022-03-09 RX ADMIN — TRAMADOL HYDROCHLORIDE 50 MG: 50 TABLET, FILM COATED ORAL at 02:39

## 2022-03-09 RX ADMIN — DOCUSATE SODIUM 100 MG: 100 CAPSULE ORAL at 21:22

## 2022-03-09 RX ADMIN — FLUTICASONE PROPIONATE 1 PUFF: 110 AEROSOL, METERED RESPIRATORY (INHALATION) at 20:21

## 2022-03-09 RX ADMIN — FLUTICASONE PROPIONATE 1 SPRAY: 50 SPRAY, METERED NASAL at 08:49

## 2022-03-09 RX ADMIN — CARVEDILOL 3.12 MG: 3.12 TABLET, FILM COATED ORAL at 08:47

## 2022-03-09 RX ADMIN — QUETIAPINE FUMARATE 25 MG: 25 TABLET ORAL at 21:22

## 2022-03-09 RX ADMIN — Medication 100 MG: at 09:12

## 2022-03-09 RX ADMIN — CARVEDILOL 3.12 MG: 3.12 TABLET, FILM COATED ORAL at 18:26

## 2022-03-09 RX ADMIN — SODIUM CHLORIDE, PRESERVATIVE FREE 10 ML: 5 INJECTION INTRAVENOUS at 08:54

## 2022-03-09 RX ADMIN — OXYCODONE HYDROCHLORIDE AND ACETAMINOPHEN 1 TABLET: 5; 325 TABLET ORAL at 18:26

## 2022-03-09 RX ADMIN — IPRATROPIUM BROMIDE AND ALBUTEROL SULFATE 1 AMPULE: .5; 2.5 SOLUTION RESPIRATORY (INHALATION) at 03:57

## 2022-03-09 ASSESSMENT — ENCOUNTER SYMPTOMS
ABDOMINAL PAIN: 0
SHORTNESS OF BREATH: 0
VOMITING: 0
COUGH: 0
NAUSEA: 0

## 2022-03-09 ASSESSMENT — PAIN SCALES - GENERAL
PAINLEVEL_OUTOF10: 8
PAINLEVEL_OUTOF10: 5
PAINLEVEL_OUTOF10: 6
PAINLEVEL_OUTOF10: 7
PAINLEVEL_OUTOF10: 7
PAINLEVEL_OUTOF10: 8
PAINLEVEL_OUTOF10: 5
PAINLEVEL_OUTOF10: 0
PAINLEVEL_OUTOF10: 8
PAINLEVEL_OUTOF10: 6

## 2022-03-09 ASSESSMENT — PAIN DESCRIPTION - ORIENTATION
ORIENTATION: RIGHT

## 2022-03-09 ASSESSMENT — PAIN DESCRIPTION - PAIN TYPE
TYPE: ACUTE PAIN;SURGICAL PAIN

## 2022-03-09 ASSESSMENT — PAIN DESCRIPTION - LOCATION
LOCATION: LEG

## 2022-03-09 NOTE — PLAN OF CARE
Problem: OXYGENATION/RESPIRATORY FUNCTION  Goal: Patient will maintain patent airway  Outcome: Ongoing     Problem: OXYGENATION/RESPIRATORY FUNCTION  Goal: Patient will achieve/maintain normal respiratory rate/effort  Description: Respiratory rate and effort will be within normal limits for the patient  Outcome: Ongoing   BRONCHOSPASM/BRONCHOCONSTRICTION   [x]  IMPROVE AERATION/BREATH SOUNDS  [x]   ADMINISTER BRONCHODILATOR THERAPY AS APPROPRIATE  [x]   ASSESS BREATH SOUNDS  []   IMPLEMENT AEROSOL/MDI PROTOCOL  [x]   PATIENT EDUCATION AS NEEDED

## 2022-03-09 NOTE — PROGRESS NOTES
Spoke with Dr. Aida Lemus from Nephrology for consult regarding urinary output. Would like me to give fluid bolus and fluids that were ordered by Dr. Oneil Proctor, and then report to him after three hours if urinary output is not adequate. Bolus will be started before the patient is transported to the floor. Davonte Neff RN will be notified upon transfer.

## 2022-03-09 NOTE — PROGRESS NOTES
Patient transported to 4A room 403. Patient was on Monitor and Non-re breather at 15 Liters. Patient had all belongings accounted for. Meds and chart given to Manolo Savage. Patient was slid into bed using slide board, and repositioned comfortable before I left.

## 2022-03-09 NOTE — FLOWSHEET NOTE
Referred by Astrid Dexter who reported that pt's family had requested information regarding cremation Services. Pt. Was awake and alert and there was no family at bedside.  provided spiritual and emotional support for pt. Who reports feeling better everyday. 03/09/22 1356   Encounter Summary   Services provided to: Patient   Referral/Consult From: Other    Support System Children;Family members   Place of [facility]   Continue Visiting   (03/09/2022)   Complexity of Encounter Low   Length of Encounter 15 minutes   Spiritual Assessment Completed Yes   Routine   Type Follow up   Assessment Calm; Approachable   Intervention Active listening;Explored feelings, thoughts, concerns;Explored coping resources;Nurtured hope;Sustaining presence/ Ministry of presence   Outcome Comfort;Coping;Encouraged

## 2022-03-09 NOTE — PROGRESS NOTES
[facility]  Office: [address], DO, Fauzia Heads, DO, Kyle Craig, DO, Linda Juan Danieldiann Blood, DO, Vonda Campos MD, Arnel Lucio MD, Naman Nicolas MD, Facundo Lira MD, Venice Lopez MD, Jericho Aldana MD, Norbert Landa MD, Enrico Norwood, DO, Jaki Ventura, DO, Marietta Cruz MD,  Massiel Montes, DO, Kris Artis MD, Dior Abraham MD, Jesus Alberto Bunn MD, Kevon Haynes MD, Chicho Azevedo MD, Mitch Wahl, [facility], [facility] Chrisopal, CNP, Agata Acevedo, CNP, Dorota Interiano, CNS, Nola Leiva, CNP, Jimbo Luna, CNP, Jailene Holden, CNP, Jonathan Trammell, CNP, Dior Hernandez, CNP, MARII SmithC, Janna Banuelos, [facility], Shelly Pickett [facility], Yoselin Kelly, CNP, Eileen Aguiar, CNP, Yael Angel, CNP, Anu Regan, CNP, Stephanie Watson, [facility], Keon Mancera, [address] [facility]    Progress Note    3/9/2022    1:41 PM    Name:   Tom Powell  MRN:     [id number]     Acct:      [de-identified]   Room:   [facility]3114-70   Day:  4  Admit Date:  3/5/2022  3:47 PM    PCP:   Kaylene Herrera DO  Code Status:  Full Code    Subjective:     C/C:   Chief Complaint   Patient presents with    Fatigue     Interval History Status:   Comfortable  No distress  Reporting his pain has been controlled with Percocet  He has had some nausea following the Percocet  Has been on BiPAP this morning  At this time no shortness of breath    Data Base Updates:  X-ray:  Postsurgical changes status post ventral effusion right hip and proximal   femur fracture. Sodium,Gr23lohi/L   Osmolality, Ur337     Hcsokze781 High mg/dL     TWF10ke/dL   CREATININE0.86mg/dL     Calcium8.7mg/dL   Cdheye659 Low mmol/L   Potassium4.8mmol/L   Fbyjntzi954joua/L     CO2 19 Low mmol/L   Anion Gap11     WBC18.1 High k/uL RBC3.06 Low m/uL Hemoglobin8.0 Low      Chest x-ray:  Impression:    Mild right basilar airspace disease. This could represent atelectasis or in   the appropriate clinical setting pneumonia. Brief History:     As documented in the medical record:  \"\" 31-year-old wheelchair-bound  male with underlying HFrEF, Severe knee OA, narcolepsy who presents to the hospital with concern for generalized weakness. Patient reports since his knee replacement surgery a few years ago he was never able to ambulate at baseline requiring him to be overall wheelchair-bound. He says over the past month his appetite's been poor and he feels he is becoming weaker. He says every time he goes from the wheelchair to his bed he struggles to lift himself over and ends up falling. He denies any loss of consciousness, head trauma, joint swelling, shortness of breath, palpitations or dizziness. He does not report any weight loss, night sweats and says over the past few years his appetite is not like it used to be. He pays out-of-pocket for home aide to come and cook for him, he reports he has access to food and can manage himself but just does not have energy to do so. Patient reports compliance to all his meds. Says he needs physical therapy but does not want placement in a nursing home. Patient denies any other symptoms at this time. \"    The intitial assessment and plan included:  \"  Hospital Problems            Last Modified POA     * (Principal) Hyponatremia 3/5/2022 Yes     Narcolepsy 3/6/2022 Yes     Cardiomyopathy ([address] 3/6/2022 Yes     HTN (hypertension) 3/6/2022 Yes     Chronic obstructive pulmonary disease ([address] 3/6/2022 Yes     Dementia with behavioral disturbance ([address] 3/6/2022 Yes     Fracture of femoral neck, right, closed ([address] 3/6/2022 Yes           Plan:   1. Hyponatremia- patient appears dry, will resume IVF 0.9% NS at 50 ml/hr, monitor sodium tomorrow. He had diarrhea for days before presentation  2. Right femoral neck fracture- on Xrays, change Norco to Percocet, consulted Ortho, appreciate Cardiac risk stratification. NPO for OR today, negative LE dopplers  3.  CMP- EF 20% on previous Echo, appreciate Cardiology consult, AICD interrogation, hold Lasix for now, recent stress test 4/2021 normal, repeat Echo- poor images, but EF improved 68%, intermediate risk for surgery  4. COPD- stable, oxygen prn, Dubonebs prn, resume Mucinex  5. HTN- BP stable  6. Narcolepsy- Nuvigil  7. Meniere's disease- resume Antivert and Ativan 1mg po q 8 hrs prn  8. Dementia- pt unable to care for self, needs SNF  9. Gi/ DVT proph  10. Will need PT/OT\"     Additional database has included:  Echocardiogram:  Summary  Technically very challenging, there is poor endocardial definition, cannot  comment on wall motion analysis, within above limitations:  Left ventricle is normal in size. Global left ventricular systolic function  is normal. Calculated ejection fraction 68% by Rashid's method. Valves not well visualized. No obvious significant valvular regurgitation or stenosis seen. Results for Debbi Gutierrez (MRN [id number]) as of 3/8/2022 15:49   Ref. Range 3/5/2022 23:18 3/6/2022 04:32 3/6/2022 14:11 3/7/2022 03:32 3/8/2022 04:21   Sodium Latest Ref Range: 135 - 144 mmol/L 127 (L) 129 (L) 129 (L) 131 (L) 134 (L)       Past Medical History:   has a past medical history of ADHD (attention deficit hyperactivity disorder), ADHD (attention deficit hyperactivity disorder), Atypical chest pain, Chronic bronchitis ([address] Hypertension, Hyponatremia, Meniere's disease, Narcolepsy, Nasal fracture, PND (post-nasal drip), SOB (shortness of breath), Tibia fracture, and Transaminasemia. Social History:   reports that he has been smoking cigarettes. He has a 38.00 pack-year smoking history. He has never used smokeless tobacco. He reports current alcohol use of about 16.0 standard drinks of alcohol per week. He reports that he does not use drugs.      Family History:   Family History   Problem Relation Age of Onset    Heart Disease Mother         heart attack    Heart Disease Father        Review of Systems: Review of Systems   Constitutional: Positive for activity change (Decreased). Respiratory: Negative for cough and shortness of breath. Cardiovascular: Negative for chest pain and palpitations. Gastrointestinal: Negative for abdominal pain, nausea and vomiting. Genitourinary: Negative for flank pain and hematuria. Musculoskeletal: Positive for arthralgias, gait problem (Unsteady, patient reports frequent falls) and myalgias. His right hip is still stiff and sore   Neurological: Positive for dizziness (He has been taking Mysoline for his dizziness?). Psychiatric/Behavioral: Positive for confusion (He acknowledges that he is still confused). Physical Examination:        Vitals  BP (!) 107/53   Pulse 73   Temp 98.4 °F (36.9 °C) (Oral)   Resp 16   Ht 5' 10\" (1.778 m)   Wt 220 lb 10.9 oz (100.1 kg)   SpO2 96%   BMI 31.66 kg/m²   Temp (24hrs), Av.1 °F (35.6 °C), Min:95.3 °F (35.2 °C), Max:98.4 °F (36.9 °C)    No results for input(s): POCGLU in the last 72 hours. Physical Exam  Vitals reviewed. Constitutional:       General: He is not in acute distress. Appearance: He is not diaphoretic. HENT:      Head: Normocephalic. Nose: Nose normal.   Eyes:      General: No scleral icterus. Conjunctiva/sclera: Conjunctivae normal.   Neck:      Trachea: No tracheal deviation. Cardiovascular:      Rate and Rhythm: Normal rate and regular rhythm. Pulmonary:      Effort: Pulmonary effort is normal. No respiratory distress. Breath sounds: Normal breath sounds. No wheezing or rales. Chest:      Chest wall: No tenderness. Abdominal:      General: There is no distension. Palpations: Abdomen is soft. Tenderness: There is no abdominal tenderness. Musculoskeletal:         General: Deformity ([place]) present. No tenderness. Cervical back: Neck supple.       Comments: Right leg is somewhat externally rotated and foreshortened   Skin:     General: Skin is warm and dry. Findings: Rash (Onychomycosis) present. Neurological:      Comments: The patient is having trouble providing historical data          Medications: Allergies: Allergies   Allergen Reactions    Motrin [Ibuprofen Micronized]      Pt states he had blood in stool from motrin       Current Meds:   Scheduled Meds:    guaiFENesin  1,200 mg Oral BID    pantoprazole  40 mg Oral QAM AC    chlorhexidine  15 mL Mouth/Throat BID    cyanocobalamin  1,000 mcg Oral Daily    docusate sodium  100 mg Oral BID    fluticasone  1 spray Each Nostril Daily    fluticasone  1 puff Inhalation BID    folic acid  1 mg Oral Daily    magnesium oxide  400 mg Oral Daily    sodium chloride  1 g Oral BID WC    vitamin C  500 mg Oral Daily    Armodafinil  150 mg Oral Daily    atorvastatin  40 mg Oral Nightly    carvedilol  3.125 mg Oral BID WC    [Held by provider] clopidogrel  75 mg Oral Daily    levothyroxine  50 mcg Oral Daily    therapeutic multivitamin-minerals  1 tablet Oral Daily    QUEtiapine  25 mg Oral Nightly    thiamine  100 mg Oral Daily    sodium chloride flush  5-40 mL IntraVENous 2 times per day    enoxaparin  40 mg SubCUTAneous Daily     Continuous Infusions:    sodium chloride       PRN Meds: LORazepam, LORazepam, meclizine, melatonin, traMADol, oxyCODONE-acetaminophen, morphine, albuterol sulfate HFA, sodium chloride flush, sodium chloride, potassium chloride **OR** potassium alternative oral replacement **OR** potassium chloride, magnesium sulfate, ondansetron **OR** ondansetron, polyethylene glycol, acetaminophen **OR** acetaminophen    Data:     I/O (24Hr):     Intake/Output Summary (Last 24 hours) at 3/9/2022 1341  Last data filed at 3/9/2022 0400  Gross per 24 hour   Intake 1407.55 ml   Output 920 ml   Net 487.55 ml       Labs:  Hematology:  Recent Labs     03/07/22  0332 03/08/22  0421 03/09/22  0549   WBC 11.4* 13.5* 18.1*   RBC 3.38* 3.50* 3.06*   HGB 8.9* 9.2* 8.0*   HCT 29.0* 31.2* 28.6*   MCV 85.8 89.1 93.5   MCH 26.3 26.3 26.1   MCHC 30.7 29.5 28.0*   RDW 19.4* 19.7* 19.7*    261 273   MPV 9.0 9.2 9.2     Chemistry:  Recent Labs     03/07/22  0332 03/08/22  0421 03/09/22  0549   * 134* 132*   K 4.5 4.6 4.8    102 102   CO2 22 21 19*   GLUCOSE 99 102* 138*   BUN 10 11 13   CREATININE 0.86 0.78 0.86   ANIONGAP 9 11 11   LABGLOM >60 >60 >60   GFRAA >60 >60 >60   CALCIUM 9.3 9.5 8.7     No results for input(s): PROT, LABALBU, LABA1C, S1BMFKG, H6WGIYP, FT4, TSH, AST, ALT, LDH, GGT, ALKPHOS, LABGGT, BILITOT, BILIDIR, AMMONIA, AMYLASE, LIPASE, LACTATE, CHOL, HDL, LDLCHOLESTEROL, CHOLHDLRATIO, TRIG, VLDL, VHL74HA, PHENYTOIN, PHENYF, URICACID, POCGLU in the last 72 hours. ABG:  Lab Results   Component Value Date    POCPH 7.54 10/24/2015    POCPCO2 35 10/24/2015    POCPO2 71 10/24/2015    POCHCO3 29.9 10/24/2015    NBEA NOT REPORTED 10/24/2015    PBEA 7 10/24/2015    IBR2JCV 31 10/24/2015    JPLZ3QSE 96 10/24/2015    FIO2 30.0 10/26/2015     Lab Results   Component Value Date/Time    SPECIAL NOT REPORTED 03/24/2020 10:15 PM     Lab Results   Component Value Date/Time    CULTURE NORMAL RESPIRATORY ORI LIGHT GROWTH 10/23/2015 04:04 PM    CULTURE  10/23/2015 04:04 PM     [address], [address] [place] [address] [phone]       Radiology:  XR HIP RIGHT (2-3 VIEWS)    Result Date: 3/6/2022  Comminuted intertrochanteric fracture right femoral neck No other acute bony or joint abnormality     XR FEMUR RIGHT (MIN 2 VIEWS)    Result Date: 3/6/2022  Comminuted intertrochanteric fracture right femoral neck No other acute bony or joint abnormality     XR KNEE RIGHT (1-2 VIEWS)    Result Date: 3/6/2022  Comminuted intertrochanteric fracture right femoral neck No other acute bony or joint abnormality     XR ANKLE LEFT (MIN 3 VIEWS)    Result Date: 3/6/2022  No acute osseous or soft tissue abnormality.      XR FOOT LEFT (MIN 3 VIEWS)    Result Date: 3/6/2022  Irregularity of the 1st distal phalanx that may relate to an acute fracture and correlation with point tenderness is needed. XR CHEST PORTABLE    Result Date: 3/5/2022  Marginal inspiration, without evidence of acute cardiopulmonary disease     CT CHEST PULMONARY EMBOLISM W CONTRAST    Result Date: 3/5/2022  1. No evidence of pulmonary embolism 2. Dependent atelectasis, right lung base 3. Diffuse emphysematous changes present throughout the lungs, with an upper lobe predominance 4. Extensive coronary arterial calcifications 5. Cholelithiasis, without evidence of cholecystitis 6. Nodular contour to the liver, suggesting cirrhosis     XR HIP 2-3 VW W PELVIS LEFT    Result Date: 3/6/2022  Comminuted intertrochanteric fracture of the right proximal femur.        Assessment:        Primary Problem  Fracture of femoral neck, right, closed [facility])    Active Hospital Problems    Diagnosis Date Noted    Hyponatremia [E87.1]      Priority: High    Acute respiratory failure with hypoxemia (HCC) [J96.01] 03/09/2022    Atelectasis [J98.11] 03/09/2022    Accelerated junctional rhythm [I49.8] 03/08/2022    Presence of permanent cardiac pacemaker [Z95.0] 03/08/2022    Anemia, normocytic normochromic [D64.9]     Closed intertrochanteric fracture of hip, right, initial encounter ([facility] [S72.141A]     Fracture of femoral neck, right, closed ([address] [S72.001A] 03/06/2022    S/p bare metal coronary artery stent [Z95.5] 11/10/2015    Dementia with behavioral disturbance ([address] [F03.91]     Chronic obstructive pulmonary disease ([address] [J44.9]     HTN (hypertension) [I10] 10/12/2015    Cardiomyopathy ([address] [I42.9] 10/12/2015    Narcolepsy [G47.419]     Meniere's disease [H81.09] 01/22/2014         Plan:        Ortho eval & f/u  Pain management  Minimize opioids   Resume Plavix when cleared by Ortho   Respiratory Therapy and Bronchodilators prn  DuoNeb  Incentive spirometry  BiPAP as needed  Observe for pneumonia  Cardiology evaluation   Correct electrolyte abnormalities  Blood Pressure - Monitor and control  PT/OT  Transfuse as needed  Respiratory Therapy and Bronchodilators prn   Fall precautions  Palliative care consult  DVT prophylaxis  Transfer to stepdown     IP CONSULT TO HOSPITALIST  IP CONSULT TO PULMONOLOGY  IP CONSULT TO DIETITIAN  IP CONSULT  Kervin Glass CONSULT TO SOCIAL WORK  IP CONSULT  ANDREIA Parada CONSULT TO [address] TO CARDIOLOGY  IP CONSULT  Cordell Matute DO  3/9/2022  1:41 PM

## 2022-03-09 NOTE — PROGRESS NOTES
Physical Therapy        Physical Therapy Cancel Note      DATE: 3/9/2022    NAME: Clifton Briones  MRN: [id number]   : 1950      Patient not seen this date for Physical Therapy due to:    Patient Declined: states he is willing to participate in therapy tomorrow once he is moved to his next room. RN aware.  Ck 3/10      Electronically signed by Kathy Pedro PT on 3/9/2022 at 2:05 PM

## 2022-03-09 NOTE — PLAN OF CARE
Problem: Nutrition  Goal: Optimal nutrition therapy  Description: Nutrition Problem #1: Inadequate oral intake  Intervention: Food and/or Nutrient Delivery: Continue Current Diet,Start Oral Nutrition Supplement  Nutritional Goals: Meet 50% or greater of estimated nutrition needs     Outcome: Ongoing     Problem: Discharge Planning:  Goal: Discharged to appropriate level of care  Description: Discharged to appropriate level of care  Outcome: Ongoing     Problem: Infection - Surgical Site:  Goal: Will show no infection signs and symptoms  Description: Will show no infection signs and symptoms  Outcome: Ongoing     Problem: Injury - Risk of, Postfracture Complications:  Goal: Absence of fat embolism  Description: Absence of fat embolism  Outcome: Ongoing  Goal: Absence of compartment syndrome signs and symptoms  Description: Absence of compartment syndrome signs and symptoms  Outcome: Ongoing     Problem: Mobility - Impaired:  Goal: Mobility will improve to maximum level  Description: Mobility will improve to maximum level  Outcome: Ongoing     Problem: Pain - Acute:  Goal: Pain level will decrease  Description: Pain level will decrease  Outcome: Ongoing     Problem: Venous Thromboembolism:  Goal: Absence of deep vein thrombosis  Description: Absence of deep vein thrombosis  Outcome: Ongoing  Goal: Absence of signs or symptoms of impaired coagulation  Description: Absence of signs or symptoms of impaired coagulation  Outcome: Ongoing  Goal: Will show no signs or symptoms of venous thromboembolism  Description: Will show no signs or symptoms of venous thromboembolism  Outcome: Ongoing     Problem: Pain:  Description: Pain management should include both nonpharmacologic and pharmacologic interventions.   Goal: Pain level will decrease  Description: Pain level will decrease  Outcome: Ongoing  Goal: Control of acute pain  Description: Control of acute pain  Outcome: Ongoing  Goal: Control of chronic pain  Description: Control of chronic pain  Outcome: Ongoing     Problem: Skin Integrity:  Goal: Will show no infection signs and symptoms  Description: Will show no infection signs and symptoms  Outcome: Ongoing  Goal: Absence of new skin breakdown  Description: Absence of new skin breakdown  Outcome: Ongoing     Problem: Falls - Risk of:  Goal: Will remain free from falls  Description: Will remain free from falls  Outcome: Ongoing  Goal: Absence of physical injury  Description: Absence of physical injury  Outcome: Ongoing     Problem: OXYGENATION/RESPIRATORY FUNCTION  Goal: Patient will maintain patent airway  3/9/2022 1525 by Alvin Bunn RN  Outcome: Ongoing  3/9/2022 0845 by Mackenzie Spencer RCP  Outcome: Ongoing  Goal: Patient will achieve/maintain normal respiratory rate/effort  Description: Respiratory rate and effort will be within normal limits for the patient  3/9/2022 1525 by Alvin Bunn RN  Outcome: Ongoing  3/9/2022 0845 by Mackenzie Spencer RCP  Outcome: Ongoing

## 2022-03-09 NOTE — PROGRESS NOTES
Palliative Care Progress Note    NAME:  Genora Lanes  MEDICAL RECORD NUMBER:  [id number]  AGE: 81 y.o. GENDER: male  : 1950  TODAY'S DATE:  3/9/2022    Reason for Consult:  goals of care  History of Present Illness     The patient is a 81 y.o. Non- / non  male who presents with Fatigue      Referred to Palliative Care by  [x] Physician   [] Nursing  [] Family Request   [] Other:       He was admitted to the ICU service for Hyponatremia [E87.1]  Nephrolithiasis [N20.0]  Generalized weakness [R53.1]  Oxygen desaturation [R09.02]. His hospital course has been associated with Fall, hyponatremia, Right intro trochanteric fracture requiring surgery and transfer to ICU post surgery and now on BiPAP due to episodes of desaturation. The patient has a complicated medical history and has been hospitalized since 3/5/2022  3:47 PM. Patient was seen at bedside wearing his BiPAP. He expressed that he wanted it removed and education on the need for oxygenation was given. I informed the patient that we would continue to follow him and his care. Patient had right hip fracture repaired yesterday. Pertinent labs:  WBC 18.1, RBC 3.06, Hgb 8.0, Hct 28.6, Na+ 132. Imaging:  CXR 3/9/22:   Mild right basilar airspace disease.  This could represent atelectasis or in   the appropriate clinical setting pneumonia. OVERNIGHT EVENTS: Changed to ICU status due to episodes of desaturation. Patient currently on BiPAP and monitoring. Otherwise no major events.  Code status FULL CODE.       BP (!) 107/53   Pulse 66   Temp 98.4 °F (36.9 °C) (Oral)   Resp 10   Ht 5' 10\" (1.778 m)   Wt 220 lb 10.9 oz (100.1 kg)   SpO2 94%   BMI 31.66 kg/m²     Assessment        REVIEW OF SYSTEMS    []   UNABLE TO OBTAIN:     Constitutional:  []   Chills   [x]  Fatigue   []  Fevers   [x]  Malaise   []  Weight loss   [] Other:     Respiratory:   []  Cough    [x]  Shortness of breath    []  Chest pain    [] Other: Cardiovascular:   []  Chest pain  []  Dyspnea    []  Exertional chest pressure/discomfort     [x] Fatigue      []  Palpitations    []  Syncope   [] Other:     Gastrointestinal:   []  Abdominal pain   []  Constipation    []  Diarrhea    []   Dysphagia   []  Reflux             []  Vomiting   [x] Other: Denies nausea  Genitourinary:  []  Dysuria     []  Frequency   []  Hematuria   [] Nocturia   []  Urinary incontinence   [] Other:   Musculoskeletal:   [] Back pain    [x]  Muscle weakness   []  Myalgias    []  Neck pain   []  Stiff joints   [x]  Other: Post surgical pain  Behavioral/Psych:   [] Anxiety    []  Depression     []  Mood swings   [x] Other: mild irritation  PHYSICAL ASSESSMENT:     General: [x]  Oriented x3      [] well appearing      [] Intubated      [x] ill appearing      [] Other:    Mental Status: [] normal mental status exam      [x] drowsy      [] Confused      [] Other:     Cardiovascular: [x]  Regular rate/rhythm      [] Arrhythmia      [] Other:     Chest: [] Effort normal      [] lungs clear      [x] respiratory distress requiring BiPAP     [] Tachypnea      []  Other:    Abdomen: [] Soft/non-tender      []  Normal appearance      [] Distended      [] Ascites      [x] Other: No Ascites noted  Neurological: [x] Normal Speech      [] Normal Sensation      []  Deficits present:      Extremity:  [] normal skin color/temp      [] clubbing/cyanosis      [x]  No edema      [] Other:     Palliative Performance Scale:  ___60%  Ambulation reduced; Significant disease; Can't do hobbies/housework; intake normal or reduced; occasional assist; LOC full/confusion  ___50%  Mainly sit/lie; Extensive disease; Can't do any work; Considerable assist; intake normal or reduced; LOC full/confusion  _x__40%  Mainly in bed; Extensive disease; Mainly assist; intake normal or reduced; LOC full/confusion   ___30%  Bed Bound; Extensive disease; Total care; intake reduced; LOCfull/confusion  ___20%  Bed Bound;  Extensive disease; Total care; intake minimal; Drowsy/coma  ___10%  Bed Bound; Extensive disease; Total care; Mouth care only; Drowsy/coma  ___0       Death      Plan      Palliative Interaction:  Patient was transferred to ICU due to respiratory distress following surgery. Today patient was seen at bedside alert and wearing a BiPAP. He expressed that he wanted it removed and education on the need for oxygenation was given. Nursing will continue to monitor his oxygen requirement. I informed the patient that we would continue to follow him and his new condition status. Education/support to patient  Primary service notified of clinical update  Continued communication updates  Principle Problem/Diagnosis:  Fracture of femoral neck, right, closed ([facility] [address]    Additional Assessments:  Principal Problem:    Fracture of femoral neck, right, closed ([facility] [address]  Active Problems:    Hyponatremia    Meniere's disease    Narcolepsy    Cardiomyopathy ([address]    HTN (hypertension)    Chronic obstructive pulmonary disease (HCC)    Dementia with behavioral disturbance (HCC)    S/p bare metal coronary artery stent    Accelerated junctional rhythm    Presence of permanent cardiac pacemaker    Anemia, normocytic normochromic    Closed intertrochanteric fracture of hip, right, initial encounter ([facility] [address]    Acute respiratory failure with hypoxemia ([facility] [address]    Atelectasis  Resolved Problems:    * No resolved hospital problems. *    1- Symptom management/ pain control     Pain Assessment:  Pain is controlled with current analgesics. Medication(s) being used: acetaminophen, narcotic analgesics including percocet.                Anxiety:  irritable                          Dyspnea:  acute dyspnea                          Fatigue:  generalized fatigue    Other:    2- Goals of care evaluation   The patient goals of care are strengthening relationships   Goals of care discussed with:    [x] Patient independently    [] Patient and Family    [] Family or [facility] independently    [] Unable to discuss with patient, family/DPOA not present    3- Code Status  Full Code    4- Other recommendations  - We will continue to provide comfort and support to the patient and the family    Please call with any palliative questions or concerns. Palliative Care Team is available via perfect serve or via phone. Palliative Care will continue to follow Mr. Cherry's care as needed. The note has been dictated by dragon, typing errors may be a possibility     Thank you for allowing Palliative Care to participate in the care of Mr. Cherry . Electronically signed by   TIFFANIE Diamond NP  Palliative Care Team  on 3/9/2022 at 12:59 PM    Palliative care can be reached via [email or web address].

## 2022-03-09 NOTE — PLAN OF CARE
Problem: Nutrition  Goal: Optimal nutrition therapy  Description: Nutrition Problem #1: Inadequate oral intake  Intervention: Food and/or Nutrient Delivery: Continue Current Diet,Start Oral Nutrition Supplement  Nutritional Goals: Meet 50% or greater of estimated nutrition needs     Outcome: Ongoing     Problem: Discharge Planning:  Goal: Discharged to appropriate level of care  Description: Discharged to appropriate level of care  Outcome: Ongoing     Problem: Infection - Surgical Site:  Goal: Will show no infection signs and symptoms  Description: Will show no infection signs and symptoms  Outcome: Ongoing     Problem: Injury - Risk of, Postfracture Complications:  Goal: Absence of fat embolism  Description: Absence of fat embolism  Outcome: Ongoing  Goal: Absence of compartment syndrome signs and symptoms  Description: Absence of compartment syndrome signs and symptoms  Outcome: Ongoing     Problem: Mobility - Impaired:  Goal: Mobility will improve to maximum level  Description: Mobility will improve to maximum level  Outcome: Ongoing     Problem: Pain - Acute:  Goal: Pain level will decrease  Description: Pain level will decrease  Outcome: Ongoing     Problem: Venous Thromboembolism:  Goal: Absence of deep vein thrombosis  Description: Absence of deep vein thrombosis  Outcome: Ongoing  Goal: Absence of signs or symptoms of impaired coagulation  Description: Absence of signs or symptoms of impaired coagulation  Outcome: Ongoing  Goal: Will show no signs or symptoms of venous thromboembolism  Description: Will show no signs or symptoms of venous thromboembolism  Outcome: Ongoing     Problem: Pain:  Goal: Pain level will decrease  Description: Pain level will decrease  Outcome: Ongoing  Goal: Control of acute pain  Description: Control of acute pain  Outcome: Ongoing  Goal: Control of chronic pain  Description: Control of chronic pain  Outcome: Ongoing     Problem: Skin Integrity:  Goal: Will show no infection signs and symptoms  Description: Will show no infection signs and symptoms  Outcome: Ongoing  Goal: Absence of new skin breakdown  Description: Absence of new skin breakdown  Outcome: Ongoing     Problem: Falls - Risk of:  Goal: Will remain free from falls  Description: Will remain free from falls  Outcome: Ongoing  Goal: Absence of physical injury  Description: Absence of physical injury  Outcome: Ongoing

## 2022-03-09 NOTE — PROGRESS NOTES
03/08/22 2013   Settings/Measurements   IPAP 10 cmH20   CPAP/EPAP 5 cmH2O   Rate Ordered 12   FiO2  60 %     Writer to recovery room to place pt on BiPAP. Placed on charted settings.

## 2022-03-09 NOTE — OP NOTE
Operative Note      Patient: Hien Ferris  YOB: 1950  MRN: [id number]    Date of Procedure: 3/8/2022    Pre-Op Diagnosis: Right intertrochanteric femur fracture with distal femoral replacement arthroplasty    Post-Op Diagnosis: Right intertrochanteric femur fracture with distal femoral replacement arthroplasty       Procedure(s):  1. Right intertrochanteric femur cephalomedullary nail insertion  2. Right femur open fixation with bridge spanning femur plate     Surgeon(s): Rene Burch DO    Assistant: Resident: Juan Berry DO; Tyler Chan DO    Anesthesia: General    Estimated Blood Loss (mL): 544    Complications: None    Specimens: * No specimens in log *    Implants:  Implant Name Type Inv. Item Serial No.  Lot No. LRB No. Used Action   NAIL IM L170MM [id number] 125DEG SHT TROCHANTERIC FEM GRN TI - [id number]  NAIL IM L170MM [id number] 125DEG SHT TROCHANTERIC FEM GRN TI  [employer]-WD [id number] Right 1 Implanted   CABLE SURG DIA1. 7MM S STL HA CERCLAGE W/ CRMP [[id number]] [DEPUY SYNTHES [facility]] - [id number]  CABLE SURG DIA1. 7MM S STL HA CERCLAGE W/ CRMP [[id number]] [[employer]Stage [facility]]  Cortrium USA-WD [id number] Right 1 Implanted   SYNTHES 4.5MM THREADED CERCLAGE POSITIONING PIN      [id number]  Right 1 Implanted   BLADE IM L105MM [id number] TI MICHELLE FOR TROCHANTERIC NAIL FIX - [id number]  BLADE IM L105MM [id number] TI MICHELLE FOR TROCHANTERIC NAIL FIX  DEPUY IntraStage [place]-WD  Right 1 Implanted   SCREW BNE L34MM DIA5MM KAREEN GRN TI ST LIBRADO FULL THRD TRCR - [id number]  SCREW BNE L34MM DIA5MM KAREEN GRN TI ST LIBRADO FULL THRD TRCR  Cortrium USA-WD  Right 1 Implanted   PLATE BNE [id number] THK5.2MM 14 H BILAT S STL BROAD LIBRADO - [id number]  PLATE BNE [id number] THK5.2MM 14 H BILAT S STL BROAD LIBRADO  [employer] USA-WD  Right 1 Implanted   SCREW BNE L34MM DIA4. 5MM PROX KAREEN TIB S STL ST LIBRADO FULL - [id number]  SCREW BNE L34MM DIA4. 5MM PROX KAREEN TIB S STL ST LIBRADO FULL  DEPUY SYNTHES [facility]-WD Right 1 Implanted   SCREW BNE L18MM DIA5MM S STL ST LIBRADO FULL THRD T25 STARDRV - [id number]  SCREW BNE L18MM DIA5MM S STL ST LIBRADO FULL THRD T25 STARDRV  DEPUY SYNTHES [place]-WD  Right 2 Implanted   SET CBL SL SM DIA2MM VIT BEAD DALL-M - [id number]  SET CBL SL SM DIA2MM VIT BEAD DALL-M  [employer] [facility] [id number] Right 1 Implanted   SCREW BNE L14MM DIA5MM S STL ST LIBRADO FULL THRD T25 STARDRV - [id number]  SCREW BNE L14MM DIA5MM S STL ST LIBRADO FULL THRD T25 STARDRV  DEPUY SYNTHES [place]-WD  Right 2 Implanted         Drains:   [REMOVED] Urethral Catheter (Removed)   Catheter Indications Perioperative use for selected surgical procedures;Prolonged immobilization (e.g. unstable thoracic or lumbar spine, multiple traumatic injuries such as pelvic fractures) 03/08/22 0800   Site Assessment No urethral drainage 03/08/22 0800   Urine Color Jasmyn 03/08/22 0800       [REMOVED] Urethral Catheter Non-latex 16 fr (Removed)   $ Urethral catheter insertion $ Not inserted for procedure 03/08/22 0807   Catheter Indications Need for fluid volume management of the critically ill patient in a critical care setting 03/09/22 0400   Securement Device Date Changed 03/08/22 03/08/22 0807   Site Assessment No urethral drainage 03/09/22 0400   Urine Color Yellow 03/09/22 0400   Urine Appearance Clear 03/09/22 0400   Output (mL) 30 mL 03/09/22 0400       Findings: Per operative note    Detailed Description of Procedure:   History: This is a 72-year-old male who presented to [facility] surgical department for fixation of a right hip fracture sustained a few days ago. The patient reportedly fell when getting out of bed. He has had a previous periprosthetic right distal femur fracture and he underwent distal femoral replacing hinged knee arthroplasty in the past.  He has now sustained fracture to the intertrochanteric region of the right hip. The patient is predominantly a household ambulator.   He wishes to proceed with fixation of rotated and abducted and placed in a well leg coreas. All bony prominences were well-padded. After an appropriate surgical timeout a combination of external rotation, longitudinal traction, adduction and internal rotation were utilized to reduce the hip fracture to as near anatomic position as possible. The right lower extremity was then prepped and draped in a sterile fashion. 2 inch incision was made approximately 2 inches proximal to the greater trochanter and a second incision was made over the lateral aspect of the femur starting at the lesser trochanter and extending distally for approximately 10 cm. Sharp incision was carried through the skin and subcutaneous tissue and also through the iliotibial band. A bone hook was placed along the inferior femoral neck helping to reduce the fracture. A guidepin was then placed through the greater trochanter into the intramedullary portion of the femur and was noted to be in the appropriate position on the AP and lateral projections. A cannulated awl was then utilized to gain entry to the proximal femoral canal and a 12 mm x 170 mm x 125 degree neck angle trochanteric femoral nail from [employer] was placed in the appropriate position. The lateral targeting device was utilized to place a a pin across the lateral cortex through the femoral neck into the femoral head in the appropriate position. This was overdrilled and a helical blade to the appropriate length was placed. Helical blade was locked to the trochanteric femoral nail and the distal locking screw was placed using the appropriate guides placing the screw with proper AO technique. Once the construct was placed and the hip was noted to be stable attention was then drawn to protecting the area between the inferior aspect of the trochanteric femoral nail and the superior aspect of the stemmed hinged total knee arthroplasty.   This was protected with a lateral broad 4.5 mm plate from [employer] which was pre-bent and placed against the lateral cortex through the superior incision and a separate inferior incision made distally along the lateral femur approximately 10 cm in length through the skin and subcutaneous tissue and iliotibial band. The plate was placed in a submuscular fashion and affixed to the lateral cortex using cortical and locking screws as well as cables under tension. The screws were placed using proper AO technique. Once the plate was affixed all incisions were thoroughly irrigated and suction. Deep closure was made with Vicryl suture and cutaneous closure was made with staples. Sterile dressings were applied. Anesthesia was reversed. Patient was taken postop recovery room in stable condition. There were no immediate postoperative complications and the procedure was tolerated well. It should be noted that meticulous hemostasis was achieved throughout the procedure with electrocautery.     Electronically signed by Main Nelson DO on 3/9/2022 at 7:16 AM

## 2022-03-09 NOTE — BRIEF OP NOTE
Brief Postoperative Note      Patient: Rivas Simmons  YOB: 1950  MRN: [id number]    Date of Procedure: 3/8/2022    Pre-Op Diagnosis: Right intertrochanteric fracture    Post-Op Diagnosis: Right intertrochanteric fracture       Procedure(s):  1. Right hip open reduction internal fixation   2. Right femur bridge spanning plate    Surgeon(s): Jovan Corrigan DO    Assistant: Resident: Britt Saucedo DO; Artis Mackay DO    Anesthesia: General    Estimated Blood Loss (mL): 595 mL    Complications: None    Specimens: * No specimens in log *    Implants:  Implant Name Type Inv. Item Serial No.  Lot No. LRB No. Used Action   NAIL IM L170MM [id number] 125DEG SHT TROCHANTERIC FEM GRN TI - [id number]  NAIL IM L170MM [id number] 125DEG SHT TROCHANTERIC FEM GRN TI  DEPUY [employer] [place]-WD [id number] Right 1 Implanted   CABLE SURG DIA1. 7MM S STL HA CERCLAGE W/ CRMP [[id number]] [DEPUY SYNTHES [place]] - [id number]  CABLE SURG DIA1. 7MM S STL HA CERCLAGE W/ CRMP [[id number]] [DEPUY SYNTHES [facility]]  DEPUY SYNTHES [place] [id number] Right 1 Implanted   SYNTHES 4.5MM THREADED CERCLAGE POSITIONING PIN      [id number]  Right 1 Implanted   BLADE IM L105MM [id number] TI MICHELLE FOR TROCHANTERIC NAIL FIX - [id number]  BLADE IM L105MM [id number] TI MICHELLE FOR TROCHANTERIC NAIL FIX  [employer] [place]-WD  Right 1 Implanted   SCREW BNE L34MM DIA5MM KAREEN GRN TI ST LIBRADO FULL THRD TRCR - [id number]  SCREW BNE L34MM DIA5MM KAREEN GRN TI ST LIBRADO FULL THRD TRCR  DEPUY SYNTHES [place]  Right 1 Implanted   PLATE BNE [id number] THK5.2MM 14 H BILAT S STL BROAD LIBRADO - [id number]  PLATE BNE H67.6PQ896PR THK5.2MM 14 H BILAT S STL BROAD LIBRADO  DEPUY SYNTHES [place]-[employer]  Right 1 Implanted   SCREW BNE L34MM DIA4. 5MM PROX KAREEN TIB S STL ST LIBRADO FULL - [id number]  SCREW BNE L34MM DIA4. 5MM PROX KAREEN TIB S STL ST LIBRADO FULL  DEPUY SYNTHES [place]  Right 1 Implanted   SCREW BNE L18MM DIA5MM S STL ST LIBRADO FULL THRD T25 [facility] - [id number]  SCREW BNE L18MM DIA5MM S STL ST LIBRADO FULL THRD T25 STARDRV  DEPUY SYNTHES [place]-WD  Right 2 Implanted   SET CBL SL SM DIA2MM VIT BEAD DALL-M - [id number]  SET CBL SL SM DIA2MM VIT BEAD DALL-M  [employer] [facility] [id number] Right 1 Implanted   SCREW BNE L14MM DIA5MM S STL ST LIBRADO FULL THRD T25 STARDRV - [id number]  SCREW BNE L14MM DIA5MM S STL ST LIBRADO FULL THRD T25 STARDRV  DEPUY SYNTHES [place]-WD  Right 2 Implanted         Drains:   Urethral Catheter Non-latex 16 fr (Active)   $ Urethral catheter insertion $ Not inserted for procedure 03/08/22 0807   Catheter Indications Perioperative use for selected surgical procedures 03/08/22 1359   Securement Device Date Changed 03/08/22 03/08/22 2825   Site Assessment No urethral drainage 03/08/22 1359   Urine Color Yellow 03/08/22 1359   Urine Appearance Clear 03/08/22 1359       [REMOVED] Urethral Catheter (Removed)   Catheter Indications Perioperative use for selected surgical procedures;Prolonged immobilization (e.g. unstable thoracic or lumbar spine, multiple traumatic injuries such as pelvic fractures) 03/08/22 0800   Site Assessment No urethral drainage 03/08/22 0800   Urine Color Jasmyn 03/08/22 0800       Findings: Right intertrochanteric fracture    Electronically signed by Hector Arias DO on 3/8/2022 at 7:04 PM

## 2022-03-09 NOTE — PROGRESS NOTES
Orthopedic Progress Note    Patient:  Dinh Prieto  YOB: 1950     81 y.o. male    Subjective:  Patient seen and examined. Transferred to TICU after surgery due to respiratory distress. Currently on O2 with nasal canula with O2 sat at 95. No symptoms of CP or SOB. No acute events overnight per nursing. Denies numbness/tingling. Admits to pain to his right femur. Pressure dressing to the right femur optifoam due to mild saturation per nursing. Vitals reviewed, afebrile    Objective:   Vitals:    03/09/22 0400   BP: 122/75   Pulse: 66   Resp: 22   Temp: 98.2 °F (36.8 °C)   SpO2: 95%     Gen: NAD  Cardiovascular: Regular rate  Respiratory: Chest symmetric, no accessory muscle use, normal respirations, no audible wheezes    MSK:  RLE: TTP to the lateral femur. Dressings were saturate and taken down. Incisions well approximated with no active drainage or signs of infection. Compartments are soft and compressible. EHL/FHL/TA/GS complex motor intact. Sural, saphenous, superificial/deep peroneal, and plantar nerve distribution SILT. Dorsalis pedis pulse 1+ with BCR. Recent Labs     03/08/22  0421   WBC 13.5*   HGB 9.2*   HCT 31.2*      *   K 4.6   BUN 11   CREATININE 0.78   GLUCOSE 102*      Meds: Ancef, plavix, lovenox   See rec for complete list    Impression/plan: 81 y.o. male s/p R femur CMN insertion/ORIF for a right IT femur fracture, POD#1    -Complete post op Abx  -F/u post op morning labs  -WB status: WBAT RLE  -Dressings changed at bedside this AM. Maintain dressings to the right femur.  Ok to reinforce as needed per nursing  -Pain control per primary team  -DVT ppx: Managed per primary team. [address] Tanisha Loco from orthopedics POD#1  -Ice/Elevate prn  -Encourage Incentive Spirometry use  -PT/OT eval/treat  -Follow up with Dr. Kasey Joya in office 10-14 days after surgery  -Please page ortho with any questions    Luna Augustine DO  Orthopedic Surgery Resident, PGY-3  [address] [place], [facility]

## 2022-03-09 NOTE — PROGRESS NOTES
Physician Progress Note      Michele Waddell  CSN #:                  [id number]  :                       1950  ADMIT DATE:       3/5/2022 3:47 PM  [address] [place] DATE:  RESPONDING  PROVIDER #:        Pham MACIEL DO          QUERY TEXT:    Pt admitted with Fracture of femoral neck, right and underwent R femur CMN   insertion/ORIF for a right IT femur fracture. If possible, please document in   the progress notes and discharge summary if you are evaluating and/or   treating any of the following: The medical record reflects the following:  Risk Factors: R femur CMN insertion/ORIF for a right IT femur fracture, COPD,   HTN, Cardiomyopathy  Clinical Indicators:Temp 95.3 , pulse 91>62, SpO2 85, BP 77/48>155/101>146/120   , Per  ortho note -  Transferred to TICU after surgery due to respiratory   distress. FIO@ 60  Treatment: O2 8L non rebreather mask, FIO2 60, transfer to TICU, albuterol   nebulizer, Lasix IV, cefazolin IV, monitoring    Thank you, Please call if questions  Sydnee Rosario RN CDS  [phone]  Options provided:  -- Acute respiratory failure due to ***, and unrelated to surgery  -- Acute respiratory failure due to the surgery  -- Acute pulmonary insufficiency following surgery  -- Other - I will add my own diagnosis  -- Disagree - Not applicable / Not valid  -- Disagree - Clinically unable to determine / Unknown  -- Refer to Clinical Documentation Reviewer    PROVIDER RESPONSE TEXT:    This patient is in acute respiratory failure due to the surgery.     Query created by: Jose Gunter on 3/9/2022 1:11 PM      Electronically signed by:  Osbaldo Kyle DO 3/9/2022 1:19 PM

## 2022-03-09 NOTE — PROGRESS NOTES
Writing RN spoke with Bao Fox NP regarding pt. And if they were going to continue to be primary on this pt. Asked if they were going to consult surgical critical care or pulmonology since pt. Is now ICU status. States that Dr. Sindi Bruno and herself discussed and they are going to remain primary. Spoke with charge RN Vonnie Samuel regarding situation and she stated this is OK for them to remain primary without surgical critical care or pulmonology consult.

## 2022-03-09 NOTE — FLOWSHEET NOTE
SPIRITUAL CARE DEPARTMENT - [facility]  PROGRESS NOTE    Shift date: 3/9/22  Shift day: Wednesday   Shift # 1    Room # 8571/3979-27   Name: Jose Dunbar            Age: 81 y.o. Gender: male          Scientologist:   Place of Quaker:     Referral: Routine Visit    Admit Date & Time: 3/5/2022  3:47 PM    PATIENT/EVENT DESCRIPTION:  Jose Dunbar is a 81 y.o. male   (Description of condition/event). SPIRITUAL ASSESSMENT/INTERVENTION:  (Please note all relevant care assessments and interventions and duties performed. Also include important family names, numbers, and dynamics)      SPIRITUAL CARE FOLLOW-UP PLAN:  Chaplains will remain available to offer spiritual and emotional support as needed.       Electronically signed by Tory Mckeon on 3/9/2022 at 2:18 PM.  [address] [employer]  [phone]

## 2022-03-09 NOTE — PROGRESS NOTES
[facility])  Occupational Therapy Not Seen Note    DATE: 3/9/2022    NAME: Sis Jones  MRN: [id number]   : 1950      Patient not seen this date for Occupational Therapy due to:    Patient Declined: pt requested to wait to do therapy until moved to actual room.  pt agreeable to participate in therapy tomorrow    Next Scheduled Treatment: check back 3/10/2022    Electronically signed by GAGE Chi on 3/9/2022 at 1:44 PM

## 2022-03-09 NOTE — ANESTHESIA POSTPROCEDURE EVALUATION
Department of Anesthesiology  Postprocedure Note    Patient: Supriya Phelps  MRN: [id number]  YOB: 1950  Date of evaluation: 3/8/2022  Time:  9:51 PM     Procedure Summary     Date: 03/08/22 Room / Location: [facility] 14 / [address]    Anesthesia Start: 1621 Anesthesia Stop: 1931    Procedure: RIGHT TFN HIP  INSERTION ,OPEN REDUCTION INTERNAL FIXATION RIGHT HIP  C-ARM, SYNTHES, CABLES , (Right ) Diagnosis: (HIP FRACTURE RIGHT)    Surgeons: Raquel Titus DO Responsible Provider: Isadora Almanza MD    Anesthesia Type: general ASA Status: 3          Anesthesia Type: general    Riya Phase I: Riya Score: 9    Riya Phase II:      Last vitals: Reviewed and per EMR flowsheets.        Anesthesia Post Evaluation    Patient location during evaluation: PACU  Patient participation: complete - patient participated  Level of consciousness: awake and alert  Pain score: 3  Airway patency: patent  Nausea & Vomiting: no nausea and no vomiting  Complications: no  Cardiovascular status: hemodynamically stable  Respiratory status: acceptable  Hydration status: stable

## 2022-03-09 NOTE — CONSULTS
@[facility]@    [address] [place] [address] Drive / HISTORY AND PHYSICAL EXAMINATION            Date:   3/9/2022  Patient name:  Alka Layne  Date of admission:  3/5/2022  3:47 PM  MRN:   [id number]  Account:  [de-identified]  YOB: 1950  PCP:    Truong Valdes DO  Room:   750/2294-41  Code Status:    Full Code    Physician Requesting Consult: Dior Garber DO    Reason for Consult: Postoperative management overnight    Chief Complaint:     Chief Complaint   Patient presents with    Fatigue       History Obtained From:     Patient    History of Present Illness:     77-year-old male with cardiovascular history who presented with a right femur fracture requiring surgical intervention via ORIF. Patient had successful uncomplicated surgery for which there was a delay in extubation. Patient took a little extra time waking up due to sedation and was briefly placed on BiPAP for alveolar recruitment. Patient mentation improved, now breathing on 2 L nasal cannula. Patient reports pain only with movement but has not moved his leg much. Denies any shortness of breath, chest pain, palpitations, nausea, vomiting. Nurse at bedside reports that he had a bowel movement after coming out of the OR. Patient says he is ready to eat. Denies any other symptoms at this time.     Medicine was consulted in regards to postoperative management overnight    Past Medical History:     Past Medical History:   Diagnosis Date    ADHD (attention deficit hyperactivity disorder)     ADHD (attention deficit hyperactivity disorder)     Atypical chest pain     Chronic bronchitis (HCC)     Hypertension     Hyponatremia     Meniere's disease     Narcolepsy     Nasal fracture     PND (post-nasal drip) 4/21/2014    SOB (shortness of breath)     Tibia fracture     right tibial plateau fx, right syndesmotic fx    Transaminasemia 4/21/2014        Past Surgical History:     Past Surgical History:   Procedure Laterality Date    ANKLE SURGERY      open reduction internal fixation of the right ankle & tibial plateau fx    CORONARY ANGIOPLASTY WITH STENT PLACEMENT N/A 10/10/2015    CYST REMOVAL      right side of face    HIP SURGERY Right 03/08/2022    RIGHT TFN HIP  INSERTION ,OPEN REDUCTION INTERNAL FIXATION RIGHT HIP  C-ARM, SYNTHES, CABLES     KNEE ARTHROPLASTY Right 04/13/2021    DISTAL FEMUR REPLACEMENT performed by Ar Ricci DO at [place] [id number] Right     total knee        Medications Prior to Admission:     Prior to Admission medications    Medication Sig Start Date End Date Taking? Authorizing Provider   amiodarone (CORDARONE) 200 MG tablet Take by mouth   Yes Historical Provider, MD   cyanocobalamin 1000 MCG tablet Take 1,000 mcg by mouth   Yes Historical Provider, MD   traMADol (ULTRAM) 50 MG tablet Take 50 mg by mouth. Yes Historical Provider, MD   sodium chloride 1 g tablet Take 1 g by mouth   Yes Historical Provider, MD   vitamin C (ASCORBIC ACID) 500 MG tablet Take 500 mg by mouth   Yes Historical Provider, MD   methylphenidate (RITALIN) 10 MG tablet Take 10 mg by mouth.    Yes Historical Provider, MD   fluticasone propionate (FLOVENT) 50 MCG/BLIST AEPB inhaler Inhale into the lungs daily   Yes Historical Provider, MD   acetaminophen (TYLENOL) 500 MG tablet Take 1 tablet by mouth 4 times daily as needed for Pain 4/15/21   Pam Feldman,    Multiple Vitamins-Minerals (CERTAVITE SENIOR/ANTIOXIDANT) TABS TAKE 1 TAB BY MOUTH ONCE A DAY 6/22/16   Iris Lynn PA-C   carvedilol (COREG) 3.125 MG tablet TAKE 1 TAB BY MOUTH TWICE A DAY WITH MEALS 6/7/16   Iris Lynn PA-C   levothyroxine (SYNTHROID) 25 MCG tablet Take 1 tablet by mouth Daily  Patient taking differently: Take 50 mcg by mouth Daily  3/24/16   Jose De Jesus Art DO   QUEtiapine (SEROQUEL) 25 MG tablet Take 1 tablet by mouth nightly 3/24/16   Jose De Jesus Art DO   melatonin 3 MG TABS tablet Take 3 mg by mouth daily    Historical Provider, MD   atorvastatin (LIPITOR) 40 MG tablet Take 1 tablet by mouth nightly 11/10/15   Carrie Villarreal MD   clopidogrel (PLAVIX) 75 MG tablet Take 1 tablet by mouth daily 11/10/15   Carrie Villarreal MD   Magnesium Oxide 250 MG TABS Take 1 tablet by mouth daily  Patient taking differently: Take 500 mg by mouth daily  11/10/15   Carrie Villarreal MD   meclizine (ANTIVERT) 25 MG tablet Take 0.5 tablets by mouth 3 times daily as needed 11/10/15   Carrie Villarreal MD   furosemide (LASIX) 20 MG tablet Take 1 tablet by mouth daily 11/10/15   Carrie Villarreal MD   [address] Adult diapers dispense quantity allowed by insurance 3/31/15   Sal Lynn PA-C   Diapers & Supplies MISC Wipes  dispense quantity allowed by insurance 3/31/15   Iris Lynn PA-C   PROAIR  (90 BASE) MCG/ACT inhaler inhale 2 puffs every 4 to 6 hours if needed 11/29/14   Iris Lynn PA-C   mometasone (NASONEX) 50 MCG/ACT nasal spray 2 sprays by Nasal route daily. 12/23/13   Dinora Anne MD   docusate sodium (COLACE) 100 MG capsule Take 100 mg by mouth 2 times daily. Historical Provider, MD   folic acid (FOLVITE) 1 MG tablet Take 1 mg by mouth daily. Historical Provider, MD   chlorhexidine (PERIDEX) 0.12 % solution Take 15 mLs by mouth 2 times daily. Historical Provider, MD   SALINE MIST SPRAY NA by Nasal route. Historical Provider, MD   Multiple Vitamin (MULTIVITAMIN PO) Take  by mouth. Historical Provider, MD        Allergies:     Motrin [ibuprofen micronized]    Social History:     Tobacco:    reports that he has been smoking cigarettes. He has a 38.00 pack-year smoking history. He has never used smokeless tobacco.  Alcohol:      reports current alcohol use of about 16.0 standard drinks of alcohol per week. Drug Use:  reports no history of drug use.     Family History:     Family History   Problem Relation Age of Onset    Heart Disease Mother         heart attack    Heart Disease Father        Review of Systems:     Review of systems negative other than the above    Physical Exam:     /67   Pulse 60   Temp 97 °F (36.1 °C) (Oral)   Resp 13   Ht 5' 10\" (1.778 m)   Wt 220 lb 10.9 oz (100.1 kg)   SpO2 96%   BMI 31.66 kg/m²   Temp (24hrs), Av.1 °F (35.6 °C), Min:95.3 °F (35.2 °C), Max:98.8 °F (37.1 °C)    No results for input(s): POCGLU in the last 72 hours. Intake/Output Summary (Last 24 hours) at 3/9/2022 0310  Last data filed at 3/9/2022 0200  Gross per 24 hour   Intake 1793 ml   Output 840 ml   Net 953 ml       General Appearance:  alert, well appearing, and in no acute distress  Mental status: oriented to person, place, and time with normal affect  Head:  normocephalic, atraumatic. Eye: no icterus, redness, pupils equal and reactive, extraocular eye movements intact, conjunctiva clear  Ear: normal external ear, no discharge, hearing intact  Nose:  no drainage noted  Mouth: mucous membranes moist  Neck: supple, no carotid bruits, thyroid not palpable  Lungs: Bilateral equal air entry, clear to ausculation, no wheezing, rales or rhonchi, normal effort  Cardiovascular: normal rate, regular rhythm, no murmur, gallop, rub. Abdomen: Soft, nontender, nondistended, hypoactive bowel sounds, no hepatomegaly or splenomegaly  Neurologic: There are no new focal motor or sensory deficits, normal muscle tone and bulk, no abnormal sensation, normal speech, cranial nerves II through XII grossly intact  Extremities: Symmetrical, nonedematous, normothermic, pulses palpable bilaterally. Right lateral aspect of the thigh has clear Tegaderm with small amount of clotted blood. No surrounding erythema, no discharge, no abnormal swelling.   Sensation intact      Investigations:      Laboratory Testing:  Recent Results (from the past 24 hour(s))   Basic Metabolic Panel    Collection Time: 22  4:21 AM   Result Value Ref Range Glucose 102 (H) 70 - 99 mg/dL    BUN 11 8 - 23 mg/dL    CREATININE 0.78 0.70 - 1.20 mg/dL    Calcium 9.5 8.6 - 10.4 mg/dL    Sodium 134 (L) 135 - 144 mmol/L    Potassium 4.6 3.7 - 5.3 mmol/L    Chloride 102 98 - 107 mmol/L    CO2 21 20 - 31 mmol/L    Anion Gap 11 9 - 17 mmol/L    GFR Non-African American >60 >60 mL/min    GFR African American >60 >60 mL/min    GFR Comment         CBC    Collection Time: 03/08/22  4:21 AM   Result Value Ref Range    WBC 13.5 (H) 3.5 - 11.3 k/uL    RBC 3.50 (L) 4.21 - 5.77 m/uL    Hemoglobin 9.2 (L) 13.0 - 17.0 g/dL    Hematocrit 31.2 (L) 40.7 - 50.3 %    MCV 89.1 82.6 - 102.9 fL    MCH 26.3 25.2 - 33.5 pg    MCHC 29.5 28.4 - 34.8 g/dL    RDW 19.7 (H) 11.8 - 14.4 %    Platelets 308 949 - 964 k/uL    MPV 9.2 8.1 - 13.5 fL    NRBC Automated 0.0 0.0 per 100 WBC   SODIUM, URINE, RANDOM    Collection Time: 03/08/22 11:12 PM   Result Value Ref Range    Sodium,Ur 22 mmol/L   OSMOLALITY, URINE    Collection Time: 03/08/22 11:12 PM   Result Value Ref Range    Osmolality, Ur 337 80 - 1300 mOsm/kg       Imaging/Diagonstics:  XR CHEST (SINGLE VIEW FRONTAL)    Result Date: 3/8/2022  Mild bibasal infiltrates and small left pleural effusion suggesting pneumonia     XR HIP RIGHT (2-3 VIEWS)    Result Date: 3/6/2022  Comminuted intertrochanteric fracture right femoral neck No other acute bony or joint abnormality     XR FEMUR RIGHT (MIN 2 VIEWS)    Result Date: 3/8/2022  Postsurgical changes status post ventral effusion right hip and proximal femur fracture. XR FEMUR RIGHT (MIN 2 VIEWS)    Result Date: 3/6/2022  Comminuted intertrochanteric fracture right femoral neck No other acute bony or joint abnormality     XR KNEE RIGHT (1-2 VIEWS)    Result Date: 3/6/2022  Comminuted intertrochanteric fracture right femoral neck No other acute bony or joint abnormality     XR ANKLE LEFT (MIN 3 VIEWS)    Result Date: 3/6/2022  No acute osseous or soft tissue abnormality.      XR FOOT LEFT (MIN 3 VIEWS)    Result Date: 3/6/2022  Irregularity of the 1st distal phalanx that may relate to an acute fracture and correlation with point tenderness is needed. XR CHEST PORTABLE    Result Date: 3/5/2022  Marginal inspiration, without evidence of acute cardiopulmonary disease     CT CHEST PULMONARY EMBOLISM W CONTRAST    Result Date: 3/5/2022  1. No evidence of pulmonary embolism 2. Dependent atelectasis, right lung base 3. Diffuse emphysematous changes present throughout the lungs, with an upper lobe predominance 4. Extensive coronary arterial calcifications 5. Cholelithiasis, without evidence of cholecystitis 6. Nodular contour to the liver, suggesting cirrhosis     XR HIP 2-3 VW W PELVIS LEFT    Result Date: 3/6/2022  Comminuted intertrochanteric fracture of the right proximal femur. XR HIP 2-3 VW W PELVIS RIGHT    Result Date: 3/8/2022  Postsurgical changes status post ventral effusion right hip and proximal femur fracture. Assessment :      #Post Operative Management  -POD 0 s/p uncomplicated ORIF 2/2 Comminuted intertrochanteric fracture right femoral neck   -Minimal blood loss noted perioperatively  -Delay in extubation requing brief bipap use while sedation weaned off. Now off, on 2L NC, no resp distress, mind intermittent faint expiratory wheeze  -Currently AO3, no Nausea, had a Bowl movement after surgery, marshall in place but being removed, Neurovascularly intact. Reports pain is controlled.   -No drains in place. Wound dressing clean with minimal clotted blood seen through the clear Tegaderm.  -Received and completed empiric Abs    PLAN  -Remove marshall, monitor for urine retention, bladder scan if concern for retention. []  -Resume diet & DC maintaince IVF.  Continue Protonix as stress ulcer ppx []  -Monitor for postoperative fevers  -Prevent atelectasis via incentive spirometer, secretion control, consider placing CPAP back on for alveolar recruitment if needed, will administer breathing treatment given the faint wheeze[]  -Obtain postop labs, monitor on tele and document Qtc in EMR if possible  -As needed analgesia with stress ulcer prophylaxis  -Had BM x1, hypoactive BM, monitor for post op ileus if no flatulence of Bowl movement   -Discussed with primary team about when to resume home Plavix medication[]  -Monitor for any possible alcohol withdrawal unknown to the medical team  -PT/OT, wound care []      -VTE ppx was on lovenox--> Holding temporarily until primary team assess in morning. Ortho procedures may hold briefly or resume depending on procure and comorbidity.  Resume as per primary

## 2022-03-10 ENCOUNTER — APPOINTMENT (OUTPATIENT)
Dept: GENERAL RADIOLOGY | Age: 72
DRG: 480 | End: 2022-03-10
Payer: COMMERCIAL

## 2022-03-10 LAB
ABO/RH: NORMAL
ANION GAP SERPL CALCULATED.3IONS-SCNC: 10 MMOL/L (ref 9–17)
ANTIBODY SCREEN: NEGATIVE
ARM BAND NUMBER: NORMAL
BLD PROD TYP BPU: NORMAL
BLD PROD TYP BPU: NORMAL
BUN BLDV-MCNC: 15 MG/DL (ref 8–23)
CALCIUM SERPL-MCNC: 8.3 MG/DL (ref 8.6–10.4)
CHLORIDE BLD-SCNC: 105 MMOL/L (ref 98–107)
CO2: 20 MMOL/L (ref 20–31)
CREAT SERPL-MCNC: 0.82 MG/DL (ref 0.7–1.2)
CROSSMATCH RESULT: NORMAL
CROSSMATCH RESULT: NORMAL
DISPENSE STATUS BLOOD BANK: NORMAL
DISPENSE STATUS BLOOD BANK: NORMAL
EKG ATRIAL RATE: 66 BPM
EKG Q-T INTERVAL: 462 MS
EKG QRS DURATION: 94 MS
EKG QTC CALCULATION (BAZETT): 491 MS
EKG R AXIS: -23 DEGREES
EKG T AXIS: 45 DEGREES
EKG VENTRICULAR RATE: 68 BPM
EXPIRATION DATE: NORMAL
GFR AFRICAN AMERICAN: >60 ML/MIN
GFR NON-AFRICAN AMERICAN: >60 ML/MIN
GFR SERPL CREATININE-BSD FRML MDRD: ABNORMAL ML/MIN/{1.73_M2}
GLUCOSE BLD-MCNC: 107 MG/DL (ref 70–99)
POTASSIUM SERPL-SCNC: 4 MMOL/L (ref 3.7–5.3)
SODIUM BLD-SCNC: 135 MMOL/L (ref 135–144)
TRANSFUSION STATUS: NORMAL
TRANSFUSION STATUS: NORMAL
UNIT DIVISION: 0
UNIT DIVISION: 0
UNIT NUMBER: NORMAL
UNIT NUMBER: NORMAL

## 2022-03-10 PROCEDURE — 97167 OT EVAL HIGH COMPLEX 60 MIN: CPT

## 2022-03-10 PROCEDURE — 94761 N-INVAS EAR/PLS OXIMETRY MLT: CPT

## 2022-03-10 PROCEDURE — 2580000003 HC RX 258: Performed by: STUDENT IN AN ORGANIZED HEALTH CARE EDUCATION/TRAINING PROGRAM

## 2022-03-10 PROCEDURE — 97163 PT EVAL HIGH COMPLEX 45 MIN: CPT

## 2022-03-10 PROCEDURE — 97530 THERAPEUTIC ACTIVITIES: CPT

## 2022-03-10 PROCEDURE — 80048 BASIC METABOLIC PNL TOTAL CA: CPT

## 2022-03-10 PROCEDURE — 6360000002 HC RX W HCPCS: Performed by: STUDENT IN AN ORGANIZED HEALTH CARE EDUCATION/TRAINING PROGRAM

## 2022-03-10 PROCEDURE — 2060000000 HC ICU INTERMEDIATE R&B

## 2022-03-10 PROCEDURE — 6370000000 HC RX 637 (ALT 250 FOR IP): Performed by: STUDENT IN AN ORGANIZED HEALTH CARE EDUCATION/TRAINING PROGRAM

## 2022-03-10 PROCEDURE — 99232 SBSQ HOSP IP/OBS MODERATE 35: CPT | Performed by: INTERNAL MEDICINE

## 2022-03-10 PROCEDURE — 97110 THERAPEUTIC EXERCISES: CPT

## 2022-03-10 PROCEDURE — 94640 AIRWAY INHALATION TREATMENT: CPT

## 2022-03-10 PROCEDURE — 71045 X-RAY EXAM CHEST 1 VIEW: CPT

## 2022-03-10 PROCEDURE — 2580000003 HC RX 258: Performed by: INTERNAL MEDICINE

## 2022-03-10 PROCEDURE — 2700000000 HC OXYGEN THERAPY PER DAY

## 2022-03-10 PROCEDURE — 36415 COLL VENOUS BLD VENIPUNCTURE: CPT

## 2022-03-10 RX ORDER — TRAMADOL HYDROCHLORIDE 50 MG/1
50 TABLET ORAL EVERY 6 HOURS PRN
Qty: 20 TABLET | Refills: 0 | Status: ON HOLD | OUTPATIENT
Start: 2022-03-10 | End: 2022-03-20 | Stop reason: HOSPADM

## 2022-03-10 RX ORDER — LEVOTHYROXINE SODIUM 0.05 MG/1
50 TABLET ORAL DAILY
Qty: 30 TABLET | Refills: 3 | DISCHARGE
Start: 2022-03-11

## 2022-03-10 RX ORDER — LORAZEPAM 1 MG/1
1 TABLET ORAL 3 TIMES DAILY PRN
Qty: 15 TABLET | Refills: 0 | Status: SHIPPED | OUTPATIENT
Start: 2022-03-10 | End: 2022-03-17 | Stop reason: ALTCHOICE

## 2022-03-10 RX ORDER — ARMODAFINIL 150 MG/1
150 TABLET ORAL DAILY
Qty: 30 TABLET | Refills: 0 | Status: ON HOLD | OUTPATIENT
Start: 2022-03-10 | End: 2022-03-20 | Stop reason: SDUPTHER

## 2022-03-10 RX ORDER — GUAIFENESIN 600 MG/1
1200 TABLET, EXTENDED RELEASE ORAL 2 TIMES DAILY
DISCHARGE
Start: 2022-03-10

## 2022-03-10 RX ADMIN — QUETIAPINE FUMARATE 25 MG: 25 TABLET ORAL at 20:43

## 2022-03-10 RX ADMIN — FLUTICASONE PROPIONATE 1 SPRAY: 50 SPRAY, METERED NASAL at 08:47

## 2022-03-10 RX ADMIN — GUAIFENESIN 1200 MG: 600 TABLET, EXTENDED RELEASE ORAL at 20:42

## 2022-03-10 RX ADMIN — OXYCODONE HYDROCHLORIDE AND ACETAMINOPHEN 1 TABLET: 5; 325 TABLET ORAL at 21:38

## 2022-03-10 RX ADMIN — OXYCODONE HYDROCHLORIDE AND ACETAMINOPHEN 1 TABLET: 5; 325 TABLET ORAL at 17:31

## 2022-03-10 RX ADMIN — Medication 1 TABLET: at 08:45

## 2022-03-10 RX ADMIN — CARVEDILOL 3.12 MG: 3.12 TABLET, FILM COATED ORAL at 16:15

## 2022-03-10 RX ADMIN — ENOXAPARIN SODIUM 40 MG: 100 INJECTION SUBCUTANEOUS at 08:46

## 2022-03-10 RX ADMIN — LORAZEPAM 1 MG: 2 INJECTION INTRAMUSCULAR; INTRAVENOUS at 08:46

## 2022-03-10 RX ADMIN — DOCUSATE SODIUM 100 MG: 100 CAPSULE ORAL at 20:43

## 2022-03-10 RX ADMIN — MAGNESIUM GLUCONATE 500 MG ORAL TABLET 400 MG: 500 TABLET ORAL at 08:46

## 2022-03-10 RX ADMIN — TRAMADOL HYDROCHLORIDE 50 MG: 50 TABLET, FILM COATED ORAL at 08:47

## 2022-03-10 RX ADMIN — DOCUSATE SODIUM 100 MG: 100 CAPSULE ORAL at 08:46

## 2022-03-10 RX ADMIN — DESMOPRESSIN ACETATE 40 MG: 0.2 TABLET ORAL at 20:43

## 2022-03-10 RX ADMIN — SODIUM CHLORIDE TAB 1 GM 1 G: 1 TAB at 08:45

## 2022-03-10 RX ADMIN — CHLORHEXIDINE GLUCONATE 0.12% ORAL RINSE 15 ML: 1.2 LIQUID ORAL at 08:47

## 2022-03-10 RX ADMIN — OXYCODONE HYDROCHLORIDE AND ACETAMINOPHEN 500 MG: 500 TABLET ORAL at 08:45

## 2022-03-10 RX ADMIN — MORPHINE SULFATE 2 MG: 4 INJECTION INTRAVENOUS at 11:44

## 2022-03-10 RX ADMIN — CARVEDILOL 3.12 MG: 3.12 TABLET, FILM COATED ORAL at 08:46

## 2022-03-10 RX ADMIN — GUAIFENESIN 1200 MG: 600 TABLET, EXTENDED RELEASE ORAL at 08:46

## 2022-03-10 RX ADMIN — FLUTICASONE PROPIONATE 1 PUFF: 110 AEROSOL, METERED RESPIRATORY (INHALATION) at 20:43

## 2022-03-10 RX ADMIN — SODIUM CHLORIDE, PRESERVATIVE FREE 10 ML: 5 INJECTION INTRAVENOUS at 09:00

## 2022-03-10 RX ADMIN — FLUTICASONE PROPIONATE 1 PUFF: 110 AEROSOL, METERED RESPIRATORY (INHALATION) at 09:00

## 2022-03-10 RX ADMIN — Medication 100 MG: at 08:45

## 2022-03-10 RX ADMIN — SODIUM CHLORIDE TAB 1 GM 1 G: 1 TAB at 16:15

## 2022-03-10 RX ADMIN — MECLIZINE HCL 12.5 MG 12.5 MG: 12.5 TABLET ORAL at 08:45

## 2022-03-10 RX ADMIN — FOLIC ACID 1 MG: 1 TABLET ORAL at 08:45

## 2022-03-10 RX ADMIN — LEVOTHYROXINE SODIUM 50 MCG: 50 TABLET ORAL at 06:00

## 2022-03-10 RX ADMIN — CHLORHEXIDINE GLUCONATE 0.12% ORAL RINSE 15 ML: 1.2 LIQUID ORAL at 20:49

## 2022-03-10 RX ADMIN — PANTOPRAZOLE SODIUM 40 MG: 40 TABLET, DELAYED RELEASE ORAL at 06:00

## 2022-03-10 RX ADMIN — Medication 1000 MCG: at 08:45

## 2022-03-10 RX ADMIN — SODIUM CHLORIDE: 9 INJECTION, SOLUTION INTRAVENOUS at 20:57

## 2022-03-10 ASSESSMENT — PAIN DESCRIPTION - LOCATION
LOCATION: KNEE;HIP
LOCATION: LEG;KNEE;HIP
LOCATION: KNEE;HIP
LOCATION: LEG;HIP;KNEE

## 2022-03-10 ASSESSMENT — ENCOUNTER SYMPTOMS
VOMITING: 0
WHEEZING: 0
SHORTNESS OF BREATH: 0
ABDOMINAL PAIN: 0
NAUSEA: 0
COUGH: 0

## 2022-03-10 ASSESSMENT — PAIN SCALES - GENERAL
PAINLEVEL_OUTOF10: 5
PAINLEVEL_OUTOF10: 6
PAINLEVEL_OUTOF10: 5
PAINLEVEL_OUTOF10: 6
PAINLEVEL_OUTOF10: 5
PAINLEVEL_OUTOF10: 7
PAINLEVEL_OUTOF10: 7

## 2022-03-10 ASSESSMENT — PAIN DESCRIPTION - ORIENTATION
ORIENTATION: RIGHT

## 2022-03-10 ASSESSMENT — PAIN DESCRIPTION - DESCRIPTORS: DESCRIPTORS: DISCOMFORT

## 2022-03-10 ASSESSMENT — PAIN DESCRIPTION - PAIN TYPE
TYPE: SURGICAL PAIN;CHRONIC PAIN
TYPE: ACUTE PAIN;SURGICAL PAIN

## 2022-03-10 ASSESSMENT — PAIN - FUNCTIONAL ASSESSMENT: PAIN_FUNCTIONAL_ASSESSMENT: PREVENTS OR INTERFERES SOME ACTIVE ACTIVITIES AND ADLS

## 2022-03-10 NOTE — PLAN OF CARE
Problem: OXYGENATION/RESPIRATORY FUNCTION  Goal: Patient will maintain patent airway  3/10/2022 0050 by Mesha Sequeira RN  Outcome: Ongoing     Problem: Falls - Risk of:  Goal: Will remain free from falls  Description: Will remain free from falls  3/10/2022 0050 by Mesha Sequeira RN  Outcome: Ongoing     Problem: Pain:  Goal: Pain level will decrease  Description: Pain level will decrease  3/10/2022 0050 by Mesha Sequeira RN  Outcome: Ongoing

## 2022-03-10 NOTE — PROGRESS NOTES
Physical Therapy    Facility/Department: [facility] 4A STEPDOWN  Initial Assessment    NAME: Herberth Murray  : 1950  MRN: [id number]    Date of Service: 3/10/2022  History copied and pasted from H+P:  74-year-old wheelchair-bound  male with underlying HFrEF, Severe knee OA, narcolepsy who presents to the hospital with concern for generalized weakness. Patient reports since his knee replacement surgery a few years ago he was never able to ambulate at baseline requiring him to be overall wheelchair-bound. He says over the past month his appetite's been poor and he feels he is becoming weaker. He says every time he goes from the wheelchair to his bed he struggles to lift himself over and ends up falling. He denies any loss of consciousness, head trauma, joint swelling, shortness of breath, palpitations or dizziness. He does not report any weight loss, night sweats and says over the past few years his appetite is not like it used to be. He pays out-of-pocket for home aide to come and cook for him, he reports he has access to food and can manage himself but just does not have energy to do so. Patient reports compliance to all his meds. Says he needs physical therapy but does not want placement in a nursing home. Patient denies any other symptoms at this time. After attempting to get up to chair unsuccessfully with PT/OT this date (3/10), pt agreeable to SNF to improve his ability to mobilize.      Discharge Recommendations:  Further therapy recommended at discharge. PT Equipment Recommendations  Equipment Needed: No (will defer to rehab facility)    Assessment    Pt cooperative, motivated but limited by B knee pain more than R hip pain s/p fracture with ORIF. He was very inconsistent in his responses in what he could do prior to this hospital stay, but it appears that he primarily gets around independently in his WC at baseline. He states he hasn't ambulated at all in ~2 years.   He also states that he's able to get up/down the 4 steps into his house with 1 HR if someone takes the [facility] up/down for him. After watching him and questioning him, it appears that all transfers are done with very flexed posture, as is his going down the stairs. He was unable to come to complete upright in the len stedy with max A+2, and in fact had to lead forward over the bar to clear his buttocks so that we could put the seat down, and get it back up. Tight knee flexors noted B.    Body structures, Functions, Activity limitations: Decreased functional mobility ; Decreased ROM; Decreased strength;Decreased safe awareness;Decreased endurance;Decreased balance; Increased pain;Decreased posture  Prognosis: Fair  Decision Making: High Complexity  PT Education: Goals;PT Role;Plan of Care  REQUIRES PT FOLLOW UP: Yes  Activity Tolerance  Activity Tolerance: Patient limited by pain       Patient Diagnosis(es): The primary encounter diagnosis was Hyponatremia. Diagnoses of Generalized weakness, Nephrolithiasis, Oxygen desaturation, Closed intertrochanteric fracture of hip, right, initial encounter ([facility] [address] Anxiety, and Narcolepsy due to underlying condition without cataplexy were also pertinent to this visit. has a past medical history of ADHD (attention deficit hyperactivity disorder), ADHD (attention deficit hyperactivity disorder), Atypical chest pain, Chronic bronchitis ([facility] [address] Hypertension, Hyponatremia, Meniere's disease, Narcolepsy, Nasal fracture, PND (post-nasal drip), SOB (shortness of breath), Tibia fracture, and Transaminasemia. has a past surgical history that includes cyst removal; Ankle surgery; knee surgery (Right); Coronary angioplasty with stent (N/A, 10/10/2015); Knee Arthroplasty (Right, 04/13/2021); hip surgery (Right, 03/08/2022); and Femur fracture surgery (Right, 3/8/2022).     Restrictions  Restrictions/Precautions  Restrictions/Precautions: Weight Bearing,General Precautions,Fall Risk,Up as Tolerated (high flow O2)  Required Braces or Orthoses?: No  Lower Extremity Weight Bearing Restrictions  Right Lower Extremity Weight Bearing: Weight Bearing As Tolerated  Vision/Hearing  Vision: Impaired  Vision Exceptions: Wears glasses for reading  Hearing: Exceptions to [facility]  Hearing Exceptions: Hard of hearing/hearing concerns     Subjective  General  Patient assessed for rehabilitation services?: Yes  Response To Previous Treatment: Not applicable  Family / Caregiver Present: No  Follows Commands: Within Functional Limits  Pain Screening  Patient Currently in Pain: Yes  Pain Assessment  Pain Level: 5 (pain increased to 10/10 after mobilizing with PT/OT)  Patient's Stated Pain Goal: No pain  Pain Type: Surgical pain;Chronic pain  Pain Location: Leg;Hip;Knee  Pain Orientation: Right  Vital Signs  Patient Currently in Pain: Yes  Pre Treatment Pain Screening  Intervention List: Patient able to continue with treatment    Orientation  Orientation  Overall Orientation Status: Within Functional Limits (pt oriented, but inconsistent when answering social history questions)  Social/Functional History  Social/Functional History  Lives With: Alone  Type of Home: House  Home Layout: One level  Home Access: Stairs to enter with rails  Entrance Stairs - Number of Steps: 3-4 with 1 HR  Home Equipment: Rolling walker,Wheelchair-manual  Receives Help From: Family,Friend(s)  ADL Assistance: Needs assistance  Ambulation Assistance:  (pt states he hasn't ambulated in ~2 years; he does a squat pivot transfer from bed to chair, etc)  Transfer Assistance: Independent  Mode of Transportation: Cab    Objective  PROM RLE (degrees)  RLE PROM: Exceptions--hip flexion ~90 degrees sitting; only ~50 degrees supine; hip IR to neutral, hip abduction to 30 degrees; knee flexion to 100 degrees; lacks 30 degrees from full extension; ankle WFL  PROM LLE (degrees)  LLE PROM: Exceptions--hip flexion to 90 degrees; hip abduction ~35 degrees; hip IR to neutral; knee flexion to 110 degrees, lacks 40 degrees from straight knee extension; ankle WFL  PROM RUE (degrees)  RUE PROM: WFL  PROM LUE (degrees)  LUE PROM: WFL  Strength RLE  Strength RLE: Exception--hip grossly 1/5; knee grossly 2+/5; ankle 3+/5  Strength LLE  Strength LLE: Exception--hip grossly 2+/5; knee grossly 3-/5; ankle 3+/5  Strength RUE  Strength RUE: Exception--grossly 3+/5 except triceps 3/5  Strength LUE  Strength LUE: Exception--grossly 3+/5 except triceps 3/5  Tone RLE  RLE Tone: Normotonic  Tone LLE  LLE Tone: Normotonic  Sensation  Overall Sensation Status: WFL (pt denies N/T)  Bed mobility  Rolling to Left: Maximum assistance;2 Person assistance  Rolling to Right: Maximum assistance;2 Person assistance  Supine to Sit: Maximum assistance;2 Person assistance  Sit to Supine: Maximum assistance;2 Person assistance  Scooting: Maximal assistance;2 Person assistance  Transfers  Sit to Stand: Unable to assess (pt unable to stand fully using len stedy and max A+2, but leaning forward over the bar he was able to clear the seat so we could put it under him and he could sit)  Pt unable to clear buttocks from bed without significantly elevating the height of the bed, and even fully elevated, he was unable to stand erect. Attempted standing x 1 from non-elevated bed, unable to clear buttocks from bed.     Stand to sit: Unable to assess (bending over len stedy \"pull up bar\" pt able to get buttocks off seat, then max A to lower to bed)  Bed to Chair: Unable to assess (unsafe to transfer pt bed to chair via len stedy, and very unlikely that he would be able to stand from low chair)  Stand Pivot Transfers: Unable to assess  Squat Pivot Transfers: Unable to assess    Ambulation  Ambulation?: No  Stairs/Curb  Stairs?: No     Balance  Posture: Poor  Sitting - Static: Fair  Sitting - Dynamic: Fair;-  Standing - Static: Poor  Standing - Dynamic: Poor  Other exercises  Other exercises 1: pt dangled EOB ~20 minutes with min A+2  Other exercises 2: AAROM BLEs x 5 reps: heel slides, SAQs, hip ab/adduction; AROM B ankle pumps x 10 reps  Other exercises 3: AROM BUEs x 5 reps     Plan   Plan  Times per week: 5 visits weekly  Times per day: Daily  Current Treatment Recommendations: Strengthening,ROM,Balance Training,Functional Mobility Training,Transfer Training,Endurance Training,Wheelchair Mobility Training,Gait Training,Pain Management,Home Exercise Program,Safety Education & Training,Patient/Caregiver Education & Training,Positioning  Safety Devices  Type of devices: Bed alarm in place,Call light within reach,Gait belt,Patient at risk for falls,Left in bed,Nurse notified (unsafe to transfer pt to [facility] chair; RN notified)  Restraints  Initially in place: No    AM-PAC Score  AM-PAC Inpatient Mobility Raw Score : 8 (03/10/22 [facility])  AM-PAC Inpatient T-Scale Score : 28.52 (03/10/22 1236)  Mobility Inpatient CMS 0-100% Score: 86.62 (03/10/22 [facility])  Mobility Inpatient CMS G-Code Modifier : CM (03/10/22 1236)          Goals  Short term goals  Time Frame for Short term goals: 12 visits  Short term goal 1: independent bed mobility  Short term goal 2: independent transfers, possibly via sliding board  Short term goal 3: progress to upright standing activities/gait with appropriate device and mod A+2 as pt tolerates  Short term goal 4: WC mobility x 25' independently  Patient Goals   Patient goals : be able to ambulate       Therapy Time   Individual Concurrent Group Co-treatment   Time In [facility] [address]         Time Out 1205         Minutes 70                 Atiya Donnelly, PT

## 2022-03-10 NOTE — PROGRESS NOTES
Progress Note for 3/9/22        Chief Complaint:          Chief Complaint   Patient presents with    Fatigue         History Obtained From:      Patient     History of Present Illness:      72-year-old male with cardiovascular history who presented with a right femur fracture requiring surgical intervention via ORIF. Patient had successful uncomplicated surgery for which there was a delay in extubation. Patient took a little extra time waking up due to sedation and was briefly placed on BiPAP for alveolar recruitment. Patient mentation improved, now breathing on 2 L nasal cannula. Patient reports pain only with movement but has not moved his leg much. Denies any shortness of breath, chest pain, palpitations, nausea, vomiting. Nurse at bedside reports that he had a bowel movement after coming out of the OR. Patient says he is ready to eat.   Denies any other symptoms at this time.     Medicine was consulted in regards to postoperative management overnight     Past Medical History:      Past Medical History        Past Medical History:   Diagnosis Date    ADHD (attention deficit hyperactivity disorder)      ADHD (attention deficit hyperactivity disorder)      Atypical chest pain      Chronic bronchitis (HCC)      Hypertension      Hyponatremia      Meniere's disease      Narcolepsy      Nasal fracture      PND (post-nasal drip) 4/21/2014    SOB (shortness of breath)      Tibia fracture       right tibial plateau fx, right syndesmotic fx    Transaminasemia 4/21/2014            Past Surgical History:      Past Surgical History         Past Surgical History:   Procedure Laterality Date    ANKLE SURGERY         open reduction internal fixation of the right ankle & tibial plateau fx    CORONARY ANGIOPLASTY WITH STENT PLACEMENT N/A 10/10/2015    CYST REMOVAL         right side of face    HIP SURGERY Right 03/08/2022     RIGHT TFN HIP  INSERTION ,OPEN REDUCTION INTERNAL FIXATION RIGHT HIP  C-ARM, SYNTHES, CABLES     KNEE ARTHROPLASTY Right 04/13/2021     DISTAL FEMUR REPLACEMENT performed by Jing Rios DO at [facility] Right       total knee            Medications Prior to Admission:      Home Medications           Prior to Admission medications    Medication Sig Start Date End Date Taking?  Authorizing Provider   amiodarone (CORDARONE) 200 MG tablet Take by mouth     Yes Historical Provider, MD   cyanocobalamin 1000 MCG tablet Take 1,000 mcg by mouth     Yes Historical Provider, MD   traMADol (ULTRAM) 50 MG tablet Take 50 mg by mouth.     Yes Historical Provider, MD   sodium chloride 1 g tablet Take 1 g by mouth     Yes Historical Provider, MD   vitamin C (ASCORBIC ACID) 500 MG tablet Take 500 mg by mouth     Yes Historical Provider, MD   methylphenidate (RITALIN) 10 MG tablet Take 10 mg by mouth.     Yes Historical Provider, MD   fluticasone propionate (FLOVENT) 50 MCG/BLIST AEPB inhaler Inhale into the lungs daily     Yes Historical Provider, MD   acetaminophen (TYLENOL) 500 MG tablet Take 1 tablet by mouth 4 times daily as needed for Pain 4/15/21     Roberto Bump, DO   Multiple Vitamins-Minerals (CERTAVITE SENIOR/ANTIOXIDANT) TABS TAKE 1 TAB BY MOUTH ONCE A DAY 6/22/16     Iris Lynn PA-C   carvedilol (COREG) 3.125 MG tablet TAKE 1 TAB BY MOUTH TWICE A DAY WITH MEALS 6/7/16     Iris Lynn PA-C   levothyroxine (SYNTHROID) 25 MCG tablet Take 1 tablet by mouth Daily  Patient taking differently: Take 50 mcg by mouth Daily  3/24/16     Deniz Alosa, DO   QUEtiapine (SEROQUEL) 25 MG tablet Take 1 tablet by mouth nightly 3/24/16     Deniz Alosa, DO   melatonin 3 MG TABS tablet Take 3 mg by mouth daily       Historical Provider, MD   atorvastatin (LIPITOR) 40 MG tablet Take 1 tablet by mouth nightly 11/10/15     Kaur Still MD   clopidogrel (PLAVIX) 75 MG tablet Take 1 tablet by mouth daily 11/10/15     Kaur Still MD   Magnesium Oxide 250 MG TABS Take 1 tablet by mouth daily  Patient taking differently: Take 500 mg by mouth daily  11/10/15     Shantelle Faria MD   meclizine (ANTIVERT) 25 MG tablet Take 0.5 tablets by mouth 3 times daily as needed 11/10/15     Shantelle Faria MD   furosemide (LASIX) 20 MG tablet Take 1 tablet by mouth daily 11/10/15     Shantelle Faria MD   [address] Adult diapers dispense quantity allowed by insurance 3/31/15     Bib Lynn PA-C   Diapers & Supplies MISC Wipes  dispense quantity allowed by insurance 3/31/15     Iris Lynn PA-C   PROAIR  (90 BASE) MCG/ACT inhaler inhale 2 puffs every 4 to 6 hours if needed 11/29/14     Iris Lynn PA-C   mometasone (NASONEX) 50 MCG/ACT nasal spray 2 sprays by Nasal route daily. 12/23/13     Kasey Douglas MD   docusate sodium (COLACE) 100 MG capsule Take 100 mg by mouth 2 times daily.         Historical Provider, MD   folic acid (FOLVITE) 1 MG tablet Take 1 mg by mouth daily.         Historical Provider, MD   chlorhexidine (PERIDEX) 0.12 % solution Take 15 mLs by mouth 2 times daily.         Historical Provider, MD   SALINE MIST SPRAY NA by Nasal route.         Historical Provider, MD   Multiple Vitamin (MULTIVITAMIN PO) Take  by mouth.         Historical Provider, MD            Allergies:      Motrin [ibuprofen micronized]     Social History:      Tobacco:    reports that he has been smoking cigarettes. He has a 38.00 pack-year smoking history. He has never used smokeless tobacco.  Alcohol:      reports current alcohol use of about 16.0 standard drinks of alcohol per week.   Drug Use:  reports no history of drug use.     Family History:      Family History         Family History   Problem Relation Age of Onset    Heart Disease Mother           heart attack    Heart Disease Father              Review of Systems:      Review of systems negative other than the above     Physical Exam:      /67   Pulse 60   Temp 97 °F (36.1 °C) (Oral)   Resp 13   Ht 5' 10\" (1.778 m)   Wt 220 lb 10.9 oz (100.1 kg)   SpO2 96%   BMI 31.66 kg/m²   Temp (24hrs), Av.1 °F (35.6 °C), Min:95.3 °F (35.2 °C), Max:98.8 °F (37.1 °C)     No results for input(s): POCGLU in the last 72 hours.     Intake/Output Summary (Last 24 hours) at 3/9/2022 0310  Last data filed at 3/9/2022 0200      Gross per 24 hour   Intake 1793 ml   Output 840 ml   Net 953 ml         General Appearance:  alert, well appearing, and in no acute distress  Mental status: oriented to person, place, and time with normal affect  Head:  normocephalic, atraumatic. Eye: no icterus, redness, pupils equal and reactive, extraocular eye movements intact, conjunctiva clear  Ear: normal external ear, no discharge, hearing intact  Nose:  no drainage noted  Mouth: mucous membranes moist  Neck: supple, no carotid bruits, thyroid not palpable  Lungs: Bilateral equal air entry, clear to ausculation, no wheezing, rales or rhonchi, normal effort  Cardiovascular: normal rate, regular rhythm, no murmur, gallop, rub. Abdomen: Soft, nontender, nondistended, hypoactive bowel sounds, no hepatomegaly or splenomegaly  Neurologic: There are no new focal motor or sensory deficits, normal muscle tone and bulk, no abnormal sensation, normal speech, cranial nerves II through XII grossly intact  Extremities: Symmetrical, nonedematous, normothermic, pulses palpable bilaterally. Right lateral aspect of the thigh has clear Tegaderm with small amount of clotted blood. No surrounding erythema, no discharge, no abnormal swelling.   Sensation intact        Investigations:       Laboratory Testing:  Recent Results         Recent Results (from the past 24 hour(s))   Basic Metabolic Panel     Collection Time: 22  4:21 AM   Result Value Ref Range     Glucose 102 (H) 70 - 99 mg/dL     BUN 11 8 - 23 mg/dL     CREATININE 0.78 0.70 - 1.20 mg/dL     Calcium 9.5 8.6 - 10.4 mg/dL     Sodium 134 (L) 135 - 144 mmol/L     Potassium 4.6 3.7 - 5.3 mmol/L     Chloride 102 98 - 107 mmol/L     CO2 21 20 - 31 mmol/L     Anion Gap 11 9 - 17 mmol/L     GFR Non-African American >60 >60 mL/min     GFR African American >60 >60 mL/min     GFR Comment          CBC     Collection Time: 03/08/22  4:21 AM   Result Value Ref Range     WBC 13.5 (H) 3.5 - 11.3 k/uL     RBC 3.50 (L) 4.21 - 5.77 m/uL     Hemoglobin 9.2 (L) 13.0 - 17.0 g/dL     Hematocrit 31.2 (L) 40.7 - 50.3 %     MCV 89.1 82.6 - 102.9 fL     MCH 26.3 25.2 - 33.5 pg     MCHC 29.5 28.4 - 34.8 g/dL     RDW 19.7 (H) 11.8 - 14.4 %     Platelets 651 495 - 767 k/uL     MPV 9.2 8.1 - 13.5 fL     NRBC Automated 0.0 0.0 per 100 WBC   SODIUM, URINE, RANDOM     Collection Time: 03/08/22 11:12 PM   Result Value Ref Range     Sodium,Ur 22 mmol/L   OSMOLALITY, URINE     Collection Time: 03/08/22 11:12 PM   Result Value Ref Range     Osmolality, Ur 337 80 - 1300 mOsm/kg            Imaging/Diagonstics:  XR CHEST (SINGLE VIEW FRONTAL)     Result Date: 3/8/2022  Mild bibasal infiltrates and small left pleural effusion suggesting pneumonia      XR HIP RIGHT (2-3 VIEWS)     Result Date: 3/6/2022  Comminuted intertrochanteric fracture right femoral neck No other acute bony or joint abnormality      XR FEMUR RIGHT (MIN 2 VIEWS)     Result Date: 3/8/2022  Postsurgical changes status post ventral effusion right hip and proximal femur fracture.      XR FEMUR RIGHT (MIN 2 VIEWS)     Result Date: 3/6/2022  Comminuted intertrochanteric fracture right femoral neck No other acute bony or joint abnormality      XR KNEE RIGHT (1-2 VIEWS)     Result Date: 3/6/2022  Comminuted intertrochanteric fracture right femoral neck No other acute bony or joint abnormality      XR ANKLE LEFT (MIN 3 VIEWS)     Result Date: 3/6/2022  No acute osseous or soft tissue abnormality.      XR FOOT LEFT (MIN 3 VIEWS)     Result Date: 3/6/2022  Irregularity of the 1st distal phalanx that may relate to an acute fracture and correlation with point tenderness is needed.      XR CHEST PORTABLE     Result Date: 3/5/2022  Marginal inspiration, without evidence of acute cardiopulmonary disease      CT CHEST PULMONARY EMBOLISM W CONTRAST     Result Date: 3/5/2022  1. No evidence of pulmonary embolism 2. Dependent atelectasis, right lung base 3. Diffuse emphysematous changes present throughout the lungs, with an upper lobe predominance 4. Extensive coronary arterial calcifications 5. Cholelithiasis, without evidence of cholecystitis 6. Nodular contour to the liver, suggesting cirrhosis      XR HIP 2-3 VW W PELVIS LEFT     Result Date: 3/6/2022  Comminuted intertrochanteric fracture of the right proximal femur.      XR HIP 2-3 VW W PELVIS RIGHT     Result Date: 3/8/2022  Postsurgical changes status post ventral effusion right hip and proximal femur fracture.         Assessment :       #Post Operative Management  -POD 0 s/p uncomplicated ORIF 2/2 Comminuted intertrochanteric fracture right femoral neck   -Minimal blood loss noted perioperatively  -Delay in extubation requing brief bipap use while sedation weaned off. Now off, on 2L NC, no resp distress, mind intermittent faint expiratory wheeze  -Currently AO3, no Nausea, had a Bowl movement after surgery, marshall in place but being removed, Neurovascularly intact. Reports pain is controlled.   -No drains in place. Wound dressing clean with minimal clotted blood seen through the clear Tegaderm.  -Received and completed empiric Abs     PLAN  -Remove marshall, monitor for urine retention, bladder scan if concern for retention. []  -Resume diet & DC maintaince IVF.  Continue Protonix as stress ulcer ppx []  -Monitor for postoperative fevers  -Prevent atelectasis via incentive spirometer, secretion control, consider placing CPAP back on for alveolar recruitment if needed, will administer breathing treatment given the faint wheeze[]  -Obtain postop labs, monitor on tele and document Qtc in EMR if possible  -As needed analgesia with stress ulcer prophylaxis  -Had BM x1, hypoactive BM, monitor for post op ileus if no flatulence of Bowl movement   -Discussed with primary team about when to resume home Plavix medication[]  -Monitor for any possible alcohol withdrawal unknown to the medical team  -PT/OT, wound care []        -VTE ppx was on lovenox--> Holding temporarily until primary team assess in morning. Ortho procedures may hold briefly or resume depending on procure and comorbidity.  Resume as per primary

## 2022-03-10 NOTE — PROGRESS NOTES
Orthopedic Progress Note    Patient:  Clifton Briones, 81 y.o. male  YOB: 1950       Subjective:  Patient seen and examined. No complaints or concerns. Pain controlled on current regimen. No issues overnight. Denies fever, HA, CP, SOB, N/V. Tolerating PO intake. Patient declined to work w/ PT yesterday, will work with today. Objective:   Vitals:    03/10/22 0415   BP: (!) 124/59   Pulse: 60   Resp: 11   Temp: 98.7 °F (37.1 °C)   SpO2: 95%     Gen: NAD, cooperative    Cardiovascular: Regular rate    Respiratory: Symmetric chest rise. No accessory muscle use    MSK  RLE: TTP to the lateral femur. Dressings were saturate and changed today with new optifoam. Incisions well approximated with no active drainage or signs of infection. Compartments are soft and compressible. EHL/FHL/TA/GS complex motor intact. Sural, saphenous, superificial/deep peroneal, and plantar nerve distribution SILT. Dorsalis pedis pulse 1+ with BCR. Recent Labs     03/09/22  0549 03/09/22  0549 03/09/22  1643   WBC 18.1*  --   --    HGB 8.0*  --   --    HCT 28.6*  --   --      --   --    *  --   --    K 4.8  --   --    BUN 13   < > 16   CREATININE 0.86   < > 1.01   GLUCOSE 138*  --   --     < > = values in this interval not displayed. Anticoag: Lovenox    Impression/Plan: 81 y.o. male who fell from wheelchair being seen for:    -Right IT fracture s/p R femur CMN insertion/ORIF, POD#2       -WB status: WBAT RLE  -Optifoam dressing changed at bedside this AM. Maintain dressings to the right femur.  Ok to reinforce as needed per nursing  -Pain control per primary team  -DVT ppx per primary  -Ice/Elevate prn  -Encourage Incentive Spirometry use  -PT/OT eval/treat  -Follow up with Dr. Aayush Mckeon in office 10-14 days after surgery  -Please page ortho with any questions    Electronically signed by Davie Aiken DO, on 3/10/2022 at 5:27 AM.

## 2022-03-10 NOTE — CARE COORDINATION
Karl Nolan Quality Flow/Interdisciplinary Rounds Progress Note    Quality Flow Rounds held on March 10, 2022 at [address] Attending:  Bedside Nurse, ,  and Nursing Unit Leadership    Barriers to Discharge: [facility], [facility] placement    Anticipated Discharge Date:       Anticipated Discharge Disposition: LTACH    Readmission Risk              Risk of Unplanned Readmission:  32           Discussed patient goal for the day, patient clinical progression, and barriers to discharge. The following Goal(s) of the Day/Commitment(s) have been identified:  Activity Progression  Transitional Care Plan, Choice - Obtain Post-Acute Preference(s) and Referral - Long Term Acute Care (LTAC)    Spoke with patient about LTACH choices. He states he does not have a strong preference but states to make a referral to [facility] and if they are unable to accept him he would like a referral to [address] [place] [address].  Referral placed to [facility], contacted Alpa Robles. He sates he will review clinicals and verify insurance and anticipates initiating Pre-Cert tomorrow. 1715 Received a call from [facility] with the Pearl Manley ([phone]) who works with the patient providing Aide service to him, requesting CB to discuss Transitional Care Plan.   Called above number and left  requesting CB      Tate Gray RN  March 10, 2022

## 2022-03-10 NOTE — PROGRESS NOTES
Upon changing pt's brief, R surgical wound dressing on leg was noted to has a brown and yellow draining. An amount that has made the dressing stop sticking and leaked onto chucks beneath pt. The dressing was reinforced with abd pads and tape.

## 2022-03-10 NOTE — PROGRESS NOTES
Pt was given a turkey sandwhich, water, and juice. Pt may have taken 3-4 bites before audibily coughing and choking, as I was a door down from his room. Pt continued to choke as I quickly approached him within the room. Pt was gasping for air and desating as I tried to sit him up in bed. The chewed up food was spit out by pt, and pt states last time this happened he \"busted his @$$\" and that I saved him. All food and drinks have been removed from pt, pt is asking for water. Admitting team was perfectserved, and stated he hold all solid until pt is evaluated in morning; along with observing how pt sips fluids.

## 2022-03-10 NOTE — PROGRESS NOTES
Comprehensive Nutrition Assessment    Type and Reason for Visit:  Reassess    Nutrition Recommendations/Plan:   -Add Ensure nutrition supplement to meal trays as previously ordered  -Continue to monitor/encourage adequate nutrition intake    Nutrition Assessment:  s/p ORIF Rt IT femur fracture. Has been tolerating diet, taking ~25-50% of meals. Malnutrition Assessment:  Malnutrition Status: At risk for malnutrition (Comment)    Context:  Acute Illness         Estimated Daily Nutrient Needs:  Energy (kcal):  4608-6623 kcal/d (22-25 kcal/kg); Weight Used for Energy Requirements:  Current     Protein (g):   gm pro/d (1.3-1.5gm/kg); Weight Used for Protein Requirements:  Ideal        Fluid (ml/day):  or per MD; Method Used for Fluid Requirements:  1 ml/kcal      Nutrition Related Findings:  labs/meds reviewed      Wounds:   (necrotic toes)       Current Nutrition Therapies:    ADULT DIET; Regular    Anthropometric Measures:  · Height: 5' 10\" (177.8 cm)  · Current Body Weight: 216 lb 11.4 oz (98.3 kg)     · Ideal Body Weight: 166 lbs; % Ideal Body Weight 108.4 %   · BMI: 31.1    · BMI Categories: Overweight (BMI 25.0-29. 9)       Nutrition Diagnosis:   · Inadequate oral intake related to other (comment) (decreased appetite reported) as evidenced by poor intake prior to admission      Nutrition Interventions:   Food and/or Nutrient Delivery:  Continue Current Diet,Start Oral Nutrition Supplement  Nutrition Education/Counseling:  No recommendation at this time   Coordination of Nutrition Care:  Continue to monitor while inpatient    Goals:  Meet 50% or greater of estimated nutrition needs       Nutrition Monitoring and Evaluation:   Behavioral-Environmental Outcomes:  None Identified   Food/Nutrient Intake Outcomes:  Food and Nutrient Intake,Supplement Intake  Physical Signs/Symptoms Outcomes:  Biochemical Data,Weight,Skin,Meal Time Behavior,Nutrition Focused Physical Findings     Discharge Planning: Continue Oral Nutrition Supplement,Continue current diet     Electronically signed by Gilson Cohen RD, JANENE on 3/10/22 at 3:43 PM EST    Contact: [phone]

## 2022-03-10 NOTE — PROGRESS NOTES
Observation of how pt sips fluids was successful. Will observe pt as he swallows evening medication.

## 2022-03-10 NOTE — PROGRESS NOTES
[facility]   Progress Note                   Date:   3/10/2022  Patient name: Ray Stanley  Date of admission:  3/5/2022  3:47 PM  MRN:   [id number]  YOB: 1950  PCP: Michele White DO    Reason for Admission: Hyponatremia [E87.1]  Nephrolithiasis [N20.0]  Generalized weakness [R53.1]  Oxygen desaturation [R09.02]    Subjective:       Clinical Changes / Abnormalities: Patient seen and examined in room after discussion with RN. Patient denies chest pain, SOB.   S/p RIGHT TFN HIP  INSERTION ,OPEN REDUCTION INTERNAL FIXATION RIGHT HIP  C-ARM, SYNTHES, CABLES   SR on tele HR 64      Medications:   Scheduled Meds:   guaiFENesin  1,200 mg Oral BID    pantoprazole  40 mg Oral QAM AC    chlorhexidine  15 mL Mouth/Throat BID    cyanocobalamin  1,000 mcg Oral Daily    docusate sodium  100 mg Oral BID    fluticasone  1 spray Each Nostril Daily    fluticasone  1 puff Inhalation BID    folic acid  1 mg Oral Daily    magnesium oxide  400 mg Oral Daily    sodium chloride  1 g Oral BID WC    vitamin C  500 mg Oral Daily    Armodafinil  150 mg Oral Daily    atorvastatin  40 mg Oral Nightly    carvedilol  3.125 mg Oral BID WC    [Held by provider] clopidogrel  75 mg Oral Daily    levothyroxine  50 mcg Oral Daily    therapeutic multivitamin-minerals  1 tablet Oral Daily    QUEtiapine  25 mg Oral Nightly    thiamine  100 mg Oral Daily    sodium chloride flush  5-40 mL IntraVENous 2 times per day    enoxaparin  40 mg SubCUTAneous Daily     Continuous Infusions:   sodium chloride 100 mL/hr at 03/09/22 2006    sodium chloride       CBC:   Recent Labs     03/08/22 0421 03/09/22  0549   WBC 13.5* 18.1*   HGB 9.2* 8.0*    273     BMP:    Recent Labs     03/08/22  0421 03/08/22  0421 03/09/22  0549 03/09/22  1643 03/10/22  0430   *  --  132*  --  135   K 4.6  --  4.8  --  4.0     --  102  --  105   CO2 21  --  19*  --  20   BUN 11   < > 13 16 15   CREATININE 0.78   < > 0. 86 1.01 0.82   GLUCOSE 102*  --  138*  --  107*    < > = values in this interval not displayed. Hepatic:   No results for input(s): AST, ALT, ALB, BILITOT, ALKPHOS in the last 72 hours. Troponin:   No results for input(s): TROPHS in the last 72 hours. BNP: No results for input(s): BNP in the last 72 hours. Lipids: No results for input(s): CHOL, HDL in the last 72 hours. Invalid input(s): LDLCALCU  INR:   No results for input(s): INR in the last 72 hours. EKG: junctional rhythm in 70s, no acute ischemic changes     STRESS 4/12/2021: No ischemia/infarct. EF 85%.      ECHO 2/16/16: EF 50%, mild TR.      CATH 11/9/15: PTCA/BMS to OM and RCA     ICD 11/2/15: [employer] device implanted by Dr. Daisy Sarmiento for ICM.    CATH 10/30/15: MVD. EF 25%.   Objective:   Vitals: BP (!) 124/59   Pulse 60   Temp 98.7 °F (37.1 °C) (Oral)   Resp 15   Ht 5' 10\" (1.778 m)   Wt 216 lb 11.4 oz (98.3 kg)   SpO2 97%   BMI 31.09 kg/m²   General appearance: alert and cooperative with exam  HEENT: Head: Normocephalic, no lesions, without obvious abnormality. Neck: no JVD, trachea midline, no adenopathy  Lungs: Clear to auscultation  Heart: Regular rate and rhythm, s1/s2 auscultated, no murmurs  Abdomen: soft, non-tender, bowel sounds active  Extremities: no edema  Neurologic: not done        Assessment / Acute Cardiac Problems:   1. Right femur intertrochanteric fracture  2. MV CAD s/p CABG  3. PAF  4. Accelerated junctional rhythm  5. HFrEF  6.  Hx of VF arrest s/p AICD      Patient Active Problem List:     Meniere's disease     ADHD (attention deficit hyperactivity disorder)     Narcolepsy     Hyponatremia     PND (post-nasal drip)     Transaminasemia     Cardiomyopathy ([address]     HTN (hypertension)     Chronic obstructive pulmonary disease (HCC)     Dementia with behavioral disturbance ([address]     Status post insertion of percutaneous endoscopic gastrostomy (PEG) tube ([address]     S/p bare metal coronary artery stent Supracondylar fracture of distal end of femur without intracondylar extension, sequela     Closed fracture of right distal femur (HCC)     Fracture of femoral neck, right, closed ([address]      Plan of Treatment:   1. Plan for OP follow up with Device clinic and with NP.    2. No further workup from cards standpoint. 3. Cardiology will sign off. With follow up appts scheduled.      Electronically signed by TIFFANIE Chung NP on 3/10/2022 at [facility].  [phone]

## 2022-03-10 NOTE — PROGRESS NOTES
Occupational Therapy   Occupational Therapy Initial Assessment  Date: 3/10/2022   Patient Name: Delphine Whitehead  MRN: [id number]     : 1950     Copied from chart: The patient is a 81 y.o.  male who currently is  in the inpatient unit at [address] [place] [address]. David's after a fall from his wheelchair, now complains about right hip pain. According to the patient and charting, the patient presented to the [facility] emergency department on 3/5/2022 after falling from his wheelchair onto his bottom. Patient states that this the second time this happened in the past few days. Patient is currently wheelchair dependent and states that he is not ambulated without assistance of roughly 5 years. Patient states that he usually transfers himself without any difficulty, but after the fall he was unable to move his right leg due to the significant bout of pain. Patient is currently admitted to the [facility] inpatient unit for hyponatremia. Patient states that any movement or touching the hip causes significant pain. Patient is a past orthopedic surgery history of a distal femoral replacement by Dr. Royer Goldstein  For a periprosthetic fracture on 2021 after right total knee was done by Dr Niles Mazariegos in 2013. Patient is currently on Lovenox and clopidogrel for anticoagulation. Past medical history of the patient includes CHF, defibrillator placement, COPD, hypertension, malnutrition. Patient endorses dysesthesias to the right lower extremity, specifically at the knee and foot, but is at baseline. Date of Service: 3/10/2022    Discharge Recommendations:  Patient would benefit from continued therapy after discharge Patient is currently unsafe to return to prior living arrangements without 24 hour assistance. OT Equipment Recommendations  Equipment Needed: Yes Equipment recommendations listed below are based on what the patient would need if they were able to return to prior living arrangements at the time of discharge.    Mobility Devices: ADL Assistive Devices  ADL Assistive Devices: Transfer Tub Bench;Long-handled Shoe Horn;Long-handled Sponge;Sock-Aid Hard;Reacher    Assessment   Performance deficits / Impairments: Decreased functional mobility ; Decreased ADL status; Decreased strength;Decreased cognition;Decreased endurance;Decreased high-level IADLs;Decreased balance;Decreased posture  Assessment: Pt agreeable to OT eval this date. Pt participated in bed mobility however requiring Max A X2 with significant effort and increased time for supine <> sit. Pt completed static and dynamic sitting tasks with CGA while EOB this date. Pt currently requires Mod-Max A for LB ADLs and toileting tasks d/t poor functional reach and BLE pain. Pt completed functional transfers with Max A X2 requiring 2 attempts to achieve full enough stand for Margorie Inches pads to be placed under buttocks for proper repositioning in bed. Pt will benefit from continued OT services to address deficits in strength, cognition, and balance for improved functional independence for ADL/IADL and functional transfer/mobility participation  Prognosis: Good  Decision Making: High Complexity  Patient Education: Pt ed on OT role, OT POC, safety awareness, bed mobility training, DME use, fall prevention, transfer training, and importance of continued OT. Pt verbalized good understanding  REQUIRES OT FOLLOW UP: Yes  Activity Tolerance  Activity Tolerance: Patient Tolerated treatment well;Patient limited by pain  Safety Devices  Safety Devices in place: Yes  Type of devices: Nurse notified; Left in bed;Gait belt;Call light within reach; Bed alarm in place  Restraints  Initially in place: No           Patient Diagnosis(es): The primary encounter diagnosis was Hyponatremia.  Diagnoses of Generalized weakness, Nephrolithiasis, Oxygen desaturation, Closed intertrochanteric fracture of hip, right, initial encounter ([facility] [facility] Anxiety, and Narcolepsy due to underlying condition without cataplexy were also pertinent to this visit. has a past medical history of ADHD (attention deficit hyperactivity disorder), ADHD (attention deficit hyperactivity disorder), Atypical chest pain, Chronic bronchitis ([address] Hypertension, Hyponatremia, Meniere's disease, Narcolepsy, Nasal fracture, PND (post-nasal drip), SOB (shortness of breath), Tibia fracture, and Transaminasemia. has a past surgical history that includes cyst removal; Ankle surgery; knee surgery (Right); Coronary angioplasty with stent (N/A, 10/10/2015); Knee Arthroplasty (Right, 04/13/2021); hip surgery (Right, 03/08/2022); and Femur fracture surgery (Right, 3/8/2022). Restrictions  Restrictions/Precautions  Restrictions/Precautions: Fall Risk,Weight Bearing  Required Braces or Orthoses?: No  Lower Extremity Weight Bearing Restrictions  Right Lower Extremity Weight Bearing: Weight Bearing As Tolerated  Position Activity Restriction  Other position/activity restrictions: up with assistance; HiFlo O2; s/p R femur CMN insertion/ORIF 03/08/2022    Subjective   General  Patient assessed for rehabilitation services?: Yes  Family / Caregiver Present: No  General Comment  Comments: RN ok'd pt for OT eval this date. Pt agreeable to session and pleasant/cooperative throughout  Patient Currently in Pain: Yes  Pain Assessment  Pain Assessment: 0-10  Pain Level: 5 (5 initially at rest, increased to 10/10 with activity)  Pain Type: Acute pain;Surgical pain  Pain Location: Leg;Knee;Hip  Pain Orientation: Right  Pain Descriptors: Discomfort  Functional Pain Assessment: Prevents or interferes some active activities and ADLs  Non-Pharmaceutical Pain Intervention(s): Ambulation/Increased Activity; Distraction;Repositioned; Therapeutic presence  Response to Pain Intervention: Patient Satisfied  Pre Treatment Pain Screening  Intervention List: Patient able to continue with treatment  Vital Signs  Patient Currently in Pain: Yes     Social/Functional History  Social/Functional poor functional reach and BLE pain)  Toileting: Maximum assistance;Setup; Increased time to complete  Tone RUE  RUE Tone: Normotonic  Tone LUE  LUE Tone: Normotonic  Coordination  Movements Are Fluid And Coordinated: Yes    Bed mobility  Rolling to Left: Maximum assistance  Rolling to Right: Maximum assistance  Supine to Sit: Maximum assistance;2 Person assistance  Sit to Supine: Maximum assistance;2 Person assistance  Scooting: Maximal assistance  Comment: Pt requires Max A X2 for BLE and trunk progression; significant increased time and effort noted to complete manuevers. Pt occasionally yelling out in pain  Transfers  Sit to stand: Maximum assistance;2 Person assistance  Stand to sit: Maximum assistance;2 Person assistance  Transfer Comments: pt required 2 attempts to achieve partial stand with enough hip extension in Brenda Krishna for pads to be placed under buttocks. Pt requires significant momentum build up and count down prior to attempt at transfer    Cognition  Overall Cognitive Status: Exceptions  Following Commands: Follows multistep commands with increased time; Follows multistep commands with repitition  Problem Solving: Assistance required to correct errors made  Initiation: Requires cues for some    Sensation  Overall Sensation Status: WFL (pt denies numbness/tingling at this time)        LUE AROM (degrees)  LUE AROM : WFL  Left Hand AROM (degrees)  Left Hand AROM: WFL  RUE AROM (degrees)  RUE AROM : WFL  Right Hand AROM (degrees)  Right Hand AROM: WFL  LUE Strength  Gross LUE Strength: Exceptions to [facility]  L Shoulder Flex: 4-/5  L Shoulder Ext: 4/5  L Elbow Flex: 4/5  L Elbow Ext: 4-/5  L Hand General: 4/5  RUE Strength  Gross RUE Strength: Exceptions to [facility]  R Shoulder Flex: 4-/5  R Shoulder Ext: 4-/5  R Elbow Flex: 4-/5  R Elbow Ext: 4-/5  R Hand General: 4-/5     Hand Dominance  Hand Dominance: Left             Plan   Plan  Times per week: 3-5 x/wk  Current Treatment Recommendations: Strengthening,Balance Training,Functional Mobility Training,Endurance Training,Safety Education & Training,Self-Care / Agata Kelley Reorientation,Equipment Evaluation, Education, & procurement,Patient/Caregiver Education & Training    AM-PAC Score        AM-PAC Inpatient Daily Activity Raw Score: 17 (03/10/22 1415)  AM-PAC Inpatient ADL T-Scale Score : 37.26 (03/10/22 1415)  ADL Inpatient CMS 0-100% Score: 50.11 (03/10/22 1415)  ADL Inpatient CMS G-Code Modifier : CK (03/10/22 1415)    Goals  Short term goals  Time Frame for Short term goals: By discharge, pt will:  Short term goal 1: Demo Min A for bed mobility maneuvers to increase independence with ADLs/IADLs  Short term goal 2: Demo Mod A for functional transfers with use of LRD for improved participation in ADLs such as toileting  Short term goal 3: Demo SBA for UB ADLs while seated unsupported with AE use PRN  Short term goal 4: Demo Min A for LB ADLs while seated unsupported with AE use PRN  Short term goal 5: Demo 15+ min dynamic sitting and reaching outside KIRSTEN in all planes with SUP for improved sitting balance for performance of ADLs/IADLs       Therapy Time   Individual Concurrent Group Co-treatment   Time In 1101         Time Out 1134         Minutes 33         Timed Code Treatment Minutes: 23 Minutes       Radha Gibson OTR/L

## 2022-03-10 NOTE — DISCHARGE INSTR - COC
*Physical Exam





- Vital Signs


 Last Vital Signs











Temp Pulse Resp BP Pulse Ox


 


 100.4 F H  101 H  16   134/81   100 


 


 02/19/19 16:25  02/19/19 16:25  02/19/19 16:25  02/19/19 16:25  02/19/19 16:25














- Physical Exam


General Appearance: Yes: Nourished, Thin


HEENT: positive: Normal Voice, Hearing Grossly Normal


Neck: positive: Trachea midline, Supple


Respiratory/Chest: positive: Lungs Clear, Normal Breath Sounds


Gastrointestinal/Abdominal: positive: Normal Bowel Sounds, Soft


Male Genitalia: positive: normal genitalia.  negative: testicular tenderness, 

epididymus tender


Extremity: positive: Normal Capillary Refill, Normal Inspection


Integumentary: positive: Normal Color, Dry, Warm


Neurologic: positive: Fully Oriented, Alert





ED Treatment Course





- LABORATORY


CBC & Chemistry Diagram: 


 02/19/19 18:27





 02/19/19 18:27





- ADDITIONAL ORDERS


Additional order review: 


 











  02/19/19





  18:27


 


RBC  5.16


 


MCV  81.8


 


MCHC  35.9


 


RDW  13.9


 


MPV  7.6


 


Neutrophils %  87.3 H


 


Lymphocytes %  8.1


 


Monocytes %  4.2


 


Eosinophils %  0.1


 


Basophils %  0.3














Medical Decision Making





- Medical Decision Making





02/19/19 19:06


Patient signed out by Dr. Llanes (Resident) under the care of Dr. Burns (

Attending)





28 year old male with scrotal pain. Febrile (100.4), other VS unremarkable.  H/

o multiple evaluations, most recently 02/17/19 for similar pain at our ED.  H/o 

varicolectomy as teenager.  Also evaluation yesterday at [facility] for similar pain 

with negative U/S.  S/p Tylenol.  Testicular U/S pending to r/o torsion/mass.  

  





02/19/19 19:07


Will give Toradol for pain control





02/19/19 20:06


Testicular U/S negative for torsion/mass


Patient reassessed @ bedside


VSS, now afebrile


Continues to c/o pain.  Mother and patient separately request Percocet until 

patient is able to be evaluated by pain management. Counseled that he requires 

long term pain management.  Appointment w/pain specialist scheduled for next 

Tuesday (02/26/19) patient advised to seek earlier appointment and given 

additional referral to another pain management doctor.


Counseled to keep spinal MRI appointment previously scheduled for tomorrow. 


Patient discharged home with mother, pain referral and return precautions.





Clinical Impression: Testicular pain, Viral Syndrome








I discussed the physical exam findings, ancillary test results and final 

diagnoses with the patient. I answered all of the patient's questions. The 

patient was satisfied with the care received and felt comfortable with the 

discharge plan and treatment plan. The patient will return to the Emergency 

Department with any new, persistent or worsening symptoms.








*DC/Admit/Observation/Transfer


Diagnosis at time of Disposition: 


 Testicular pain








- Discharge Dispostion


Disposition: HOME


Condition at time of disposition: Good


Decision to Admit order: No





- Referrals


Referrals: 


Sy Smallwood MD [Primary Care Provider] - 


Wilbert Moreno MD [Staff Physician] - 





- Patient Instructions


Additional Instructions: 


Your testicular ultrasound as well as a test of your urine showed no concerning 

findings.





At this time you are safe for discharge home.





Monitor your temperature and take Tylenol (up to 4000 mg daily) alternating 

with Motrin (up to 3200 mg daily) for fevers and for pain.





At this time you are safe for discharge home. 





Please keep your previously scheduled appointment for an MRI tomorrow.





We have provided a referral to a pain specialist for further evaluation of your 

pain.





Return to the Emergency Department for any new/worsening/concerning symptoms.





- Post Discharge Activity Continuity of Care Form    Patient Name: Ana Franco   :  1950  MRN:  [id number]    Admit date:  3/5/2022  Discharge date:  2022    Code Status Order: Full Code   Advance Directives:   Advance Care Flowsheet Documentation       Date/Time Healthcare Directive Type of Healthcare Directive Copy in [address] Agent's Name Healthcare Agent's Phone Number    22 1355 No, patient does not have an advance directive for healthcare treatment -- -- -- -- --            Admitting Physician:  Esther Renteria DO  PCP: Malcolm Dixon DO    Discharging Nurse: [facility] Unit/Room#: 2053/7568-64  Discharging Unit Phone Number: [phone]    Emergency Contact:   Extended Emergency Contact Information  Primary Emergency Contact: [address] Phone: [phone]  Relation: Other  Secondary Emergency Contact: Krzysztof Aguilar  Relation: Child    Past Surgical History:  Past Surgical History:   Procedure Laterality Date    ANKLE SURGERY      open reduction internal fixation of the right ankle & tibial plateau fx    CORONARY ANGIOPLASTY WITH STENT PLACEMENT N/A 10/10/2015    CYST REMOVAL      right side of face    HIP SURGERY Right 2022    RIGHT TFN HIP  INSERTION ,OPEN REDUCTION INTERNAL FIXATION RIGHT HIP  C-ARM, SYNTHES, CABLES     KNEE ARTHROPLASTY Right 2021    DISTAL FEMUR REPLACEMENT performed by Grisel Lockwood DO at [place] [address] Right     total knee       Immunization History:   Immunization History   Administered Date(s) Administered    Influenza Virus Vaccine 2014, 10/15/2015    Pneumococcal Conjugate 13-valent (Anthony Aver) 2016    Tdap (Boostrix, Adacel) 2020       Active Problems:  Patient Active Problem List   Diagnosis Code    Meniere's disease H81.09    ADHD (attention deficit hyperactivity disorder) F90.9    Narcolepsy G47.419    Hyponatremia E87.1    PND (post-nasal drip) R09.82 Transaminasemia R74.01    Cardiomyopathy ([facility] I42.9    HTN (hypertension) I10    Chronic obstructive pulmonary disease ([facility]) J44.9    Dementia with behavioral disturbance ([facility]) F03.91    Status post insertion of percutaneous endoscopic gastrostomy (PEG) tube ([facility] Z93.1    S/p bare metal coronary artery stent Z95.5    Supracondylar fracture of distal end of femur without intracondylar extension, sequela S72.453S    Closed fracture of right distal femur ([facility]) S72.401A    Fracture of femoral neck, right, closed ([facility] S72.001A    Accelerated junctional rhythm I49.8    Presence of permanent cardiac pacemaker Z95.0    Anemia, normocytic normochromic D64.9    Closed intertrochanteric fracture of hip, right, initial encounter ([facility] S72.141A    Acute respiratory failure with hypoxemia ([facility]) J96.01    Atelectasis J98.11       Isolation/Infection:   Isolation            No Isolation          Patient Infection Status       Infection Onset Added Last Indicated Last Indicated By Review Planned Expiration Resolved Resolved By    None active    Resolved    COVID-19 (Rule Out) 03/05/22 03/05/22 03/05/22 COVID-19, Rapid (Ordered)   03/05/22 Rule-Out Test Resulted            Nurse Assessment:  Last Vital Signs: BP (!) 124/59   Pulse 60   Temp 98.7 °F (37.1 °C) (Oral)   Resp 15   Ht 5' 10\" (1.778 m)   Wt 216 lb 11.4 oz (98.3 kg)   SpO2 97%   BMI 31.09 kg/m²     Last documented pain score (0-10 scale): Pain Level: 5  Last Weight:   Wt Readings from Last 1 Encounters:   03/10/22 216 lb 11.4 oz (98.3 kg)     Mental Status:  oriented, alert, coherent, logical, thought processes intact, and able to concentrate and follow conversation    IV Access:  - None    Nursing Mobility/ADLs:  Walking   Dependent  Transfer  Dependent  Bathing  Dependent  Dressing  Dependent  Toileting  Dependent  Feeding  Independent  Med Admin  Independent  Med Delivery   whole    Wound Care Documentation and Therapy:  Pressure Ulcer 10/14/15 Chest Right;Lateral;Distal;Anterior (Active)   Number of days: 2339       Incision 11/02/15 Chest Left (Active)   Number of days: 2320       Puncture 11/09/15 Groin Right (Active)   Number of days: 2312       Wound 03/24/20 Buttocks Left (Active)   Number of days: 715       Wound 03/24/20 Thigh Right;Lateral (Active)   Number of days: 715       Wound 03/24/20 Knee Anterior;Right (Active)   Number of days: 715       Wound 03/25/20 Arm Right (Active)   Number of days: 715       Wound 03/25/20 Toe (Comment  which one) Right #1 (Active)   Number of days: 715       Wound 03/25/20 Toe (Comment  which one) Left #1 (Active)   Number of days: 715       Wound 03/25/20 Head Mid;Left (Active)   Number of days: 715        Elimination:  Continence: Bowel: No  Bladder: Yes  Urinary Catheter: None   Colostomy/Ileostomy/Ileal Conduit: No       Date of Last BM: 3/14/2022    Intake/Output Summary (Last 24 hours) at 3/10/2022 1006  Last data filed at 3/10/2022 0857  Gross per 24 hour   Intake 2304.19 ml   Output 300 ml   Net 2004.19 ml     I/O last 3 completed shifts: In: 2551.7 [P.O.:320; I.V.:1898.8; IV Piggyback:332.9]  Out: 620 [Urine:620]    Safety Concerns:     History of Falls (last 30 days) and At Risk for Falls    Impairments/Disabilities:      Vision and Hearing    Nutrition Therapy:  Current Nutrition Therapy:   - Oral Diet:  General    Routes of Feeding: Oral  Liquids: No Restrictions  Daily Fluid Restriction: no  Last Modified Barium Swallow with Video (Video Swallowing Test): not done    Treatments at the Time of Hospital Discharge:   Respiratory Treatments: NA  Oxygen Therapy:  is on oxygen at 3 L/min per nasal cannula.   Ventilator:    - CPAP   only when sleeping    Rehab Therapies: Physical Therapy and Occupational Therapy  Weight Bearing Status/Restrictions: Non-weight bearing on right leg  Other Medical Equipment (for information only, NOT a DME order):  wheelchair, bedside commode, and hospital bed  Other Treatments: na    Patient's personal belongings (please select all that are sent with patient):  Glasses    RN SIGNATURE:  Electronically signed by Christa Catherine RN on 3/14/22 at 4:21 PM EDT    CASE MANAGEMENT/SOCIAL WORK SECTION    Inpatient Status Date: ***    Readmission Risk Assessment Score:  Readmission Risk              Risk of Unplanned Readmission:  31           Discharging to Facility/ Agency   Name:   [address] [facility] Details  [address] [place], [address] [address]       Phone: [phone]       Fax: [phone]          / signature: Electronically signed by Dennis Yi RN on 3/14/22 at 3:00 PM EDT    PHYSICIAN SECTION    Prognosis: Fair    Condition at Discharge: Stable    Rehab Potential (if transferring to Rehab): Good    Recommended Labs or Other Treatments After Discharge:   Ortho eval & f/u Dr Mindy Partida 10-14 days  Pain management  Minimize opioids    Respiratory Therapy and Bronchodilators prn  DuoNeb  Incentive spirometry  Cardiology eval & f/u as scheduled   Correct electrolyte abnormalities  Blood Pressure - Monitor and control  PT/OT  Respiratory Therapy and Bronchodilators prn   Fall precautions  Palliative care consult  DVT prophylaxis  Follow-up with PCP in one week, Diego Yee DO    Physician Certification: I certify the above information and transfer of Duke Cheney  is necessary for the continuing treatment of the diagnosis listed and that he requires [facility] for less 30 days.      Update Admission H&P:   Principal Problem:    Fracture of femoral neck, right, closed ([address]  Active Problems:    Hyponatremia    Meniere's disease    Narcolepsy    Cardiomyopathy ([address]    HTN (hypertension)    Chronic obstructive pulmonary disease (HCC)    Dementia with behavioral disturbance (HCC)    S/p bare metal coronary artery stent    Accelerated junctional rhythm    Presence of permanent cardiac pacemaker    Anemia, normocytic normochromic

## 2022-03-10 NOTE — PROGRESS NOTES
[facility]  Office: [address], DO, Lauren Silvestre, DO, Amelie Fine, DO, [place] Boxer Blood, DO, Kesha Shahid MD, Emre Carter MD, Hermilo Reeves MD, Dodie Tam MD, Sue Joe MD, Courtney Cameron MD, Naila Yusuf MD, Cara Leung, DO, Gladys Rivera, DO, Lavonne Alexandra MD,  Alexx Hyde, DO, Kiersten Garza MD, Abner Tay MD, Maurisio Small MD, Lo Chamberlain MD, Daleen Lennox, MD, Kateryna Ram, CNP, Kavon Reyes, CNP, Dang Ellington, CNP, Alexander Chew, CNS, Amilcar Ag, CNP, Caroline Kate, CNP, Daniel Ragland, CNP, Zita Cruz, CNP, Hildy Shone, [facility], [facility] PA-C, Charity Palomares, [facility], Estee Olmos [facility], Esa Herrera, CNP, Kingsley Rosenberg, CNP, Norma Shore, CNP, Marquis Boyer, CNP, Brent Contreras, CNP, Gregoria Garces, [address] [facility]    Progress Note    3/10/2022    10:11 AM    Name:   Alfonzo Kendrick  MRN:     [id number]     Acct:      [de-identified]   Room:   [address] [place] [address] Day:  5  Admit Date:  3/5/2022  3:47 PM    PCP:   Bridgette Aguirre DO  Code Status:  Full Code    Subjective:     C/C:   Chief Complaint   Patient presents with    Fatigue     Interval History Status: The patient continues to improve  Pain controlled  Appetite increased  No cough  No shortness of breath  Has not ambulated postop    Data Base Updates:  Pro-BNP1,271 High      Rewsqbw078 High mg/dL     JWJ42jn/dL   CREATININE0.82mg/dL     Calcium8. 3 Low        Brief History:     As documented in the medical record:  \"\" 53-year-old wheelchair-bound  male with underlying HFrEF, Severe knee OA, narcolepsy who presents to the hospital with concern for generalized weakness. Patient reports since his knee replacement surgery a few years ago he was never able to ambulate at baseline requiring him to be overall wheelchair-bound. He says over the past month his appetite's been poor and he feels he is becoming weaker.   He says every time he goes from the wheelchair to his bed he struggles to lift himself over and ends up falling. He denies any loss of consciousness, head trauma, joint swelling, shortness of breath, palpitations or dizziness. He does not report any weight loss, night sweats and says over the past few years his appetite is not like it used to be. He pays out-of-pocket for home aide to come and cook for him, he reports he has access to food and can manage himself but just does not have energy to do so. Patient reports compliance to all his meds. Says he needs physical therapy but does not want placement in a nursing home. Patient denies any other symptoms at this time. \"    The intitial assessment and plan included:  \"  Hospital Problems            Last Modified POA     * (Principal) Hyponatremia 3/5/2022 Yes     Narcolepsy 3/6/2022 Yes     Cardiomyopathy ([address] 3/6/2022 Yes     HTN (hypertension) 3/6/2022 Yes     Chronic obstructive pulmonary disease ([address] 3/6/2022 Yes     Dementia with behavioral disturbance ([address] 3/6/2022 Yes     Fracture of femoral neck, right, closed ([address] 3/6/2022 Yes           Plan:   1. Hyponatremia- patient appears dry, will resume IVF 0.9% NS at 50 ml/hr, monitor sodium tomorrow. He had diarrhea for days before presentation  2. Right femoral neck fracture- on Xrays, change Norco to Percocet, consulted Ortho, appreciate Cardiac risk stratification. NPO for OR today, negative LE dopplers  3. CMP- EF 20% on previous Echo, appreciate Cardiology consult, AICD interrogation, hold Lasix for now, recent stress test 4/2021 normal, repeat Echo- poor images, but EF improved 68%, intermediate risk for surgery  4. COPD- stable, oxygen prn, Dubonebs prn, resume Mucinex  5. HTN- BP stable  6. Narcolepsy- Nuvigil  7. Meniere's disease- resume Antivert and Ativan 1mg po q 8 hrs prn  8. Dementia- pt unable to care for self, needs SNF  9. Gi/ DVT proph  10.  Will need PT/OT\"     Additional database has included:  Echocardiogram:  Summary  Technically very challenging, there is poor endocardial definition, cannot  comment on wall motion analysis, within above limitations:  Left ventricle is normal in size. Global left ventricular systolic function  is normal. Calculated ejection fraction 68% by Rashid's method. Valves not well visualized. No obvious significant valvular regurgitation or stenosis seen. Results for Serena Raines (MRN [id number]) as of 3/8/2022 15:49   Ref. Range 3/5/2022 23:18 3/6/2022 04:32 3/6/2022 14:11 3/7/2022 03:32 3/8/2022 04:21   Sodium Latest Ref Range: 135 - 144 mmol/L 127 (L) 129 (L) 129 (L) 131 (L) 134 (L)       Past Medical History:   has a past medical history of ADHD (attention deficit hyperactivity disorder), ADHD (attention deficit hyperactivity disorder), Atypical chest pain, Chronic bronchitis ([address] Hypertension, Hyponatremia, Meniere's disease, Narcolepsy, Nasal fracture, PND (post-nasal drip), SOB (shortness of breath), Tibia fracture, and Transaminasemia. Social History:   reports that he has been smoking cigarettes. He has a 38.00 pack-year smoking history. He has never used smokeless tobacco. He reports current alcohol use of about 16.0 standard drinks of alcohol per week. He reports that he does not use drugs. Family History:   Family History   Problem Relation Age of Onset    Heart Disease Mother         heart attack    Heart Disease Father        Review of Systems:     Review of Systems   Constitutional: Positive for activity change (Diminished). Respiratory: Negative for cough, shortness of breath and wheezing. Cardiovascular: Negative for chest pain and palpitations. Gastrointestinal: Negative for abdominal pain, nausea and vomiting. Genitourinary: Negative for flank pain and hematuria. Musculoskeletal: Positive for arthralgias, gait problem (Unsteady, patient reports frequent falls) and myalgias.         His right hip is still stiff and sore   Neurological: Negative for dizziness (He has been taking Mysoline for his dizziness?). Psychiatric/Behavioral: Positive for confusion (He acknowledges that he is still confused). Physical Examination:        Vitals  BP (!) 124/59   Pulse 60   Temp 98.7 °F (37.1 °C) (Oral)   Resp 15   Ht 5' 10\" (1.778 m)   Wt 216 lb 11.4 oz (98.3 kg)   SpO2 97%   BMI 31.09 kg/m²   Temp (24hrs), Av.7 °F (37.1 °C), Min:98.1 °F (36.7 °C), Max:99.1 °F (37.3 °C)    No results for input(s): POCGLU in the last 72 hours. Physical Exam  Vitals reviewed. Constitutional:       General: He is not in acute distress. Appearance: He is not diaphoretic. HENT:      Head: Normocephalic. Nose: Nose normal.   Eyes:      General: No scleral icterus. Conjunctiva/sclera: Conjunctivae normal.   Neck:      Trachea: No tracheal deviation. Cardiovascular:      Rate and Rhythm: Normal rate and regular rhythm. Pulmonary:      Effort: Pulmonary effort is normal. No respiratory distress. Breath sounds: Normal breath sounds. No wheezing or rales. Chest:      Chest wall: No tenderness. Abdominal:      General: There is no distension. Palpations: Abdomen is soft. Tenderness: There is no abdominal tenderness. Musculoskeletal:         General: Tenderness (Incisional) and deformity ([place]amm[place]) present. Cervical back: Neck supple. Comments: Right leg is somewhat externally rotated and foreshortened   Skin:     General: Skin is warm and dry. Findings: Rash (Onychomycosis) present. Comments: Wound is dressed, dry and clean    Neurological:      Comments: The patient is having trouble providing historical data          Medications: Allergies:     Allergies   Allergen Reactions    Motrin [Ibuprofen Micronized]      Pt states he had blood in stool from motrin       Current Meds:   Scheduled Meds:    guaiFENesin  1,200 mg Oral BID    pantoprazole  40 mg Oral QAM AC    chlorhexidine  15 mL Mouth/Throat BID    cyanocobalamin  1,000 mcg Oral Daily    docusate sodium  100 mg Oral BID    fluticasone  1 spray Each Nostril Daily    fluticasone  1 puff Inhalation BID    folic acid  1 mg Oral Daily    magnesium oxide  400 mg Oral Daily    sodium chloride  1 g Oral BID WC    vitamin C  500 mg Oral Daily    Armodafinil  150 mg Oral Daily    atorvastatin  40 mg Oral Nightly    carvedilol  3.125 mg Oral BID WC    [Held by provider] clopidogrel  75 mg Oral Daily    levothyroxine  50 mcg Oral Daily    therapeutic multivitamin-minerals  1 tablet Oral Daily    QUEtiapine  25 mg Oral Nightly    thiamine  100 mg Oral Daily    sodium chloride flush  5-40 mL IntraVENous 2 times per day    enoxaparin  40 mg SubCUTAneous Daily     Continuous Infusions:    sodium chloride 100 mL/hr at 03/09/22 2006    sodium chloride       PRN Meds: LORazepam, LORazepam, meclizine, melatonin, traMADol, oxyCODONE-acetaminophen, morphine, albuterol sulfate HFA, sodium chloride flush, sodium chloride, potassium chloride **OR** potassium alternative oral replacement **OR** potassium chloride, magnesium sulfate, ondansetron **OR** ondansetron, polyethylene glycol, acetaminophen **OR** acetaminophen    Data:     I/O (24Hr):     Intake/Output Summary (Last 24 hours) at 3/10/2022 1011  Last data filed at 3/10/2022 0857  Gross per 24 hour   Intake 2304.19 ml   Output 300 ml   Net 2004.19 ml       Labs:  Hematology:  Recent Labs     03/08/22 0421 03/09/22  0549   WBC 13.5* 18.1*   RBC 3.50* 3.06*   HGB 9.2* 8.0*   HCT 31.2* 28.6*   MCV 89.1 93.5   MCH 26.3 26.1   MCHC 29.5 28.0*   RDW 19.7* 19.7*    273   MPV 9.2 9.2     Chemistry:  Recent Labs     03/08/22 0421 03/08/22  0421 03/09/22  0549 03/09/22  1643 03/10/22  0430   *  --  132*  --  135   K 4.6  --  4.8  --  4.0     --  102  --  105   CO2 21  --  19*  --  20   GLUCOSE 102*  --  138*  --  107*   BUN 11   < > 13 16 15   CREATININE 0.78 < > 0.86 1.01 0.82   ANIONGAP 11  --  11  --  10   LABGLOM >60   < > >60 >60 >60   GFRAA >60   < > >60 >60 >60   CALCIUM 9.5  --  8.7  --  8.3*   PROBNP  --   --  1,271*  --   --     < > = values in this interval not displayed. No results for input(s): PROT, LABALBU, LABA1C, B2BSQSE, G2NTKXX, FT4, TSH, AST, ALT, LDH, GGT, ALKPHOS, LABGGT, BILITOT, BILIDIR, AMMONIA, AMYLASE, LIPASE, LACTATE, CHOL, HDL, LDLCHOLESTEROL, CHOLHDLRATIO, TRIG, VLDL, HRM90GA, PHENYTOIN, PHENYF, URICACID, POCGLU in the last 72 hours. ABG:  Lab Results   Component Value Date    POCPH 7.54 10/24/2015    POCPCO2 35 10/24/2015    POCPO2 71 10/24/2015    POCHCO3 29.9 10/24/2015    NBEA NOT REPORTED 10/24/2015    PBEA 7 10/24/2015    XJZ8CGD 31 10/24/2015    HJDM9AIF 96 10/24/2015    FIO2 30.0 10/26/2015     Lab Results   Component Value Date/Time    SPECIAL NOT REPORTED 03/24/2020 10:15 PM     Lab Results   Component Value Date/Time    CULTURE NORMAL RESPIRATORY ORI LIGHT GROWTH 10/23/2015 04:04 PM    CULTURE  10/23/2015 04:04 PM     [address] [phone]       Radiology:  XR HIP RIGHT (2-3 VIEWS)    Result Date: 3/6/2022  Comminuted intertrochanteric fracture right femoral neck No other acute bony or joint abnormality     XR FEMUR RIGHT (MIN 2 VIEWS)    Result Date: 3/6/2022  Comminuted intertrochanteric fracture right femoral neck No other acute bony or joint abnormality     XR KNEE RIGHT (1-2 VIEWS)    Result Date: 3/6/2022  Comminuted intertrochanteric fracture right femoral neck No other acute bony or joint abnormality     XR ANKLE LEFT (MIN 3 VIEWS)    Result Date: 3/6/2022  No acute osseous or soft tissue abnormality. XR FOOT LEFT (MIN 3 VIEWS)    Result Date: 3/6/2022  Irregularity of the 1st distal phalanx that may relate to an acute fracture and correlation with point tenderness is needed.      XR CHEST PORTABLE    Result Date: 3/5/2022  Marginal inspiration, without evidence of acute cardiopulmonary disease     CT CHEST PULMONARY EMBOLISM W CONTRAST    Result Date: 3/5/2022  1. No evidence of pulmonary embolism 2. Dependent atelectasis, right lung base 3. Diffuse emphysematous changes present throughout the lungs, with an upper lobe predominance 4. Extensive coronary arterial calcifications 5. Cholelithiasis, without evidence of cholecystitis 6. Nodular contour to the liver, suggesting cirrhosis     XR HIP 2-3 VW W PELVIS LEFT    Result Date: 3/6/2022  Comminuted intertrochanteric fracture of the right proximal femur.        Assessment:        Primary Problem  Fracture of femoral neck, right, closed [facility])    Active Hospital Problems    Diagnosis Date Noted    Hyponatremia [E87.1]      Priority: High    Acute respiratory failure with hypoxemia (HCC) [J96.01] 03/09/2022    Atelectasis [J98.11] 03/09/2022    Accelerated junctional rhythm [I49.8] 03/08/2022    Presence of permanent cardiac pacemaker [Z95.0] 03/08/2022    Anemia, normocytic normochromic [D64.9]     Closed intertrochanteric fracture of hip, right, initial encounter ([facility] [S72.141A]     Fracture of femoral neck, right, closed ([address] [S72.001A] 03/06/2022    S/p bare metal coronary artery stent [Z95.5] 11/10/2015    Dementia with behavioral disturbance ([address] [F03.91]     Chronic obstructive pulmonary disease ([address] [J44.9]     HTN (hypertension) [I10] 10/12/2015    Cardiomyopathy ([facility] [I42.9] 10/12/2015    Narcolepsy [G47.419]     Meniere's disease [H81.09] 01/22/2014         Plan:        Ortho eval & f/u Dr Taco Garcia 10-14 days  Pain management  Minimize opioids   Resume Plavix when cleared by Ortho   Respiratory Therapy and Bronchodilators prn  DuoNeb  Incentive spirometry  BiPAP as needed  Cardiology eval & f/u as scheduled:  CAD, junctional rhythm  Correct electrolyte abnormalities  Blood Pressure - Monitor and control  PT/OT  Transfuse as needed  Respiratory Therapy and Bronchodilators prn   Fall precautions  Palliative care consult  DVT prophylaxis  Discharge planning  Will discharge when arrangements complete and ok with other services.   Follow-up with PCP in one week, Enoc Gomez DO  Notify PCP of discharge   Med rec done  JOANNA done  DCP 34 min+    IP CONSULT TO HOSPITALIST  IP CONSULT TO PULMONOLOGY  IP CONSULT TO DIETITIAN  IP CONSULT TO PALLIATIVE CARE  IP CONSULT TO SOCIAL WORK  IP CONSULT TO ORTHOPEDIC SURGERY  IP CONSULT TO [place]  IP CONSULT TO CARDIOLOGY  IP CONSULT TO IV TEAM    Dinora Mckeon DO  3/10/2022  10:11 AM

## 2022-03-11 LAB
ANION GAP SERPL CALCULATED.3IONS-SCNC: 9 MMOL/L (ref 9–17)
BUN BLDV-MCNC: 12 MG/DL (ref 8–23)
CALCIUM SERPL-MCNC: 8.4 MG/DL (ref 8.6–10.4)
CHLORIDE BLD-SCNC: 111 MMOL/L (ref 98–107)
CO2: 18 MMOL/L (ref 20–31)
CREAT SERPL-MCNC: 0.66 MG/DL (ref 0.7–1.2)
GFR AFRICAN AMERICAN: >60 ML/MIN
GFR NON-AFRICAN AMERICAN: >60 ML/MIN
GFR SERPL CREATININE-BSD FRML MDRD: ABNORMAL ML/MIN/{1.73_M2}
GLUCOSE BLD-MCNC: 101 MG/DL (ref 70–99)
POTASSIUM SERPL-SCNC: 4 MMOL/L (ref 3.7–5.3)
SODIUM BLD-SCNC: 138 MMOL/L (ref 135–144)

## 2022-03-11 PROCEDURE — 6370000000 HC RX 637 (ALT 250 FOR IP): Performed by: STUDENT IN AN ORGANIZED HEALTH CARE EDUCATION/TRAINING PROGRAM

## 2022-03-11 PROCEDURE — 99232 SBSQ HOSP IP/OBS MODERATE 35: CPT | Performed by: INTERNAL MEDICINE

## 2022-03-11 PROCEDURE — 94761 N-INVAS EAR/PLS OXIMETRY MLT: CPT

## 2022-03-11 PROCEDURE — 36415 COLL VENOUS BLD VENIPUNCTURE: CPT

## 2022-03-11 PROCEDURE — 2580000003 HC RX 258: Performed by: INTERNAL MEDICINE

## 2022-03-11 PROCEDURE — 2700000000 HC OXYGEN THERAPY PER DAY

## 2022-03-11 PROCEDURE — 94640 AIRWAY INHALATION TREATMENT: CPT

## 2022-03-11 PROCEDURE — 80048 BASIC METABOLIC PNL TOTAL CA: CPT

## 2022-03-11 PROCEDURE — 6360000002 HC RX W HCPCS: Performed by: STUDENT IN AN ORGANIZED HEALTH CARE EDUCATION/TRAINING PROGRAM

## 2022-03-11 PROCEDURE — 2060000000 HC ICU INTERMEDIATE R&B

## 2022-03-11 RX ADMIN — Medication 1 TABLET: at 08:51

## 2022-03-11 RX ADMIN — GUAIFENESIN 1200 MG: 600 TABLET, EXTENDED RELEASE ORAL at 08:52

## 2022-03-11 RX ADMIN — LEVOTHYROXINE SODIUM 50 MCG: 50 TABLET ORAL at 06:08

## 2022-03-11 RX ADMIN — Medication 100 MG: at 08:51

## 2022-03-11 RX ADMIN — FLUTICASONE PROPIONATE 1 PUFF: 110 AEROSOL, METERED RESPIRATORY (INHALATION) at 21:49

## 2022-03-11 RX ADMIN — MECLIZINE HCL 12.5 MG 12.5 MG: 12.5 TABLET ORAL at 16:06

## 2022-03-11 RX ADMIN — Medication 1000 MCG: at 08:50

## 2022-03-11 RX ADMIN — CARVEDILOL 3.12 MG: 3.12 TABLET, FILM COATED ORAL at 17:28

## 2022-03-11 RX ADMIN — FLUTICASONE PROPIONATE 1 PUFF: 110 AEROSOL, METERED RESPIRATORY (INHALATION) at 08:45

## 2022-03-11 RX ADMIN — DOCUSATE SODIUM 100 MG: 100 CAPSULE ORAL at 20:20

## 2022-03-11 RX ADMIN — FOLIC ACID 1 MG: 1 TABLET ORAL at 08:51

## 2022-03-11 RX ADMIN — OXYCODONE HYDROCHLORIDE AND ACETAMINOPHEN 500 MG: 500 TABLET ORAL at 08:51

## 2022-03-11 RX ADMIN — SODIUM CHLORIDE TAB 1 GM 1 G: 1 TAB at 11:04

## 2022-03-11 RX ADMIN — CARVEDILOL 3.12 MG: 3.12 TABLET, FILM COATED ORAL at 08:53

## 2022-03-11 RX ADMIN — DESMOPRESSIN ACETATE 40 MG: 0.2 TABLET ORAL at 20:20

## 2022-03-11 RX ADMIN — OXYCODONE HYDROCHLORIDE AND ACETAMINOPHEN 1 TABLET: 5; 325 TABLET ORAL at 20:20

## 2022-03-11 RX ADMIN — GUAIFENESIN 1200 MG: 600 TABLET, EXTENDED RELEASE ORAL at 20:20

## 2022-03-11 RX ADMIN — ENOXAPARIN SODIUM 40 MG: 100 INJECTION SUBCUTANEOUS at 08:49

## 2022-03-11 RX ADMIN — OXYCODONE HYDROCHLORIDE AND ACETAMINOPHEN 1 TABLET: 5; 325 TABLET ORAL at 06:08

## 2022-03-11 RX ADMIN — FLUTICASONE PROPIONATE 1 SPRAY: 50 SPRAY, METERED NASAL at 08:49

## 2022-03-11 RX ADMIN — Medication 3 MG: at 20:23

## 2022-03-11 RX ADMIN — SODIUM CHLORIDE: 9 INJECTION, SOLUTION INTRAVENOUS at 20:07

## 2022-03-11 RX ADMIN — OXYCODONE HYDROCHLORIDE AND ACETAMINOPHEN 1 TABLET: 5; 325 TABLET ORAL at 16:07

## 2022-03-11 RX ADMIN — DOCUSATE SODIUM 100 MG: 100 CAPSULE ORAL at 08:53

## 2022-03-11 RX ADMIN — CHLORHEXIDINE GLUCONATE 0.12% ORAL RINSE 15 ML: 1.2 LIQUID ORAL at 11:05

## 2022-03-11 RX ADMIN — MAGNESIUM GLUCONATE 500 MG ORAL TABLET 400 MG: 500 TABLET ORAL at 08:52

## 2022-03-11 RX ADMIN — SODIUM CHLORIDE TAB 1 GM 1 G: 1 TAB at 17:28

## 2022-03-11 RX ADMIN — PANTOPRAZOLE SODIUM 40 MG: 40 TABLET, DELAYED RELEASE ORAL at 06:08

## 2022-03-11 RX ADMIN — QUETIAPINE FUMARATE 25 MG: 25 TABLET ORAL at 20:20

## 2022-03-11 RX ADMIN — OXYCODONE HYDROCHLORIDE AND ACETAMINOPHEN 1 TABLET: 5; 325 TABLET ORAL at 11:46

## 2022-03-11 ASSESSMENT — PAIN DESCRIPTION - LOCATION
LOCATION: KNEE;HIP
LOCATION: HIP;KNEE
LOCATION: HIP;KNEE

## 2022-03-11 ASSESSMENT — ENCOUNTER SYMPTOMS
NAUSEA: 0
ABDOMINAL PAIN: 0
VOMITING: 0
SHORTNESS OF BREATH: 0
COUGH: 0
WHEEZING: 0

## 2022-03-11 ASSESSMENT — PAIN DESCRIPTION - ORIENTATION
ORIENTATION: RIGHT
ORIENTATION: RIGHT

## 2022-03-11 ASSESSMENT — PAIN SCALES - GENERAL
PAINLEVEL_OUTOF10: 6
PAINLEVEL_OUTOF10: 3
PAINLEVEL_OUTOF10: 6
PAINLEVEL_OUTOF10: 6
PAINLEVEL_OUTOF10: 3
PAINLEVEL_OUTOF10: 5

## 2022-03-11 ASSESSMENT — PAIN DESCRIPTION - PAIN TYPE
TYPE: ACUTE PAIN;SURGICAL PAIN
TYPE: ACUTE PAIN;SURGICAL PAIN

## 2022-03-11 NOTE — PROGRESS NOTES
Orthopedic Progress Note    Patient:  Genora Lanes  YOB: 1950     81 y.o. male    Subjective:  Patient seen and examined at bedside this morning  Optifoam dressings switched to a pressure dressing yesterday  No complaints or concerns  No issue overnight per patient and nursinf  Pain controlled, resting comfortably  Denies fever, HA, CP, SOB, N/V, dysuria  PT assessment: able to transfer with assistance    Vitals reviewed, afebrile    Objective:   Vitals:    03/11/22 0419   BP: (!) 119/59   Pulse: 63   Resp: 13   Temp: 99.4 °F (37.4 °C)   SpO2: 96%     Gen: NAD, cooperative     Cardiovascular: Regular rate    Respiratory: Chest symmetric, no accessory muscle use    RLE: Dressings on, which are clean/dry/intact. Mild tenderness to palpation about the hip. Compartments soft and easily compressible. EHL/FHL/TA/GS complex motor intact. Sural/saphenous/SPN/DPN/plantar nerve distribution SILT. DP and PT pulses 1+ with BCR. Recent Labs     03/09/22  0549 03/09/22  1643 03/10/22  0430   WBC 18.1*  --   --    HGB 8.0*  --   --    HCT 28.6*  --   --      --   --    *  --  135   K 4.8  --  4.0   BUN 13   < > 15   CREATININE 0.86   < > 0.82   GLUCOSE 138*  --  107*    < > = values in this interval not displayed.       Anticoag: Lovenox, Plavix  ABX: None   See rec for complete list    Impression/plan: 81 y.o. male who fell from wheelchair being seen for:     -Right IT fracture s/p R femur CMN insertion/ORIF, POD#3    -Dressings changed yesterday (3/10) by ortho team. Nursing to change dressings as needed.   -Patient will be focused on pain control rather than ambulatory status as patient has not ambulated >5 years  -WB status: WBAT RLE  -Pain control/DVT ppx/ABX per priamry  -Ice (20 min, 1 hour off) for edema/pain control  -Encourage deep breathing and incentive spirometry use  -Continue to work with PT/OT  -OK to DC to facility/nursing home per ortho team  -Lamin Sequeira Dr. Marcella Borjas in office 10-14 days after surgery  -Please page ortho with any questions    Ronna Falcon DO  Orthopedic Surgery Resident, PGY-1  [place] [id number], [facility]

## 2022-03-11 NOTE — PROGRESS NOTES
[facility]  Office: [address], DO, Kacey Schneider, DO, Phil Jerry, DO, John Nuñezangela Oakes, DO, Remington Peace MD, Nilo Briseno MD, Patricia Cannon MD, Francis Sarmiento MD, Dee Dee Alexis MD, Loreta Eng MD, Dash Padilla MD, Cash Gonzalez, DO, Mark Diaz, DO, Lei Zhu MD,  David Schwartz DO, Jack Michael MD, Shay Suárez MD, Mariel Robles MD, Asya Pillai MD, Angie Cordero MD, Colt Sharpe, [facility], [facility], CNP, Rachel Harley, CNP, Dash Ramirez, CNS, Caitlin Franks, CNP, Maxwell Bailey, CNP, Vern Godinez, CNP, Harini Galvan, CNP, Hattie Ramsay, CNP, MARII MaravillaC, Dena Martin, [facility], [place]), Valentina Jones, [facility] [place]), Steven Hansens, CNP, Alvarado Ortiz, CNP, Oliverio Garay, CNP, Bailey Dexter, CNP, Aga Fields, CNP, Kevin Kim, [address] [place] [address]    Progress Note    3/11/2022    11:04 AM    Name:   Oscar Sultana  MRN:     [id number]     Acct:      [de-identified]   Room:   [address] [place] [address] Day:  6  Admit Date:  3/5/2022  3:47 PM    PCP:   Opal Franco DO  Code Status:  Full Code    Subjective:     C/C:   Chief Complaint   Patient presents with    Fatigue     Interval History Status:   Improving  Pain controlled  Feels stronger  The patient has stood  Awaiting placement    Data Base Updates:  Ibyninf772 High mg/dL     CVN60nz/dL   CREATININE0.66 Low mg/dL     Calcium8. 4 Low        Brief History:     As documented in the medical record:  \"81year-old wheelchair-bound  male with underlying HFrEF, Severe knee OA, narcolepsy who presents to the hospital with concern for generalized weakness. Patient reports since his knee replacement surgery a few years ago he was never able to ambulate at baseline requiring him to be overall wheelchair-bound. He says over the past month his appetite's been poor and he feels he is becoming weaker.   He says every time he goes from the wheelchair to his bed he struggles to lift himself over and ends up falling. He denies any loss of consciousness, head trauma, joint swelling, shortness of breath, palpitations or dizziness. He does not report any weight loss, night sweats and says over the past few years his appetite is not like it used to be. He pays out-of-pocket for home aide to come and cook for him, he reports he has access to food and can manage himself but just does not have energy to do so. Patient reports compliance to all his meds. Says he needs physical therapy but does not want placement in a nursing home. Patient denies any other symptoms at this time. \"    The intitial assessment and plan included:  \"  Hospital Problems            Last Modified POA     * (Principal) Hyponatremia 3/5/2022 Yes     Narcolepsy 3/6/2022 Yes     Cardiomyopathy ([address] 3/6/2022 Yes     HTN (hypertension) 3/6/2022 Yes     Chronic obstructive pulmonary disease ([address] 3/6/2022 Yes     Dementia with behavioral disturbance ([address] 3/6/2022 Yes     Fracture of femoral neck, right, closed ([address] 3/6/2022 Yes           Plan:   1. Hyponatremia- patient appears dry, will resume IVF 0.9% NS at 50 ml/hr, monitor sodium tomorrow. He had diarrhea for days before presentation  2. Right femoral neck fracture- on Xrays, change Norco to Percocet, consulted Ortho, appreciate Cardiac risk stratification. NPO for OR today, negative LE dopplers  3. CMP- EF 20% on previous Echo, appreciate Cardiology consult, AICD interrogation, hold Lasix for now, recent stress test 4/2021 normal, repeat Echo- poor images, but EF improved 68%, intermediate risk for surgery  4. COPD- stable, oxygen prn, Dubonebs prn, resume Mucinex  5. HTN- BP stable  6. Narcolepsy- Nuvigil  7. Meniere's disease- resume Antivert and Ativan 1mg po q 8 hrs prn  8. Dementia- pt unable to care for self, needs SNF  9. Gi/ DVT proph  10.  Will need PT/OT\"     Additional database has included:  Echocardiogram:  Summary  Technically very challenging, there is poor endocardial definition, cannot  comment on wall motion analysis, within above limitations:  Left ventricle is normal in size. Global left ventricular systolic function  is normal. Calculated ejection fraction 68% by Rashid's method. Valves not well visualized. No obvious significant valvular regurgitation or stenosis seen. Results for Kirstin Neelyr (MRN [id number]) as of 3/8/2022 15:49   Ref. Range 3/5/2022 23:18 3/6/2022 04:32 3/6/2022 14:11 3/7/2022 03:32 3/8/2022 04:21   Sodium Latest Ref Range: 135 - 144 mmol/L 127 (L) 129 (L) 129 (L) 131 (L) 134 (L)     The patient did have an episode of postoperative hypoxia  He responded to respiratory therapy and optimization of his cardiopulmonary function    This condition was stabilized  Discharge planning initiated    Past Medical History:   has a past medical history of ADHD (attention deficit hyperactivity disorder), ADHD (attention deficit hyperactivity disorder), Atypical chest pain, Chronic bronchitis ([address] Hypertension, Hyponatremia, Meniere's disease, Narcolepsy, Nasal fracture, PND (post-nasal drip), SOB (shortness of breath), Tibia fracture, and Transaminasemia. Social History:   reports that he has been smoking cigarettes. He has a 38.00 pack-year smoking history. He has never used smokeless tobacco. He reports current alcohol use of about 16.0 standard drinks of alcohol per week. He reports that he does not use drugs. Family History:   Family History   Problem Relation Age of Onset    Heart Disease Mother         heart attack    Heart Disease Father        Review of Systems:     Review of Systems   Constitutional: Positive for activity change (Improved, he has stood). Respiratory: Negative for cough, shortness of breath and wheezing. Cardiovascular: Negative for chest pain and palpitations. Gastrointestinal: Negative for abdominal pain, nausea and vomiting. Genitourinary: Negative for flank pain and hematuria.    Musculoskeletal: Positive for arthralgias, gait problem (Unsteady, patient reports frequent falls) and myalgias. His right hip is still stiff and sore   Neurological: Negative for dizziness. Psychiatric/Behavioral: Positive for confusion (Patient reports memory impairment). Physical Examination:        Vitals  BP (!) 130/47   Pulse 65   Temp 98.3 °F (36.8 °C) (Temporal)   Resp 16   Ht 5' 10\" (1.778 m)   Wt 216 lb 11.4 oz (98.3 kg)   SpO2 97%   BMI 31.09 kg/m²   Temp (24hrs), Av.9 °F (37.2 °C), Min:98.3 °F (36.8 °C), Max:99.4 °F (37.4 °C)    No results for input(s): POCGLU in the last 72 hours. Physical Exam  Vitals reviewed. Constitutional:       General: He is not in acute distress. Appearance: He is not diaphoretic. HENT:      Head: Normocephalic. Nose: Nose normal.   Eyes:      General: No scleral icterus. Conjunctiva/sclera: Conjunctivae normal.   Neck:      Trachea: No tracheal deviation. Cardiovascular:      Rate and Rhythm: Normal rate and regular rhythm. Pulmonary:      Effort: Pulmonary effort is normal. No respiratory distress. Breath sounds: Normal breath sounds. No wheezing or rales. Chest:      Chest wall: No tenderness. Abdominal:      General: There is no distension. Palpations: Abdomen is soft. Tenderness: There is no abdominal tenderness. Musculoskeletal:         General: Tenderness (Incisional) and deformity ([place]) present. Cervical back: Neck supple. Skin:     General: Skin is warm and dry. Findings: Rash (Onychomycosis) present. Comments: Wound is dressed, dry and clean    Neurological:      Comments: The patient is having trouble providing historical data          Medications: Allergies:     Allergies   Allergen Reactions    Motrin [Ibuprofen Micronized]      Pt states he had blood in stool from motrin       Current Meds:   Scheduled Meds:    guaiFENesin  1,200 mg Oral BID    pantoprazole  40 mg Oral QAM AC    chlorhexidine  15 mL Mouth/Throat BID    cyanocobalamin  1,000 mcg Oral Daily    docusate sodium  100 mg Oral BID    fluticasone  1 spray Each Nostril Daily    fluticasone  1 puff Inhalation BID    folic acid  1 mg Oral Daily    magnesium oxide  400 mg Oral Daily    sodium chloride  1 g Oral BID WC    vitamin C  500 mg Oral Daily    Armodafinil  150 mg Oral Daily    atorvastatin  40 mg Oral Nightly    carvedilol  3.125 mg Oral BID WC    [Held by provider] clopidogrel  75 mg Oral Daily    levothyroxine  50 mcg Oral Daily    therapeutic multivitamin-minerals  1 tablet Oral Daily    QUEtiapine  25 mg Oral Nightly    thiamine  100 mg Oral Daily    sodium chloride flush  5-40 mL IntraVENous 2 times per day    enoxaparin  40 mg SubCUTAneous Daily     Continuous Infusions:    sodium chloride 100 mL/hr at 03/11/22 0630    sodium chloride       PRN Meds: LORazepam, LORazepam, meclizine, melatonin, traMADol, oxyCODONE-acetaminophen, morphine, albuterol sulfate HFA, sodium chloride flush, sodium chloride, potassium chloride **OR** potassium alternative oral replacement **OR** potassium chloride, magnesium sulfate, ondansetron **OR** ondansetron, polyethylene glycol, acetaminophen **OR** acetaminophen    Data:     I/O (24Hr):     Intake/Output Summary (Last 24 hours) at 3/11/2022 1104  Last data filed at 3/11/2022 0630  Gross per 24 hour   Intake 4358.99 ml   Output 150 ml   Net 4208.99 ml       Labs:  Hematology:  Recent Labs     03/09/22  0549   WBC 18.1*   RBC 3.06*   HGB 8.0*   HCT 28.6*   MCV 93.5   MCH 26.1   MCHC 28.0*   RDW 19.7*      MPV 9.2     Chemistry:  Recent Labs     03/09/22  0549 03/09/22  0549 03/09/22  1643 03/10/22  0430 03/11/22  0632   *  --   --  135 138   K 4.8  --   --  4.0 4.0     --   --  105 111*   CO2 19*  --   --  20 18*   GLUCOSE 138*  --   --  107* 101*   BUN 13   < > 16 15 12   CREATININE 0.86   < > 1.01 0.82 0.66*   ANIONGAP 11  --   --  10 9   LABGLOM >60 < > >60 >60 >60   GFRAA >60   < > >60 >60 >60   CALCIUM 8.7  --   --  8.3* 8.4*   PROBNP 1,271*  --   --   --   --     < > = values in this interval not displayed. No results for input(s): PROT, LABALBU, LABA1C, W4IPMQA, B7NGOWY, FT4, TSH, AST, ALT, LDH, GGT, ALKPHOS, LABGGT, BILITOT, BILIDIR, AMMONIA, AMYLASE, LIPASE, LACTATE, CHOL, HDL, LDLCHOLESTEROL, CHOLHDLRATIO, TRIG, VLDL, GER26SR, PHENYTOIN, PHENYF, URICACID, POCGLU in the last 72 hours. ABG:  Lab Results   Component Value Date    POCPH 7.54 10/24/2015    POCPCO2 35 10/24/2015    POCPO2 71 10/24/2015    POCHCO3 29.9 10/24/2015    NBEA NOT REPORTED 10/24/2015    PBEA 7 10/24/2015    ZLB6IZI 31 10/24/2015    RGFZ8XCO 96 10/24/2015    FIO2 30.0 10/26/2015     Lab Results   Component Value Date/Time    SPECIAL NOT REPORTED 03/24/2020 10:15 PM     Lab Results   Component Value Date/Time    CULTURE NORMAL RESPIRATORY ORI LIGHT GROWTH 10/23/2015 04:04 PM    CULTURE  10/23/2015 04:04 PM     [employer] [address] [address] [phone]       Radiology:  XR HIP RIGHT (2-3 VIEWS)    Result Date: 3/6/2022  Comminuted intertrochanteric fracture right femoral neck No other acute bony or joint abnormality     XR FEMUR RIGHT (MIN 2 VIEWS)    Result Date: 3/6/2022  Comminuted intertrochanteric fracture right femoral neck No other acute bony or joint abnormality     XR KNEE RIGHT (1-2 VIEWS)    Result Date: 3/6/2022  Comminuted intertrochanteric fracture right femoral neck No other acute bony or joint abnormality     XR ANKLE LEFT (MIN 3 VIEWS)    Result Date: 3/6/2022  No acute osseous or soft tissue abnormality. XR FOOT LEFT (MIN 3 VIEWS)    Result Date: 3/6/2022  Irregularity of the 1st distal phalanx that may relate to an acute fracture and correlation with point tenderness is needed.      XR CHEST PORTABLE    Result Date: 3/5/2022  Marginal inspiration, without evidence of acute cardiopulmonary disease     CT CHEST PULMONARY EMBOLISM W CONTRAST    Result Date: 3/5/2022  1. No evidence of pulmonary embolism 2. Dependent atelectasis, right lung base 3. Diffuse emphysematous changes present throughout the lungs, with an upper lobe predominance 4. Extensive coronary arterial calcifications 5. Cholelithiasis, without evidence of cholecystitis 6. Nodular contour to the liver, suggesting cirrhosis     XR HIP 2-3 VW W PELVIS LEFT    Result Date: 3/6/2022  Comminuted intertrochanteric fracture of the right proximal femur.        Assessment:        Primary Problem  Closed intertrochanteric fracture of hip, right, initial encounter [facility])    Active Hospital Problems    Diagnosis Date Noted    Hyponatremia [E87.1]      Priority: High    Acute respiratory failure with hypoxemia (HCC) [J96.01] 03/09/2022    Atelectasis [J98.11] 03/09/2022    Accelerated junctional rhythm [I49.8] 03/08/2022    Presence of permanent cardiac pacemaker [Z95.0] 03/08/2022    Anemia, normocytic normochromic [D64.9]     Closed intertrochanteric fracture of hip, right, initial [facility] Sabrina Vargas     S/p bare metal coronary artery stent [Z95.5] 11/10/2015    Dementia with behavioral disturbance (HCC) [F03.91]     Chronic obstructive pulmonary disease ([facility] [J44.9]     HTN (hypertension) [I10] 10/12/2015    Cardiomyopathy ([facility] [I42.9] 10/12/2015    Narcolepsy [G47.419]     Meniere's disease [H81.09] 01/22/2014       Plan:        Ortho eval & f/u Dr Lazaro End 10-14 days  Pain management  Minimize opioids   Resume Plavix when cleared by Ortho   Respiratory Therapy and Bronchodilators prn  DuoNeb  Incentive spirometry  BiPAP as needed  Cardiology eval & f/u as scheduled:  CAD, junctional rhythm:  TCC  Correct electrolyte abnormalities  Blood Pressure - Monitor and control  PT/OT  Transfuse as needed  Fall precautions  Palliative care consult  The patient is requesting a dietary supplement  DVT prophylaxis  Discharge planning  Precertification in process  Will discharge when arrangements complete and ok with other services.   Follow-up with PCP in one week, Surekha Cummings DO  Notify PCP of discharge   Med rec done  JOANNA done  DCP 34 min+    IP CONSULT TO HOSPITALIST  IP CONSULT TO PULMONOLOGY  IP CONSULT TO DIETITIAN  IP CONSULT TO PALLIATIVE CARE  IP CONSULT TO SOCIAL WORK  IP CONSULT TO ORTHOPEDIC SURGERY  IP CONSULT TO SPIRITUAL SERVICES  IP CONSULT TO CARDIOLOGY  IP CONSULT TO IV TEAM    Armando Shaffer DO  3/11/2022  11:04 AM

## 2022-03-11 NOTE — PLAN OF CARE
Problem: Discharge Planning:  Goal: Discharged to appropriate level of care  Description: Discharged to appropriate level of care  Outcome: Ongoing     Problem: Infection - Surgical Site:  Goal: Will show no infection signs and symptoms  Description: Will show no infection signs and symptoms  Outcome: Ongoing     Problem: Injury - Risk of, Postfracture Complications:  Goal: Absence of fat embolism  Description: Absence of fat embolism  Outcome: Ongoing  Goal: Absence of compartment syndrome signs and symptoms  Description: Absence of compartment syndrome signs and symptoms  Outcome: Ongoing     Problem: Mobility - Impaired:  Goal: Mobility will improve to maximum level  Description: Mobility will improve to maximum level  Outcome: Ongoing     Problem: Pain - Acute:  Goal: Pain level will decrease  Description: Pain level will decrease  Outcome: Ongoing     Problem: Venous Thromboembolism:  Goal: Absence of deep vein thrombosis  Description: Absence of deep vein thrombosis  Outcome: Ongoing  Goal: Absence of signs or symptoms of impaired coagulation  Description: Absence of signs or symptoms of impaired coagulation  Outcome: Ongoing  Goal: Will show no signs or symptoms of venous thromboembolism  Description: Will show no signs or symptoms of venous thromboembolism  Outcome: Ongoing     Problem: Pain:  Goal: Pain level will decrease  Description: Pain level will decrease  Outcome: Ongoing  Goal: Control of acute pain  Description: Control of acute pain  Outcome: Ongoing  Goal: Control of chronic pain  Description: Control of chronic pain  Outcome: Ongoing     Problem: Skin Integrity:  Goal: Will show no infection signs and symptoms  Description: Will show no infection signs and symptoms  Outcome: Ongoing  Goal: Absence of new skin breakdown  Description: Absence of new skin breakdown  Outcome: Ongoing     Problem: Falls - Risk of:  Goal: Will remain free from falls  Description: Will remain free from falls  Outcome: Ongoing  Goal: Absence of physical injury  Description: Absence of physical injury  Outcome: Ongoing

## 2022-03-11 NOTE — PROGRESS NOTES
.. PALLIATIVE CARE TEAM    Patient: Oscar Sultana  Room: 4284/1544-96    Reason For Consult   Goals of care evaluation  Distress management  Symptom Management  Guidance and support  Facilitate communications  Assistance in coordinating care  Recommendations for the above    Impression: Oscar Sultana is a 81y.o. year old male with  has a past medical history of ADHD (attention deficit hyperactivity disorder), ADHD (attention deficit hyperactivity disorder), Atypical chest pain, Chronic bronchitis ([address] Hypertension, Hyponatremia, Meniere's disease, Narcolepsy, Nasal fracture, PND (post-nasal drip), SOB (shortness of breath), Tibia fracture, and Transaminasemia. .  Currently hospitalized for the management of fracture right hip. The Palliative Care Team is following to assist with goals of care and support.      Code Status  Full Code    Vital Signs:  /67   Pulse 62   Temp 99.1 °F (37.3 °C) (Temporal)   Resp 24   Ht 5' 10\" (1.778 m)   Wt 216 lb 11.4 oz (98.3 kg)   SpO2 94%   BMI 31.09 kg/m²     Patient Active Problem List   Diagnosis    Meniere's disease    ADHD (attention deficit hyperactivity disorder)    Narcolepsy    Hyponatremia    PND (post-nasal drip)    Transaminasemia    Cardiomyopathy ([address]    HTN (hypertension)    Chronic obstructive pulmonary disease ([address]    Dementia with behavioral disturbance ([address]    Status post insertion of percutaneous endoscopic gastrostomy (PEG) tube ([address]    S/p bare metal coronary artery stent    Supracondylar fracture of distal end of femur without intracondylar extension, sequela    Closed fracture of right distal femur ([address]    Accelerated junctional rhythm    Presence of permanent cardiac pacemaker    Anemia, normocytic normochromic    Closed intertrochanteric fracture of hip, right, initial encounter ([address]    Acute respiratory failure with hypoxemia ([address]    Atelectasis       Palliative Interaction:Patient is in bed and is alert and oriented. He does have confusion and stated dementia by history. He states that he is not in pain, and that he just woke up. He is on high flow at 25 liters or 40%. I introduce my palliative care support role to him. I confirm that his Girlfriend Marilin Corral is his decision maker, and he states\" yes. \" I see a copy of the [employer]. He states that he was living at home with help, and that he has narcolepsy and lost his balance and fell. He states that he uses a wheelchair at home. The plan if for him to go to rehab at discharge. I offer much emotional support and he is appreciative. Will follow for goals of care and support. Goals/Plan of care  Education/support to patient  Providing support for coping/adaptation/distress of patient  Continue with current plan of care  Code status clarified: Full Code  Validating patient/family distress  Continued communication updates  Patient is alert and oriented and by history has confusion and dementia. He is on 25 liters or 49 % oxygen per high flow. The plan is for rehab at [address] [place] [address]. Patient has a completed HCPOA in the paper chart.      Electronically signed by   Yesi Aguiar RN  Palliative Care Team  on 3/11/2022 at 3:17 PM

## 2022-03-11 NOTE — PROGRESS NOTES
Physical Therapy        Physical Therapy Cancel Note      DATE: 3/11/2022    NAME: Bharati Almonte  MRN: [id number]   : 1950      Patient not seen this date for Physical Therapy due to:    Patient Declined: Pt refusing to participate in PT at this time, will check back tomorrow.       Electronically signed by Margaret Thomas PTA on 3/11/2022 at 2:09 PM

## 2022-03-11 NOTE — CARE COORDINATION
Transitional Planning    Left message for Sharmila Reyes [phone], to inquire about acceptance and precert    [facility] starting precert

## 2022-03-11 NOTE — PLAN OF CARE
Problem: Nutrition  Goal: Optimal nutrition therapy  Description: Nutrition Problem #1: Inadequate oral intake  Intervention: Food and/or Nutrient Delivery: Continue Current Diet,Start Oral Nutrition Supplement  Nutritional Goals: Meet 50% or greater of estimated nutrition needs     Outcome: Ongoing     Problem: Discharge Planning:  Goal: Discharged to appropriate level of care  Description: Discharged to appropriate level of care  Outcome: Ongoing     Problem: Infection - Surgical Site:  Goal: Will show no infection signs and symptoms  Description: Will show no infection signs and symptoms  Outcome: Ongoing     Problem: Injury - Risk of, Postfracture Complications:  Goal: Absence of fat embolism  Description: Absence of fat embolism  Outcome: Ongoing     Problem: Injury - Risk of, Postfracture Complications:  Goal: Absence of compartment syndrome signs and symptoms  Description: Absence of compartment syndrome signs and symptoms  Outcome: Ongoing     Problem: Pain - Acute:  Goal: Pain level will decrease  Description: Pain level will decrease  Outcome: Ongoing     Problem: Falls - Risk of:  Goal: Will remain free from falls  Description: Will remain free from falls  Outcome: Ongoing     Problem: OXYGENATION/RESPIRATORY FUNCTION  Goal: Patient will maintain patent airway  Outcome: Ongoing

## 2022-03-11 NOTE — PLAN OF CARE
Problem: OXYGENATION/RESPIRATORY FUNCTION  Goal: Patient will maintain patent airway  Intervention: ASSESS BREATH SOUNDS IN ALL LOBES  Note:   PROVIDE ADEQUATE OXYGENATION WITH ACCEPTABLE SP02/ABG'S    [x]  IDENTIFY APPROPRIATE OXYGEN THERAPY  [x]   MONITOR SP02/ABG'S AS NEEDED   [x]   PATIENT EDUCATION AS NEEDED     BRONCHOSPASM/BRONCHOCONSTRICTION     [x]         IMPROVE AERATION/BREATH SOUNDS  [x]   ADMINISTER BRONCHODILATOR THERAPY AS APPROPRIATE  [x]   ASSESS BREATH SOUNDS  []   IMPLEMENT AEROSOL/MDI PROTOCOL  [x]   PATIENT EDUCATION AS NEEDED     [x]  NON INVASIVE VENTILATION  [x]  PROVIDE OPTIMAL VENTILATION/ACCEPTABLE SP02  []  IMPLEMENT NON INVASIVE VENTILATION PROTOCOL  [x]  ASSESSMENT SKIN INTEGRITY  [x]  PATIENT EDUCATION AS NEEDED  [x]  BIPAP AS NEEDED      Problem: OXYGENATION/RESPIRATORY FUNCTION  Goal: Patient will maintain patent airway  Outcome: Ongoing     Problem: OXYGENATION/RESPIRATORY FUNCTION  Goal: Patient will achieve/maintain normal respiratory rate/effort  Description: Respiratory rate and effort will be within normal limits for the patient  Outcome: Ongoing

## 2022-03-12 LAB
ANION GAP SERPL CALCULATED.3IONS-SCNC: 8 MMOL/L (ref 9–17)
BUN BLDV-MCNC: 9 MG/DL (ref 8–23)
CALCIUM SERPL-MCNC: 8.4 MG/DL (ref 8.6–10.4)
CHLORIDE BLD-SCNC: 109 MMOL/L (ref 98–107)
CO2: 18 MMOL/L (ref 20–31)
CREAT SERPL-MCNC: 0.55 MG/DL (ref 0.7–1.2)
GFR AFRICAN AMERICAN: >60 ML/MIN
GFR NON-AFRICAN AMERICAN: >60 ML/MIN
GFR SERPL CREATININE-BSD FRML MDRD: ABNORMAL ML/MIN/{1.73_M2}
GLUCOSE BLD-MCNC: 95 MG/DL (ref 70–99)
HCT VFR BLD CALC: 23.1 % (ref 40.7–50.3)
HCT VFR BLD CALC: 26.7 % (ref 40.7–50.3)
HEMOGLOBIN: 6.6 G/DL (ref 13–17)
HEMOGLOBIN: 7.9 G/DL (ref 13–17)
MCH RBC QN AUTO: 26.4 PG (ref 25.2–33.5)
MCHC RBC AUTO-ENTMCNC: 28.6 G/DL (ref 28.4–34.8)
MCV RBC AUTO: 92.4 FL (ref 82.6–102.9)
NRBC AUTOMATED: 0.1 PER 100 WBC
PDW BLD-RTO: 21 % (ref 11.8–14.4)
PLATELET # BLD: 334 K/UL (ref 138–453)
PMV BLD AUTO: 9.3 FL (ref 8.1–13.5)
POTASSIUM SERPL-SCNC: 3.7 MMOL/L (ref 3.7–5.3)
RBC # BLD: 2.5 M/UL (ref 4.21–5.77)
SODIUM BLD-SCNC: 135 MMOL/L (ref 135–144)
WBC # BLD: 14.1 K/UL (ref 3.5–11.3)

## 2022-03-12 PROCEDURE — 2060000000 HC ICU INTERMEDIATE R&B

## 2022-03-12 PROCEDURE — 86901 BLOOD TYPING SEROLOGIC RH(D): CPT

## 2022-03-12 PROCEDURE — 80048 BASIC METABOLIC PNL TOTAL CA: CPT

## 2022-03-12 PROCEDURE — 2580000003 HC RX 258: Performed by: STUDENT IN AN ORGANIZED HEALTH CARE EDUCATION/TRAINING PROGRAM

## 2022-03-12 PROCEDURE — 6370000000 HC RX 637 (ALT 250 FOR IP): Performed by: INTERNAL MEDICINE

## 2022-03-12 PROCEDURE — 36415 COLL VENOUS BLD VENIPUNCTURE: CPT

## 2022-03-12 PROCEDURE — 51798 US URINE CAPACITY MEASURE: CPT

## 2022-03-12 PROCEDURE — P9016 RBC LEUKOCYTES REDUCED: HCPCS

## 2022-03-12 PROCEDURE — 86850 RBC ANTIBODY SCREEN: CPT

## 2022-03-12 PROCEDURE — 36430 TRANSFUSION BLD/BLD COMPNT: CPT

## 2022-03-12 PROCEDURE — 85014 HEMATOCRIT: CPT

## 2022-03-12 PROCEDURE — 6370000000 HC RX 637 (ALT 250 FOR IP): Performed by: STUDENT IN AN ORGANIZED HEALTH CARE EDUCATION/TRAINING PROGRAM

## 2022-03-12 PROCEDURE — 94761 N-INVAS EAR/PLS OXIMETRY MLT: CPT

## 2022-03-12 PROCEDURE — 2700000000 HC OXYGEN THERAPY PER DAY

## 2022-03-12 PROCEDURE — 6360000002 HC RX W HCPCS: Performed by: STUDENT IN AN ORGANIZED HEALTH CARE EDUCATION/TRAINING PROGRAM

## 2022-03-12 PROCEDURE — 94640 AIRWAY INHALATION TREATMENT: CPT

## 2022-03-12 PROCEDURE — 86900 BLOOD TYPING SEROLOGIC ABO: CPT

## 2022-03-12 PROCEDURE — 86920 COMPATIBILITY TEST SPIN: CPT

## 2022-03-12 PROCEDURE — 85018 HEMOGLOBIN: CPT

## 2022-03-12 PROCEDURE — 99232 SBSQ HOSP IP/OBS MODERATE 35: CPT | Performed by: INTERNAL MEDICINE

## 2022-03-12 PROCEDURE — 85027 COMPLETE CBC AUTOMATED: CPT

## 2022-03-12 RX ORDER — HYDRALAZINE HYDROCHLORIDE 10 MG/1
10 TABLET, FILM COATED ORAL EVERY 6 HOURS PRN
Status: DISCONTINUED | OUTPATIENT
Start: 2022-03-12 | End: 2022-03-14 | Stop reason: HOSPADM

## 2022-03-12 RX ORDER — SODIUM CHLORIDE 9 MG/ML
INJECTION, SOLUTION INTRAVENOUS PRN
Status: DISCONTINUED | OUTPATIENT
Start: 2022-03-12 | End: 2022-03-14 | Stop reason: HOSPADM

## 2022-03-12 RX ORDER — CLONIDINE HYDROCHLORIDE 0.1 MG/1
0.1 TABLET ORAL EVERY 4 HOURS PRN
Status: DISCONTINUED | OUTPATIENT
Start: 2022-03-12 | End: 2022-03-14 | Stop reason: HOSPADM

## 2022-03-12 RX ADMIN — Medication 100 MG: at 09:28

## 2022-03-12 RX ADMIN — FLUTICASONE PROPIONATE 1 SPRAY: 50 SPRAY, METERED NASAL at 09:31

## 2022-03-12 RX ADMIN — MAGNESIUM GLUCONATE 500 MG ORAL TABLET 400 MG: 500 TABLET ORAL at 09:28

## 2022-03-12 RX ADMIN — DESMOPRESSIN ACETATE 40 MG: 0.2 TABLET ORAL at 20:02

## 2022-03-12 RX ADMIN — MECLIZINE HCL 12.5 MG 12.5 MG: 12.5 TABLET ORAL at 17:33

## 2022-03-12 RX ADMIN — DOCUSATE SODIUM 100 MG: 100 CAPSULE ORAL at 09:29

## 2022-03-12 RX ADMIN — OXYCODONE HYDROCHLORIDE AND ACETAMINOPHEN 1 TABLET: 5; 325 TABLET ORAL at 07:02

## 2022-03-12 RX ADMIN — OXYCODONE HYDROCHLORIDE AND ACETAMINOPHEN 1 TABLET: 5; 325 TABLET ORAL at 17:33

## 2022-03-12 RX ADMIN — LEVOTHYROXINE SODIUM 50 MCG: 50 TABLET ORAL at 07:02

## 2022-03-12 RX ADMIN — CARVEDILOL 3.12 MG: 3.12 TABLET, FILM COATED ORAL at 09:30

## 2022-03-12 RX ADMIN — CARVEDILOL 3.12 MG: 3.12 TABLET, FILM COATED ORAL at 16:10

## 2022-03-12 RX ADMIN — Medication 3 MG: at 20:02

## 2022-03-12 RX ADMIN — CLONIDINE HYDROCHLORIDE 0.1 MG: 0.1 TABLET ORAL at 18:20

## 2022-03-12 RX ADMIN — FLUTICASONE PROPIONATE 1 PUFF: 110 AEROSOL, METERED RESPIRATORY (INHALATION) at 07:36

## 2022-03-12 RX ADMIN — QUETIAPINE FUMARATE 25 MG: 25 TABLET ORAL at 20:46

## 2022-03-12 RX ADMIN — GUAIFENESIN 1200 MG: 600 TABLET, EXTENDED RELEASE ORAL at 20:02

## 2022-03-12 RX ADMIN — GUAIFENESIN 1200 MG: 600 TABLET, EXTENDED RELEASE ORAL at 09:29

## 2022-03-12 RX ADMIN — PANTOPRAZOLE SODIUM 40 MG: 40 TABLET, DELAYED RELEASE ORAL at 07:02

## 2022-03-12 RX ADMIN — OXYCODONE HYDROCHLORIDE AND ACETAMINOPHEN 500 MG: 500 TABLET ORAL at 09:30

## 2022-03-12 RX ADMIN — FOLIC ACID 1 MG: 1 TABLET ORAL at 10:28

## 2022-03-12 RX ADMIN — HYDRALAZINE HYDROCHLORIDE 10 MG: 10 TABLET, FILM COATED ORAL at 15:01

## 2022-03-12 RX ADMIN — OXYCODONE HYDROCHLORIDE AND ACETAMINOPHEN 1 TABLET: 5; 325 TABLET ORAL at 13:32

## 2022-03-12 RX ADMIN — OXYCODONE HYDROCHLORIDE AND ACETAMINOPHEN 1 TABLET: 5; 325 TABLET ORAL at 02:02

## 2022-03-12 RX ADMIN — DOCUSATE SODIUM 100 MG: 100 CAPSULE ORAL at 20:02

## 2022-03-12 RX ADMIN — OXYCODONE HYDROCHLORIDE AND ACETAMINOPHEN 1 TABLET: 5; 325 TABLET ORAL at 21:40

## 2022-03-12 RX ADMIN — FLUTICASONE PROPIONATE 1 PUFF: 110 AEROSOL, METERED RESPIRATORY (INHALATION) at 20:46

## 2022-03-12 RX ADMIN — MECLIZINE HCL 12.5 MG 12.5 MG: 12.5 TABLET ORAL at 07:03

## 2022-03-12 RX ADMIN — SODIUM CHLORIDE, PRESERVATIVE FREE 10 ML: 5 INJECTION INTRAVENOUS at 20:04

## 2022-03-12 RX ADMIN — ENOXAPARIN SODIUM 40 MG: 100 INJECTION SUBCUTANEOUS at 09:31

## 2022-03-12 RX ADMIN — Medication 1000 MCG: at 09:27

## 2022-03-12 RX ADMIN — Medication 1 TABLET: at 09:28

## 2022-03-12 RX ADMIN — SODIUM CHLORIDE TAB 1 GM 1 G: 1 TAB at 10:28

## 2022-03-12 ASSESSMENT — PAIN SCALES - GENERAL
PAINLEVEL_OUTOF10: 6
PAINLEVEL_OUTOF10: 7
PAINLEVEL_OUTOF10: 4
PAINLEVEL_OUTOF10: 7
PAINLEVEL_OUTOF10: 6
PAINLEVEL_OUTOF10: 6

## 2022-03-12 ASSESSMENT — ENCOUNTER SYMPTOMS
WHEEZING: 0
NAUSEA: 0
COUGH: 0
VOMITING: 0
ABDOMINAL PAIN: 0
SHORTNESS OF BREATH: 0

## 2022-03-12 ASSESSMENT — PAIN DESCRIPTION - LOCATION: LOCATION: HIP;KNEE

## 2022-03-12 ASSESSMENT — PAIN DESCRIPTION - PAIN TYPE: TYPE: ACUTE PAIN;SURGICAL PAIN

## 2022-03-12 ASSESSMENT — PAIN DESCRIPTION - ORIENTATION: ORIENTATION: RIGHT

## 2022-03-12 NOTE — FLOWSHEET NOTE
SPIRITUAL CARE DEPARTMENT - [address] 85  PROGRESS NOTE    Shift date: 3/12/22  Shift day: Saturday   Shift # 1    Room # 6543/1832-45   Name: Stephanie Peñaloza            Age: 81 y.o. Gender: male          Bahai: Yarsanism  Place of [place]: Unknown    Referral: Routine Visit    Admit Date & Time: 3/5/2022  3:47 PM    PATIENT/EVENT DESCRIPTION:  Stephanie Peñaloza is a 81 y.o. male in room 26 of . SPIRITUAL ASSESSMENT/INTERVENTION:   writing note visited and conversed with patient. Patient declined prayer from the . Patient was thankful for the visit. SPIRITUAL CARE FOLLOW-UP PLAN:  Chaplains will remain available to offer spiritual and emotional support as needed. Electronically signed by Bethany Macias, on 3/12/2022 at 4:02 PM.  Rik Robledo  [phone]       03/12/22 1600   Encounter Summary   Services provided to: Patient   Referral/Consult From: [facility] Family members   Continue Visiting   (3/12/22)   Complexity of Encounter Low   Length of Encounter 15 minutes   Routine   Type Initial   Assessment Approachable;Coping; Anxious; Helplessness   Intervention Active listening;Sustaining presence/ Ministry of presence; Explored feelings, thoughts, concerns   Outcome Coping; Acceptance;Expressed gratitude;Receptive

## 2022-03-12 NOTE — PROGRESS NOTES
[facility]  Office: [address], DO, Heather Easley, DO, Anuj Pop, DO, Hali Oakes, DO, Leah Ace MD, Pat Boykin MD, Stacy Felix MD, Sal Ambrosio MD, Sheila Franklin MD, Cindy Tierney MD, Josselyn De La Garza MD, Dorothea Patton, DO, Aleja Junior, DO, Christine Norwood MD,  Miah Mathis DO, Vertell Moritz, MD, Shari Cleveland MD, Anitra Hardwick MD, Jasmin Miguel MD, Frederick Galeana MD, Dileep Sharp, [facility], [facility] Chrisopal, CNP, Mohan Del Valle, CNP, Lilly Hernandez, CNS, Cecilia Montes, CNP, Gila Chery, CNP, Sada Salomon, CNP, Jae Banuelos, CNP, Betzy Auguste, CNP, MARII FuchsC, Demarcus Capps, [facility], Jose Miguel Gonzales, [facility], Nicolas Lares, CNP, Cori Shine, CNP, Dawayne Oppenheim, CNP, Molly Shaw, CNP, Tiffany Dior, [facility], [place], [facility]    Progress Note    3/12/2022    10:14 AM    Name:   Yvonne Guadarrama  MRN:     [id number]     Acct:      [de-identified]   Room:   [facility] Day:  7  Admit Date:  3/5/2022  3:47 PM    PCP:   Cyn Potts DO  Code Status:  Full Code    Subjective:     C/C:   Chief Complaint   Patient presents with    Fatigue     Interval History Status:   Improving  Pain controlled  Still nonambulatory  Weaned from high flow oxygen to nasal cannula    Data Base Updates:  Ownbuuh00tl/dL     BUN9mg/dL   CREATININE0.55 Low mg/dL     Calcium8. 4 Low mg/dL   Zacmwc120       WBC14.1 High k/uL RBC2.50 Low m/uL Hemoglobin 6.6 Low Panic          Brief History:     As documented in the medical record:  \"81year-old wheelchair-bound  male with underlying HFrEF, Severe knee OA, narcolepsy who presents to the hospital with concern for generalized weakness. Patient reports since his knee replacement surgery a few years ago he was never able to ambulate at baseline requiring him to be overall wheelchair-bound. He says over the past month his appetite's been poor and he feels he is becoming weaker.   He says every time he goes from the wheelchair to his bed he struggles to lift himself over and ends up falling. He denies any loss of consciousness, head trauma, joint swelling, shortness of breath, palpitations or dizziness. He does not report any weight loss, night sweats and says over the past few years his appetite is not like it used to be. He pays out-of-pocket for home aide to come and cook for him, he reports he has access to food and can manage himself but just does not have energy to do so. Patient reports compliance to all his meds. Says he needs physical therapy but does not want placement in a nursing home. Patient denies any other symptoms at this time. \"    The intitial assessment and plan included:  \"  Hospital Problems            Last Modified POA     * (Principal) Hyponatremia 3/5/2022 Yes     Narcolepsy 3/6/2022 Yes     Cardiomyopathy ([address] 3/6/2022 Yes     HTN (hypertension) 3/6/2022 Yes     Chronic obstructive pulmonary disease ([address] 3/6/2022 Yes     Dementia with behavioral disturbance ([address] 3/6/2022 Yes     Fracture of femoral neck, right, closed ([address] 3/6/2022 Yes           Plan:   1. Hyponatremia- patient appears dry, will resume IVF 0.9% NS at 50 ml/hr, monitor sodium tomorrow. He had diarrhea for days before presentation  2. Right femoral neck fracture- on Xrays, change Norco to Percocet, consulted Ortho, appreciate Cardiac risk stratification. NPO for OR today, negative LE dopplers  3. CMP- EF 20% on previous Echo, appreciate Cardiology consult, AICD interrogation, hold Lasix for now, recent stress test 4/2021 normal, repeat Echo- poor images, but EF improved 68%, intermediate risk for surgery  4. COPD- stable, oxygen prn, Dubonebs prn, resume Mucinex  5. HTN- BP stable  6. Narcolepsy- Nuvigil  7. Meniere's disease- resume Antivert and Ativan 1mg po q 8 hrs prn  8. Dementia- pt unable to care for self, needs SNF  9. Gi/ DVT proph  10.  Will need PT/OT\"     Additional database has included:  Echocardiogram:  Summary  Technically very challenging, there is poor endocardial definition, cannot  comment on wall motion analysis, within above limitations:  Left ventricle is normal in size. Global left ventricular systolic function  is normal. Calculated ejection fraction 68% by Rashid's method. Valves not well visualized. No obvious significant valvular regurgitation or stenosis seen. Results for Sedrick Bautista (MRN [id number]) as of 3/8/2022 15:49   Ref. Range 3/5/2022 23:18 3/6/2022 04:32 3/6/2022 14:11 3/7/2022 03:32 3/8/2022 04:21   Sodium Latest Ref Range: 135 - 144 mmol/L 127 (L) 129 (L) 129 (L) 131 (L) 134 (L)     The patient did have an episode of postoperative hypoxia  He responded to respiratory therapy and optimization of his cardiopulmonary function    This condition was stabilized  Discharge planning initiated    Past Medical History:   has a past medical history of ADHD (attention deficit hyperactivity disorder), ADHD (attention deficit hyperactivity disorder), Atypical chest pain, Chronic bronchitis ([address] Hypertension, Hyponatremia, Meniere's disease, Narcolepsy, Nasal fracture, PND (post-nasal drip), SOB (shortness of breath), Tibia fracture, and Transaminasemia. Social History:   reports that he has been smoking cigarettes. He has a 38.00 pack-year smoking history. He has never used smokeless tobacco. He reports current alcohol use of about 16.0 standard drinks of alcohol per week. He reports that he does not use drugs. Family History:   Family History   Problem Relation Age of Onset    Heart Disease Mother         heart attack    Heart Disease Father        Review of Systems:     Review of Systems   Constitutional: Positive for activity change (Improved, he has stood). Respiratory: Negative for cough, shortness of breath and wheezing. Cardiovascular: Negative for chest pain and palpitations.    Gastrointestinal: Negative for abdominal pain, nausea and vomiting. Genitourinary: Positive for frequency (Chronic urinary frequency reported). Negative for flank pain and hematuria. Musculoskeletal: Positive for arthralgias, gait problem (Unsteady, patient reports frequent falls) and myalgias. His right hip is still stiff and sore   Neurological: Negative for dizziness. Physical Examination:        Vitals  BP (!) 151/71   Pulse 73   Temp 98.7 °F (37.1 °C) (Temporal)   Resp 17   Ht 5' 10\" (1.778 m)   Wt 216 lb 11.4 oz (98.3 kg)   SpO2 93%   BMI 31.09 kg/m²   Temp (24hrs), Av °F (37.2 °C), Min:98.7 °F (37.1 °C), Max:99.1 °F (37.3 °C)    No results for input(s): POCGLU in the last 72 hours. Physical Exam  Vitals reviewed. Constitutional:       General: He is not in acute distress. Appearance: He is not diaphoretic. HENT:      Head: Normocephalic. Nose: Nose normal.   Eyes:      General: No scleral icterus. Conjunctiva/sclera: Conjunctivae normal.   Neck:      Trachea: No tracheal deviation. Cardiovascular:      Rate and Rhythm: Normal rate and regular rhythm. Pulmonary:      Effort: Pulmonary effort is normal. No respiratory distress. Breath sounds: Normal breath sounds. No wheezing or rales. Chest:      Chest wall: No tenderness. Abdominal:      General: There is no distension. Palpations: Abdomen is soft. Tenderness: There is no abdominal tenderness. Musculoskeletal:         General: Tenderness (Incisional) and deformity ([place]) present. Cervical back: Neck supple. Skin:     General: Skin is warm and dry. Findings: Rash (Onychomycosis) present. Comments: Wound is dressed, dry and clean    Neurological:      Comments: The patient is having trouble providing historical data          Medications: Allergies:     Allergies   Allergen Reactions    Motrin [Ibuprofen Micronized]      Pt states he had blood in stool from motrin       Current Meds:   Scheduled Meds:    guaiFENesin 1,200 mg Oral BID    pantoprazole  40 mg Oral QAM AC    chlorhexidine  15 mL Mouth/Throat BID    cyanocobalamin  1,000 mcg Oral Daily    docusate sodium  100 mg Oral BID    fluticasone  1 spray Each Nostril Daily    fluticasone  1 puff Inhalation BID    folic acid  1 mg Oral Daily    magnesium oxide  400 mg Oral Daily    sodium chloride  1 g Oral BID WC    vitamin C  500 mg Oral Daily    Armodafinil  150 mg Oral Daily    atorvastatin  40 mg Oral Nightly    carvedilol  3.125 mg Oral BID WC    [Held by provider] clopidogrel  75 mg Oral Daily    levothyroxine  50 mcg Oral Daily    therapeutic multivitamin-minerals  1 tablet Oral Daily    QUEtiapine  25 mg Oral Nightly    thiamine  100 mg Oral Daily    sodium chloride flush  5-40 mL IntraVENous 2 times per day    enoxaparin  40 mg SubCUTAneous Daily     Continuous Infusions:    sodium chloride      sodium chloride 100 mL/hr at 03/12/22 0700    sodium chloride       PRN Meds: sodium chloride, LORazepam, LORazepam, meclizine, melatonin, traMADol, oxyCODONE-acetaminophen, morphine, albuterol sulfate HFA, sodium chloride flush, sodium chloride, potassium chloride **OR** potassium alternative oral replacement **OR** potassium chloride, magnesium sulfate, ondansetron **OR** ondansetron, polyethylene glycol, acetaminophen **OR** acetaminophen    Data:     I/O (24Hr):     Intake/Output Summary (Last 24 hours) at 3/12/2022 1014  Last data filed at 3/12/2022 0700  Gross per 24 hour   Intake 2769.53 ml   Output 725 ml   Net 2044.53 ml       Labs:  Hematology:  Recent Labs     03/12/22  0930   WBC 14.1*   RBC 2.50*   HGB 6.6*   HCT 23.1*   MCV 92.4   MCH 26.4   MCHC 28.6   RDW 21.0*      MPV 9.3     Chemistry:  Recent Labs     03/10/22  0430 03/11/22  0632 03/12/22  0521    138 135   K 4.0 4.0 3.7    111* 109*   CO2 20 18* 18*   GLUCOSE 107* 101* 95   BUN 15 12 9   CREATININE 0.82 0.66* 0.55*   ANIONGAP 10 9 8*   LABGLOM >60 >60 >60   GFRAA >60 >60 >60   CALCIUM 8.3* 8.4* 8.4*     No results for input(s): PROT, LABALBU, LABA1C, H7XRYGG, G6ANMYT, FT4, TSH, AST, ALT, LDH, GGT, ALKPHOS, LABGGT, BILITOT, BILIDIR, AMMONIA, AMYLASE, LIPASE, LACTATE, CHOL, HDL, LDLCHOLESTEROL, CHOLHDLRATIO, TRIG, VLDL, WFQ47YI, PHENYTOIN, PHENYF, URICACID, POCGLU in the last 72 hours. ABG:  Lab Results   Component Value Date    POCPH 7.54 10/24/2015    POCPCO2 35 10/24/2015    POCPO2 71 10/24/2015    POCHCO3 29.9 10/24/2015    NBEA NOT REPORTED 10/24/2015    PBEA 7 10/24/2015    WFJ5JMC 31 10/24/2015    JWEN8HTL 96 10/24/2015    FIO2 30.0 10/26/2015     Lab Results   Component Value Date/Time    SPECIAL NOT REPORTED 03/24/2020 10:15 PM     Lab Results   Component Value Date/Time    CULTURE NORMAL RESPIRATORY ORI LIGHT GROWTH 10/23/2015 04:04 PM    CULTURE  10/23/2015 04:04 PM     [facility] [id number] [place] [address] [phone]       Radiology:  XR HIP RIGHT (2-3 VIEWS)    Result Date: 3/6/2022  Comminuted intertrochanteric fracture right femoral neck No other acute bony or joint abnormality     XR FEMUR RIGHT (MIN 2 VIEWS)    Result Date: 3/6/2022  Comminuted intertrochanteric fracture right femoral neck No other acute bony or joint abnormality     XR KNEE RIGHT (1-2 VIEWS)    Result Date: 3/6/2022  Comminuted intertrochanteric fracture right femoral neck No other acute bony or joint abnormality     XR ANKLE LEFT (MIN 3 VIEWS)    Result Date: 3/6/2022  No acute osseous or soft tissue abnormality. XR FOOT LEFT (MIN 3 VIEWS)    Result Date: 3/6/2022  Irregularity of the 1st distal phalanx that may relate to an acute fracture and correlation with point tenderness is needed. XR CHEST PORTABLE    Result Date: 3/5/2022  Marginal inspiration, without evidence of acute cardiopulmonary disease     CT CHEST PULMONARY EMBOLISM W CONTRAST    Result Date: 3/5/2022  1. No evidence of pulmonary embolism 2. Dependent atelectasis, right lung base 3.  Diffuse emphysematous changes present throughout the lungs, with an upper lobe predominance 4. Extensive coronary arterial calcifications 5. Cholelithiasis, without evidence of cholecystitis 6. Nodular contour to the liver, suggesting cirrhosis     XR HIP 2-3 VW W PELVIS LEFT    Result Date: 3/6/2022  Comminuted intertrochanteric fracture of the right proximal femur.        Assessment:        Primary Problem  Closed intertrochanteric fracture of hip, right, initial encounter [facility])    Active Hospital Problems    Diagnosis Date Noted    Hyponatremia [E87.1]      Priority: High    Acute postoperative anemia due to expected blood loss [D62]     Acute respiratory failure with hypoxemia (HCC) [J96.01] 03/09/2022    Atelectasis [J98.11] 03/09/2022    Accelerated junctional rhythm [I49.8] 03/08/2022    Presence of permanent cardiac pacemaker [Z95.0] 03/08/2022    Anemia, normocytic normochromic [D64.9]     Closed intertrochanteric fracture of hip, right, initial encounter ([facility] Sabrina Vargas     S/p bare metal coronary artery stent [Z95.5] 11/10/2015    Dementia with behavioral disturbance ([address] [F03.91]     Chronic obstructive pulmonary disease ([address] [J44.9]     HTN (hypertension) [I10] 10/12/2015    Cardiomyopathy ([facility] [I42.9] 10/12/2015    Narcolepsy [G47.419]     Meniere's disease [H81.09] 01/22/2014       Plan:        Transfuse as needed to maintain hemoglobin greater than 7.0  Ortho eval & f/u Dr Lazaro End 10-14 days  Pain management  Minimize opioids   Resume Plavix when cleared by Ortho   Respiratory Therapy and Bronchodilators prn  DuoNeb  Incentive spirometry  BiPAP as needed  Cardiology eval & f/u as scheduled:  CAD, junctional rhythm:  TCC  Correct electrolyte abnormalities  Blood Pressure - Monitor and control  PT/OT  Fall precautions  Palliative care consult  The patient is requesting a dietary supplement  DVT prophylaxis  Discharge planning  Precertification in process  Will discharge when arrangements complete and ok with other services.   Follow-up with PCP in one week, Low Mcwilliams DO  Notify PCP of discharge   Med rec done  JOANNA done  DCP 34 min+    IP CONSULT TO HOSPITALIST  IP CONSULT TO PULMONOLOGY  IP CONSULT TO DIETITIAN  IP CONSULT TO PALLIATIVE CARE  IP CONSULT TO SOCIAL WORK  IP CONSULT TO ORTHOPEDIC SURGERY  IP CONSULT TO SPIRITUAL SERVICES  IP CONSULT TO CARDIOLOGY  IP CONSULT TO Nash Matute DO  3/12/2022  10:14 AM

## 2022-03-12 NOTE — PLAN OF CARE
Problem: Nutrition  Goal: Optimal nutrition therapy  Description: Nutrition Problem #1: Inadequate oral intake  Intervention: Food and/or Nutrient Delivery: Continue Current Diet,Start Oral Nutrition Supplement  Nutritional Goals: Meet 50% or greater of estimated nutrition needs     3/11/2022 1826 by Colt Santana RN  Outcome: Ongoing     Problem: Discharge Planning:  Goal: Discharged to appropriate level of care  Description: Discharged to appropriate level of care  3/11/2022 1826 by Colt Santana RN  Outcome: Ongoing     Problem: Infection - Surgical Site:  Goal: Will show no infection signs and symptoms  Description: Will show no infection signs and symptoms  3/11/2022 2259 by Aris Alarcon RN  Outcome: Ongoing  3/11/2022 1826 by Colt Santana RN  Outcome: Ongoing     Problem: Injury - Risk of, Postfracture Complications:  Goal: Absence of fat embolism  Description: Absence of fat embolism  3/11/2022 1826 by Colt Santana RN  Outcome: Ongoing  Goal: Absence of compartment syndrome signs and symptoms  Description: Absence of compartment syndrome signs and symptoms  3/11/2022 1826 by Colt Santana RN  Outcome: Ongoing     Problem: Mobility - Impaired:  Goal: Mobility will improve to maximum level  Description: Mobility will improve to maximum level  Outcome: Ongoing     Problem: Pain - Acute:  Goal: Pain level will decrease  Description: Pain level will decrease  3/11/2022 2259 by Aris Alarcon RN  Outcome: Ongoing  3/11/2022 1826 by Colt Santana RN  Outcome: Ongoing     Problem: Venous Thromboembolism:  Goal: Absence of deep vein thrombosis  Description: Absence of deep vein thrombosis  Outcome: Ongoing  Goal: Absence of signs or symptoms of impaired coagulation  Description: Absence of signs or symptoms of impaired coagulation  Outcome: Ongoing  Goal: Will show no signs or symptoms of venous thromboembolism  Description: Will show no signs or symptoms of venous thromboembolism  Outcome: Ongoing Problem: Pain:  Goal: Pain level will decrease  Description: Pain level will decrease  3/11/2022 2259 by Soledad Novoa RN  Outcome: Ongoing  3/11/2022 1826 by Lincoln Kelley RN  Outcome: Ongoing  Goal: Control of acute pain  Description: Control of acute pain  Outcome: Ongoing     Problem: Skin Integrity:  Goal: Will show no infection signs and symptoms  Description: Will show no infection signs and symptoms  Outcome: Ongoing  Goal: Absence of new skin breakdown  Description: Absence of new skin breakdown  Outcome: Ongoing     Problem: Falls - Risk of:  Goal: Will remain free from falls  Description: Will remain free from falls  3/11/2022 2259 by Soledad Novoa RN  Outcome: Ongoing  3/11/2022 1826 by Lincoln Kelley RN  Outcome: Ongoing     Problem: OXYGENATION/RESPIRATORY FUNCTION  Goal: Patient will maintain patent airway  3/11/2022 1826 by Lincoln Kelley RN  Outcome: Ongoing

## 2022-03-12 NOTE — PROGRESS NOTES
Orthopedic Progress Note    Patient:  Latoya Interiano  YOB: 1950     81 y.o. male    Subjective:  Patient seen and examined at bedside this morning  No complaints or concerns  No issue overnight per nursing  Pain controlled on current regimen, resting comfortably  Dressings changed yesterday (3/11) due to saturation midday  Denies fever, HA, CP, SOB, N/V, dysuria  Refusal to participate with PT    Vitals reviewed, afebrile    Objective:   Vitals:    03/12/22 0405   BP: 124/61   Pulse: 64   Resp: 12   Temp: 98.9 °F (37.2 °C)   SpO2: 94%     Gen: NAD, cooperative     Cardiovascular: Regular rate    Respiratory: Chest symmetric, no accessory muscle use    RLE: Dressings on, which show mild strike through at the center. Mild tenderness to palpation about the hip. Compartments soft and easily compressible. EHL/FHL/TA/GS complex motor intact. Sural/saphenous/SPN/DPN/plantar nerve distribution SILT. DP and PT pulses 1+ with BCR. Recent Labs     03/12/22  0521      K 3.7   BUN 9   CREATININE 0.55*   GLUCOSE 95      Meds: Lovenox, Plavix held  See rec for complete list    Impression/plan: 81 y. o. male who fell from wheelchair being seen for:     -Right IT fracture s/p R femur CMN insertion/ORIF, POD#4    -Maintain dressings.  Nursing to change/reinforce dressings as needed  -WB status: WBAT RLE  -Patient will be focused on pain control//transfer status rather than ambulatory status as patient has not ambulated >5 years  -Pain control/DVT ppx/ABX per primary  -Ice (20 min, 1 hour off) for edema/pain control  -Encourage deep breathing and incentive spirometry use  -Continue to work with PT/OT  -OK to DC to facility/nursing home per ortho team  -Follow up with Dr. Lawson Flores in office 10-14 days after surgery  -Please page ortho with any questions       Kieran Grimm DO  Orthopedic Surgery Resident, PGY-1  [place] [address], [facility]

## 2022-03-12 NOTE — PROGRESS NOTES
PATIENT REFUSES TO WEAR BIPAP     [x] Risks and benefits explained to patient   [x] Patient refuses to wear Bipap stating  Don't need it tonight  [x] Patient verbalizes understanding of information presented.

## 2022-03-13 LAB
ABO/RH: NORMAL
ABSOLUTE EOS #: 0.41 K/UL (ref 0–0.44)
ABSOLUTE IMMATURE GRANULOCYTE: 0.17 K/UL (ref 0–0.3)
ABSOLUTE LYMPH #: 1.3 K/UL (ref 1.1–3.7)
ABSOLUTE MONO #: 1.03 K/UL (ref 0.1–1.2)
ANION GAP SERPL CALCULATED.3IONS-SCNC: 11 MMOL/L (ref 9–17)
ANTIBODY SCREEN: NEGATIVE
ARM BAND NUMBER: NORMAL
BASOPHILS # BLD: 0 % (ref 0–2)
BASOPHILS ABSOLUTE: <0.03 K/UL (ref 0–0.2)
BLD PROD TYP BPU: NORMAL
BUN BLDV-MCNC: 9 MG/DL (ref 8–23)
CALCIUM SERPL-MCNC: 8.7 MG/DL (ref 8.6–10.4)
CHLORIDE BLD-SCNC: 113 MMOL/L (ref 98–107)
CO2: 18 MMOL/L (ref 20–31)
CREAT SERPL-MCNC: 0.53 MG/DL (ref 0.7–1.2)
CROSSMATCH RESULT: NORMAL
DISPENSE STATUS BLOOD BANK: NORMAL
EOSINOPHILS RELATIVE PERCENT: 3 % (ref 1–4)
EXPIRATION DATE: NORMAL
GFR AFRICAN AMERICAN: >60 ML/MIN
GFR NON-AFRICAN AMERICAN: >60 ML/MIN
GFR SERPL CREATININE-BSD FRML MDRD: ABNORMAL ML/MIN/{1.73_M2}
GLUCOSE BLD-MCNC: 92 MG/DL (ref 70–99)
HCT VFR BLD CALC: 25.4 % (ref 40.7–50.3)
HEMOGLOBIN: 7.7 G/DL (ref 13–17)
IMMATURE GRANULOCYTES: 1 %
LYMPHOCYTES # BLD: 10 % (ref 24–43)
MCH RBC QN AUTO: 27.5 PG (ref 25.2–33.5)
MCHC RBC AUTO-ENTMCNC: 30.3 G/DL (ref 28.4–34.8)
MCV RBC AUTO: 90.7 FL (ref 82.6–102.9)
MONOCYTES # BLD: 8 % (ref 3–12)
NRBC AUTOMATED: 0.1 PER 100 WBC
PDW BLD-RTO: 19.7 % (ref 11.8–14.4)
PLATELET # BLD: 368 K/UL (ref 138–453)
PMV BLD AUTO: 9.3 FL (ref 8.1–13.5)
POTASSIUM SERPL-SCNC: 4 MMOL/L (ref 3.7–5.3)
RBC # BLD: 2.8 M/UL (ref 4.21–5.77)
RBC # BLD: ABNORMAL 10*6/UL
SEG NEUTROPHILS: 78 % (ref 36–65)
SEGMENTED NEUTROPHILS ABSOLUTE COUNT: 10.54 K/UL (ref 1.5–8.1)
SODIUM BLD-SCNC: 142 MMOL/L (ref 135–144)
TRANSFUSION STATUS: NORMAL
UNIT DIVISION: 0
UNIT NUMBER: NORMAL
WBC # BLD: 13.5 K/UL (ref 3.5–11.3)

## 2022-03-13 PROCEDURE — 6370000000 HC RX 637 (ALT 250 FOR IP): Performed by: STUDENT IN AN ORGANIZED HEALTH CARE EDUCATION/TRAINING PROGRAM

## 2022-03-13 PROCEDURE — 99232 SBSQ HOSP IP/OBS MODERATE 35: CPT | Performed by: INTERNAL MEDICINE

## 2022-03-13 PROCEDURE — 97530 THERAPEUTIC ACTIVITIES: CPT

## 2022-03-13 PROCEDURE — 94761 N-INVAS EAR/PLS OXIMETRY MLT: CPT

## 2022-03-13 PROCEDURE — 36415 COLL VENOUS BLD VENIPUNCTURE: CPT

## 2022-03-13 PROCEDURE — 94640 AIRWAY INHALATION TREATMENT: CPT

## 2022-03-13 PROCEDURE — 2580000003 HC RX 258: Performed by: STUDENT IN AN ORGANIZED HEALTH CARE EDUCATION/TRAINING PROGRAM

## 2022-03-13 PROCEDURE — 80048 BASIC METABOLIC PNL TOTAL CA: CPT

## 2022-03-13 PROCEDURE — 85025 COMPLETE CBC W/AUTO DIFF WBC: CPT

## 2022-03-13 PROCEDURE — 6370000000 HC RX 637 (ALT 250 FOR IP): Performed by: INTERNAL MEDICINE

## 2022-03-13 PROCEDURE — 97110 THERAPEUTIC EXERCISES: CPT

## 2022-03-13 PROCEDURE — 2700000000 HC OXYGEN THERAPY PER DAY

## 2022-03-13 PROCEDURE — 2060000000 HC ICU INTERMEDIATE R&B

## 2022-03-13 PROCEDURE — 97535 SELF CARE MNGMENT TRAINING: CPT

## 2022-03-13 PROCEDURE — 6360000002 HC RX W HCPCS: Performed by: STUDENT IN AN ORGANIZED HEALTH CARE EDUCATION/TRAINING PROGRAM

## 2022-03-13 RX ORDER — CARVEDILOL 6.25 MG/1
6.25 TABLET ORAL 2 TIMES DAILY WITH MEALS
Status: DISCONTINUED | OUTPATIENT
Start: 2022-03-13 | End: 2022-03-14 | Stop reason: HOSPADM

## 2022-03-13 RX ADMIN — ENOXAPARIN SODIUM 40 MG: 100 INJECTION SUBCUTANEOUS at 08:25

## 2022-03-13 RX ADMIN — DESMOPRESSIN ACETATE 40 MG: 0.2 TABLET ORAL at 20:42

## 2022-03-13 RX ADMIN — FLUTICASONE PROPIONATE 1 PUFF: 110 AEROSOL, METERED RESPIRATORY (INHALATION) at 07:50

## 2022-03-13 RX ADMIN — PANTOPRAZOLE SODIUM 40 MG: 40 TABLET, DELAYED RELEASE ORAL at 05:54

## 2022-03-13 RX ADMIN — FLUTICASONE PROPIONATE 1 PUFF: 110 AEROSOL, METERED RESPIRATORY (INHALATION) at 19:35

## 2022-03-13 RX ADMIN — OXYCODONE HYDROCHLORIDE AND ACETAMINOPHEN 1 TABLET: 5; 325 TABLET ORAL at 05:54

## 2022-03-13 RX ADMIN — OXYCODONE HYDROCHLORIDE AND ACETAMINOPHEN 1 TABLET: 5; 325 TABLET ORAL at 10:04

## 2022-03-13 RX ADMIN — FLUTICASONE PROPIONATE 1 SPRAY: 50 SPRAY, METERED NASAL at 08:25

## 2022-03-13 RX ADMIN — Medication 1000 MCG: at 08:22

## 2022-03-13 RX ADMIN — HYDRALAZINE HYDROCHLORIDE 10 MG: 10 TABLET, FILM COATED ORAL at 00:40

## 2022-03-13 RX ADMIN — OXYCODONE HYDROCHLORIDE AND ACETAMINOPHEN 1 TABLET: 5; 325 TABLET ORAL at 15:58

## 2022-03-13 RX ADMIN — GUAIFENESIN 1200 MG: 600 TABLET, EXTENDED RELEASE ORAL at 08:24

## 2022-03-13 RX ADMIN — HYDRALAZINE HYDROCHLORIDE 10 MG: 10 TABLET, FILM COATED ORAL at 11:07

## 2022-03-13 RX ADMIN — SODIUM CHLORIDE, PRESERVATIVE FREE 10 ML: 5 INJECTION INTRAVENOUS at 20:43

## 2022-03-13 RX ADMIN — SODIUM CHLORIDE, PRESERVATIVE FREE 10 ML: 5 INJECTION INTRAVENOUS at 08:34

## 2022-03-13 RX ADMIN — CARVEDILOL 6.25 MG: 6.25 TABLET, FILM COATED ORAL at 18:48

## 2022-03-13 RX ADMIN — POLYETHYLENE GLYCOL 3350 17 G: 17 POWDER, FOR SOLUTION ORAL at 08:27

## 2022-03-13 RX ADMIN — DOCUSATE SODIUM 100 MG: 100 CAPSULE ORAL at 08:24

## 2022-03-13 RX ADMIN — DOCUSATE SODIUM 100 MG: 100 CAPSULE ORAL at 20:42

## 2022-03-13 RX ADMIN — GUAIFENESIN 1200 MG: 600 TABLET, EXTENDED RELEASE ORAL at 20:42

## 2022-03-13 RX ADMIN — CARVEDILOL 3.12 MG: 3.12 TABLET, FILM COATED ORAL at 08:22

## 2022-03-13 RX ADMIN — MAGNESIUM GLUCONATE 500 MG ORAL TABLET 400 MG: 500 TABLET ORAL at 08:24

## 2022-03-13 RX ADMIN — MECLIZINE HCL 12.5 MG 12.5 MG: 12.5 TABLET ORAL at 10:22

## 2022-03-13 RX ADMIN — CHLORHEXIDINE GLUCONATE 0.12% ORAL RINSE 15 ML: 1.2 LIQUID ORAL at 20:42

## 2022-03-13 RX ADMIN — QUETIAPINE FUMARATE 25 MG: 25 TABLET ORAL at 20:42

## 2022-03-13 RX ADMIN — CHLORHEXIDINE GLUCONATE 0.12% ORAL RINSE 15 ML: 1.2 LIQUID ORAL at 08:25

## 2022-03-13 RX ADMIN — LEVOTHYROXINE SODIUM 50 MCG: 50 TABLET ORAL at 05:54

## 2022-03-13 RX ADMIN — FOLIC ACID 1 MG: 1 TABLET ORAL at 08:24

## 2022-03-13 RX ADMIN — OXYCODONE HYDROCHLORIDE AND ACETAMINOPHEN 1 TABLET: 5; 325 TABLET ORAL at 20:42

## 2022-03-13 RX ADMIN — Medication 100 MG: at 08:23

## 2022-03-13 RX ADMIN — OXYCODONE HYDROCHLORIDE AND ACETAMINOPHEN 500 MG: 500 TABLET ORAL at 08:25

## 2022-03-13 RX ADMIN — Medication 1 TABLET: at 08:23

## 2022-03-13 RX ADMIN — Medication 3 MG: at 20:42

## 2022-03-13 ASSESSMENT — ENCOUNTER SYMPTOMS
ABDOMINAL PAIN: 0
WHEEZING: 0
NAUSEA: 0
VOMITING: 0
COUGH: 0
SHORTNESS OF BREATH: 0

## 2022-03-13 ASSESSMENT — PAIN DESCRIPTION - PAIN TYPE
TYPE: ACUTE PAIN;SURGICAL PAIN
TYPE: ACUTE PAIN;SURGICAL PAIN

## 2022-03-13 ASSESSMENT — PAIN SCALES - GENERAL
PAINLEVEL_OUTOF10: 7
PAINLEVEL_OUTOF10: 6

## 2022-03-13 ASSESSMENT — PAIN DESCRIPTION - LOCATION
LOCATION: HIP;KNEE
LOCATION: HIP;KNEE

## 2022-03-13 ASSESSMENT — PAIN DESCRIPTION - ORIENTATION
ORIENTATION: RIGHT
ORIENTATION: RIGHT

## 2022-03-13 NOTE — PROGRESS NOTES
includes cyst removal; Ankle surgery; knee surgery (Right); Coronary angioplasty with stent (N/A, 10/10/2015); Knee Arthroplasty (Right, 04/13/2021); hip surgery (Right, 03/08/2022); and Femur fracture surgery (Right, 3/8/2022). Restrictions  Restrictions/Precautions  Restrictions/Precautions: Fall Risk,Weight Bearing  Required Braces or Orthoses?: No  Lower Extremity Weight Bearing Restrictions  Right Lower Extremity Weight Bearing: Weight Bearing As Tolerated  Position Activity Restriction  Other position/activity restrictions: up with assistance; s/p R femur CMN insertion/ORIF 03/08/2022, on oxygen. Subjective   General  Patient assessed for rehabilitation services?: Yes  Family / Caregiver Present: No  General Comment  Comments: RN ok'd pt for OT tx this date. Pt agreeable to session and pleasant/cooperative throughout  Pain Assessment  Pain Level: 6  Pain Type: Acute pain;Surgical pain  Pain Location: Hip;Knee  Pain Orientation: Right  Non-Pharmaceutical Pain Intervention(s): Ambulation/Increased Activity; Distraction; Emotional support;Repositioned  Vital Signs  Patient Currently in Pain: Yes   Orientation  Orientation  Overall Orientation Status: Within Functional Limits  Objective    ADL  Grooming: Setup; Increased time to complete;Contact guard assistance (Pt. transferred to EOB for grooming activity, S + verbal cues for initiation to complete action, CGA for stability. Increased time and effort.)  Toileting: Maximum assistance;Setup; Increased time to complete (Pt. had urge for BM during bed mobility, Max A for bed toro/brief management/to get cleaned up, pt. able to complete bed mobility at mod A-min A level + lots of cues for tech. Urinal usage sitting in SS, CGA + setup.)        Balance  Sitting Balance: Contact guard assistance (Sat EOB ~30 minutes, static/dynamic, using UE support of SS as needed, C/o dizziness initially which subsided within a few minutes.  CGA once established.)  Standing Balance: Maximum assistance (x2 in SS.)  Standing Balance  Time: ~30 sec, 2 times  Activity: static standing in SS  Comment: Pt. ridged and stiff, needing verbal cues to pull not push on SS bar. Max A x 2 + verb cues, pt. demo flexed at B hips/neck. Bed mobility  Rolling to Left: Moderate assistance  Rolling to Right: Moderate assistance  Supine to Sit: Moderate assistance;2 Person assistance  Comment: Bed rail  Transfers  Sit to stand: Maximum assistance;2 Person assistance  Stand to sit: 2 Person assistance; Moderate assistance  Transfer Comments: EOB->stand->sat in SS->stood in SS->recliner chair. Cognition  Overall Cognitive Status: Exceptions  Following Commands:  Follows one step commands with repetition  Attention Span: Attends with cues to redirect  Problem Solving: Assistance required to correct errors made;Assistance required to generate solutions;Assistance required to implement solutions  Insights: Fully aware of deficits  Initiation: Requires cues for all  Sequencing: Requires cues for all                                         Plan   Plan  Times per week: 3-5 x/wk  Current Treatment Recommendations: Strengthening,Balance Training,Functional Mobility Training,Endurance Training,Safety Education & Training,Self-Care / Daylene Rasp Reorientation,Equipment Evaluation, Education, & procurement,Patient/Caregiver Education & Training                                               AM-PAC Score        AM-[facility] Inpatient Daily Activity Raw Score: 17 (03/13/22 1301)  AM-PAC Inpatient ADL T-Scale Score : 37.26 (03/13/22 1301)  ADL Inpatient CMS 0-100% Score: 50.11 (03/13/22 1301)  ADL Inpatient CMS G-Code Modifier : CK (03/13/22 1301)    Goals  Short term goals  Time Frame for Short term goals: By discharge, pt will:  Short term goal 1: Demo Min A for bed mobility maneuvers to increase independence with ADLs/IADLs  Short term goal 2: Demo Mod A for functional transfers with use of LRD for improved participation in ADLs such as toileting  Short term goal 3: Demo SBA for UB ADLs while seated unsupported with AE use PRN  Short term goal 4: Demo Min A for LB ADLs while seated unsupported with AE use PRN  Short term goal 5: Demo 15+ min dynamic sitting and reaching outside KIRSTEN in all planes with SUP for improved sitting balance for performance of ADLs/IADLs       Therapy Time   Individual Concurrent Group Co-treatment   Time In 1000         Time Out 1040         Minutes 40         Timed Code Treatment Minutes: 124 [facility], CATARINA/L

## 2022-03-13 NOTE — PROGRESS NOTES
Physical Therapy  Facility/Department: [facility] 4A STEPDOWN  Daily Treatment Note  NAME: Avani Martel  : 1950  MRN: [id number]    Date of Service: 3/13/2022    Discharge Recommendations:  Patient would benefit from continued therapy after discharge        Assessment   Body structures, Functions, Activity limitations: Decreased functional mobility ; Decreased ROM; Decreased strength;Decreased safe awareness;Decreased endurance;Decreased balance; Increased pain;Decreased posture;Decreased coordination  Assessment: Rolling and supine to sit is requiring less assistance. Patient however, moves poorly with weakness and contractures. Patient needs further PT to regain functional independence. PT Education: Goals;PT Role;Plan of Care;General Safety;Transfer Training;Functional Mobility Training  REQUIRES PT FOLLOW UP: Yes  Activity Tolerance  Activity Tolerance: Patient limited by pain; Patient limited by fatigue     Patient Diagnosis(es): The primary encounter diagnosis was Hyponatremia. Diagnoses of Generalized weakness, Nephrolithiasis, Oxygen desaturation, Closed intertrochanteric fracture of hip, right, initial encounter ([address] Anxiety, and Narcolepsy due to underlying condition without cataplexy were also pertinent to this visit. has a past medical history of ADHD (attention deficit hyperactivity disorder), ADHD (attention deficit hyperactivity disorder), Atypical chest pain, Chronic bronchitis ([address] Hypertension, Hyponatremia, Meniere's disease, Narcolepsy, Nasal fracture, PND (post-nasal drip), SOB (shortness of breath), Tibia fracture, and Transaminasemia. has a past surgical history that includes cyst removal; Ankle surgery; knee surgery (Right); Coronary angioplasty with stent (N/A, 10/10/2015); Knee Arthroplasty (Right, 2021); hip surgery (Right, 2022); and Femur fracture surgery (Right, 3/8/2022).     Restrictions  Restrictions/Precautions  Restrictions/Precautions: Fall Risk,Weight Bearing  Required Braces or Orthoses?: No  Lower Extremity Weight Bearing Restrictions  Right Lower Extremity Weight Bearing: Weight Bearing As Tolerated  Position Activity Restriction  Other position/activity restrictions: up with assistance; s/p R femur CMN insertion/ORIF 03/08/2022, on oxygen. Subjective   General  Chart Reviewed: Yes  Family / Caregiver Present: No  Pain Screening  Patient Currently in Pain: Yes  Pain Assessment  Pain Assessment: 0-10  Pain Level: 6  Pain Type: Acute pain;Surgical pain  Pain Location: Hip;Knee  Pain Orientation: Right  Vital Signs  Patient Currently in Pain: Yes            Cognition   Cognition  Overall Cognitive Status: Exceptions  Following Commands: Follows one step commands with repetition  Attention Span: Attends with cues to redirect  Problem Solving: Assistance required to correct errors made;Assistance required to generate solutions;Assistance required to implement solutions  Insights: Fully aware of deficits  Initiation: Requires cues for all  Sequencing: Requires cues for all  Objective   Bed mobility  Rolling to Left: Moderate assistance  Rolling to Right: Moderate assistance  Supine to Sit: Moderate assistance;2 Person assistance  Transfers  Sit to Stand: Maximum Assistance;2 Person Assistance  Stand to sit: Moderate Assistance  Bed to Chair: Dependent/Total  Comment: Moved to chair via [place]. Nurse Brain Oven aware.   Ambulation  Ambulation?: No (Unable)        Exercises  Heelslides: left x10 reps, right x5 AAROM  Ankle Pumps: x10            AM-PAC Score  AM-PAC Inpatient Mobility Raw Score : 10 (03/13/22 1118)  AM-PAC Inpatient T-Scale Score : 32.29 (03/13/22 1118)  Mobility Inpatient CMS 0-100% Score: 76.75 (03/13/22 1118)  Mobility Inpatient CMS G-Code Modifier : CL (03/13/22 1118)          Goals  Short term goals  Time Frame for Short term goals: 12 visits  Short term goal 1: independent bed mobility  Short term goal 2: independent transfers, possibly via sliding board  Short term goal 3: progress to upright standing activities/gait with appropriate device and mod A+2 as pt tolerates  Short term goal 4: WC mobility x 25' independently  Patient Goals   Patient goals : be able to ambulate    Plan    Plan  Times per week: 5 visits weekly  Times per day: Daily  Current Treatment Recommendations: Strengthening,ROM,Balance Training,Functional Mobility Training,Transfer Training,Endurance Training,Wheelchair Mobility Training,Gait Training,Pain Management,Home Exercise Program,Safety Education & Training,Patient/Caregiver Education & Training,Positioning  Safety Devices  Type of devices: Call light within reach,Gait belt,Patient at risk for falls,Nurse notified,Left in chair,All fall risk precautions in place  Restraints  Initially in place: No     Therapy Time   Individual Concurrent Group Co-treatment   Time In 0955, 235pm         Time Out 1045, 250pm         Minutes 50         Timed Code Treatment Minutes: 120 Hiren Glass, PT

## 2022-03-13 NOTE — PROGRESS NOTES
Orthopedic Progress Note    Patient:  Oscar Sultana  YOB: 1950     81 y.o. male    Subjective:  Patient seen and examined, Nursing currently changing bandages. They have been doing dressing changes daily for mild drainage. Nursing states draining is resolving. Pain controlled on current regimen. Denies fever, HA, CP, SOB, N/V, dysuria  Denies bowel movement, passing flatus  Did not work with PT yesterday  Was transfused 1uPRBC for Hgb of 6.6 yesterday  Was accepted and is pending placement to LTAC    Vitals reviewed    Objective:   Vitals:    03/13/22 0430   BP: (!) 141/71   Pulse: 60   Resp: 13   Temp: 99.2 °F (37.3 °C)   SpO2: 95%       Gen: NAD, cooperative     Cardiovascular: Regular rate    Respiratory: Chest symmetric, no accessory muscle use, normal respirations, no audible wheezes    MSK: RLE: Incisions are well approximated with staples. No dehiscence, erythema, or sign of infection. Mild tenderness about the thigh. Mild post operative hematoma about the thigh on palpation. EHL/FHL/TA/GSC motor intact. Sensation intact to sural/saph/SPN/DPN distribution. DP/PT pulses 2+. Recent Labs     03/12/22  0521 03/12/22  0930 03/12/22  0930 03/12/22  1735   WBC  --  14.1*  --   --    HGB  --  6.6*   < > 7.9*   HCT  --  23.1*   < > 26.7*   PLT  --  334  --   --      --   --   --    K 3.7  --   --   --    BUN 9  --   --   --    CREATININE 0.55*  --   --   --    GLUCOSE 95  --   --   --     < > = values in this interval not displayed. DVT ppx: Lovenox, and Plavix. Plavix held at this time    See rec for complete list    Impression/plan: 81 y.o. male who fell from wheelchair being seen for:     -Right IT fracture s/p R femur CMN insertion/ORIF, POD#5    -Maintain dressings. Nursing to change/reinforce dressings as needed  -WB status: WBAT RLE  -Pain control/DVT ppx/ABX per primary  -Hgb 6.6 yesterday. 7.9 post transfusion  -Will continue to monitor Hgb.   Recommend transfusion for Hgb <7  -Ice (20 min, 1 hour off) for edema/pain control  -Encourage deep breathing and incentive spirometry use  -Continue to work with PT/OT  -Patient will be focused on pain control//transfer status rather than ambulatory status as patient has not ambulated >5 years  -OK to DC to facility/nursing home per ortho team  -Follow up with Dr. Veena Farley in office 10-14 days after surgery  -Please page ortho with any questions    Bert Chaparro DO   Orthopedic Surgery Resident PGY-1  [facility], [facility]

## 2022-03-13 NOTE — PROGRESS NOTES
Occupational [address]  Occupational Therapy Not Seen Note    DATE: 3/13/2022    NAME: Stephanie Peñaloza  MRN: [id number]   : 1950      Patient not seen this date for Occupational Therapy due to: Attempted to see this am, pt. C/o not feeling well, requesting to come back later in day. RN  notified.     Electronically signed by KENDAL Moya on 3/13/2022 at 8:48 AM

## 2022-03-13 NOTE — PROGRESS NOTES
[facility]  Office: [address], DO, Leslie Jordan, DO, Humberto Mariee, DO, Marlo Kanner Champ, DO, Charlie Hills MD, Rebecca Pruitt MD, Kirstin Mccrary MD, Allyson Borja MD, Juana Khan MD, Ernestina Mason MD, Alcon Cunha MD, Desirae Joe, DO, Amy Menchaca, DO, Armando Candelaria MD,  Sarah Thrasher DO, Genevieve Brantley MD, Kristi Hernandez MD, Emily Vazquez MD, Kelly Bhatti MD, Virginia Stack MD, Lopez [place], [facility], [facility] Col[facility], CNP, Alex Sinclair, CNP, Anjali Godoy, CNS, Yashira Vee, CNP, Anthony Liriano, CNP, Camille Patel, CNP, Pratik Minor, CNP, Kirby Billy, CNP, Naheed Ortega PA-C, Yonatan Shaikh [facility], Piter Fajardo [facility], Blake Bunn, CNP, Lakesha Hernandez, CNP, Jered Ferreira, CNP, Leanne Ceballos, CNP, [place], [facility], Carmine Miranda, [address] [place] [address]    Progress Note    3/13/2022    9:54 AM    Name:   Ana Franco  MRN:     [id number]     Acct:      [de-identified]   Room:   [address] [place] [address] Day:  8  Admit Date:  3/5/2022  3:47 PM    PCP:   Malcolm Dixon DO  Code Status:  Full Code    Subjective:     C/C:   Chief Complaint   Patient presents with    Fatigue     Interval History Status:   Improving  Pain controlled  Still nonambulatory  Weaned from high flow oxygen to nasal cannula    Data Base Updates:  Wwgamad53ee/dL     BUN9mg/dL   CREATININE0.53 Low mg/dL     Calcium8.7mg/dL   Zyhaqs184ravw/L   Potassium4.0     WBC13.5 High k/uL RBC2.80 Low m/uL Hemoglobin7. 7 Low g/dL Rzsmwqgvpe40.4 Low          Brief History:     As documented in the medical record:  \"81year-old wheelchair-bound  male with underlying HFrEF, Severe knee OA, narcolepsy who presents to the hospital with concern for generalized weakness. Patient reports since his knee replacement surgery a few years ago he was never able to ambulate at baseline requiring him to be overall wheelchair-bound.   He says over the past month his appetite's been poor and he feels he is becoming weaker. He says every time he goes from the wheelchair to his bed he struggles to lift himself over and ends up falling. He denies any loss of consciousness, head trauma, joint swelling, shortness of breath, palpitations or dizziness. He does not report any weight loss, night sweats and says over the past few years his appetite is not like it used to be. He pays out-of-pocket for home aide to come and cook for him, he reports he has access to food and can manage himself but just does not have energy to do so. Patient reports compliance to all his meds. Says he needs physical therapy but does not want placement in a nursing home. Patient denies any other symptoms at this time. \"    The intitial assessment and plan included:  \"  Hospital Problems            Last Modified POA     * (Principal) Hyponatremia 3/5/2022 Yes     Narcolepsy 3/6/2022 Yes     Cardiomyopathy ([address] 3/6/2022 Yes     HTN (hypertension) 3/6/2022 Yes     Chronic obstructive pulmonary disease ([address] 3/6/2022 Yes     Dementia with behavioral disturbance ([address] 3/6/2022 Yes     Fracture of femoral neck, right, closed ([address] 3/6/2022 Yes           Plan:   1. Hyponatremia- patient appears dry, will resume IVF 0.9% NS at 50 ml/hr, monitor sodium tomorrow. He had diarrhea for days before presentation  2. Right femoral neck fracture- on Xrays, change Norco to Percocet, consulted Ortho, appreciate Cardiac risk stratification. NPO for OR today, negative LE dopplers  3. CMP- EF 20% on previous Echo, appreciate Cardiology consult, AICD interrogation, hold Lasix for now, recent stress test 4/2021 normal, repeat Echo- poor images, but EF improved 68%, intermediate risk for surgery  4. COPD- stable, oxygen prn, Dubonebs prn, resume Mucinex  5. HTN- BP stable  6. Narcolepsy- Nuvigil  7. Meniere's disease- resume Antivert and Ativan 1mg po q 8 hrs prn  8. Dementia- pt unable to care for self, needs SNF  9. Gi/ DVT proph  10.  Will need PT/OT\"     Additional database has included:  Echocardiogram:  Summary  Technically very challenging, there is poor endocardial definition, cannot  comment on wall motion analysis, within above limitations:  Left ventricle is normal in size. Global left ventricular systolic function  is normal. Calculated ejection fraction 68% by Rashid's method. Valves not well visualized. No obvious significant valvular regurgitation or stenosis seen. Results for Golden Garrett (MRN [id number]) as of 3/8/2022 15:49   Ref. Range 3/5/2022 23:18 3/6/2022 04:32 3/6/2022 14:11 3/7/2022 03:32 3/8/2022 04:21   Sodium Latest Ref Range: 135 - 144 mmol/L 127 (L) 129 (L) 129 (L) 131 (L) 134 (L)     The patient did have an episode of postoperative hypoxia  He responded to respiratory therapy and optimization of his cardiopulmonary function    3/12:  Hemoglobin dropped to 6.6  Patient was transfused with 1 unit of RBCs    This condition was stabilized  Discharge planning initiated    Past Medical History:   has a past medical history of ADHD (attention deficit hyperactivity disorder), ADHD (attention deficit hyperactivity disorder), Atypical chest pain, Chronic bronchitis ([address] Hypertension, Hyponatremia, Meniere's disease, Narcolepsy, Nasal fracture, PND (post-nasal drip), SOB (shortness of breath), Tibia fracture, and Transaminasemia. Social History:   reports that he has been smoking cigarettes. He has a 38.00 pack-year smoking history. He has never used smokeless tobacco. He reports current alcohol use of about 16.0 standard drinks of alcohol per week. He reports that he does not use drugs. Family History:   Family History   Problem Relation Age of Onset    Heart Disease Mother         heart attack    Heart Disease Father        Review of Systems:     Review of Systems   Constitutional: Positive for activity change (Still not ambulating). Respiratory: Negative for cough, shortness of breath and wheezing. Cardiovascular: Negative for chest pain and palpitations. Gastrointestinal: Negative for abdominal pain, nausea and vomiting. Genitourinary: Negative for flank pain and hematuria. Musculoskeletal: Positive for arthralgias, gait problem (Unsteady, patient reports frequent falls, he uses a wheelchair at home for balance) and myalgias. His right hip is less stiff and sore   Neurological: Negative for dizziness (His Ménière's has been stable). Psychiatric/Behavioral: Positive for sleep disturbance (Known history of narcolepsy). Physical Examination:        Vitals  BP (!) 170/69   Pulse 64   Temp 99.7 °F (37.6 °C) (Temporal)   Resp 17   Ht 5' 10\" (1.778 m)   Wt 216 lb 11.4 oz (98.3 kg)   SpO2 95%   BMI 31.09 kg/m²   Temp (24hrs), Av.3 °F (37.4 °C), Min:98 °F (36.7 °C), Max:99.7 °F (37.6 °C)    No results for input(s): POCGLU in the last 72 hours. Physical Exam  Vitals reviewed. Constitutional:       General: He is not in acute distress. Appearance: He is not diaphoretic. HENT:      Head: Normocephalic. Nose: Nose normal.   Eyes:      General: No scleral icterus. Conjunctiva/sclera: Conjunctivae normal.   Neck:      Trachea: No tracheal deviation. Cardiovascular:      Rate and Rhythm: Normal rate and regular rhythm. Pulmonary:      Effort: Pulmonary effort is normal. No respiratory distress. Breath sounds: Normal breath sounds. No wheezing or rales. Chest:      Chest wall: No tenderness. Abdominal:      General: There is no distension. Palpations: Abdomen is soft. Tenderness: There is no abdominal tenderness. Musculoskeletal:         General: Tenderness (Incisional) and deformity ([place]) present. Cervical back: Neck supple. Skin:     General: Skin is warm and dry. Findings: Rash (Onychomycosis) present.       Comments: Wound is dressed, dry and clean    Neurological:      Comments: Mild cognitive impairment Medications: Allergies: Allergies   Allergen Reactions    Motrin [Ibuprofen Micronized]      Pt states he had blood in stool from motrin       Current Meds:   Scheduled Meds:    guaiFENesin  1,200 mg Oral BID    pantoprazole  40 mg Oral QAM AC    chlorhexidine  15 mL Mouth/Throat BID    cyanocobalamin  1,000 mcg Oral Daily    docusate sodium  100 mg Oral BID    fluticasone  1 spray Each Nostril Daily    fluticasone  1 puff Inhalation BID    folic acid  1 mg Oral Daily    magnesium oxide  400 mg Oral Daily    sodium chloride  1 g Oral BID WC    vitamin C  500 mg Oral Daily    Armodafinil  150 mg Oral Daily    atorvastatin  40 mg Oral Nightly    carvedilol  3.125 mg Oral BID WC    [Held by provider] clopidogrel  75 mg Oral Daily    levothyroxine  50 mcg Oral Daily    therapeutic multivitamin-minerals  1 tablet Oral Daily    QUEtiapine  25 mg Oral Nightly    thiamine  100 mg Oral Daily    sodium chloride flush  5-40 mL IntraVENous 2 times per day    enoxaparin  40 mg SubCUTAneous Daily     Continuous Infusions:    sodium chloride      sodium chloride       PRN Meds: sodium chloride, hydrALAZINE, cloNIDine, LORazepam, LORazepam, meclizine, melatonin, traMADol, oxyCODONE-acetaminophen, morphine, albuterol sulfate HFA, sodium chloride flush, sodium chloride, potassium chloride **OR** potassium alternative oral replacement **OR** potassium chloride, magnesium sulfate, ondansetron **OR** ondansetron, polyethylene glycol, acetaminophen **OR** acetaminophen    Data:     I/O (24Hr):     Intake/Output Summary (Last 24 hours) at 3/13/2022 0954  Last data filed at 3/13/2022 0657  Gross per 24 hour   Intake 1137.49 ml   Output 700 ml   Net 437.49 ml       Labs:  Hematology:  Recent Labs     03/12/22  0930 03/12/22  1735 03/13/22  0646   WBC 14.1*  --  13.5*   RBC 2.50*  --  2.80*   HGB 6.6* 7.9* 7.7*   HCT 23.1* 26.7* 25.4*   MCV 92.4  --  90.7   MCH 26.4  --  27.5   MCHC 28.6  --  30.3   RDW 21.0*  --  19.7*     --  368   MPV 9.3  --  9.3     Chemistry:  Recent Labs     03/11/22  0632 03/12/22  0521 03/13/22  0529    135 142   K 4.0 3.7 4.0   * 109* 113*   CO2 18* 18* 18*   GLUCOSE 101* 95 92   BUN 12 9 9   CREATININE 0.66* 0.55* 0.53*   ANIONGAP 9 8* 11   LABGLOM >60 >60 >60   GFRAA >60 >60 >60   CALCIUM 8.4* 8.4* 8.7     No results for input(s): PROT, LABALBU, LABA1C, V6QWKNR, M9VXDUT, FT4, TSH, AST, ALT, LDH, GGT, ALKPHOS, LABGGT, BILITOT, BILIDIR, AMMONIA, AMYLASE, LIPASE, LACTATE, CHOL, HDL, LDLCHOLESTEROL, CHOLHDLRATIO, TRIG, VLDL, SIA11AV, PHENYTOIN, PHENYF, URICACID, POCGLU in the last 72 hours. ABG:  Lab Results   Component Value Date    POCPH 7.54 10/24/2015    POCPCO2 35 10/24/2015    POCPO2 71 10/24/2015    POCHCO3 29.9 10/24/2015    NBEA NOT REPORTED 10/24/2015    PBEA 7 10/24/2015    BCJ6HMY 31 10/24/2015    UJTA4QCG 96 10/24/2015    FIO2 30.0 10/26/2015     Lab Results   Component Value Date/Time    SPECIAL NOT REPORTED 03/24/2020 10:15 PM     Lab Results   Component Value Date/Time    CULTURE NORMAL RESPIRATORY ORI LIGHT GROWTH 10/23/2015 04:04 PM    CULTURE  10/23/2015 04:04 PM     [employer] [address] [address] [phone]       Radiology:  XR HIP RIGHT (2-3 VIEWS)    Result Date: 3/6/2022  Comminuted intertrochanteric fracture right femoral neck No other acute bony or joint abnormality     XR FEMUR RIGHT (MIN 2 VIEWS)    Result Date: 3/6/2022  Comminuted intertrochanteric fracture right femoral neck No other acute bony or joint abnormality     XR KNEE RIGHT (1-2 VIEWS)    Result Date: 3/6/2022  Comminuted intertrochanteric fracture right femoral neck No other acute bony or joint abnormality     XR ANKLE LEFT (MIN 3 VIEWS)    Result Date: 3/6/2022  No acute osseous or soft tissue abnormality.      XR FOOT LEFT (MIN 3 VIEWS)    Result Date: 3/6/2022  Irregularity of the 1st distal phalanx that may relate to an acute fracture and correlation with point tenderness is needed. XR CHEST PORTABLE    Result Date: 3/5/2022  Marginal inspiration, without evidence of acute cardiopulmonary disease     CT CHEST PULMONARY EMBOLISM W CONTRAST    Result Date: 3/5/2022  1. No evidence of pulmonary embolism 2. Dependent atelectasis, right lung base 3. Diffuse emphysematous changes present throughout the lungs, with an upper lobe predominance 4. Extensive coronary arterial calcifications 5. Cholelithiasis, without evidence of cholecystitis 6. Nodular contour to the liver, suggesting cirrhosis     XR HIP 2-3 VW W PELVIS LEFT    Result Date: 3/6/2022  Comminuted intertrochanteric fracture of the right proximal femur.        Assessment:        Primary Problem  Closed intertrochanteric fracture of hip, right, initial encounter [facility])    Active Hospital Problems    Diagnosis Date Noted    Hyponatremia [E87.1]      Priority: High    Acute postoperative anemia due to expected blood loss [D62]     Acute respiratory failure with hypoxemia (HCC) [J96.01] 03/09/2022    Atelectasis [J98.11] 03/09/2022    Accelerated junctional rhythm [I49.8] 03/08/2022    Presence of permanent cardiac pacemaker [Z95.0] 03/08/2022    Anemia, normocytic normochromic [D64.9]     Closed intertrochanteric fracture of hip, right, initial encounter ([facility] [address] Roger Peoples     S/p bare metal coronary artery stent [Z95.5] 11/10/2015    Dementia with behavioral disturbance ([facility] [address] [F03.91]     Chronic obstructive pulmonary disease ([address] [J44.9]     HTN (hypertension) [I10] 10/12/2015    Cardiomyopathy ([facility] [address] [I42.9] 10/12/2015    Narcolepsy [G47.419]     Meniere's disease [H81.09] 01/22/2014       Plan:        Transfuse as needed to maintain hemoglobin greater than 7.0  Ortho eval & f/u Dr Pathak Mark 10-14 days  Pain management  Minimize opioids   Resume Plavix when cleared by Ortho   Respiratory Therapy and Bronchodilators prn  DuoNeb  Incentive spirometry  BiPAP as needed  Cardiology eval & f/u as scheduled:  CAD, junctional rhythm:  TCC  Correct electrolyte abnormalities  Blood Pressure - Monitor and control  PT/OT  Fall precautions  Palliative care consult  DVT prophylaxis  Discharge planning  Precertification in process  Will discharge when arrangements complete and ok with other services.   Follow-up with PCP in one week, Daljit Montilla DO  Notify PCP of discharge   Med rec done  JOANNA done  DCP 34 min+    IP CONSULT TO HOSPITALIST  IP CONSULT TO PULMONOLOGY  IP CONSULT TO DIETITIAN  IP CONSULT TO PALLIATIVE CARE  IP CONSULT TO SOCIAL WORK  IP CONSULT TO ORTHOPEDIC SURGERY  IP CONSULT TO Shawna  IP CONSULT TO CARDIOLOGY  IP CONSULT TO Nash Matute DO  3/13/2022  9:54 AM

## 2022-03-13 NOTE — PLAN OF CARE
Problem: Infection - Surgical Site:  Goal: Will show no infection signs and symptoms  Description: Will show no infection signs and symptoms  Outcome: Ongoing     Problem: Mobility - Impaired:  Goal: Mobility will improve to maximum level  Description: Mobility will improve to maximum level  Outcome: Ongoing     Problem: Pain - Acute:  Goal: Pain level will decrease  Description: Pain level will decrease  Outcome: Ongoing     Problem: Venous Thromboembolism:  Goal: Absence of deep vein thrombosis  Description: Absence of deep vein thrombosis  Outcome: Ongoing     Problem: Pain:  Goal: Pain level will decrease  Description: Pain level will decrease  Outcome: Ongoing  Goal: Control of acute pain  Description: Control of acute pain  Outcome: Ongoing  Goal: Control of chronic pain  Description: Control of chronic pain  Outcome: Ongoing     Problem: Skin Integrity:  Goal: Will show no infection signs and symptoms  Description: Will show no infection signs and symptoms  Outcome: Ongoing  Goal: Absence of new skin breakdown  Description: Absence of new skin breakdown  Outcome: Ongoing     Problem: Falls - Risk of:  Goal: Will remain free from falls  Description: Will remain free from falls  Outcome: Ongoing  Goal: Absence of physical injury  Description: Absence of physical injury  Outcome: Ongoing

## 2022-03-14 VITALS
DIASTOLIC BLOOD PRESSURE: 78 MMHG | RESPIRATION RATE: 27 BRPM | HEART RATE: 69 BPM | OXYGEN SATURATION: 97 % | TEMPERATURE: 97.7 F | BODY MASS INDEX: 31.03 KG/M2 | HEIGHT: 70 IN | WEIGHT: 216.71 LBS | SYSTOLIC BLOOD PRESSURE: 156 MMHG

## 2022-03-14 PROCEDURE — 6370000000 HC RX 637 (ALT 250 FOR IP): Performed by: INTERNAL MEDICINE

## 2022-03-14 PROCEDURE — 6360000002 HC RX W HCPCS: Performed by: STUDENT IN AN ORGANIZED HEALTH CARE EDUCATION/TRAINING PROGRAM

## 2022-03-14 PROCEDURE — 6370000000 HC RX 637 (ALT 250 FOR IP): Performed by: STUDENT IN AN ORGANIZED HEALTH CARE EDUCATION/TRAINING PROGRAM

## 2022-03-14 PROCEDURE — 99239 HOSP IP/OBS DSCHRG MGMT >30: CPT | Performed by: INTERNAL MEDICINE

## 2022-03-14 PROCEDURE — 2580000003 HC RX 258: Performed by: STUDENT IN AN ORGANIZED HEALTH CARE EDUCATION/TRAINING PROGRAM

## 2022-03-14 PROCEDURE — 94761 N-INVAS EAR/PLS OXIMETRY MLT: CPT

## 2022-03-14 PROCEDURE — 2700000000 HC OXYGEN THERAPY PER DAY

## 2022-03-14 PROCEDURE — 94640 AIRWAY INHALATION TREATMENT: CPT

## 2022-03-14 RX ORDER — CARVEDILOL 6.25 MG/1
6.25 TABLET ORAL 2 TIMES DAILY WITH MEALS
Qty: 60 TABLET | Refills: 3 | DISCHARGE
Start: 2022-03-14

## 2022-03-14 RX ADMIN — MAGNESIUM GLUCONATE 500 MG ORAL TABLET 400 MG: 500 TABLET ORAL at 09:05

## 2022-03-14 RX ADMIN — FLUTICASONE PROPIONATE 1 SPRAY: 50 SPRAY, METERED NASAL at 09:07

## 2022-03-14 RX ADMIN — FLUTICASONE PROPIONATE 1 PUFF: 110 AEROSOL, METERED RESPIRATORY (INHALATION) at 08:45

## 2022-03-14 RX ADMIN — SODIUM CHLORIDE TAB 1 GM 1 G: 1 TAB at 10:49

## 2022-03-14 RX ADMIN — LEVOTHYROXINE SODIUM 50 MCG: 50 TABLET ORAL at 09:06

## 2022-03-14 RX ADMIN — OXYCODONE HYDROCHLORIDE AND ACETAMINOPHEN 1 TABLET: 5; 325 TABLET ORAL at 09:19

## 2022-03-14 RX ADMIN — Medication 1 TABLET: at 09:05

## 2022-03-14 RX ADMIN — OXYCODONE HYDROCHLORIDE AND ACETAMINOPHEN 1 TABLET: 5; 325 TABLET ORAL at 15:16

## 2022-03-14 RX ADMIN — FOLIC ACID 1 MG: 1 TABLET ORAL at 09:06

## 2022-03-14 RX ADMIN — DOCUSATE SODIUM 100 MG: 100 CAPSULE ORAL at 09:06

## 2022-03-14 RX ADMIN — OXYCODONE HYDROCHLORIDE AND ACETAMINOPHEN 1 TABLET: 5; 325 TABLET ORAL at 18:51

## 2022-03-14 RX ADMIN — OXYCODONE HYDROCHLORIDE AND ACETAMINOPHEN 500 MG: 500 TABLET ORAL at 09:07

## 2022-03-14 RX ADMIN — CARVEDILOL 6.25 MG: 6.25 TABLET, FILM COATED ORAL at 18:15

## 2022-03-14 RX ADMIN — Medication 1000 MCG: at 09:05

## 2022-03-14 RX ADMIN — CARVEDILOL 6.25 MG: 6.25 TABLET, FILM COATED ORAL at 09:06

## 2022-03-14 RX ADMIN — SODIUM CHLORIDE, PRESERVATIVE FREE 10 ML: 5 INJECTION INTRAVENOUS at 09:07

## 2022-03-14 RX ADMIN — Medication 100 MG: at 09:05

## 2022-03-14 RX ADMIN — ENOXAPARIN SODIUM 40 MG: 100 INJECTION SUBCUTANEOUS at 09:06

## 2022-03-14 RX ADMIN — PANTOPRAZOLE SODIUM 40 MG: 40 TABLET, DELAYED RELEASE ORAL at 09:05

## 2022-03-14 RX ADMIN — GUAIFENESIN 1200 MG: 600 TABLET, EXTENDED RELEASE ORAL at 09:06

## 2022-03-14 ASSESSMENT — PAIN SCALES - GENERAL
PAINLEVEL_OUTOF10: 6
PAINLEVEL_OUTOF10: 0
PAINLEVEL_OUTOF10: 7
PAINLEVEL_OUTOF10: 5
PAINLEVEL_OUTOF10: 5
PAINLEVEL_OUTOF10: 7
PAINLEVEL_OUTOF10: 0

## 2022-03-14 ASSESSMENT — ENCOUNTER SYMPTOMS
SHORTNESS OF BREATH: 1
NAUSEA: 0
COUGH: 0
VOMITING: 0
WHEEZING: 0
ABDOMINAL PAIN: 0

## 2022-03-14 NOTE — PLAN OF CARE
Problem: Nutrition  Goal: Optimal nutrition therapy  Description: Nutrition Problem #1: Inadequate oral intake  Intervention: Food and/or Nutrient Delivery: Continue Current Diet,Start Oral Nutrition Supplement  Nutritional Goals: Meet 50% or greater of estimated nutrition needs     3/13/2022 2123 by Kellie Ariza RN  Outcome: Ongoing  3/13/2022 2026 by Jessy Limon RN  Outcome: Ongoing     Problem: Infection - Surgical Site:  Goal: Will show no infection signs and symptoms  Description: Will show no infection signs and symptoms  Outcome: Ongoing     Problem: Injury - Risk of, Postfracture Complications:  Goal: Absence of fat embolism  Description: Absence of fat embolism  3/13/2022 2123 by Kellie Ariza RN  Outcome: Ongoing  3/13/2022 2026 by Jessy Limon RN  Outcome: Ongoing  Goal: Absence of compartment syndrome signs and symptoms  Description: Absence of compartment syndrome signs and symptoms  Outcome: Ongoing     Problem: Pain - Acute:  Goal: Pain level will decrease  Description: Pain level will decrease  3/13/2022 2123 by Kellie Ariza RN  Outcome: Ongoing  3/13/2022 2026 by Jessy Limon RN  Outcome: Ongoing

## 2022-03-14 NOTE — PROGRESS NOTES
[facility]  Office: [address], DO, Fermin Botello, DO, Audra Boast, DO, Kim Metcalfbrenda Blood, DO, Deb Hodgson MD, Gila Elizalde MD, Marcus Knowles MD, Gisella Roper MD, Myra Gracia MD, Portia Foley MD, Comfort Escalera MD, Daniel Marques, DO, Estiven Rossi, DO, Santana Moscoso MD,  Bharathi Chamberlain, DO, Philemon Simmonds, MD, Marlow Cowden, MD, Patience Zelaya MD, Bakari Villarreal MD, Alden Anton MD, Shaista Waterman, [facility], [facility], [facility], Kalie Benitez, CNP, Florentino Mays, CNS, Shante Orellana, CNP, Emiliano Chauhan, CNP, Jace Villar, CNP, Benigno Uribe, CNP, Charlie Heredia, CNP, Nereida Beavers PA-C, Lisa Nicholas, [facility], Sim Howe, [facility], Glenroy Alvarado, CNP, Baldwin Essex, CNP, Florian Cabrera, CNP, Bassam Holloway, CNP, Frederic Barnhart, CNP, Gaynelle Pallas, [facility]    Progress Note    3/14/2022    9:18 AM    Name:   Stephanie Peñaloza  MRN:     [id number]     Acct:      [de-identified]   Room:   [address] [place] [address] Day:  9  Admit Date:  3/5/2022  3:47 PM    PCP:   Surekha Cummings DO  Code Status:  Full Code    Subjective:     C/C:   Chief Complaint   Patient presents with    Fatigue     Interval History Status:   Improving  Has been up to chair  Pain controlled  Awaiting placement     Data Base Updates:  Coreg increased to 6.25 mg twice daily  BP improved    O2 sats stable off high flow  Doing okay with nasal cannula    Brief History:     As documented in the medical record:  \"81year-old wheelchair-bound  male with underlying HFrEF, Severe knee OA, narcolepsy who presents to the hospital with concern for generalized weakness. Patient reports since his knee replacement surgery a few years ago he was never able to ambulate at baseline requiring him to be overall wheelchair-bound. He says over the past month his appetite's been poor and he feels he is becoming weaker.   He says every time he goes from the wheelchair to his bed he struggles to lift himself over and ends up falling. He denies any loss of consciousness, head trauma, joint swelling, shortness of breath, palpitations or dizziness. He does not report any weight loss, night sweats and says over the past few years his appetite is not like it used to be. He pays out-of-pocket for home aide to come and cook for him, he reports he has access to food and can manage himself but just does not have energy to do so. Patient reports compliance to all his meds. Says he needs physical therapy but does not want placement in a nursing home. Patient denies any other symptoms at this time. \"    The intitial assessment and plan included:  \"  Hospital Problems            Last Modified POA     * (Principal) Hyponatremia 3/5/2022 Yes     Narcolepsy 3/6/2022 Yes     Cardiomyopathy ([address] 3/6/2022 Yes     HTN (hypertension) 3/6/2022 Yes     Chronic obstructive pulmonary disease ([address] 3/6/2022 Yes     Dementia with behavioral disturbance ([address] 3/6/2022 Yes     Fracture of femoral neck, right, closed ([address] 3/6/2022 Yes           Plan:   1. Hyponatremia- patient appears dry, will resume IVF 0.9% NS at 50 ml/hr, monitor sodium tomorrow. He had diarrhea for days before presentation  2. Right femoral neck fracture- on Xrays, change Norco to Percocet, consulted Ortho, appreciate Cardiac risk stratification. NPO for OR today, negative LE dopplers  3. CMP- EF 20% on previous Echo, appreciate Cardiology consult, AICD interrogation, hold Lasix for now, recent stress test 4/2021 normal, repeat Echo- poor images, but EF improved 68%, intermediate risk for surgery  4. COPD- stable, oxygen prn, Dubonebs prn, resume Mucinex  5. HTN- BP stable  6. Narcolepsy- Nuvigil  7. Meniere's disease- resume Antivert and Ativan 1mg po q 8 hrs prn  8. Dementia- pt unable to care for self, needs SNF  9. Gi/ DVT proph  10.  Will need PT/OT\"     Additional database has included:  Echocardiogram:  Summary  Technically very challenging, there is poor endocardial definition, cannot  comment on wall motion analysis, within above limitations:  Left ventricle is normal in size. Global left ventricular systolic function  is normal. Calculated ejection fraction 68% by Rashid's method. Valves not well visualized. No obvious significant valvular regurgitation or stenosis seen. Results for Lashawn Whalen (MRN [id number]) as of 3/8/2022 15:49   Ref. Range 3/5/2022 23:18 3/6/2022 04:32 3/6/2022 14:11 3/7/2022 03:32 3/8/2022 04:21   Sodium Latest Ref Range: 135 - 144 mmol/L 127 (L) 129 (L) 129 (L) 131 (L) 134 (L)     The patient did have an episode of postoperative hypoxia  He responded to respiratory therapy and optimization of his cardiopulmonary function    3/12:  Hemoglobin dropped to 6.6  Patient was transfused with 1 unit of RBCs    This condition was stabilized  Discharge planning initiated    Past Medical History:   has a past medical history of ADHD (attention deficit hyperactivity disorder), ADHD (attention deficit hyperactivity disorder), Atypical chest pain, Chronic bronchitis ([address] Hypertension, Hyponatremia, Meniere's disease, Narcolepsy, Nasal fracture, PND (post-nasal drip), SOB (shortness of breath), Tibia fracture, and Transaminasemia. Social History:   reports that he has been smoking cigarettes. He has a 38.00 pack-year smoking history. He has never used smokeless tobacco. He reports current alcohol use of about 16.0 standard drinks of alcohol per week. He reports that he does not use drugs. Family History:   Family History   Problem Relation Age of Onset    Heart Disease Mother         heart attack    Heart Disease Father        Review of Systems:     Review of Systems   Constitutional: Positive for activity change (Still not ambulating) and fatigue (Exercise capacity still reduced). Respiratory: Positive for shortness of breath (has had WILLIAMSON). Negative for cough and wheezing.     Cardiovascular: Negative for chest pain and palpitations. Gastrointestinal: Negative for abdominal pain, nausea and vomiting. Genitourinary: Negative for flank pain and hematuria. Musculoskeletal: Positive for arthralgias, gait problem (Unsteady, patient reports frequent falls, he uses a wheelchair at home for balance) and myalgias. His right hip is less stiff and sore, pain controlled   Neurological: Negative for dizziness (His Ménière's has been stable). Psychiatric/Behavioral: Positive for sleep disturbance (Known history of narcolepsy). Physical Examination:        Vitals  BP (!) 151/80   Pulse 69   Temp 97.7 °F (36.5 °C) (Oral)   Resp 25   Ht 5' 10\" (1.778 m)   Wt 216 lb 11.4 oz (98.3 kg)   SpO2 96%   BMI 31.09 kg/m²   Temp (24hrs), Av.4 °F (36.9 °C), Min:97.7 °F (36.5 °C), Max:99.6 °F (37.6 °C)    No results for input(s): POCGLU in the last 72 hours. Physical Exam  Vitals reviewed. Constitutional:       General: He is not in acute distress. Appearance: He is not diaphoretic. HENT:      Head: Normocephalic. Nose: Nose normal.   Eyes:      General: No scleral icterus. Conjunctiva/sclera: Conjunctivae normal.   Neck:      Trachea: No tracheal deviation. Cardiovascular:      Rate and Rhythm: Normal rate and regular rhythm. Pulmonary:      Effort: Pulmonary effort is normal. No respiratory distress. Breath sounds: Normal breath sounds. No wheezing or rales. Comments: Airflow reduced  No retractions  No accessory muscle use  No cyanosis    Chest:      Chest wall: No tenderness. Abdominal:      General: There is no distension. Palpations: Abdomen is soft. Tenderness: There is no abdominal tenderness. Musculoskeletal:         General: Tenderness (Incisional) and deformity ([place]ammertoes) present. Cervical back: Neck supple. Skin:     General: Skin is warm and dry. Findings: Rash (Onychomycosis) present.       Comments: Wound is dressed, dry and clean    Neurological: Comments: Mild cognitive impairment          Medications: Allergies: Allergies   Allergen Reactions    Motrin [Ibuprofen Micronized]      Pt states he had blood in stool from motrin       Current Meds:   Scheduled Meds:    carvedilol  6.25 mg Oral BID WC    guaiFENesin  1,200 mg Oral BID    pantoprazole  40 mg Oral QAM AC    chlorhexidine  15 mL Mouth/Throat BID    cyanocobalamin  1,000 mcg Oral Daily    docusate sodium  100 mg Oral BID    fluticasone  1 spray Each Nostril Daily    fluticasone  1 puff Inhalation BID    folic acid  1 mg Oral Daily    magnesium oxide  400 mg Oral Daily    sodium chloride  1 g Oral BID WC    vitamin C  500 mg Oral Daily    Armodafinil  150 mg Oral Daily    atorvastatin  40 mg Oral Nightly    [Held by provider] clopidogrel  75 mg Oral Daily    levothyroxine  50 mcg Oral Daily    therapeutic multivitamin-minerals  1 tablet Oral Daily    QUEtiapine  25 mg Oral Nightly    thiamine  100 mg Oral Daily    sodium chloride flush  5-40 mL IntraVENous 2 times per day    enoxaparin  40 mg SubCUTAneous Daily     Continuous Infusions:    sodium chloride      sodium chloride       PRN Meds: sodium chloride, hydrALAZINE, cloNIDine, LORazepam, LORazepam, meclizine, melatonin, traMADol, oxyCODONE-acetaminophen, morphine, albuterol sulfate HFA, sodium chloride flush, sodium chloride, potassium chloride **OR** potassium alternative oral replacement **OR** potassium chloride, magnesium sulfate, ondansetron **OR** ondansetron, polyethylene glycol, acetaminophen **OR** acetaminophen    Data:     I/O (24Hr):     Intake/Output Summary (Last 24 hours) at 3/14/2022 0918  Last data filed at 3/14/2022 0409  Gross per 24 hour   Intake 600 ml   Output 225 ml   Net 375 ml       Labs:  Hematology:  Recent Labs     03/12/22  0930 03/12/22  1735 03/13/22  0646   WBC 14.1*  --  13.5*   RBC 2.50*  --  2.80*   HGB 6.6* 7.9* 7.7*   HCT 23.1* 26.7* 25.4*   MCV 92.4  --  90.7   MCH 26.4  --  27.5 MCHC 28.6  --  30.3   RDW 21.0*  --  19.7*     --  368   MPV 9.3  --  9.3     Chemistry:  Recent Labs     03/12/22  0521 03/13/22  0529    142   K 3.7 4.0   * 113*   CO2 18* 18*   GLUCOSE 95 92   BUN 9 9   CREATININE 0.55* 0.53*   ANIONGAP 8* 11   LABGLOM >60 >60   GFRAA >60 >60   CALCIUM 8.4* 8.7     No results for input(s): PROT, LABALBU, LABA1C, Y1OKEFM, Y1KAPWD, FT4, TSH, AST, ALT, LDH, GGT, ALKPHOS, LABGGT, BILITOT, BILIDIR, AMMONIA, AMYLASE, LIPASE, LACTATE, CHOL, HDL, LDLCHOLESTEROL, CHOLHDLRATIO, TRIG, VLDL, LUN09YG, PHENYTOIN, PHENYF, URICACID, POCGLU in the last 72 hours. ABG:  Lab Results   Component Value Date    POCPH 7.54 10/24/2015    POCPCO2 35 10/24/2015    POCPO2 71 10/24/2015    POCHCO3 29.9 10/24/2015    NBEA NOT REPORTED 10/24/2015    PBEA 7 10/24/2015    BKH6FGX 31 10/24/2015    SYFI9ZUW 96 10/24/2015    FIO2 30.0 10/26/2015     Lab Results   Component Value Date/Time    SPECIAL NOT REPORTED 03/24/2020 10:15 PM     Lab Results   Component Value Date/Time    CULTURE NORMAL RESPIRATORY ORI LIGHT GROWTH 10/23/2015 04:04 PM    CULTURE  10/23/2015 04:04 PM     [employer] [id number] [place] [address] [phone]       Radiology:  XR HIP RIGHT (2-3 VIEWS)    Result Date: 3/6/2022  Comminuted intertrochanteric fracture right femoral neck No other acute bony or joint abnormality     XR FEMUR RIGHT (MIN 2 VIEWS)    Result Date: 3/6/2022  Comminuted intertrochanteric fracture right femoral neck No other acute bony or joint abnormality     XR KNEE RIGHT (1-2 VIEWS)    Result Date: 3/6/2022  Comminuted intertrochanteric fracture right femoral neck No other acute bony or joint abnormality     XR ANKLE LEFT (MIN 3 VIEWS)    Result Date: 3/6/2022  No acute osseous or soft tissue abnormality. XR FOOT LEFT (MIN 3 VIEWS)    Result Date: 3/6/2022  Irregularity of the 1st distal phalanx that may relate to an acute fracture and correlation with point tenderness is needed. XR CHEST PORTABLE    Result Date: 3/5/2022  Marginal inspiration, without evidence of acute cardiopulmonary disease     CT CHEST PULMONARY EMBOLISM W CONTRAST    Result Date: 3/5/2022  1. No evidence of pulmonary embolism 2. Dependent atelectasis, right lung base 3. Diffuse emphysematous changes present throughout the lungs, with an upper lobe predominance 4. Extensive coronary arterial calcifications 5. Cholelithiasis, without evidence of cholecystitis 6. Nodular contour to the liver, suggesting cirrhosis     XR HIP 2-3 VW W PELVIS LEFT    Result Date: 3/6/2022  Comminuted intertrochanteric fracture of the right proximal femur.        Assessment:        Primary Problem  Closed intertrochanteric fracture of hip, right, initial encounter [facility])    Active Hospital Problems    Diagnosis Date Noted    Hyponatremia [E87.1]      Priority: High    Acute postoperative anemia due to expected blood loss [D62]     Acute respiratory failure with hypoxemia (HCC) [J96.01] 03/09/2022    Atelectasis [J98.11] 03/09/2022    Accelerated junctional rhythm [I49.8] 03/08/2022    Presence of permanent cardiac pacemaker [Z95.0] 03/08/2022    Anemia, normocytic normochromic [D64.9]     Closed intertrochanteric fracture of hip, right, initial encounter ([facility] Rosa Maria Abo     S/p bare metal coronary artery stent [Z95.5] 11/10/2015    Dementia with behavioral disturbance (HCC) [F03.91]     Chronic obstructive pulmonary disease ([address] [J44.9]     HTN (hypertension) [I10] 10/12/2015    Cardiomyopathy ([facility] [I42.9] 10/12/2015    Narcolepsy [G47.419]     Meniere's disease [H81.09] 01/22/2014       Plan:        Transfuse as needed to maintain hemoglobin greater than 7.0  Ortho eval & f/u Dr Sharon Patel 10-14 days  Pain management  Minimize opioids   Respiratory Therapy and Bronchodilators prn  DuoNeb  Incentive spirometry  BiPAP as needed  Cardiology eval & f/u as scheduled:  CAD, junctional rhythm:  TCC  Correct electrolyte

## 2022-03-14 NOTE — PLAN OF CARE
Problem: Nutrition  Goal: Optimal nutrition therapy  Description: Nutrition Problem #1: Inadequate oral intake  Intervention: Food and/or Nutrient Delivery: Continue Current Diet,Start Oral Nutrition Supplement  Nutritional Goals: Meet 50% or greater of estimated nutrition needs     Outcome: Ongoing     Problem: Discharge Planning:  Goal: Discharged to appropriate level of care  Description: Discharged to appropriate level of care  Outcome: Ongoing     Problem: Injury - Risk of, Postfracture Complications:  Goal: Absence of fat embolism  Description: Absence of fat embolism  Outcome: Ongoing     Problem: Mobility - Impaired:  Goal: Mobility will improve to maximum level  Description: Mobility will improve to maximum level  Outcome: Ongoing     Problem: Pain - Acute:  Goal: Pain level will decrease  Description: Pain level will decrease  Outcome: Ongoing     Problem: Pain:  Goal: Pain level will decrease  Description: Pain level will decrease  Outcome: Ongoing

## 2022-03-14 NOTE — PROGRESS NOTES
Report given to [address] [place] [address].  Electronically signed by Duong Silvesrte RN on 3/14/2022 at 7:03 PM

## 2022-03-14 NOTE — PROGRESS NOTES
Orthopedic Progress Note    Patient:  Rivas Simmons  YOB: 1950     81 y.o. male    Subjective:  Patient seen and examined  No issues overnight  Pain controlled on current regimen. Denies fever, HA, CP, SOB, N/V, dysuria  Denies bowel movement, passing flatus  Up to bedside chair with PT yesterday  Was accepted and is pending placement to LTAC    Vitals reviewed    Objective:   Vitals:    03/14/22 0400   BP: (!) 154/75   Pulse: 67   Resp: 20   Temp: 97.7 °F (36.5 °C)   SpO2:        Gen: NAD, cooperative     Cardiovascular: Regular rate    Respiratory: Chest symmetric, no accessory muscle use, normal respirations, no audible wheezes    MSK: RLE: Dressings on, c/d/i. Mild tenderness about the thigh. Thigh is soft and compressible. EHL/FHL/TA/GSC motor intact. Sensation intact to sural/saph/SPN/DPN distribution. DP/PT pulses 2+. Recent Labs     03/12/22  0930 03/13/22  0529 03/13/22  0646   WBC   < >  --  13.5*   HGB   < >  --  7.7*   HCT   < >  --  25.4*   PLT   < >  --  368   NA  --  142  --    K  --  4.0  --    BUN  --  9  --    CREATININE  --  0.53*  --    GLUCOSE  --  92  --     < > = values in this interval not displayed. DVT ppx: Lovenox, and Plavix. Plavix held at this time    See rec for complete list    Impression/plan: 81 y.o. male who fell from wheelchair being seen for:     -Right IT fracture s/p R femur CMN insertion/ORIF, POD#6    -Maintain dressings. Nursing to change/reinforce dressings as needed  -WB status: WBAT RLE  -Pain control/DVT ppx/ABX per primary  -Hgb 7.7 this AM.  -Will continue to monitor Hgb.   Recommend transfusion for Hgb <7  -Ice (20 min, 1 hour off) for edema/pain control  -Encourage deep breathing and incentive spirometry use  -Continue to work with PT/OT  -OK to DC to facility/nursing home per ortho team  -Andreina Peterson in office 10-14 days after surgery  -Please page ortho with any questions    [place], DO   Orthopedic Surgery Resident PGY-1  Obie Sibley, [facility]

## 2022-03-14 NOTE — PROGRESS NOTES
Writer attempted to call [facility] for report, nurse unavailable, call back number #[phone] left with [place] for nurse to call writer for report. Writer informed Sarmad that pt to be picked up at 18:00 today.  Electronically signed by Wagner Davis RN on 3/14/2022 at 5:42 PM

## 2022-03-14 NOTE — PROGRESS NOTES
Comprehensive Nutrition Assessment    Type and Reason for Visit:  Reassess    Nutrition Recommendations/Plan:   - Continue current diet, Regular  - Continue High kcal/High PRO ONS, BID  - Monitor for intake/tolerance    Nutrition Assessment:  Per pt, poor appetite, believes he is eating ~50% of meals. Per chart, intake variable 0-100%. Pt endorses drinking ONS if vanilla/strawberry are sent. Malnutrition Assessment:  Malnutrition Status: At risk for malnutrition (Comment)    Context:  Acute Illness     Findings of the 6 clinical characteristics of malnutrition:  Energy Intake:  7 - 50% or less of estimated energy requirements for 5 or more days  Weight Loss:  No significant weight loss     Body Fat Loss:  No significant body fat loss     Muscle Mass Loss:  No significant muscle mass loss    Fluid Accumulation:  1 - Mild Extremities   Strength:  Not Performed    Estimated Daily Nutrient Needs:  Energy (kcal):  4066-4090 kcal/d (22-25 kcal/kg); Weight Used for Energy Requirements:  Current     Protein (g):   gm pro/d (1.3-1.5gm/kg); Weight Used for Protein Requirements:  Ideal        Fluid (ml/day):  or per MD; Method Used for Fluid Requirements:  1 ml/kcal      Nutrition Related Findings:  Labs/meds reviewed. Generalized +1, BLE +2 edema. LBM 3/13. Wounds:  Surgical Incision       Current Nutrition Therapies:    ADULT DIET; Regular  ADULT ORAL NUTRITION SUPPLEMENT; Breakfast, Dinner; Standard High Calorie/High Protein Oral Supplement    Anthropometric Measures:  · Height: 5' 10\" (177.8 cm)  · Current Body Weight: 216 lb 11.4 oz (98.3 kg)   · Admission Body Weight: 220 lb 10.9 oz (100.1 kg) (3/8, bedscale)    · Ideal Body Weight: 166 lbs; % Ideal Body Weight 108.4 %   · BMI: 31.1  · BMI Categories: Overweight (BMI 25.0-29. 9)       Nutrition Diagnosis:   · Inadequate oral intake related to other (comment) (decreased appetite reported) as evidenced by poor intake prior to admission    Nutrition

## 2022-03-14 NOTE — CARE COORDINATION
Transitional Planning    Precert was submitted 2/82 for [facility] when patient was on high flow. Received call this morning from [facility]. Insurance denying and offering peer to peer due by 10 am today [phone] with reference number [id number]. However, after reviewing chart, pt is no longer meeting LTAC criteria. Will speak to patient about new d/c plan. 1100 Spoke with patient, agreeable to [facility]. Referral sent to [facility] and [facility]    1500 received call from Osorio Chase of [address], they accept and can take patient today.      Transport ETA between [address] [facility]    1632 updated Osorio Chase, she is agreeable to transport time      # for Report [phone]

## 2022-03-15 NOTE — DISCHARGE SUMMARY
[facility]  Office: [address], DO, Maged Smalls, DO, Dino Gutierres, DO, Cayetano Oakes, DO, Cash Miller MD, Cheri Albarado MD, Darrion Winters MD, Rosemary Alcantara MD, Pedro Stoner MD, Gale Guzmán MD, Yessi Rogers MD, Kimberli Dinero, DO, Heron Echevarria, DO, Kaya Xie MD,  Mehrdad Wiggins, DO, Ramandeep Noe MD, Oza Hatchet, MD, Phu Elena MD, Lili Mckeon, DO, Leda Lovett MD, Maribel Dior MD, Zelda Schirmer, [facility], [facility] Hospital[facility] Berry, CNP, Lucrecia De Jesus, CNP, Leeroy Pearce, CNS, Moon Mcconnell, CNP, Kaz Trimlbe, CNP, Oli Amador, CNP, Fahad Rosado, CNP, Kelli Leung, CNP, Heevr Murphy PA-C, Mee Joiner, DNP, Antonella Hopkins, [facility], Nolvia Vallecillo, CNP, Ilene Simon, CNP, Lashawn Lei, CNP, Víctor Hanson, CNP, Kentrell Zarco, CNP, Carson Goltz, [address] [place] [address]    Discharge Summary    Patient ID: Darlene Melendez  :  1950   MRN: [id number]     ACCOUNT:  [de-identified]   Patient's PCP: Reymundo Santana DO  Admit Date: 3/5/2022   Discharge Date: 3/14/2022    Discharge Physician: Braxton Correa DO     The patient was seen and examined on day of discharge and this discharge summary is in conjunction with any daily progress note from day of discharge.     Active Discharge Diagnoses:     Primary Problem  Closed intertrochanteric fracture of hip, right, initial encounter [facility])      [facility] Problems    Diagnosis Date Noted    Hyponatremia [E87.1]      Priority: High    Acute postoperative anemia due to expected blood loss [D62]     Acute respiratory failure with hypoxemia (HCC) [J96.01] 2022    Atelectasis [J98.11] 2022    Accelerated junctional rhythm [I49.8] 2022    Presence of permanent cardiac pacemaker [Z95.0] 03/08/2022    Anemia, normocytic normochromic [D64.9]     Closed intertrochanteric fracture of hip, right, initial encounter [facility] [S72.141A]     S/p bare metal coronary artery stent [Z95.5] 11/10/2015    Dementia with behavioral disturbance (HCC) [F03.91]     [facility] disease ([facility] [J44.9]     HTN (hypertension) [I10] 10/12/2015    Cardiomyopathy ([facility] [I42.9] 10/12/2015    Narcolepsy [G47.419]     Meniere's disease [H81.09] 01/22/2014         Hospital Course:     Brief History  As documented in the medical record:  \"81year-old wheelchair-bound  male with underlying HFrEF, Severe knee OA, narcolepsy who presents to the hospital with concern for generalized weakness.  Patient reports since his knee replacement surgery a few years ago he was never able to ambulate at baseline requiring him to be overall wheelchair-bound. Bubba Almaraz says over the past month his appetite's been poor and he feels he is becoming weaker. Bubba Almaraz says every time he goes from the wheelchair to his bed he struggles to lift himself over and ends up falling.       He denies any loss of consciousness, head trauma, joint swelling, shortness of breath, palpitations or dizziness.  He does not report any weight loss, night sweats and says over the past few years his appetite is not like it used to be. Bubba Almaraz pays out-of-pocket for home aide to come and cook for him, he reports he has access to food and can manage himself but just does not have energy to do so.  Patient reports compliance to all his meds.  Says he needs physical therapy but does not want placement in a nursing home.  Patient denies any other symptoms at this time. \"     The intitial assessment and plan included:  [facility] Problems            Last Modified POA     * (Principal) Hyponatremia 3/5/2022 Yes     Narcolepsy 3/6/2022 Yes     Cardiomyopathy ([facility] 3/6/2022 Yes     HTN (hypertension) 3/6/2022 Yes     Chronic obstructive pulmonary disease ([facility] 3/6/2022 Yes     Dementia with behavioral disturbance ([facility] 3/6/2022 Yes     Fracture of femoral neck, right, closed ([facility] 3/6/2022 Yes           Plan:   1. Hyponatremia- patient appears dry, will resume IVF 0.9% NS at 50 ml/hr, monitor sodium tomorrow. [facility] had diarrhea for days before presentation  2. Right femoral neck fracture- on Xrays, change Norco to Percocet, consulted Ortho, appreciate Cardiac risk stratification.  NPO for OR today, negative LE dopplers  3. CMP- EF 20% on previous Echo, appreciate Cardiology consult, AICD interrogation, hold Lasix for now, recent stress test 4/2021 normal, repeat Echo- poor images, but EF improved 68%, intermediate risk for surgery  4. COPD- stable, oxygen prn, Dubonebs prn, resume Mucinex  5. HTN- BP stable  6. Narcolepsy- Nuvigil  7. Meniere's disease- resume Antivert and Ativan 1mg po q 8 hrs prn  8. Dementia- pt unable to care for self, needs SNF  9. Gi/ DVT proph  10. Will need PT/OT\"     Additional database has included:  Echocardiogram:  Summary  Technically very challenging, there is poor endocardial definition, cannot  comment on wall motion analysis, within above limitations:  Left ventricle is normal in size. Global left ventricular systolic function  is normal. Calculated ejection fraction 68% by Rashid's method. Valves not well visualized. No obvious significant valvular regurgitation or stenosis seen.     Results for Emelyn Pearson" (MRN [id number]) as of 3/8/2022 15:49    Ref.  Range 3/5/2022 23:18 3/6/2022 04:32 3/6/2022 14:11 3/7/2022 03:32 3/8/2022 04:21   Sodium Latest Ref Range: 135 - 144 mmol/L 127 (L) 129 (L) 129 (L) 131 (L) 134 (L)      The patient did have an episode of postoperative hypoxia  He responded to respiratory therapy and optimization of his cardiopulmonary function     3/12:  Hemoglobin dropped to 6.6  Patient was transfused with 1 unit of RBCs     This condition was stabilized  Discharge planning initiated      Discharge plan:     Transfuse as needed to maintain hemoglobin greater than 7.0  Ortho eval & f/u Dr Dorothy Mtz 10-14 days  Pain management  Minimize opioids   Respiratory Therapy and Bronchodilators prn  DuoNeb  Incentive spirometry  BiPAP as needed  Cardiology eval & f/u as scheduled:  CAD, junctional rhythm:  TCC  Correct electrolyte abnormalities  Blood Pressure - Monitor and control  PT/OT  Fall precautions  Palliative care consult  DVT prophylaxis  Discharge planning  Will discharge when arrangements complete and ok with other services. Follow-up with PCP in one week, Arthur Francisco DO  Notify PCP of discharge   Med rec done  JOANNA done  DCP 34 min+    No discharge procedures on file. Significant Diagnostic Studies:     Labs / Micro:  Labs:  Hematology:  Recent Labs     03/12/22  0930 03/12/22  1735 03/13/22  0646   WBC 14.1*  --  13.5*   RBC 2.50*  --  2.80*   HGB 6.6* 7.9* 7.7*   HCT 23.1* 26.7* 25.4*   MCV 92.4  --  90.7   MCH 26.4  --  27.5   MCHC 28.6  --  30.3   RDW 21.0*  --  19.7*     --  368   MPV 9.3  --  9.3     Chemistry:  Recent Labs     03/12/22  0521 03/13/22  0529    142   K 3.7 4.0   * 113*   CO2 18* 18*   GLUCOSE 95 92   BUN 9 9   CREATININE 0.55* 0.53*   ANIONGAP 8* 11   LABGLOM >60 >60   GFRAA >60 >60   CALCIUM 8.4* 8.7   No results for input(s): PROT, LABALBU, LABA1C, W3TQJEF, K9PFBSK, FT4, TSH, AST, ALT, LDH, GGT, ALKPHOS, LABGGT, BILITOT, BILIDIR, AMMONIA, AMYLASE, LIPASE, LACTATE, CHOL, HDL, LDLCHOLESTEROL, CHOLHDLRATIO, TRIG, VLDL, SHT89SJ, PHENYTOIN, PHENYF, URICACID, POCGLU in the last 72 hours.       Radiology:    ECHO Complete 2D W Doppler W Color    Result Date: 3/7/2022  Transthoracic Echocardiography Report (TTE)  Patient Name COMES       Date of Study               03/07/2022               Cedric BELL   Date of      1950  Gender                      Male  Birth   Age          81 year(s)  Race                           Room ---------------------------------------------------------------------------- FINDINGS Left Atrium Left atrium is normal in size. Left Ventricle Technically very challenging, there is poor endocardial definition, cannot comment on wall motion analysis, within above limitations: Left ventricle is normal in size. Global left ventricular systolic function is normal. Calculated ejection fraction 68% by Rashid's method. Right Atrium Right atrium appears normal in size. Right Ventricle Right ventricular function appears normal . Rght ventricular cavity appears normal in size. Pacemaker / ICD lead seen in right ventricle. Mitral Valve Normal mitral valve structure and function. No mitral regurgitation. Aortic Valve Aortic valve is trileaflet and opens adequately. No aortic insufficiency. Tricuspid Valve Normal tricuspid valve structure and function. No tricuspid regurgitation. Pulmonic Valve The pulmonic valve is normal in structure. No pulmonic insufficiency. Pericardial Effusion No pericardial effusion seen. Miscellaneous IVC not well visualized.  M-mode / 2D Measurements & Calculations:   LVIDd:4.4 cm(3.7 - 5.6 cm)       Diastolic DXBNBX:30.0 ml  GHVPY:4.6 cm(2.2 - 4.0 cm)       Systolic BDTJDL:15.6 ml  YHFZ:6.7 cm(0.6 - 1.1 cm)        Aortic Root:3.3 cm(2.0 - 3.7 cm)  LVPWd:1.1 cm(0.6 - 1.1 cm)       LA Dimension: 3.2 cm(1.9 - 4.0 cm)  Fractional Shortenin.36 %    LA volume/Index: 29.1 ml /15m^2  Calculated LVEF (%): 68.22 %                                   RVDd:3 cm   Mitral:                                   Aortic   Valve Area (P1/2-Time): 4.4 cm^2          Peak Velocity: 1.08 m/s  Peak E-Wave: 0.54 m/s                     Mean Velocity: 0.59 m/s  Peak A-Wave: 0.67 m/s                     Peak Gradient: 4.67 mmHg  E/A Ratio: 0.81                           Mean Gradient: 2 mmHg  Peak Gradient: 1.17 mmHg  Mean Gradient: 1 mmHg  Deceleration Time: 169 msec               AV VTI: 18.3 cm  P1/2t: 50 msec   Mean Velocity: 0.55 m/s  Diastology / Tissue Doppler Septal Wall E' velocity:0.10 m/s Septal Wall E/E':5.4    XR CHEST (SINGLE VIEW FRONTAL)    Result Date: 3/8/2022  EXAMINATION: ONE XRAY VIEW OF THE CHEST 3/8/2022 5:19 pm COMPARISON: 03/05/2022 HISTORY: ORDERING SYSTEM PROVIDED HISTORY: respiratory distress TECHNOLOGIST PROVIDED HISTORY: respiratory distress Reason for Exam: difficulty breathing    upright port FINDINGS: Stable ICD lead. .The cardiac size is normal. Mild patchy bibasal infiltrates and small left pleural effusion. .  Pulmonary vascularity appears stable. There is mild ectasia of the thoracic aorta. There are degenerative changes in the spine . No acute bony abnormalities. The hilar structures are normal.     Mild bibasal infiltrates and small left pleural effusion suggesting pneumonia     XR HIP RIGHT (2-3 VIEWS)    Result Date: 3/6/2022  EXAMINATION: TWO XRAY VIEWS OF THE RIGHT KNEE; TWO XRAY VIEWS OF THE RIGHT HIP; 4 XRAY VIEWS OF THE RIGHT FEMUR 3/6/2022 11:15 am COMPARISON: None. HISTORY: ORDERING SYSTEM PROVIDED HISTORY: Pain s/p fall, recent TKA TECHNOLOGIST PROVIDED HISTORY: Pain s/p fall, recent TKA; ORDERING SYSTEM PROVIDED HISTORY: Pain, fall TECHNOLOGIST PROVIDED HISTORY: Pain, fall; ORDERING SYSTEM PROVIDED HISTORY: pain TECHNOLOGIST PROVIDED HISTORY: pain FINDINGS: Right knee: Status post total knee arthroplasty. No hardware complications visualized. Vascular calcifications. No acute fracture. Right femur: Comminuted intertrochanteric fracture. Vascular calcifications. No dislocation. No other fracture. Right hip: Comminuted intertrochanteric fracture. No dislocation. No other fracture identified.      Comminuted intertrochanteric fracture right femoral neck No other acute bony or joint abnormality     XR FEMUR RIGHT (MIN 2 VIEWS)    Result Date: 3/8/2022  EXAMINATION: ONE XRAY VIEW OF THE PELVIS AND TWO XRAY VIEWS RIGHT HIP;   XRAY VIEWS OF THE RIGHT FEMUR 3/8/2022 8:19 pm COMPARISON: 03/06/2022 HISTORY: ORDERING SYSTEM PROVIDED HISTORY: post op pacu, AP pelvis, AP right hip, cross table lateral right hip TECHNOLOGIST PROVIDED HISTORY: post op pacu, AP pelvis, AP right hip, cross table lateral right hip Reason for Exam: post op hardware placement FINDINGS: Extensive postsurgical changes identified suggestive of progression of fixation of the right in surgery insert fracture. Additionally there is evidence lateral fixation plate and screws and cerclage wires across the proximal femoral diaphysis. Evidence knee arthroplasty identified. Overlying skin staples and surgical identified. Postsurgical changes status post ventral effusion right hip and proximal femur fracture. XR FEMUR RIGHT (MIN 2 VIEWS)    Result Date: 3/6/2022  EXAMINATION: TWO XRAY VIEWS OF THE RIGHT KNEE; TWO XRAY VIEWS OF THE RIGHT HIP; 4 XRAY VIEWS OF THE RIGHT FEMUR 3/6/2022 11:15 am COMPARISON: None. HISTORY: ORDERING SYSTEM PROVIDED HISTORY: Pain s/p fall, recent TKA TECHNOLOGIST PROVIDED HISTORY: Pain s/p fall, recent TKA; ORDERING SYSTEM PROVIDED HISTORY: Pain, fall TECHNOLOGIST PROVIDED HISTORY: Pain, fall; ORDERING SYSTEM PROVIDED HISTORY: pain TECHNOLOGIST PROVIDED HISTORY: pain FINDINGS: Right knee: Status post total knee arthroplasty. No hardware complications visualized. Vascular calcifications. No acute fracture. Right femur: Comminuted intertrochanteric fracture. Vascular calcifications. No dislocation. No other fracture. Right hip: Comminuted intertrochanteric fracture. No dislocation. No other fracture identified. Comminuted intertrochanteric fracture right femoral neck No other acute bony or joint abnormality     XR KNEE RIGHT (1-2 VIEWS)    Result Date: 3/6/2022  EXAMINATION: TWO XRAY VIEWS OF THE RIGHT KNEE; TWO XRAY VIEWS OF THE RIGHT HIP; 4 XRAY VIEWS OF THE RIGHT FEMUR 3/6/2022 11:15 am COMPARISON: None.  HISTORY: ORDERING SYSTEM PROVIDED HISTORY: Pain s/p fall, recent TKA TECHNOLOGIST PROVIDED HISTORY: Pain s/p fall, recent TKA; ORDERING SYSTEM PROVIDED HISTORY: Pain, fall TECHNOLOGIST PROVIDED HISTORY: Pain, fall; ORDERING SYSTEM PROVIDED HISTORY: pain TECHNOLOGIST PROVIDED HISTORY: pain FINDINGS: Right knee: Status post total knee arthroplasty. No hardware complications visualized. Vascular calcifications. No acute fracture. Right femur: Comminuted intertrochanteric fracture. Vascular calcifications. No dislocation. No other fracture. Right hip: Comminuted intertrochanteric fracture. No dislocation. No other fracture identified. Comminuted intertrochanteric fracture right femoral neck No other acute bony or joint abnormality     XR ANKLE LEFT (MIN 3 VIEWS)    Result Date: 3/6/2022  EXAMINATION: THREE XRAY VIEWS OF THE LEFT ANKLE 3/6/2022 4:15 pm COMPARISON: None. HISTORY: ORDERING SYSTEM PROVIDED HISTORY: Trauma/Fracture TECHNOLOGIST PROVIDED HISTORY: Ap /lateral/mortise Trauma/Fracture FINDINGS: There is no acute osseous abnormality. There is degenerative joint disease. There are calcaneal spurs at the Achilles and plantar fascial attachments. The surrounding soft tissues are unremarkable. There are vascular calcifications. No acute osseous or soft tissue abnormality. XR FOOT LEFT (MIN 3 VIEWS)    Result Date: 3/6/2022  EXAMINATION: THREE XRAY VIEWS OF THE LEFT FOOT 3/6/2022 4:15 pm COMPARISON: None. HISTORY: ORDERING SYSTEM PROVIDED HISTORY: Trauma/Fracture TECHNOLOGIST PROVIDED HISTORY: Ap/oblique/lateral Trauma/Fracture FINDINGS: There is irregularity of the 1st distal phalanx that may relate to acute fracture and correlation with point tenderness is needed. The osseous structures are otherwise unremarkable. There is diffuse degenerative joint disease. There are vascular calcifications. There are calcaneal spurs at the Achilles and plantar fascial attachments.      Irregularity of the 1st distal phalanx that may relate to an acute fracture and correlation with point tenderness is needed. XR CHEST PORTABLE    Result Date: 3/10/2022  EXAMINATION: ONE XRAY VIEW OF THE CHEST 3/10/2022 8:00 am COMPARISON: Chest x-ray dated 9 March 2022 HISTORY: ORDERING SYSTEM PROVIDED HISTORY: hypoxia TECHNOLOGIST PROVIDED HISTORY: hypoxia Reason for Exam: uprt port FINDINGS: Interval increase in right basilar airspace disease. The left lung is clear. No pneumothorax or pleural effusion. Stable cardiomediastinal silhouette with left-sided ICD. Increasing right basilar airspace disease. This could represent atelectasis or in the appropriate clinical setting pneumonia. XR CHEST PORTABLE    Result Date: 3/9/2022  EXAMINATION: ONE XRAY VIEW OF THE CHEST 3/9/2022 10:20 am COMPARISON: None. HISTORY: ORDERING SYSTEM PROVIDED HISTORY: sob TECHNOLOGIST PROVIDED HISTORY: sob Reason for Exam: upright port FINDINGS: Mild cardiomegaly. Left-sided ICD. No pneumothorax or pleural effusion. Mild right basilar airspace disease. Mild right basilar airspace disease. This could represent atelectasis or in the appropriate clinical setting pneumonia. XR CHEST PORTABLE    Result Date: 3/5/2022  EXAMINATION: ONE XRAY VIEW OF THE CHEST 3/5/2022 1:43 pm COMPARISON: April levin 2021 HISTORY: ORDERING SYSTEM PROVIDED HISTORY: sob TECHNOLOGIST PROVIDED HISTORY: sob Reason for Exam: port upright FINDINGS: Marginal inspiration is present. No focal area of consolidation, pleural effusion, or pneumothorax is present. Heart size appears normal.  ICD is in place. Vascular markings are distinct. No acute osseous abnormality is present. Marginal inspiration, without evidence of acute cardiopulmonary disease     CT CHEST PULMONARY EMBOLISM W CONTRAST    Result Date: 3/5/2022  EXAMINATION: CTA OF THE CHEST 3/5/2022 3:07 pm TECHNIQUE: CTA of the chest was performed after the administration of intravenous contrast.  Multiplanar reformatted images are provided for review.   MIP images are provided for review. Dose modulation, iterative reconstruction, and/or weight based adjustment of the mA/kV was utilized to reduce the radiation dose to as low as reasonably achievable. COMPARISON: Chest x-ray dated March 5, 2022 HISTORY: ORDERING SYSTEM PROVIDED HISTORY: albina, new O2 requirement TECHNOLOGIST PROVIDED HISTORY: sob, new O2 requirement Decision Support Exception - unselect if not a suspected or confirmed emergency medical condition->Emergency Medical Condition (MA) Reason for Exam: fatigue FINDINGS: Pulmonary Arteries: Pulmonary arteries are adequately opacified for evaluation. No evidence of intraluminal filling defect to suggest pulmonary embolism. Main pulmonary artery is enlarged, measuring 32 mm, suggesting pulmonary arterial hypertension. Mediastinum: No evidence of mediastinal lymphadenopathy. Extensive coronary arterial calcifications are present. Small pericardial effusion is noted. The heart and pericardium demonstrate no acute abnormality. There is no acute abnormality of the thoracic aorta. Lungs/pleura: Diffuse emphysematous changes are present throughout the lungs. Subsegmental atelectasis is present within the right lung base. No focal area of consolidation, pleural effusion, or pneumothorax is present. Upper Abdomen: Images through the upper abdomen demonstrate cholelithiasis, without evidence of cholecystitis. Nodular contour to the liver is present, suggesting cirrhosis. Soft Tissues/Bones: No acute bone or soft tissue abnormality. 1. No evidence of pulmonary embolism 2. Dependent atelectasis, right lung base 3. Diffuse emphysematous changes present throughout the lungs, with an upper lobe predominance 4. Extensive coronary arterial calcifications 5. Cholelithiasis, without evidence of cholecystitis 6.  Nodular contour to the liver, suggesting cirrhosis     VL DUP LOWER EXTREMITY VENOUS BILATERAL    Result Date: 3/7/2022    [facility]  Vascular Lower Extremities DVT Study Procedure   Patient Name   COMES       Date of Study           03/07/2022                 Keely BELL   Date of Birth  1950  Gender                  Male   Age            81 year(s)  Race                       Room Number    3189        Height:                 70 inch, 177.8 cm   Corporate ID # [id number]    Weight:                 180 pounds, 81.6 kg   Patient Acct # [de-identified]   BSA:        2 m^2       BMI:       25.83 kg/m^2   MR #           [id number]     Sophia Marino, [facility]   Accession #    [id number]  Interpreting Physician  Nav Aranda   Referring                  Referring Physician     Cheri Albarado  Nurse  Practitioner  Procedure Type of Study:   Veins: Lower Extremities DVT Study, Venous Scan Lower Bilateral.  Indications for Study:Edema and Pain. Patient Status: In Patient. Technical Quality:Limited visualization. Limitation reason:Edema. Conclusions   Summary   No evidence of superficial or deep venous thrombosis in both lower  extremities. Signature   ----------------------------------------------------------------  Electronically signed by Ragini Weiss RVT(Sonographer) on  03/07/2022 09:52 AM  ----------------------------------------------------------------   ----------------------------------------------------------------  Electronically signed by Oleh Barman Reyes,Arthur(Interpreting  physician) on 03/07/2022 05:47 PM  ----------------------------------------------------------------  Findings:   Right Impression:                       Left Impression:  The common femoral, femoral, popliteal  The common femoral, femoral,  and tibial veins demonstrate normal     popliteal and tibial veins  compressibility and augmentation. demonstrate normal compressibility                                          and augmentation. Normal compressibility of the great  saphenous vein.  Limited visualization   Normal compressibility of the  of the greater saphenous vein due to great saphenous vein.  small size in diameter. Normal compressibility of the  Normal compressibility of the small     small saphenous vein. saphenous vein. Allergies   - Allergy:*No Known Allergies(Miscellaneous). - Allergy:*Unlisted(Drug). Comments:motrin   - Allergy:*Unlisted(Drug). Comments:motrim Velocities are measured in cm/s ; Diameters are measured in cm Right Lower Extremities DVT Study Measurements Right 2D Measurements +------------------------------------+----------+---------------+----------+ ! Location                            ! Visualized! Compressibility! Thrombosis! +------------------------------------+----------+---------------+----------+ ! Common Femoral                      !Yes       ! Yes            ! None      ! +------------------------------------+----------+---------------+----------+ ! Prox Femoral                        !Yes       ! Yes            ! None      ! +------------------------------------+----------+---------------+----------+ ! Mid Femoral                         !Yes       ! Yes            ! None      ! +------------------------------------+----------+---------------+----------+ ! Dist Femoral                        !Yes       ! Yes            ! None      ! +------------------------------------+----------+---------------+----------+ ! Popliteal                           !Yes       ! Yes            ! None      ! +------------------------------------+----------+---------------+----------+ ! Sapheno Femoral Junction            ! Yes       ! Yes            ! None      ! +------------------------------------+----------+---------------+----------+ ! PTV                                 ! Partial   !Yes            ! None      ! +------------------------------------+----------+---------------+----------+ ! Peroneal                            !Partial   !Yes            ! None      ! +------------------------------------+----------+---------------+----------+ !Gastroc                             ! Yes       ! Yes            ! None      ! +------------------------------------+----------+---------------+----------+ ! GSV Thigh                           ! Partial   !Yes            ! None      ! +------------------------------------+----------+---------------+----------+ ! GSV Knee                            ! Partial   !Yes            ! None      ! +------------------------------------+----------+---------------+----------+ ! GSV Ankle                           ! Partial   !Yes            ! None      ! +------------------------------------+----------+---------------+----------+ ! SSV                                 ! Yes       ! Yes            ! None      ! +------------------------------------+----------+---------------+----------+ Right Doppler Measurements +---------------------------+------+------+--------------------------------+ ! Location                   ! Signal!Reflux! Reflux (msec)                   ! +---------------------------+------+------+--------------------------------+ ! Common Femoral             !Phasic!      !                                ! +---------------------------+------+------+--------------------------------+ ! Prox Femoral               !Phasic!      !                                ! +---------------------------+------+------+--------------------------------+ ! Popliteal                  !Phasic!      !                                ! +---------------------------+------+------+--------------------------------+ Left Lower Extremities DVT Study Measurements Left 2D Measurements +------------------------------------+----------+---------------+----------+ ! Location                            ! Visualized! Compressibility! Thrombosis! +------------------------------------+----------+---------------+----------+ ! Common Femoral                      !Yes       ! Yes            ! None      ! +------------------------------------+----------+---------------+----------+ ! Prox Femoral                        !Yes       ! Yes            ! None      ! +------------------------------------+----------+---------------+----------+ ! Mid Femoral                         !Yes       ! Yes            ! None      ! +------------------------------------+----------+---------------+----------+ ! Dist Femoral                        !Yes       ! Yes            ! None      ! +------------------------------------+----------+---------------+----------+ ! Popliteal                           !Yes       ! Yes            ! None      ! +------------------------------------+----------+---------------+----------+ ! Sapheno Femoral Junction            ! Yes       ! Yes            ! None      ! +------------------------------------+----------+---------------+----------+ ! PTV                                 ! Partial   !Yes            ! None      ! +------------------------------------+----------+---------------+----------+ ! Peroneal                            !No        !               !          ! +------------------------------------+----------+---------------+----------+ ! Gastroc                             ! Yes       ! Yes            ! None      ! +------------------------------------+----------+---------------+----------+ ! GSV Thigh                           ! Yes       ! Yes            ! None      ! +------------------------------------+----------+---------------+----------+ ! GSV Knee                            ! Yes       ! Yes            ! None      ! +------------------------------------+----------+---------------+----------+ ! GSV Ankle                           ! Yes       ! Yes            ! None      ! +------------------------------------+----------+---------------+----------+ ! SSV                                 ! Yes       ! Yes            ! None      ! +------------------------------------+----------+---------------+----------+ Left Doppler Measurements +---------------------------+------+------+--------------------------------+ !Location                   ! Signal!Reflux! Reflux (msec)                   ! +---------------------------+------+------+--------------------------------+ ! Common Femoral             !Phasic!      !                                ! +---------------------------+------+------+--------------------------------+ ! Prox Femoral               !Phasic!      !                                ! +---------------------------+------+------+--------------------------------+ ! Popliteal                  !Phasic!      !                                ! +---------------------------+------+------+--------------------------------+    FLUORO FOR SURGICAL PROCEDURES    Result Date: 3/8/2022  Radiology exam is complete. No Radiologist dictation. Please follow up with ordering provider. XR HIP 2-3 VW W PELVIS LEFT    Result Date: 3/6/2022  EXAMINATION: ONE XRAY VIEW OF THE PELVIS AND TWO XRAY VIEWS LEFT HIP 3/6/2022 4:15 pm COMPARISON: Right hip radiographs performed 04/11/2021. HISTORY: ORDERING SYSTEM PROVIDED HISTORY: Trauma/Fracture TECHNOLOGIST PROVIDED HISTORY: AP and cross-table lateral please, thank you Trauma/Fracture Reason for Exam: per ordering provider, frog leg lateral ok due to pt condition. -JEK FINDINGS: There is a comminuted right intertrochanteric fracture. The bony pelvis is intact. There is degenerative change of the SI joints and hip joints. The surrounding soft tissues are unremarkable. Comminuted intertrochanteric fracture of the right proximal femur.      XR HIP 2-3 VW W PELVIS RIGHT    Result Date: 3/8/2022  EXAMINATION: ONE XRAY VIEW OF THE PELVIS AND TWO XRAY VIEWS RIGHT HIP;   XRAY VIEWS OF THE RIGHT FEMUR 3/8/2022 8:19 pm COMPARISON: 03/06/2022 HISTORY: ORDERING SYSTEM PROVIDED HISTORY: post op pacu, AP pelvis, AP right hip, cross table lateral right hip TECHNOLOGIST PROVIDED HISTORY: post op pacu, AP pelvis, AP right hip, cross table lateral right hip Reason for Exam: post op hardware placement FINDINGS: Extensive postsurgical changes identified suggestive of progression of fixation of the right in surgery insert fracture. Additionally there is evidence lateral fixation plate and screws and cerclage wires across the proximal femoral diaphysis. Evidence knee arthroplasty identified. Overlying skin staples and surgical identified. Postsurgical changes status post ventral effusion right hip and proximal femur fracture.          Consultations:    Consults:     Final Specialist Recommendations/Findings:   IP CONSULT TO HOSPITALIST  IP CONSULT TO PULMONOLOGY  IP CONSULT TO DIETITIAN  IP CONSULT TO PALLIATIVE CARE  IP CONSULT TO SOCIAL WORK  IP CONSULT TO ORTHOPEDIC SURGERY  IP CONSULT TO SPIRITUAL SERVICES  IP CONSULT TO CARDIOLOGY  IP CONSULT TO IV TEAM        Discharged Condition:    Stable     Disposition: skilled nursing facility     Physician Follow Up:   MD Kyleigh Ragland [facility] [address]  [phone]    Schedule an appointment as soon as possible for a visit in 1 week  pulmonary function test, suspected 1 [facility] [facility]  [address] [place] [id number]  [phone]  On 3/30/2022  2:00 PM  follow up [facility]  With Zuleyma Fried, [address] [place] [address] [facility]  [address] [place] [id number] [phone]  On 4/19/2022  at 11:15 am for [employer] Device check       Activity:  activity as tolerated  Fall precautions  Ortho restrictions     Diet:  cardiac diet     Discharge Medications:      Medication List      START taking these medications    enoxaparin 40 MG/0.4ML injection  Commonly known as: LOVENOX  Inject 0.4 mLs into the skin daily     guaiFENesin 600 MG extended release tablet  Commonly known as: MUCINEX  Take 2 tablets by mouth 2 times daily  Replaces: guaiFENesin 400 MG tablet        CHANGE how you take these medications    carvedilol 6.25 MG tablet  Commonly known as: COREG  Take 1 tablet by mouth 2 times daily (with meals)  What changed:   · medication strength  · See the new instructions. levothyroxine 50 MCG tablet  Commonly known as: SYNTHROID  Take 1 tablet by mouth Daily  What changed:   · medication strength  · how much to take     Magnesium Oxide 250 MG Tabs tablet  Commonly known as: MAGNESIUM-OXIDE  Take 1 tablet by mouth daily  What changed: how much to take     traMADol 50 MG tablet  Commonly known as: ULTRAM  Take 1 tablet by mouth every 6 hours as needed for Pain for up to 7 days. What changed:   · when to take this  · reasons to take this        CONTINUE taking these medications    acetaminophen 500 MG tablet  Commonly known as: TYLENOL  Take 1 tablet by mouth 4 times daily as needed for Pain     amiodarone 200 MG tablet  Commonly known as: CORDARONE     Armodafinil 150 MG Tabs tablet  Commonly known as: NUVIGIL  Take 1 tablet by mouth daily for 30 days. atorvastatin 40 MG tablet  Commonly known as: LIPITOR  Take 1 tablet by mouth nightly     [employer]ite Senior/Antioxidant Tabs  TAKE 1 TAB BY MOUTH ONCE A DAY     chlorhexidine 0.12 % solution  Commonly known as: PERIDEX     clopidogrel 75 MG tablet  Commonly known as: PLAVIX  Take 1 tablet by mouth daily     cyanocobalamin 1000 MCG tablet     * Diapers & Supplies [facility]  Adult diapers dispense quantity allowed by insurance     * Diapers & Supplies Misc  Wipes  dispense quantity allowed by insurance     docusate sodium 100 MG capsule  Commonly known as: COLACE     fluticasone propionate 50 MCG/BLIST Aepb inhaler  Commonly known as: FLOVENT     folic acid 1 MG tablet  Commonly known as: FOLVITE     furosemide 20 MG tablet  Commonly known as: LASIX  Take 1 tablet by mouth daily     LORazepam 1 MG tablet  Commonly known as: ATIVAN  Take 1 tablet by mouth 3 times daily as needed for Anxiety for up to 7 days.      meclizine 25 MG tablet  Commonly known as: ANTIVERT  Take 0.5 tablets by mouth 3 times daily as needed     methylphenidate 10 MG tablet  Commonly known as: RITALIN     mometasone 50 MCG/ACT nasal spray  Commonly known as: Nasonex  2 sprays by Nasal route daily. MULTIVITAMIN PO     ProAir  (90 Base) MCG/ACT inhaler  Generic drug: albuterol sulfate HFA  inhale 2 puffs every 4 to 6 hours if needed     QUEtiapine 25 MG tablet  Commonly known as: SEROQUEL  Take 1 tablet by mouth nightly     SALINE MIST SPRAY NA     sodium chloride 1 g tablet     vitamin C 500 MG tablet  Commonly known as: ASCORBIC ACID         * This list has 2 medication(s) that are the same as other medications prescribed for you. Read the directions carefully, and ask your doctor or other care provider to review them with you. STOP taking these medications    guaiFENesin 400 MG tablet  Replaced by: guaiFENesin 600 MG extended release tablet     HYDROcodone-acetaminophen 5-325 MG per tablet  Commonly known as: NORCO     loratadine 10 MG tablet  Commonly known as: CLARITIN     melatonin 3 MG Tabs tablet           Where to Get Your Medications      You can get these medications from any pharmacy    Bring a paper prescription for each of these medications  · Armodafinil 150 MG Tabs tablet  · LORazepam 1 MG tablet  · traMADol 50 MG tablet     Information about where to get these medications is not yet available    Ask your nurse or doctor about these medications  · carvedilol 6.25 MG tablet  · enoxaparin 40 MG/0.4ML injection  · guaiFENesin 600 MG extended release tablet  · levothyroxine 50 MCG tablet         Time Spent on discharge is  34 mins in patient examination, evaluation, counseling, medication reconciliation, discharge plan and follow up. Electronically signed by   Derek Grace DO  3/14/2022  10:32 PM      Thank you Dr. Mckenzie Toscano DO for the opportunity to be involved in this patient's care.

## 2022-03-16 ENCOUNTER — APPOINTMENT (OUTPATIENT)
Dept: GENERAL RADIOLOGY | Age: 72
DRG: 871 | End: 2022-03-16
Payer: COMMERCIAL

## 2022-03-16 ENCOUNTER — HOSPITAL ENCOUNTER (INPATIENT)
Age: 72
LOS: 4 days | Discharge: NURSING, REHAB OR LONG-TERM CARE | DRG: 871 | End: 2022-03-20
Attending: EMERGENCY MEDICINE | Admitting: INTERNAL MEDICINE
Payer: COMMERCIAL

## 2022-03-16 DIAGNOSIS — S72.141A CLOSED INTERTROCHANTERIC FRACTURE OF HIP, RIGHT, INITIAL ENCOUNTER (HCC): ICD-10-CM

## 2022-03-16 DIAGNOSIS — J18.9 PNEUMONIA OF RIGHT LOWER LOBE DUE TO INFECTIOUS ORGANISM: Primary | ICD-10-CM

## 2022-03-16 DIAGNOSIS — G47.429 NARCOLEPSY DUE TO UNDERLYING CONDITION WITHOUT CATAPLEXY: ICD-10-CM

## 2022-03-16 LAB
ABSOLUTE EOS #: 0 K/UL (ref 0–0.4)
ABSOLUTE LYMPH #: 1.59 K/UL (ref 1–4.8)
ABSOLUTE MONO #: 1.27 K/UL (ref 0.1–1.3)
ANION GAP SERPL CALCULATED.3IONS-SCNC: 16 MMOL/L (ref 9–17)
BASOPHILS # BLD: 0 % (ref 0–2)
BASOPHILS ABSOLUTE: 0 K/UL (ref 0–0.2)
BUN BLDV-MCNC: 30 MG/DL (ref 8–23)
CALCIUM SERPL-MCNC: 9.7 MG/DL (ref 8.6–10.4)
CHLORIDE BLD-SCNC: 107 MMOL/L (ref 98–107)
CO2: 16 MMOL/L (ref 20–31)
CREAT SERPL-MCNC: 1.21 MG/DL (ref 0.7–1.2)
EOSINOPHILS RELATIVE PERCENT: 0 % (ref 0–4)
GFR AFRICAN AMERICAN: >60 ML/MIN
GFR NON-AFRICAN AMERICAN: 59 ML/MIN
GFR SERPL CREATININE-BSD FRML MDRD: ABNORMAL ML/MIN/{1.73_M2}
GLUCOSE BLD-MCNC: 102 MG/DL (ref 70–99)
HCT VFR BLD CALC: 25.8 % (ref 41–53)
HEMOGLOBIN: 8.3 G/DL (ref 13.5–17.5)
LYMPHOCYTES # BLD: 10 % (ref 24–44)
MCH RBC QN AUTO: 28 PG (ref 26–34)
MCHC RBC AUTO-ENTMCNC: 32.1 G/DL (ref 31–37)
MCV RBC AUTO: 87.3 FL (ref 80–100)
MONOCYTES # BLD: 8 % (ref 1–7)
MORPHOLOGY: ABNORMAL
MORPHOLOGY: ABNORMAL
PDW BLD-RTO: 21.3 % (ref 11.5–14.9)
PLATELET # BLD: 609 K/UL (ref 150–450)
PMV BLD AUTO: 7 FL (ref 6–12)
POTASSIUM SERPL-SCNC: 4.8 MMOL/L (ref 3.7–5.3)
RBC # BLD: 2.96 M/UL (ref 4.5–5.9)
SARS-COV-2, RAPID: NOT DETECTED
SEG NEUTROPHILS: 82 % (ref 36–66)
SEGMENTED NEUTROPHILS ABSOLUTE COUNT: 13.04 K/UL (ref 1.3–9.1)
SODIUM BLD-SCNC: 139 MMOL/L (ref 135–144)
SPECIMEN DESCRIPTION: NORMAL
TROPONIN, HIGH SENSITIVITY: 23 NG/L (ref 0–22)
WBC # BLD: 15.9 K/UL (ref 3.5–11)

## 2022-03-16 PROCEDURE — 6360000002 HC RX W HCPCS: Performed by: EMERGENCY MEDICINE

## 2022-03-16 PROCEDURE — 36415 COLL VENOUS BLD VENIPUNCTURE: CPT

## 2022-03-16 PROCEDURE — 87635 SARS-COV-2 COVID-19 AMP PRB: CPT

## 2022-03-16 PROCEDURE — 2060000000 HC ICU INTERMEDIATE R&B

## 2022-03-16 PROCEDURE — 99284 EMERGENCY DEPT VISIT MOD MDM: CPT

## 2022-03-16 PROCEDURE — 71045 X-RAY EXAM CHEST 1 VIEW: CPT

## 2022-03-16 PROCEDURE — 2580000003 HC RX 258: Performed by: EMERGENCY MEDICINE

## 2022-03-16 PROCEDURE — 93005 ELECTROCARDIOGRAM TRACING: CPT | Performed by: EMERGENCY MEDICINE

## 2022-03-16 PROCEDURE — 80048 BASIC METABOLIC PNL TOTAL CA: CPT

## 2022-03-16 PROCEDURE — 84484 ASSAY OF TROPONIN QUANT: CPT

## 2022-03-16 PROCEDURE — 85025 COMPLETE CBC W/AUTO DIFF WBC: CPT

## 2022-03-16 RX ADMIN — VANCOMYCIN HYDROCHLORIDE 2500 MG: 1 INJECTION, POWDER, LYOPHILIZED, FOR SOLUTION INTRAVENOUS at 23:59

## 2022-03-16 RX ADMIN — CEFEPIME HYDROCHLORIDE 2000 MG: 2 INJECTION, POWDER, FOR SOLUTION INTRAVENOUS at 23:10

## 2022-03-16 ASSESSMENT — ENCOUNTER SYMPTOMS
WHEEZING: 0
FACIAL SWELLING: 0
RHINORRHEA: 0
NAUSEA: 0
SINUS PRESSURE: 0
VOMITING: 0
SORE THROAT: 0
SHORTNESS OF BREATH: 1
BLOOD IN STOOL: 0
DIARRHEA: 0
EYE REDNESS: 0
EYE DISCHARGE: 0
ABDOMINAL PAIN: 0
CHEST TIGHTNESS: 0
CONSTIPATION: 0
COUGH: 1
EYE PAIN: 0
BACK PAIN: 0
TROUBLE SWALLOWING: 0
COLOR CHANGE: 0

## 2022-03-17 PROBLEM — N17.9 AKI (ACUTE KIDNEY INJURY) (HCC): Status: ACTIVE | Noted: 2022-03-17

## 2022-03-17 LAB
ANION GAP SERPL CALCULATED.3IONS-SCNC: 12 MMOL/L (ref 9–17)
BUN BLDV-MCNC: 34 MG/DL (ref 8–23)
CALCIUM SERPL-MCNC: 9.6 MG/DL (ref 8.6–10.4)
CHLORIDE BLD-SCNC: 105 MMOL/L (ref 98–107)
CO2: 20 MMOL/L (ref 20–31)
CREAT SERPL-MCNC: 1.3 MG/DL (ref 0.7–1.2)
EKG ATRIAL RATE: 66 BPM
EKG P AXIS: 92 DEGREES
EKG P-R INTERVAL: 210 MS
EKG Q-T INTERVAL: 538 MS
EKG QRS DURATION: 94 MS
EKG QTC CALCULATION (BAZETT): 564 MS
EKG R AXIS: -16 DEGREES
EKG T AXIS: -164 DEGREES
EKG VENTRICULAR RATE: 66 BPM
GFR AFRICAN AMERICAN: >60 ML/MIN
GFR NON-AFRICAN AMERICAN: 55 ML/MIN
GFR SERPL CREATININE-BSD FRML MDRD: ABNORMAL ML/MIN/{1.73_M2}
GLUCOSE BLD-MCNC: 94 MG/DL (ref 70–99)
HCT VFR BLD CALC: 25.3 % (ref 41–53)
HEMOGLOBIN: 7.7 G/DL (ref 13.5–17.5)
LACTATE DEHYDROGENASE: 267 U/L (ref 135–225)
LACTIC ACID, SEPSIS: 1.3 MMOL/L (ref 0.5–1.9)
MCH RBC QN AUTO: 26.6 PG (ref 26–34)
MCHC RBC AUTO-ENTMCNC: 30.5 G/DL (ref 31–37)
MCV RBC AUTO: 87.4 FL (ref 80–100)
MRSA, DNA, NASAL: NEGATIVE
PDW BLD-RTO: 21.5 % (ref 11.5–14.9)
PLATELET # BLD: 609 K/UL (ref 150–450)
PMV BLD AUTO: 6.8 FL (ref 6–12)
POTASSIUM SERPL-SCNC: 4.2 MMOL/L (ref 3.7–5.3)
RBC # BLD: 2.9 M/UL (ref 4.5–5.9)
SODIUM BLD-SCNC: 137 MMOL/L (ref 135–144)
SPECIMEN DESCRIPTION: NORMAL
TROPONIN, HIGH SENSITIVITY: 23 NG/L (ref 0–22)
VANCOMYCIN RANDOM DATE LAST DOSE: NORMAL
VANCOMYCIN RANDOM DOSE AMOUNT: NORMAL
VANCOMYCIN RANDOM TIME LAST DOSE: NORMAL
VANCOMYCIN RANDOM: 20.8 UG/ML
WBC # BLD: 14.9 K/UL (ref 3.5–11)

## 2022-03-17 PROCEDURE — 97162 PT EVAL MOD COMPLEX 30 MIN: CPT

## 2022-03-17 PROCEDURE — 99223 1ST HOSP IP/OBS HIGH 75: CPT | Performed by: INTERNAL MEDICINE

## 2022-03-17 PROCEDURE — 83615 LACTATE (LD) (LDH) ENZYME: CPT

## 2022-03-17 PROCEDURE — 2580000003 HC RX 258: Performed by: NURSE PRACTITIONER

## 2022-03-17 PROCEDURE — 94761 N-INVAS EAR/PLS OXIMETRY MLT: CPT

## 2022-03-17 PROCEDURE — 2060000000 HC ICU INTERMEDIATE R&B

## 2022-03-17 PROCEDURE — 94640 AIRWAY INHALATION TREATMENT: CPT

## 2022-03-17 PROCEDURE — 97530 THERAPEUTIC ACTIVITIES: CPT

## 2022-03-17 PROCEDURE — 36415 COLL VENOUS BLD VENIPUNCTURE: CPT

## 2022-03-17 PROCEDURE — 6370000000 HC RX 637 (ALT 250 FOR IP): Performed by: INTERNAL MEDICINE

## 2022-03-17 PROCEDURE — 93010 ELECTROCARDIOGRAM REPORT: CPT | Performed by: INTERNAL MEDICINE

## 2022-03-17 PROCEDURE — 2700000000 HC OXYGEN THERAPY PER DAY

## 2022-03-17 PROCEDURE — 83605 ASSAY OF LACTIC ACID: CPT

## 2022-03-17 PROCEDURE — 97166 OT EVAL MOD COMPLEX 45 MIN: CPT

## 2022-03-17 PROCEDURE — 6370000000 HC RX 637 (ALT 250 FOR IP): Performed by: NURSE PRACTITIONER

## 2022-03-17 PROCEDURE — 85027 COMPLETE CBC AUTOMATED: CPT

## 2022-03-17 PROCEDURE — 87641 MR-STAPH DNA AMP PROBE: CPT

## 2022-03-17 PROCEDURE — 6360000002 HC RX W HCPCS: Performed by: NURSE PRACTITIONER

## 2022-03-17 PROCEDURE — 80202 ASSAY OF VANCOMYCIN: CPT

## 2022-03-17 PROCEDURE — 80048 BASIC METABOLIC PNL TOTAL CA: CPT

## 2022-03-17 PROCEDURE — 94664 DEMO&/EVAL PT USE INHALER: CPT

## 2022-03-17 RX ORDER — ACETAMINOPHEN 325 MG/1
650 TABLET ORAL EVERY 6 HOURS PRN
Status: DISCONTINUED | OUTPATIENT
Start: 2022-03-17 | End: 2022-03-20 | Stop reason: HOSPADM

## 2022-03-17 RX ORDER — SODIUM CHLORIDE 0.9 % (FLUSH) 0.9 %
5-40 SYRINGE (ML) INJECTION EVERY 12 HOURS SCHEDULED
Status: DISCONTINUED | OUTPATIENT
Start: 2022-03-17 | End: 2022-03-20 | Stop reason: HOSPADM

## 2022-03-17 RX ORDER — GUAIFENESIN 600 MG/1
1200 TABLET, EXTENDED RELEASE ORAL 2 TIMES DAILY
Status: DISCONTINUED | OUTPATIENT
Start: 2022-03-17 | End: 2022-03-20 | Stop reason: HOSPADM

## 2022-03-17 RX ORDER — ASCORBIC ACID 500 MG
500 TABLET ORAL DAILY
Status: DISCONTINUED | OUTPATIENT
Start: 2022-03-17 | End: 2022-03-20 | Stop reason: HOSPADM

## 2022-03-17 RX ORDER — ATORVASTATIN CALCIUM 40 MG/1
40 TABLET, FILM COATED ORAL NIGHTLY
Status: DISCONTINUED | OUTPATIENT
Start: 2022-03-17 | End: 2022-03-20 | Stop reason: HOSPADM

## 2022-03-17 RX ORDER — MULTIVITAMIN/IRON/FOLIC ACID 18MG-0.4MG
250 TABLET ORAL DAILY
Status: DISCONTINUED | OUTPATIENT
Start: 2022-03-17 | End: 2022-03-17 | Stop reason: RX

## 2022-03-17 RX ORDER — FOLIC ACID 1 MG/1
1 TABLET ORAL DAILY
Status: DISCONTINUED | OUTPATIENT
Start: 2022-03-17 | End: 2022-03-20 | Stop reason: HOSPADM

## 2022-03-17 RX ORDER — ONDANSETRON 4 MG/1
4 TABLET, ORALLY DISINTEGRATING ORAL EVERY 8 HOURS PRN
Status: DISCONTINUED | OUTPATIENT
Start: 2022-03-17 | End: 2022-03-20 | Stop reason: HOSPADM

## 2022-03-17 RX ORDER — SODIUM CHLORIDE 0.9 % (FLUSH) 0.9 %
10 SYRINGE (ML) INJECTION PRN
Status: DISCONTINUED | OUTPATIENT
Start: 2022-03-17 | End: 2022-03-20 | Stop reason: HOSPADM

## 2022-03-17 RX ORDER — SODIUM CHLORIDE 9 MG/ML
INJECTION, SOLUTION INTRAVENOUS CONTINUOUS
Status: DISCONTINUED | OUTPATIENT
Start: 2022-03-17 | End: 2022-03-20 | Stop reason: HOSPADM

## 2022-03-17 RX ORDER — DOCUSATE SODIUM 100 MG/1
100 CAPSULE, LIQUID FILLED ORAL 2 TIMES DAILY PRN
Status: DISCONTINUED | OUTPATIENT
Start: 2022-03-17 | End: 2022-03-20 | Stop reason: HOSPADM

## 2022-03-17 RX ORDER — FLUTICASONE PROPIONATE 50 MCG
2 SPRAY, SUSPENSION (ML) NASAL DAILY
Status: DISCONTINUED | OUTPATIENT
Start: 2022-03-17 | End: 2022-03-20 | Stop reason: HOSPADM

## 2022-03-17 RX ORDER — IPRATROPIUM BROMIDE AND ALBUTEROL SULFATE 2.5; .5 MG/3ML; MG/3ML
1 SOLUTION RESPIRATORY (INHALATION)
Status: DISCONTINUED | OUTPATIENT
Start: 2022-03-17 | End: 2022-03-20 | Stop reason: HOSPADM

## 2022-03-17 RX ORDER — LANOLIN ALCOHOL/MO/W.PET/CERES
3 CREAM (GRAM) TOPICAL NIGHTLY PRN
Status: DISCONTINUED | OUTPATIENT
Start: 2022-03-17 | End: 2022-03-20 | Stop reason: HOSPADM

## 2022-03-17 RX ORDER — M-VIT,TX,IRON,MINS/CALC/FOLIC 27MG-0.4MG
1 TABLET ORAL DAILY
Status: DISCONTINUED | OUTPATIENT
Start: 2022-03-17 | End: 2022-03-20 | Stop reason: HOSPADM

## 2022-03-17 RX ORDER — ACETAMINOPHEN 650 MG/1
650 SUPPOSITORY RECTAL EVERY 6 HOURS PRN
Status: DISCONTINUED | OUTPATIENT
Start: 2022-03-17 | End: 2022-03-20 | Stop reason: HOSPADM

## 2022-03-17 RX ORDER — CLOPIDOGREL BISULFATE 75 MG/1
75 TABLET ORAL DAILY
Status: DISCONTINUED | OUTPATIENT
Start: 2022-03-17 | End: 2022-03-20 | Stop reason: HOSPADM

## 2022-03-17 RX ORDER — SODIUM CHLORIDE 9 MG/ML
25 INJECTION, SOLUTION INTRAVENOUS PRN
Status: DISCONTINUED | OUTPATIENT
Start: 2022-03-17 | End: 2022-03-20 | Stop reason: HOSPADM

## 2022-03-17 RX ORDER — QUETIAPINE FUMARATE 50 MG/1
25 TABLET, FILM COATED ORAL NIGHTLY
Status: DISCONTINUED | OUTPATIENT
Start: 2022-03-17 | End: 2022-03-20 | Stop reason: HOSPADM

## 2022-03-17 RX ORDER — SODIUM CHLORIDE 1000 MG
1 TABLET, SOLUBLE MISCELLANEOUS DAILY
Status: DISCONTINUED | OUTPATIENT
Start: 2022-03-17 | End: 2022-03-20 | Stop reason: HOSPADM

## 2022-03-17 RX ORDER — ARMODAFINIL 150 MG/1
150 TABLET ORAL DAILY
Status: DISCONTINUED | OUTPATIENT
Start: 2022-03-17 | End: 2022-03-17

## 2022-03-17 RX ORDER — CHLORHEXIDINE GLUCONATE 0.12 MG/ML
15 RINSE ORAL 2 TIMES DAILY
Status: DISCONTINUED | OUTPATIENT
Start: 2022-03-17 | End: 2022-03-20 | Stop reason: HOSPADM

## 2022-03-17 RX ORDER — ONDANSETRON 2 MG/ML
4 INJECTION INTRAMUSCULAR; INTRAVENOUS EVERY 6 HOURS PRN
Status: DISCONTINUED | OUTPATIENT
Start: 2022-03-17 | End: 2022-03-20 | Stop reason: HOSPADM

## 2022-03-17 RX ORDER — CARVEDILOL 6.25 MG/1
6.25 TABLET ORAL 2 TIMES DAILY WITH MEALS
Status: DISCONTINUED | OUTPATIENT
Start: 2022-03-17 | End: 2022-03-20 | Stop reason: HOSPADM

## 2022-03-17 RX ORDER — LINEZOLID 2 MG/ML
600 INJECTION, SOLUTION INTRAVENOUS EVERY 12 HOURS
Status: DISCONTINUED | OUTPATIENT
Start: 2022-03-17 | End: 2022-03-17

## 2022-03-17 RX ORDER — TRAMADOL HYDROCHLORIDE 50 MG/1
50 TABLET ORAL EVERY 6 HOURS PRN
Status: DISCONTINUED | OUTPATIENT
Start: 2022-03-17 | End: 2022-03-20 | Stop reason: HOSPADM

## 2022-03-17 RX ORDER — LEVOTHYROXINE SODIUM 0.05 MG/1
50 TABLET ORAL DAILY
Status: DISCONTINUED | OUTPATIENT
Start: 2022-03-17 | End: 2022-03-20 | Stop reason: HOSPADM

## 2022-03-17 RX ORDER — FLUTICASONE PROPIONATE 110 UG/1
1 AEROSOL, METERED RESPIRATORY (INHALATION) 2 TIMES DAILY
Status: DISCONTINUED | OUTPATIENT
Start: 2022-03-17 | End: 2022-03-20 | Stop reason: HOSPADM

## 2022-03-17 RX ORDER — ALBUTEROL SULFATE 2.5 MG/3ML
2.5 SOLUTION RESPIRATORY (INHALATION) EVERY 4 HOURS PRN
Status: DISCONTINUED | OUTPATIENT
Start: 2022-03-17 | End: 2022-03-20 | Stop reason: HOSPADM

## 2022-03-17 RX ORDER — AMIODARONE HYDROCHLORIDE 200 MG/1
200 TABLET ORAL DAILY
Status: DISCONTINUED | OUTPATIENT
Start: 2022-03-17 | End: 2022-03-20 | Stop reason: HOSPADM

## 2022-03-17 RX ADMIN — CARVEDILOL 6.25 MG: 6.25 TABLET, FILM COATED ORAL at 17:58

## 2022-03-17 RX ADMIN — SODIUM CHLORIDE: 9 INJECTION, SOLUTION INTRAVENOUS at 18:16

## 2022-03-17 RX ADMIN — GUAIFENESIN 1200 MG: 600 TABLET, EXTENDED RELEASE ORAL at 21:08

## 2022-03-17 RX ADMIN — ENOXAPARIN SODIUM 40 MG: 40 INJECTION SUBCUTANEOUS at 10:16

## 2022-03-17 RX ADMIN — TRAMADOL HYDROCHLORIDE 50 MG: 50 TABLET, COATED ORAL at 18:02

## 2022-03-17 RX ADMIN — IPRATROPIUM BROMIDE AND ALBUTEROL SULFATE 1 AMPULE: 2.5; .5 SOLUTION RESPIRATORY (INHALATION) at 07:23

## 2022-03-17 RX ADMIN — CEFEPIME HYDROCHLORIDE 2000 MG: 2 INJECTION, POWDER, FOR SOLUTION INTRAVENOUS at 23:00

## 2022-03-17 RX ADMIN — LEVOTHYROXINE SODIUM 50 MCG: 0.05 TABLET ORAL at 06:01

## 2022-03-17 RX ADMIN — SODIUM CHLORIDE, PRESERVATIVE FREE 10 ML: 5 INJECTION INTRAVENOUS at 21:09

## 2022-03-17 RX ADMIN — 0.12% CHLORHEXIDINE GLUCONATE 15 ML: 1.2 RINSE ORAL at 10:17

## 2022-03-17 RX ADMIN — GUAIFENESIN 1200 MG: 600 TABLET, EXTENDED RELEASE ORAL at 10:17

## 2022-03-17 RX ADMIN — FOLIC ACID 1 MG: 1 TABLET ORAL at 10:17

## 2022-03-17 RX ADMIN — OXYCODONE HYDROCHLORIDE AND ACETAMINOPHEN 500 MG: 500 TABLET ORAL at 10:17

## 2022-03-17 RX ADMIN — FLUTICASONE PROPIONATE 2 SPRAY: 50 SPRAY, METERED NASAL at 10:17

## 2022-03-17 RX ADMIN — CLOPIDOGREL BISULFATE 75 MG: 75 TABLET ORAL at 10:17

## 2022-03-17 RX ADMIN — Medication 3 MG: at 23:04

## 2022-03-17 RX ADMIN — TRAMADOL HYDROCHLORIDE 50 MG: 50 TABLET, COATED ORAL at 03:29

## 2022-03-17 RX ADMIN — QUETIAPINE FUMARATE 25 MG: 50 TABLET ORAL at 21:08

## 2022-03-17 RX ADMIN — Medication 200 MG: at 10:17

## 2022-03-17 RX ADMIN — AMIODARONE HYDROCHLORIDE 200 MG: 200 TABLET ORAL at 10:16

## 2022-03-17 RX ADMIN — TRAMADOL HYDROCHLORIDE 50 MG: 50 TABLET, COATED ORAL at 10:24

## 2022-03-17 RX ADMIN — 0.12% CHLORHEXIDINE GLUCONATE 15 ML: 1.2 RINSE ORAL at 21:12

## 2022-03-17 RX ADMIN — SODIUM CHLORIDE: 9 INJECTION, SOLUTION INTRAVENOUS at 03:29

## 2022-03-17 RX ADMIN — ATORVASTATIN CALCIUM 40 MG: 40 TABLET, FILM COATED ORAL at 21:09

## 2022-03-17 RX ADMIN — CARVEDILOL 6.25 MG: 6.25 TABLET, FILM COATED ORAL at 10:16

## 2022-03-17 RX ADMIN — TRAMADOL HYDROCHLORIDE 50 MG: 50 TABLET, COATED ORAL at 23:36

## 2022-03-17 RX ADMIN — MULTIPLE VITAMINS W/ MINERALS TAB 1 TABLET: TAB at 10:17

## 2022-03-17 RX ADMIN — CEFEPIME HYDROCHLORIDE 2000 MG: 2 INJECTION, POWDER, FOR SOLUTION INTRAVENOUS at 17:58

## 2022-03-17 RX ADMIN — CEFEPIME HYDROCHLORIDE 2000 MG: 2 INJECTION, POWDER, FOR SOLUTION INTRAVENOUS at 05:59

## 2022-03-17 RX ADMIN — IPRATROPIUM BROMIDE AND ALBUTEROL SULFATE 1 AMPULE: 2.5; .5 SOLUTION RESPIRATORY (INHALATION) at 19:05

## 2022-03-17 RX ADMIN — Medication 1 PUFF: at 19:08

## 2022-03-17 ASSESSMENT — ENCOUNTER SYMPTOMS
BACK PAIN: 0
DIARRHEA: 0
SORE THROAT: 0
SHORTNESS OF BREATH: 1
ABDOMINAL PAIN: 0
CONSTIPATION: 0
NAUSEA: 0
WHEEZING: 0
VOMITING: 0
COUGH: 1

## 2022-03-17 ASSESSMENT — PAIN DESCRIPTION - DESCRIPTORS
DESCRIPTORS: SHARP
DESCRIPTORS: SHARP

## 2022-03-17 ASSESSMENT — PAIN DESCRIPTION - ORIENTATION
ORIENTATION: RIGHT
ORIENTATION: RIGHT

## 2022-03-17 ASSESSMENT — PAIN SCALES - GENERAL
PAINLEVEL_OUTOF10: 7
PAINLEVEL_OUTOF10: 9
PAINLEVEL_OUTOF10: 7
PAINLEVEL_OUTOF10: 7

## 2022-03-17 ASSESSMENT — PAIN DESCRIPTION - PAIN TYPE
TYPE: CHRONIC PAIN;SURGICAL PAIN
TYPE: SURGICAL PAIN;CHRONIC PAIN

## 2022-03-17 ASSESSMENT — PAIN DESCRIPTION - ONSET: ONSET: ON-GOING

## 2022-03-17 ASSESSMENT — PAIN DESCRIPTION - LOCATION
LOCATION: HIP
LOCATION: HIP

## 2022-03-17 ASSESSMENT — PAIN DESCRIPTION - FREQUENCY
FREQUENCY: CONTINUOUS
FREQUENCY: CONTINUOUS

## 2022-03-17 ASSESSMENT — PAIN DESCRIPTION - PROGRESSION
CLINICAL_PROGRESSION: NOT CHANGED
CLINICAL_PROGRESSION: NOT CHANGED

## 2022-03-17 NOTE — PROGRESS NOTES
Pt arrived to unit via stretcher from ED. Gown on, heart monitor on, vitals taken. Pt currently on 6L of oxygen, o2 saturation is 91%. Admission complete. Pt agitated. Call light within reach. Will continue to monitor.

## 2022-03-17 NOTE — PROGRESS NOTES
Physical Therapy    Facility/Department: [facility] PROGRESSIVE CARE  Initial Assessment    NAME: Andreia Kyle  : 1950  MRN: [id number]    Date of Service: 3/17/2022    Discharge Recommendations:  Patient would benefit from continued therapy after discharge   PT Equipment Recommendations  Equipment Needed:  (TBD)    Assessment   Body structures, Functions, Activity limitations: Decreased functional mobility ; Decreased safe awareness;Decreased ROM; Decreased strength; Increased pain  Assessment: pt is self limiting and refused to allow therapist to assess sitting balance or transfers supine <> sit. Pt becomes agitated easiliy. Treatment Diagnosis: impaired mobility Due to debilitation and recent right TFN  Specific instructions for Next Treatment: continue w/ bed mobility for rolling, further advance to dangling and pivot transfers bed <> W/C, gentle ROM & strengthening exercises (recent right TFN)  Prognosis: Poor  Decision Making: Medium Complexity  History: pt admitted due to pneumonia  Exam: ROM and MMT as allowed by patient, bed mobility for rolling to the left and scooting  Clinical Presentation: Min x 1 to roll to the left and max x 2 to sccot up in bed; pt refused to dangle at the EOB or transfer to a W/C, HIGH FALL RISK, per chart, pt is non-ambulatory, uses manual W/C, pt is allowed right LE WBAT S/P recent right TFN 3-8-22  PT Education: Goals;PT Role;Plan of Care  Barriers to Learning: cognition, agitation  REQUIRES PT FOLLOW UP: Yes  Activity Tolerance  Activity Tolerance: Patient limited by pain; Patient limited by fatigue;Patient limited by endurance; Patient limited by cognitive status       Patient Diagnosis(es): The encounter diagnosis was Pneumonia of right lower lobe due to infectious organism.      has a past medical history of ADHD (attention deficit hyperactivity disorder), ADHD (attention deficit hyperactivity disorder), Atypical chest pain, Chronic bronchitis ([facility] [address] Hypertension, Hyponatremia, Meniere's disease, Narcolepsy, Nasal fracture, PND (post-nasal drip), SOB (shortness of breath), Tibia fracture, and Transaminasemia. has a past surgical history that includes cyst removal; Ankle surgery; knee surgery (Right); Coronary angioplasty with stent (N/A, 10/10/2015); Knee Arthroplasty (Right, 04/13/2021); hip surgery (Right, 03/08/2022); and Femur fracture surgery (Right, 3/8/2022). Restrictions  Restrictions/Precautions  Restrictions/Precautions: Fall Risk  Implants present? : Metal implants  Lower Extremity Weight Bearing Restrictions  Right Lower Extremity Weight Bearing: Weight Bearing As Tolerated  Vision/Hearing  Vision: Impaired  Vision Exceptions: Wears glasses for reading  Hearing: Within functional limits     Subjective  General  Patient assessed for rehabilitation services?: Yes  Response To Previous Treatment: Not applicable  Family / Caregiver Present: No  Referring Practitioner: Shadia Ferrari CNP  Referral Date : 03/17/22  Diagnosis: pneumonia  Follows Commands: Impaired (confused and agitated)  Other (Comment): OK per nurse Lino Montanez to proceed w/ PT evaluation  General Comment  Comments: per medical record, pt recently at \[facility]\"" for a right femur fracture after he fell out of his W/C at a nursing home. .  Pt had a right TFN on 3-8-2022 by Dr. Royer Goldstein. Pt was D/C to nursing home after hospitalization. Subjective  Subjective: Pt admitted w/ C/O SOB and cough. .  Pt is confused and reports that it is 9:30 p.m. at night. Pt becomes agitated easiliy.   Pain Screening  Patient Currently in Pain: Yes  Pain Assessment  Pain Assessment: 0-10  Pain Level: 9  Pain Type: Chronic pain;Surgical pain  Pain Location: Hip  Pain Orientation: Right  Pain Descriptors: Sharp (straight)  Pain Frequency: Continuous  Pain Onset: On-going  Clinical Progression: Not changed  Non-Pharmaceutical Pain Intervention(s): Repositioned  Response to Pain Intervention: Confused  Multiple Pain Sites: No  Vital Signs  Patient Currently in Pain: Yes       Orientation  Orientation  Overall Orientation Status: Impaired (correct name, , current month and year,  and place stated; Incorrect: time of day (9:30 p.m.))  Orientation Level: Oriented to place;Oriented to person;Disoriented to situation;Disoriented to time  Social/Functional History  Social/Functional History  Bathroom Equipment: Shower chair  Home Equipment: [employer]  ADL Assistance: Needs assistance  Homemaking Assistance: Needs assistance (staff completes)  Ambulation Assistance:  (Non-ambulatory, uses manual W/C)  Transfer Assistance: Needs assistance (assist for pivot transfer bed <> W/C)  Active : No  Additional Comments: Pt is a questionable historian and unable to provide specific PLOF or environmental set-up, no family/caregiver present to verify information. Per chart review, pt was at an [facility] for rehab (unknown which one)  Cognition        Objective     Observation/Palpation  Observation: O2 at 5 L, troponins 23 on 3-, peripheral IV left forearm, surgical dressing right lateral thigh and kerlex dressing left heel  Scar: scar right anterior knee    PROM RLE (degrees)  RLE General PROM: pt tends to hold the right LE in an abducted and externally rotated position at the hip w/ a flexed knee, ankle plantarflexed. Pt allowed therapist to assist bringing right LE into a more neutral position at the hip to allow us to scoot him up in bed. AROM RLE (degrees)  RLE General AROM: pt becomes agitated when asked for ROM and refused to follow therapist's commands. Observed to actively flex right hip flexors and knee flexors w/ trace contraction but refused further  PROM LLE (degrees)  LLE General PROM: pt tends to hold the left LE in an adducted and internally  rotated position at the hip w/ an extended  knee, ankle plantarflexed,  Therapist assisted pt to bring left LE into a neutral position to allow us to scoot him up in bed.   AROM LLE (degrees)  LLE AROM : Exceptions  L Hip Flexion 0-125: 0-80  L Hip ABduction 0-45: 0-20  L Knee Flexion 0-145: 0-80  L Knee Extension 0: 0  L Ankle Dorsiflexion 0-20: NT  L Ankle Plantar Flexion 0-45: NT  AROM RUE (degrees)  RUE General AROM: see OT for UE assessment  AROM LUE (degrees)  LUE General AROM: see OT for UE assessment  Strength RLE  Strength RLE: Exception  R Hip Flexion: 1+/5  R Hip ABduction: 1+/5  R Hip ADduction: 1+/5  R Knee Flexion: 1+/5  R Knee Extension: 2-/5  R Ankle Dorsiflexion: NT  R Ankle Plantar flexion: NT  Strength LLE  Strength LLE: Exception  L Hip Flexion: 2-/5  L Hip ABduction: 2-/5  L Hip ADduction: 2-/5  L Knee Flexion: 2-/5  L Knee Extension: 2-/5  L Ankle Dorsiflexion: NT  L Ankle Plantar Flexion: NT  Strength RUE  Comment: see OT for UE assessment  Strength LUE  Comment: see OT for UE assessment     Sensation  Overall Sensation Status: WFL (denies)  Bed mobility  Rolling to Left: Minimal assistance  Rolling to Right: Minimal assistance  Scooting: Maximal assistance;2 Person assistance  Comment: Pt adamantly refused sitting EOB, despite max encouragement from OT/PT. Also becomes easily agitated. Pt repositioned in bed with pillow under RLE for comfort. Transfers  Comment: deferred transfer to W/C or dangling  Ambulation  Ambulation?: No (per chart, pt is non-ambulatory, uses manual W/C)  Wheelchair Activities  Propulsion: No     Balance  Comments: refused        Plan   Plan  Times per week: 5-7 treatments/ week  Times per day:  (5-7 treatments/ week)  Specific instructions for Next Treatment: continue w/ bed mobility for rolling, further advance to dangling and pivot transfers bed <> W/C, gentle ROM & strengthening exercises (recent right TFN)  Current Treatment Recommendations: Strengthening,Endurance Training,Transfer Training,Patient/Caregiver Education & Training,ROM,Wheelchair Mobility Training,Balance Training,Positioning,Safety Education & Training  Safety Devices  Type of devices:  All fall

## 2022-03-17 NOTE — PROGRESS NOTES
Patient found off oxygen, spo2 78%, patient placed back on 6L n/c as previously charted and given scheduled aerosol treatment. Spo2 up to the low 90s after treatment.

## 2022-03-17 NOTE — H&P
[id number] [facility]     HISTORY AND PHYSICAL EXAMINATION            Date:   3/17/2022  Patientname:  Lucas Cherry  Date of admission:  3/16/2022  9:23 PM  MRN:   [id number]  Account:  [de-identified]  YOB: 1950  PCP:    Kwame Carpenter DO  Room:   2121/2121-01  Code Status:    Full Code    CHIEF COMPLAINT     No chief complaint on file. HISTORY OF PRESENT ILLNESS  (Character, Onset, Location, Duration,  Exacerbating/RelievingFactors, Radiation,   Associated Symptoms, Severity )      The patient is a 81 y.o.  male, with a history of anemia, cardiomyopathy, COPD, dementia with behavioral disturbance, HTN, Ménière's disease, and permanent cardiac pacemaker, who presents from ECF with shortness of breath. According to patient and E HR, patient underwent right hip surgery for repair of right femoral fracture at [facility] on 3/8/2022 and was discharged to an ECF for rehabilitative services on 3/14/2022. Patient reports that he began feeling ill and having intermittent episodes of shortness of breath prior to discharge but states that symptoms have continued to worsen since that time. Symptoms are associated with cough, chest congestion, and hypoxia, with SPO2 noted to be in the 60s prior to transport from ECF. SPO2 reportedly normalized after administration of oxygen. Denies fever, chills, chest pain, abdominal pain, nausea, vomiting, diarrhea, and urinary symptoms. There are no aggravating or alleviating factors. Symptoms are reported as constant and moderate; progressively worsening. Patient denies problems from right hip surgical site.     PAST MEDICAL HISTORY   Patient  has a past medical history of ADHD (attention deficit hyperactivity disorder), ADHD (attention deficit hyperactivity disorder), Atypical chest pain, Chronic bronchitis ([facility] [address] Hypertension, Hyponatremia, Meniere's disease, Narcolepsy, Nasal fracture, PND (post-nasal drip), SOB (shortness of breath), Tibia fracture, and Transaminasemia. PAST SURGICAL HISTORY    Patient  has a past surgical history that includes cyst removal; Ankle surgery; knee surgery (Right); Coronary angioplasty with stent (N/A, 10/10/2015); Knee Arthroplasty (Right, 04/13/2021); hip surgery (Right, 03/08/2022); and Femur fracture surgery (Right, 3/8/2022). FAMILY HISTORY    Patient family history includes Heart Disease in his father and mother. SOCIAL HISTORY    Patient  reports that he has been smoking cigarettes. He has a 38.00 pack-year smoking history. He has never used smokeless tobacco. He reports current alcohol use of about 16.0 standard drinks of alcohol per week. He reports that he does not use drugs. HOME MEDICATIONS        Prior to Admission medications    Medication Sig Start Date End Date Taking? Authorizing Provider   carvedilol (COREG) 6.25 MG tablet Take 1 tablet by mouth 2 times daily (with meals) 3/14/22   Charli Naylor,    traMADol (ULTRAM) 50 MG tablet Take 1 tablet by mouth every 6 hours as needed for Pain for up to 7 days. 3/10/22 3/17/22  Charli Naylor, DO   Armodafinil (NUVIGIL) 150 MG TABS tablet Take 1 tablet by mouth daily for 30 days.  3/10/22 4/9/22  Charli Naylor DO   levothyroxine (SYNTHROID) 50 MCG tablet Take 1 tablet by mouth Daily 3/11/22   Charli Naylor, DO   enoxaparin (LOVENOX) 40 MG/0.4ML injection Inject 0.4 mLs into the skin daily 3/11/22   Charli Naylor DO   [facility]) 600 MG extended release tablet Take 2 tablets by mouth 2 times daily 3/10/22   Charli Naylor DO   amiodarone (CORDARONE) 200 MG tablet Take by mouth    Historical Provider, MD   cyanocobalamin 1000 MCG tablet Take 1,000 mcg by mouth    Historical Provider, MD   sodium chloride 1 g tablet Take 1 g by mouth daily     Historical Provider, MD   vitamin C (ASCORBIC ACID) 500 MG tablet Take 500 mg by mouth    Historical Provider, MD   fluticasone propionate (FLOVENT) 50 MCG/BLIST AEPB inhaler Inhale into the lungs daily    Historical Provider, MD   acetaminophen (TYLENOL) 500 MG tablet Take 1 tablet by mouth 4 times daily as needed for Pain 4/15/21   Cordell Diane DO   Multiple Vitamins-Minerals (CERTAVITE SENIOR/ANTIOXIDANT) TABS TAKE 1 TAB BY MOUTH ONCE A DAY 6/22/16   Iris Lynn PA-C   QUEtiapine (SEROQUEL) 25 MG tablet Take 1 tablet by mouth nightly 3/24/16   Jalil Brooks DO   atorvastatin (LIPITOR) 40 MG tablet Take 1 tablet by mouth nightly 11/10/15   Cory Riggs MD   clopidogrel (PLAVIX) 75 MG tablet Take 1 tablet by mouth daily 11/10/15   Cory Riggs MD   Magnesium Oxide 250 MG TABS Take 1 tablet by mouth daily  Patient taking differently: Take 500 mg by mouth daily  11/10/15   Cory Riggs MD   furosemide (LASIX) 20 MG tablet Take 1 tablet by mouth daily 11/10/15   Cory Riggs MD   [address] Adult diapers dispense quantity allowed by insurance 3/31/15   Donna Hands MAITE Lynn   Diapers & Supplies MISC Wipes  dispense quantity allowed by insurance 3/31/15   Iris Lynn PA-C   PROAIR  (90 BASE) MCG/ACT inhaler inhale 2 puffs every 4 to 6 hours if needed 11/29/14   Iris Lynn PA-C   mometasone (NASONEX) 50 MCG/ACT nasal spray 2 sprays by Nasal route daily. 12/23/13   Jameel Juárez MD   docusate sodium (COLACE) 100 MG capsule Take 100 mg by mouth 2 times daily. Historical Provider, MD   folic acid (FOLVITE) 1 MG tablet Take 1 mg by mouth daily. Historical Provider, MD   chlorhexidine (PERIDEX) 0.12 % solution Take 15 mLs by mouth 2 times daily. Historical Provider, MD   SALINE MIST SPRAY NA by Nasal route. Historical Provider, MD   Multiple Vitamin (MULTIVITAMIN PO) Take  by mouth. Historical Provider, MD       ALLERGIES      Motrin [ibuprofen micronized]    REVIEW OF SYSTEMS     Review of Systems   Constitutional: Negative for chills, diaphoresis and fever. HENT: Negative for congestion and sore throat. Respiratory: Positive for cough and shortness of breath. Negative for wheezing. Hypoxia   Cardiovascular: Negative for chest pain, palpitations and leg swelling. Gastrointestinal: Negative for abdominal pain, constipation, diarrhea, nausea and vomiting. Genitourinary: Negative for dysuria, frequency and urgency. Musculoskeletal: Negative for back pain and myalgias. Skin: Negative for rash. Right hip incision from recent surgery   Neurological: Negative for dizziness, weakness and headaches. Psychiatric/Behavioral: The patient is not nervous/anxious. PHYSICAL EXAM      /80   Pulse 64   Temp 97.3 °F (36.3 °C) (Oral)   Resp 20   Ht 5' 10\" (1.778 m)   Wt 202 lb 8 oz (91.9 kg)   SpO2 90%   BMI 29.06 kg/m²  Body mass index is 29.06 kg/m². Physical Exam  Constitutional:       General: He is not in acute distress. Appearance: He is well-developed. He is not diaphoretic. HENT:      Head: Normocephalic and atraumatic. Eyes:      Conjunctiva/sclera: Conjunctivae normal.      Pupils: Pupils are equal, round, and reactive to light. Neck:      Trachea: No tracheal deviation. Cardiovascular:      Rate and Rhythm: Normal rate and regular rhythm. Heart sounds: Normal heart sounds. No murmur heard. No friction rub. No gallop. Pulmonary:      Effort: Pulmonary effort is normal. No respiratory distress. Breath sounds: Rhonchi (Rhonchi scattered throughout all lung fields) present. No wheezing or rales. Comments: Frequent loose cough noted  Chest:      Chest wall: No tenderness. Abdominal:      General: Bowel sounds are normal. There is no distension. Palpations: Abdomen is soft. Tenderness: There is no abdominal tenderness. There is no guarding. Musculoskeletal:         General: No tenderness. Normal range of motion. Cervical back: Normal range of motion and neck supple.       Comments: Dressing intact over right hip incision Lymphadenopathy:      Cervical: No cervical adenopathy. Skin:     General: Skin is warm and dry. Coloration: Skin is not pale. Findings: No erythema or rash. Neurological:      Mental Status: He is alert and oriented to person, place, and time. Motor: No seizure activity. Coordination: Coordination normal.   Psychiatric:         Behavior: Behavior normal.         Thought Content: Thought content normal.       DIAGNOSTICS      EKG:   EKG 12 Lead [[id number]]    Collected: 03/16/22 2132    Updated: 03/16/22 2133     Ventricular Rate 66 BPM    Atrial Rate 66 BPM    P-R Interval 210 ms    QRS Duration 94 ms    Q-T Interval 538 ms    QTc Calculation (Bazett) 564 ms    P Axis 92 degrees    R Axis -16 degrees    T Axis -164 degrees   Narrative:     *Poor data quality, interpretation may be adversely affected   Sinus rhythm with 1st degree A-V block   ST & T wave abnormality, consider inferolateral ischemia   Prolonged QT   Abnormal ECG   No previous ECGs available     Labs:  CBC:   Recent Labs     03/16/22 2210   WBC 15.9*   HGB 8.3*   *     BMP:    Recent Labs     03/16/22 2210      K 4.8      CO2 16*   BUN 30*   CREATININE 1.21*   GLUCOSE 102*     S. Calcium:  Recent Labs     03/16/22 2210   CALCIUM 9.7     S. Ionized Calcium:No results for input(s): IONCA in the last 72 hours. S. Magnesium:No results for input(s): MG in the last 72 hours. S. Phosphorus:No results for input(s): PHOS in the last 72 hours. S. Glucose:No results for input(s): POCGLU in the last 72 hours. Glycosylated hemoglobin A1C:   Lab Results   Component Value Date    LABA1C 6.0 02/19/2022     Hepatic: No results for input(s): AST, ALT, ALB, ALKPHOS in the last 72 hours. Invalid input(s):  PROT,  BILITOT,  BILIDIR  CARDIAC ENZY:   Recent Labs     03/16/22  0005 03/16/22 2210   TROPHS 23* 23*     INR: No results for input(s): INR in the last 72 hours.   BNP: No results for input(s): PROBNP in the last 72 hours. ABGs: No results for input(s): PH, PCO2, PO2, HCO3, O2SAT in the last 72 hours. Lipids: No results for input(s): CHOL, TRIG, HDL, LDL, LDLCALC in the last 72 hours. Pancreatic functions:No results for input(s): LIPASE, AMYLASE in the last 72 hours. Marycruz Leitz: No results for input(s): LACTA in the last 72 hours. Thyroid functions:   Lab Results   Component Value Date    TSH 5.18 02/19/2022      U/A:No results for input(s): NITRITE, COLORU, WBCUA, RBCUA, MUCUS, BACTERIA, CLARITYU, SPECGRAV, LEUKOCYTESUR, BLOODU, GLUCOSEU, AMORPHOUS in the last 72 hours. Invalid input(s): Ruydaryl Scot  Recent Labs     03/16/22 2146   COVID19 Not Detected     Imaging/Diagonstics:     ECHO Complete 2D W Doppler W Color    Result Date: 3/7/2022  Transthoracic Echocardiography Report (TTE)  Patient Name COMES       Date of Study               03/07/2022               Rayo BELL   Date of      1950  Gender                      Male  Birth   Age          81 year(s)  Race                           Room Number  8937        Height:                     70 inch, 177.8 cm   Corporate ID [id number]    Weight:                     180 pounds, 81.6 kg  #   Patient Acct [de-identified]   BSA:          2 m^2         BMI:     25.83 kg/m^2  #   MR #         [id number]     Sonographer                 Jamaica Garcia   Accession #  [id number]  Interpreting Physician      [facility]   Fellow                   Referring Nurse                           Practitioner   Interpreting             Referring Physician         Dinora Chan  Fellow  Additional Comments Technically difficult study. Type of Study   TTE procedure:2D Echocardiogram, M-Mode, Doppler, Color Doppler. Procedure Date Date: 03/07/2022 Start: 08:13 AM Study Location: [facility] Technical Quality: Adequate visualization Indications:Preop cardiac evaluation. History / Tech. Comments: Echo done at patient bedside. Procedure explained to patient.  HTN, COPD Patient Status: Inpatient Height: 70 inches Weight: 180 pounds BSA: 2 m^2 BMI: 25.83 kg/m^2 Allergies   - *No Known Allergies. - *Unlisted:(motrin). - *Unlisted:(motrim). CONCLUSIONS Summary Technically very challenging, there is poor endocardial definition, cannot comment on wall motion analysis, within above limitations: Left ventricle is normal in size. Global left ventricular systolic function is normal. Calculated ejection fraction 68% by Rashid's method. Valves not well visualized. No obvious significant valvular regurgitation or stenosis seen. Signature ----------------------------------------------------------------------------  Electronically signed by Amelia Garcia(Sonographer) on 03/07/2022  09:36 AM ---------------------------------------------------------------------------- ----------------------------------------------------------------------------  Electronically signed by Waylon Yuan(Interpreting physician) on 03/07/2022  10:24 AM ---------------------------------------------------------------------------- FINDINGS Left Atrium Left atrium is normal in size. Left Ventricle Technically very challenging, there is poor endocardial definition, cannot comment on wall motion analysis, within above limitations: Left ventricle is normal in size. Global left ventricular systolic function is normal. Calculated ejection fraction 68% by Rashid's method. Right Atrium Right atrium appears normal in size. Right Ventricle Right ventricular function appears normal . Rght ventricular cavity appears normal in size. Pacemaker / ICD lead seen in right ventricle. Mitral Valve Normal mitral valve structure and function. No mitral regurgitation. Aortic Valve Aortic valve is trileaflet and opens adequately. No aortic insufficiency. Tricuspid Valve Normal tricuspid valve structure and function. No tricuspid regurgitation. Pulmonic Valve The pulmonic valve is normal in structure. No pulmonic insufficiency.  Pericardial Effusion No pericardial effusion seen. Miscellaneous IVC not well visualized. M-mode / 2D Measurements & Calculations:   LVIDd:4.4 cm(3.7 - 5.6 cm)       Diastolic PYTCOT:62.9 ml  QBXUT:2.3 cm(2.2 - 4.0 cm)       Systolic NKLSHZ:55.2 ml  HWV.2 cm(0.6 - 1.1 cm)        Aortic Root:3.3 cm(2.0 - 3.7 cm)  LVPWd:1.1 cm(0.6 - 1.1 cm)       LA Dimension: 3.2 cm(1.9 - 4.0 cm)  Fractional Shortenin.36 %    LA volume/Index: 29.1 ml /15m^2  Calculated LVEF (%): 68.22 %                                   RVDd:3 cm   Mitral:                                   Aortic   Valve Area (P1/2-Time): 4.4 cm^2          Peak Velocity: 1.08 m/s  Peak E-Wave: 0.54 m/s                     Mean Velocity: 0.59 m/s  Peak A-Wave: 0.67 m/s                     Peak Gradient: 4.67 mmHg  E/A Ratio: 0.81                           Mean Gradient: 2 mmHg  Peak Gradient: 1.17 mmHg  Mean Gradient: 1 mmHg  Deceleration Time: 169 msec               AV VTI: 18.3 cm  P1/2t: 50 msec   Mean Velocity: 0.55 m/s  Diastology / Tissue Doppler Septal Wall E' velocity:0.10 m/s Septal Wall E/E':5.4    XR CHEST (SINGLE VIEW FRONTAL)    Result Date: 3/8/2022  EXAMINATION: ONE XRAY VIEW OF THE CHEST 3/8/2022 5:19 pm COMPARISON: 2022 HISTORY: ORDERING SYSTEM PROVIDED HISTORY: respiratory distress TECHNOLOGIST PROVIDED HISTORY: respiratory distress Reason for Exam: difficulty breathing    upright port FINDINGS: Stable ICD lead. .The cardiac size is normal. Mild patchy bibasal infiltrates and small left pleural effusion. .  Pulmonary vascularity appears stable. There is mild ectasia of the thoracic aorta. There are degenerative changes in the spine . No acute bony abnormalities.  The hilar structures are normal.     Mild bibasal infiltrates and small left pleural effusion suggesting pneumonia     XR HIP RIGHT (2-3 VIEWS)    Result Date: 3/6/2022  EXAMINATION: TWO XRAY VIEWS OF THE RIGHT KNEE; TWO XRAY VIEWS OF THE RIGHT HIP; 4 XRAY VIEWS OF THE RIGHT FEMUR 3/6/2022 11:15 am COMPARISON: None. HISTORY: ORDERING SYSTEM PROVIDED HISTORY: Pain s/p fall, recent TKA TECHNOLOGIST PROVIDED HISTORY: Pain s/p fall, recent TKA; ORDERING SYSTEM PROVIDED HISTORY: Pain, fall TECHNOLOGIST PROVIDED HISTORY: Pain, fall; ORDERING SYSTEM PROVIDED HISTORY: pain TECHNOLOGIST PROVIDED HISTORY: pain FINDINGS: Right knee: Status post total knee arthroplasty. No hardware complications visualized. Vascular calcifications. No acute fracture. Right femur: Comminuted intertrochanteric fracture. Vascular calcifications. No dislocation. No other fracture. Right hip: Comminuted intertrochanteric fracture. No dislocation. No other fracture identified. Comminuted intertrochanteric fracture right femoral neck No other acute bony or joint abnormality     XR FEMUR RIGHT (MIN 2 VIEWS)    Result Date: 3/8/2022  EXAMINATION: ONE XRAY VIEW OF THE PELVIS AND TWO XRAY VIEWS RIGHT HIP;   XRAY VIEWS OF THE RIGHT FEMUR 3/8/2022 8:19 pm COMPARISON: 03/06/2022 HISTORY: ORDERING SYSTEM PROVIDED HISTORY: post op pacu, AP pelvis, AP right hip, cross table lateral right hip TECHNOLOGIST PROVIDED HISTORY: post op pacu, AP pelvis, AP right hip, cross table lateral right hip Reason for Exam: post op hardware placement FINDINGS: Extensive postsurgical changes identified suggestive of progression of fixation of the right in surgery insert fracture. Additionally there is evidence lateral fixation plate and screws and cerclage wires across the proximal femoral diaphysis. Evidence knee arthroplasty identified. Overlying skin staples and surgical identified. Postsurgical changes status post ventral effusion right hip and proximal femur fracture. XR FEMUR RIGHT (MIN 2 VIEWS)    Result Date: 3/6/2022  EXAMINATION: TWO XRAY VIEWS OF THE RIGHT KNEE; TWO XRAY VIEWS OF THE RIGHT HIP; 4 XRAY VIEWS OF THE RIGHT FEMUR 3/6/2022 11:15 am COMPARISON: None.  HISTORY: ORDERING SYSTEM PROVIDED HISTORY: Pain s/p fall, recent TKA TECHNOLOGIST PROVIDED HISTORY: Pain s/p fall, recent TKA; ORDERING SYSTEM PROVIDED HISTORY: Pain, fall TECHNOLOGIST PROVIDED HISTORY: Pain, fall; ORDERING SYSTEM PROVIDED HISTORY: pain TECHNOLOGIST PROVIDED HISTORY: pain FINDINGS: Right knee: Status post total knee arthroplasty. No hardware complications visualized. Vascular calcifications. No acute fracture. Right femur: Comminuted intertrochanteric fracture. Vascular calcifications. No dislocation. No other fracture. Right hip: Comminuted intertrochanteric fracture. No dislocation. No other fracture identified. Comminuted intertrochanteric fracture right femoral neck No other acute bony or joint abnormality     XR KNEE RIGHT (1-2 VIEWS)    Result Date: 3/6/2022  EXAMINATION: TWO XRAY VIEWS OF THE RIGHT KNEE; TWO XRAY VIEWS OF THE RIGHT HIP; 4 XRAY VIEWS OF THE RIGHT FEMUR 3/6/2022 11:15 am COMPARISON: None. HISTORY: ORDERING SYSTEM PROVIDED HISTORY: Pain s/p fall, recent TKA TECHNOLOGIST PROVIDED HISTORY: Pain s/p fall, recent TKA; ORDERING SYSTEM PROVIDED HISTORY: Pain, fall TECHNOLOGIST PROVIDED HISTORY: Pain, fall; ORDERING SYSTEM PROVIDED HISTORY: pain TECHNOLOGIST PROVIDED HISTORY: pain FINDINGS: Right knee: Status post total knee arthroplasty. No hardware complications visualized. Vascular calcifications. No acute fracture. Right femur: Comminuted intertrochanteric fracture. Vascular calcifications. No dislocation. No other fracture. Right hip: Comminuted intertrochanteric fracture. No dislocation. No other fracture identified. Comminuted intertrochanteric fracture right femoral neck No other acute bony or joint abnormality     XR ANKLE LEFT (MIN 3 VIEWS)    Result Date: 3/6/2022  EXAMINATION: THREE XRAY VIEWS OF THE LEFT ANKLE 3/6/2022 4:15 pm COMPARISON: None. HISTORY: ORDERING SYSTEM PROVIDED HISTORY: Trauma/Fracture TECHNOLOGIST PROVIDED HISTORY: Ap /lateral/mortise Trauma/Fracture FINDINGS: There is no acute osseous abnormality. There is degenerative joint disease. There are calcaneal spurs at the Achilles and plantar fascial attachments. The surrounding soft tissues are unremarkable. There are vascular calcifications. No acute osseous or soft tissue abnormality. XR FOOT LEFT (MIN 3 VIEWS)    Result Date: 3/6/2022  EXAMINATION: THREE XRAY VIEWS OF THE LEFT FOOT 3/6/2022 4:15 pm COMPARISON: None. HISTORY: ORDERING SYSTEM PROVIDED HISTORY: Trauma/Fracture TECHNOLOGIST PROVIDED HISTORY: Ap/oblique/lateral Trauma/Fracture FINDINGS: There is irregularity of the 1st distal phalanx that may relate to acute fracture and correlation with point tenderness is needed. The osseous structures are otherwise unremarkable. There is diffuse degenerative joint disease. There are vascular calcifications. There are calcaneal spurs at the Achilles and plantar fascial attachments. Irregularity of the 1st distal phalanx that may relate to an acute fracture and correlation with point tenderness is needed. XR CHEST PORTABLE    Result Date: 3/16/2022  EXAMINATION: ONE XRAY VIEW OF THE CHEST 3/16/2022 9:59 pm COMPARISON: 03/10/2022 HISTORY: ORDERING SYSTEM PROVIDED HISTORY: Shortness of breath. TECHNOLOGIST PROVIDED HISTORY: Shortness of breath. Reason for Exam: PT C/O SOB X several days. FINDINGS: The cardiac silhouette and mediastinal contours are stable. Vascular calcifications are noted along the aortic arch. There is a new infiltrate in the right lung base with probable pleural effusion. Findings may be related to atelectasis versus pneumonia. The lung volumes are low. The bones are osteopenic. 1. New right basilar lung infiltrate which may be related to atelectasis versus pneumonia with associated right pleural effusion. 2. Low lung volumes.      XR CHEST PORTABLE    Result Date: 3/10/2022  EXAMINATION: ONE XRAY VIEW OF THE CHEST 3/10/2022 8:00 am COMPARISON: Chest x-ray dated 9 March 2022 HISTORY: ORDERING SYSTEM PROVIDED HISTORY: hypoxia TECHNOLOGIST PROVIDED HISTORY: hypoxia Reason for Exam: uprt port FINDINGS: Interval increase in right basilar airspace disease. The left lung is clear. No pneumothorax or pleural effusion. Stable cardiomediastinal silhouette with left-sided ICD. Increasing right basilar airspace disease. This could represent atelectasis or in the appropriate clinical setting pneumonia. XR CHEST PORTABLE    Result Date: 3/9/2022  EXAMINATION: ONE XRAY VIEW OF THE CHEST 3/9/2022 10:20 am COMPARISON: None. HISTORY: ORDERING SYSTEM PROVIDED HISTORY: sob TECHNOLOGIST PROVIDED HISTORY: sob Reason for Exam: upright port FINDINGS: Mild cardiomegaly. Left-sided ICD. No pneumothorax or pleural effusion. Mild right basilar airspace disease. Mild right basilar airspace disease. This could represent atelectasis or in the appropriate clinical setting pneumonia. XR CHEST PORTABLE    Result Date: 3/5/2022  EXAMINATION: ONE XRAY VIEW OF THE CHEST 3/5/2022 1:43 pm COMPARISON: April levin 2021 HISTORY: ORDERING SYSTEM PROVIDED HISTORY: sob TECHNOLOGIST PROVIDED HISTORY: sob Reason for Exam: port upright FINDINGS: Marginal inspiration is present. No focal area of consolidation, pleural effusion, or pneumothorax is present. Heart size appears normal.  ICD is in place. Vascular markings are distinct. No acute osseous abnormality is present. Marginal inspiration, without evidence of acute cardiopulmonary disease     CT CHEST PULMONARY EMBOLISM W CONTRAST    Result Date: 3/5/2022  EXAMINATION: CTA OF THE CHEST 3/5/2022 3:07 pm TECHNIQUE: CTA of the chest was performed after the administration of intravenous contrast.  Multiplanar reformatted images are provided for review. MIP images are provided for review. Dose modulation, iterative reconstruction, and/or weight based adjustment of the mA/kV was utilized to reduce the radiation dose to as low as reasonably achievable.  COMPARISON: Chest x-ray dated March 5, 2022 HISTORY: ORDERING SYSTEM PROVIDED HISTORY: sob, new O2 requirement TECHNOLOGIST PROVIDED HISTORY: sob, new O2 requirement Decision Support Exception - unselect if not a suspected or confirmed emergency medical condition->Emergency Medical Condition (MA) Reason for Exam: fatigue FINDINGS: Pulmonary Arteries: Pulmonary arteries are adequately opacified for evaluation. No evidence of intraluminal filling defect to suggest pulmonary embolism. Main pulmonary artery is enlarged, measuring 32 mm, suggesting pulmonary arterial hypertension. Mediastinum: No evidence of mediastinal lymphadenopathy. Extensive coronary arterial calcifications are present. Small pericardial effusion is noted. The heart and pericardium demonstrate no acute abnormality. There is no acute abnormality of the thoracic aorta. Lungs/pleura: Diffuse emphysematous changes are present throughout the lungs. Subsegmental atelectasis is present within the right lung base. No focal area of consolidation, pleural effusion, or pneumothorax is present. Upper Abdomen: Images through the upper abdomen demonstrate cholelithiasis, without evidence of cholecystitis. Nodular contour to the liver is present, suggesting cirrhosis. Soft Tissues/Bones: No acute bone or soft tissue abnormality. 1. No evidence of pulmonary embolism 2. Dependent atelectasis, right lung base 3. Diffuse emphysematous changes present throughout the lungs, with an upper lobe predominance 4. Extensive coronary arterial calcifications 5. Cholelithiasis, without evidence of cholecystitis 6.  Nodular contour to the liver, suggesting cirrhosis     VL DUP LOWER EXTREMITY VENOUS BILATERAL    Result Date: 3/7/2022    [facility]  Vascular Lower Extremities DVT Study Procedure   Patient Name   COMES       Date of Study           03/07/2022                 Jose Aquilino E   Date of Birth  1950  Gender                  Male   Age            81 year(s)  Race    Room Number    2821        Height:                 70 inch, 177.8 cm   Corporate ID # [id number]    Weight:                 180 pounds, 81.6 kg   Patient Acct # [de-identified]   BSA:        2 m^2       BMI:       25.83 kg/m^2   MR #           [id number]     Nicanor Stevenson, T   Accession #    [id number]  Interpreting Physician  [id number] [facility]   Referring                  Referring Physician     Favian Valadez  Nurse  Practitioner  Procedure Type of Study:   Veins: Lower Extremities DVT Study, Venous Scan Lower Bilateral.  Indications for Study:Edema and Pain. Patient Status: In Patient. Technical Quality:Limited visualization. Limitation reason:Edema. Conclusions   Summary   No evidence of superficial or deep venous thrombosis in both lower  extremities. Signature   ----------------------------------------------------------------  Electronically signed by Eugene Arreola RVT(Sonographer) on  03/07/2022 09:52 AM  ----------------------------------------------------------------   ----------------------------------------------------------------  Electronically signed by Starlett Helena Reyes,Arthur(Interpreting  physician) on 03/07/2022 05:47 PM  ----------------------------------------------------------------  Findings:   Right Impression:                       Left Impression:  The common femoral, femoral, popliteal  The common femoral, femoral,  and tibial veins demonstrate normal     popliteal and tibial veins  compressibility and augmentation. demonstrate normal compressibility                                          and augmentation. Normal compressibility of the great  saphenous vein. Limited visualization   Normal compressibility of the  of the greater saphenous vein due to    great saphenous vein.  small size in diameter. Normal compressibility of the  Normal compressibility of the small     small saphenous vein. saphenous vein.   Allergies - Allergy:*No Known Allergies(Miscellaneous). - Allergy:*Unlisted(Drug). Comments:motrin   - Allergy:*Unlisted(Drug). Comments:motrim Velocities are measured in cm/s ; Diameters are measured in cm Right Lower Extremities DVT Study Measurements Right 2D Measurements +------------------------------------+----------+---------------+----------+ ! Location                            ! Visualized! Compressibility! Thrombosis! +------------------------------------+----------+---------------+----------+ ! Common Femoral                      !Yes       ! Yes            ! None      ! +------------------------------------+----------+---------------+----------+ ! Prox Femoral                        !Yes       ! Yes            ! None      ! +------------------------------------+----------+---------------+----------+ ! Mid Femoral                         !Yes       ! Yes            ! None      ! +------------------------------------+----------+---------------+----------+ ! Dist Femoral                        !Yes       ! Yes            ! None      ! +------------------------------------+----------+---------------+----------+ ! Popliteal                           !Yes       ! Yes            ! None      ! +------------------------------------+----------+---------------+----------+ ! Sapheno Femoral Junction            ! Yes       ! Yes            ! None      ! +------------------------------------+----------+---------------+----------+ ! PTV                                 ! Partial   !Yes            ! None      ! +------------------------------------+----------+---------------+----------+ ! Peroneal                            !Partial   !Yes            ! None      ! +------------------------------------+----------+---------------+----------+ ! Gastroc                             ! Yes       ! Yes            ! None      ! +------------------------------------+----------+---------------+----------+ ! GSV Thigh                           ! Partial   !Yes !None      ! +------------------------------------+----------+---------------+----------+ ! GSV Knee                            ! Partial   !Yes            ! None      ! +------------------------------------+----------+---------------+----------+ ! GSV Ankle                           ! Partial   !Yes            ! None      ! +------------------------------------+----------+---------------+----------+ ! SSV                                 ! Yes       ! Yes            ! None      ! +------------------------------------+----------+---------------+----------+ Right Doppler Measurements +---------------------------+------+------+--------------------------------+ ! Location                   ! Signal!Reflux! Reflux (msec)                   ! +---------------------------+------+------+--------------------------------+ ! Common Femoral             !Phasic!      !                                ! +---------------------------+------+------+--------------------------------+ ! Prox Femoral               !Phasic!      !                                ! +---------------------------+------+------+--------------------------------+ ! Popliteal                  !Phasic!      !                                ! +---------------------------+------+------+--------------------------------+ Left Lower Extremities DVT Study Measurements Left 2D Measurements +------------------------------------+----------+---------------+----------+ ! Location                            ! Visualized! Compressibility! Thrombosis! +------------------------------------+----------+---------------+----------+ ! Common Femoral                      !Yes       ! Yes            ! None      ! +------------------------------------+----------+---------------+----------+ ! Prox Femoral                        !Yes       ! Yes            ! None      ! +------------------------------------+----------+---------------+----------+ ! Mid Femoral                         !Yes       ! Yes            ! None ! +------------------------------------+----------+---------------+----------+ ! Dist Femoral                        !Yes       ! Yes            ! None      ! +------------------------------------+----------+---------------+----------+ ! Popliteal                           !Yes       ! Yes            ! None      ! +------------------------------------+----------+---------------+----------+ ! Sapheno Femoral Junction            ! Yes       ! Yes            ! None      ! +------------------------------------+----------+---------------+----------+ ! PTV                                 ! Partial   !Yes            ! None      ! +------------------------------------+----------+---------------+----------+ ! Peroneal                            !No        !               !          ! +------------------------------------+----------+---------------+----------+ ! Gastroc                             ! Yes       ! Yes            ! None      ! +------------------------------------+----------+---------------+----------+ ! GSV Thigh                           ! Yes       ! Yes            ! None      ! +------------------------------------+----------+---------------+----------+ ! GSV Knee                            ! Yes       ! Yes            ! None      ! +------------------------------------+----------+---------------+----------+ ! GSV Ankle                           ! Yes       ! Yes            ! None      ! +------------------------------------+----------+---------------+----------+ ! SSV                                 ! Yes       ! Yes            ! None      ! +------------------------------------+----------+---------------+----------+ Left Doppler Measurements +---------------------------+------+------+--------------------------------+ ! Location                   ! Signal!Reflux! Reflux (msec)                   ! +---------------------------+------+------+--------------------------------+ ! Common Femoral             !Phasic!      ! proximal femoral diaphysis. Evidence knee arthroplasty identified. Overlying skin staples and surgical identified. Postsurgical changes status post ventral effusion right hip and proximal femur fracture. ASSESSMENT  and  PLAN     Principal Problem:    Pneumonia  Active Problems:    RENALDO (acute kidney injury) ([address]  Resolved Problems:    * No resolved hospital problems. *    Plan:    Pneumonia - Healthcare associated  -CXR -new right basilar lung infiltrate  --May be atelectasis versus pneumonia - associated right pleural effusion  --Low lung volumes  -WBC 15.9  -IV antibiotic initiated in ED  --continue on admission  ---Pharmacy to dose Vancomycin  -Sputum C&S pending  -Recheck CXR in am  -hypoxic prior to arrival  -Continue oxygen as needed to maintain spo2 >92  -Monitor pulse ox  -Pneumovax before discharge if applicable  -Duoneb and Albuterol nebulizer treatments  -Continue home dose guiafenesin  -Covid swab negative  -Troponin at bedtime 23 x2 readings    Acute Kidney Injury  -Creatinine 1.21; Baseline near 0.6  -denies prior history of kidney disease  -NS fluid bolus in ED  -continue IVF on admission  -hold diuretics/nephrotoxic medications  -monitor BMP    Right Hip Surgery  -Patient underwent right hip replacement on 3/8  -Monitor surgical site  -PT and OT eval and treat    Consultations:     [facility] - CNP   3/17/2022  6:12 AM    Sunil [address] [place] [address], [address] [place] [address]. Phone [phone]     Attending Physician Statement  I have discussed the care of Mandeep Cherry with the CNP. I have examined the patient myself and taken ros and hpi , including pertinent history and exam findings. I have reviewed the key elements of all parts of the encounter with the CNPt. I agree with the assessment, plan and orders as documented by the CNP. Healthcare acquired pneumonia, recent admission for right hip surgery on 3/8    1.  Acute hypoxic respiratory failure secondary to healthcare acquired pneumonia  2. Sepsis secondary to healthcare acquired pneumonia as evidenced by respiratory 24 white cell count 14, will check lactic  3. Healthcare associated pneumonia-started on cefepime vancomycin, switched to cefepime and Zyvox  Body mass index is 29.06 kg/m².       DVT prophylaxis-Lovenox    Electronically signed by Rocio Chamberlain MD

## 2022-03-17 NOTE — PROGRESS NOTES
Vancomycin Dosing by Pharmacy - Initial Note   TODAY'S DATE:  3/17/2022  Patient name, age:  Jackie Caban, 81 y.o. Indication: Respiratory infection, MRSA suspected  Additional antimicrobials:  cefepime    Allergies:  Motrin [ibuprofen micronized]   Actual Weight:    Wt Readings from Last 1 Encounters:   03/16/22 216 lb (98 kg)     Labs/Ancillary Data  Estimated Creatinine Clearance: 67 mL/min (A) (based on SCr of 1.21 mg/dL (H)). Recent Labs     03/16/22  2210   CREATININE 1.21*   BUN 30*   WBC 15.9*     No results found for: PROCAL  No intake or output data in the 24 hours ending 03/17/22 0237  Temp: 97.5    Recent vancomycin administrations                     vancomycin (VANCOCIN) 2,500 mg in dextrose 5 % 500 mL IVPB (mg) 2,500 mg New Bag 03/16/22 8479                  Culture Date / Source  /  Results      MRSA Nasal Swab: not ordered. Order placed by pharmacy. [facility] PLAN   Initial loading dose of 25mg/kg (max of 2,500 mg) = 2500 mg  x 1, then  vancomycin 750 mg IV every 12 hours. Ensured BUN/sCr ordered at baseline and every 48 hours x at least 3 levels, then at least weekly. Vancomycin level ordered for 3/17/2022 @ 1800. Vancomycin Target Concentration Parameters  Treatment  Population Target AUC/CHELY Target Trough   Invasive MRSA Infection (bacteremia, pneumonia, meningitis, endocarditis, osteomyelitis)  Sepsis (undifferentiated) 400-600 N/A   Infection due to non-MRSA pathogen  Empiric treatment of non-invasive MRSA infection  (SSTI, UTI) <500 10-15 mg/L   CrCl < 29 mL/min  Rapidly fluctuating serum creatinine   RENALDO N/A < 15 mg/L     Renal replacement therapy is dosed by levels, per hospital protocol. Abbreviations  * Pauc: probability that AUC is >400 (efficacy); Pconc: probability that Ctrough is above 20 ?g/mL (toxicity); Tox: Probability of nephrotoxicity, based on Perez et al. Clin Infect Dis 2009. Loading dose: 2500 mg at 23:59 03/16/2022. Regimen: 750 mg IV every 12 hours.   Start time: 02:38 on 03/17/2022  Exposure target: AUC24 (range)400-600 mg/L.hr   AUC24,ss: 462 mg/L.hr  Probability of AUC24 > 400: 65 %  Ctrough,ss: 16.9 mg/L  Probability of Ctrough,ss > 20: 34 %  Probability of nephrotoxicity (Lodise BROOKE 2009): 13 %      Thank you for the consult. Pharmacy will continue to follow.

## 2022-03-17 NOTE — ED PROVIDER NOTES
[address]  eMERGENCY dEPARTMENT eNCOUnter      Pt Name: Jackie Caban  MRN: [id number]  [place] 1950  Date of evaluation: 3/16/22      CHIEF COMPLAINT     No chief complaint on file. HISTORY OF PRESENT ILLNESS    Jackie Caban is a 81 y.o. male who presents complaining of shortness of breath. Patient states that he had surgery late last week and was just discharged from here couple days ago to the nursing home. Patient states that since surgery he has felt like he has had increasing shortness of breath. Patient also has had a lot of junk in his chest and coughing a lot. Patient states he said no chest pain no abdominal pain. Patient states overall he feels well from his hip. Patient has not noticed any swelling in his leg. Reportedly by EMS the patient was found to be in the 60s pulse ox at the nursing home they put him on oxygen and it came up immediately. REVIEW OF SYSTEMS       Review of Systems   Constitutional: Negative for activity change, appetite change, chills, diaphoresis and fever. HENT: Negative for congestion, ear pain, facial swelling, nosebleeds, rhinorrhea, sinus pressure, sore throat and trouble swallowing. Eyes: Negative for pain, discharge and redness. Respiratory: Positive for cough and shortness of breath. Negative for chest tightness and wheezing. Cardiovascular: Negative for chest pain, palpitations and leg swelling. Gastrointestinal: Negative for abdominal pain, blood in stool, constipation, diarrhea, nausea and vomiting. Genitourinary: Negative for difficulty urinating, dysuria, flank pain, frequency, genital sores and hematuria. Musculoskeletal: Negative for arthralgias, back pain, gait problem, joint swelling, myalgias and neck pain. Skin: Negative for color change, pallor, rash and wound. Neurological: Negative for dizziness, tremors, seizures, syncope, speech difficulty, weakness, numbness and headaches.    Psychiatric/Behavioral: Negative for confusion, decreased concentration, hallucinations, self-injury, sleep disturbance and suicidal ideas. PAST MEDICAL HISTORY     Past Medical History:   Diagnosis Date    ADHD (attention deficit hyperactivity disorder)     ADHD (attention deficit hyperactivity disorder)     Atypical chest pain     Chronic bronchitis (HCC)     Hypertension     Hyponatremia     Meniere's disease     Narcolepsy     Nasal fracture     PND (post-nasal drip) 4/21/2014    SOB (shortness of breath)     Tibia fracture     right tibial plateau fx, right syndesmotic fx    Transaminasemia 4/21/2014       SURGICAL HISTORY       Past Surgical History:   Procedure Laterality Date    ANKLE SURGERY      open reduction internal fixation of the right ankle & tibial plateau fx    CORONARY ANGIOPLASTY WITH STENT PLACEMENT N/A 10/10/2015    CYST REMOVAL      right side of face    FEMUR FRACTURE SURGERY Right 3/8/2022    RIGHT TFN HIP  INSERTION ,OPEN REDUCTION INTERNAL FIXATION RIGHT HIP  C-ARM, SYNTHES, CABLES , performed by Yvette Miranda DO at [address] [place] [address] Right 03/08/2022    RIGHT TFN HIP  INSERTION ,OPEN REDUCTION INTERNAL FIXATION RIGHT HIP  C-ARM, SYNTHES, CABLES     KNEE ARTHROPLASTY Right 04/13/2021    DISTAL FEMUR REPLACEMENT performed by Yvette Miranda DO at [place] [address] Right     total knee       CURRENT MEDICATIONS       Previous Medications    ACETAMINOPHEN (TYLENOL) 500 MG TABLET    Take 1 tablet by mouth 4 times daily as needed for Pain    AMIODARONE (CORDARONE) 200 MG TABLET    Take by mouth    ARMODAFINIL (NUVIGIL) 150 MG TABS TABLET    Take 1 tablet by mouth daily for 30 days. ATORVASTATIN (LIPITOR) 40 MG TABLET    Take 1 tablet by mouth nightly    CARVEDILOL (COREG) 6.25 MG TABLET    Take 1 tablet by mouth 2 times daily (with meals)    CHLORHEXIDINE (PERIDEX) 0.12 % SOLUTION    Take 15 mLs by mouth 2 times daily.       CLOPIDOGREL (PLAVIX) 75 MG TABLET Take 1 tablet by mouth daily    CYANOCOBALAMIN 1000 MCG TABLET    Take 1,000 mcg by mouth    DIAPERS & SUPPLIES [facility]    Adult diapers dispense quantity allowed by insurance    DIAPERS & SUPPLIES MISC    Wipes  dispense quantity allowed by insurance    DOCUSATE SODIUM (COLACE) 100 MG CAPSULE    Take 100 mg by mouth 2 times daily. ENOXAPARIN (LOVENOX) 40 MG/0.4ML INJECTION    Inject 0.4 mLs into the skin daily    FLUTICASONE PROPIONATE (FLOVENT) 50 MCG/BLIST AEPB INHALER    Inhale into the lungs daily    FOLIC ACID (FOLVITE) 1 MG TABLET    Take 1 mg by mouth daily. FUROSEMIDE (LASIX) 20 MG TABLET    Take 1 tablet by mouth daily    GUAIFENESIN (MUCINEX) 600 MG EXTENDED RELEASE TABLET    Take 2 tablets by mouth 2 times daily    LEVOTHYROXINE (SYNTHROID) 50 MCG TABLET    Take 1 tablet by mouth Daily    LORAZEPAM (ATIVAN) 1 MG TABLET    Take 1 tablet by mouth 3 times daily as needed for Anxiety for up to 7 days. MAGNESIUM OXIDE 250 MG TABS    Take 1 tablet by mouth daily    MECLIZINE (ANTIVERT) 25 MG TABLET    Take 0.5 tablets by mouth 3 times daily as needed    METHYLPHENIDATE (RITALIN) 10 MG TABLET    Take 10 mg by mouth. MOMETASONE (NASONEX) 50 MCG/ACT NASAL SPRAY    2 sprays by Nasal route daily. MULTIPLE VITAMIN (MULTIVITAMIN PO)    Take  by mouth. MULTIPLE VITAMINS-MINERALS (CERTAVITE SENIOR/ANTIOXIDANT) TABS    TAKE 1 TAB BY MOUTH ONCE A DAY    PROAIR  (90 BASE) MCG/ACT INHALER    inhale 2 puffs every 4 to 6 hours if needed    QUETIAPINE (SEROQUEL) 25 MG TABLET    Take 1 tablet by mouth nightly    SALINE MIST SPRAY NA    by Nasal route. SODIUM CHLORIDE 1 G TABLET    Take 1 g by mouth    TRAMADOL (ULTRAM) 50 MG TABLET    Take 1 tablet by mouth every 6 hours as needed for Pain for up to 7 days. VITAMIN C (ASCORBIC ACID) 500 MG TABLET    Take 500 mg by mouth       ALLERGIES     is allergic to motrin [ibuprofen micronized].     FAMILY HISTORY     [unfilled]     SOCIAL HISTORY reports that he has been smoking cigarettes. He has a 38.00 pack-year smoking history. He has never used smokeless tobacco. He reports current alcohol use of about 16.0 standard drinks of alcohol per week. He reports that he does not use drugs. PHYSICAL EXAM     INITIAL VITALS: /75   Pulse 68   Temp 97.5 °F (36.4 °C)   Resp 24   Ht 5' 10\" (1.778 m)   Wt 216 lb (98 kg)   SpO2 90%   BMI 30.99 kg/m²      Physical Exam  Vitals and nursing note reviewed. Constitutional:       General: He is not in acute distress. Appearance: He is well-developed. He is not diaphoretic. HENT:      Head: Normocephalic and atraumatic. Eyes:      General: No scleral icterus. Right eye: No discharge. Left eye: No discharge. Conjunctiva/sclera: Conjunctivae normal.      Pupils: Pupils are equal, round, and reactive to light. Cardiovascular:      Rate and Rhythm: Normal rate and regular rhythm. Heart sounds: Normal heart sounds. No murmur heard. No friction rub. No gallop. Pulmonary:      Effort: Pulmonary effort is normal. No respiratory distress. Breath sounds: Rhonchi present. No wheezing or rales. Chest:      Chest wall: No tenderness. Abdominal:      General: Bowel sounds are normal. There is no distension. Palpations: Abdomen is soft. There is no mass. Tenderness: There is no abdominal tenderness. There is no guarding or rebound. Musculoskeletal:         General: No tenderness. Normal range of motion. Skin:     General: Skin is warm and dry. Coloration: Skin is not pale. Findings: No erythema or rash. Neurological:      Mental Status: He is alert and oriented to person, place, and time. Cranial Nerves: No cranial nerve deficit. Sensory: No sensory deficit. Motor: No abnormal muscle tone.       Coordination: Coordination normal.      Deep Tendon Reflexes: Reflexes normal.   Psychiatric:         Behavior: Behavior normal. Thought Content: Thought content normal.         Judgment: Judgment normal.         MEDICAL DECISION MAKING:     Patient overall looks well. Hip looks well no signs of infection. My top concerns at this point would be pneumonia or blood clot. We will do a CT of his chest once I get his blood work back. Otherwise do a cardiac work-up. DIAGNOSTIC RESULTS     EKG: All EKG's are interpreted by the Emergency Department Physician who either signs or Co-signs this chart in the absence of a cardiologist.    EKG Interpretation    Interpreted by emergency department physician    Rhythm: normal sinus   Rate: normal  Axis: normal  Ectopy: none  Conduction: normal  ST Segments: nonspecific changes  T Waves: inversion in  lateral leads and inferior leads  Q Waves: none    Clinical Impression: EKG: normal sinus rhythm, prolonged QT interval, 1st degree AV block, T wave inversions in the inferior lateral leads suggesting ischemia, these findings are new since last EKG. Leticia Blair MD        RADIOLOGY:All plain film, CT, MRI, and formal ultrasound images (except ED bedside ultrasound)are read by the radiologist and interpretations are directly viewed by the emergency physician. XR CHEST PORTABLE    Result Date: 3/16/2022  EXAMINATION: ONE XRAY VIEW OF THE CHEST 3/16/2022 9:59 pm COMPARISON: 03/10/2022 HISTORY: ORDERING SYSTEM PROVIDED HISTORY: Shortness of breath. TECHNOLOGIST PROVIDED HISTORY: Shortness of breath. Reason for Exam: PT C/O SOB X several days. FINDINGS: The cardiac silhouette and mediastinal contours are stable. Vascular calcifications are noted along the aortic arch. There is a new infiltrate in the right lung base with probable pleural effusion. Findings may be related to atelectasis versus pneumonia. The lung volumes are low. The bones are osteopenic. 1. New right basilar lung infiltrate which may be related to atelectasis versus pneumonia with associated right pleural effusion.  2. Low lung volumes. LABS: All lab results were reviewed byshalom, and all abnormals are listed below. Labs Reviewed   CBC WITH AUTO DIFFERENTIAL - Abnormal; Notable for the following components:       Result Value    WBC 15.9 (*)     RBC 2.96 (*)     Hemoglobin 8.3 (*)     Hematocrit 25.8 (*)     RDW 21.3 (*)     Platelets 123 (*)     All other components within normal limits   BASIC METABOLIC PANEL - Abnormal; Notable for the following components:    Glucose 102 (*)     BUN 30 (*)     CREATININE 1.21 (*)     CO2 16 (*)     GFR Non- 59 (*)     All other components within normal limits   TROPONIN - Abnormal; Notable for the following components:    Troponin, High Sensitivity 23 (*)     All other components within normal limits   COVID-19, RAPID   TROPONIN         EMERGENCY DEPARTMENT COURSE:   Vitals:    Vitals:    03/16/22 2123   BP: 113/75   Pulse: 68   Resp: 24   Temp: 97.5 °F (36.4 °C)   SpO2: 90%   Weight: 216 lb (98 kg)   Height: 5' 10\" (1.778 m)       The patient was given the following medications while in the emergency department:     Orders Placed This Encounter   Medications    cefepime (MAXIPIME) 2000 mg IVPB minibag     Order Specific Question:   Antimicrobial Indications     Answer:   Pneumonia (HAP)       -------------------------  10:49 PM EDT  Patient was noted to have new pneumonia which is consistent with his postoperative state and his clinical presentation. We will start him on hospital-acquired antibiotics since he just got out of surgery. Patient will be admitted. Discussed case with Shannon Calderon the nurse practitioner for PCP. CRITICAL CARE:   None    CONSULTS:  PHARMACY TO DOSE VANCOMYCIN    PROCEDURES:  None    FINAL IMPRESSION      1. Pneumonia of right lower lobe due to infectious organism          DISPOSITION/PLAN   DISPOSITION Decision To Admit 03/16/2022 10:48:38 PM      PATIENT REFERRED TO:  No follow-up provider specified.     DISCHARGE MEDICATIONS:  New Prescriptions No medications on file       (Please note that portions of this note were completed with a voice recognition program.  Efforts were made to edit the dictations but occasionally words are mis-transcribed.)    Meagan Ashby MD  Attending Phil Fonseca MD  03/16/22 7065

## 2022-03-17 NOTE — ED TRIAGE NOTES
Mode of arrival (squad #, walk in, police, etc) : Squad        Chief complaint(s): SOB        Arrival Note (brief scenario, treatment PTA, etc). : Patient reports SOB since being discharged from hospital for a hip surgery. Patient was 77% on RA upon arrival. Patient is 91% on 6L NC. Patient is on Plavix. C= \"Have you ever felt that you should Cut down on your drinking? \"  No  A= \"Have people Annoyed you by criticizing your drinking? \"  No  G= \"Have you ever felt bad or Guilty about your drinking? \"  No  E= \"Have you ever had a drink as an Eye-opener first thing in the morning to steady your nerves or to help a hangover? \"  No      Deferred []      Reason for deferring: N/A    *If yes to two or more: probable alcohol abuse. *

## 2022-03-17 NOTE — ED NOTES
Report given to [facility] PCU RN.      Luca Ferro, ROHIT  03/17/22 6084

## 2022-03-17 NOTE — PROGRESS NOTES
[address]   Occupational Therapy Evaluation  Date: 3/17/22  Patient Name: Clifton Briones       Room:   MRN: [id number]  Account: [de-identified]   : 1950  (81 y.o.) Gender: male     Discharge Recommendations:  Further Occupational Therapy is recommended upon facility discharge. Equipment Needed:  (TBD)    Referring Practitioner: TIFFANIE Godinez CNP  Diagnosis: Pneumonia  Additional Pertinent Hx:  81 y.o.  male, with a history of anemia, cardiomyopathy, COPD, dementia with behavioral disturbance, HTN, Ménière's disease, and permanent cardiac pacemaker, who presents from ECF with shortness of breath. According to patient and E HR, patient underwent right hip surgery for repair of right [facility] fracture at [facility] on 3/8/2022 and was discharged to an ECF for rehabilitative services on 3/14/2022. Patient reports that he began feeling ill and having intermittent episodes of shortness of breath prior to discharge but states that symptoms have continued to worsen since that time. Treatment Diagnosis: Impaired self-care status  Past Medical History:  has a past medical history of ADHD (attention deficit hyperactivity disorder), ADHD (attention deficit hyperactivity disorder), Atypical chest pain, Chronic bronchitis ([address] Hypertension, Hyponatremia, Meniere's disease, Narcolepsy, Nasal fracture, PND (post-nasal drip), SOB (shortness of breath), Tibia fracture, and Transaminasemia. Past Surgical History:   has a past surgical history that includes cyst removal; Ankle surgery; knee surgery (Right); Coronary angioplasty with stent (N/A, 10/10/2015); Knee Arthroplasty (Right, 2021); hip surgery (Right, 2022); and Femur fracture surgery (Right, 3/8/2022).     Restrictions  Restrictions/Precautions: Fall Risk  Implants present? : Metal implants     Vitals  Temp: 97.7 °F (36.5 °C)  Pulse: 66  Resp: 22  BP: 124/66  Height: 5' 10\" (177.8 cm)  Weight: 202 lb 8 oz (91.9 kg)  BMI (Calculated): 29.1  Oxygen Therapy  SpO2: 94 %  Pulse Oximeter Device Mode: Intermittent  Pulse Oximeter Device Location: Finger  O2 Device: Nasal cannula  O2 Flow Rate (L/min): 6 L/min  Level of Consciousness: Alert (0)    Subjective  Subjective: \"See if you came in the morning, right after breakfast, I would work with you. Now, not so much! \" Pt easily agitated, writer oriented pt to day time vs. night time. Comments: Okay for OT/PT per [employer]. Vision  Vision: Impaired  Vision Exceptions: Wears glasses for reading  Hearing  Hearing: Within functional limits  Social/Functional History  Bathroom Equipment: Shower chair  Home Equipment: Kleer  ADL Assistance: Needs assistance  Homemaking Assistance: Needs assistance (staff completes)  Ambulation Assistance:  (Non-ambulatory, uses manual W/C)  Transfer Assistance: Needs assistance (assist for pivot transfer bed <> W/C)  Active : No  IADL Comments: sleeps in a  Additional Comments: Pt is a questionable historian and unable to provide specific PLOF or environmental set-up, no family/caregiver present to verify information. Per chart review, pt was at an ECF for rehab (unknown which one)  Pain Assessment  Pain Assessment: 0-10  Pain Level: 9  Pain Type: Surgical pain,Chronic pain  Pain Location: Hip  Pain Orientation: Right  Pain Descriptors: Sharp (straight)  Pain Frequency: Continuous  Clinical Progression: Not changed  Response to Pain Intervention: Confused    Objective      Cognition  Overall Cognitive Status: Impaired  Following Directions: Follows one step commands  Attention Span: Attends with cues to redirect  Memory: Decreased recall of recent events,Decreased short term memory  Following Commands:  Follows one step commands with repetition  Safety Judgement: Decreased awareness of need for safety  Awareness of Errors: Assistance required to identify errors made   Sensation  Overall Sensation Status: WFL (denies)   ADL  Feeding: Minimal assistance,Beverage management (Assist with placing straw in cup)  Grooming: Minimal assistance  UE Bathing: Moderate assistance  LE Bathing: Maximum assistance  UE Dressing: Moderate assistance  LE Dressing: Maximum assistance  Toileting: Maximum assistance  Additional Comments: ADL scores based on skilled observation and clinical reasoning, unless otherwise noted. UE Function           LUE Strength  L Hand General: 3+/5  LUE Strength Comment: Overall 3+/5     LUE Tone: Normotonic     LUE AROM (degrees)  LUE AROM : Exceptions  LUE General AROM: WFL except  L Shoulder Flexion 0-180: 0-75     Left Hand AROM (degrees)  Left Hand AROM: WFL  RUE Strength  R Hand General: 3+/5  RUE Strength Comment: Overall 3+/5      RUE Tone: Normotonic     RUE AROM (degrees)  RUE AROM : Exceptions  RUE General AROM: WFL except   R Shoulder Flexion 0-180: 0-75     Right Hand AROM (degrees)  Right Hand AROM: WFL    Fine Motor Skills  Coordination  Movements Are Fluid And Coordinated: No  Coordination and Movement description: Fine motor impairments,Right UE,Left UE                           Mobility          Balance  Sitting Balance: Unable to assess(comment) (Pt refused)  Standing Balance: Unable to assess(comment) (Safety concerns)        Bed mobility  Rolling to Left: Minimal assistance  Rolling to Right: Minimal assistance  Scooting: Maximal assistance;2 Person assistance  Comment: Pt adamantly refused sitting EOB, despite max encouragement from OT/PT. Also becomes easily agitated. Pt repositioned in bed with pillow under RLE for comfort. Functional Activity Tolerance  Functional Activity Tolerance:  Tolerates 30 min exercise with multiple rests     Assessment  Assessment  Performance deficits / Impairments: Decreased functional mobility ,Decreased ADL status,Decreased ROM,Decreased strength,Decreased safe awareness,Decreased cognition,Decreased endurance,Decreased balance  Treatment Diagnosis: Impaired self-care status  Decision Making: Medium Complexity  REQUIRES OT FOLLOW UP: Yes  Discharge Recommendations: Patient would benefit from continued therapy after discharge  Activity Tolerance: Treatment limited secondary to decreased cognition,Treatment limited secondary to agitation         Functional Outcome Measures  AM-PAC Daily Activity Inpatient   How much help for putting on and taking off regular lower body clothing?: A Lot  How much help for Bathing?: A Lot  How much help for Toileting?: A Lot  How much help for putting on and taking off regular upper body clothing?: A Lot  How much help for taking care of personal grooming?: A Little  How much help for eating meals?: A Little  AM-[facility] Inpatient Daily Activity Raw Score: 14  AM-PAC Inpatient ADL T-Scale Score : 33.39  ADL Inpatient CMS 0-100% Score: 59.67  ADL Inpatient CMS G-Code Modifier : CK       Goals  Short term goals  Time Frame for Short term goals: By discharge  Short term goal 1: Pt will participate in grooming/UB bathing with min cues for sequencing  Short term goal 2: Pt will perform bed mobility with min A and Fair safety  Short term goal 3: Pt will perform UE exercise/hand strengthening to increase strength/endurance to improved functional use  Short term goal 4: Pt will actively participate in 15+ minutes of therapeutic exercise/functional activity to promote safety and independence with self  care and mobility    Plan  Safety Devices  Safety Devices in place: Yes  Type of devices:  All fall risk precautions in place,Bed alarm in place,Call light within reach,Patient at risk for falls,Nurse notified     Plan  Times per week: 4-5  Current Treatment Recommendations: Basil Hopping Training,ROM,Cognitive Reorientation,Positioning,Self-Care / ADL,Patient/Caregiver Education & Training       Equipment Recommendations  Equipment Needed:  (TBD)  OT Individual Minutes  Time In: 1058  Time Out: [facility]  Minutes: 25    Electronically signed by Rodolfo Bro OT on 3/17/22 at 11:22 AM EDT         03/17/22 1122   OT Individual Minutes   Time In 0916   Time Out 0941   Minutes 25   Time Code Minutes    Timed Code Treatment Minutes 8 Minutes

## 2022-03-17 NOTE — PROGRESS NOTES
Sultana Head, RCPPatient Assessment complete. Pneumonia [J18.9]  Pneumonia of right lower lobe due to infectious organism [J18.9] . Vitals:    03/17/22 1905   BP:    Pulse:    Resp:    Temp:    SpO2: 97%   . Patients home meds are   Prior to Admission medications    Medication Sig Start Date End Date Taking? Authorizing Provider   carvedilol (COREG) 6.25 MG tablet Take 1 tablet by mouth 2 times daily (with meals) 3/14/22   Andreina Burkett DO   traMADol (ULTRAM) 50 MG tablet Take 1 tablet by mouth every 6 hours as needed for Pain for up to 7 days. 3/10/22 3/17/22  Andreina Burkett DO   Armodafinil (NUVIGIL) 150 MG TABS tablet Take 1 tablet by mouth daily for 30 days.  3/10/22 4/9/22  Andreina Burkett DO   levothyroxine (SYNTHROID) 50 MCG tablet Take 1 tablet by mouth Daily 3/11/22   Andreina Burktet DO   enoxaparin (LOVENOX) 40 MG/0.4ML injection Inject 0.4 mLs into the skin daily 3/11/22   Andreina Burkett DO   guaiFENesin [facility]) 600 MG extended release tablet Take 2 tablets by mouth 2 times daily 3/10/22   Andreina Burkett DO   amiodarone (CORDARONE) 200 MG tablet Take by mouth    Historical Provider, MD   cyanocobalamin 1000 MCG tablet Take 1,000 mcg by mouth    Historical Provider, MD   sodium chloride 1 g tablet Take 1 g by mouth daily     Historical Provider, MD   vitamin C (ASCORBIC ACID) 500 MG tablet Take 500 mg by mouth    Historical Provider, MD   fluticasone propionate (FLOVENT) 50 MCG/BLIST AEPB inhaler Inhale into the lungs daily    Historical Provider, MD   acetaminophen (TYLENOL) 500 MG tablet Take 1 tablet by mouth 4 times daily as needed for Pain 4/15/21   Autumn Mcrae,    Multiple Vitamins-Minerals (CERTAVITE SENIOR/ANTIOXIDANT) TABS TAKE 1 TAB BY MOUTH ONCE A DAY 6/22/16   Iris Lynn PA-C   QUEtiapine (SEROQUEL) 25 MG tablet Take 1 tablet by mouth nightly 3/24/16   Blanca Rouse DO   atorvastatin (LIPITOR) 40 MG tablet Take 1 tablet by mouth nightly 11/10/15 Tee Angel MD   clopidogrel (PLAVIX) 75 MG tablet Take 1 tablet by mouth daily 11/10/15   Tee Angel MD   Magnesium Oxide 250 MG TABS Take 1 tablet by mouth daily  Patient taking differently: Take 500 mg by mouth daily  11/10/15   Tee Angel MD   furosemide (LASIX) 20 MG tablet Take 1 tablet by mouth daily 11/10/15   Tee Angel MD   [address] Adult diapers dispense quantity allowed by insurance 3/31/15   Saba Lynn PA-C   Diapers & Supplies MISC Wipes  dispense quantity allowed by insurance 3/31/15   Iris Lynn PA-C   PROAIR  (90 BASE) MCG/ACT inhaler inhale 2 puffs every 4 to 6 hours if needed 11/29/14   Iris Lynn PA-C   mometasone (NASONEX) 50 MCG/ACT nasal spray 2 sprays by Nasal route daily. 12/23/13   Leia Brady MD   docusate sodium (COLACE) 100 MG capsule Take 100 mg by mouth 2 times daily. Historical Provider, MD   folic acid (FOLVITE) 1 MG tablet Take 1 mg by mouth daily. Historical Provider, MD   chlorhexidine (PERIDEX) 0.12 % solution Take 15 mLs by mouth 2 times daily. Historical Provider, MD   SALINE MIST SPRAY NA by Nasal route. Historical Provider, MD   Multiple Vitamin (MULTIVITAMIN PO) Take  by mouth.       Historical Provider, MD        Assessment:     03/17/22 1910   RT Protocol   History Pulmonary Disease 2   Respiratory pattern 2   Breath sounds 6   Cough 1   Indications for Bronchodilator Therapy Decreased or absent breath sounds   Bronchodilator Assessment Score 11         HR - 88  RR - 20  SpO2 - 94% (4L NC)  Breath Sounds: Rhonchi; Diminished      Bronchodilator assessment at level  3  Hyperinflation assessment at level 1  Secretion Management assessment at level  2    [x]    Bronchodilator Assessment  BRONCHODILATOR ASSESSMENT SCORE  Score 0 1 2 3 4 5   Breath Sounds   []  Patient Baseline []  No Wheeze good aeration []  Faint, scattered wheezing, good aeration [x]  Expiratory Wheezing and or moderately diminished []  Insp/Exp wheeze and/or very diminished []  Insp/Exp and/ or marked distress   Respiratory Rate   []  Patient Baseline []  Less than 20 []  Less than 20 [x]  20-25 []  Greater than 25 []  Greater than 25   Peak flow % of Pred or PB [x]  NA   []  Greater than 90%  []  81-90% []  71-80% []  Less than or equal to 70%  or unable to perform []  Unable due to Respiratory Distress   Dyspnea re []  Patient Baseline []  No SOB []  No SOB [x]  SOB on exertion []  SOB min activity []  At rest/acute   e FEV% Predicted       [x]  NA []  Above 69%  []  Unable []  Above 60-69%  []  Unable []  Above 50-59%  []  Unable []  Above 35-49%  []  Unable []  Less than 35%  []  Unable                 [x]  Hyperinflation Assessment  Score 1 2 3   CXR and Breath Sounds   []  Clear []  No atelectasis  Basilar aeration []  Atelectasis or absent basilar breath sounds   Incentive Spirometry Volume  (Per IBW)   []  Greater than or equal to 15ml/Kg []  less than 15ml/Kg []  less than 15ml/Kg   Surgery within last 2 weeks [x]  None or general   []  Abdominal or thoracic surgery  []  Abdominal or thoracic   Chronic Pulmonary Historyre []  No [x]  Yes []  Yes     [x]  Secretion Management Assessment  Score 1 2 3   Bilateral Breath Sounds   []  Occasional Rhonchi []  Scattered Rhonchi []  Course Rhonchi and/or poor aeration   Sputum    []  Small amount of thin secretions []  Moderate amount of viscous secretions []  Copius, Viscious Yellow/ Secretions   CXR as reported by physician []  clear  []  Unavailable [x]  Infiltrates and/or consolidation  []  Unavailable []  Mucus Plugging and or lobar consolidation  []  Unavailable   Cough []  Strong, productive cough [x]  Weak productive cough []  No cough or weak non-productive cough   Adryan Ly, RCP  7:11 PM

## 2022-03-17 NOTE — PROGRESS NOTES
03/16/22 2158   RT Protocol   History Pulmonary Disease 1   Respiratory pattern 2   Breath sounds 2   Cough 1   Indications for Bronchodilator Therapy Decreased or absent breath sounds   Bronchodilator Assessment Score 6

## 2022-03-18 ENCOUNTER — APPOINTMENT (OUTPATIENT)
Dept: ULTRASOUND IMAGING | Age: 72
DRG: 871 | End: 2022-03-18
Payer: COMMERCIAL

## 2022-03-18 LAB
ANION GAP SERPL CALCULATED.3IONS-SCNC: 11 MMOL/L (ref 9–17)
BUN BLDV-MCNC: 35 MG/DL (ref 8–23)
CALCIUM SERPL-MCNC: 9.4 MG/DL (ref 8.6–10.4)
CHLORIDE BLD-SCNC: 108 MMOL/L (ref 98–107)
CO2: 18 MMOL/L (ref 20–31)
CREAT SERPL-MCNC: 1.38 MG/DL (ref 0.7–1.2)
GFR AFRICAN AMERICAN: >60 ML/MIN
GFR NON-AFRICAN AMERICAN: 51 ML/MIN
GFR SERPL CREATININE-BSD FRML MDRD: ABNORMAL ML/MIN/{1.73_M2}
GLUCOSE BLD-MCNC: 104 MG/DL (ref 70–99)
HCT VFR BLD CALC: 22.6 % (ref 41–53)
HEMOGLOBIN: 7.4 G/DL (ref 13.5–17.5)
MCH RBC QN AUTO: 27.9 PG (ref 26–34)
MCHC RBC AUTO-ENTMCNC: 32.7 G/DL (ref 31–37)
MCV RBC AUTO: 85.4 FL (ref 80–100)
PDW BLD-RTO: 21.8 % (ref 11.5–14.9)
PLATELET # BLD: 473 K/UL (ref 150–450)
PMV BLD AUTO: 6.9 FL (ref 6–12)
POTASSIUM SERPL-SCNC: 3.9 MMOL/L (ref 3.7–5.3)
PRO-BNP: ABNORMAL PG/ML
RBC # BLD: 2.64 M/UL (ref 4.5–5.9)
SODIUM BLD-SCNC: 137 MMOL/L (ref 135–144)
WBC # BLD: 9.7 K/UL (ref 3.5–11)

## 2022-03-18 PROCEDURE — 87205 SMEAR GRAM STAIN: CPT

## 2022-03-18 PROCEDURE — 51798 US URINE CAPACITY MEASURE: CPT

## 2022-03-18 PROCEDURE — 97530 THERAPEUTIC ACTIVITIES: CPT

## 2022-03-18 PROCEDURE — 76770 US EXAM ABDO BACK WALL COMP: CPT

## 2022-03-18 PROCEDURE — 2580000003 HC RX 258: Performed by: NURSE PRACTITIONER

## 2022-03-18 PROCEDURE — 6370000000 HC RX 637 (ALT 250 FOR IP): Performed by: NURSE PRACTITIONER

## 2022-03-18 PROCEDURE — 97110 THERAPEUTIC EXERCISES: CPT

## 2022-03-18 PROCEDURE — 86403 PARTICLE AGGLUT ANTBDY SCRN: CPT

## 2022-03-18 PROCEDURE — 94640 AIRWAY INHALATION TREATMENT: CPT

## 2022-03-18 PROCEDURE — 85027 COMPLETE CBC AUTOMATED: CPT

## 2022-03-18 PROCEDURE — 80048 BASIC METABOLIC PNL TOTAL CA: CPT

## 2022-03-18 PROCEDURE — 87070 CULTURE OTHR SPECIMN AEROBIC: CPT

## 2022-03-18 PROCEDURE — 6360000002 HC RX W HCPCS: Performed by: NURSE PRACTITIONER

## 2022-03-18 PROCEDURE — 87075 CULTR BACTERIA EXCEPT BLOOD: CPT

## 2022-03-18 PROCEDURE — 6360000002 HC RX W HCPCS: Performed by: INTERNAL MEDICINE

## 2022-03-18 PROCEDURE — 99233 SBSQ HOSP IP/OBS HIGH 50: CPT | Performed by: INTERNAL MEDICINE

## 2022-03-18 PROCEDURE — 6370000000 HC RX 637 (ALT 250 FOR IP): Performed by: INTERNAL MEDICINE

## 2022-03-18 PROCEDURE — 2700000000 HC OXYGEN THERAPY PER DAY

## 2022-03-18 PROCEDURE — 2060000000 HC ICU INTERMEDIATE R&B

## 2022-03-18 PROCEDURE — 87186 SC STD MICRODIL/AGAR DIL: CPT

## 2022-03-18 PROCEDURE — 83880 ASSAY OF NATRIURETIC PEPTIDE: CPT

## 2022-03-18 PROCEDURE — 36415 COLL VENOUS BLD VENIPUNCTURE: CPT

## 2022-03-18 PROCEDURE — 94761 N-INVAS EAR/PLS OXIMETRY MLT: CPT

## 2022-03-18 RX ORDER — FUROSEMIDE 10 MG/ML
40 INJECTION INTRAMUSCULAR; INTRAVENOUS 2 TIMES DAILY
Status: DISCONTINUED | OUTPATIENT
Start: 2022-03-18 | End: 2022-03-20 | Stop reason: HOSPADM

## 2022-03-18 RX ORDER — 0.9 % SODIUM CHLORIDE 0.9 %
500 INTRAVENOUS SOLUTION INTRAVENOUS ONCE
Status: DISCONTINUED | OUTPATIENT
Start: 2022-03-18 | End: 2022-03-18

## 2022-03-18 RX ORDER — MECLIZINE HCL 12.5 MG/1
12.5 TABLET ORAL 3 TIMES DAILY
COMMUNITY
Start: 2022-03-15

## 2022-03-18 RX ADMIN — IPRATROPIUM BROMIDE AND ALBUTEROL SULFATE 1 AMPULE: 2.5; .5 SOLUTION RESPIRATORY (INHALATION) at 15:02

## 2022-03-18 RX ADMIN — GUAIFENESIN 1200 MG: 600 TABLET, EXTENDED RELEASE ORAL at 20:40

## 2022-03-18 RX ADMIN — Medication 1 PUFF: at 06:58

## 2022-03-18 RX ADMIN — AMIODARONE HYDROCHLORIDE 200 MG: 200 TABLET ORAL at 09:07

## 2022-03-18 RX ADMIN — Medication 3 MG: at 20:40

## 2022-03-18 RX ADMIN — ATORVASTATIN CALCIUM 40 MG: 40 TABLET, FILM COATED ORAL at 20:40

## 2022-03-18 RX ADMIN — FOLIC ACID 1 MG: 1 TABLET ORAL at 09:07

## 2022-03-18 RX ADMIN — TRAMADOL HYDROCHLORIDE 50 MG: 50 TABLET, COATED ORAL at 05:51

## 2022-03-18 RX ADMIN — GUAIFENESIN 1200 MG: 600 TABLET, EXTENDED RELEASE ORAL at 09:07

## 2022-03-18 RX ADMIN — CLOPIDOGREL BISULFATE 75 MG: 75 TABLET ORAL at 09:07

## 2022-03-18 RX ADMIN — CARVEDILOL 6.25 MG: 6.25 TABLET, FILM COATED ORAL at 18:05

## 2022-03-18 RX ADMIN — IPRATROPIUM BROMIDE AND ALBUTEROL SULFATE 1 AMPULE: 2.5; .5 SOLUTION RESPIRATORY (INHALATION) at 06:52

## 2022-03-18 RX ADMIN — SODIUM CHLORIDE 1 G: 1 TABLET ORAL at 09:06

## 2022-03-18 RX ADMIN — Medication 200 MG: at 09:07

## 2022-03-18 RX ADMIN — LEVOTHYROXINE SODIUM 50 MCG: 0.05 TABLET ORAL at 05:50

## 2022-03-18 RX ADMIN — QUETIAPINE FUMARATE 25 MG: 50 TABLET ORAL at 20:47

## 2022-03-18 RX ADMIN — FUROSEMIDE 40 MG: 10 INJECTION, SOLUTION INTRAVENOUS at 20:46

## 2022-03-18 RX ADMIN — ENOXAPARIN SODIUM 40 MG: 40 INJECTION SUBCUTANEOUS at 09:11

## 2022-03-18 RX ADMIN — CEFEPIME HYDROCHLORIDE 2000 MG: 2 INJECTION, POWDER, FOR SOLUTION INTRAVENOUS at 05:48

## 2022-03-18 RX ADMIN — TRAMADOL HYDROCHLORIDE 50 MG: 50 TABLET, COATED ORAL at 12:45

## 2022-03-18 RX ADMIN — OXYCODONE HYDROCHLORIDE AND ACETAMINOPHEN 500 MG: 500 TABLET ORAL at 09:07

## 2022-03-18 RX ADMIN — CARVEDILOL 6.25 MG: 6.25 TABLET, FILM COATED ORAL at 09:07

## 2022-03-18 RX ADMIN — CEFEPIME HYDROCHLORIDE 2000 MG: 2 INJECTION, POWDER, FOR SOLUTION INTRAVENOUS at 15:34

## 2022-03-18 RX ADMIN — 0.12% CHLORHEXIDINE GLUCONATE 15 ML: 1.2 RINSE ORAL at 20:40

## 2022-03-18 RX ADMIN — TRAMADOL HYDROCHLORIDE 50 MG: 50 TABLET, COATED ORAL at 19:23

## 2022-03-18 RX ADMIN — ONDANSETRON 4 MG: 4 TABLET, ORALLY DISINTEGRATING ORAL at 12:10

## 2022-03-18 RX ADMIN — MULTIPLE VITAMINS W/ MINERALS TAB 1 TABLET: TAB at 09:06

## 2022-03-18 ASSESSMENT — PAIN SCALES - GENERAL
PAINLEVEL_OUTOF10: 4
PAINLEVEL_OUTOF10: 8
PAINLEVEL_OUTOF10: 7
PAINLEVEL_OUTOF10: 4
PAINLEVEL_OUTOF10: 7
PAINLEVEL_OUTOF10: 7

## 2022-03-18 ASSESSMENT — PAIN DESCRIPTION - LOCATION: LOCATION: LEG;HIP

## 2022-03-18 ASSESSMENT — PAIN DESCRIPTION - PROGRESSION: CLINICAL_PROGRESSION: NOT CHANGED

## 2022-03-18 ASSESSMENT — PAIN DESCRIPTION - ORIENTATION: ORIENTATION: RIGHT;LEFT

## 2022-03-18 ASSESSMENT — PAIN DESCRIPTION - PAIN TYPE: TYPE: SURGICAL PAIN;ACUTE PAIN

## 2022-03-18 NOTE — PROGRESS NOTES
Comprehensive Nutrition Assessment    Type and Reason for Visit:  Positive Nutrition Screen (wound)    Nutrition Recommendations/Plan: Continue diet as ordered. Provide Ensure Enlive each meal.    Nutrition Assessment:  Pt had a recent ORIF right femur and was at [facility] for rehab and became increasingly SOB with coughing. Pt admitted with diagnosis of pneumonia and noted surgical incision line is draining serous liquid and getting cultures. Pt states his appetite isn't the best, offered nutritional supplements which he wanted just no chocolate. Malnutrition Assessment:  Malnutrition Status: At risk for malnutrition (Comment)    Context:  Acute Illness     Findings of the 6 clinical characteristics of malnutrition:  Energy Intake:  1 - 75% or less of estimated energy requirements for 7 or more days  Weight Loss:  No significant weight loss     Body Fat Loss:  No significant body fat loss     Muscle Mass Loss:  No significant muscle mass loss    Fluid Accumulation:  1 - Mild Generalized   Strength:  Not Performed    Estimated Daily Nutrient Needs:  Energy (kcal):  3808-8270 kcals based on [place] Mini with 1.1-1.2 factor using adm wt 92.5 kg; Weight Used for Energy Requirements:  Admission     Protein (g):  105-120 gm protein based on 1.4-1.6 gm/kg using IBW; Weight Used for Protein Requirements:  Ideal          Nutrition Related Findings:  Edema: +1 generalized, +1 pitting BLE's, +1 perineal. Hx: ADHD, COPD, cardiomyopathy. BM-3/17. Labs & meds reviewed. Wounds:  Surgical Incision       Current Nutrition Therapies:    ADULT DIET; Regular    Anthropometric Measures:  · Height: 5' 10\" (177.8 cm)  · Current Body Weight: 203 lb (92.1 kg)   · Admission Body Weight: 203 lb (92.1 kg)     · Ideal Body Weight: 166 lbs; % Ideal Body Weight 122.3 %   · BMI: 29.1  · BMI Categories: Overweight (BMI 25.0-29. 9)       Nutrition Diagnosis:   · Inadequate oral intake related to other (comment) (decreased appetite reported) as evidenced by poor intake prior to admission,intake 0-25%,intake 26-50%      Nutrition Interventions:   Food and/or Nutrient Delivery:  Continue Current Diet,Start Oral Nutrition Supplement  Nutrition Education/Counseling:  No recommendation at this time   Coordination of Nutrition Care:  Continue to monitor while inpatient    Goals:  Meet % of nutrient needs with PO diet and supplements       Nutrition Monitoring and Evaluation:   Behavioral-Environmental Outcomes:  None Identified   Food/Nutrient Intake Outcomes:  Food and Nutrient Intake,Supplement Intake  Physical Signs/Symptoms Outcomes:  Biochemical Data,GI Status,Fluid Status or Edema,Nutrition Focused Physical Findings,Skin,Weight     Discharge Planning: Too soon to determine     Some areas of assessment may be incomplete due to COVID-19 precautions. Lou Mejia R.D. L.VERA.   Clinical Dietitian  Office: [phone]

## 2022-03-18 NOTE — PROGRESS NOTES
Physical Therapy  Facility/Department: [facility]  Daily Treatment Note  NAME: Kofi Wright  : 1950  MRN: [id number]    Date of Service: 3/18/2022    Discharge Recommendations:  Patient would benefit from continued therapy after discharge   PT Equipment Recommendations  Equipment Needed: No (TBD)    Assessment   Body structures, Functions, Activity limitations: Decreased functional mobility ; Decreased safe awareness;Decreased ROM; Decreased strength; Increased pain  Assessment: Pt is self limiting and becomes agitated easily. Pt would benefit from continued therapy to increase global strength in order to perform ADLs and functional tasks with ease and safely. Treatment Diagnosis: impaired mobility Due to debilitation and recent right TFN  Specific instructions for Next Treatment: continue w/ bed mobility for rolling, further advance to dangling and pivot transfers bed <> W/C, gentle ROM & strengthening exercises (recent right TFN)  Prognosis: Poor  Decision Making: Medium Complexity  History: pt admitted due to pneumonia  Exam: ROM and MMT as allowed by patient, bed mobility for rolling to the left and scooting  Clinical Presentation: Gentle approach and max encouragement needed, HIGH FALL RISK, per chart, pt is non-ambulatory, uses manual W/C, pt is allowed right LE WBAT S/P recent right TFN 3-8-22  Barriers to Learning: cognition, agitation  REQUIRES PT FOLLOW UP: Yes  Activity Tolerance  Activity Tolerance: Patient limited by pain; Patient limited by fatigue;Patient limited by endurance; Patient limited by cognitive status     Patient Diagnosis(es): The encounter diagnosis was Pneumonia of right lower lobe due to infectious organism.      has a past medical history of ADHD (attention deficit hyperactivity disorder), ADHD (attention deficit hyperactivity disorder), Atypical chest pain, Chronic bronchitis ([facility] [address] Hypertension, Hyponatremia, Meniere's disease, Narcolepsy, Nasal fracture, PND (post-nasal drip), SOB (shortness of breath), Tibia fracture, and Transaminasemia. has a past surgical history that includes cyst removal; Ankle surgery; knee surgery (Right); Coronary angioplasty with stent (N/A, 10/10/2015); Knee Arthroplasty (Right, 04/13/2021); hip surgery (Right, 03/08/2022); and Femur fracture surgery (Right, 3/8/2022). Restrictions  Restrictions/Precautions  Restrictions/Precautions: Fall Risk  Implants present? : Metal implants  Lower Extremity Weight Bearing Restrictions  Right Lower Extremity Weight Bearing: Weight Bearing As Tolerated  Subjective   General  Response To Previous Treatment: Not applicable  Family / Caregiver Present: No  Referring Practitioner: Yaw Carroll CNP  Subjective  Subjective: Pt ws agitated and stated \"it's to late\" when writer asked if he wanted to get in the chair. Pt stated he wanted his pain medications and when RN [facility]PHINE came in explaining he doesn't take them till 12:30pm pt became verbally combative. Pt states aggresively that he was \"tricked\" to come the [address] [place] [address] and that he wants to [place] back to [facility]. V's.\" PTA explained to pt respect is needed for staff as rescpect is show to him, and pt then agreed to participate in therapy tx. Pt states he would perfer therapy treatment to be in mornings after pain medication. Pt did apologies post tx for his behavior. General Comment  Comments: ROHIT fragoso's tx. Janiya Sessions from [address] stated pt has been asking to get up this morning. Pain Screening  Patient Currently in Pain: Yes  Pain Assessment  Pain Assessment: 0-10  Pain Level: 8  Pain Type: Surgical pain;Acute pain  Pain Location: Leg;Hip  Pain Orientation: Right;Left (bilat legs; R hip)  Clinical Progression: Not changed  Vital Signs  Level of Consciousness: Alert (0)  Patient Currently in Pain: Yes  Oxygen Therapy  O2 Device: Nasal cannula  Patient Observation  Observations: Pt coughed out muscus with two small dots of red coloration caught in a tissue.  RN Marlyn shown. LLE ankle is wrapped due to ankle sprain. Orientation  Orientation  Overall Orientation Status: WFL (hx: dementia)   Orientation Level: Oriented to person;Oriented to time;Oriented to situation;Oriented to place       Objective   Bed mobility  Rolling to Left: Moderate assistance  Rolling to Right: Minimal assistance  Supine to Sit: Moderate assistance;Minimal assistance;2 Person assistance  Sit to Supine: Minimal assistance; Moderate assistance;2 Person assistance  Scooting: Moderate assistance  Comment: Pt needed Mod A x1 for right roll with no rail support but arm support from writer to [employer] and \"up. \" Then Min A x1 and Mod A x1 for LE and UE support when coming from supine roll to sit EOB. Pt tolerated dangle for ~25 mins. Pt was becoming agitated due to becoming nauseaed and dizzy. Pt went from seated to lateral left leaning x6. Pt stated he needed his \"dizzy pill. \" PTA called the nurse, and RN Janet Madison came in to give him zophran for nausea stating her doesn't have a pill for dizziness listed. Pt took medication with water. Writer asked pt to \"look up at me\" since pt had anterior lean and flexed cervical with downward gaze. When Pt looked up he stated \"your dizzy\" and dropped gaze. RW standing transfer was defered and pt positioned back in bed for safety but prior at EOB PTA donned waffle matress and new bed pad. Pt needed Mod A x1 and Min A x1 for LE and UE assistance. Pt performed a left roll with Mod A x1 for weight shifting and assistance with hand placement. Pt rolled to right with Min A x1 using proper rail technique for roll, however needed assistance with weight shifting of LE. Transfers  Comment: defered due to dizziness increasing at EOB  Ambulation  Ambulation?: No (per chart, pt is non-ambulatory, uses manual W/C)        Other exercises  Other exercises?: Yes  Other exercises 1: Edu on supine bed mobility with minimal teach back from the pt.  Exercises edu on were: ankle pumps; knee extensions, glute sets, and heel slides. Writer was able to perform with pt x8 AAROM LLE heel slides. Other exercises 2: Edu on skin rupert and importance of air flow for skin to decrease wounds and increase healing of wounds present. Other exercises 3: Edu on the benefits of repositioning and how it is important to stay mobile for joint, muscle, and circulatory health. Other exercises 4: Edu on waffle matress and how it can help pt skin health and repositioning and mobility due to having more ease shifting hip weight to help prevent/heal wounds. Other exercises 5: Dynamic/static dangle EOB ~25 mins with x6 lateral left leans to reposition  Other exercises 6: De-escilation and redirectional techniques used throughout tx to edu and discuss respectfully with pt on what tx can help him with. Edu on respecting the nurses and who they aren't against him but trying to help him and his healing progression . Other exercises 7: Edu on posture and while sitting shoulders should be retracted to open up lungs and longer, deeper breathes should be taken. Edu and additional minimal verbal cues had poor- carryover and agitated pt. Other exercises 8: De-escilation and relaxing technique of itching, and applying lotion on back in a circular rhythmic pattern by PTA         Comment: Needs increased time to de-escilate and re-direct pt.                 Goals  Short term goals  Time Frame for Short term goals: 5-7 treatments/ week  Short term goal 1: pt to tolerate 1/2 hour of therapuetic exercise and activity  Short term goal 2: pt to demonstrate good technique for LE ROM and strengthening exercises and energy conservation techniques  Short term goal 3: Pt to perform bed mobility to roll to the left w/ CGA x 1 using the rail  Short term goal 4: pt to demonstrate supine <> sit transfers w/ max x 2  Short term goal 5: pt to demonstrate fair or better sitting balance at the EOB w/ CGA x 1 and increase sitting tolerance to 15 minutes  Short term goal 6: pt to advance to and demonstrate sit <> stand and pivot bed <> chair transfers right LE WBAT w/ max x 2  Short term goal 7: pt to advance to and demonstrate ability to propel manual WC for 100ft w/ BUE with min x 1  Patient Goals   Patient goals : pt would not state a goal    Plan    Plan  Times per week: 5-7 treatments/ week  Times per day:  (5-7 treatments/ week)  Specific instructions for Next Treatment: continue w/ bed mobility for rolling, further advance to dangling and pivot transfers bed <> W/C, gentle ROM & strengthening exercises (recent right TFN)  Current Treatment Recommendations: Strengthening,Endurance Training,Transfer Training,Patient/Caregiver Education & Training,ROM,Wheelchair Mobility Training,Balance Training,Positioning,Safety Education & Training  Safety Devices  Type of devices: All fall risk precautions in place,Patient at risk for falls,Nurse notified,Left in bed,Call light within reach,Gait belt (nurse Ruyss Carolyn notified post tx)     Therapy Time   Individual Concurrent Group Co-treatment   Time In 4757         Time Out 1233         Minutes 49                 Electronically signed by RAMSEY Gaming// on 3/18/2022 at 2:54 PM    The above documentation and treatment completed by Student Physical Therapist Assistant (SPTA)  was provided under the supervision in the direct line of sight and documented by the student, was reviewed and accepted by supervising Clinical Instructor NANDO Ribera.

## 2022-03-18 NOTE — FLOWSHEET NOTE
03/17/22 2014   Encounter Summary   Services provided to: Patient   Referral/Consult From: Rounding   Continue Visiting   (03/17/2022)   Complexity of Encounter Low   Length of Encounter 15 minutes   Spiritual Assessment Completed Yes   Routine   Type Initial   Assessment Approachable;Calm;Passive   Intervention Active listening;Sustaining presence/ Ministry of presence   Outcome Expressed gratitude;Comfort

## 2022-03-18 NOTE — PROGRESS NOTES
[place] [address] Internal Medicine    Progress Note    3/18/2022    10:29 AM    Name:   Avani Martel  MRN:     [id number]     Acct:      [de-identified]   Room:   [address] [place] [address] Day:  2  Admit Date:  3/16/2022  9:23 PM    PCP:   Betito Cason DO  Code Status:  Full Code    Subjective:     C/C: No chief complaint on file. Shortness of breath      Interval History Status: Improving    HPI:     See HPI    Review of Systems:     Positive for cough and shortness of breath  Denies chest pain or palpitations  Denies abdominal pain, diarrhea vomiting  Denies any new numbness tremors or weakness. Medications: Allergies:     Allergies   Allergen Reactions    Motrin [Ibuprofen Micronized]      Pt states he had blood in stool from motrin       Current Meds:   Scheduled Meds:    sodium chloride flush  5-40 mL IntraVENous 2 times per day    enoxaparin  40 mg SubCUTAneous Daily    amiodarone  200 mg Oral Daily    atorvastatin  40 mg Oral Nightly    carvedilol  6.25 mg Oral BID WC    chlorhexidine  15 mL Mouth/Throat BID    clopidogrel  75 mg Oral Daily    fluticasone  1 puff Inhalation BID    folic acid  1 mg Oral Daily    guaiFENesin  1,200 mg Oral BID    levothyroxine  50 mcg Oral Daily    fluticasone  2 spray Each Nostril Daily    therapeutic multivitamin-minerals  1 tablet Oral Daily    QUEtiapine  25 mg Oral Nightly    sodium chloride  1 g Oral Daily    vitamin C  500 mg Oral Daily    magnesium oxide  200 mg Oral Daily    cefepime  2,000 mg IntraVENous Q8H    ipratropium-albuterol  1 ampule Inhalation Q4H WA     Continuous Infusions:    sodium chloride      [Held by provider] sodium chloride 75 mL/hr at 03/17/22 1816     PRN Meds: sodium chloride flush, sodium chloride, ondansetron **OR** ondansetron, magnesium hydroxide, acetaminophen **OR** acetaminophen, docusate sodium, sodium chloride, traMADol, albuterol, melatonin    Data:     Past Medical History:   has a past medical history of ADHD (attention deficit hyperactivity disorder), ADHD (attention deficit hyperactivity disorder), Atypical chest pain, Chronic bronchitis ([address] Hypertension, Hyponatremia, Meniere's disease, Narcolepsy, Nasal fracture, PND (post-nasal drip), SOB (shortness of breath), Tibia fracture, and Transaminasemia. Social History:   reports that he has been smoking cigarettes. He has a 38.00 pack-year smoking history. He has never used smokeless tobacco. He reports current alcohol use of about 16.0 standard drinks of alcohol per week. He reports that he does not use drugs. Family History:   Family History   Problem Relation Age of Onset    Heart Disease Mother         heart attack    Heart Disease Father        Vitals:  /72   Pulse 62   Temp 97.6 °F (36.4 °C) (Oral)   Resp 18   Ht 5' 10\" (1.778 m)   Wt 203 lb 14.4 oz (92.5 kg)   SpO2 97%   BMI 29.26 kg/m²   Temp (24hrs), Av.1 °F (36.7 °C), Min:97.6 °F (36.4 °C), Max:98.6 °F (37 °C)    No results for input(s): POCGLU in the last 72 hours. I/O (24Hr):     Intake/Output Summary (Last 24 hours) at 3/18/2022 1029  Last data filed at 3/18/2022 0752  Gross per 24 hour   Intake 350 ml   Output 100 ml   Net 250 ml       Labs:    Lab Results   Component Value Date    WBC 9.7 2022    HGB 7.4 (L) 2022    HCT 22.6 (L) 2022    MCV 85.4 2022     (H) 2022     Lab Results   Component Value Date     2022    K 3.9 2022     2022    CO2 18 2022    BUN 35 2022    CREATININE 1.38 2022    GLUCOSE 104 2022    GLUCOSE 95 2012    CALCIUM 9.4 2022          Lab Results   Component Value Date/Time    SPECIAL NOT REPORTED 2020 10:15 PM     Lab Results   Component Value Date/Time    CULTURE NORMAL RESPIRATORY ORI LIGHT GROWTH 10/23/2015 04:04 PM    CULTURE  10/23/2015 04:04 PM     [facility] [address] [facility], [address] [place] [address] [phone] Radiology:    Recent data reviewed    Physical Examination:        General appearance:  alert, cooperative and no distress  Eyes: Anicteric sclera. Pupils are equally round and reactive to light. Extraocular movements are intact. Lungs: Bibasilar crackles present, normal effort  Heart:  regular rate and rhythm, no murmur  Abdomen:  soft, nontender, nondistended, normal bowel sounds, no masses, hepatomegaly, splenomegaly  Extremities:  no edema, redness, tenderness in the calves  Skin:  no gross lesions, rashes, induration  Neuro:  Alert, oriented X 3, no new focal weakness  Assessment:        Primary Problem  Pneumonia    Active Hospital Problems    Diagnosis Date Noted    RENALDO (acute kidney injury) ([facility] [N17.9] 03/17/2022    Pneumonia [J18.9] 03/16/2022           DVT prophylaxis:-Lovenox  Antibiotics: Cefepime, vancomycin    Plan:        61-year-old male admitted for healthcare acquired pneumonia recent admission for right hip surgery on 3/8  1. Acute hypoxic respiratory failure secondary to healthcare acquired pneumonia  2. Sepsis secondary to healthcare acquired pneumonia as evidenced by respiratory 24 white cell count 14, will check lactic  3. Healthcare associated pneumonia-started on cefepime vancomycin, switched to cefepime and Zyvox  4.  RENALDO-gentle hydration    3/18  Remains on 4 L nasal cannula oxygen; patient subjectively feeling better today  No further fevers, leukocytosis resolved  Will discontinue fluids, initially started for RENALDO but creatinine has been worsening, will check proBNP if elevated may need diuresis, will also check renal ultrasound, postvoid bladder scan  MRSA swab negative-Zyvox can be discontinued  Incision site on right hip draining serous liquid-wound cultures ordered, will follow results        Chani Mac MD  3/18/2022  10:29 AM

## 2022-03-18 NOTE — PLAN OF CARE
Nutrition Problem #1: Inadequate oral intake  Intervention: Food and/or Nutrient Delivery: Continue Current Diet,Start Oral Nutrition Supplement  Nutritional Goals: Meet % of nutrient needs with PO diet and supplements

## 2022-03-18 NOTE — PLAN OF CARE
Problem: Pain:  Goal: Pain level will decrease  Description: Pain level will decrease  Outcome: Ongoing  Goal: Control of acute pain  Description: Control of acute pain  Outcome: Ongoing  Goal: Control of chronic pain  Description: Control of chronic pain  Outcome: Ongoing     Problem: Skin Integrity:  Goal: Will show no infection signs and symptoms  Description: Will show no infection signs and symptoms  Outcome: Ongoing  Goal: Absence of new skin breakdown  Description: Absence of new skin breakdown  Outcome: Ongoing     Problem: Musculor/Skeletal Functional Status  Goal: Highest potential functional level  Outcome: Ongoing  Goal: Absence of falls  Outcome: Ongoing

## 2022-03-18 NOTE — FLOWSHEET NOTE
03/18/22 1443   Treatment Team Notification   Reason for Communication Evaluate   Team Member Name Dr Rosario Neighbor Team Role Attending Provider   Method of Communication Secure Message   Response Waiting for response   Update:  Patient BMP 10,182. Patient requesting Meclizine for dizziness due to Meniere's disease. RN did find it in outside resources 12.5 PRN TID, however, the order had been discontinued.  RN has added to his home med list.

## 2022-03-18 NOTE — CARE COORDINATION
CASE MANAGEMENT NOTE:    Admission Date:  3/16/2022 Rivas Calabrese is a 81 y.o.  male    Admitted for : Pneumonia [J18.9]  Pneumonia of right lower lobe due to infectious organism [J18.9]    Met with:  Patient    PCP:  Chey Quinn                              Insurance:  Sam Yesenia      Is patient alert and oriented at time of discussion:  Yes    Current Residence/ Living Arrangements:  in nursing home [facility]             Current Services PTA:  No      SNF needed: Yes, To go back to [facility] of choice and list provided: Yes           Discharge Plan:  3/18/22 - Clemente Peterson - Plan is to return back to [place] [address]. tried reaching Family , no answer, or mail box is full. was able to leave a message with his emergency contact amy to call with any information on this patient. JOANNA needs singed and completed.  .//.pf                Electronically signed by: Donnetta Ahumada, RN on 3/18/2022 at 4:54 PM

## 2022-03-18 NOTE — PLAN OF CARE
Problem: Pain:  Goal: Pain level will decrease  Description: Pain level will decrease  3/18/2022 1813 by Karson Hardin RN  Outcome: Ongoing  3/18/2022 0554 by Kassandra Hernández RN  Outcome: Ongoing  Goal: Control of acute pain  Description: Control of acute pain  3/18/2022 1813 by Karson Hardin RN  Outcome: Ongoing  3/18/2022 0554 by Kassandra Hernández RN  Outcome: Ongoing  Goal: Control of chronic pain  Description: Control of chronic pain  3/18/2022 1813 by Karson Hardin RN  Outcome: Ongoing  3/18/2022 0554 by Kassandra Hernández RN  Outcome: Ongoing     Problem: Skin Integrity:  Goal: Will show no infection signs and symptoms  Description: Will show no infection signs and symptoms  3/18/2022 1813 by Karson Hardin RN  Outcome: Ongoing  3/18/2022 0554 by Kassandra Hernández RN  Outcome: Ongoing  Goal: Absence of new skin breakdown  Description: Absence of new skin breakdown  3/18/2022 1813 by Karson Hardin RN  Outcome: Ongoing  3/18/2022 0554 by Kassandra Hernández RN  Outcome: Ongoing Skin assessment complete. Waffle mattress in place S[employer] applied PRN. Area kept free from moisture. Proper nourishment and fluids encouraged, as appropriate. Will continue to monitor for additional needs and changes in skin breakdown. Problem: Musculor/Skeletal Functional Status  Goal: Highest potential functional level  3/18/2022 1813 by Karson Hardin RN  Outcome: Ongoing  3/18/2022 0554 by Kassandra Hernández RN  Outcome: Ongoing  Goal: Absence of falls  3/18/2022 1813 by Karson Hardin RN  Outcome: Ongoing  3/18/2022 0554 by Kassandra Hernández RN  Outcome: Ongoing     Problem: Nutrition  Goal: Optimal nutrition therapy  Outcome: Ongoing     Problem: Falls - Risk of:  Goal: Will remain free from falls  Description: Will remain free from falls  Outcome: Ongoing  Goal: Absence of physical injury  Description: Absence of physical injury  Outcome: Ongoing   Pt assessed as a fall risk this shift.  Remains free from falls and accidental injury at this time. Fall precautions in place, including falling star sign and fall risk band on pt. Floor free from obstacles, and bed is locked and in lowest position. Adequate lighting provided. Pt encouraged to call before getting OOB for any need. Bed alarm activated. Will continue to monitor needs during hourly rounding, and reinforce education on use of call light.

## 2022-03-19 LAB
ANION GAP SERPL CALCULATED.3IONS-SCNC: 10 MMOL/L (ref 9–17)
BUN BLDV-MCNC: 32 MG/DL (ref 8–23)
CALCIUM SERPL-MCNC: 9.4 MG/DL (ref 8.6–10.4)
CHLORIDE BLD-SCNC: 108 MMOL/L (ref 98–107)
CO2: 20 MMOL/L (ref 20–31)
CREAT SERPL-MCNC: 1.39 MG/DL (ref 0.7–1.2)
GFR AFRICAN AMERICAN: >60 ML/MIN
GFR NON-AFRICAN AMERICAN: 51 ML/MIN
GFR SERPL CREATININE-BSD FRML MDRD: ABNORMAL ML/MIN/{1.73_M2}
GLUCOSE BLD-MCNC: 104 MG/DL (ref 70–99)
HCT VFR BLD CALC: 21.9 % (ref 41–53)
HEMOGLOBIN: 7.3 G/DL (ref 13.5–17.5)
MAGNESIUM: 2.6 MG/DL (ref 1.6–2.6)
MCH RBC QN AUTO: 28.9 PG (ref 26–34)
MCHC RBC AUTO-ENTMCNC: 33.4 G/DL (ref 31–37)
MCV RBC AUTO: 86.7 FL (ref 80–100)
PDW BLD-RTO: 21.2 % (ref 11.5–14.9)
PLATELET # BLD: 436 K/UL (ref 150–450)
PMV BLD AUTO: 7.5 FL (ref 6–12)
POTASSIUM SERPL-SCNC: 3.6 MMOL/L (ref 3.7–5.3)
RBC # BLD: 2.52 M/UL (ref 4.5–5.9)
SODIUM BLD-SCNC: 138 MMOL/L (ref 135–144)
WBC # BLD: 7.9 K/UL (ref 3.5–11)

## 2022-03-19 PROCEDURE — 99233 SBSQ HOSP IP/OBS HIGH 50: CPT | Performed by: INTERNAL MEDICINE

## 2022-03-19 PROCEDURE — 6370000000 HC RX 637 (ALT 250 FOR IP): Performed by: INTERNAL MEDICINE

## 2022-03-19 PROCEDURE — 36415 COLL VENOUS BLD VENIPUNCTURE: CPT

## 2022-03-19 PROCEDURE — 6370000000 HC RX 637 (ALT 250 FOR IP): Performed by: NURSE PRACTITIONER

## 2022-03-19 PROCEDURE — 83735 ASSAY OF MAGNESIUM: CPT

## 2022-03-19 PROCEDURE — 94761 N-INVAS EAR/PLS OXIMETRY MLT: CPT

## 2022-03-19 PROCEDURE — 85027 COMPLETE CBC AUTOMATED: CPT

## 2022-03-19 PROCEDURE — 6360000002 HC RX W HCPCS: Performed by: INTERNAL MEDICINE

## 2022-03-19 PROCEDURE — 94640 AIRWAY INHALATION TREATMENT: CPT

## 2022-03-19 PROCEDURE — 2700000000 HC OXYGEN THERAPY PER DAY

## 2022-03-19 PROCEDURE — 6360000002 HC RX W HCPCS: Performed by: NURSE PRACTITIONER

## 2022-03-19 PROCEDURE — 80048 BASIC METABOLIC PNL TOTAL CA: CPT

## 2022-03-19 PROCEDURE — 2580000003 HC RX 258: Performed by: NURSE PRACTITIONER

## 2022-03-19 PROCEDURE — 2060000000 HC ICU INTERMEDIATE R&B

## 2022-03-19 RX ORDER — MECLIZINE HYDROCHLORIDE 25 MG/1
12.5 TABLET ORAL 3 TIMES DAILY PRN
Status: DISCONTINUED | OUTPATIENT
Start: 2022-03-19 | End: 2022-03-20 | Stop reason: HOSPADM

## 2022-03-19 RX ORDER — POTASSIUM CHLORIDE 20 MEQ/1
40 TABLET, EXTENDED RELEASE ORAL ONCE
Status: DISCONTINUED | OUTPATIENT
Start: 2022-03-19 | End: 2022-03-19

## 2022-03-19 RX ADMIN — Medication 200 MG: at 08:22

## 2022-03-19 RX ADMIN — ATORVASTATIN CALCIUM 40 MG: 40 TABLET, FILM COATED ORAL at 19:55

## 2022-03-19 RX ADMIN — MULTIPLE VITAMINS W/ MINERALS TAB 1 TABLET: TAB at 08:23

## 2022-03-19 RX ADMIN — CLOPIDOGREL BISULFATE 75 MG: 75 TABLET ORAL at 08:23

## 2022-03-19 RX ADMIN — 0.12% CHLORHEXIDINE GLUCONATE 15 ML: 1.2 RINSE ORAL at 19:57

## 2022-03-19 RX ADMIN — MECLIZINE HYDROCHLORIDE 12.5 MG: 25 TABLET ORAL at 13:33

## 2022-03-19 RX ADMIN — FOLIC ACID 1 MG: 1 TABLET ORAL at 08:25

## 2022-03-19 RX ADMIN — LEVOTHYROXINE SODIUM 50 MCG: 0.05 TABLET ORAL at 06:12

## 2022-03-19 RX ADMIN — QUETIAPINE FUMARATE 25 MG: 50 TABLET ORAL at 19:55

## 2022-03-19 RX ADMIN — SODIUM CHLORIDE 25 ML: 9 INJECTION, SOLUTION INTRAVENOUS at 15:31

## 2022-03-19 RX ADMIN — ENOXAPARIN SODIUM 40 MG: 40 INJECTION SUBCUTANEOUS at 08:23

## 2022-03-19 RX ADMIN — TRAMADOL HYDROCHLORIDE 50 MG: 50 TABLET, COATED ORAL at 15:26

## 2022-03-19 RX ADMIN — IPRATROPIUM BROMIDE AND ALBUTEROL SULFATE 1 AMPULE: 2.5; .5 SOLUTION RESPIRATORY (INHALATION) at 10:49

## 2022-03-19 RX ADMIN — IPRATROPIUM BROMIDE AND ALBUTEROL SULFATE 1 AMPULE: 2.5; .5 SOLUTION RESPIRATORY (INHALATION) at 07:55

## 2022-03-19 RX ADMIN — FUROSEMIDE 40 MG: 10 INJECTION, SOLUTION INTRAVENOUS at 08:23

## 2022-03-19 RX ADMIN — CEFEPIME HYDROCHLORIDE 2000 MG: 2 INJECTION, POWDER, FOR SOLUTION INTRAVENOUS at 06:32

## 2022-03-19 RX ADMIN — GUAIFENESIN 1200 MG: 600 TABLET, EXTENDED RELEASE ORAL at 08:23

## 2022-03-19 RX ADMIN — SODIUM CHLORIDE, PRESERVATIVE FREE 10 ML: 5 INJECTION INTRAVENOUS at 19:57

## 2022-03-19 RX ADMIN — MECLIZINE HYDROCHLORIDE 12.5 MG: 25 TABLET ORAL at 19:55

## 2022-03-19 RX ADMIN — OXYCODONE HYDROCHLORIDE AND ACETAMINOPHEN 500 MG: 500 TABLET ORAL at 08:23

## 2022-03-19 RX ADMIN — FUROSEMIDE 40 MG: 10 INJECTION, SOLUTION INTRAVENOUS at 17:38

## 2022-03-19 RX ADMIN — GUAIFENESIN 1200 MG: 600 TABLET, EXTENDED RELEASE ORAL at 19:55

## 2022-03-19 RX ADMIN — Medication 1 PUFF: at 19:28

## 2022-03-19 RX ADMIN — IPRATROPIUM BROMIDE AND ALBUTEROL SULFATE 1 AMPULE: 2.5; .5 SOLUTION RESPIRATORY (INHALATION) at 15:02

## 2022-03-19 RX ADMIN — Medication 1 PUFF: at 07:55

## 2022-03-19 RX ADMIN — CEFEPIME HYDROCHLORIDE 2000 MG: 2 INJECTION, POWDER, FOR SOLUTION INTRAVENOUS at 15:32

## 2022-03-19 RX ADMIN — TRAMADOL HYDROCHLORIDE 50 MG: 50 TABLET, COATED ORAL at 21:31

## 2022-03-19 RX ADMIN — CEFEPIME HYDROCHLORIDE 2000 MG: 2 INJECTION, POWDER, FOR SOLUTION INTRAVENOUS at 00:24

## 2022-03-19 RX ADMIN — FLUTICASONE PROPIONATE 2 SPRAY: 50 SPRAY, METERED NASAL at 08:24

## 2022-03-19 RX ADMIN — CARVEDILOL 6.25 MG: 6.25 TABLET, FILM COATED ORAL at 08:24

## 2022-03-19 RX ADMIN — AMIODARONE HYDROCHLORIDE 200 MG: 200 TABLET ORAL at 08:24

## 2022-03-19 RX ADMIN — SODIUM CHLORIDE 1 G: 1 TABLET ORAL at 08:24

## 2022-03-19 RX ADMIN — SODIUM CHLORIDE, PRESERVATIVE FREE 10 ML: 5 INJECTION INTRAVENOUS at 08:24

## 2022-03-19 RX ADMIN — CEFEPIME HYDROCHLORIDE 2000 MG: 2 INJECTION, POWDER, FOR SOLUTION INTRAVENOUS at 22:44

## 2022-03-19 RX ADMIN — IPRATROPIUM BROMIDE AND ALBUTEROL SULFATE 1 AMPULE: 2.5; .5 SOLUTION RESPIRATORY (INHALATION) at 19:19

## 2022-03-19 RX ADMIN — TRAMADOL HYDROCHLORIDE 50 MG: 50 TABLET, COATED ORAL at 08:23

## 2022-03-19 ASSESSMENT — PAIN SCALES - GENERAL
PAINLEVEL_OUTOF10: 7
PAINLEVEL_OUTOF10: 8
PAINLEVEL_OUTOF10: 7
PAINLEVEL_OUTOF10: 7
PAINLEVEL_OUTOF10: 6
PAINLEVEL_OUTOF10: 8

## 2022-03-19 NOTE — DISCHARGE INSTR - COC
Continuity of Care Form    Patient Name: Latoya Interiano   :  1950  MRN:  [id number]    Admit date:  3/16/2022  Discharge date:  3/20/22    Code Status Order: Full Code   Advance Directives:      Admitting Physician:  Jennifer Mcnamara MD  PCP: Deniz Dejesus DO    Discharging Nurse: [facility] Unit/Room#: 2121/2121-01  Discharging Unit Phone Number: [phone]    Emergency Contact:   Extended Emergency Contact Information  Primary Emergency Contact: [address] Phone: [phone]  Relation: Other  Secondary Emergency Contact: Keegan Neal  Relation: Child    Past Surgical History:  Past Surgical History:   Procedure Laterality Date    ANKLE SURGERY      open reduction internal fixation of the right ankle & tibial plateau fx    CORONARY ANGIOPLASTY WITH STENT PLACEMENT N/A 10/10/2015    CYST REMOVAL      right side of face    FEMUR FRACTURE SURGERY Right 3/8/2022    RIGHT TFN HIP  INSERTION ,OPEN REDUCTION INTERNAL FIXATION RIGHT HIP  C-ARM, SYNTHES, CABLES , performed by Jing Rios DO at [place] [address] Right 2022    RIGHT TFN HIP  INSERTION ,OPEN REDUCTION INTERNAL FIXATION RIGHT HIP  C-ARM, SYNTHES, CABLES     KNEE ARTHROPLASTY Right 2021    DISTAL FEMUR REPLACEMENT performed by Jing Rios DO at [place] [address] Right     total knee       Immunization History:   Immunization History   Administered Date(s) Administered    Influenza Virus Vaccine 2014, 10/15/2015    Pneumococcal Conjugate 13-valent ([facility]) 2016    Tdap (Boostrix, Adacel) 2020       Active Problems:  Patient Active Problem List   Diagnosis Code    Meniere's disease H81.09    ADHD (attention deficit hyperactivity disorder) F90.9    Narcolepsy G47.419    Hyponatremia E87.1    PND (post-nasal drip) R09.82    Transaminasemia R74.01    Cardiomyopathy ([facility] I42.9    HTN (hypertension) I10    Chronic obstructive pulmonary disease ([facility] J44.9 Dementia with behavioral disturbance (HCC) F03.91    Status post insertion of percutaneous endoscopic gastrostomy (PEG) tube ([facility] Z93.1    S/p bare metal coronary artery stent Z95.5    Supracondylar fracture of distal end of femur without intracondylar extension, sequela S72.453S    Closed fracture of right distal femur ([facility] S72.401A    Accelerated junctional rhythm I49.8    Presence of permanent cardiac pacemaker Z95.0    Anemia, normocytic normochromic D64.9    Closed intertrochanteric fracture of hip, right, initial encounter ([facility] S72.141A    Acute respiratory failure with hypoxemia ([facility]) J96.01    Atelectasis J98.11    Acute postoperative anemia due to expected blood loss D62    Pneumonia J18.9    RENALDO (acute kidney injury) ([facility] N17.9       Isolation/Infection:   Isolation            No Isolation          Patient Infection Status       Infection Onset Added Last Indicated Last Indicated By Review Planned Expiration Resolved Resolved By    None active    Resolved    COVID-19 (Rule Out) 03/16/22 03/16/22 03/16/22 COVID-19, Rapid (Ordered)   03/16/22 Rule-Out Test Resulted    COVID-19 (Rule Out) 03/05/22 03/05/22 03/05/22 COVID-19, Rapid (Ordered)   03/05/22 Rule-Out Test Resulted            Nurse Assessment:  Last Vital Signs: BP (!) 142/72   Pulse 58   Temp 97.7 °F (36.5 °C) (Oral)   Resp 16   Ht 5' 10\" (1.778 m)   Wt 203 lb 14.4 oz (92.5 kg)   SpO2 99%   BMI 29.26 kg/m²     Last documented pain score (0-10 scale): Pain Level: 8  Last Weight:   Wt Readings from Last 1 Encounters:   03/17/22 203 lb 14.4 oz (92.5 kg)     Mental Status:  oriented and alert    IV Access:  - None    Nursing Mobility/ADLs:  Walking   Dependent  Transfer  Dependent  Bathing  Dependent  Dressing  Dependent  Toileting  Dependent  Feeding  Assisted  Med Admin  Assisted  Med Delivery   whole    Wound Care Documentation and Therapy:  Pressure Ulcer 10/14/15 Chest Right;Lateral;Distal;Anterior (Active)   Number of days: 2348 Incision 11/02/15 Chest Left (Active)   Number of days: 2329       Puncture 11/09/15 Groin Right (Active)   Number of days: 2321       Wound 03/24/20 Buttocks Left (Active)   Number of days: 724       Wound 03/24/20 Thigh Right;Lateral (Active)   Wound Etiology Surgical 03/19/22 0624   Dressing Status Dry; Intact 03/19/22 0817   Wound Cleansed Soap and water 03/19/22 0624   Dressing/Treatment Adhesive bandage;Dry dressing 03/19/22 0624   Wound Assessment Other (Comment) 03/19/22 0817   Drainage Amount None 03/18/22 2150   Odor None 03/18/22 2150   Shirley-wound Assessment Intact 03/18/22 2150   Number of days: 724       Wound 03/24/20 Knee Anterior;Right (Active)   Number of days: 724       Wound 03/25/20 Arm Right (Active)   Number of days: 724       Wound 03/25/20 Toe (Comment  which one) Right #1 (Active)   Number of days: 724       Wound 03/25/20 Toe (Comment  which one) Left #1 (Active)   Number of days: 724       Wound 03/25/20 Head Mid;Left (Active)   Number of days: 724        Elimination:  Continence: Bowel: No  Bladder: No  Urinary Catheter: None   Colostomy/Ileostomy/Ileal Conduit: No       Date of Last BM: 03/19/22    Intake/Output Summary (Last 24 hours) at 3/19/2022 1005  Last data filed at 3/19/2022 0555  Gross per 24 hour   Intake 1941.48 ml   Output --   Net 1941.48 ml     I/O last 3 completed shifts: In: 1941.5 [I.V.:1698.8; IV Piggyback:242.7]  Out: 100 [Urine:100]    Safety Concerns:     History of Falls (last 30 days) and At Risk for Falls    Impairments/Disabilities:      Hearing    Nutrition Therapy:  Current Nutrition Therapy:   - Oral Diet:  General    Routes of Feeding: Oral  Liquids: No Restrictions  Daily Fluid Restriction: no  Last Modified Barium Swallow with Video (Video Swallowing Test): not done    Treatments at the Time of Hospital Discharge:   Respiratory Treatments: N/A  Oxygen Therapy:  is on oxygen at 2.5 L/min per nasal cannula.   Ventilator:    - No ventilator support    Rehab Therapies: Physical Therapy and Occupational Therapy  Weight Bearing Status/Restrictions: Non-weight bearing on right leg  Other Medical Equipment (for information only, NOT a DME order):  hospital bed/wheel chair  Other Treatments:n/a    Patient's personal belongings (please select all that are sent with patient):  None    RN SIGNATURE:  Electronically signed by Driss Hernandez RN on 3/20/22 at 1:39 PM EDT    CASE MANAGEMENT/SOCIAL WORK SECTION    Inpatient Status Date: ***    Readmission Risk Assessment Score:  Readmission Risk              Risk of Unplanned Readmission:  32           Discharging to Facility/ Agency   Name: [facility] [address]  Phone: report: [phone] and for admissions [facility] [phone]  Fax:[phone]    Dialysis Facility (if applicable)   Name:  Address:  Dialysis Schedule:  Phone:  Fax:    / signature: Electronically signed by RICK Butcher, LSW on 3/20/22 at 12:12 PM EDT    PHYSICIAN SECTION    Prognosis: Fair    Condition at Discharge: Stable    Rehab Potential (if transferring to Rehab): Fair    Recommended Labs or Other Treatments After Discharge:     Physician Certification: I certify the above information and transfer of Izabella Smith  is necessary for the continuing treatment of the diagnosis listed and that he requires [facility] for greater 30 days.      Update Admission H&P: No change in H&P    PHYSICIAN SIGNATURE:  Electronically signed by Georgina Posada MD on 3/19/22 at 10:05 AM EDT

## 2022-03-19 NOTE — PLAN OF CARE
Problem: Pain:  Goal: Pain level will decrease  Description: Pain level will decrease  3/18/2022 1813 by Earnest Acevedo RN  Outcome: Ongoing     Problem: Skin Integrity:  Goal: Will show no infection signs and symptoms  Description: Will show no infection signs and symptoms  3/18/2022 1813 by Earnest Acevedo RN  Outcome: Ongoing     Problem: Musculor/Skeletal Functional Status  Goal: Highest potential functional level  3/19/2022 0249 by Kelly Ram RN  Outcome: Ongoing  3/18/2022 1813 by Earnest Acevedo RN  Outcome: Ongoing  Goal: Absence of falls  3/18/2022 1813 by Earnest Acevedo RN  Outcome: Ongoing     Problem: Musculor/Skeletal Functional Status  Goal: Highest potential functional level  3/19/2022 0249 by Kelly Ram RN  Outcome: Ongoing     Problem: Falls - Risk of:  Goal: Will remain free from falls  Description: Will remain free from falls  3/19/2022 0249 by Kelly Ram RN  Outcome: Ongoing

## 2022-03-19 NOTE — PROGRESS NOTES
[place] [address] Internal Medicine    Progress Note    3/19/2022    9:57 AM    Name:   Anselmo Sharpe  MRN:     [id number]     Acct:      [de-identified]   Room:   [facility] Day:  3  Admit Date:  3/16/2022  9:23 PM    PCP:   Daljit Montilla DO  Code Status:  Full Code    Subjective:     C/C: No chief complaint on file. Shortness of breath      Interval History Status: Improving    HPI:     See HPI    Review of Systems:     Positive for cough and shortness of breath  Denies chest pain or palpitations  Denies abdominal pain, diarrhea vomiting  Denies any new numbness tremors or weakness. Medications: Allergies:     Allergies   Allergen Reactions    Motrin [Ibuprofen Micronized]      Pt states he had blood in stool from motrin       Current Meds:   Scheduled Meds:    furosemide  40 mg IntraVENous BID    sodium chloride flush  5-40 mL IntraVENous 2 times per day    enoxaparin  40 mg SubCUTAneous Daily    amiodarone  200 mg Oral Daily    atorvastatin  40 mg Oral Nightly    carvedilol  6.25 mg Oral BID WC    chlorhexidine  15 mL Mouth/Throat BID    clopidogrel  75 mg Oral Daily    fluticasone  1 puff Inhalation BID    folic acid  1 mg Oral Daily    guaiFENesin  1,200 mg Oral BID    levothyroxine  50 mcg Oral Daily    fluticasone  2 spray Each Nostril Daily    therapeutic multivitamin-minerals  1 tablet Oral Daily    QUEtiapine  25 mg Oral Nightly    sodium chloride  1 g Oral Daily    vitamin C  500 mg Oral Daily    magnesium oxide  200 mg Oral Daily    cefepime  2,000 mg IntraVENous Q8H    ipratropium-albuterol  1 ampule Inhalation Q4H WA     Continuous Infusions:    sodium chloride      [Held by provider] sodium chloride 10 mL/hr at 03/19/22 0555     PRN Meds: sodium chloride flush, sodium chloride, ondansetron **OR** ondansetron, magnesium hydroxide, acetaminophen **OR** acetaminophen, docusate sodium, sodium chloride, traMADol, albuterol, melatonin    Data:     Past Medical History:   has a past medical history of ADHD (attention deficit hyperactivity disorder), ADHD (attention deficit hyperactivity disorder), Atypical chest pain, Chronic bronchitis ([address] Hypertension, Hyponatremia, Meniere's disease, Narcolepsy, Nasal fracture, PND (post-nasal drip), SOB (shortness of breath), Tibia fracture, and Transaminasemia. Social History:   reports that he has been smoking cigarettes. He has a 38.00 pack-year smoking history. He has never used smokeless tobacco. He reports current alcohol use of about 16.0 standard drinks of alcohol per week. He reports that he does not use drugs. Family History:   Family History   Problem Relation Age of Onset    Heart Disease Mother         heart attack    Heart Disease Father        Vitals:  BP (!) 142/72   Pulse 58   Temp 97.7 °F (36.5 °C) (Oral)   Resp 16   Ht 5' 10\" (1.778 m)   Wt 203 lb 14.4 oz (92.5 kg)   SpO2 99%   BMI 29.26 kg/m²   Temp (24hrs), Av.6 °F (36.4 °C), Min:97.5 °F (36.4 °C), Max:97.7 °F (36.5 °C)    No results for input(s): POCGLU in the last 72 hours. I/O (24Hr):     Intake/Output Summary (Last 24 hours) at 3/19/2022 0957  Last data filed at 3/19/2022 0555  Gross per 24 hour   Intake 1941.48 ml   Output --   Net 1941.48 ml       Labs:    Lab Results   Component Value Date    WBC 7.9 2022    HGB 7.3 (L) 2022    HCT 21.9 (L) 2022    MCV 86.7 2022     2022     Lab Results   Component Value Date     2022    K 3.6 2022     2022    CO2 20 2022    BUN 32 2022    CREATININE 1.39 2022    GLUCOSE 104 2022    GLUCOSE 95 2012    CALCIUM 9.4 2022          Lab Results   Component Value Date/Time    SPECIAL NOT REPORTED 2020 10:15 PM     Lab Results   Component Value Date/Time    CULTURE PENDING 2022 04:21 PM         Radiology:    Recent data reviewed    Physical Examination: General appearance:  alert, cooperative and no distress  Eyes: Anicteric sclera. Pupils are equally round and reactive to light. Extraocular movements are intact. Lungs: Bibasilar crackles present, normal effort  Heart:  regular rate and rhythm, no murmur  Abdomen:  soft, nontender, nondistended, normal bowel sounds, no masses, hepatomegaly, splenomegaly  Extremities:  no edema, redness, tenderness in the calves  Skin:  no gross lesions, rashes, induration  Neuro:  Alert, oriented X 3, no new focal weakness  Assessment:        Primary Problem  Pneumonia    Active Hospital Problems    Diagnosis Date Noted    [facility] [N17.9] 03/17/2022    Pneumonia [J18.9] 03/16/2022           DVT prophylaxis:-Lovenox  Antibiotics: Cefepime, vancomycin    Plan:        79-year-old male admitted for healthcare acquired pneumonia recent admission for right hip surgery on 3/8  1. Acute hypoxic respiratory failure secondary to healthcare acquired pneumonia  2. Sepsis secondary to healthcare acquired pneumonia as evidenced by respiratory 24 white cell count 14, will check lactic  3. Healthcare associated pneumonia-started on cefepime vancomycin, switched to cefepime and Zyvox  4.  RENALDO-gentle hydration    3/18  Remains on 4 L nasal cannula oxygen; patient subjectively feeling better today  No further fevers, leukocytosis resolved  Will discontinue fluids, initially started for RENALDO but creatinine has been worsening, will check proBNP if elevated may need diuresis, will also check renal ultrasound, postvoid bladder scan  MRSA swab negative-Zyvox can be discontinued  Incision site on right hip draining serous liquid-wound cultures ordered, will follow results    3/19/22  Pt still on 4L NC O2- will try to wean  Feels better today, crackles improved  Renal US unremarkable  Pro BNP much elevated  Creat no worse since starting iv lasix last night  Will c/w IV diuresis  Monitor creat      Jaxson Cameron MD  3/19/2022  9:57 AM

## 2022-03-19 NOTE — PROGRESS NOTES
Pt c/o dizzness, Rn noticed note from Rn yesterday that Meclizine 12.5mg PRN was a home med for pt. Rn spoke with Dr. Tank Saucedo an okay to order med.

## 2022-03-20 VITALS
SYSTOLIC BLOOD PRESSURE: 136 MMHG | RESPIRATION RATE: 16 BRPM | HEIGHT: 70 IN | WEIGHT: 222.4 LBS | TEMPERATURE: 98.3 F | DIASTOLIC BLOOD PRESSURE: 67 MMHG | BODY MASS INDEX: 31.84 KG/M2 | OXYGEN SATURATION: 98 % | HEART RATE: 58 BPM

## 2022-03-20 LAB
ANION GAP SERPL CALCULATED.3IONS-SCNC: 10 MMOL/L (ref 9–17)
BUN BLDV-MCNC: 28 MG/DL (ref 8–23)
CALCIUM SERPL-MCNC: 9.4 MG/DL (ref 8.6–10.4)
CHLORIDE BLD-SCNC: 105 MMOL/L (ref 98–107)
CO2: 22 MMOL/L (ref 20–31)
CREAT SERPL-MCNC: 1.2 MG/DL (ref 0.7–1.2)
GFR AFRICAN AMERICAN: >60 ML/MIN
GFR NON-AFRICAN AMERICAN: 60 ML/MIN
GFR SERPL CREATININE-BSD FRML MDRD: ABNORMAL ML/MIN/{1.73_M2}
GLUCOSE BLD-MCNC: 120 MG/DL (ref 70–99)
HCT VFR BLD CALC: 25.8 % (ref 41–53)
HEMOGLOBIN: 8.3 G/DL (ref 13.5–17.5)
MCH RBC QN AUTO: 27.8 PG (ref 26–34)
MCHC RBC AUTO-ENTMCNC: 32 G/DL (ref 31–37)
MCV RBC AUTO: 86.9 FL (ref 80–100)
PDW BLD-RTO: 21.5 % (ref 11.5–14.9)
PLATELET # BLD: 472 K/UL (ref 150–450)
PMV BLD AUTO: 7.6 FL (ref 6–12)
POTASSIUM SERPL-SCNC: 3.7 MMOL/L (ref 3.7–5.3)
RBC # BLD: 2.97 M/UL (ref 4.5–5.9)
SODIUM BLD-SCNC: 137 MMOL/L (ref 135–144)
WBC # BLD: 9.9 K/UL (ref 3.5–11)

## 2022-03-20 PROCEDURE — 6370000000 HC RX 637 (ALT 250 FOR IP): Performed by: NURSE PRACTITIONER

## 2022-03-20 PROCEDURE — 85027 COMPLETE CBC AUTOMATED: CPT

## 2022-03-20 PROCEDURE — 6360000002 HC RX W HCPCS: Performed by: INTERNAL MEDICINE

## 2022-03-20 PROCEDURE — 2700000000 HC OXYGEN THERAPY PER DAY

## 2022-03-20 PROCEDURE — 6360000002 HC RX W HCPCS: Performed by: NURSE PRACTITIONER

## 2022-03-20 PROCEDURE — 80048 BASIC METABOLIC PNL TOTAL CA: CPT

## 2022-03-20 PROCEDURE — 2580000003 HC RX 258: Performed by: NURSE PRACTITIONER

## 2022-03-20 PROCEDURE — 36415 COLL VENOUS BLD VENIPUNCTURE: CPT

## 2022-03-20 PROCEDURE — 94761 N-INVAS EAR/PLS OXIMETRY MLT: CPT

## 2022-03-20 PROCEDURE — 99239 HOSP IP/OBS DSCHRG MGMT >30: CPT | Performed by: INTERNAL MEDICINE

## 2022-03-20 PROCEDURE — 6370000000 HC RX 637 (ALT 250 FOR IP): Performed by: INTERNAL MEDICINE

## 2022-03-20 PROCEDURE — 94640 AIRWAY INHALATION TREATMENT: CPT

## 2022-03-20 RX ORDER — AMOXICILLIN AND CLAVULANATE POTASSIUM 875; 125 MG/1; MG/1
1 TABLET, FILM COATED ORAL 2 TIMES DAILY
Qty: 6 TABLET | Refills: 0 | Status: SHIPPED | OUTPATIENT
Start: 2022-03-20 | End: 2022-03-23

## 2022-03-20 RX ORDER — ARMODAFINIL 150 MG/1
150 TABLET ORAL DAILY
Qty: 5 TABLET | Refills: 0 | Status: SHIPPED | OUTPATIENT
Start: 2022-03-20 | End: 2022-03-25

## 2022-03-20 RX ADMIN — TRAMADOL HYDROCHLORIDE 50 MG: 50 TABLET, COATED ORAL at 04:55

## 2022-03-20 RX ADMIN — AMIODARONE HYDROCHLORIDE 200 MG: 200 TABLET ORAL at 09:00

## 2022-03-20 RX ADMIN — GUAIFENESIN 1200 MG: 600 TABLET, EXTENDED RELEASE ORAL at 08:59

## 2022-03-20 RX ADMIN — CLOPIDOGREL BISULFATE 75 MG: 75 TABLET ORAL at 09:01

## 2022-03-20 RX ADMIN — OXYCODONE HYDROCHLORIDE AND ACETAMINOPHEN 500 MG: 500 TABLET ORAL at 09:00

## 2022-03-20 RX ADMIN — LEVOTHYROXINE SODIUM 50 MCG: 0.05 TABLET ORAL at 05:00

## 2022-03-20 RX ADMIN — FUROSEMIDE 40 MG: 10 INJECTION, SOLUTION INTRAVENOUS at 09:00

## 2022-03-20 RX ADMIN — MECLIZINE HYDROCHLORIDE 12.5 MG: 25 TABLET ORAL at 13:30

## 2022-03-20 RX ADMIN — IPRATROPIUM BROMIDE AND ALBUTEROL SULFATE 1 AMPULE: 2.5; .5 SOLUTION RESPIRATORY (INHALATION) at 07:14

## 2022-03-20 RX ADMIN — 0.12% CHLORHEXIDINE GLUCONATE 15 ML: 1.2 RINSE ORAL at 09:02

## 2022-03-20 RX ADMIN — FOLIC ACID 1 MG: 1 TABLET ORAL at 09:00

## 2022-03-20 RX ADMIN — MULTIPLE VITAMINS W/ MINERALS TAB 1 TABLET: TAB at 08:59

## 2022-03-20 RX ADMIN — ENOXAPARIN SODIUM 30 MG: 100 INJECTION SUBCUTANEOUS at 08:59

## 2022-03-20 RX ADMIN — CARVEDILOL 6.25 MG: 6.25 TABLET, FILM COATED ORAL at 09:01

## 2022-03-20 RX ADMIN — MECLIZINE HYDROCHLORIDE 12.5 MG: 25 TABLET ORAL at 05:00

## 2022-03-20 RX ADMIN — SODIUM CHLORIDE, PRESERVATIVE FREE 10 ML: 5 INJECTION INTRAVENOUS at 09:01

## 2022-03-20 RX ADMIN — CEFEPIME HYDROCHLORIDE 2000 MG: 2 INJECTION, POWDER, FOR SOLUTION INTRAVENOUS at 06:26

## 2022-03-20 RX ADMIN — IPRATROPIUM BROMIDE AND ALBUTEROL SULFATE 1 AMPULE: 2.5; .5 SOLUTION RESPIRATORY (INHALATION) at 11:04

## 2022-03-20 RX ADMIN — Medication 1 PUFF: at 07:16

## 2022-03-20 RX ADMIN — FLUTICASONE PROPIONATE 2 SPRAY: 50 SPRAY, METERED NASAL at 09:03

## 2022-03-20 RX ADMIN — Medication 200 MG: at 08:59

## 2022-03-20 ASSESSMENT — PAIN SCALES - GENERAL
PAINLEVEL_OUTOF10: 8
PAINLEVEL_OUTOF10: 8
PAINLEVEL_OUTOF10: 7

## 2022-03-20 ASSESSMENT — PAIN DESCRIPTION - LOCATION
LOCATION: LEG
LOCATION: LEG

## 2022-03-20 NOTE — CARE COORDINATION
Social work: called [facility] national admissions office [phone] Sofia Vazquez did approve pt to return today if ready. [employer] for reporrt [phone]  Fax: [phone] for lisa and Rx. Will set up transport and attempt to reach family to advise of pt return to [facility]. Juan mackay    Social work; reached family amy Pardo [phone] ok to send pt back to [facility] and he will let other family members know. Ok with [facility] for 2:30 pm  today. cAlled national admissions and will fax lisa as soon as completed. Juan mackay    Faxed lisa to [facility].  Juan mackay

## 2022-03-20 NOTE — PLAN OF CARE
Problem: Pain:  Goal: Pain level will decrease  Description: Pain level will decrease  3/20/2022 0335 by Yudy Galvan RN  Outcome: Ongoing  Note: Analgesics provided for pain as needed. Problem: Pain:  Goal: Control of acute pain  Description: Control of acute pain  Outcome: Ongoing     Problem: Pain:  Goal: Control of chronic pain  Description: Control of chronic pain  Outcome: Ongoing     Problem: Skin Integrity:  Goal: Will show no infection signs and symptoms  Description: Will show no infection signs and symptoms  3/20/2022 0335 by Yudy Galvan RN  Outcome: Ongoing     Problem: Skin Integrity:  Goal: Absence of new skin breakdown  Description: Absence of new skin breakdown  Outcome: Ongoing  Note: No new signs of skin breakdown noted. Patient turned every 2 hours, along with alternating pillow support that is being provided. Problem: Musculor/Skeletal Functional Status  Goal: Highest potential functional level  Outcome: Ongoing     Problem: Falls - Risk of:  Goal: Will remain free from falls  Description: Will remain free from falls  3/20/2022 0335 by Yudy Galvan RN  Outcome: Ongoing  Note: Patient in bed, bed in lowest position and locked. Non-skid socks on, fall sign posted, bed alarm is on, and call light within is reach.       Problem: Falls - Risk of:  Goal: Absence of physical injury  Description: Absence of physical injury  Outcome: Ongoing     Problem: Nutrition  Goal: Optimal nutrition therapy  Outcome: Ongoing

## 2022-03-20 NOTE — DISCHARGE SUMMARY
On license of [facility]    Discharge Summary     Patient ID: Delphine Whitehead  :  1950   MRN: [id number]     ACCOUNT:  [de-identified]   Patient's PCP: Gabby Fried DO  Admit Date: 3/16/2022   Discharge Date: 3/20/2022  Length of Stay: 4  Code Status:  Full Code  Admitting Physician: Estelita Rose MD  Discharge Physician: Estelita Rose MD     Active Discharge Diagnoses:     Primary Problem  Pneumonia      Hospital Problems  Active Hospital Problems    Diagnosis Date Noted    RENALDO (acute kidney injury) ([address] [N17.9] 2022    Pneumonia [J18.9] 2022       Admission Condition:  fair     Discharged Condition: fair    Hospital Stay:     Hospital Course:  Delphine Whitehead is a 81 y.o. male who was admitted for the management of Pneumonia , presented to ER with No chief complaint on file. 59-year-old male admitted for healthcare acquired pneumonia recent admission for right hip surgery on 3/8  Acute hypoxic respiratory failure secondary to healthcare acquired pneumonia  Sepsis secondary to healthcare acquired pneumonia -resolved  Acute exacerbation of chronic diastolic heart failure, EF per previous echo 68%, significantly elevated proBNP-patient was given IV diuresis with improvement in oxygenation  Healthcare associated pneumonia-started on cefepime vancomycin, switched to cefepime and Zyvox, Zyvox was discontinued after MRSA nasal swab came negative.   Patient was discharged on oral antibiotics  RENALDO-initially started on gentle hydration but discontinued after finding of elevated proBNP, creatinine improved with diuresis  Oozing from surgical wound site-cultures were taken and sent off, results pending at the time of discharge        Significant therapeutic interventions: As above    Significant Diagnostic Studies:   Labs / Micro:    Lab Results   Component Value Date    WBC 9.9 2022    HGB 8.3 (L) 2022    HCT 25.8 (L) 2022    MCV 86.9 03/20/2022     (H) 03/20/2022          Lab Results   Component Value Date     03/20/2022    K 3.7 03/20/2022     03/20/2022    CO2 22 03/20/2022    BUN 28 03/20/2022    CREATININE 1.20 03/20/2022    GLUCOSE 120 03/20/2022    GLUCOSE 95 01/19/2012    CALCIUM 9.4 03/20/2022          Radiology:    ECHO Complete 2D W Doppler W Color    Result Date: 3/7/2022  Transthoracic Echocardiography Report (TTE)  Patient Name COMES       Date of Study               03/07/2022               Olga BELL   Date of      1950  Gender                      Male  Birth   Age          81 year(s)  Race                           Room Number  0512        Height:                     70 inch, 177.8 cm   Corporate ID [id number]    Weight:                     180 pounds, 81.6 kg  #   Patient Acct [de-identified]   BSA:          2 m^2         BMI:     25.83 kg/m^2  #   MR #         [id number]     Sonographer                 Ana Lopez   Accession #  [id number]  Interpreting Physician      [facility]   Fellow                   Referring Nurse                           Practitioner   Interpreting             Referring Physician         Marisela Ashton  Fellow  Additional Comments Technically difficult study. Type of Study   TTE procedure:2D Echocardiogram, M-Mode, Doppler, Color Doppler. Procedure Date Date: 03/07/2022 Start: 08:13 AM Study Location: [facility] Technical Quality: Adequate visualization Indications:Preop cardiac evaluation. History / Tech. Comments: Echo done at patient bedside. Procedure explained to patient. HTN, COPD Patient Status: Inpatient Height: 70 inches Weight: 180 pounds BSA: 2 m^2 BMI: 25.83 kg/m^2 Allergies   - *No Known Allergies. - *Unlisted:(motrin). - *Unlisted:(motrim). CONCLUSIONS Summary Technically very challenging, there is poor endocardial definition, cannot comment on wall motion analysis, within above limitations: Left ventricle is normal in size.  Global left ventricular systolic function is normal. Calculated ejection fraction 68% by Rashid's method. Valves not well visualized. No obvious significant valvular regurgitation or stenosis seen. Signature ----------------------------------------------------------------------------  Electronically signed by Amelia Queen(Sonographer) on 03/07/2022  09:36 AM ---------------------------------------------------------------------------- ----------------------------------------------------------------------------  Electronically signed by Waylon Yuan(Interpreting physician) on 03/07/2022  10:24 AM ---------------------------------------------------------------------------- FINDINGS Left Atrium Left atrium is normal in size. Left Ventricle Technically very challenging, there is poor endocardial definition, cannot comment on wall motion analysis, within above limitations: Left ventricle is normal in size. Global left ventricular systolic function is normal. Calculated ejection fraction 68% by Rashid's method. Right Atrium Right atrium appears normal in size. Right Ventricle Right ventricular function appears normal . Rght ventricular cavity appears normal in size. Pacemaker / ICD lead seen in right ventricle. Mitral Valve Normal mitral valve structure and function. No mitral regurgitation. Aortic Valve Aortic valve is trileaflet and opens adequately. No aortic insufficiency. Tricuspid Valve Normal tricuspid valve structure and function. No tricuspid regurgitation. Pulmonic Valve The pulmonic valve is normal in structure. No pulmonic insufficiency. Pericardial Effusion No pericardial effusion seen. Miscellaneous IVC not well visualized.  M-mode / 2D Measurements & Calculations:   LVIDd:4.4 cm(3.7 - 5.6 cm)       Diastolic CPYCMW:81.3 ml  PNDLU:7.1 cm(2.2 - 4.0 cm)       Systolic WDWVPB:69.6 ml  XWAI:3.6 cm(0.6 - 1.1 cm)        Aortic Root:3.3 cm(2.0 - 3.7 cm)  LVPWd:1.1 cm(0.6 - 1.1 cm)       LA Dimension: 3.2 cm(1.9 - 4.0 cm) Fractional Shortenin.36 %    LA volume/Index: 29.1 ml /15m^2  Calculated LVEF (%): 68.22 %                                   RVDd:3 cm   Mitral:                                   Aortic   Valve Area (P1/2-Time): 4.4 cm^2          Peak Velocity: 1.08 m/s  Peak E-Wave: 0.54 m/s                     Mean Velocity: 0.59 m/s  Peak A-Wave: 0.67 m/s                     Peak Gradient: 4.67 mmHg  E/A Ratio: 0.81                           Mean Gradient: 2 mmHg  Peak Gradient: 1.17 mmHg  Mean Gradient: 1 mmHg  Deceleration Time: 169 msec               AV VTI: 18.3 cm  P1/2t: 50 msec   Mean Velocity: 0.55 m/s  Diastology / Tissue Doppler Septal Wall E' velocity:0.10 m/s Septal Wall E/E':5.4    XR CHEST (SINGLE VIEW FRONTAL)    Result Date: 3/8/2022  EXAMINATION: ONE XRAY VIEW OF THE CHEST 3/8/2022 5:19 pm COMPARISON: 2022 HISTORY: ORDERING SYSTEM PROVIDED HISTORY: respiratory distress TECHNOLOGIST PROVIDED HISTORY: respiratory distress Reason for Exam: difficulty breathing    upright port FINDINGS: Stable ICD lead. .The cardiac size is normal. Mild patchy bibasal infiltrates and small left pleural effusion. .  Pulmonary vascularity appears stable. There is mild ectasia of the thoracic aorta. There are degenerative changes in the spine . No acute bony abnormalities. The hilar structures are normal.     Mild bibasal infiltrates and small left pleural effusion suggesting pneumonia     XR HIP RIGHT (2-3 VIEWS)    Result Date: 3/6/2022  EXAMINATION: TWO XRAY VIEWS OF THE RIGHT KNEE; TWO XRAY VIEWS OF THE RIGHT HIP; 4 XRAY VIEWS OF THE RIGHT FEMUR 3/6/2022 11:15 am COMPARISON: None. HISTORY: ORDERING SYSTEM PROVIDED HISTORY: Pain s/p fall, recent TKA TECHNOLOGIST PROVIDED HISTORY: Pain s/p fall, recent TKA; ORDERING SYSTEM PROVIDED HISTORY: Pain, fall TECHNOLOGIST PROVIDED HISTORY: Pain, fall; ORDERING SYSTEM PROVIDED HISTORY: pain TECHNOLOGIST PROVIDED HISTORY: pain FINDINGS: Right knee: Status post total knee arthroplasty. No hardware complications visualized. Vascular calcifications. No acute fracture. Right femur: Comminuted intertrochanteric fracture. Vascular calcifications. No dislocation. No other fracture. Right hip: Comminuted intertrochanteric fracture. No dislocation. No other fracture identified. Comminuted intertrochanteric fracture right femoral neck No other acute bony or joint abnormality     XR FEMUR RIGHT (MIN 2 VIEWS)    Result Date: 3/8/2022  EXAMINATION: ONE XRAY VIEW OF THE PELVIS AND TWO XRAY VIEWS RIGHT HIP;   XRAY VIEWS OF THE RIGHT FEMUR 3/8/2022 8:19 pm COMPARISON: 03/06/2022 HISTORY: ORDERING SYSTEM PROVIDED HISTORY: post op pacu, AP pelvis, AP right hip, cross table lateral right hip TECHNOLOGIST PROVIDED HISTORY: post op pacu, AP pelvis, AP right hip, cross table lateral right hip Reason for Exam: post op hardware placement FINDINGS: Extensive postsurgical changes identified suggestive of progression of fixation of the right in surgery insert fracture. Additionally there is evidence lateral fixation plate and screws and cerclage wires across the proximal femoral diaphysis. Evidence knee arthroplasty identified. Overlying skin staples and surgical identified. Postsurgical changes status post ventral effusion right hip and proximal femur fracture. XR FEMUR RIGHT (MIN 2 VIEWS)    Result Date: 3/6/2022  EXAMINATION: TWO XRAY VIEWS OF THE RIGHT KNEE; TWO XRAY VIEWS OF THE RIGHT HIP; 4 XRAY VIEWS OF THE RIGHT FEMUR 3/6/2022 11:15 am COMPARISON: None. HISTORY: ORDERING SYSTEM PROVIDED HISTORY: Pain s/p fall, recent TKA TECHNOLOGIST PROVIDED HISTORY: Pain s/p fall, recent TKA; ORDERING SYSTEM PROVIDED HISTORY: Pain, fall TECHNOLOGIST PROVIDED HISTORY: Pain, fall; ORDERING SYSTEM PROVIDED HISTORY: pain TECHNOLOGIST PROVIDED HISTORY: pain FINDINGS: Right knee: Status post total knee arthroplasty. No hardware complications visualized. Vascular calcifications. No acute fracture.  Right femur: Comminuted intertrochanteric fracture. Vascular calcifications. No dislocation. No other fracture. Right hip: Comminuted intertrochanteric fracture. No dislocation. No other fracture identified. Comminuted intertrochanteric fracture right femoral neck No other acute bony or joint abnormality     XR KNEE RIGHT (1-2 VIEWS)    Result Date: 3/6/2022  EXAMINATION: TWO XRAY VIEWS OF THE RIGHT KNEE; TWO XRAY VIEWS OF THE RIGHT HIP; 4 XRAY VIEWS OF THE RIGHT FEMUR 3/6/2022 11:15 am COMPARISON: None. HISTORY: ORDERING SYSTEM PROVIDED HISTORY: Pain s/p fall, recent TKA TECHNOLOGIST PROVIDED HISTORY: Pain s/p fall, recent TKA; ORDERING SYSTEM PROVIDED HISTORY: Pain, fall TECHNOLOGIST PROVIDED HISTORY: Pain, fall; ORDERING SYSTEM PROVIDED HISTORY: pain TECHNOLOGIST PROVIDED HISTORY: pain FINDINGS: Right knee: Status post total knee arthroplasty. No hardware complications visualized. Vascular calcifications. No acute fracture. Right femur: Comminuted intertrochanteric fracture. Vascular calcifications. No dislocation. No other fracture. Right hip: Comminuted intertrochanteric fracture. No dislocation. No other fracture identified. Comminuted intertrochanteric fracture right femoral neck No other acute bony or joint abnormality     XR ANKLE LEFT (MIN 3 VIEWS)    Result Date: 3/6/2022  EXAMINATION: THREE XRAY VIEWS OF THE LEFT ANKLE 3/6/2022 4:15 pm COMPARISON: None. HISTORY: ORDERING SYSTEM PROVIDED HISTORY: Trauma/Fracture TECHNOLOGIST PROVIDED HISTORY: Ap /lateral/mortise Trauma/Fracture FINDINGS: There is no acute osseous abnormality. There is degenerative joint disease. There are calcaneal spurs at the Achilles and plantar fascial attachments. The surrounding soft tissues are unremarkable. There are vascular calcifications. No acute osseous or soft tissue abnormality.      XR FOOT LEFT (MIN 3 VIEWS)    Result Date: 3/6/2022  EXAMINATION: THREE XRAY VIEWS OF THE LEFT FOOT 3/6/2022 4:15 pm COMPARISON: None. HISTORY: ORDERING SYSTEM PROVIDED HISTORY: Trauma/Fracture TECHNOLOGIST PROVIDED HISTORY: Ap/oblique/lateral Trauma/Fracture FINDINGS: There is irregularity of the 1st distal phalanx that may relate to acute fracture and correlation with point tenderness is needed. The osseous structures are otherwise unremarkable. There is diffuse degenerative joint disease. There are vascular calcifications. There are calcaneal spurs at the Achilles and plantar fascial attachments. Irregularity of the 1st distal phalanx that may relate to an acute fracture and correlation with point tenderness is needed. US RENAL COMPLETE    Result Date: 3/18/2022  EXAMINATION: RETROPERITONEAL ULTRASOUND OF THE KIDNEYS AND URINARY BLADDER 3/18/2022 COMPARISON: None HISTORY: ORDERING SYSTEM PROVIDED HISTORY: gilmar TECHNOLOGIST PROVIDED HISTORY: gilmar 53-year-old male with acute kidney injury FINDINGS: Kidneys: Right kidney measures 11.3 x 6.0 x 5.1 cm. Right renal cortical thickness measures 1.7 cm. Left kidney measures 11.6 x 5.1 x 5.5 cm. Left renal cortical thickness measures 1.5 cm. No hydronephrosis or perinephric fluid. No echogenic foci with posterior acoustic shadowing to suggest renal calculi. Gross preservation of the bilateral corticomedullary differentiation. Color flow projects over the bilateral renal parenchyma. Bladder: Not imaged. Unremarkable renal ultrasound. No hydronephrosis. XR CHEST PORTABLE    Result Date: 3/17/2022  EXAMINATION: ONE XRAY VIEW OF THE CHEST 3/16/2022 9:59 pm COMPARISON: 03/10/2022 HISTORY: ORDERING SYSTEM PROVIDED HISTORY: Shortness of breath. TECHNOLOGIST PROVIDED HISTORY: Shortness of breath. Reason for Exam: PT C/O SOB X several days. FINDINGS: The cardiac silhouette and mediastinal contours are stable. Vascular calcifications are noted along the aortic arch. There is a new infiltrate in the right lung base with probable pleural effusion.   Findings may be related to atelectasis versus pneumonia. The lung volumes are low. The bones are osteopenic. 1. New right basilar lung infiltrate which may be related to atelectasis versus pneumonia with associated right pleural effusion. 2. Low lung volumes. XR CHEST PORTABLE    Result Date: 3/10/2022  EXAMINATION: ONE XRAY VIEW OF THE CHEST 3/10/2022 8:00 am COMPARISON: Chest x-ray dated 9 March 2022 HISTORY: ORDERING SYSTEM PROVIDED HISTORY: hypoxia TECHNOLOGIST PROVIDED HISTORY: hypoxia Reason for Exam: uprt port FINDINGS: Interval increase in right basilar airspace disease. The left lung is clear. No pneumothorax or pleural effusion. Stable cardiomediastinal silhouette with left-sided ICD. Increasing right basilar airspace disease. This could represent atelectasis or in the appropriate clinical setting pneumonia. XR CHEST PORTABLE    Result Date: 3/9/2022  EXAMINATION: ONE XRAY VIEW OF THE CHEST 3/9/2022 10:20 am COMPARISON: None. HISTORY: ORDERING SYSTEM PROVIDED HISTORY: sob TECHNOLOGIST PROVIDED HISTORY: sob Reason for Exam: upright port FINDINGS: Mild cardiomegaly. Left-sided ICD. No pneumothorax or pleural effusion. Mild right basilar airspace disease. Mild right basilar airspace disease. This could represent atelectasis or in the appropriate clinical setting pneumonia. XR CHEST PORTABLE    Result Date: 3/5/2022  EXAMINATION: ONE XRAY VIEW OF THE CHEST 3/5/2022 1:43 pm COMPARISON: April levin 2021 HISTORY: ORDERING SYSTEM PROVIDED HISTORY: sob TECHNOLOGIST PROVIDED HISTORY: sob Reason for Exam: port upright FINDINGS: Marginal inspiration is present. No focal area of consolidation, pleural effusion, or pneumothorax is present. Heart size appears normal.  ICD is in place. Vascular markings are distinct. No acute osseous abnormality is present.      Marginal inspiration, without evidence of acute cardiopulmonary disease     CT CHEST PULMONARY EMBOLISM W CONTRAST    Result Date: 3/5/2022  EXAMINATION: CTA OF THE CHEST 3/5/2022 3:07 pm TECHNIQUE: CTA of the chest was performed after the administration of intravenous contrast.  Multiplanar reformatted images are provided for review. MIP images are provided for review. Dose modulation, iterative reconstruction, and/or weight based adjustment of the mA/kV was utilized to reduce the radiation dose to as low as reasonably achievable. COMPARISON: Chest x-ray dated March 5, 2022 HISTORY: ORDERING SYSTEM PROVIDED HISTORY: sob, new O2 requirement TECHNOLOGIST PROVIDED HISTORY: sob, new O2 requirement Decision Support Exception - unselect if not a suspected or confirmed emergency medical condition->Emergency Medical Condition (MA) Reason for Exam: fatigue FINDINGS: Pulmonary Arteries: Pulmonary arteries are adequately opacified for evaluation. No evidence of intraluminal filling defect to suggest pulmonary embolism. Main pulmonary artery is enlarged, measuring 32 mm, suggesting pulmonary arterial hypertension. Mediastinum: No evidence of mediastinal lymphadenopathy. Extensive coronary arterial calcifications are present. Small pericardial effusion is noted. The heart and pericardium demonstrate no acute abnormality. There is no acute abnormality of the thoracic aorta. Lungs/pleura: Diffuse emphysematous changes are present throughout the lungs. Subsegmental atelectasis is present within the right lung base. No focal area of consolidation, pleural effusion, or pneumothorax is present. Upper Abdomen: Images through the upper abdomen demonstrate cholelithiasis, without evidence of cholecystitis. Nodular contour to the liver is present, suggesting cirrhosis. Soft Tissues/Bones: No acute bone or soft tissue abnormality. 1. No evidence of pulmonary embolism 2. Dependent atelectasis, right lung base 3. Diffuse emphysematous changes present throughout the lungs, with an upper lobe predominance 4. Extensive coronary arterial calcifications 5.  Cholelithiasis, without evidence of cholecystitis 6. Nodular contour to the liver, suggesting cirrhosis     VL DUP LOWER EXTREMITY VENOUS BILATERAL    Result Date: 3/7/2022    [facility]  Vascular Lower Extremities DVT Study Procedure   Patient Name   COMES       Date of Study           03/07/2022                 Steve BELL   Date of Birth  1950  Gender                  Male   Age            81 year(s)  Race                       Room Number    5411        Height:                 70 inch, 177.8 cm   Corporate ID # [id number]    Weight:                 180 pounds, 81.6 kg   Patient Acct # [de-identified]   BSA:        2 m^2       BMI:       25.83 kg/m^2   MR #           [id number]     Racquel Rice RVT   Accession #    [id number]  Interpreting Physician  [address] [place] [address]   Referring                  Referring Physician     Michelle Crenshaw  Nurse  Practitioner  Procedure Type of Study:   Veins: Lower Extremities DVT Study, Venous Scan Lower Bilateral.  Indications for Study:Edema and Pain. Patient Status: In Patient. Technical Quality:Limited visualization. Limitation reason:Edema. Conclusions   Summary   No evidence of superficial or deep venous thrombosis in both lower  extremities. Signature   ----------------------------------------------------------------  Electronically signed by James Ingram RVT(Sonographer) on  03/07/2022 09:52 AM  ----------------------------------------------------------------   ----------------------------------------------------------------  Electronically signed by Patrica Berlin Reyes,Arthur(Interpreting  physician) on 03/07/2022 05:47 PM  ----------------------------------------------------------------  Findings:   Right Impression:                       Left Impression:  The common femoral, femoral, popliteal  The common femoral, femoral,  and tibial veins demonstrate normal     popliteal and tibial veins  compressibility and augmentation.        demonstrate normal compressibility and augmentation. Normal compressibility of the great  saphenous vein. Limited visualization   Normal compressibility of the  of the greater saphenous vein due to    great saphenous vein.  small size in diameter. Normal compressibility of the  Normal compressibility of the small     small saphenous vein. saphenous vein. Allergies   - Allergy:*No Known Allergies(Miscellaneous). - Allergy:*Unlisted(Drug). Comments:motrin   - Allergy:*Unlisted(Drug). Comments:motrim Velocities are measured in cm/s ; Diameters are measured in cm Right Lower Extremities DVT Study Measurements Right 2D Measurements +------------------------------------+----------+---------------+----------+ ! Location                            ! Visualized! Compressibility! Thrombosis! +------------------------------------+----------+---------------+----------+ ! Common Femoral                      !Yes       ! Yes            ! None      ! +------------------------------------+----------+---------------+----------+ ! Prox Femoral                        !Yes       ! Yes            ! None      ! +------------------------------------+----------+---------------+----------+ ! Mid Femoral                         !Yes       ! Yes            ! None      ! +------------------------------------+----------+---------------+----------+ ! Dist Femoral                        !Yes       ! Yes            ! None      ! +------------------------------------+----------+---------------+----------+ ! Popliteal                           !Yes       ! Yes            ! None      ! +------------------------------------+----------+---------------+----------+ ! Sapheno Femoral Junction            ! Yes       ! Yes            ! None      ! +------------------------------------+----------+---------------+----------+ ! PTV                                 ! Partial   !Yes            ! None      ! +------------------------------------+----------+---------------+----------+ ! Peroneal                            !Partial   !Yes            ! None      ! +------------------------------------+----------+---------------+----------+ ! Gastroc                             ! Yes       ! Yes            ! None      ! +------------------------------------+----------+---------------+----------+ ! GSV Thigh                           ! Partial   !Yes            ! None      ! +------------------------------------+----------+---------------+----------+ ! GSV Knee                            ! Partial   !Yes            ! None      ! +------------------------------------+----------+---------------+----------+ ! GSV Ankle                           ! Partial   !Yes            ! None      ! +------------------------------------+----------+---------------+----------+ ! SSV                                 ! Yes       ! Yes            ! None      ! +------------------------------------+----------+---------------+----------+ Right Doppler Measurements +---------------------------+------+------+--------------------------------+ ! Location                   ! Signal!Reflux! Reflux (msec)                   ! +---------------------------+------+------+--------------------------------+ ! Common Femoral             !Phasic!      !                                ! +---------------------------+------+------+--------------------------------+ ! Prox Femoral               !Phasic!      !                                ! +---------------------------+------+------+--------------------------------+ ! Popliteal                  !Phasic!      !                                ! +---------------------------+------+------+--------------------------------+ Left Lower Extremities DVT Study Measurements Left 2D Measurements +------------------------------------+----------+---------------+----------+ ! Location                            ! Visualized! Compressibility! Thrombosis! +------------------------------------+----------+---------------+----------+ ! Common Femoral                      !Yes       ! Yes            ! None      ! +------------------------------------+----------+---------------+----------+ ! Prox Femoral                        !Yes       ! Yes            ! None      ! +------------------------------------+----------+---------------+----------+ ! Mid Femoral                         !Yes       ! Yes            ! None      ! +------------------------------------+----------+---------------+----------+ ! Dist Femoral                        !Yes       ! Yes            ! None      ! +------------------------------------+----------+---------------+----------+ ! Popliteal                           !Yes       ! Yes            ! None      ! +------------------------------------+----------+---------------+----------+ ! Sapheno Femoral Junction            ! Yes       ! Yes            ! None      ! +------------------------------------+----------+---------------+----------+ ! PTV                                 ! Partial   !Yes            ! None      ! +------------------------------------+----------+---------------+----------+ ! Peroneal                            !No        !               !          ! +------------------------------------+----------+---------------+----------+ ! Gastroc                             ! Yes       ! Yes            ! None      ! +------------------------------------+----------+---------------+----------+ ! GSV Thigh                           ! Yes       ! Yes            ! None      ! +------------------------------------+----------+---------------+----------+ ! GSV Knee                            ! Yes       ! Yes            ! None      ! +------------------------------------+----------+---------------+----------+ ! GSV Ankle                           ! Yes       ! Yes            ! None      ! +------------------------------------+----------+---------------+----------+ ! SSV !Yes       !Yes            ! None      ! +------------------------------------+----------+---------------+----------+ Left Doppler Measurements +---------------------------+------+------+--------------------------------+ ! Location                   ! Signal!Reflux! Reflux (msec)                   ! +---------------------------+------+------+--------------------------------+ ! Common Femoral             !Phasic!      !                                ! +---------------------------+------+------+--------------------------------+ ! Prox Femoral               !Phasic!      !                                ! +---------------------------+------+------+--------------------------------+ ! Popliteal                  !Phasic!      !                                ! +---------------------------+------+------+--------------------------------+    FLUORO FOR SURGICAL PROCEDURES    Result Date: 3/8/2022  Radiology exam is complete. No Radiologist dictation. Please follow up with ordering provider. XR HIP 2-3 VW W PELVIS LEFT    Result Date: 3/6/2022  EXAMINATION: ONE XRAY VIEW OF THE PELVIS AND TWO XRAY VIEWS LEFT HIP 3/6/2022 4:15 pm COMPARISON: Right hip radiographs performed 04/11/2021. HISTORY: ORDERING SYSTEM PROVIDED HISTORY: Trauma/Fracture TECHNOLOGIST PROVIDED HISTORY: AP and cross-table lateral please, thank you Trauma/Fracture Reason for Exam: per ordering provider, frog leg lateral ok due to pt condition. -JEK FINDINGS: There is a comminuted right intertrochanteric fracture. The bony pelvis is intact. There is degenerative change of the SI joints and hip joints. The surrounding soft tissues are unremarkable. Comminuted intertrochanteric fracture of the right proximal femur.      XR HIP 2-3 VW W PELVIS RIGHT    Result Date: 3/8/2022  EXAMINATION: ONE XRAY VIEW OF THE PELVIS AND TWO XRAY VIEWS RIGHT HIP;   XRAY VIEWS OF THE RIGHT FEMUR 3/8/2022 8:19 pm COMPARISON: 03/06/2022 HISTORY: ORDERING SYSTEM PROVIDED HISTORY: post op pacu, AP pelvis, AP right hip, cross table lateral right hip TECHNOLOGIST PROVIDED HISTORY: post op pacu, AP pelvis, AP right hip, cross table lateral right hip Reason for Exam: post op hardware placement FINDINGS: Extensive postsurgical changes identified suggestive of progression of fixation of the right in surgery insert fracture. Additionally there is evidence lateral fixation plate and screws and cerclage wires across the proximal femoral diaphysis. Evidence knee arthroplasty identified. Overlying skin staples and surgical identified. Postsurgical changes status post ventral effusion right hip and proximal femur fracture. Consultations:    Consults:     Final Specialist Recommendations/Findings:   PHARMACY TO DOSE VANCOMYCIN      The patient was seen and examined on day of discharge and this discharge summary is in conjunction with any daily progress note from day of discharge. Discharge plan:     Disposition: F      Instructions to Patient: Keep follow-up appointments      Requiring Further Evaluation/Follow Up POST HOSPITALIZATION/Incidental Findings:     Physician Follow Up:     No follow-up provider specified. Activity: Resume as directed    Discharge Medications:      Medication List      CHANGE how you take these medications    Magnesium Oxide 250 MG Tabs tablet  Commonly known as: MAGNESIUM-OXIDE  Take 1 tablet by mouth daily  What changed: how much to take        CONTINUE taking these medications    acetaminophen 500 MG tablet  Commonly known as: TYLENOL  Take 1 tablet by mouth 4 times daily as needed for Pain     amiodarone 200 MG tablet  Commonly known as: CORDARONE     Armodafinil 150 MG Tabs tablet  Commonly known as: NUVIGIL  Take 1 tablet by mouth daily for 5 days.      atorvastatin 40 MG tablet  Commonly known as: LIPITOR  Take 1 tablet by mouth nightly     carvedilol 6.25 MG tablet  Commonly known as: COREG  Take 1 tablet by mouth 2 times daily (with meals)     CertaVite Senior/Antioxidant Tabs  TAKE 1 TAB BY MOUTH ONCE A DAY     chlorhexidine 0.12 % solution  Commonly known as: PERIDEX     clopidogrel 75 MG tablet  Commonly known as: PLAVIX  Take 1 tablet by mouth daily     cyanocobalamin 1000 MCG tablet     * Diapers & Supplies Misc  Adult diapers dispense quantity allowed by insurance     * Diapers & Supplies Misc  Wipes  dispense quantity allowed by insurance     docusate sodium 100 MG capsule  Commonly known as: COLACE     enoxaparin 40 MG/0.4ML injection  Commonly known as: LOVENOX  Inject 0.4 mLs into the skin daily     fluticasone propionate 50 MCG/BLIST Aepb inhaler  Commonly known as: FLOVENT     folic acid 1 MG tablet  Commonly known as: FOLVITE     furosemide 20 MG tablet  Commonly known as: LASIX  Take 1 tablet by mouth daily     guaiFENesin 600 MG extended release tablet  Commonly known as: MUCINEX  Take 2 tablets by mouth 2 times daily     levothyroxine 50 MCG tablet  Commonly known as: SYNTHROID  Take 1 tablet by mouth Daily     meclizine 12.5 MG tablet  Commonly known as: ANTIVERT     mometasone 50 MCG/ACT nasal spray  Commonly known as: Nasonex  2 sprays by Nasal route daily. MULTIVITAMIN PO     ProAir  (90 Base) MCG/ACT inhaler  Generic drug: albuterol sulfate HFA  inhale 2 puffs every 4 to 6 hours if needed     QUEtiapine 25 MG tablet  Commonly known as: SEROQUEL  Take 1 tablet by mouth nightly     SALINE MIST SPRAY NA     sodium chloride 1 g tablet     vitamin C 500 MG tablet  Commonly known as: ASCORBIC ACID         * This list has 2 medication(s) that are the same as other medications prescribed for you. Read the directions carefully, and ask your doctor or other care provider to review them with you.             STOP taking these medications    LORazepam 1 MG tablet  Commonly known as: ATIVAN     methylphenidate 10 MG tablet  Commonly known as: RITALIN     traMADol 50 MG tablet  Commonly known as: ULTRAM        ASK your doctor about these medications    amoxicillin-clavulanate 875-125 MG per tablet  Commonly known as: AUGMENTIN  Take 1 tablet by mouth 2 times daily for 3 days  Ask about: Should I take this medication? Where to Get Your Medications      These medications were sent to [address], [address], [address]  [address]    Phone: [phone]   amoxicillin-clavulanate 282-998 MG per tablet     You can get these medications from any pharmacy    Bring a paper prescription for each of these medications  Armodafinil 150 MG Tabs tablet         Time Spent on discharge is  35 mins in patient examination, evaluation, counseling as well as medication reconciliation, prescriptions for required medications, discharge plan and follow up. Electronically signed by   Vinicio Griffith MD  3/20/2022  8:46 AM      Thank you Dr. Andrew Nolen DO for the opportunity to be involved in this patient's care.

## 2022-03-20 NOTE — PROGRESS NOTES
Home Oxygen Evaluation    A home oxygen evaluation has been completed. [x]Patient is an inpatient. It is expected that the patient will be discharged within the next 48 hours. Patient prescribed 02 level is: 2 LPM  Patient Resting SpO2 on prescribed O2 level: 94    Patient was put on room air while resting for: >30 minutes.    Patient resting SpO2 on room air: 87      Patient was walked: n/a feet  Patient exertion SpO2 on room air: n/a    Patient exertion SpO2 on prescribed O2 level: n/a      CATHY MANCERA RCP   11:07 AM

## 2022-03-20 NOTE — PROGRESS NOTES
Pt discharge to [place] [address] via [facility] [address]. Pt telemetry an IV removed with no complications, Pt in no distress.      Current vital signs:  /67   Pulse 58   Temp 98.3 °F (36.8 °C) (Oral)   Resp 16   Ht 5' 10\" (1.778 m)   Wt 222 lb 6.4 oz (100.9 kg)   SpO2 98%   BMI 31.91 kg/m²                MEWS Score: 1

## 2022-03-21 LAB
CULTURE: ABNORMAL
DIRECT EXAM: ABNORMAL
DIRECT EXAM: ABNORMAL
SPECIMEN DESCRIPTION: ABNORMAL

## 2022-03-22 RX ORDER — CLINDAMYCIN HYDROCHLORIDE 300 MG/1
300 CAPSULE ORAL 3 TIMES DAILY
Qty: 21 CAPSULE | Refills: 0 | Status: SHIPPED | OUTPATIENT
Start: 2022-03-22 | End: 2022-03-29

## 2022-03-22 NOTE — RESULT ENCOUNTER NOTE
Please inform patient his wound culture is growing bacteria that Augmentin would not coverSending clindamycin to his pharmacy-needs to take for 1 week, needs to see primary care provider for reassessment of wound at that time

## 2022-03-31 DIAGNOSIS — S72.453S SUPRACONDYLAR FRACTURE OF DISTAL END OF FEMUR WITHOUT INTRACONDYLAR EXTENSION, SEQUELA: Primary | ICD-10-CM

## 2022-04-01 ENCOUNTER — OFFICE VISIT (OUTPATIENT)
Dept: ORTHOPEDIC SURGERY | Age: 72
End: 2022-04-01

## 2022-04-01 VITALS — BODY MASS INDEX: 31.78 KG/M2 | WEIGHT: 222 LBS | HEIGHT: 70 IN

## 2022-04-01 DIAGNOSIS — S72.141A CLOSED DISPLACED INTERTROCHANTERIC FRACTURE OF RIGHT FEMUR, INITIAL ENCOUNTER (HCC): Primary | ICD-10-CM

## 2022-04-01 PROCEDURE — 99024 POSTOP FOLLOW-UP VISIT: CPT | Performed by: PHYSICIAN ASSISTANT

## 2022-04-01 NOTE — PROGRESS NOTES
[address]  [address] [facility] Nette Fuentes  Dept: [phone]  Dept Fax: [phone]        Postoperative follow-up note    Subjective:   Augustin Ross is a 81y.o. year old male who presents to our office today for postoperative followup regarding his   1. Closed displaced intertrochanteric fracture of right femur, initial encounter ([facility] 75.)    . Chief Complaint   Patient presents with    Post-Op Check     RIGHT TFN HIP  INSERTION ,OPEN REDUCTION INTERNAL FIXATION RIGHT HIP       Date of Surgery: 3/8/2022    Augustin Ross  is a 81y.o. year old male who presents to our office today for postoperative follow up after having undergone a right hip TFN insertion with ORIF right hip d/t an intertrochanteric fracture. Surgery was completed on 3/8/2022 by Dr Maury Murphy DO. The patient denies fevers, chills, nausea, vomiting, diarrhea. The patient has started physical therapy at the skilled nursing facility. Discharged from hospital on 3/24/2022, now at [facility] - in [place], [place]. He is currently WBAT on the Right lower extremity while using a walker. He is on Eliquis for DVT prophylaxis. He is taking Norco for post-op pain control. Review of Systems   Constitutional: Negative for activity change and fever. HENT: Negative for sneezing. Respiratory: Negative for cough and shortness of breath. Cardiovascular: Negative for chest pain. Gastrointestinal: Negative for vomiting. Musculoskeletal: Positive for arthralgias (right hip). Negative for joint swelling and myalgias. Skin: Negative for color change. Neurological: Negative for weakness and numbness. Psychiatric/Behavioral: Negative for sleep disturbance. Objective :   General: Augustin Ross is a 81 y.o. male who is alert and oriented and sitting comfortably in our office. Ortho Exam  MS:  Patient arrives seated in a wheelchair.  Lower extremity muscle atrophy noted bilaterally. Superficial skin staples are intact on the lateral aspect of the right hip and thigh. There is no incision drainage, erythema, edema or signs of infection noted surrounding the surgical incisions. Mild tenderness notes with palpation over the right lateral hip. Patient unable to complete a seated march on the right due to muscle weakness. Sensation is intact to light touch to the right lower extremity without focal deficits noted. The skin is noted to be warm with brisk capillary refill distally on the right foot. DP pulse 1+ and equal when compared bilaterally. Neuro: alert. oriented  Eyes: Extra-ocular muscles intact  Mouth: Oral mucosa moist. No perioral lesions  Pulm: Respirations unlabored and regular. Skin: warm, well perfused  Psych:   Patient has good fund of knowledge and displays understanging of exam, diagnosis, and plan. Radiology:  ECHO Complete 2D W Doppler W Color    Result Date: 3/7/2022  Transthoracic Echocardiography Report (TTE)  Patient Name COMES       Date of Study               03/07/2022               Harshil Rand E   Date of      1950  Gender                      Male  Birth   Age          81 year(s)  Race                           Room Number  0686        Height:                     70 inch, 177.8 cm   Corporate ID [id number]    Weight:                     180 pounds, 81.6 kg  #   Patient Acct [de-identified]   BSA:          2 m^2         BMI:     25.83 kg/m^2  #   MR #         [id number]     Sonographer                 Eladia Yañez   Accession #  [id number]  Interpreting Physician      [address] [place] [address]   Fellow                   Referring Nurse                           Practitioner   Interpreting             Referring Physician         Jocelin Luevano  Fellow  Additional Comments Technically difficult study. Type of Study   TTE procedure:2D Echocardiogram, M-Mode, Doppler, Color Doppler.   Procedure Date Date: 03/07/2022 Start: 08:13 AM Study Location: [facility] Technical Quality: Adequate visualization Indications:Preop cardiac evaluation. History / Tech. Comments: Echo done at patient bedside. Procedure explained to patient. HTN, COPD Patient Status: Inpatient Height: 70 inches Weight: 180 pounds BSA: 2 m^2 BMI: 25.83 kg/m^2 Allergies   - *No Known Allergies. - *Unlisted:(motrin). - *Unlisted:(motrim). CONCLUSIONS Summary Technically very challenging, there is poor endocardial definition, cannot comment on wall motion analysis, within above limitations: Left ventricle is normal in size. Global left ventricular systolic function is normal. Calculated ejection fraction 68% by Rashid's method. Valves not well visualized. No obvious significant valvular regurgitation or stenosis seen. Signature ----------------------------------------------------------------------------  Electronically signed by Amelia Barraza(Sonographer) on 03/07/2022  09:36 AM ---------------------------------------------------------------------------- ----------------------------------------------------------------------------  Electronically signed by Waylon Yuan(Interpreting physician) on 03/07/2022  10:24 AM ---------------------------------------------------------------------------- FINDINGS Left Atrium Left atrium is normal in size. Left Ventricle Technically very challenging, there is poor endocardial definition, cannot comment on wall motion analysis, within above limitations: Left ventricle is normal in size. Global left ventricular systolic function is normal. Calculated ejection fraction 68% by Rashid's method. Right Atrium Right atrium appears normal in size. Right Ventricle Right ventricular function appears normal . Rght ventricular cavity appears normal in size. Pacemaker / ICD lead seen in right ventricle. Mitral Valve Normal mitral valve structure and function. No mitral regurgitation. Aortic Valve Aortic valve is trileaflet and opens adequately. No aortic insufficiency. Tricuspid Valve Normal tricuspid valve structure and function. No tricuspid regurgitation. Pulmonic Valve The pulmonic valve is normal in structure. No pulmonic insufficiency. Pericardial Effusion No pericardial effusion seen. Miscellaneous IVC not well visualized. M-mode / 2D Measurements & Calculations:   LVIDd:4.4 cm(3.7 - 5.6 cm)       Diastolic IZMTPJ:29.0 ml  UTUHB:4.9 cm(2.2 - 4.0 cm)       Systolic QGAOBS:85.6 ml  EVEM:0.5 cm(0.6 - 1.1 cm)        Aortic Root:3.3 cm(2.0 - 3.7 cm)  LVPWd:1.1 cm(0.6 - 1.1 cm)       LA Dimension: 3.2 cm(1.9 - 4.0 cm)  Fractional Shortenin.36 %    LA volume/Index: 29.1 ml /15m^2  Calculated LVEF (%): 68.22 %                                   RVDd:3 cm   Mitral:                                   Aortic   Valve Area (P1/2-Time): 4.4 cm^2          Peak Velocity: 1.08 m/s  Peak E-Wave: 0.54 m/s                     Mean Velocity: 0.59 m/s  Peak A-Wave: 0.67 m/s                     Peak Gradient: 4.67 mmHg  E/A Ratio: 0.81                           Mean Gradient: 2 mmHg  Peak Gradient: 1.17 mmHg  Mean Gradient: 1 mmHg  Deceleration Time: 169 msec               AV VTI: 18.3 cm  P1/2t: 50 msec   Mean Velocity: 0.55 m/s  Diastology / Tissue Doppler Septal Wall E' velocity:0.10 m/s Septal Wall E/E':5.4    XR CHEST (SINGLE VIEW FRONTAL)    Result Date: 3/8/2022  EXAMINATION: ONE XRAY VIEW OF THE CHEST 3/8/2022 5:19 pm COMPARISON: 2022 HISTORY: ORDERING SYSTEM PROVIDED HISTORY: respiratory distress TECHNOLOGIST PROVIDED HISTORY: respiratory distress Reason for Exam: difficulty breathing    upright port FINDINGS: Stable ICD lead. .The cardiac size is normal. Mild patchy bibasal infiltrates and small left pleural effusion. .  Pulmonary vascularity appears stable. There is mild ectasia of the thoracic aorta. There are degenerative changes in the spine . No acute bony abnormalities.  The hilar structures are normal.     Mild bibasal infiltrates and small left pleural effusion suggesting pneumonia     XR HIP RIGHT (2-3 VIEWS)    Result Date: 3/6/2022  EXAMINATION: TWO XRAY VIEWS OF THE RIGHT KNEE; TWO XRAY VIEWS OF THE RIGHT HIP; 4 XRAY VIEWS OF THE RIGHT FEMUR 3/6/2022 11:15 am COMPARISON: None. HISTORY: ORDERING SYSTEM PROVIDED HISTORY: Pain s/p fall, recent TKA TECHNOLOGIST PROVIDED HISTORY: Pain s/p fall, recent TKA; ORDERING SYSTEM PROVIDED HISTORY: Pain, fall TECHNOLOGIST PROVIDED HISTORY: Pain, fall; ORDERING SYSTEM PROVIDED HISTORY: pain TECHNOLOGIST PROVIDED HISTORY: pain FINDINGS: Right knee: Status post total knee arthroplasty. No hardware complications visualized. Vascular calcifications. No acute fracture. Right femur: Comminuted intertrochanteric fracture. Vascular calcifications. No dislocation. No other fracture. Right hip: Comminuted intertrochanteric fracture. No dislocation. No other fracture identified. Comminuted intertrochanteric fracture right femoral neck No other acute bony or joint abnormality     XR FEMUR RIGHT (MIN 2 VIEWS)    Result Date: 4/1/2022  History: Status post TFN right hip. Comparison: 3/8/2022. Findings: Full-length AP and lateral images of the right femur obtained today in office reveal evidence of TFN fixation of the right proximal femur with evidence of lateral fixation and screws with cerclage wires across the proximal femoral diaphysis. There is evidence of prior total knee arthroplasty. Overlying skin staples appreciated. No further evidence of fracture, subluxation, dislocation, radiopaque foreign body or radiopaque tumors appreciated. Impression: Healing right hip intertrochanteric fracture as described above.     XR FEMUR RIGHT (MIN 2 VIEWS)    Result Date: 3/8/2022  EXAMINATION: ONE XRAY VIEW OF THE PELVIS AND TWO XRAY VIEWS RIGHT HIP;   XRAY VIEWS OF THE RIGHT FEMUR 3/8/2022 8:19 pm COMPARISON: 03/06/2022 HISTORY: ORDERING SYSTEM PROVIDED HISTORY: post op pacu, AP pelvis, AP right hip, cross table lateral right hip TECHNOLOGIST PROVIDED HISTORY: post op pacu, AP pelvis, AP right hip, cross table lateral right hip Reason for Exam: post op hardware placement FINDINGS: Extensive postsurgical changes identified suggestive of progression of fixation of the right in surgery insert fracture. Additionally there is evidence lateral fixation plate and screws and cerclage wires across the proximal femoral diaphysis. Evidence knee arthroplasty identified. Overlying skin staples and surgical identified. Postsurgical changes status post ventral effusion right hip and proximal femur fracture. XR FEMUR RIGHT (MIN 2 VIEWS)    Result Date: 3/6/2022  EXAMINATION: TWO XRAY VIEWS OF THE RIGHT KNEE; TWO XRAY VIEWS OF THE RIGHT HIP; 4 XRAY VIEWS OF THE RIGHT FEMUR 3/6/2022 11:15 am COMPARISON: None. HISTORY: ORDERING SYSTEM PROVIDED HISTORY: Pain s/p fall, recent TKA TECHNOLOGIST PROVIDED HISTORY: Pain s/p fall, recent TKA; ORDERING SYSTEM PROVIDED HISTORY: Pain, fall TECHNOLOGIST PROVIDED HISTORY: Pain, fall; ORDERING SYSTEM PROVIDED HISTORY: pain TECHNOLOGIST PROVIDED HISTORY: pain FINDINGS: Right knee: Status post total knee arthroplasty. No hardware complications visualized. Vascular calcifications. No acute fracture. Right femur: Comminuted intertrochanteric fracture. Vascular calcifications. No dislocation. No other fracture. Right hip: Comminuted intertrochanteric fracture. No dislocation. No other fracture identified. Comminuted intertrochanteric fracture right femoral neck No other acute bony or joint abnormality     XR KNEE RIGHT (1-2 VIEWS)    Result Date: 3/6/2022  EXAMINATION: TWO XRAY VIEWS OF THE RIGHT KNEE; TWO XRAY VIEWS OF THE RIGHT HIP; 4 XRAY VIEWS OF THE RIGHT FEMUR 3/6/2022 11:15 am COMPARISON: None.  HISTORY: ORDERING SYSTEM PROVIDED HISTORY: Pain s/p fall, recent TKA TECHNOLOGIST PROVIDED HISTORY: Pain s/p fall, recent TKA; ORDERING SYSTEM PROVIDED HISTORY: Pain, fall TECHNOLOGIST PROVIDED HISTORY: Pain, fall; ORDERING SYSTEM PROVIDED HISTORY: pain TECHNOLOGIST PROVIDED HISTORY: pain FINDINGS: Right knee: Status post total knee arthroplasty. No hardware complications visualized. Vascular calcifications. No acute fracture. Right femur: Comminuted intertrochanteric fracture. Vascular calcifications. No dislocation. No other fracture. Right hip: Comminuted intertrochanteric fracture. No dislocation. No other fracture identified. Comminuted intertrochanteric fracture right femoral neck No other acute bony or joint abnormality     XR ANKLE LEFT (MIN 3 VIEWS)    Result Date: 3/6/2022  EXAMINATION: THREE XRAY VIEWS OF THE LEFT ANKLE 3/6/2022 4:15 pm COMPARISON: None. HISTORY: ORDERING SYSTEM PROVIDED HISTORY: Trauma/Fracture TECHNOLOGIST PROVIDED HISTORY: Ap /lateral/mortise Trauma/Fracture FINDINGS: There is no acute osseous abnormality. There is degenerative joint disease. There are calcaneal spurs at the Achilles and plantar fascial attachments. The surrounding soft tissues are unremarkable. There are vascular calcifications. No acute osseous or soft tissue abnormality. XR FOOT LEFT (MIN 3 VIEWS)    Result Date: 3/6/2022  EXAMINATION: THREE XRAY VIEWS OF THE LEFT FOOT 3/6/2022 4:15 pm COMPARISON: None. HISTORY: ORDERING SYSTEM PROVIDED HISTORY: Trauma/Fracture TECHNOLOGIST PROVIDED HISTORY: Ap/oblique/lateral Trauma/Fracture FINDINGS: There is irregularity of the 1st distal phalanx that may relate to acute fracture and correlation with point tenderness is needed. The osseous structures are otherwise unremarkable. There is diffuse degenerative joint disease. There are vascular calcifications. There are calcaneal spurs at the Achilles and plantar fascial attachments. Irregularity of the 1st distal phalanx that may relate to an acute fracture and correlation with point tenderness is needed.      US RENAL COMPLETE    Result Date: 3/18/2022  EXAMINATION: RETROPERITONEAL ULTRASOUND OF THE KIDNEYS AND URINARY BLADDER 3/18/2022 COMPARISON: None HISTORY: ORDERING SYSTEM PROVIDED HISTORY: gilmar TECHNOLOGIST PROVIDED HISTORY: gilmar 68-year-old male with acute kidney injury FINDINGS: Kidneys: Right kidney measures 11.3 x 6.0 x 5.1 cm. Right renal cortical thickness measures 1.7 cm. Left kidney measures 11.6 x 5.1 x 5.5 cm. Left renal cortical thickness measures 1.5 cm. No hydronephrosis or perinephric fluid. No echogenic foci with posterior acoustic shadowing to suggest renal calculi. Gross preservation of the bilateral corticomedullary differentiation. Color flow projects over the bilateral renal parenchyma. Bladder: Not imaged. Unremarkable renal ultrasound. No hydronephrosis. XR CHEST PORTABLE    Result Date: 3/17/2022  EXAMINATION: ONE XRAY VIEW OF THE CHEST 3/16/2022 9:59 pm COMPARISON: 03/10/2022 HISTORY: ORDERING SYSTEM PROVIDED HISTORY: Shortness of breath. TECHNOLOGIST PROVIDED HISTORY: Shortness of breath. Reason for Exam: PT C/O SOB X several days. FINDINGS: The cardiac silhouette and mediastinal contours are stable. Vascular calcifications are noted along the aortic arch. There is a new infiltrate in the right lung base with probable pleural effusion. Findings may be related to atelectasis versus pneumonia. The lung volumes are low. The bones are osteopenic. 1. New right basilar lung infiltrate which may be related to atelectasis versus pneumonia with associated right pleural effusion. 2. Low lung volumes. XR CHEST PORTABLE    Result Date: 3/10/2022  EXAMINATION: ONE XRAY VIEW OF THE CHEST 3/10/2022 8:00 am COMPARISON: Chest x-ray dated 9 March 2022 HISTORY: ORDERING SYSTEM PROVIDED HISTORY: hypoxia TECHNOLOGIST PROVIDED HISTORY: hypoxia Reason for Exam: uprt port FINDINGS: Interval increase in right basilar airspace disease. The left lung is clear. No pneumothorax or pleural effusion. Stable cardiomediastinal silhouette with left-sided ICD.      Increasing right basilar airspace disease. This could represent atelectasis or in the appropriate clinical setting pneumonia. XR CHEST PORTABLE    Result Date: 3/9/2022  EXAMINATION: ONE XRAY VIEW OF THE CHEST 3/9/2022 10:20 am COMPARISON: None. HISTORY: ORDERING SYSTEM PROVIDED HISTORY: sob TECHNOLOGIST PROVIDED HISTORY: sob Reason for Exam: upright port FINDINGS: Mild cardiomegaly. Left-sided ICD. No pneumothorax or pleural effusion. Mild right basilar airspace disease. Mild right basilar airspace disease. This could represent atelectasis or in the appropriate clinical setting pneumonia. XR CHEST PORTABLE    Result Date: 3/5/2022  EXAMINATION: ONE XRAY VIEW OF THE CHEST 3/5/2022 1:43 pm COMPARISON: April levin 2021 HISTORY: ORDERING SYSTEM PROVIDED HISTORY: sob TECHNOLOGIST PROVIDED HISTORY: sob Reason for Exam: port upright FINDINGS: Marginal inspiration is present. No focal area of consolidation, pleural effusion, or pneumothorax is present. Heart size appears normal.  ICD is in place. Vascular markings are distinct. No acute osseous abnormality is present. Marginal inspiration, without evidence of acute cardiopulmonary disease     CT CHEST PULMONARY EMBOLISM W CONTRAST    Result Date: 3/5/2022  EXAMINATION: CTA OF THE CHEST 3/5/2022 3:07 pm TECHNIQUE: CTA of the chest was performed after the administration of intravenous contrast.  Multiplanar reformatted images are provided for review. MIP images are provided for review. Dose modulation, iterative reconstruction, and/or weight based adjustment of the mA/kV was utilized to reduce the radiation dose to as low as reasonably achievable.  COMPARISON: Chest x-ray dated March 5, 2022 HISTORY: ORDERING SYSTEM PROVIDED HISTORY: sob, new O2 requirement TECHNOLOGIST PROVIDED HISTORY: sob, new O2 requirement Decision Support Exception - unselect if not a suspected or confirmed emergency medical condition->Emergency Medical Condition (MA) Reason for Exam: fatigue FINDINGS: Pulmonary Arteries: Pulmonary arteries are adequately opacified for evaluation. No evidence of intraluminal filling defect to suggest pulmonary embolism. Main pulmonary artery is enlarged, measuring 32 mm, suggesting pulmonary arterial hypertension. Mediastinum: No evidence of mediastinal lymphadenopathy. Extensive coronary arterial calcifications are present. Small pericardial effusion is noted. The heart and pericardium demonstrate no acute abnormality. There is no acute abnormality of the thoracic aorta. Lungs/pleura: Diffuse emphysematous changes are present throughout the lungs. Subsegmental atelectasis is present within the right lung base. No focal area of consolidation, pleural effusion, or pneumothorax is present. Upper Abdomen: Images through the upper abdomen demonstrate cholelithiasis, without evidence of cholecystitis. Nodular contour to the liver is present, suggesting cirrhosis. Soft Tissues/Bones: No acute bone or soft tissue abnormality. 1. No evidence of pulmonary embolism 2. Dependent atelectasis, right lung base 3. Diffuse emphysematous changes present throughout the lungs, with an upper lobe predominance 4. Extensive coronary arterial calcifications 5. Cholelithiasis, without evidence of cholecystitis 6.  Nodular contour to the liver, suggesting cirrhosis     VL DUP LOWER EXTREMITY VENOUS BILATERAL    Result Date: 3/7/2022    [facility]  Vascular Lower Extremities DVT Study Procedure   Patient Name   COMES       Date of Study           03/07/2022                 Hugh BELL   Date of Birth  1950  Gender                  Male   Age            81 year(s)  Race                       Room Number    3139        Height:                 70 inch, 177.8 cm   Corporate ID # [id number]    Weight:                 180 pounds, 81.6 kg   Patient Acct # [de-identified]   BSA:        2 m^2       BMI:       25.83 kg/m^2   MR #           [id number]     Sonographer Jason Figueroa [facility] [[email or web address]]   Accession #    [id number]  Interpreting Physician  [id number] [facility]   Referring                  Referring Physician     Husam Hugo  Nurse  Practitioner  Procedure Type of Study:   Veins: Lower Extremities DVT Study, Venous Scan Lower Bilateral.  Indications for Study:Edema and Pain. Patient Status: In Patient. Technical Quality:Limited visualization. Limitation reason:Edema. Conclusions   Summary   No evidence of superficial or deep venous thrombosis in both lower  extremities. Signature   ----------------------------------------------------------------  Electronically signed by Jason Figueroa RVT(Sonographer) on  03/07/2022 09:52 AM  ----------------------------------------------------------------   ----------------------------------------------------------------  Electronically signed by Rosanna Limber Reyes,Arthur(Interpreting  physician) on 03/07/2022 05:47 PM  ----------------------------------------------------------------  Findings:   Right Impression:                       Left Impression:  The common femoral, femoral, popliteal  The common femoral, femoral,  and tibial veins demonstrate normal     popliteal and tibial veins  compressibility and augmentation. demonstrate normal compressibility                                          and augmentation. Normal compressibility of the great  saphenous vein. Limited visualization   Normal compressibility of the  of the greater saphenous vein due to    great saphenous vein.  small size in diameter. Normal compressibility of the  Normal compressibility of the small     small saphenous vein. saphenous vein. Allergies   - Allergy:*No Known Allergies(Miscellaneous). - Allergy:*Unlisted(Drug). Comments:motrin   - Allergy:*Unlisted(Drug).  Comments:motrim Velocities are measured in cm/s ; Diameters are measured in cm Right Lower Extremities DVT Study Measurements Right 2D Measurements +------------------------------------+----------+---------------+----------+ ! Location                            ! Visualized! Compressibility! Thrombosis! +------------------------------------+----------+---------------+----------+ ! Common Femoral                      !Yes       ! Yes            ! None      ! +------------------------------------+----------+---------------+----------+ ! Prox Femoral                        !Yes       ! Yes            ! None      ! +------------------------------------+----------+---------------+----------+ ! Mid Femoral                         !Yes       ! Yes            ! None      ! +------------------------------------+----------+---------------+----------+ ! Dist Femoral                        !Yes       ! Yes            ! None      ! +------------------------------------+----------+---------------+----------+ ! Popliteal                           !Yes       ! Yes            ! None      ! +------------------------------------+----------+---------------+----------+ ! Sapheno Femoral Junction            ! Yes       ! Yes            ! None      ! +------------------------------------+----------+---------------+----------+ ! PTV                                 ! Partial   !Yes            ! None      ! +------------------------------------+----------+---------------+----------+ ! Peroneal                            !Partial   !Yes            ! None      ! +------------------------------------+----------+---------------+----------+ ! Gastroc                             ! Yes       ! Yes            ! None      ! +------------------------------------+----------+---------------+----------+ ! GSV Thigh                           ! Partial   !Yes            ! None      ! +------------------------------------+----------+---------------+----------+ ! GSV Knee                            ! Partial   !Yes            ! None      ! +------------------------------------+----------+---------------+----------+ ! GSV Ankle !Partial   !Yes            ! None      ! +------------------------------------+----------+---------------+----------+ ! SSV                                 ! Yes       ! Yes            ! None      ! +------------------------------------+----------+---------------+----------+ Right Doppler Measurements +---------------------------+------+------+--------------------------------+ ! Location                   ! Signal!Reflux! Reflux (msec)                   ! +---------------------------+------+------+--------------------------------+ ! Common Femoral             !Phasic!      !                                ! +---------------------------+------+------+--------------------------------+ ! Prox Femoral               !Phasic!      !                                ! +---------------------------+------+------+--------------------------------+ ! Popliteal                  !Phasic!      !                                ! +---------------------------+------+------+--------------------------------+ Left Lower Extremities DVT Study Measurements Left 2D Measurements +------------------------------------+----------+---------------+----------+ ! Location                            ! Visualized! Compressibility! Thrombosis! +------------------------------------+----------+---------------+----------+ ! Common Femoral                      !Yes       ! Yes            ! None      ! +------------------------------------+----------+---------------+----------+ ! Prox Femoral                        !Yes       ! Yes            ! None      ! +------------------------------------+----------+---------------+----------+ ! Mid Femoral                         !Yes       ! Yes            ! None      ! +------------------------------------+----------+---------------+----------+ ! Dist Femoral                        !Yes       ! Yes            ! None      ! +------------------------------------+----------+---------------+----------+ ! Popliteal !Yes       !Yes            ! None      ! +------------------------------------+----------+---------------+----------+ ! Sapheno Femoral Junction            ! Yes       ! Yes            ! None      ! +------------------------------------+----------+---------------+----------+ ! PTV                                 ! Partial   !Yes            ! None      ! +------------------------------------+----------+---------------+----------+ ! Peroneal                            !No        !               !          ! +------------------------------------+----------+---------------+----------+ ! Gastroc                             ! Yes       ! Yes            ! None      ! +------------------------------------+----------+---------------+----------+ ! GSV Thigh                           ! Yes       ! Yes            ! None      ! +------------------------------------+----------+---------------+----------+ ! GSV Knee                            ! Yes       ! Yes            ! None      ! +------------------------------------+----------+---------------+----------+ ! GSV Ankle                           ! Yes       ! Yes            ! None      ! +------------------------------------+----------+---------------+----------+ ! SSV                                 ! Yes       ! Yes            ! None      ! +------------------------------------+----------+---------------+----------+ Left Doppler Measurements +---------------------------+------+------+--------------------------------+ ! Location                   ! Signal!Reflux! Reflux (msec)                   ! +---------------------------+------+------+--------------------------------+ ! Common Femoral             !Phasic!      !                                ! +---------------------------+------+------+--------------------------------+ ! Prox Femoral               !Phasic!      !                                ! +---------------------------+------+------+--------------------------------+ ! Popliteal !Phasic!      !                                ! +---------------------------+------+------+--------------------------------+    FLUORO FOR SURGICAL PROCEDURES    Result Date: 3/8/2022  Radiology exam is complete. No Radiologist dictation. Please follow up with ordering provider. XR HIP 2-3 VW W PELVIS LEFT    Result Date: 3/6/2022  EXAMINATION: ONE XRAY VIEW OF THE PELVIS AND TWO XRAY VIEWS LEFT HIP 3/6/2022 4:15 pm COMPARISON: Right hip radiographs performed 04/11/2021. HISTORY: ORDERING SYSTEM PROVIDED HISTORY: Trauma/Fracture TECHNOLOGIST PROVIDED HISTORY: AP and cross-table lateral please, thank you Trauma/Fracture Reason for Exam: per ordering provider, frog leg lateral ok due to pt condition. -JEK FINDINGS: There is a comminuted right intertrochanteric fracture. The bony pelvis is intact. There is degenerative change of the SI joints and hip joints. The surrounding soft tissues are unremarkable. Comminuted intertrochanteric fracture of the right proximal femur. XR HIP 2-3 VW W PELVIS RIGHT    Result Date: 4/1/2022  History: Status post TFN placement right hip Comparison: 3/8/2022. Findings:   Low AP pelvis, AP Right  hip, cross table lateral xrays Right hip done in the office today shows TFN in good position without signs complication. Evidence of lateral fixation plate and screws and cerclage wires across the proximal femoral diaphysis is also appreciated. Evidence of prior total knee arthroplasty identified. Superficial skin staples also appreciated. Components are in good position without signs of loosening. Interval healing appreciated within the right hip intertrochanteric fracture. No evidence of fracture, subluxation, dislocation, radiopaque foreign body, radiopaque foreign tumor is noted. Impression: S/p Right total hip TFN in good position without complications noted.       XR HIP 2-3 VW W PELVIS RIGHT    Result Date: 3/8/2022  EXAMINATION: ONE XRAY VIEW OF THE PELVIS AND TWO XRAY VIEWS RIGHT HIP;   XRAY VIEWS OF THE RIGHT FEMUR 3/8/2022 8:19 pm COMPARISON: 03/06/2022 HISTORY: ORDERING SYSTEM PROVIDED HISTORY: post op pacu, AP pelvis, AP right hip, cross table lateral right hip TECHNOLOGIST PROVIDED HISTORY: post op pacu, AP pelvis, AP right hip, cross table lateral right hip Reason for Exam: post op hardware placement FINDINGS: Extensive postsurgical changes identified suggestive of progression of fixation of the right in surgery insert fracture. Additionally there is evidence lateral fixation plate and screws and cerclage wires across the proximal femoral diaphysis. Evidence knee arthroplasty identified. Overlying skin staples and surgical identified. Postsurgical changes status post ventral effusion right hip and proximal femur fracture. Procedure:     After informed consent was obtained verbally, staples were removed from the surgical site after sterile Betadine prep. Patient tolerated the staple removal well without post procedure complications. No significant wound healing complications were appreciated at the time of staple removal.      Assessment:      1. Closed displaced intertrochanteric fracture of right femur, initial encounter [facility])         Plan:      The patient is currently 3.5 weeks post operative after undergoing a right hip TFN placement with ORIF of the right hip completed by Dr Kassandra Durán DO on 3/8/2022. Instructions for the SNF were given below:    1. Continue facility physical and occupational therapy while utilizing a walker for lower extremity strengthening. The patient may weight-bear as tolerated on the right lower extremity. 2.  Continue Norco and Tylenol as prescribed for right lower extremity pain control. 3.  Continue Eliquis as prescribed for DVT prophylaxis. 4.  Monitor surgical incision sites for signs of redness, drainage or gross signs of infection. Staples were removed today in office without complication.     5. Follow

## 2022-04-01 NOTE — LETTER
Mamie Bullock, [address] [facility]  [address] [facility] [address]  Dept: [phone]  Dept Fax: [phone]  4/1/22    Patient: Roshni Bailey  YOB: 1950    Dear Vashti Hawkins,    I had the pleasure of seeing one of your patients, Tammie Gilman today in the office. Below are the relevant portions of my assessment and plan of care. IMPRESSION:  1. Closed displaced intertrochanteric fracture of right femur, [facility] encounter [facility]      PLAN:    1. Continue facility physical and occupational therapy while utilizing a walker for lower extremity strengthening. The patient may weight-bear as tolerated on the right lower extremity. 2.  Continue Norco and Tylenol as prescribed for right lower extremity pain control. 3.  Continue Eliquis as prescribed for DVT prophylaxis. 4.  Monitor surgical incision sites for signs of redness, drainage or gross signs of infection. Staples were removed today in office without complication. 5. Follow Up 5/2/2022 at 10:30am with Dr Skylar Skinner at our [facility]. [place] location. [address] [id number] [phone]      Thank you for allowing me to participate in the care of this patient. I will keep you updated on this patient's follow up and I look forward to serving you and your patients again in the future. Please call us with any questions or concerns in regards to Mr. Cherry.            Mamie Andrews PA-C

## 2022-04-01 NOTE — Clinical Note
[facility]  [address] [facility]  Phone: [phone]  Fax: [phone]    Jackson Banerjee PA-C        April 1, 2022     Patient: Marilee Cabrera   YOB: 1950   Date of Visit: 4/1/2022       To Whom It May Concern: It is my medical opinion that Raz Abreu {Work release (duty restriction):42540}. If you have any questions or concerns, please don't hesitate to call.     Sincerely,        Mamie Andrews PA-C

## 2022-04-02 ASSESSMENT — ENCOUNTER SYMPTOMS
COLOR CHANGE: 0
SHORTNESS OF BREATH: 0
COUGH: 0
VOMITING: 0

## 2022-04-28 DIAGNOSIS — S72.453S SUPRACONDYLAR FRACTURE OF DISTAL END OF FEMUR WITHOUT INTRACONDYLAR EXTENSION, SEQUELA: Primary | ICD-10-CM

## 2022-11-14 ENCOUNTER — TELEPHONE (OUTPATIENT)
Dept: FAMILY MEDICINE CLINIC | Age: 72
End: 2022-11-14

## 2022-11-14 ENCOUNTER — HOSPITAL ENCOUNTER (OUTPATIENT)
Age: 72
Setting detail: SPECIMEN
Discharge: HOME | End: 2022-11-14

## 2022-11-14 ENCOUNTER — OFFICE VISIT (OUTPATIENT)
Dept: FAMILY MEDICINE CLINIC | Age: 72
End: 2022-11-14
Payer: COMMERCIAL

## 2022-11-14 VITALS
SYSTOLIC BLOOD PRESSURE: 140 MMHG | WEIGHT: 175 LBS | HEIGHT: 70 IN | OXYGEN SATURATION: 96 % | DIASTOLIC BLOOD PRESSURE: 90 MMHG | BODY MASS INDEX: 25.05 KG/M2 | HEART RATE: 68 BPM

## 2022-11-14 DIAGNOSIS — F03.918 DEMENTIA WITH BEHAVIORAL DISTURBANCE: ICD-10-CM

## 2022-11-14 DIAGNOSIS — K59.03 THERAPEUTIC OPIOID-INDUCED CONSTIPATION (OIC): Primary | ICD-10-CM

## 2022-11-14 DIAGNOSIS — J44.9 CHRONIC OBSTRUCTIVE PULMONARY DISEASE, UNSPECIFIED COPD TYPE (HCC): ICD-10-CM

## 2022-11-14 DIAGNOSIS — Z91.81 AT HIGH RISK FOR FALLS: ICD-10-CM

## 2022-11-14 DIAGNOSIS — Z12.5 ENCOUNTER FOR SCREENING FOR MALIGNANT NEOPLASM OF PROSTATE: ICD-10-CM

## 2022-11-14 DIAGNOSIS — G47.429 NARCOLEPSY DUE TO UNDERLYING CONDITION WITHOUT CATAPLEXY: ICD-10-CM

## 2022-11-14 DIAGNOSIS — R09.82 PND (POST-NASAL DRIP): ICD-10-CM

## 2022-11-14 DIAGNOSIS — E03.9 ACQUIRED HYPOTHYROIDISM: ICD-10-CM

## 2022-11-14 DIAGNOSIS — J31.0 CHRONIC RHINITIS: ICD-10-CM

## 2022-11-14 DIAGNOSIS — R42 VERTIGO: ICD-10-CM

## 2022-11-14 DIAGNOSIS — H81.03 MENIERE'S DISEASE OF BOTH EARS: ICD-10-CM

## 2022-11-14 DIAGNOSIS — Z13.0 SCREENING, ANEMIA, DEFICIENCY, IRON: ICD-10-CM

## 2022-11-14 DIAGNOSIS — S72.453S SUPRACONDYLAR FRACTURE OF DISTAL END OF FEMUR WITHOUT INTRACONDYLAR EXTENSION, SEQUELA: ICD-10-CM

## 2022-11-14 DIAGNOSIS — I50.22 CHRONIC SYSTOLIC (CONGESTIVE) HEART FAILURE (HCC): ICD-10-CM

## 2022-11-14 DIAGNOSIS — D64.9 ANEMIA, UNSPECIFIED TYPE: ICD-10-CM

## 2022-11-14 DIAGNOSIS — Z95.5 S/P BARE METAL CORONARY ARTERY STENT: ICD-10-CM

## 2022-11-14 DIAGNOSIS — T40.2X5A THERAPEUTIC OPIOID-INDUCED CONSTIPATION (OIC): Primary | ICD-10-CM

## 2022-11-14 DIAGNOSIS — I42.9 CARDIOMYOPATHY, UNSPECIFIED TYPE (HCC): ICD-10-CM

## 2022-11-14 DIAGNOSIS — Z79.899 HIGH RISK MEDICATION USE: ICD-10-CM

## 2022-11-14 DIAGNOSIS — Z87.81 HISTORY OF HIP FRACTURE: ICD-10-CM

## 2022-11-14 DIAGNOSIS — E55.9 VITAMIN D DEFICIENCY: ICD-10-CM

## 2022-11-14 DIAGNOSIS — E78.5 HYPERLIPIDEMIA, UNSPECIFIED HYPERLIPIDEMIA TYPE: ICD-10-CM

## 2022-11-14 PROBLEM — J96.01 ACUTE RESPIRATORY FAILURE WITH HYPOXEMIA (HCC): Status: RESOLVED | Noted: 2022-03-09 | Resolved: 2022-11-14

## 2022-11-14 PROBLEM — N17.9 AKI (ACUTE KIDNEY INJURY) (HCC): Status: RESOLVED | Noted: 2022-03-17 | Resolved: 2022-11-14

## 2022-11-14 PROBLEM — I49.8 ACCELERATED JUNCTIONAL RHYTHM: Status: RESOLVED | Noted: 2022-03-08 | Resolved: 2022-11-14

## 2022-11-14 PROBLEM — J18.9 PNEUMONIA: Status: RESOLVED | Noted: 2022-03-16 | Resolved: 2022-11-14

## 2022-11-14 LAB
ABSOLUTE EOS #: 0.5 K/UL (ref 0–0.44)
ABSOLUTE IMMATURE GRANULOCYTE: <0.03 K/UL (ref 0–0.3)
ABSOLUTE LYMPH #: 2.43 K/UL (ref 1.1–3.7)
ABSOLUTE MONO #: 0.64 K/UL (ref 0.1–1.2)
ALBUMIN SERPL-MCNC: 4.1 G/DL (ref 3.5–5.2)
ALBUMIN/GLOBULIN RATIO: 1.3 (ref 1–2.5)
ALP BLD-CCNC: 173 U/L (ref 40–129)
ALT SERPL-CCNC: 15 U/L (ref 5–41)
ANION GAP SERPL CALCULATED.3IONS-SCNC: 14 MMOL/L (ref 9–17)
AST SERPL-CCNC: 28 U/L
BASOPHILS # BLD: 1 % (ref 0–2)
BASOPHILS ABSOLUTE: 0.04 K/UL (ref 0–0.2)
BILIRUB SERPL-MCNC: 0.2 MG/DL (ref 0.3–1.2)
BUN BLDV-MCNC: 16 MG/DL (ref 8–23)
CALCIUM SERPL-MCNC: 9.9 MG/DL (ref 8.6–10.4)
CHLORIDE BLD-SCNC: 101 MMOL/L (ref 98–107)
CO2: 23 MMOL/L (ref 20–31)
CREAT SERPL-MCNC: 0.81 MG/DL (ref 0.7–1.2)
EOSINOPHILS RELATIVE PERCENT: 6 % (ref 1–4)
FERRITIN: 37 NG/ML (ref 30–400)
GFR SERPL CREATININE-BSD FRML MDRD: >60 ML/MIN/1.73M2
GLUCOSE BLD-MCNC: 93 MG/DL (ref 70–99)
HCT VFR BLD CALC: 38.5 % (ref 40.7–50.3)
HEMOGLOBIN: 11.6 G/DL (ref 13–17)
IMMATURE GRANULOCYTES: 0 %
IRON SATURATION: 12 % (ref 20–55)
IRON: 51 UG/DL (ref 59–158)
LYMPHOCYTES # BLD: 29 % (ref 24–43)
MCH RBC QN AUTO: 26.7 PG (ref 25.2–33.5)
MCHC RBC AUTO-ENTMCNC: 30.1 G/DL (ref 28.4–34.8)
MCV RBC AUTO: 88.5 FL (ref 82.6–102.9)
MONOCYTES # BLD: 8 % (ref 3–12)
NRBC AUTOMATED: 0 PER 100 WBC
PDW BLD-RTO: 16.5 % (ref 11.8–14.4)
PLATELET # BLD: 250 K/UL (ref 138–453)
PMV BLD AUTO: 10.4 FL (ref 8.1–13.5)
POTASSIUM SERPL-SCNC: 4.8 MMOL/L (ref 3.7–5.3)
PROSTATE SPECIFIC ANTIGEN: 1.17 NG/ML
RBC # BLD: 4.35 M/UL (ref 4.21–5.77)
RBC # BLD: ABNORMAL 10*6/UL
REASON FOR REJECTION: NORMAL
SEG NEUTROPHILS: 56 % (ref 36–65)
SEGMENTED NEUTROPHILS ABSOLUTE COUNT: 4.85 K/UL (ref 1.5–8.1)
SODIUM BLD-SCNC: 138 MMOL/L (ref 135–144)
TOTAL IRON BINDING CAPACITY: 424 UG/DL (ref 250–450)
TOTAL PROTEIN: 7.3 G/DL (ref 6.4–8.3)
TSH SERPL DL<=0.05 MIU/L-ACNC: 2.41 UIU/ML (ref 0.3–5)
UNSATURATED IRON BINDING CAPACITY: 373 UG/DL (ref 112–347)
WBC # BLD: 8.5 K/UL (ref 3.5–11.3)
ZZ NTE CLEAN UP: ORDERED TEST: NORMAL
ZZ NTE WITH NAME CLEAN UP: SPECIMEN SOURCE: NORMAL

## 2022-11-14 PROCEDURE — 3074F SYST BP LT 130 MM HG: CPT | Performed by: INTERNAL MEDICINE

## 2022-11-14 PROCEDURE — 1123F ACP DISCUSS/DSCN MKR DOCD: CPT | Performed by: INTERNAL MEDICINE

## 2022-11-14 PROCEDURE — 3078F DIAST BP <80 MM HG: CPT | Performed by: INTERNAL MEDICINE

## 2022-11-14 PROCEDURE — 99204 OFFICE O/P NEW MOD 45 MIN: CPT | Performed by: INTERNAL MEDICINE

## 2022-11-14 RX ORDER — CARVEDILOL 6.25 MG/1
6.25 TABLET ORAL 2 TIMES DAILY WITH MEALS
Qty: 60 TABLET | Refills: 5 | Status: SHIPPED | OUTPATIENT
Start: 2022-11-14

## 2022-11-14 RX ORDER — HYDROCODONE BITARTRATE AND ACETAMINOPHEN 5; 325 MG/1; MG/1
1 TABLET ORAL EVERY 6 HOURS PRN
Qty: 120 TABLET | Refills: 0 | Status: SHIPPED | OUTPATIENT
Start: 2022-11-14 | End: 2022-12-14

## 2022-11-14 RX ORDER — HYDROCODONE BITARTRATE AND ACETAMINOPHEN 5; 325 MG/1; MG/1
1 TABLET ORAL EVERY 6 HOURS PRN
COMMUNITY
End: 2022-11-14 | Stop reason: SDUPTHER

## 2022-11-14 RX ORDER — DOCUSATE SODIUM 100 MG/1
100 CAPSULE, LIQUID FILLED ORAL 2 TIMES DAILY
Qty: 60 CAPSULE | Refills: 5 | Status: SHIPPED | OUTPATIENT
Start: 2022-11-14

## 2022-11-14 RX ORDER — ARMODAFINIL 150 MG/1
150 TABLET ORAL DAILY
COMMUNITY

## 2022-11-14 RX ORDER — POTASSIUM BICARBONATE 25 MEQ/1
20 TABLET, EFFERVESCENT ORAL DAILY
COMMUNITY
End: 2022-11-14

## 2022-11-14 RX ORDER — MECLIZINE HYDROCHLORIDE 25 MG/1
25 TABLET ORAL 3 TIMES DAILY PRN
Qty: 90 TABLET | Refills: 3 | Status: SHIPPED | OUTPATIENT
Start: 2022-11-14

## 2022-11-14 RX ORDER — MOMETASONE FUROATE 50 UG/1
2 SPRAY, METERED NASAL DAILY
Qty: 1 EACH | Refills: 3 | Status: SHIPPED | OUTPATIENT
Start: 2022-11-14

## 2022-11-14 RX ORDER — MULTIVITAMIN/IRON/FOLIC ACID 18MG-0.4MG
250 TABLET ORAL DAILY
Qty: 30 TABLET | Refills: 3 | Status: SHIPPED
Start: 2022-11-14

## 2022-11-14 RX ORDER — POTASSIUM CHLORIDE 20 MEQ/1
20 TABLET, EXTENDED RELEASE ORAL DAILY
COMMUNITY

## 2022-11-14 SDOH — ECONOMIC STABILITY: FOOD INSECURITY: WITHIN THE PAST 12 MONTHS, THE FOOD YOU BOUGHT JUST DIDN'T LAST AND YOU DIDN'T HAVE MONEY TO GET MORE.: NEVER TRUE

## 2022-11-14 SDOH — ECONOMIC STABILITY: FOOD INSECURITY: WITHIN THE PAST 12 MONTHS, YOU WORRIED THAT YOUR FOOD WOULD RUN OUT BEFORE YOU GOT MONEY TO BUY MORE.: NEVER TRUE

## 2022-11-14 ASSESSMENT — ENCOUNTER SYMPTOMS
CHOKING: 0
CONSTIPATION: 0
COUGH: 0
DIARRHEA: 0
ANAL BLEEDING: 0
VOMITING: 0
CHEST TIGHTNESS: 0
ABDOMINAL PAIN: 0
WHEEZING: 0
BLOOD IN STOOL: 0
NAUSEA: 0
SHORTNESS OF BREATH: 0

## 2022-11-14 ASSESSMENT — PATIENT HEALTH QUESTIONNAIRE - PHQ9
SUM OF ALL RESPONSES TO PHQ QUESTIONS 1-9: 0
1. LITTLE INTEREST OR PLEASURE IN DOING THINGS: 0
SUM OF ALL RESPONSES TO PHQ9 QUESTIONS 1 & 2: 0
2. FEELING DOWN, DEPRESSED OR HOPELESS: 0
SUM OF ALL RESPONSES TO PHQ QUESTIONS 1-9: 0

## 2022-11-14 ASSESSMENT — SOCIAL DETERMINANTS OF HEALTH (SDOH): HOW HARD IS IT FOR YOU TO PAY FOR THE VERY BASICS LIKE FOOD, HOUSING, MEDICAL CARE, AND HEATING?: NOT HARD AT ALL

## 2022-11-14 ASSESSMENT — VISUAL ACUITY: OU: 1

## 2022-11-14 NOTE — PROGRESS NOTES
New patient      Visit Information    Have you changed or started any medications since your last visit including any over-the-counter medicines, vitamins, or herbal medicines? no   Have you stopped taking any of your medications? Is so, why? -  no  Are you having any side effects from any of your medications? - no    Have you seen any other physician or provider since your last visit?  no   Have you had any other diagnostic tests since your last visit?  no   Have you been seen in the emergency room and/or had an admission in a hospital since we last saw you?  no   Have you had your routine dental cleaning in the past 6 months?  no     Do you have an active MyChart account? If no, what is the barrier?   Yes    Patient Care Team:  Juan Ray MD as PCP - General (Internal Medicine)  Smita Lyles, DO as Consulting Physician (Family Medicine)  Gamal Godfrey)    Medical History Review  Past Medical, Family, and Social History reviewed and does not contribute to the patient presenting condition    Health Maintenance   Topic Date Due    Depression Screen  Never done    Shingles vaccine (1 of 2) Never done    Low dose CT lung screening  Never done    AAA screen  Never done    Pneumococcal 65+ years Vaccine (2 - PPSV23 if available, else PCV20) 02/23/2017    Annual Wellness Visit (AWV)  Never done    COVID-19 Vaccine (1) 09/19/2022    A1C test (Diabetic or Prediabetic)  02/19/2023    Lipids  02/19/2023    Colorectal Cancer Screen  06/16/2025    DTaP/Tdap/Td vaccine (2 - Td or Tdap) 03/23/2030    Flu vaccine  Completed    Hepatitis C screen  Completed    Hepatitis A vaccine  Aged Out    Hib vaccine  Aged Out    Meningococcal (ACWY) vaccine  Aged Out

## 2022-11-14 NOTE — TELEPHONE ENCOUNTER
Pharmacy called needing clarification on meclizine script. States they previously had a script for 12.5mg and would like to confirm it is being changed to 25mg.

## 2022-11-14 NOTE — PROGRESS NOTES
[facility]  [facility] [address]  [address] [facility] [address]  Dept: [phone]  Dept Fax: [phone]      Lolly Montalvo is a 81 y.o. male who presents today for hismedical conditions/complaints as noted below. Lolly Montalvo is c/o of New Patient        Assessment/Plan:     1. Therapeutic opioid-induced constipation (OIC)  -     docusate sodium (COLACE) 100 MG capsule; Take 1 capsule by mouth 2 times daily, Disp-60 capsule, R-5Normal  2. Chronic systolic (congestive) heart failure  3. 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 Cardiomyopathy, unspecified type ([facility]  -     carvedilol (COREG) 6.25 MG tablet; Take 1 tablet by mouth 2 times daily (with meals), Disp-60 tablet, R-5Normal  -     Comprehensive Metabolic Panel; Future  5. S/p bare metal coronary artery stent  6. Chronic obstructive pulmonary disease, unspecified COPD type ([facility]  7. History of hip fracture  8. Anemia, unspecified type  -     CBC with Auto Differential; Future  -     Iron and TIBC; Future  -     Ferritin; Future  -     Vitamin B12 & Folate; Future  9. Acquired hypothyroidism  -     TSH With Reflex Ft4; Future  10. Vertigo  -     meclizine (ANTIVERT) 25 MG tablet; Take 1 tablet by mouth 3 times daily as needed for Dizziness, Disp-90 tablet, R-3Normal  11. Hyperlipidemia, unspecified hyperlipidemia type  12. Screening, anemia, deficiency, iron  13. Encounter for screening for malignant neoplasm of prostate  -     PSA Screening; Future  14. At high risk for falls  15. High risk medication use  16. Vitamin D deficiency  17. Dementia with behavioral disturbance ([facility]  18. Meniere's disease of both ears  -     meclizine (ANTIVERT) 25 MG tablet; Take 1 tablet by mouth 3 times daily as needed for Dizziness, Disp-90 tablet, R-3Normal  19. PND (post-nasal drip)  20.  Supracondylar fracture of distal end of femur without intracondylar extension, sequela  -     HYDROcodone-acetaminophen (NORCO) 5-325 MG per tablet; Take 1 tablet by mouth every 6 hours as needed for Pain for up to 30 days. , Disp-120 tablet, R-0Normal  21. Chronic rhinitis  -     mometasone (NASONEX) 50 MCG/ACT nasal spray; 2 sprays by Nasal route daily, Nasal, DAILY Starting Mon 11/14/2022, Disp-1 each, R-3, Normal        No follow-ups on file. HPI     He is on Nuvigil for narcolepsy   On norco for chronic R leg pain from knee and hip fracture - doing PT at home with [employer]. He is following with ortho at [facility].  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 One refill left on nuvigil   Needs norco refilled - taking four times daily   Following with Cardiology - dr Savita Cardozo   Hypertension-tolerating current regimen without chest pain, palpitations, dizziness, peripheral edema, dyspnea on exertion, orthopnea, paroxysmal nocturnal dyspnea. Hyperlipidemia-tolerating current regimen without myalgias, dyspepsia, jaundice. Mostly compliant with diet recommendations for low salt diet, tries to limit greasy/cheesy/fried foods, not very compliant with exercise recommendations.     Cardiovascular risk factors: advanced age (older than 54 for men, 72 for women), dyslipidemia, and hypertension        BP Readings from Last 3 Encounters:   11/14/22 (!) 140/90   03/20/22 136/67   03/14/22 (!) 156/78              Past Medical History:   Diagnosis Date    ADHD (attention deficit hyperactivity disorder)     ADHD (attention deficit hyperactivity disorder)     Atypical chest pain     Chronic bronchitis (HCC)     Hypertension     Hyponatremia     Meniere's disease     Narcolepsy     Nasal fracture     PND (post-nasal drip) 4/21/2014    SOB (shortness of breath)     Tibia fracture     right tibial plateau fx, right syndesmotic fx    Transaminasemia 4/21/2014      Past Surgical History:   Procedure Laterality Date    ANKLE SURGERY      open reduction internal fixation of the right ankle & tibial plateau fx CORONARY ANGIOPLASTY WITH STENT PLACEMENT N/A 10/10/2015    CYST REMOVAL      right side of face    FEMUR FRACTURE SURGERY Right 3/8/2022    RIGHT TFN HIP  INSERTION ,OPEN REDUCTION INTERNAL FIXATION RIGHT HIP  C-ARM, SYNTHES, CABLES , performed by Weston Redding DO at [place] [address] Right 03/08/2022    RIGHT TFN HIP  INSERTION ,OPEN REDUCTION INTERNAL FIXATION RIGHT HIP  C-ARM, SYNTHES, CABLES     KNEE ARTHROPLASTY Right 04/13/2021    DISTAL FEMUR REPLACEMENT performed by Weston Redding DO at [place] [address] Right     total knee       Family History   Problem Relation Age of Onset    Heart Disease Mother         heart attack    Heart Disease Father        Social History     Tobacco Use    Smoking status: Every Day     Packs/day: 1.00     Years: 38.00     Pack years: 38.00     Types: Cigarettes    Smokeless tobacco: Never    Tobacco comments:     e-cigs   Substance Use Topics    Alcohol use: Yes     Alcohol/week: 16.0 standard drinks     Types: 16 Cans of beer per week     Comment: daily        Allergies   Allergen Reactions    Ambien [Zolpidem] Hallucinations    Motrin [Ibuprofen Micronized]      Pt states he had blood in stool from motrin     Prior to Visit Medications    Medication Sig Taking? Authorizing Provider   potassium bicarbonate (K-LYTE) 25 MEQ disintegrating tablet Take 20 mEq by mouth daily Yes Historical Provider, MD   Armodafinil (NUVIGIL) 150 MG TABS tablet Take 150 mg by mouth daily. Yes Historical Provider, MD   HYDROcodone-acetaminophen (NORCO) 5-325 MG per tablet Take 1 tablet by mouth every 6 hours as needed for Pain.  Yes Historical Provider, MD   carvedilol (COREG) 6.25 MG tablet Take 1 tablet by mouth 2 times daily (with meals) Yes Rickey Tran DO   levothyroxine (SYNTHROID) 50 MCG tablet Take 1 tablet by mouth Daily Yes Rickey Tran DO   guaiFENesin (MUCINEX) 600 MG extended release tablet Take 2 tablets by mouth 2 times daily Yes Xavier Workman Leigh Chao,    amiodarone (CORDARONE) 200 MG tablet Take by mouth Yes Historical Provider, MD   atorvastatin (LIPITOR) 40 MG tablet Take 1 tablet by mouth nightly Yes Favian Traylor MD   clopidogrel (PLAVIX) 75 MG tablet Take 1 tablet by mouth daily Yes Favian Traylor MD   Magnesium Oxide 250 MG TABS Take 1 tablet by mouth daily  Patient taking differently: Take 500 mg by mouth daily Yes Favian Traylor MD   furosemide (LASIX) 20 MG tablet Take 1 tablet by mouth daily Yes Favian Traylor MD   [address] Adult diapers dispense quantity allowed by insurance Yes Iris Lynn PA-C   PROAIR  (90 BASE) MCG/ACT inhaler inhale 2 puffs every 4 to 6 hours if needed Yes Iris Lynn PA-C   docusate sodium (COLACE) 100 MG capsule Take 100 mg by mouth 2 times daily. Yes Historical Provider, MD   folic acid (FOLVITE) 1 MG tablet Take 1 mg by mouth daily. Yes Historical Provider, MD   Multiple Vitamin (MULTIVITAMIN PO) Take  by mouth.    Yes Historical Provider, MD   meclizine (ANTIVERT) 12.5 MG tablet Take 12.5 mg by mouth in the morning, at noon, and at bedtime  Patient not taking: Reported on 11/14/2022  Historical Provider, MD   enoxaparin (LOVENOX) 40 MG/0.4ML injection Inject 0.4 mLs into the skin daily  Patient not taking: Reported on 11/14/2022  Alejandrina Garza,    cyanocobalamin 1000 MCG tablet Take 1,000 mcg by mouth  Patient not taking: Reported on 11/14/2022  Historical Provider, MD   sodium chloride 1 g tablet Take 1 g by mouth daily   Historical Provider, MD   vitamin C (ASCORBIC ACID) 500 MG tablet Take 500 mg by mouth  Patient not taking: Reported on 11/14/2022  Historical Provider, MD   fluticasone propionate (FLOVENT) 50 MCG/BLIST AEPB inhaler Inhale into the lungs daily  Patient not taking: Reported on 11/14/2022  Historical Provider, MD   acetaminophen (TYLENOL) 500 MG tablet Take 1 tablet by mouth 4 times daily as needed for Pain  Patient not taking: Reported on 11/14/2022 Brittney Dewey,    Multiple Vitamins-Minerals (CERTAVITE SENIOR/ANTIOXIDANT) TABS TAKE 1 TAB BY MOUTH ONCE A DAY  Patient not taking: Reported on 11/14/2022  Iris Lynn PA-C   QUEtiapine (SEROQUEL) 25 MG tablet Take 1 tablet by mouth nightly  Patient not taking: Reported on 11/14/2022  Amy BonillaDO   [id number]  dispense quantity allowed by insurance  Patient not taking: Reported on 11/14/2022  Iris Lynn PA-C   mometasone (NASONEX) 50 MCG/ACT nasal spray 2 sprays by Nasal route daily. Patient not taking: Reported on 11/14/2022  Jacinta Self MD   chlorhexidine (PERIDEX) 0.12 % solution Take 15 mLs by mouth 2 times daily. Patient not taking: Reported on 11/14/2022  Historical Provider, MD   SALINE MIST SPRAY NA by Nasal route. Patient not taking: Reported on 11/14/2022  Historical Provider, MD       Review of Systems     Review of Systems   Constitutional:  Negative for fatigue, fever and unexpected weight change. Respiratory:  Negative for cough, choking, chest tightness, shortness of breath and wheezing. Cardiovascular:  Negative for chest pain, palpitations and leg swelling. Gastrointestinal:  Negative for abdominal pain, anal bleeding, blood in stool, constipation, diarrhea, nausea and vomiting. Endocrine: Negative. Musculoskeletal:  Negative for joint swelling and myalgias. Skin: Negative. Neurological:  Negative for dizziness. Psychiatric/Behavioral:  Negative for sleep disturbance. All other systems reviewed and are negative. Objective     BP (!) 140/90 (Site: Left Upper Arm, Position: Sitting, Cuff Size: Medium Adult)   Pulse 68   Ht 5' 10\" (1.778 m)   Wt 175 lb (79.4 kg)   SpO2 96%   BMI 25.11 kg/m²   Physical Exam  Vitals and nursing note reviewed. Constitutional:       General: He is not in acute distress. Appearance: He is well-developed. He is not ill-appearing.       Comments: Seated in wheelchair      Eyes: General: Lids are normal. Vision grossly intact. Cardiovascular:      Rate and Rhythm: Normal rate and regular rhythm. Heart sounds: Normal heart sounds, S1 normal and S2 normal. No murmur heard. No friction rub. No gallop. Pulmonary:      Effort: Pulmonary effort is normal. No respiratory distress. Breath sounds: Normal breath sounds. No wheezing. Abdominal:      General: Bowel sounds are normal.      Palpations: Abdomen is soft. There is no mass. Tenderness: There is no abdominal tenderness. There is no guarding. Musculoskeletal:         General: Normal range of motion. Skin:     General: Skin is warm and dry. Capillary Refill: Capillary refill takes less than 2 seconds. Neurological:      General: No focal deficit present. Mental Status: He is alert and oriented to person, place, and time. Data Review     PDMP Monitoring:    Last PDMP Trinidad Suttonice as Reviewed:  Review User Review Instant Review Result   MONIKA GERARDO 11/14/2022  1:57 PM Reviewed PDMP [1]     Last Controlled Substance Monitoring Documentation      [address] Office Visit from 11/14/2022 in [address] [place] [address]   Periodic Controlled Substance Monitoring Possible medication side effects, risk of tolerance/dependence & alternative treatments discussed., No signs of potential drug abuse or diversion identified. , Assessed functional status., Obtaining appropriate analgesic effect of treatment. , Random urine drug screen sent today.  filed at 11/14/2022 1358          Urine Drug Screenings (1 yr)       Urine Drug Screen  Collected: 10/14/2014 (Edited Result - FINAL)                  Medication Contract and Consent for Opioid Use Documents Filed        No documents found                    Health Maintenance Due   Topic Date Due    Depression Screen  Never done    Shingles vaccine (1 of 2) Never done    Low dose CT lung screening  Never done    AAA screen  Never done    Pneumococcal 65+ years Vaccine (2 - PPSV23 if available, else PCV20) 02/23/2017    Annual Wellness Visit (AWV)  Never done    COVID-19 Vaccine (1) 09/19/2022           Patient given educational materials- see patient instructions. Discussed use, benefit, and side effects of prescribedmedications. All patient questions answered. Pt voiced understanding. Reviewedhealth maintenance. Instructed to continue current medications, diet and exercise. Patient agreed with treatment plan. Follow up as directed. Electronically signedby ARTI Geraldyne Merlin, MD on 11/14/2022   On the basis of positive falls risk screening, assessment and plan is as follows:  doing PT at home .

## 2022-11-16 ENCOUNTER — TELEPHONE (OUTPATIENT)
Dept: FAMILY MEDICINE CLINIC | Age: 72
End: 2022-11-16

## 2022-11-16 DIAGNOSIS — R74.8 ELEVATED ALKALINE PHOSPHATASE LEVEL: Primary | ICD-10-CM

## 2022-11-16 DIAGNOSIS — Z87.81 HISTORY OF HIP FRACTURE: ICD-10-CM

## 2022-11-16 DIAGNOSIS — D50.9 IRON DEFICIENCY ANEMIA, UNSPECIFIED IRON DEFICIENCY ANEMIA TYPE: Primary | ICD-10-CM

## 2022-11-16 DIAGNOSIS — D50.9 IRON DEFICIENCY ANEMIA, UNSPECIFIED IRON DEFICIENCY ANEMIA TYPE: ICD-10-CM

## 2022-11-16 DIAGNOSIS — Z87.81 HISTORY OF HIP FRACTURE: Primary | ICD-10-CM

## 2022-11-16 RX ORDER — FERROUS SULFATE 325(65) MG
325 TABLET ORAL
Qty: 90 TABLET | Refills: 1 | Status: SHIPPED | OUTPATIENT
Start: 2022-11-16 | End: 2022-11-16 | Stop reason: SDUPTHER

## 2022-11-16 RX ORDER — FLUTICASONE PROPIONATE 50 MCG
1 SPRAY, SUSPENSION (ML) NASAL DAILY
Qty: 16 G | Refills: 1 | Status: SHIPPED | OUTPATIENT
Start: 2022-11-16 | End: 2022-11-16 | Stop reason: SDUPTHER

## 2022-11-16 RX ORDER — FLUTICASONE PROPIONATE 50 MCG
1 SPRAY, SUSPENSION (ML) NASAL DAILY
Qty: 16 G | Refills: 1 | Status: SHIPPED | OUTPATIENT
Start: 2022-11-16

## 2022-11-16 RX ORDER — FERROUS SULFATE 325(65) MG
325 TABLET ORAL
Qty: 90 TABLET | Refills: 1 | Status: SHIPPED | OUTPATIENT
Start: 2022-11-16

## 2022-11-16 NOTE — TELEPHONE ENCOUNTER
Pt is requesting something for dry skin and itching. He states is using benadryl but he is already having fatigue. He would like something called in for the itching. He is using cream for dry skin but is unable to apply to his back. He would like a script for Voltaren gel and try (pt states TRY not for sure how that will go) to wean off pain med.  This was suggested from PT  in regards to the Voltaren Gel

## 2022-11-16 NOTE — TELEPHONE ENCOUNTER
Hydrocortisone ointment and Flonase called in. Voltaren gel called in and a different encounter, please see second telephone encounter from today.

## 2022-11-16 NOTE — TELEPHONE ENCOUNTER
Patient contacted in regards to lab results. Patient stated he knows he's anemic, he has been. He is asking if a medication for Iron can be sent to pharmacy. Also stated his physical therapist suggested Voltaren gel for his knee pain.  Patient is asking if that can be ordered for him as well

## 2022-11-16 NOTE — TELEPHONE ENCOUNTER
Patient was informed of all medications that were sent. He stated he does not use [facility] and needs these sent to [address] [place] [address]

## 2022-11-16 NOTE — TELEPHONE ENCOUNTER
Patient called stating insurance will not cover Nasonex. States he used to use Flonase and would like this sent in instead.

## 2022-11-17 ENCOUNTER — TELEPHONE (OUTPATIENT)
Dept: FAMILY MEDICINE CLINIC | Age: 72
End: 2022-11-17

## 2022-11-17 DIAGNOSIS — Q80.9 XERODERMA: Primary | ICD-10-CM

## 2022-11-17 RX ORDER — PRAMOXINE HYDROCHLORIDE 10 MG/G
1 CREAM TOPICAL DAILY PRN
Qty: 340 G | Refills: 1 | Status: SHIPPED | OUTPATIENT
Start: 2022-11-17

## 2022-11-17 NOTE — TELEPHONE ENCOUNTER
Benadryl may be making dry itchy skin worse because it dries you up. I sent in some cerave itch cream. I am sure the cold dry weather is triggering his skin right now. Avoid taking long hot showers - try to shower every 2 to 3 days. Use moisturizing soaps. Set up humidifiers if possible to put moisture back in the air. Drink plenty of water.

## 2022-11-17 NOTE — TELEPHONE ENCOUNTER
Patient contacted office:    Pt is requesting something for dry skin and itching. He states he is using benadryl already. He has been using a cream but unable to apply it to his back. He is asking if there is a pill that can be called in to help.     [facility]

## 2022-11-21 ENCOUNTER — TELEPHONE (OUTPATIENT)
Dept: FAMILY MEDICINE CLINIC | Age: 72
End: 2022-11-21

## 2022-11-21 NOTE — TELEPHONE ENCOUNTER
Patient called stating that Dr Warner Call needs to update contract with [employer] Medicaid/Medicare  Is this something you can check into please

## 2022-11-22 ENCOUNTER — PATIENT MESSAGE (OUTPATIENT)
Dept: FAMILY MEDICINE CLINIC | Age: 72
End: 2022-11-22

## 2022-12-05 ENCOUNTER — TELEPHONE (OUTPATIENT)
Dept: FAMILY MEDICINE CLINIC | Age: 72
End: 2022-12-05

## 2022-12-05 DIAGNOSIS — Z23 NEED FOR SHINGLES VACCINE: Primary | ICD-10-CM

## 2022-12-05 RX ORDER — ZOSTER VACCINE RECOMBINANT, ADJUVANTED 50 MCG/0.5
0.5 KIT INTRAMUSCULAR SEE ADMIN INSTRUCTIONS
Qty: 0.5 ML | Refills: 0 | Status: SHIPPED | OUTPATIENT
Start: 2022-12-05 | End: 2023-06-03

## 2022-12-05 NOTE — TELEPHONE ENCOUNTER
Pt is scheduled for pneumonia vaccine 12/06/2022. Pt would like to get a script for his shingles vaccine while he is here.

## 2022-12-05 NOTE — TELEPHONE ENCOUNTER
----- Message from Kaleb Rush sent at 12/5/2022 10:41 AM EST -----  Subject: Message to Provider    QUESTIONS  Information for Provider? Pt is requesting a blood test along with   shingles and pneumonia shots at the office for 12/6/22.  ---------------------------------------------------------------------------  --------------  [employer] [employer]  [phone]; OK to leave message on voicemail, OK to respond with   electronic message via [employer] portal (only for patients who have   registered [employer] account)  ---------------------------------------------------------------------------  --------------  SCRIPT ANSWERS  Relationship to Patient?  Self

## 2022-12-05 NOTE — TELEPHONE ENCOUNTER
Dr. Isamar Koroma [facility].

## 2022-12-06 ENCOUNTER — HOSPITAL ENCOUNTER (OUTPATIENT)
Age: 72
Setting detail: SPECIMEN
Discharge: HOME | End: 2022-12-06

## 2022-12-06 ENCOUNTER — HOSPITAL ENCOUNTER (EMERGENCY)
Age: 72
Discharge: HOME | End: 2022-12-07
Attending: EMERGENCY MEDICINE
Payer: COMMERCIAL

## 2022-12-06 ENCOUNTER — NURSE ONLY (OUTPATIENT)
Dept: FAMILY MEDICINE CLINIC | Age: 72
End: 2022-12-06
Payer: COMMERCIAL

## 2022-12-06 ENCOUNTER — APPOINTMENT (OUTPATIENT)
Dept: NUCLEAR MEDICINE | Age: 72
End: 2022-12-06
Payer: COMMERCIAL

## 2022-12-06 ENCOUNTER — APPOINTMENT (OUTPATIENT)
Dept: GENERAL RADIOLOGY | Age: 72
End: 2022-12-06
Payer: COMMERCIAL

## 2022-12-06 ENCOUNTER — APPOINTMENT (OUTPATIENT)
Dept: CT IMAGING | Age: 72
End: 2022-12-06
Payer: COMMERCIAL

## 2022-12-06 DIAGNOSIS — R06.00 DYSPNEA, UNSPECIFIED TYPE: Primary | ICD-10-CM

## 2022-12-06 DIAGNOSIS — R74.8 ELEVATED ALKALINE PHOSPHATASE LEVEL: ICD-10-CM

## 2022-12-06 DIAGNOSIS — Z23 NEED FOR 23-POLYVALENT PNEUMOCOCCAL POLYSACCHARIDE VACCINE: Primary | ICD-10-CM

## 2022-12-06 DIAGNOSIS — J44.1 COPD EXACERBATION (HCC): ICD-10-CM

## 2022-12-06 LAB
ABSOLUTE EOS #: 0.55 K/UL (ref 0–0.44)
ABSOLUTE IMMATURE GRANULOCYTE: 0.04 K/UL (ref 0–0.3)
ABSOLUTE LYMPH #: 2.99 K/UL (ref 1.1–3.7)
ABSOLUTE MONO #: 0.69 K/UL (ref 0.1–1.2)
ANION GAP SERPL CALCULATED.3IONS-SCNC: 11 MMOL/L (ref 9–17)
BASOPHILS # BLD: 1 % (ref 0–2)
BASOPHILS ABSOLUTE: 0.04 K/UL (ref 0–0.2)
BUN BLDV-MCNC: 16 MG/DL (ref 8–23)
CALCIUM SERPL-MCNC: 9.6 MG/DL (ref 8.6–10.4)
CHLORIDE BLD-SCNC: 105 MMOL/L (ref 98–107)
CO2: 22 MMOL/L (ref 20–31)
CREAT SERPL-MCNC: 0.79 MG/DL (ref 0.7–1.2)
D-DIMER QUANTITATIVE: 1.88 MG/L FEU
EOSINOPHILS RELATIVE PERCENT: 6 % (ref 1–4)
FOLATE: >20 NG/ML
GFR SERPL CREATININE-BSD FRML MDRD: >60 ML/MIN/1.73M2
GLUCOSE BLD-MCNC: 111 MG/DL (ref 70–99)
HCT VFR BLD CALC: 42.6 % (ref 40.7–50.3)
HEMOGLOBIN: 12.8 G/DL (ref 13–17)
IMMATURE GRANULOCYTES: 1 %
LYMPHOCYTES # BLD: 34 % (ref 24–43)
MCH RBC QN AUTO: 27.8 PG (ref 25.2–33.5)
MCHC RBC AUTO-ENTMCNC: 30 G/DL (ref 28.4–34.8)
MCV RBC AUTO: 92.4 FL (ref 82.6–102.9)
MONOCYTES # BLD: 8 % (ref 3–12)
NRBC AUTOMATED: 0 PER 100 WBC
PDW BLD-RTO: 18.5 % (ref 11.8–14.4)
PLATELET # BLD: 280 K/UL (ref 138–453)
PMV BLD AUTO: 9.8 FL (ref 8.1–13.5)
POTASSIUM SERPL-SCNC: 4.9 MMOL/L (ref 3.7–5.3)
PRO-BNP: 142 PG/ML
RBC # BLD: 4.61 M/UL (ref 4.21–5.77)
RBC # BLD: ABNORMAL 10*6/UL
SARS-COV-2, RAPID: NOT DETECTED
SEG NEUTROPHILS: 50 % (ref 36–65)
SEGMENTED NEUTROPHILS ABSOLUTE COUNT: 4.57 K/UL (ref 1.5–8.1)
SODIUM BLD-SCNC: 138 MMOL/L (ref 135–144)
SPECIMEN DESCRIPTION: NORMAL
TROPONIN, HIGH SENSITIVITY: 10 NG/L (ref 0–22)
TROPONIN, HIGH SENSITIVITY: 9 NG/L (ref 0–22)
VITAMIN B-12: 442 PG/ML (ref 232–1245)
WBC # BLD: 8.9 K/UL (ref 3.5–11.3)

## 2022-12-06 PROCEDURE — 2700000000 HC OXYGEN THERAPY PER DAY

## 2022-12-06 PROCEDURE — 78582 LUNG VENTILAT&PERFUS IMAGING: CPT

## 2022-12-06 PROCEDURE — 6370000000 HC RX 637 (ALT 250 FOR IP)

## 2022-12-06 PROCEDURE — 94761 N-INVAS EAR/PLS OXIMETRY MLT: CPT

## 2022-12-06 PROCEDURE — A9539 TC99M PENTETATE: HCPCS | Performed by: STUDENT IN AN ORGANIZED HEALTH CARE EDUCATION/TRAINING PROGRAM

## 2022-12-06 PROCEDURE — G0009 ADMIN PNEUMOCOCCAL VACCINE: HCPCS | Performed by: NURSE PRACTITIONER

## 2022-12-06 PROCEDURE — 3430000000 HC RX DIAGNOSTIC RADIOPHARMACEUTICAL: Performed by: STUDENT IN AN ORGANIZED HEALTH CARE EDUCATION/TRAINING PROGRAM

## 2022-12-06 PROCEDURE — 94664 DEMO&/EVAL PT USE INHALER: CPT

## 2022-12-06 PROCEDURE — 99284 EMERGENCY DEPT VISIT MOD MDM: CPT

## 2022-12-06 PROCEDURE — 6360000002 HC RX W HCPCS

## 2022-12-06 PROCEDURE — 96374 THER/PROPH/DIAG INJ IV PUSH: CPT

## 2022-12-06 PROCEDURE — 93005 ELECTROCARDIOGRAM TRACING: CPT | Performed by: STUDENT IN AN ORGANIZED HEALTH CARE EDUCATION/TRAINING PROGRAM

## 2022-12-06 PROCEDURE — 83880 ASSAY OF NATRIURETIC PEPTIDE: CPT

## 2022-12-06 PROCEDURE — 90732 PPSV23 VACC 2 YRS+ SUBQ/IM: CPT | Performed by: NURSE PRACTITIONER

## 2022-12-06 PROCEDURE — A9540 TC99M MAA: HCPCS | Performed by: STUDENT IN AN ORGANIZED HEALTH CARE EDUCATION/TRAINING PROGRAM

## 2022-12-06 PROCEDURE — 84484 ASSAY OF TROPONIN QUANT: CPT

## 2022-12-06 PROCEDURE — 85025 COMPLETE CBC W/AUTO DIFF WBC: CPT

## 2022-12-06 PROCEDURE — 71045 X-RAY EXAM CHEST 1 VIEW: CPT

## 2022-12-06 PROCEDURE — 85379 FIBRIN DEGRADATION QUANT: CPT

## 2022-12-06 PROCEDURE — 87635 SARS-COV-2 COVID-19 AMP PRB: CPT

## 2022-12-06 PROCEDURE — 94640 AIRWAY INHALATION TREATMENT: CPT

## 2022-12-06 PROCEDURE — 6370000000 HC RX 637 (ALT 250 FOR IP): Performed by: STUDENT IN AN ORGANIZED HEALTH CARE EDUCATION/TRAINING PROGRAM

## 2022-12-06 PROCEDURE — 80048 BASIC METABOLIC PNL TOTAL CA: CPT

## 2022-12-06 RX ORDER — ATORVASTATIN CALCIUM 80 MG/1
40 TABLET, FILM COATED ORAL NIGHTLY
Status: DISCONTINUED | OUTPATIENT
Start: 2022-12-06 | End: 2022-12-07 | Stop reason: HOSPADM

## 2022-12-06 RX ORDER — ALBUTEROL SULFATE 2.5 MG/3ML
2.5 SOLUTION RESPIRATORY (INHALATION)
Status: DISCONTINUED | OUTPATIENT
Start: 2022-12-06 | End: 2022-12-07 | Stop reason: HOSPADM

## 2022-12-06 RX ORDER — KIT FOR THE PREPARATION OF TECHNETIUM TC 99M PENTETATE 20 MG/1
45 INJECTION, POWDER, LYOPHILIZED, FOR SOLUTION INTRAVENOUS; RESPIRATORY (INHALATION)
Status: COMPLETED | OUTPATIENT
Start: 2022-12-06 | End: 2022-12-06

## 2022-12-06 RX ORDER — CARVEDILOL 12.5 MG/1
6.25 TABLET ORAL 2 TIMES DAILY WITH MEALS
Status: DISCONTINUED | OUTPATIENT
Start: 2022-12-06 | End: 2022-12-07 | Stop reason: HOSPADM

## 2022-12-06 RX ORDER — MECLIZINE HCL 12.5 MG/1
25 TABLET ORAL ONCE
Status: COMPLETED | OUTPATIENT
Start: 2022-12-06 | End: 2022-12-06

## 2022-12-06 RX ORDER — METHYLPREDNISOLONE SODIUM SUCCINATE 125 MG/2ML
125 INJECTION, POWDER, LYOPHILIZED, FOR SOLUTION INTRAMUSCULAR; INTRAVENOUS ONCE
Status: COMPLETED | OUTPATIENT
Start: 2022-12-06 | End: 2022-12-06

## 2022-12-06 RX ORDER — IPRATROPIUM BROMIDE AND ALBUTEROL SULFATE 2.5; .5 MG/3ML; MG/3ML
1 SOLUTION RESPIRATORY (INHALATION)
Status: DISCONTINUED | OUTPATIENT
Start: 2022-12-06 | End: 2022-12-07 | Stop reason: HOSPADM

## 2022-12-06 RX ORDER — GUAIFENESIN 600 MG/1
600 TABLET, EXTENDED RELEASE ORAL 2 TIMES DAILY
Status: DISCONTINUED | OUTPATIENT
Start: 2022-12-06 | End: 2022-12-07 | Stop reason: HOSPADM

## 2022-12-06 RX ORDER — DOCUSATE SODIUM 100 MG/1
100 CAPSULE, LIQUID FILLED ORAL DAILY
Status: DISCONTINUED | OUTPATIENT
Start: 2022-12-06 | End: 2022-12-07 | Stop reason: HOSPADM

## 2022-12-06 RX ORDER — ALBUTEROL SULFATE 2.5 MG/3ML
15 SOLUTION RESPIRATORY (INHALATION)
Status: DISCONTINUED | OUTPATIENT
Start: 2022-12-06 | End: 2022-12-07 | Stop reason: HOSPADM

## 2022-12-06 RX ORDER — HYDROCODONE BITARTRATE AND ACETAMINOPHEN 5; 325 MG/1; MG/1
1 TABLET ORAL ONCE
Status: COMPLETED | OUTPATIENT
Start: 2022-12-06 | End: 2022-12-06

## 2022-12-06 RX ADMIN — HYDROCODONE BITARTRATE AND ACETAMINOPHEN 1 TABLET: 5; 325 TABLET ORAL at 18:28

## 2022-12-06 RX ADMIN — DOCUSATE SODIUM 100 MG: 100 CAPSULE, LIQUID FILLED ORAL at 23:45

## 2022-12-06 RX ADMIN — ALBUTEROL SULFATE 5 MG: 2.5 SOLUTION RESPIRATORY (INHALATION) at 17:45

## 2022-12-06 RX ADMIN — ATORVASTATIN CALCIUM 40 MG: 80 TABLET, FILM COATED ORAL at 23:45

## 2022-12-06 RX ADMIN — GUAIFENESIN 600 MG: 600 TABLET, EXTENDED RELEASE ORAL at 23:44

## 2022-12-06 RX ADMIN — MECLIZINE 25 MG: 12.5 TABLET ORAL at 23:45

## 2022-12-06 RX ADMIN — CARVEDILOL 6.25 MG: 12.5 TABLET, FILM COATED ORAL at 23:45

## 2022-12-06 RX ADMIN — METHYLPREDNISOLONE SODIUM SUCCINATE 125 MG: 125 INJECTION, POWDER, FOR SOLUTION INTRAMUSCULAR; INTRAVENOUS at 17:35

## 2022-12-06 RX ADMIN — Medication 5.3 MILLICURIE: at 23:40

## 2022-12-06 RX ADMIN — IPRATROPIUM BROMIDE AND ALBUTEROL SULFATE 1 AMPULE: .5; 2.5 SOLUTION RESPIRATORY (INHALATION) at 16:21

## 2022-12-06 RX ADMIN — KIT FOR THE PREPARATION OF TECHNETIUM TC 99M PENTETATE 45 MILLICURIE: 20 INJECTION, POWDER, LYOPHILIZED, FOR SOLUTION INTRAVENOUS; RESPIRATORY (INHALATION) at 11:00

## 2022-12-06 ASSESSMENT — ENCOUNTER SYMPTOMS
ABDOMINAL PAIN: 0
VOMITING: 0
CHEST TIGHTNESS: 0
SORE THROAT: 0
SHORTNESS OF BREATH: 1
BACK PAIN: 0
NAUSEA: 0
ABDOMINAL DISTENTION: 0
TROUBLE SWALLOWING: 0
WHEEZING: 0

## 2022-12-06 ASSESSMENT — PAIN - FUNCTIONAL ASSESSMENT: PAIN_FUNCTIONAL_ASSESSMENT: NONE - DENIES PAIN

## 2022-12-06 NOTE — PROGRESS NOTES
[address] [facility] [address]  PROGRESS NOTE    Shift date: 12/6/22  Shift day: Tuesday   Shift # 2    Room # 20/20   Name: Petra Sims                Jehovah's witness: [address] of [place]:     Referral: Routine Visit    Admit Date & Time: 12/6/2022  3:26 PM    Assessment:  Petra Sims is a 81 y.o. male    Intervention:  Writer introduced self and title as . Patient appeared receptive to  visit and engaged in conversation about his health. Writer offered space for patient  to express feelings, needs, and concerns and provided a ministry presence. Patient appears to have strong gabriela which he leans on for strength. Outcome:  Patient awaits test results and expressed gratitude for  visit. Plan:  Chaplains will remain available to offer spiritual and emotional support as needed.       Electronically signed by Ana Li on 12/6/2022 at 5:42 PM.  [facility]  [phone]

## 2022-12-06 NOTE — ED PROVIDER NOTES
[facility]     Emergency Department     Faculty Attestation    I performed a history and physical examination of the patient and discussed management with the resident. I have reviewed and agree with the residents findings including all diagnostic interpretations, and treatment plans as written. Any areas of disagreement are noted on the chart. I was personally present for the key portions of any procedures. I have documented in the chart those procedures where I was not present during the key portions. I have reviewed the emergency nurses triage note. I agree with the chief complaint, past medical history, past surgical history, allergies, medications, social and family history as documented unless otherwise noted below. Documentation of the HPI, Physical Exam and Medical Decision Making performed by vikki is based on my personal performance of the HPI, PE and MDM. For Physician Assistant/ Nurse Practitioner cases/documentation I have personally evaluated this patient and have completed at least one if not all key elements of the E/M (history, physical exam, and MDM). Additional findings are as noted. Patient presents by EMS due to shortness of breath. Patient does have a history of CAD, with stents, previous history of smoking, chart does show a history of COPD. Patient does have a previous leg fracture. Currently in physical therapy does have chronic right leg pain. Did get his pneumonia vaccine today. He said 30 minutes after he started to feel short of breath. Denies any swelling to his throat. No chest pain no nausea or vomiting. He does have an AICD in place. He denies any cough or congestion no fevers or chills his appetite is at its usual.  Previous echo from March 2022 does show an EF of 68%. On exam patient does appear tachypneic. He is on 2 L oxygen. He does have some abdominal breathing noted. He is diminished bilaterally. He does have an AICD device in place to his left chest.  No lower extremity edema noted no calf tenderness to palpation. Unclear etiology of his shortness of breath possible COPD exacerbation versus possible ACS versus infectious etiology although sudden onset. Will check labs including troponin and EKG CBC BMP as well as a BNP, plan on getting a chest x-ray. We will do aerosol treatments to see if his breath sounds improved. EKG shows normal sinus rhythm with a ventricular rate of 82 there is first-degree AV block with a NM interval of 236. There is left axis deviation noted. And poor R wave progression. QRS of 94 and a QT corrected of 448. Previous EKG reviewed March 16, 2022 does show R wave progression at that time. There were some T wave inversions on this previous EKG in the lateral leads which are improved on today's EKG.     Joyce Giron D.O, M.P.H  Attending Emergency Medicine Physician         Joyce Giron, DO  12/06/22 5714

## 2022-12-06 NOTE — ED PROVIDER NOTES
[facility] ED  Emergency Department Encounter  Emergency Medicine Resident     Pt Name:Cristino Lopez  MRN: [id number]  [place] 1950  Date of evaluation: 12/6/22  PCP:  Lefty Dougherty MD      [address] [place] [address]       Chief Complaint   Patient presents with    Shortness of Breath       HISTORY OF PRESENT ILLNESS  (Location/Symptom, Timing/Onset, Context/Setting, Quality, Duration, Modifying Factors, Severity.)      Lucy Coto is a 81 y.o. male who presents with shortness of breath. Patient states that the short of breath started about an hour prior to arrival in the emergency department. Patient arrived on 2 L of oxygen by nasal cannula, does not take oxygen at home. Patient has a past medical history significant for COPD. Patient states that he received a vaccination about an hour prior to becoming short of breath. Patient denies any associated fever, chills, cough, congestion, chest pain, abdominal pain, leg swelling. PAST MEDICAL / SURGICAL / SOCIAL / FAMILY HISTORY      has a past medical history of ADHD (attention deficit hyperactivity disorder), ADHD (attention deficit hyperactivity disorder), Atypical chest pain, Chronic bronchitis ([address] Hypertension, Hyponatremia, Meniere's disease, Narcolepsy, Nasal fracture, PND (post-nasal drip), SOB (shortness of breath), Tibia fracture, and Transaminasemia. has a past surgical history that includes cyst removal; Ankle surgery; knee surgery (Right); Coronary angioplasty with stent (N/A, 10/10/2015); Knee Arthroplasty (Right, 04/13/2021); hip surgery (Right, 03/08/2022); and Femur fracture surgery (Right, 3/8/2022).       Social History     Socioeconomic History    Marital status:      Spouse name: Not on file    Number of children: Not on file    Years of education: Not on file    Highest education level: Not on file   Occupational History     Employer: NOT EMPLOYED   Tobacco Use    Smoking status: Every Day Packs/day: 1.00     Years: 38.00     Pack years: 38.00     Types: Cigarettes    Smokeless tobacco: Never    Tobacco comments:     e-cigs   Vaping Use    Vaping Use: Every day    Substances: Always   Substance and Sexual Activity    Alcohol use: Yes     Alcohol/week: 16.0 standard drinks     Types: 16 Cans of beer per week     Comment: daily    Drug use: No    Sexual activity: Not Currently   Other Topics Concern    Not on file   Social History Narrative    Not on file     Social Determinants of Health     Financial Resource Strain: Low Risk     Difficulty of Paying Living Expenses: Not hard at all   Food Insecurity: No Food Insecurity    Worried About Running Out of Food in the Last Year: Never true    Ran Out of Food in the Last Year: Never true   Transportation Needs: Not on file   Physical Activity: Not on file   Stress: Not on file   Social Connections: Not on file   Intimate Partner Violence: Not on file   Housing Stability: Not on file       Family History   Problem Relation Age of Onset    Heart Disease Mother         heart attack    Heart Disease Father        Allergies:  Ambien [zolpidem] and Motrin [ibuprofen micronized]    Home Medications:  Prior to Admission medications    Medication Sig Start Date End Date Taking? Authorizing Provider   predniSONE (DELTASONE) 20 MG tablet Take 2 tablets by mouth once daily for 5 days 12/7/22 12/17/22 Yes Hernandez Pellet, DO   zoster recombinant adjuvanted vaccine [facility]) 50 MCG/0.5ML SUSR injection Inject 0.5 mLs into the muscle See Admin Instructions 1 dose now and repeat in 2-6 months 12/5/22 6/3/23  Alicia Mendoza MD   Pramoxine HCl ([facility]) 1 % CREA Apply 1 applicator topically daily as needed (Dry itchy skin) 11/17/22   TIFFANIE Hannon NP   hydrocortisone 2.5 % ointment Apply topically 2 times daily.  11/16/22   Alicia Mendoza MD   fluticasone Millicent Banana) 50 MCG/ACT nasal spray 1 spray by Nasal route daily 11/16/22   Shara Cockayne, MD ferrous sulfate (IRON 325) 325 (65 Fe) MG tablet Take 1 tablet by mouth daily (with breakfast) 11/16/22   Alicia Barba MD   diclofenac sodium (VOLTAREN) 1 % GEL Apply 4 g topically 4 times daily 11/16/22   Alicia Barba MD   Armodafinil (NUVIGIL) 150 MG TABS tablet Take 150 mg by mouth daily. Historical Provider, MD   Magnesium Oxide (MAGNESIUM-OXIDE) 250 MG TABS tablet Take 1 tablet by mouth daily 11/14/22   Alicia Barba MD   potassium chloride (KLOR-CON M) 20 MEQ extended release tablet Take 20 mEq by mouth daily    Historical Provider, MD   meclizine (ANTIVERT) 25 MG tablet Take 1 tablet by mouth 3 times daily as needed for Dizziness 11/14/22   Alicia Barba MD   docusate sodium (COLACE) 100 MG capsule Take 1 capsule by mouth 2 times daily 11/14/22   Alicia Barba MD   carvedilol (COREG) 6.25 MG tablet Take 1 tablet by mouth 2 times daily (with meals) 11/14/22   Alicia Barba MD   mometasone (NASONEX) 50 MCG/ACT nasal spray 2 sprays by Nasal route daily 11/14/22   Alicia Barba MD   HYDROcodone-acetaminophen (NORCO) 5-325 MG per tablet Take 1 tablet by mouth every 6 hours as needed for Pain for up to 30 days.  11/14/22 12/14/22  Alicia Barba MD   levothyroxine (SYNTHROID) 50 MCG tablet Take 1 tablet by mouth Daily 3/11/22   Lena Orellana DO   guaiFENesin [facility]) 600 MG extended release tablet Take 2 tablets by mouth 2 times daily 3/10/22   Lena Orellana DO   amiodarone (CORDARONE) 200 MG tablet Take by mouth    Historical Provider, MD   fluticasone propionate (FLOVENT) 50 MCG/BLIST AEPB inhaler Inhale into the lungs daily    Historical Provider, MD   atorvastatin (LIPITOR) 40 MG tablet Take 1 tablet by mouth nightly 11/10/15   Dona Rose MD   clopidogrel (PLAVIX) 75 MG tablet Take 1 tablet by mouth daily 11/10/15   Dona Rose MD   furosemide (LASIX) 20 MG tablet Take 1 tablet by mouth daily 11/10/15   Dona Rose MD   [address] Adult diapers dispense quantity allowed by insurance 3/31/15   Iris Lynn PA-C   PROAIR  (90 BASE) MCG/ACT inhaler inhale 2 puffs every 4 to 6 hours if needed 11/29/14   Iris Lynn PA-C   folic acid (FOLVITE) 1 MG tablet Take 1 mg by mouth daily. Historical Provider, MD   Multiple Vitamin (MULTIVITAMIN PO) Take  by mouth. Historical Provider, MD       REVIEW OF SYSTEMS    (2-9 systems for level 4, 10 or more for level 5)      Review of Systems   Constitutional:  Negative for activity change, appetite change, chills, fatigue and fever. HENT:  Negative for congestion, sore throat and trouble swallowing. Respiratory:  Positive for shortness of breath. Negative for chest tightness and wheezing. Cardiovascular:  Negative for chest pain. Gastrointestinal:  Negative for abdominal distention, abdominal pain, nausea and vomiting. Genitourinary:  Negative for dysuria. Musculoskeletal:  Negative for back pain and joint swelling. Neurological:  Negative for weakness, numbness and headaches. Psychiatric/Behavioral:  Negative for agitation and confusion. PHYSICAL EXAM   (up to 7 for level 4, 8 or more for level 5)      INITIAL VITALS:   /72   Pulse 88   Temp 97.9 °F (36.6 °C) (Oral)   Resp 23   Ht 5' 10\" (1.778 m)   Wt 175 lb (79.4 kg)   SpO2 95%   BMI 25.11 kg/m²     Physical Exam  Constitutional:       General: He is not in acute distress. Appearance: Normal appearance. He is not ill-appearing or toxic-appearing. HENT:      Head: Normocephalic and atraumatic. Eyes:      Extraocular Movements: Extraocular movements intact. Pupils: Pupils are equal, round, and reactive to light. Cardiovascular:      Rate and Rhythm: Normal rate and regular rhythm. Pulmonary:      Effort: Pulmonary effort is normal.      Breath sounds: Decreased breath sounds present. No wheezing. Abdominal:      General: Abdomen is flat. There is no distension.       Palpations: Abdomen is soft. Tenderness: There is no abdominal tenderness. Musculoskeletal:      Right lower leg: No edema. Left lower leg: No edema. Neurological:      General: No focal deficit present. Mental Status: He is alert and oriented to person, place, and time. Mental status is at baseline. Psychiatric:         Mood and Affect: Mood normal.         Behavior: Behavior normal.         Thought Content:  Thought content normal.       DIFFERENTIAL  DIAGNOSIS     PLAN (LABS / IMAGING / EKG):  Orders Placed This Encounter   Procedures    COVID-19, Rapid    XR CHEST PORTABLE    NM LUNG VENT/PERFUSION (VQ)    BMP    CBC with Auto Differential    Brain Natriuretic Peptide    Troponin    D-Dimer, Quantitative    Inpatient consult to IV Team    Inpatient consult to Social Work    EKG 12 Lead       MEDICATIONS ORDERED:  Orders Placed This Encounter   Medications    DISCONTD: ipratropium-albuterol (DUONEB) nebulizer solution 1 ampule     Order Specific Question:   Initiate RT Bronchodilator Protocol     Answer:   Yes - ED protocol    DISCONTD: albuterol (PROVENTIL) nebulizer solution 2.5 mg     Order Specific Question:   Initiate RT Bronchodilator Protocol     Answer:   Yes - ED protocol    DISCONTD: albuterol (PROVENTIL) nebulizer solution 15 mg     Order Specific Question:   Initiate RT Bronchodilator Protocol     Answer:   Yes - ED protocol    DISCONTD: ipratropium (ATROVENT) 0.02 % nebulizer solution 0.5 mg     Order Specific Question:   Initiate RT Bronchodilator Protocol     Answer:   Yes - ED protocol    methylPREDNISolone sodium (SOLU-MEDROL) injection 125 mg    HYDROcodone-acetaminophen (NORCO) 5-325 MG per tablet 1 tablet    DISCONTD: iopamidol (ISOVUE-370) 76 % injection 75 mL    DISCONTD: atorvastatin (LIPITOR) tablet 40 mg    DISCONTD: carvedilol (COREG) tablet 6.25 mg    DISCONTD: docusate sodium (COLACE) capsule 100 mg    DISCONTD: guaiFENesin (MUCINEX) extended release tablet 600 mg    meclizine (ANTIVERT) tablet 25 mg    technetium albumin aggregated (MAA) solution 5.3 millicurie    technetium 99m DTPA solution 45 millicurie    predniSONE (DELTASONE) 20 MG tablet     Sig: Take 2 tablets by mouth once daily for 5 days     Dispense:  10 tablet     Refill:  0       DDX: COPD exacerbation, asthma, pneumothorax, anaphylaxis, anxiety, PE , pericardial effusion, CHF, ACS/MI, atelectasis, lower airway obstruction, aspiration    DIAGNOSTIC RESULTS / EMERGENCY DEPARTMENT COURSE / MDM   LAB RESULTS:  Results for orders placed or performed during the hospital encounter of 12/06/22   COVID-19, Rapid    Specimen: Nasopharyngeal Swab   Result Value Ref Range    Specimen Description . NASOPHARYNGEAL SWAB     SARS-CoV-2, Rapid Not Detected Not Detected   BMP   Result Value Ref Range    Glucose 111 (H) 70 - 99 mg/dL    BUN 16 8 - 23 mg/dL    Creatinine 0.79 0.70 - 1.20 mg/dL    Est, Glom Filt Rate >60 >60 mL/min/1.73m2    Calcium 9.6 8.6 - 10.4 mg/dL    Sodium 138 135 - 144 mmol/L    Potassium 4.9 3.7 - 5.3 mmol/L    Chloride 105 98 - 107 mmol/L    CO2 22 20 - 31 mmol/L    Anion Gap 11 9 - 17 mmol/L   CBC with Auto Differential   Result Value Ref Range    WBC 8.9 3.5 - 11.3 k/uL    RBC 4.61 4.21 - 5.77 m/uL    Hemoglobin 12.8 (L) 13.0 - 17.0 g/dL    Hematocrit 42.6 40.7 - 50.3 %    MCV 92.4 82.6 - 102.9 fL    MCH 27.8 25.2 - 33.5 pg    MCHC 30.0 28.4 - 34.8 g/dL    RDW 18.5 (H) 11.8 - 14.4 %    Platelets 052 009 - 577 k/uL    MPV 9.8 8.1 - 13.5 fL    NRBC Automated 0.0 0.0 per 100 WBC    Seg Neutrophils 50 36 - 65 %    Lymphocytes 34 24 - 43 %    Monocytes 8 3 - 12 %    Eosinophils % 6 (H) 1 - 4 %    Basophils 1 0 - 2 %    Immature Granulocytes 1 (H) 0 %    Segs Absolute 4.57 1.50 - 8.10 k/uL    Absolute Lymph # 2.99 1.10 - 3.70 k/uL    Absolute Mono # 0.69 0.10 - 1.20 k/uL    Absolute Eos # 0.55 (H) 0.00 - 0.44 k/uL    Basophils Absolute 0.04 0.00 - 0.20 k/uL    Absolute Immature Granulocyte 0.04 0.00 - 0.30 k/uL    RBC Morphology ANISOCYTOSIS PRESENT    Troponin   Result Value Ref Range    Troponin, High Sensitivity 10 0 - 22 ng/L   Brain Natriuretic Peptide   Result Value Ref Range    Pro- <300 pg/mL   Troponin   Result Value Ref Range    Troponin, High Sensitivity 9 0 - 22 ng/L   D-Dimer, Quantitative   Result Value Ref Range    D-Dimer, Quant 1.88 mg/L FEU   EKG 12 Lead   Result Value Ref Range    Ventricular Rate 82 BPM    Atrial Rate 82 BPM    P-R Interval 236 ms    QRS Duration 94 ms    Q-T Interval 384 ms    QTc Calculation (Bazett) 448 ms    P Axis 95 degrees    R Axis -54 degrees    T Axis 64 degrees       IMPRESSION:     RADIOLOGY:  NM LUNG VENT/PERFUSION (VQ)   Final Result   Low probability of pulmonary embolism. No definable segmental or larger   perfusion defect in either lung. Grossly heterogeneous/impaired ventilation in the upper lobes of both lungs   and lingula of left lung, most likely due to, at least moderate diffuse   centrilobular pulmonary emphysema in the lungs as visualized on previous CT   scan. XR CHEST PORTABLE   Final Result   No acute intrathoracic process. EKG      All EKG's are interpreted by the Emergency Department Physician who either signs or Co-signs this chart in the absence of a cardiologist.    EMERGENCY DEPARTMENT COURSE:  72-year-old male presents emergency department shortness of breath. Patient states that the chest pain started about an hour prior to coming into the emergency department. EMS brought the patient in on 2 L of oxygen by nasal cannula, patient does not use oxygen at home. EMS states that the patient was satting in the low 90s at home. On physical exam the patient had diminished breath sounds bilaterally, no wheezing appreciated. Patient was nontoxic, afebrile. The patient states he is wheelchair-bound.   Due to this immobilization and the patient having rapid onset shortness of breath pulmonary embolism is on the differential.  We will obtain a D-dimer in order to evaluate for this. Patient's D-dimer came back elevated, will obtain a CT PE study. Patient lost his IV below his antecubital fossa. The PICC team placed a IV however this IV was above the antecubital fossa, CT tech states that he cannot inject contrast through this IV. Due to poor venous access, we obtained a VQ scan. Will obtain a BMP, came back within normal notes, CBC, came back within normal limits. We obtained a troponin, BNP this came back within normal limits. The patient states he has a past medical history significant for COPD. We will give the patient a DuoNeb breathing treatment. We also give the patient Solu-Medrol. Patient's breathing improved after this treatment. Patient signed out to Dr. Rosalinda Pratt. ED Course as of 12/07/22 1326   Tue Dec 06, 2022   1723 XR CHEST PORTABLE  IMPRESSION:  No acute intrathoracic process. [MW]   1844 72M dyspnea. COPD hx. Duonebs, steroids. CTPE pending [KK]   Wed Dec 07, 2022   0145 Patient reevaluated, feels fine, hemodynamically stable, vitals within normal limits. VQ scan shows low probability for PE, low risk for PE per Kaitlin Race, per up-to-date patient has 4% risk for PE, and in the setting that COPD explains his symptoms, patient was agreeable to discharge and agreed to follow-up with his primary care provider within 24 hours. [address]      ED Course User Index  [KK] Bindu Alfonso DO  [MW] Moe Ulloa MD       No notes of [facility] Admission Criteria type on file. PROCEDURES:      CONSULTS:  IP CONSULT TO IV TEAM  IP CONSULT TO SOCIAL WORK    CRITICAL CARE:      FINAL IMPRESSION      1. Dyspnea, unspecified type    2.  COPD exacerbation [facility])          DISPOSITION / PLAN     DISPOSITION Decision To Discharge 12/07/2022 01:44:26 AM      PATIENT REFERRED TO:  Alicia Tejeda, [address] [facility] [address]  [phone]    Schedule an appointment as soon as possible for a visit in 3 days      [facility]

## 2022-12-06 NOTE — PROGRESS NOTES
After obtaining consent, and per orders of Christian Ferreira CNP, injection of PCV 23 given in Left deltoid by Power Lyons MA. Patient instructed to remain in clinic for 20 minutes afterwards, and to report any adverse reaction to me immediately.

## 2022-12-06 NOTE — ED NOTES
Pt. Arrives to ED via EMS for c/o SOB. Pt states the SOB started two hours ago at home. Pt denies any chest pain, N/V, or abdominal pain. Pt states that he got the pneumonia vaccine this morning and it started after that.        Daisy Capone RN  12/06/22 9914

## 2022-12-06 NOTE — ED PROVIDER NOTES
[facility]  FACULTY HANDOFF       Handoff taken on the following patient from prior Attending Physician:  Pt Name: Sukhi Sheikhfinkel  PCP:  Lashell Bermudez MD    Attestation  I was available and discussed any additional care issues that arose and coordinated the management plans with the resident(s) caring for the patient during my duty period. Any areas of disagreement with resident's documentation of care or procedures are noted on the chart. I was personally present for the key portions of any/all procedures during my duty period. I have documented in the chart those procedures where I was not present during the key portions.           Brittney Mitchell MD  12/06/22 0858

## 2022-12-07 VITALS
RESPIRATION RATE: 23 BRPM | WEIGHT: 175 LBS | TEMPERATURE: 97.9 F | SYSTOLIC BLOOD PRESSURE: 130 MMHG | HEIGHT: 70 IN | DIASTOLIC BLOOD PRESSURE: 72 MMHG | OXYGEN SATURATION: 95 % | BODY MASS INDEX: 25.05 KG/M2 | HEART RATE: 88 BPM

## 2022-12-07 LAB
EKG ATRIAL RATE: 82 BPM
EKG P AXIS: 95 DEGREES
EKG P-R INTERVAL: 236 MS
EKG Q-T INTERVAL: 384 MS
EKG QRS DURATION: 94 MS
EKG QTC CALCULATION (BAZETT): 448 MS
EKG R AXIS: -54 DEGREES
EKG T AXIS: 64 DEGREES
EKG VENTRICULAR RATE: 82 BPM

## 2022-12-07 PROCEDURE — 93010 ELECTROCARDIOGRAM REPORT: CPT | Performed by: INTERNAL MEDICINE

## 2022-12-07 RX ORDER — PREDNISONE 20 MG/1
TABLET ORAL
Qty: 10 TABLET | Refills: 0 | Status: SHIPPED | OUTPATIENT
Start: 2022-12-07 | End: 2022-12-17

## 2022-12-07 NOTE — ED PROVIDER NOTES
[address] [facility]  ED  Emergency Department  Emergency Medicine Resident Turn-Over     Care of Herbert Douglass was assumed from Dr. Laureen Stringer and is being seen for Shortness of Breath  . The patient's initial evaluation and plan have been discussed with the prior provider who initially evaluated the patient.      EMERGENCY DEPARTMENT COURSE / MEDICAL DECISION MAKING:       MEDICATIONS GIVEN:  Orders Placed This Encounter   Medications    OR Linked Order Group     ipratropium-albuterol (DUONEB) nebulizer solution 1 ampule      Order Specific Question:   Initiate RT Bronchodilator Protocol      Answer:   Yes - ED protocol     albuterol (PROVENTIL) nebulizer solution 2.5 mg      Order Specific Question:   Initiate RT Bronchodilator Protocol      Answer:   Yes - ED protocol    AND Linked Order Group     albuterol (PROVENTIL) nebulizer solution 15 mg      Order Specific Question:   Initiate RT Bronchodilator Protocol      Answer:   Yes - ED protocol     ipratropium (ATROVENT) 0.02 % nebulizer solution 0.5 mg      Order Specific Question:   Initiate RT Bronchodilator Protocol      Answer:   Yes - ED protocol    methylPREDNISolone sodium (SOLU-MEDROL) injection 125 mg    HYDROcodone-acetaminophen (NORCO) 5-325 MG per tablet 1 tablet    DISCONTD: iopamidol (ISOVUE-370) 76 % injection 75 mL    atorvastatin (LIPITOR) tablet 40 mg    carvedilol (COREG) tablet 6.25 mg    docusate sodium (COLACE) capsule 100 mg    guaiFENesin (MUCINEX) extended release tablet 600 mg    meclizine (ANTIVERT) tablet 25 mg    technetium albumin aggregated (MAA) solution 5.3 millicurie    technetium 99m DTPA solution 45 millicurie       LABS / RADIOLOGY:     Labs Reviewed   BASIC METABOLIC PANEL - Abnormal; Notable for the following components:       Result Value    Glucose 111 (*)     All other components within normal limits   CBC WITH AUTO DIFFERENTIAL - Abnormal; Notable for the following components:    Hemoglobin 12.8 (*)     RDW 18.5 (*) Eosinophils % 6 (*)     Immature Granulocytes 1 (*)     Absolute Eos # 0.55 (*)     All other components within normal limits   COVID-19, RAPID   TROPONIN   BRAIN NATRIURETIC PEPTIDE   TROPONIN   D-DIMER, QUANTITATIVE       NM LUNG VENT/PERFUSION (VQ)    Result Date: 12/7/2022  Impressions: Low probability of pulmonary embolism. No definable segmental or larger perfusion defect in either lung. Grossly heterogeneous/impaired ventilation in the upper lobes of both lungs and lingula of left lung, most likely due to, at least moderate diffuse centrilobular pulmonary emphysema in the lungs as visualized on previous CT scan. XR CHEST PORTABLE    Result Date: 12/6/2022  EXAMINATION: ONE XRAY VIEW OF THE CHEST 12/6/2022 3:57 pm COMPARISON: 03/16/2022 HISTORY: ORDERING SYSTEM PROVIDED HISTORY: SOB TECHNOLOGIST PROVIDED HISTORY: SOB FINDINGS: Left chest cardiac defibrillator device remains in place. Heart size is within normal limits. No significant vascular congestion. There is improved aeration at the right base when compared to previous radiographs. No new airspace consolidation, pneumothorax, or pleural effusion. No free air beneath the diaphragm. No acute intrathoracic process. RECENT VITALS:     Temp: 97.9 °F (36.6 °C),  Heart Rate: 78, Resp: 25, BP: 129/77, SpO2: 90 %    This patient is a 81 y.o. Male with dyspnea. Has COPD. On 2L at home. Satting 90-92% on 2L here. Could not obtain CTPE due to lack of adequate access required for study. VQ scan shows low probability for PE. Patient has stable vitals, not in acute distress. BNP and trop normal. CXR normal.     Low risk per Wells score for pulmonary embolism  VQ scan shows low probability  Up-to-date review shows 4% risk for PE given history and studies completed today. Patient has follow-up with primary care within 24 hours, agreed to follow-up. Patient's vitals within normal limits appropriate for discharge and outpatient follow-up.     ED Course as of 12/07/22 0309   Tue Dec 06, 2022   1723 XR CHEST PORTABLE  IMPRESSION:  No acute intrathoracic process. [MW]   1844 72M dyspnea. COPD hx. Duonebs, steroids. CTPE pending [KK]   Wed Dec 07, 2022   0145 Patient reevaluated, feels fine, hemodynamically stable, vitals within normal limits. VQ scan shows low probability for PE, low risk for PE per Saulo Lundy, per up-to-date patient has 4% risk for PE, and in the setting that COPD explains his symptoms, patient was agreeable to discharge and agreed to follow-up with his primary care provider within 24 hours. [address]      ED Course User Index  [address] Matute DO  [MW] Jennifer Avendano MD       OUTSTANDING TASKS / RECOMMENDATIONS:    VQ scan   Dispo     FINAL IMPRESSION:     1. Dyspnea, unspecified type        DISPOSITION:         DISPOSITION:  [x]  Discharge   []  Transfer -    []  Admission -     []  Against Medical Advice   []  Eloped   FOLLOW-UP: No follow-up provider specified.    DISCHARGE MEDICATIONS: New Prescriptions    No medications on file           Viki Allen DO  Emergency Medicine Resident  [place], [place]  Resident  12/07/22 2648

## 2022-12-07 NOTE — ED NOTES
The following labs were labeled with patient stickers & tubed to lab;    []Lavender   []On Ice  []Blue  []Green/ Yellow  []Green/ Black []On Ice  []Pink  []Red  []Yellow    [x]COVID-19 Swab [x]Rapid    []Urine Sample  []Pelvic Cultures    []Blood Cultures       Flavio Dickson RN  12/06/22 4445

## 2022-12-07 NOTE — ED NOTES
[facility]  ETA [id number]     Tonny Diez [facility]  12/07/22 9916

## 2022-12-07 NOTE — DISCHARGE INSTRUCTIONS
You have been seen in the ER today for shortness of breath. Your studies showed low risk for a blood clot in your lungs, your symptoms are likely due to your COPD. Please follow-up with your primary care provider within 3 days, sooner if you have concerns. If you begin to experience any symptoms such as chest pain shortness of breath nausea vomiting dizziness drowsiness abdominal pain loss of consciousness or any other symptoms you find concerning please return to the ED for follow-up evaluation. If you have been given pain medication please take them only as prescribed for the your symptoms. Do not take more medication than recommended at any given time. Please follow-up with your primary care provider within 5 to 7 days for continued care, sooner if you have concerns.

## 2022-12-07 NOTE — ED NOTES
Unable to obtain IVC that is capable of CTA per CT tech. Nuclear medicine paged.      Yvonne Koenig RN  12/06/22 2033

## 2022-12-07 NOTE — ED NOTES
Patient assisted with getting dressed and into his wheelchair. Patient waiting for wheelchair transport arranged by Bailey Fox at this time. ETA 30 min.      Jami Betancourt RN  12/07/22 7381

## 2022-12-07 NOTE — ED NOTES
Patient placed on 2L O2 via NC for oxygen saturation of 89% on RA.      Steffen Walton RN  12/06/22 2127

## 2022-12-07 NOTE — ED NOTES
Dr Hernandes Petties at bedside to update patient on results and plan of care.      Ruperto Malcolm RN  12/07/22 6875

## 2022-12-07 NOTE — ED NOTES
Patient is resting on cart, RR even and unlabored. Side rails up x2, call light within reach, will continue with plan of care.      Benson Leonardo RN  12/07/22 2453

## 2022-12-07 NOTE — ED NOTES
Interrogated pts Medtronic defibrillator       [place], [address] [place] [address]  12/06/22 1315

## 2022-12-07 NOTE — ED NOTES
Patient given boxed lunch and juice. Await VQ scan results at this time.      Jami Betancourt RN  12/07/22 3086

## 2022-12-07 NOTE — ED NOTES
Nuclear medicine putting medication orders in and en route per Blake with nuc med.      Cheyenne Liu, ROHIT  12/06/22 1213

## 2022-12-08 ENCOUNTER — CARE COORDINATION (OUTPATIENT)
Dept: CARE COORDINATION | Age: 72
End: 2022-12-08

## 2022-12-08 SDOH — ECONOMIC STABILITY: INCOME INSECURITY: IN THE LAST 12 MONTHS, WAS THERE A TIME WHEN YOU WERE NOT ABLE TO PAY THE MORTGAGE OR RENT ON TIME?: NO

## 2022-12-08 SDOH — HEALTH STABILITY: PHYSICAL HEALTH: ON AVERAGE, HOW MANY MINUTES DO YOU ENGAGE IN EXERCISE AT THIS LEVEL?: 20 MIN

## 2022-12-08 SDOH — ECONOMIC STABILITY: HOUSING INSECURITY
IN THE LAST 12 MONTHS, WAS THERE A TIME WHEN YOU DID NOT HAVE A STEADY PLACE TO SLEEP OR SLEPT IN A SHELTER (INCLUDING NOW)?: NO

## 2022-12-08 SDOH — HEALTH STABILITY: PHYSICAL HEALTH: ON AVERAGE, HOW MANY DAYS PER WEEK DO YOU ENGAGE IN MODERATE TO STRENUOUS EXERCISE (LIKE A BRISK WALK)?: 2 DAYS

## 2022-12-08 SDOH — ECONOMIC STABILITY: HOUSING INSECURITY: IN THE LAST 12 MONTHS, HOW MANY PLACES HAVE YOU LIVED?: 1

## 2022-12-08 SDOH — ECONOMIC STABILITY: TRANSPORTATION INSECURITY
IN THE PAST 12 MONTHS, HAS THE LACK OF TRANSPORTATION KEPT YOU FROM MEDICAL APPOINTMENTS OR FROM GETTING MEDICATIONS?: NO

## 2022-12-08 SDOH — ECONOMIC STABILITY: TRANSPORTATION INSECURITY
IN THE PAST 12 MONTHS, HAS LACK OF TRANSPORTATION KEPT YOU FROM MEETINGS, WORK, OR FROM GETTING THINGS NEEDED FOR DAILY LIVING?: NO

## 2022-12-08 ASSESSMENT — SOCIAL DETERMINANTS OF HEALTH (SDOH)
HOW OFTEN DO YOU ATTEND CHURCH OR RELIGIOUS SERVICES?: NEVER
DO YOU BELONG TO ANY CLUBS OR ORGANIZATIONS SUCH AS CHURCH GROUPS UNIONS, FRATERNAL OR ATHLETIC GROUPS, OR SCHOOL GROUPS?: NO
IN A TYPICAL WEEK, HOW MANY TIMES DO YOU TALK ON THE PHONE WITH FAMILY, FRIENDS, OR NEIGHBORS?: TWICE A WEEK
HOW OFTEN DO YOU GET TOGETHER WITH FRIENDS OR RELATIVES?: MORE THAN THREE TIMES A WEEK
HOW OFTEN DO YOU ATTENT MEETINGS OF THE CLUB OR ORGANIZATION YOU BELONG TO?: NEVER

## 2022-12-08 ASSESSMENT — LIFESTYLE VARIABLES
HOW MANY STANDARD DRINKS CONTAINING ALCOHOL DO YOU HAVE ON A TYPICAL DAY: 1 OR 2
HOW OFTEN DO YOU HAVE A DRINK CONTAINING ALCOHOL: MONTHLY OR LESS

## 2022-12-08 ASSESSMENT — ENCOUNTER SYMPTOMS: DYSPNEA ASSOCIATED WITH: EXERTION

## 2022-12-08 NOTE — CARE COORDINATION
Ambulatory Care Coordination Note  12/8/2022    ACC: Timi De La Paz RN    ED visit for shortness of breath, COPD exacerbation. Prescribed prednisone, pharmacy will deliver to him today. Feels better today after received treatments in ED. Symptoms started after moving around alexandra sheets that covered a window. No wheezing. Checks pulse ox a few times a day, always mid to high 90s. Received pneumonia vaccine earlier yesterday at PCP office. Enrolled in care management. COPD- he is not sure of what inhalers he uses, stated one is a discus. Has a rescue inhaler, rarely uses. Does not wear oxygen, does not have a nebulizer, does not follow with pulmonology. Smokes a few cigarettes a day. 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 Doesn't weigh self at home. History of falls. Had right hip and femur fracture March 2022, had ORIF and nail, went to a rehab facility. Not very mobile, learning now to use walker. Gets PT, OT and nursing visits from [facility]. History of alcohol abuse, stated only occasionally now, 1-2 beers. Lives by himself, closest son is in [place]. Other children, he's not sure where they're at. Is a manager of rental house next door, he lets a man named Halima Laura live there in exchange for helping him at home with housework, helping in get up, does his grocery shopping. He gets meals delivered and able to prepare other meals himself. He would like an aide to help with bathing. He uses [facility] per [place]. Can't walk more than a few steps so in wheelchair most of time. He is new to [facility] PCP. He didn't want to talk long. Writer called [facility], nurse will set up aide visits for bathing. She was unaware of new PCP, will fax plan of care to office which will have complete med list.    Offered patient enrollment in the Remote Patient Monitoring (RPM) program for in-home monitoring: Yes, but did not enroll at this time.    CC Plan: -Follow up next week to review symptoms, review medications, ACP, address CHF Initiative, RPM.  Congestive Heart Failure Assessment    Are you currently restricting fluids?: No Restriction  Do you understand a low sodium diet?: Yes  Do you understand how to read food labels?: No  How many restaurant meals do you eat per week?: 0  Do you salt your food before tasting it?: No         Symptoms:  CHF associated dyspnea on exertion: Pos      Symptom course: stable  Weight trend:  (Comment: doesn't weigh at home)      and   COPD Assessment    Does the patient understand envrionmental exposure?: Yes  Does the patient have a nebulizer?: No  Does the patient use a space with inhaled medications?: No            Symptoms:     Symptom course: stable  Breathlessness: exertion  Increase use of rapid acting/rescue inhaled medications?: No  Change in chronic cough?: No/At Baseline  Sputum characteristics: Unable to Specify  Self Monitoring - SaO2: Yes  Baseline SaO2 Readin (Comment: room air)  Have you had a recent diagnosis of pneumonia either by PCP or at a hospital?: No           Ambulatory Care Coordination Assessment    Care Coordination Protocol  Referral from Primary Care Provider: No  Week 1 - Initial Assessment     Do you have all of your prescriptions and are they filled?: Yes  Barriers to medication adherence: None  Are you able to afford your medications?: Yes  How often do you have trouble taking your medications the way you have been told to take them?: I always take them as prescribed. Do you have Home O2 Therapy?: No      Ability to seek help/take action for Emergent Urgent situations i.e. fire, crime, inclement weather or health crisis. : Independent  Ability to ambulate to restroom: Independent  Ability handle personal hygeine needs (bathing/dressing/grooming): Needs Assistance  Ability to manage Medications: Needs Assistance  Ability to prepare Food Preparation: Needs Assistance  Ability to maintain home (clean home, laundry): Dependent  Ability to drive and/or has transportation: Dependent  Ability to do shopping: Dependent  Ability to manage finances: Needs Assistance  Is patient able to live independently?: Yes     Current Housing: Private Residence        Per the Fall Risk Screening, did the patient have 2 or more falls or 1 fall with injury in the past year?: Yes  How often do you think you are about to fall and you do NOT fall? For example, you grab something to stabilize yourself or hold onto a wall/furniture?: Rarely  Use of a Mobility Aid: Yes  Difficulty walking/impaired gait: Yes  Issues with feet or shoes like numbness, edema, shoes not fitting: No  Changes in vision, poor vision or poor lighting in environment: No  Dizziness: Yes     Frequent urination at night?: No  Do you use rails/bars?: Yes  Do you have a non-slip tub mat?: Yes     Are you experiencing loss of meaning?: No  Are you experiencing loss of hope and peace?: No     Thinking about your patient's physical health needs, are there any symptoms or problems (risk indicators) you are unsure about that require further investigation?: No identified areas of uncertainly or problems already being investigated   Are the patients physical health problems impacting on their mental well-being?: Mild impact on mental well-being e.g. \"\"feeling fed-up\"\", \"\"reduced enjoyment\"\"   Do you have any other concerns about your patients mental well-being?  How would you rate their severity and impact on the patient?: Mild problems - don't interfere with function   How would you rate their home environment in terms of safety and stability (including domestic violence, insecure housing, neighbor harassment)?: Safe, stable, but with some inconsistency   How do daily activities impact on the patient's well-being? (include current or anticipated unemployment, work, caregiving, access to transportation or other): Some general dissatisfaction but no concern   How would you rate their social network (family, work, friends)?: Restricted participation with some degree of social isolation   How would you rate their financial resources (including ability to afford all required medical care)?: Financially secure, some resource challenges   How wells does the patient now understand their health and well-being (symptoms, signs or risk factors) and what they need to do to manage their health?: Reasonable to good understanding but do not feel able to engage with advice at this time   How well do you think your patient can engage in healthcare discussions? (Barriers include language, deafness, aphasia, alcohol or drug problems, learning difficulties, concentration): Clear and open communication, no identified barriers   Do other services need to be involved to help this patient?: Other care/services in place and adequate   Are current services involved with this patient well-coordinated? (Include coordination with other services you are now recommendation): All required care/services in place and well-coordinated   Suggested Interventions and Community Resources                  Prior to Admission medications    Medication Sig Start Date End Date Taking? Authorizing Provider   predniSONE (DELTASONE) 20 MG tablet Take 2 tablets by mouth once daily for 5 days 12/7/22 12/17/22  Prema Araujo,    zoster recombinant adjuvanted vaccine [facility]) 50 MCG/0.5ML SUSR injection Inject 0.5 mLs into the muscle See Admin Instructions 1 dose now and repeat in 2-6 months 12/5/22 6/3/23  Alicia Linares MD   Pramoxine HCl ([facility]) 1 % CREA Apply 1 applicator topically daily as needed (Dry itchy skin) 11/17/22   TIFFANIE Reynolds NP   hydrocortisone 2.5 % ointment Apply topically 2 times daily.  11/16/22   Alicia Linares MD   fluticasone [facility]) 50 MCG/ACT nasal spray 1 spray by Nasal route daily 11/16/22   Alicia Linares MD   ferrous sulfate (IRON 325) 325 (65 Fe) MG tablet Take 1 tablet by mouth daily (with breakfast) 11/16/22   Alicia Hong MD   diclofenac sodium (VOLTAREN) 1 % GEL Apply 4 g topically 4 times daily 11/16/22   Alicia Hong MD   Armodafinil (NUVIGIL) 150 MG TABS tablet Take 150 mg by mouth daily. Historical Provider, MD   Magnesium Oxide (MAGNESIUM-OXIDE) 250 MG TABS tablet Take 1 tablet by mouth daily 11/14/22   Alicia Hong MD   potassium chloride (KLOR-CON M) 20 MEQ extended release tablet Take 20 mEq by mouth daily    Historical Provider, MD   meclizine (ANTIVERT) 25 MG tablet Take 1 tablet by mouth 3 times daily as needed for Dizziness 11/14/22   Alicia Hong MD   docusate sodium (COLACE) 100 MG capsule Take 1 capsule by mouth 2 times daily 11/14/22   Alicia Hong MD   carvedilol (COREG) 6.25 MG tablet Take 1 tablet by mouth 2 times daily (with meals) 11/14/22   Alicia Hong MD   mometasone (NASONEX) 50 MCG/ACT nasal spray 2 sprays by Nasal route daily 11/14/22   Alicia Hong MD   HYDROcodone-acetaminophen (NORCO) 5-325 MG per tablet Take 1 tablet by mouth every 6 hours as needed for Pain for up to 30 days.  11/14/22 12/14/22  Alicia Hong MD   levothyroxine (SYNTHROID) 50 MCG tablet Take 1 tablet by mouth Daily 3/11/22   Nilo Dixon DO   guaiFENesin [facility]) 600 MG extended release tablet Take 2 tablets by mouth 2 times daily 3/10/22   iNlo Dixon DO   amiodarone (CORDARONE) 200 MG tablet Take by mouth    Historical Provider, MD   fluticasone propionate (FLOVENT) 50 MCG/BLIST AEPB inhaler Inhale into the lungs daily    Historical Provider, MD   atorvastatin (LIPITOR) 40 MG tablet Take 1 tablet by mouth nightly 11/10/15   Noy Patel MD   clopidogrel (PLAVIX) 75 MG tablet Take 1 tablet by mouth daily 11/10/15   Noy Patel MD   furosemide (LASIX) 20 MG tablet Take 1 tablet by mouth daily 11/10/15   Noy Patel MD   [address] Adult diapers dispense quantity allowed by insurance 3/31/15   Iris Lynn PA-C   PROAIR  (90 BASE) MCG/ACT inhaler inhale 2 puffs every 4 to 6 hours if needed 11/29/14   Iris Lynn PA-C   folic acid (FOLVITE) 1 MG tablet Take 1 mg by mouth daily. Historical Provider, MD   Multiple Vitamin (MULTIVITAMIN PO) Take  by mouth.       Historical Provider, MD       Future Appointments   Date Time Provider Dana Rueda   12/19/2022  2:00 PM Mamie GAVIRIA [facility]   2/14/2023  2:15 PM Alicia Mendoza MD [facility] BRENT Beth

## 2022-12-09 DIAGNOSIS — S72.453S SUPRACONDYLAR FRACTURE OF DISTAL END OF FEMUR WITHOUT INTRACONDYLAR EXTENSION, SEQUELA: ICD-10-CM

## 2022-12-10 LAB
ALK PHOS BONE SPECIFIC: 68 U/L (ref 0–55)
ALK PHOS OTHER CALC: 0 U/L
ALK PHOSPHATASE: 155 U/L (ref 40–120)
ALKALINE PHOSPHATASE LIVER FRACTION: 87 U/L (ref 0–94)

## 2022-12-12 ENCOUNTER — PATIENT MESSAGE (OUTPATIENT)
Dept: FAMILY MEDICINE CLINIC | Age: 72
End: 2022-12-12

## 2022-12-12 DIAGNOSIS — Z87.81 HISTORY OF HIP FRACTURE: ICD-10-CM

## 2022-12-12 RX ORDER — HYDROCODONE BITARTRATE AND ACETAMINOPHEN 5; 325 MG/1; MG/1
1 TABLET ORAL EVERY 6 HOURS PRN
Qty: 120 TABLET | Refills: 0 | Status: SHIPPED | OUTPATIENT
Start: 2022-12-12 | End: 2023-01-11

## 2022-12-12 NOTE — TELEPHONE ENCOUNTER
Lucy Coto is calling to request a refill on the following medication(s):    Medication Request:  Requested Prescriptions     Pending Prescriptions Disp Refills    diclofenac sodium (VOLTAREN) 1 % GEL [Pharmacy Med Name: DICLOFENAC SODIUM 1% GEL] 100 g 0     Sig: USE AS DIRECTED 4 TIMES A DAY       Last Visit Date (If Applicable):  94/75/8004    Next Visit Date:    2/14/2023

## 2022-12-12 NOTE — TELEPHONE ENCOUNTER
Needs UDS
Would you like him to come in for a nurse visit for UDS or wait until next visit.
sequela     Closed fracture of right distal femur (HCC)     Presence of permanent cardiac pacemaker     Anemia, normocytic normochromic     History of hip fracture     Atelectasis     Acute postoperative anemia due to expected blood loss     Chronic systolic (congestive) heart failure

## 2022-12-13 NOTE — TELEPHONE ENCOUNTER
From: Yulissa Theodore Comes  To: Dr. Shara Cockayne  Sent: 12/12/2022 6:45 PM EST  Subject: MY PERSONAL UPDATE     SINCE I WAS IN THE HOSPITAL I'VE COME AWARE OF SOME OF MY PROBLEMS  1 I'VE GOT TO BE CAREFUL WITH MAJOR DUST AND CHEMICALS ( I DIDN'T KNOW WHEN I CAME HOME ( MY HOUSE WAS A BREATHING DISASTER)  2 MY PHYSICAL THERAPIST STARTED A NEW CARDIO EXERCISES THAT I'VE NEVER HAD B4 EVEN AFTER MY HEART ATTACK  IT'S GREATLY IMPROVING MY BREATHING AND ENDURANCE  I JUST WANT TO Raymon 66 YOU FOR FINALLY GETTING ME ON THE RIGHT TRACK SINCERELY  Daphne [address] ( [address] )

## 2022-12-16 ENCOUNTER — CARE COORDINATION (OUTPATIENT)
Dept: CARE COORDINATION | Age: 72
End: 2022-12-16

## 2022-12-16 NOTE — CARE COORDINATION
Ambulatory Care Coordination Note  2022    ACC: Eliecer Douglass, ROHIT    He is feeling better, feels therapy is very beneficial and he feels like can do more than he used to. Tired right now though, didn't want to talk long, wants to take a  nap. CHF is stable, he weighed himself a few times last week. Heart Failure Education outreach Date/Time: 2022 3:58 PM    Ambulatory Care Manager (ACM) contacted the patient by telephone to perform Ambulatory Care Coordination. Verified name and  with patient as identifiers. Provided introduction to self, and explanation of the Ambulatory Care Manager's role. ACM reviewed that a Health Healthy tips for the Holiday packet has been sent to [employer]. ACM reviewed CHF zones, daily weights, the importance of low sodium diet, and healthy tips packet with the patient. Instructed patient to call their PCP if they have a weight gain of 3 lbs in 2 days or 5 lbs in a week. Patient reminded that there is a physician on call 24 hours a day / 7 days a week should the patient have questions or concerns. The patient verbalized understanding. Offered patient enrollment in the Remote Patient Monitoring (RPM) program for in-home monitoring: Patient declined. CC Plan: Will follow up again in a week to review medications, ACP.   Congestive Heart Failure Assessment    Are you currently restricting fluids?: No Restriction  Do you understand a low sodium diet?: Yes  Do you understand how to read food labels?: No  How many restaurant meals do you eat per week?: 0  Do you salt your food before tasting it?: No         Symptoms:  CHF associated dyspnea on exertion: Pos      Symptom course: stable  Patient-reported weight (lb): 160  Weight trend: stable  Salt intake watch compared to last visit: stable      and   COPD Assessment    Does the patient understand envrionmental exposure?: Yes  Does the patient have a nebulizer?: No  Does the patient use a space with inhaled medications?: No            Symptoms:                Lab Results       None            Care Coordination Interventions    Referral from Primary Care Provider: No  Suggested Interventions and JOVANNY Balderas Support: Completed          Goals Addressed                   This Visit's Progress     Conditions and Symptoms   On track     I will schedule office visits, as directed by my provider. I will keep my appointment or reschedule if I have to cancel. I will notify my provider of any barriers to my plan of care. I will follow my Zone Management tool to seek urgent or emergent care. I will notify my provider of any symptoms that indicate a worsening of my condition. COPD, CHF    Barriers: lack of motivation, overwhelmed by complexity of regimen, and lack of education  Plan for overcoming my barriers: ACM calls, home health visits, education  Confidence: 7/10  Anticipated Goal Completion Date: 3/10/23                Prior to Admission medications    Medication Sig Start Date End Date Taking? Authorizing Provider   HYDROcodone-acetaminophen (NORCO) 5-325 MG per tablet Take 1 tablet by mouth every 6 hours as needed for Pain for up to 30 days. 12/12/22 1/11/23  Alicia Bear MD   diclofenac sodium (VOLTAREN) 1 % GEL USE AS DIRECTED 4 TIMES A DAY 12/12/22   Alicia Bear MD   predniSONE (DELTASONE) 20 MG tablet Take 2 tablets by mouth once daily for 5 days 12/7/22 12/17/22  Cherelle Salgado,    zoster recombinant adjuvanted vaccine [facility]) 50 MCG/0.5ML SUSR injection Inject 0.5 mLs into the muscle See Admin Instructions 1 dose now and repeat in 2-6 months 12/5/22 6/3/23  Alicia Bear MD   Pramoxine HCl ([facility]) 1 % CREA Apply 1 applicator topically daily as needed (Dry itchy skin) 11/17/22   TIFFANIE Moy NP   hydrocortisone 2.5 % ointment Apply topically 2 times daily.  11/16/22   Alicia Bear MD   fluticasone (FLONASE) 50 MCG/ACT nasal spray 1 spray by Nasal route daily 11/16/22   Alicia Mireles MD   ferrous sulfate (IRON 325) 325 (65 Fe) MG tablet Take 1 tablet by mouth daily (with breakfast) 11/16/22   Alicia Mireles MD   Armodafinil (NUVIGIL) 150 MG TABS tablet Take 150 mg by mouth daily.     Historical Provider, MD   Magnesium Oxide (MAGNESIUM-OXIDE) 250 MG TABS tablet Take 1 tablet by mouth daily 11/14/22   Alicia Mireles MD   potassium chloride (KLOR-CON M) 20 MEQ extended release tablet Take 20 mEq by mouth daily    Historical Provider, MD   meclizine (ANTIVERT) 25 MG tablet Take 1 tablet by mouth 3 times daily as needed for Dizziness 11/14/22   Alicia Mireles MD   docusate sodium (COLACE) 100 MG capsule Take 1 capsule by mouth 2 times daily 11/14/22   Alicia Mireles MD   carvedilol (COREG) 6.25 MG tablet Take 1 tablet by mouth 2 times daily (with meals) 11/14/22   Alicia Mireles MD   mometasone (NASONEX) 50 MCG/ACT nasal spray 2 sprays by Nasal route daily 11/14/22   Alicia Mireles MD   levothyroxine (SYNTHROID) 50 MCG tablet Take 1 tablet by mouth Daily 3/11/22   Jet Haywood DO   guaiFENesin [facility]) 600 MG extended release tablet Take 2 tablets by mouth 2 times daily 3/10/22   Jet Haywood DO   amiodarone (CORDARONE) 200 MG tablet Take by mouth    Historical Provider, MD   fluticasone propionate (FLOVENT) 50 MCG/BLIST AEPB inhaler Inhale into the lungs daily    Historical Provider, MD   atorvastatin (LIPITOR) 40 MG tablet Take 1 tablet by mouth nightly 11/10/15   Elisa Meza MD   clopidogrel (PLAVIX) 75 MG tablet Take 1 tablet by mouth daily 11/10/15   Elisa Meza MD   furosemide (LASIX) 20 MG tablet Take 1 tablet by mouth daily 11/10/15   Elisa Meza MD   Diapers & Supplies [address] [facility] Adult diapers dispense quantity allowed by insurance 3/31/15   Iris Lynn PA-C   PROAIR  (90 BASE) MCG/ACT inhaler inhale 2 puffs every 4 to 6 hours if needed 11/29/14   Iris Lynn PA-C   folic acid (FOLVITE) 1 MG tablet Take 1 mg by mouth daily. Historical Provider, MD   Multiple Vitamin (MULTIVITAMIN PO) Take  by mouth.       Historical Provider, MD       Future Appointments   Date Time Provider Dana Rueda   12/19/2022  2:00 PM Mamie GAVIRIA MHTOLPP   2/14/2023  2:15 PM Alicia Serrano MD [facility] [address] [facility]

## 2022-12-19 ENCOUNTER — OFFICE VISIT (OUTPATIENT)
Dept: ORTHOPEDIC SURGERY | Age: 72
End: 2022-12-19
Payer: COMMERCIAL

## 2022-12-19 VITALS — HEIGHT: 70 IN | BODY MASS INDEX: 25.05 KG/M2 | WEIGHT: 175 LBS

## 2022-12-19 DIAGNOSIS — S72.141D CLOSED DISPLACED INTERTROCHANTERIC FRACTURE OF RIGHT FEMUR WITH ROUTINE HEALING, SUBSEQUENT ENCOUNTER: Primary | ICD-10-CM

## 2022-12-19 PROCEDURE — 1123F ACP DISCUSS/DSCN MKR DOCD: CPT | Performed by: PHYSICIAN ASSISTANT

## 2022-12-19 PROCEDURE — 99214 OFFICE O/P EST MOD 30 MIN: CPT | Performed by: PHYSICIAN ASSISTANT

## 2022-12-20 DIAGNOSIS — Z87.81 HISTORY OF HIP FRACTURE: ICD-10-CM

## 2022-12-20 ASSESSMENT — ENCOUNTER SYMPTOMS
COLOR CHANGE: 0
COUGH: 0
VOMITING: 0
SHORTNESS OF BREATH: 0

## 2022-12-20 NOTE — PROGRESS NOTES
[address]  [address] [facility] [address]  Dept: [phone]  Dept Fax: [phone]        Postoperative follow-up note    Subjective:   Jamey Henson is a 81y.o. year old male who presents to our office today for postoperative followup regarding his   1. Closed displaced intertrochanteric fracture of right femur with routine healing, subsequent encounter        Chief Complaint   Patient presents with    Hip Pain     DOS: 3/8/2022 Right hip TFN, ORIF right hip         Date of Surgery: 3/8/2022    Jamey Henson  is a 81y.o. year old male who presents to our office today for postoperative follow up after having undergone a right hip TFN insertion with open reduction and internal fixation of the right hip due to an intertrochanteric fracture completed on 3/8/2022 by Dr. Jimmie Wolfe DO. The patient is currently still in outpatient physical therapy that has been intermittent in nature since his initial surgery. Patient has not been evaluated in our office since 4/1/2022. He notes continued lower extremity weakness and intermittent discomfort. Patient notes a mild pinching sensation within the right hip that is intermittent in nature and is here today for follow-up. He denies skin redness, swelling, fever, chills, nausea, vomiting or diarrhea. Patient is currently taking Norco 5-325 mg, 1 tablet every 6 hours as needed for pain as prescribed by his primary care physician. Review of Systems   Constitutional:  Negative for activity change and fever. HENT:  Negative for sneezing. Respiratory:  Negative for cough and shortness of breath. Cardiovascular:  Negative for chest pain. Gastrointestinal:  Negative for vomiting. Musculoskeletal:  Positive for arthralgias (Right hip). Negative for joint swelling and myalgias. Skin:  Negative for color change. Neurological:  Negative for weakness and numbness. Psychiatric/Behavioral:  Negative for sleep disturbance. Objective :   General: Douglas Briseno is a 81 y.o. male who is alert and oriented and sitting comfortably in our office. Ortho Exam  MS: Patient arrives seated in a wheelchair but was able to stand upon request.  Evaluation of the right hip reveals well-healed surgical incisions on the anterior lateral aspect of the right hip and down the lateral aspect of the right thigh. There is no erythema, wound dehiscence or gross signs of infection noted to the right hip or thigh. Patient is able to complete a seated march but notes some limitations in range of motion. Patient has full range of motion of the right knee and ankle without discomfort noted. Mild tenderness noted to the lateral aspect of the right hip over the greater trochanteric bursa region. Sensation is intact to light touch to the right lower extremity without focal deficits present. The skin is noted to be warm with brisk capillary fill distally noted on the right foot. Neuro: alert. oriented  Eyes: Extra-ocular muscles intact  Mouth: Oral mucosa moist. No perioral lesions  Pulm: Respirations unlabored and regular. Skin: warm, well perfused  Psych:   Patient has good fund of knowledge and displays understanging of exam, diagnosis, and plan. Radiology:  Please refer to radiology read from 12/20/2022 for most recent imaging result. Assessment:      1. Closed displaced intertrochanteric fracture of right femur with routine healing, subsequent encounter         Plan:      Patient is here today in follow-up for his right hip after undergoing a right hip TFN insertion with open reduction and internal fixation of the right hip due to an intertrochanteric femur fracture. Surgery was completed on 3/8/2022 by Dr. Erasto Rosado DO. Patient was last evaluated in our office on 4/1/2022 and has not returned for postoperative evaluation since that time.   Patient notes that mild increase in right hip discomfort with a pinching type sensation within the right hip along with diffuse discomfort along the greater trochanteric region of the right hip. Patient does have muscular weakness in the lower extremities therefore I recommended that he continue formal outpatient physical therapy working on lower extremity strengthening, balance and mobility and gait training. I discussed with the patient that if he has continued right lateral hip pain at his next appointment that he should mention it and we can discuss further treatment options. Patient is to follow-up in 6 to 8 weeks after completing formal physical therapy. He was instructed to call our office with any questions or concerns. He noted his understanding. Follow up: Return in about 6 weeks (around 1/30/2023) for re-evaluation. No orders of the defined types were placed in this encounter. No orders of the defined types were placed in this encounter. This note is created with the assistance of a speech recognition program.  While intending to generate a document that actually reflects the content of the visit, the document can still have some errors including those of syntax and sound a like substitutions which may escape proof reading. In such instances, actual meaning can be extrapolated by contextual diversion.      Electronically signed by Nuvia Hardin PA-C on 12/20/2022 at 3:46 PM

## 2022-12-20 NOTE — TELEPHONE ENCOUNTER
Last visit: 11/14/22  Last Med refill: 11/16/22, 12/12/22  Does patient have enough medication for 72 hours: No: patient states these only last him 1 week    Next Visit Date:  Future Appointments   Date Time Provider Dana Rueda   2/1/2023  1:45 PM César Stephens   2/14/2023  2:15 PM Alicia Us  [address] [facility]   Topic Date Due    Shingles vaccine (1 of 2) Never done    AAA screen  Never done    Annual Wellness Visit (AWV)  Never done    A1C test (Diabetic or Prediabetic)  02/19/2023    Lipids  02/19/2023    Depression Screen  11/14/2023    Colorectal Cancer Screen  06/16/2025    DTaP/Tdap/Td vaccine (2 - Td or Tdap) 03/23/2030    Flu vaccine  Completed    Pneumococcal 65+ years Vaccine  Completed    COVID-19 Vaccine  Completed    Hepatitis C screen  Completed    Hepatitis A vaccine  Aged Out    Hib vaccine  Aged Out    Meningococcal (ACWY) vaccine  Aged Out       Hemoglobin A1C (%)   Date Value   02/19/2022 6.0   05/05/2020 6.4 (H)   05/03/2018 5.4             ( goal A1C is < 7)   No results found for: LABMICR  LDL Cholesterol (mg/dL)   Date Value   02/19/2022 97   05/05/2020 56       (goal LDL is <100)   AST (U/L)   Date Value   11/14/2022 28     ALT (U/L)   Date Value   11/14/2022 15     BUN (mg/dL)   Date Value   12/06/2022 16     BP Readings from Last 3 Encounters:   12/07/22 130/72   11/14/22 (!) 140/90   03/20/22 136/67          (goal 120/80)    All Future Testing planned in [facility]  Lab Frequency Next Occurrence   XR FEMUR RIGHT (MIN 2 VIEWS) Once 12/19/2022               Patient Active Problem List:     Meniere's disease     Narcolepsy     PND (post-nasal drip)     Transaminasemia     Cardiomyopathy ([address]     HTN (hypertension)     Chronic obstructive pulmonary disease ([address]     Dementia with behavioral disturbance ([address]     S/p bare metal coronary artery stent     Supracondylar fracture of distal end of femur without intracondylar extension, sequela     Closed fracture of right distal femur (HCC)     Presence of permanent cardiac pacemaker     Anemia, normocytic normochromic     History of hip fracture     Atelectasis     Acute postoperative anemia due to expected blood loss     Chronic systolic (congestive) heart failure

## 2022-12-21 DIAGNOSIS — G47.429 NARCOLEPSY DUE TO UNDERLYING CONDITION WITHOUT CATAPLEXY: Primary | ICD-10-CM

## 2022-12-21 RX ORDER — ARMODAFINIL 150 MG/1
TABLET ORAL
Qty: 30 TABLET | Refills: 1 | Status: SHIPPED | OUTPATIENT
Start: 2022-12-21 | End: 2023-01-20

## 2022-12-21 NOTE — TELEPHONE ENCOUNTER
Last visit: 11/14/2022  Last Med refill: UNKNOWN  Does patient have enough medication for 72 hours: No:     Next Visit Date:  Future Appointments   Date Time Provider Dana Rueda   2/1/2023  1:45 PM Obie Brar DO Sports Med [facility]   2/14/2023  2:15 PM Alicia Jones  [address] Maintenance   Topic Date Due    Shingles vaccine (1 of 2) Never done    AAA screen  Never done    Annual Wellness Visit (AWV)  Never done    A1C test (Diabetic or Prediabetic)  02/19/2023    Lipids  02/19/2023    Depression Screen  11/14/2023    Colorectal Cancer Screen  06/16/2025    DTaP/Tdap/Td vaccine (2 - Td or Tdap) 03/23/2030    Flu vaccine  Completed    Pneumococcal 65+ years Vaccine  Completed    COVID-19 Vaccine  Completed    Hepatitis C screen  Completed    Hepatitis A vaccine  Aged Out    Hib vaccine  Aged Out    Meningococcal (ACWY) vaccine  Aged Out       Hemoglobin A1C (%)   Date Value   02/19/2022 6.0   05/05/2020 6.4 (H)   05/03/2018 5.4             ( goal A1C is < 7)   No results found for: LABMICR  LDL Cholesterol (mg/dL)   Date Value   02/19/2022 97   05/05/2020 56       (goal LDL is <100)   AST (U/L)   Date Value   11/14/2022 28     ALT (U/L)   Date Value   11/14/2022 15     BUN (mg/dL)   Date Value   12/06/2022 16     BP Readings from Last 3 Encounters:   12/07/22 130/72   11/14/22 (!) 140/90   03/20/22 136/67          (goal 120/80)    All Future Testing planned in [facility]  Lab Frequency Next Occurrence               Patient Active Problem List:     Meniere's disease     Narcolepsy     PND (post-nasal drip)     Transaminasemia     Cardiomyopathy ([address]     HTN (hypertension)     Chronic obstructive pulmonary disease ([address]     Dementia with behavioral disturbance ([address]     S/p bare metal coronary artery stent     Supracondylar fracture of distal end of femur without intracondylar extension, sequela     Closed fracture of right distal femur (HCC)     Presence of permanent cardiac pacemaker     Anemia, normocytic normochromic     History of hip fracture     Atelectasis     Acute postoperative anemia due to expected blood loss     Chronic systolic (congestive) heart failure

## 2022-12-28 ENCOUNTER — CARE COORDINATION (OUTPATIENT)
Dept: CARE COORDINATION | Age: 72
End: 2022-12-28

## 2022-12-28 ASSESSMENT — ENCOUNTER SYMPTOMS: DYSPNEA ASSOCIATED WITH: EXERTION

## 2022-12-28 NOTE — CARE COORDINATION
Ambulatory Care Coordination Note  12/28/2022    ACC: Marysol Camacho, RN    He is ambulating more, longer distances than past several months since hip surgery,not as sedentary. No falls. Feels good. Getting PT, OT, aide and RN visits from [address]. He started taking a Vitamin D supplement. COPD- no worsening of cough or dyspnea, no wheezing. CHF- no leg swelling. Not weighing self at home. CC Plan:   -Follow up in a few weeks to check on symptoms, appts, continue education. Congestive Heart Failure Assessment    Are you currently restricting fluids?: No Restriction  Do you understand a low sodium diet?: Yes  Do you understand how to read food labels?: No  How many restaurant meals do you eat per week?: 0  Do you salt your food before tasting it?: No         Symptoms:  CHF associated dyspnea on exertion: Pos      Symptom course: stable  Weight trend:  (Comment: doesn't weigh himself at home)  Salt intake watch compared to last visit: stable      and   COPD Assessment    Does the patient understand envrionmental exposure?: Yes  Does the patient have a nebulizer?: No  Does the patient use a space with inhaled medications?: No            Symptoms:     Symptom course: stable  Breathlessness: exertion  Increase use of rapid acting/rescue inhaled medications?: No  Change in chronic cough?: No/At Baseline  Change in sputum?: No/At Baseline  Sputum characteristics: Clear  Self Monitoring - SaO2: No  Have you had a recent diagnosis of pneumonia either by PCP or at a hospital?: No           Offered patient enrollment in the Remote Patient Monitoring (RPM) program for in-home monitoring: Patient declined.     Lab Results       None            Care Coordination Interventions    Referral from Primary Care Provider: No  Suggested Interventions and JOVANNY Balderas Support: Completed          Goals Addressed                   This Visit's Progress     Conditions and Symptoms   On track     I will schedule office visits, as directed by my provider. I will keep my appointment or reschedule if I have to cancel. I will notify my provider of any barriers to my plan of care. I will follow my Zone Management tool to seek urgent or emergent care. I will notify my provider of any symptoms that indicate a worsening of my condition. COPD, CHF    Barriers: lack of motivation, overwhelmed by complexity of regimen, and lack of education  Plan for overcoming my barriers: ACM calls, home health visits, education  Confidence: 7/10  Anticipated Goal Completion Date: 3/10/23                Prior to Admission medications    Medication Sig Start Date End Date Taking? Authorizing Provider   Cholecalciferol (VITAMIN D3) 125 MCG (5000 UT) TABS Take 1 tablet by mouth daily   Yes Historical Provider, MD   diclofenac sodium (VOLTAREN) 1 % GEL USE AS DIRECTED 4 TIMES A DAY 12/20/22   Alicia Craig MD   hydrocortisone 2.5 % ointment Apply topically 2 times daily. 12/20/22   Alicia Craig MD   HYDROcodone-acetaminophen (NORCO) 5-325 MG per tablet Take 1 tablet by mouth every 6 hours as needed for Pain for up to 30 days.  12/12/22 1/11/23  Alicia Craig MD   Armodafinil (NUVIGIL) 150 MG TABS tablet TAKE 1 TAB BY MOUTH ONCE EVERY DAY FOR NARCOLEPSY 12/21/22 1/20/23  Alicia Craig MD   zoster recombinant adjuvanted vaccine [facility]) 50 MCG/0.5ML SUSR injection Inject 0.5 mLs into the muscle See Admin Instructions 1 dose now and repeat in 2-6 months 12/5/22 6/3/23  Alicia Craig MD   Pramoxine HCl ([facility]) 1 % CREA Apply 1 applicator topically daily as needed (Dry itchy skin) 11/17/22   TIFFANIE Hatch - NP   fluticasone [facility]) 50 MCG/ACT nasal spray 1 spray by Nasal route daily 11/16/22   Alicia Craig MD   ferrous sulfate (IRON 325) 325 (65 Fe) MG tablet Take 1 tablet by mouth daily (with breakfast) 11/16/22   Alicia Craig MD   Magnesium Oxide (MAGNESIUM-OXIDE) 250 MG TABS tablet Take 1 tablet by mouth daily 11/14/22   Alicia Cano MD   potassium chloride (KLOR-CON M) 20 MEQ extended release tablet Take 20 mEq by mouth daily    Historical Provider, MD   meclizine (ANTIVERT) 25 MG tablet Take 1 tablet by mouth 3 times daily as needed for Dizziness 11/14/22   Alicia Cano MD   docusate sodium (COLACE) 100 MG capsule Take 1 capsule by mouth 2 times daily 11/14/22   Alicia Cano MD   carvedilol (COREG) 6.25 MG tablet Take 1 tablet by mouth 2 times daily (with meals) 11/14/22   Alicia Cano MD   mometasone (NASONEX) 50 MCG/ACT nasal spray 2 sprays by Nasal route daily 11/14/22   Alicia Cano MD   levothyroxine (SYNTHROID) 50 MCG tablet Take 1 tablet by mouth Daily 3/11/22   Lilia Yee DO   guaiFENesin [facility]) 600 MG extended release tablet Take 2 tablets by mouth 2 times daily 3/10/22   Lilia Yee DO   amiodarone (CORDARONE) 200 MG tablet Take by mouth    Historical Provider, MD   fluticasone propionate (FLOVENT) 50 MCG/BLIST AEPB inhaler Inhale into the lungs daily    Historical Provider, MD   atorvastatin (LIPITOR) 40 MG tablet Take 1 tablet by mouth nightly 11/10/15   Amador Jones MD   clopidogrel (PLAVIX) 75 MG tablet Take 1 tablet by mouth daily 11/10/15   Amador Jones MD   furosemide (LASIX) 20 MG tablet Take 1 tablet by mouth daily 11/10/15   Amador Jones MD   Diapers & Supplies [address] [facility] Adult diapers dispense quantity allowed by insurance 3/31/15   Iris Lynn PA-C   PROAIR  (90 BASE) MCG/ACT inhaler inhale 2 puffs every 4 to 6 hours if needed 11/29/14   Iris Lynn PA-C   folic acid (FOLVITE) 1 MG tablet Take 1 mg by mouth daily. Historical Provider, MD   Multiple Vitamin (MULTIVITAMIN PO) Take  by mouth.       Historical Provider, MD       Future Appointments   Date Time Provider Dana Rueda   2/1/2023  1:45 PM Madonna Carter DO Sports Med [facility]   2/14/2023  2:15 PM MD Chasity Conde

## 2023-01-05 DIAGNOSIS — S72.453S SUPRACONDYLAR FRACTURE OF DISTAL END OF FEMUR WITHOUT INTRACONDYLAR EXTENSION, SEQUELA: ICD-10-CM

## 2023-01-05 RX ORDER — HYDROCODONE BITARTRATE AND ACETAMINOPHEN 5; 325 MG/1; MG/1
1 TABLET ORAL EVERY 6 HOURS PRN
Qty: 120 TABLET | Refills: 0 | Status: SHIPPED | OUTPATIENT
Start: 2023-01-12 | End: 2023-02-11

## 2023-01-05 NOTE — TELEPHONE ENCOUNTER
Last visit: 11/14/22  Last Med refill: 12/12/22  Does patient have enough medication for 72 hours: Yes    Patient knows he is calling early for med    Next Visit Date:  Future Appointments   Date Time Provider Dana Rueda   2/1/2023  1:45 PM Christian Son DO Sports Med [facility]   2/14/2023  2:15 PM Alicia Wiggins  [address] [facility]   Topic Date Due    Shingles vaccine (1 of 2) Never done    AAA screen  Never done    Annual Wellness Visit (AWV)  Never done    A1C test (Diabetic or Prediabetic)  02/19/2023    Lipids  02/19/2023    Depression Screen  11/14/2023    Colorectal Cancer Screen  06/16/2025    DTaP/Tdap/Td vaccine (2 - Td or Tdap) 03/23/2030    Flu vaccine  Completed    Pneumococcal 65+ years Vaccine  Completed    COVID-19 Vaccine  Completed    Hepatitis C screen  Completed    Hepatitis A vaccine  Aged Out    Hib vaccine  Aged Out    Meningococcal (ACWY) vaccine  Aged Out       Hemoglobin A1C (%)   Date Value   02/19/2022 6.0   05/05/2020 6.4 (H)   05/03/2018 5.4             ( goal A1C is < 7)   No results found for: LABMICR  LDL Cholesterol (mg/dL)   Date Value   02/19/2022 97   05/05/2020 56       (goal LDL is <100)   AST (U/L)   Date Value   11/14/2022 28     ALT (U/L)   Date Value   11/14/2022 15     BUN (mg/dL)   Date Value   12/06/2022 16     BP Readings from Last 3 Encounters:   12/07/22 130/72   11/14/22 (!) 140/90   03/20/22 136/67          (goal 120/80)    All Future Testing planned in [facility]  Lab Frequency Next Occurrence               Patient Active Problem List:     Meniere's disease     Narcolepsy     PND (post-nasal drip)     Transaminasemia     Cardiomyopathy ([address]     HTN (hypertension)     Chronic obstructive pulmonary disease ([address]     Dementia with behavioral disturbance ([address]     S/p bare metal coronary artery stent     Supracondylar fracture of distal end of femur without intracondylar extension, sequela     Closed fracture of right distal femur [address]     Presence of permanent cardiac pacemaker     Anemia, normocytic normochromic     History of hip fracture     Atelectasis     Acute postoperative anemia due to expected blood loss     Chronic systolic (congestive) heart failure

## 2023-01-10 DIAGNOSIS — Z87.81 HISTORY OF HIP FRACTURE: ICD-10-CM

## 2023-01-10 NOTE — TELEPHONE ENCOUNTER
Last visit: 11/14/2022  Last Med refill: 12/20/2022  Does patient have enough medication for 72 hours: Yes    Next Visit Date:  Future Appointments   Date Time Provider Dana Rueda   2/1/2023  1:45 PM Obie Brar DO Sports Med [facility]   2/14/2023  2:15 PM Alicia Carmona  [address] Maintenance   Topic Date Due    Shingles vaccine (1 of 2) Never done    AAA screen  Never done    Annual Wellness Visit (AWV)  Never done    A1C test (Diabetic or Prediabetic)  02/19/2023    Lipids  02/19/2023    Depression Screen  11/14/2023    Colorectal Cancer Screen  06/16/2025    DTaP/Tdap/Td vaccine (2 - Td or Tdap) 03/23/2030    Flu vaccine  Completed    Pneumococcal 65+ years Vaccine  Completed    COVID-19 Vaccine  Completed    Hepatitis C screen  Completed    Hepatitis A vaccine  Aged Out    Hib vaccine  Aged Out    Meningococcal (ACWY) vaccine  Aged Out       Hemoglobin A1C (%)   Date Value   02/19/2022 6.0   05/05/2020 6.4 (H)   05/03/2018 5.4             ( goal A1C is < 7)   No results found for: LABMICR  LDL Cholesterol (mg/dL)   Date Value   02/19/2022 97   05/05/2020 56       (goal LDL is <100)   AST (U/L)   Date Value   11/14/2022 28     ALT (U/L)   Date Value   11/14/2022 15     BUN (mg/dL)   Date Value   12/06/2022 16     BP Readings from Last 3 Encounters:   12/07/22 130/72   11/14/22 (!) 140/90   03/20/22 136/67          (goal 120/80)    All Future Testing planned in [facility]  Lab Frequency Next Occurrence               Patient Active Problem List:     Meniere's disease     Narcolepsy     PND (post-nasal drip)     Transaminasemia     Cardiomyopathy ([address]     HTN (hypertension)     Chronic obstructive pulmonary disease ([address]     Dementia with behavioral disturbance ([address]     S/p bare metal coronary artery stent     Supracondylar fracture of distal end of femur without intracondylar extension, sequela     Closed fracture of right distal femur (HCC)     Presence of permanent cardiac pacemaker     Anemia, normocytic normochromic     History of hip fracture     Atelectasis     Acute postoperative anemia due to expected blood loss     Chronic systolic (congestive) heart failure

## 2023-01-11 ENCOUNTER — CARE COORDINATION (OUTPATIENT)
Dept: CARE COORDINATION | Age: 73
End: 2023-01-11

## 2023-01-11 ASSESSMENT — ENCOUNTER SYMPTOMS: DYSPNEA ASSOCIATED WITH: EXERTION

## 2023-01-11 NOTE — CARE COORDINATION
Ambulatory Care Coordination Note  1/11/2023    ACC: Guillermina Craig RN    He feels ok. No recent worsening of COPD symtpoms. Feels he has never fully recovered from PNA last March. No cough or wheezing right now but his energy has never gotten back to normal. Using an air purifier in his bedroom. CHF- doesn't weigh himself. Only dyspneic after a lot of exertion. No leg swelling right now. No recent dizziness. Getting PT from [facility] but they still haven't sent out an aide for bathing. No falls, stated walking ok but is slow. Right hip and leg pain about the same. Taking Norco and applies diclofenac gel. Sees cardio Dr. Tawanda Tierney tomorrow, plans on getting Shingles vaccine next week.   CC Plan:   -Follow in a month after PCP appt to review notes     Congestive Heart Failure Assessment    Are you currently restricting fluids?: No Restriction  Do you understand a low sodium diet?: Yes  Do you understand how to read food labels?: No  How many restaurant meals do you eat per week?: 0  Do you salt your food before tasting it?: No         Symptoms:  CHF associated dyspnea on exertion: Pos      Symptom course: stable  Weight trend:  (Comment: doesn't weigh himself)  Salt intake watch compared to last visit: stable     ,   COPD Assessment    Does the patient understand envrionmental exposure?: Yes  Does the patient have a nebulizer?: No  Does the patient use a space with inhaled medications?: No            Symptoms:     Symptom course: stable  Breathlessness: exertion  Increase use of rapid acting/rescue inhaled medications?: No  Change in chronic cough?: No/At Baseline  Change in sputum?: No/At Baseline  Sputum characteristics: White  Have you had a recent diagnosis of pneumonia either by PCP or at a hospital?: No     , and   General Assessment    Do you have any symptoms that are causing concern?: No         Offered patient enrollment in the Remote Patient Monitoring (RPM) program for in-home monitoring: Patient declined. Lab Results       None            Care Coordination Interventions    Referral from Primary Care Provider: No  Suggested Interventions and JOVANNY Balderas Support: Completed          Goals Addressed                   This Visit's Progress     Conditions and Symptoms   On track     I will schedule office visits, as directed by my provider. I will keep my appointment or reschedule if I have to cancel. I will notify my provider of any barriers to my plan of care. I will follow my Zone Management tool to seek urgent or emergent care. I will notify my provider of any symptoms that indicate a worsening of my condition. COPD, CHF    Barriers: lack of motivation, overwhelmed by complexity of regimen, and lack of education  Plan for overcoming my barriers: ACM calls, home health visits, education  Confidence: 7/10  Anticipated Goal Completion Date: 3/10/23                Prior to Admission medications    Medication Sig Start Date End Date Taking? Authorizing Provider   diclofenac sodium (VOLTAREN) 1 % GEL USE AS DIRECTED 4 TIMES A DAY 12/20/22   Alicia Campbell MD   hydrocortisone 2.5 % ointment Apply topically 2 times daily. 12/20/22   Alicia Campbell MD   HYDROcodone-acetaminophen (NORCO) 5-325 MG per tablet Take 1 tablet by mouth every 6 hours as needed for Pain for up to 30 days.  1/12/23 2/11/23  Alicia Campbell MD   Cholecalciferol (VITAMIN D3) 125 MCG (5000 UT) TABS Take 1 tablet by mouth daily    Historical Provider, MD   Armodafinil (NUVIGIL) 150 MG TABS tablet TAKE 1 TAB BY MOUTH ONCE EVERY DAY FOR NARCOLEPSY 12/21/22 1/20/23  Alicia Campbell MD   zoster recombinant adjuvanted vaccine [facility]) 50 MCG/0.5ML SUSR injection Inject 0.5 mLs into the muscle See Admin Instructions 1 dose now and repeat in 2-6 months 12/5/22 6/3/23  Alicia Campbell MD   Pramoxine HCl ([facility]) 1 % CREA Apply 1 applicator topically daily as needed (Dry itchy skin) 11/17/22   Paul Pastrana TIFFANIE Prajapati NP   fluticasone (FLONASE) 50 MCG/ACT nasal spray 1 spray by Nasal route daily 11/16/22   Alicia Buck MD   ferrous sulfate (IRON 325) 325 (65 Fe) MG tablet Take 1 tablet by mouth daily (with breakfast) 11/16/22   Alicia Buck MD   Magnesium Oxide (MAGNESIUM-OXIDE) 250 MG TABS tablet Take 1 tablet by mouth daily 11/14/22   Alicia Buck MD   potassium chloride (KLOR-CON M) 20 MEQ extended release tablet Take 20 mEq by mouth daily    Historical Provider, MD   meclizine (ANTIVERT) 25 MG tablet Take 1 tablet by mouth 3 times daily as needed for Dizziness 11/14/22   Alicia Buck MD   docusate sodium (COLACE) 100 MG capsule Take 1 capsule by mouth 2 times daily 11/14/22   Alicia Buck MD   carvedilol (COREG) 6.25 MG tablet Take 1 tablet by mouth 2 times daily (with meals) 11/14/22   Alicia Buck MD   mometasone (NASONEX) 50 MCG/ACT nasal spray 2 sprays by Nasal route daily 11/14/22   Alicia Buck MD   levothyroxine (SYNTHROID) 50 MCG tablet Take 1 tablet by mouth Daily 3/11/22   Blossom Carter DO   guaiFENesin [facility]) 600 MG extended release tablet Take 2 tablets by mouth 2 times daily 3/10/22   Blossom Carter DO   amiodarone (CORDARONE) 200 MG tablet Take by mouth    Historical Provider, MD   fluticasone propionate (FLOVENT) 50 MCG/BLIST AEPB inhaler Inhale into the lungs daily    Historical Provider, MD   atorvastatin (LIPITOR) 40 MG tablet Take 1 tablet by mouth nightly 11/10/15   Chito Sadler MD   clopidogrel (PLAVIX) 75 MG tablet Take 1 tablet by mouth daily 11/10/15   Chito Sadler MD   furosemide (LASIX) 20 MG tablet Take 1 tablet by mouth daily 11/10/15   Chito Sadler MD   [employer] Supplies [address] [facility] Adult diapers dispense quantity allowed by insurance 3/31/15   Iris Lynn PA-C   PROAIR  (90 BASE) MCG/ACT inhaler inhale 2 puffs every 4 to 6 hours if needed 11/29/14   Iris Lynn PA-C   folic acid (FOLVITE) 1 MG tablet Take 1 mg by mouth daily.      Historical Provider, MD   Multiple Vitamin (MULTIVITAMIN PO) Take  by mouth.       Historical Provider, MD       Future Appointments   Date Time Provider Dana Marybeth   2/1/2023  1:45 PM Jaylon Chinchilla DO Sports Med [place] [facility]   2/14/2023  2:15 PM MD Chasity Mak

## 2023-01-23 DIAGNOSIS — G47.429 NARCOLEPSY DUE TO UNDERLYING CONDITION WITHOUT CATAPLEXY: ICD-10-CM

## 2023-01-23 RX ORDER — ARMODAFINIL 150 MG/1
TABLET ORAL
Qty: 30 TABLET | Refills: 3 | Status: SHIPPED | OUTPATIENT
Start: 2023-01-23 | End: 2023-04-23

## 2023-01-23 NOTE — TELEPHONE ENCOUNTER
Patient contacted office requesting refill on medication. I asked patient what medication he needed and I could not understand patient after 3 attempts.  I asked patient to spell the medication and he kept repeating it is for his narcolepsy

## 2023-01-23 NOTE — TELEPHONE ENCOUNTER
Last visit: 12/19/22  Last Med refill: 12/21/22    Does patient have enough medication for 72 hours: No: patient states NO refills    Next Visit Date:  Future Appointments   Date Time Provider Dana Rueda   2/1/2023  1:45 PM Obie Brar DO Sports Med [facility]   2/14/2023  2:15 PM Alicia Patel MD [place] [facility]   4/18/2023  2:00 PM SCHEDULE, [facility] RESPIRATORY SPEC Resp Spec [email or web address]       Health Maintenance   Topic Date Due    AAA screen  Never done    Annual Wellness Visit (AWV)  Never done    A1C test (Diabetic or Prediabetic)  02/19/2023    Lipids  02/19/2023    Shingles vaccine (2 of 2) 03/13/2023    Depression Screen  11/14/2023    Colorectal Cancer Screen  06/16/2025    DTaP/Tdap/Td vaccine (2 - Td or Tdap) 03/23/2030    Flu vaccine  Completed    Pneumococcal 65+ years Vaccine  Completed    COVID-19 Vaccine  Completed    Hepatitis C screen  Completed    Hepatitis A vaccine  Aged Out    Hib vaccine  Aged Out    Meningococcal (ACWY) vaccine  Aged Out       Hemoglobin A1C (%)   Date Value   02/19/2022 6.0   05/05/2020 6.4 (H)   05/03/2018 5.4             ( goal A1C is < 7)   No results found for: LABMICR  LDL Cholesterol (mg/dL)   Date Value   02/19/2022 97   05/05/2020 56       (goal LDL is <100)   AST (U/L)   Date Value   11/14/2022 28     ALT (U/L)   Date Value   11/14/2022 15     BUN (mg/dL)   Date Value   12/06/2022 16     BP Readings from Last 3 Encounters:   12/07/22 130/72   11/14/22 (!) 140/90   03/20/22 136/67          (goal 120/80)    All Future Testing planned in [facility]  Lab Frequency Next Occurrence               Patient Active Problem List:     Meniere's disease     Narcolepsy     PND (post-nasal drip)     Transaminasemia     Cardiomyopathy ([address]     HTN (hypertension)     Chronic obstructive pulmonary disease ([address]     Dementia with behavioral disturbance ([address]     S/p bare metal coronary artery stent     Supracondylar fracture of distal end of femur without intracondylar extension, sequela     Closed fracture of right distal femur (HCC)     Presence of permanent cardiac pacemaker     Anemia, normocytic normochromic     History of hip fracture     Atelectasis     Acute postoperative anemia due to expected blood loss     Chronic systolic (congestive) heart failure

## 2023-02-01 DIAGNOSIS — S72.453S SUPRACONDYLAR FRACTURE OF DISTAL END OF FEMUR WITHOUT INTRACONDYLAR EXTENSION, SEQUELA: ICD-10-CM

## 2023-02-01 NOTE — TELEPHONE ENCOUNTER
Last visit: 11/14/22  Last Med refill: 1/12/23  Does patient have enough medication for 72 hours: Yes    Next Visit Date:  Future Appointments   Date Time Provider Dana Rueda   2/14/2023  2:15 PM Alicia Dhaliwal MD [facility]   2/15/2023  2:15 PM [place], DO Sports Med [facility] [place]   4/18/2023  2:00 PM SCHEDULE, [facility] RESPIRATORY SPEC Resp Spec [address] Maintenance   Topic Date Due    AAA screen  Never done    Annual Wellness Visit (AWV)  Never done    A1C test (Diabetic or Prediabetic)  02/19/2023    Lipids  02/19/2023    Shingles vaccine (2 of 2) 03/13/2023    Depression Screen  11/14/2023    Colorectal Cancer Screen  06/16/2025    DTaP/Tdap/Td vaccine (2 - Td or Tdap) 03/23/2030    Flu vaccine  Completed    Pneumococcal 65+ years Vaccine  Completed    COVID-19 Vaccine  Completed    Hepatitis C screen  Completed    Hepatitis A vaccine  Aged Out    Hib vaccine  Aged Out    Meningococcal (ACWY) vaccine  Aged Out       Hemoglobin A1C (%)   Date Value   02/19/2022 6.0   05/05/2020 6.4 (H)   05/03/2018 5.4             ( goal A1C is < 7)   No results found for: LABMICR  LDL Cholesterol (mg/dL)   Date Value   02/19/2022 97   05/05/2020 56       (goal LDL is <100)   AST (U/L)   Date Value   11/14/2022 28     ALT (U/L)   Date Value   11/14/2022 15     BUN (mg/dL)   Date Value   12/06/2022 16     BP Readings from Last 3 Encounters:   12/07/22 130/72   11/14/22 (!) 140/90   03/20/22 136/67          (goal 120/80)    All Future Testing planned in [facility]  Lab Frequency Next Occurrence               Patient Active Problem List:     Meniere's disease     Narcolepsy     PND (post-nasal drip)     Transaminasemia     Cardiomyopathy ([address]     HTN (hypertension)     Chronic obstructive pulmonary disease ([address]     Dementia with behavioral disturbance ([address]     S/p bare metal coronary artery stent     Supracondylar fracture of distal end of femur without intracondylar extension, sequela     Closed fracture of right distal femur (HCC)     Presence of permanent cardiac pacemaker     Anemia, normocytic normochromic     History of hip fracture     Atelectasis     Acute postoperative anemia due to expected blood loss     Chronic systolic (congestive) heart failure

## 2023-02-02 DIAGNOSIS — S72.453S SUPRACONDYLAR FRACTURE OF DISTAL END OF FEMUR WITHOUT INTRACONDYLAR EXTENSION, SEQUELA: ICD-10-CM

## 2023-02-02 RX ORDER — HYDROCODONE BITARTRATE AND ACETAMINOPHEN 5; 325 MG/1; MG/1
1 TABLET ORAL EVERY 6 HOURS PRN
Qty: 120 TABLET | Refills: 0 | Status: SHIPPED | OUTPATIENT
Start: 2023-02-02 | End: 2023-03-04

## 2023-02-02 RX ORDER — HYDROCODONE BITARTRATE AND ACETAMINOPHEN 5; 325 MG/1; MG/1
TABLET ORAL
Qty: 120 TABLET | Refills: 0 | OUTPATIENT
Start: 2023-02-02

## 2023-02-09 ENCOUNTER — CARE COORDINATION (OUTPATIENT)
Dept: CARE COORDINATION | Age: 73
End: 2023-02-09

## 2023-02-09 ASSESSMENT — ENCOUNTER SYMPTOMS: DYSPNEA ASSOCIATED WITH: EXERTION

## 2023-02-09 NOTE — CARE COORDINATION
Ambulatory Care Coordination Note  2023    Patient Current Location:  Home: [address] [place] [address]     ACM contacted the patient by telephone. Verified name and  with patient as identifiers. Provided introduction to self, and explanation of the ACM role. ACM: Eric Cordova RN    He was discharged from nursing and therapy visits by [facility]. Still not ambulating, using wheelchair all the time. Able to get himself up in to bed or on toilet. Has ortho appt next week. COPD- referred by pulmonology by cardiologist, has appt scheduled. Symptoms stable, pulse ox still mid 90s on room air. He stated feels like may be getting sick but didn't elaborate, denied worse dyspnea, cough or wheezing. Denied fever/chills, n/v.He may take a COVID test.  CHF- no leg swelling, wears compression stockings. Unable to weigh self at home. Saw Dr. Lauren Dawn, will get an echo next week at that office. Podiatrist Dr. Larry Stone visited him last week. Had his first Shingrix vaccine, stated felt terrible afterwards but will get second one in a few weeks. Gets meals delivered, medications are delivered, blister packed. Denied any needs for at home. Wen Merino is full of food, he is managing preparing food and taking care of himself. CC Plan:   -Follow up in a few weeks to review progress notes, symptoms.   Congestive Heart Failure Assessment    Are you currently restricting fluids?: No Restriction  Do you understand a low sodium diet?: Yes  Do you understand how to read food labels?: No  How many restaurant meals do you eat per week?: 0  Do you salt your food before tasting it?: No         Symptoms:  CHF associated dyspnea on exertion: Pos      Symptom course: stable  Weight trend:  (Comment: not weighing self at home)  Salt intake watch compared to last visit: stable     ,   COPD Assessment    Does the patient understand envrionmental exposure?: Yes  Does the patient have a nebulizer?: No  Does the patient use a space with inhaled medications?: No            Symptoms:     Symptom course: stable  Breathlessness: exertion  Increase use of rapid acting/rescue inhaled medications?: No  Change in chronic cough?: No/At Baseline  Change in sputum?: No/At Baseline  Sputum characteristics: White  Self Monitoring - SaO2: Yes  Baseline SaO2 Readin  Have you had a recent diagnosis of pneumonia either by PCP or at a hospital?: No     , and   General Assessment    Do you have any symptoms that are causing concern?: Yes  Progression since Onset: Unchanged  Reported Symptoms: Weakness (Comment: immobile, not ambulating)           Offered patient enrollment in the Remote Patient Monitoring (RPM) program for in-home monitoring: Patient declined. Lab Results       None            Care Coordination Interventions    Referral from Primary Care Provider: No  Suggested Interventions and [employer] on Wheels: Completed (Comment: Mobile Meals)  Medi Set or Pill Pack: Completed (Comment: [facility])  Transportation Support: Completed  Zone Management Tools: In Process (Comment: COPD)          Goals Addressed                   This Visit's Progress     Conditions and Symptoms   On track     I will schedule office visits, as directed by my provider. I will keep my appointment or reschedule if I have to cancel. I will notify my provider of any barriers to my plan of care. I will follow my Zone Management tool to seek urgent or emergent care. I will notify my provider of any symptoms that indicate a worsening of my condition. COPD, CHF    Barriers: lack of motivation, overwhelmed by complexity of regimen, and lack of education  Plan for overcoming my barriers: ACM calls, home health visits, education  Confidence: 7/10  Anticipated Goal Completion Date: 3/10/23                Future Appointments   Date Time Provider Dana Rueda   2023  2:15 PM Alicia Ashley MD [facility]   2/15/2023  2:15 PM Nate Carrillo Izzy Noe, [facility]   4/18/2023  2:00 PM SCHEDULE, [facility] RESPIRATORY SPEC Resp Spec [email or web address]

## 2023-02-27 ENCOUNTER — CARE COORDINATION (OUTPATIENT)
Dept: CARE COORDINATION | Age: 73
End: 2023-02-27

## 2023-02-27 ASSESSMENT — ENCOUNTER SYMPTOMS: DYSPNEA ASSOCIATED WITH: EXERTION

## 2023-02-27 NOTE — CARE COORDINATION
Ambulatory Care Coordination Note  2023    Patient Current Location:  Home: [address] [place] [address]     ACM contacted the patient by telephone. Verified name and  with patient as identifiers. Spoke with patient. He has PCP appt tomorrow and plans on attending. He has paratransit transportation arranged. He had to cancel his ortho appt because he thought was at a different office, is rescheduled. No change, still using wheelchair a lot, can walk short distances. COPD- symptoms are stable. His furnace filter was getting clogged before and the dust and  humidity were making harder to breathe but symptoms are baseline right now. CHF- no worsening symptoms. Doesn't weigh self at home. Per [facility], he is scheduled for echo 3/1 and has appt with Dr. Jessie Dixon 23. He couldn't talk long today. Stated doing well, managing to get around enough at home to prepare meals and go to bathroom, denied any need for help. CC Plan:   -Follow in a few weeks to check on cardiology testing (echo 3/1), review progress notes, address ACP.   Congestive Heart Failure Assessment    Are you currently restricting fluids?: No Restriction  Do you understand a low sodium diet?: Yes  Do you understand how to read food labels?: No  How many restaurant meals do you eat per week?: 0  Do you salt your food before tasting it?: No         Symptoms:  CHF associated dyspnea on exertion: Pos      Symptom course: stable  Weight trend:  (Comment: doesn't weigh at home)  Salt intake watch compared to last visit: stable     ,   COPD Assessment    Does the patient understand envrionmental exposure?: Yes  Does the patient have a nebulizer?: No  Does the patient use a space with inhaled medications?: No            Symptoms:     Symptom course: stable  Breathlessness: exertion  Increase use of rapid acting/rescue inhaled medications?: No  Change in chronic cough?: No/At Baseline  Change in sputum?: No/At Baseline  Sputum characteristics: Unable to Specify  Self Monitoring - SaO2: Yes  Have you had a recent diagnosis of pneumonia either by PCP or at a hospital?: No     , and   General Assessment    Do you have any symptoms that are causing concern?: No           Offered patient enrollment in the Remote Patient Monitoring (RPM) program for in-home monitoring: Patient declined. Lab Results       None            Care Coordination Interventions    Referral from Primary Care Provider: No  Suggested Interventions and [employer] on Wheels: Completed (Comment: Mobile Meals)  Medi Set or Pill Pack: Completed (Comment: [facility])  Transportation Support: Completed  Zone Management Tools:  In Process (Comment: COPD, CHF)          Goals Addressed    None         Future Appointments   Date Time Provider Dana Rueda   2/28/2023  3:00 PM [facility]   4/3/2023  1:45 PM [place], DO Sports Med [facility]   4/18/2023  2:00 PM SCHEDULE, P RESPIRATORY SPEC Resp Spec [facility]

## 2023-02-28 DIAGNOSIS — S72.453S SUPRACONDYLAR FRACTURE OF DISTAL END OF FEMUR WITHOUT INTRACONDYLAR EXTENSION, SEQUELA: ICD-10-CM

## 2023-02-28 NOTE — TELEPHONE ENCOUNTER
----- Message from Cecy Shea sent at 2/28/2023  2:50 PM EST -----  Subject: Refill Request    QUESTIONS  Name of Medication? HYDROcodone-acetaminophen (NORCO) 5-325 MG per tablet  Patient-reported dosage and instructions? as needed   How many days do you have left? 7  Preferred Pharmacy? [facility] phone number (if available)? [phone]  ---------------------------------------------------------------------------  --------------  CALL BACK INFO  What is the best way for the office to contact you? OK to leave message on   voicemail  Preferred Call Back Phone Number? [phone]  ---------------------------------------------------------------------------  --------------  SCRIPT ANSWERS  Relationship to Patient?  Self

## 2023-03-01 RX ORDER — HYDROCODONE BITARTRATE AND ACETAMINOPHEN 5; 325 MG/1; MG/1
1 TABLET ORAL EVERY 6 HOURS PRN
Qty: 120 TABLET | Refills: 0 | Status: SHIPPED | OUTPATIENT
Start: 2023-03-01 | End: 2023-03-31

## 2023-03-02 RX ORDER — AMIODARONE HYDROCHLORIDE 200 MG/1
TABLET ORAL
Qty: 30 TABLET | Refills: 5 | OUTPATIENT
Start: 2023-03-02

## 2023-03-02 RX ORDER — POTASSIUM CHLORIDE 20 MEQ/1
TABLET, EXTENDED RELEASE ORAL
Qty: 30 TABLET | Refills: 5 | Status: SHIPPED | OUTPATIENT
Start: 2023-03-02

## 2023-03-02 RX ORDER — ATORVASTATIN CALCIUM 40 MG/1
40 TABLET, FILM COATED ORAL NIGHTLY
Qty: 30 TABLET | Refills: 3 | Status: SHIPPED | OUTPATIENT
Start: 2023-03-02

## 2023-03-02 RX ORDER — GUAIFENESIN 600 MG/1
1200 TABLET, EXTENDED RELEASE ORAL 2 TIMES DAILY
Qty: 60 TABLET | Refills: 5 | Status: SHIPPED | OUTPATIENT
Start: 2023-03-02 | End: 2023-03-03

## 2023-03-02 RX ORDER — CLOPIDOGREL BISULFATE 75 MG/1
75 TABLET ORAL DAILY
Qty: 30 TABLET | Refills: 3 | Status: SHIPPED | OUTPATIENT
Start: 2023-03-02

## 2023-03-02 RX ORDER — AMIODARONE HYDROCHLORIDE 200 MG/1
200 TABLET ORAL DAILY
Qty: 30 TABLET | Refills: 5 | Status: SHIPPED | OUTPATIENT
Start: 2023-03-02

## 2023-03-02 RX ORDER — FOLIC ACID 1 MG/1
TABLET ORAL
Qty: 30 TABLET | Refills: 5 | Status: SHIPPED | OUTPATIENT
Start: 2023-03-02

## 2023-03-02 RX ORDER — GUAIFENESIN 600 MG/1
TABLET, EXTENDED RELEASE ORAL
Qty: 60 TABLET | Refills: 5 | OUTPATIENT
Start: 2023-03-02

## 2023-03-02 RX ORDER — FUROSEMIDE 20 MG/1
20 TABLET ORAL DAILY
Qty: 60 TABLET | Refills: 3 | Status: SHIPPED | OUTPATIENT
Start: 2023-03-02

## 2023-03-02 RX ORDER — LEVOTHYROXINE SODIUM 0.05 MG/1
TABLET ORAL
Qty: 30 TABLET | Refills: 5 | Status: SHIPPED | OUTPATIENT
Start: 2023-03-02

## 2023-03-02 RX ORDER — MULTIVITAMIN/IRON/FOLIC ACID 18MG-0.4MG
250 TABLET ORAL DAILY
Qty: 30 TABLET | Refills: 3 | Status: SHIPPED | OUTPATIENT
Start: 2023-03-02

## 2023-03-02 NOTE — TELEPHONE ENCOUNTER
Last visit: 12/06/22  Last Med refill: 01/16/2023  Does patient have enough medication for 72 hours: Yes    Next Visit Date:  Future Appointments   Date Time Provider Dana Marybeth   3/13/2023 12:00 PM Alicia Calero MD [facility]   4/3/2023  1:45 PM [place], DO Sports Med [facility]   4/18/2023  2:00 PM SCHEDULE, P RESPIRATORY SPEC Resp Spec [address] Maintenance   Topic Date Due    AAA screen  Never done    Annual Wellness Visit (AWV)  Never done    A1C test (Diabetic or Prediabetic)  02/19/2023    Lipids  02/19/2023    Shingles vaccine (2 of 2) 03/13/2023    Depression Screen  11/14/2023    Colorectal Cancer Screen  06/16/2025    DTaP/Tdap/Td vaccine (2 - Td or Tdap) 03/23/2030    Flu vaccine  Completed    Pneumococcal 65+ years Vaccine  Completed    COVID-19 Vaccine  Completed    Hepatitis C screen  Completed    Hepatitis A vaccine  Aged Out    Hib vaccine  Aged Out    Meningococcal (ACWY) vaccine  Aged Out       Hemoglobin A1C (%)   Date Value   02/19/2022 6.0   05/05/2020 6.4 (H)   05/03/2018 5.4             ( goal A1C is < 7)   No results found for: LABMICR  LDL Cholesterol (mg/dL)   Date Value   02/19/2022 97   05/05/2020 56       (goal LDL is <100)   AST (U/L)   Date Value   11/14/2022 28     ALT (U/L)   Date Value   11/14/2022 15     BUN (mg/dL)   Date Value   12/06/2022 16     BP Readings from Last 3 Encounters:   12/07/22 130/72   11/14/22 (!) 140/90   03/20/22 136/67          (goal 120/80)    All Future Testing planned in [facility]  Lab Frequency Next Occurrence               Patient Active Problem List:     Meniere's disease     Narcolepsy     PND (post-nasal drip)     Transaminasemia     Cardiomyopathy ([address]     HTN (hypertension)     Chronic obstructive pulmonary disease ([address]     Dementia with behavioral disturbance ([address]     S/p bare metal coronary artery stent     Supracondylar fracture of distal end of femur without intracondylar extension, sequela Closed fracture of right distal femur (HCC)     Presence of permanent cardiac pacemaker     Anemia, normocytic normochromic     History of hip fracture     Atelectasis     Acute postoperative anemia due to expected blood loss     Chronic systolic (congestive) heart failure

## 2023-03-03 ENCOUNTER — TELEPHONE (OUTPATIENT)
Dept: FAMILY MEDICINE CLINIC | Age: 73
End: 2023-03-03

## 2023-03-03 RX ORDER — GUAIFENESIN 600 MG/1
1200 TABLET, EXTENDED RELEASE ORAL 2 TIMES DAILY
Qty: 120 TABLET | Refills: 5 | Status: SHIPPED | OUTPATIENT
Start: 2023-03-03

## 2023-03-03 NOTE — TELEPHONE ENCOUNTER
[facility] called asking for a increase in quantity for pts Mucinex, states he sees directions as pt is to take 1200 MG daily, taking a total of 4 tablets a day     2 600 MG twice a day     Asked for quaintly to be 120, okayed the increase in quantity

## 2023-03-15 ENCOUNTER — CARE COORDINATION (OUTPATIENT)
Dept: CARE COORDINATION | Age: 73
End: 2023-03-15

## 2023-03-15 NOTE — CARE COORDINATION
3/15/23 Called patient but he was eating, requested writer call back later. Ambulatory Care Coordination Note  3/16/2023    Patient Current Location:  Home: [address] [place] [address]     ACM contacted the patient by telephone. Verified name and  with patient as identifiers. SUMMARY:  He canceled PCP appt last week due to snow. He doesn't go anywhere if rain or snow on ground due to fall risk. Still only taking a few steps, mostly uses wheelchair since right femur fracture and surgery a year ago. No falls. Was discharged from nursing and therapy visits with [facility]. Has ortho appt in a few weeks  (he has canceled the last 2 appts). COPD- symptoms stable right now. 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 Has pulmonology appt in April. CHF- no leg swelling, is unable to weigh self at home. Wears compression stockings. No chest pain, palpitations or dizziness. Thinks has cardiology appt in May. He gets meals delivered to him. Has transportation available, denied any issues, has canceled last 3 PCP appts, is rescheduled for next week. Meds are delivered and blister packed from pharmacy. Eating good, no bowel or bladder symptoms. Taking Vitamin E supplements which is helping his itching. Takes iron and Norco, is on a stool softener. Has no help at home, was supposed to get an aide to help with cleaning but hasn't gotten one. Writer called [facility], left VM message for his  to call back and discuss. CC Plan:  Follow up next week to see if went to PCP appt, discuss ACP.   Congestive Heart Failure Assessment    Are you currently restricting fluids?: No Restriction  Do you understand a low sodium diet?: Yes  Do you understand how to read food labels?: No  How many restaurant meals do you eat per week?: 0  Do you salt your food before tasting it?: No         Symptoms:  CHF associated dyspnea on exertion:

## 2023-03-16 ASSESSMENT — ENCOUNTER SYMPTOMS: DYSPNEA ASSOCIATED WITH: EXERTION

## 2023-03-18 DIAGNOSIS — R42 VERTIGO: ICD-10-CM

## 2023-03-18 DIAGNOSIS — H81.03 MENIERE'S DISEASE OF BOTH EARS: ICD-10-CM

## 2023-03-20 RX ORDER — FLUTICASONE PROPIONATE 50 MCG
SPRAY, SUSPENSION (ML) NASAL
Qty: 16 G | Refills: 5 | Status: SHIPPED | OUTPATIENT
Start: 2023-03-20

## 2023-03-20 RX ORDER — MECLIZINE HYDROCHLORIDE 25 MG/1
TABLET ORAL
Qty: 90 TABLET | Refills: 0 | Status: SHIPPED | OUTPATIENT
Start: 2023-03-20

## 2023-03-20 NOTE — TELEPHONE ENCOUNTER
Last visit: 11/14/22  Last Med refill: 11/16/22  Does patient have enough medication for 72 hours: No:     Next Visit Date:  Future Appointments   Date Time Provider Dana Rueda   3/21/2023 12:00 PM Alicia Yarbrough MD [facility] FP [facility]   4/3/2023  1:45 PM [place], DO Sports Med [facility]   4/18/2023  2:00 PM SCHEDULE, [facility] RESPIRATORY SPEC Resp Spec [address] Maintenance   Topic Date Due    AAA screen  Never done    Annual Wellness Visit (AWV)  Never done    A1C test (Diabetic or Prediabetic)  02/19/2023    Lipids  02/19/2023    Shingles vaccine (2 of 2) 03/13/2023    Depression Screen  11/14/2023    Colorectal Cancer Screen  06/16/2025    DTaP/Tdap/Td vaccine (2 - Td or Tdap) 03/23/2030    Flu vaccine  Completed    Pneumococcal 65+ years Vaccine  Completed    COVID-19 Vaccine  Completed    Hepatitis C screen  Completed    Hepatitis A vaccine  Aged Out    Hib vaccine  Aged Out    Meningococcal (ACWY) vaccine  Aged Out       Hemoglobin A1C (%)   Date Value   02/19/2022 6.0   05/05/2020 6.4 (H)   05/03/2018 5.4             ( goal A1C is < 7)   No results found for: LABMICR  LDL Cholesterol (mg/dL)   Date Value   02/19/2022 97   05/05/2020 56       (goal LDL is <100)   AST (U/L)   Date Value   11/14/2022 28     ALT (U/L)   Date Value   11/14/2022 15     BUN (mg/dL)   Date Value   12/06/2022 16     BP Readings from Last 3 Encounters:   12/07/22 130/72   11/14/22 (!) 140/90   03/20/22 136/67          (goal 120/80)    All Future Testing planned in [facility]  Lab Frequency Next Occurrence               Patient Active Problem List:     Meniere's disease     Narcolepsy     PND (post-nasal drip)     Transaminasemia     Cardiomyopathy ([address]     HTN (hypertension)     Chronic obstructive pulmonary disease ([address]     Dementia with behavioral disturbance ([address]     S/p bare metal coronary artery stent     Supracondylar fracture of distal end of femur without intracondylar extension, sequela

## 2023-03-20 NOTE — TELEPHONE ENCOUNTER
Closed fracture of right distal femur (HCC)     Presence of permanent cardiac pacemaker     Anemia, normocytic normochromic     History of hip fracture     Atelectasis     Acute postoperative anemia due to expected blood loss     Chronic systolic (congestive) heart failure

## 2023-03-21 ENCOUNTER — OFFICE VISIT (OUTPATIENT)
Dept: FAMILY MEDICINE CLINIC | Age: 73
End: 2023-03-21

## 2023-03-21 VITALS — SYSTOLIC BLOOD PRESSURE: 128 MMHG | HEART RATE: 65 BPM | OXYGEN SATURATION: 97 % | DIASTOLIC BLOOD PRESSURE: 78 MMHG

## 2023-03-21 DIAGNOSIS — E03.9 ACQUIRED HYPOTHYROIDISM: ICD-10-CM

## 2023-03-21 DIAGNOSIS — E78.5 HYPERLIPIDEMIA, UNSPECIFIED HYPERLIPIDEMIA TYPE: ICD-10-CM

## 2023-03-21 DIAGNOSIS — F11.20 OPIOID DEPENDENCE WITH CURRENT USE (HCC): ICD-10-CM

## 2023-03-21 DIAGNOSIS — J44.9 CHRONIC OBSTRUCTIVE PULMONARY DISEASE, UNSPECIFIED COPD TYPE (HCC): ICD-10-CM

## 2023-03-21 DIAGNOSIS — E61.1 IRON DEFICIENCY: ICD-10-CM

## 2023-03-21 DIAGNOSIS — F03.918 DEMENTIA WITH BEHAVIORAL DISTURBANCE (HCC): ICD-10-CM

## 2023-03-21 DIAGNOSIS — I50.22 CHRONIC SYSTOLIC (CONGESTIVE) HEART FAILURE (HCC): ICD-10-CM

## 2023-03-21 DIAGNOSIS — Z13.1 DIABETES MELLITUS SCREENING: Primary | ICD-10-CM

## 2023-03-21 DIAGNOSIS — I10 PRIMARY HYPERTENSION: ICD-10-CM

## 2023-03-21 DIAGNOSIS — J44.1 COPD EXACERBATION (HCC): ICD-10-CM

## 2023-03-21 DIAGNOSIS — S72.453S SUPRACONDYLAR FRACTURE OF DISTAL END OF FEMUR WITHOUT INTRACONDYLAR EXTENSION, SEQUELA: ICD-10-CM

## 2023-03-21 DIAGNOSIS — Z95.0 PRESENCE OF PERMANENT CARDIAC PACEMAKER: ICD-10-CM

## 2023-03-21 DIAGNOSIS — R73.9 HYPERGLYCEMIA, UNSPECIFIED: ICD-10-CM

## 2023-03-21 DIAGNOSIS — Z13.220 SCREENING FOR HYPERLIPIDEMIA: ICD-10-CM

## 2023-03-21 DIAGNOSIS — G47.429 NARCOLEPSY DUE TO UNDERLYING CONDITION WITHOUT CATAPLEXY: ICD-10-CM

## 2023-03-21 LAB — HBA1C MFR BLD: 5.4 %

## 2023-03-21 RX ORDER — HYDROCODONE BITARTRATE AND ACETAMINOPHEN 5; 325 MG/1; MG/1
1 TABLET ORAL EVERY 6 HOURS PRN
Qty: 120 TABLET | Refills: 0 | Status: SHIPPED | OUTPATIENT
Start: 2023-06-01 | End: 2023-07-01

## 2023-03-21 RX ORDER — ARMODAFINIL 200 MG/1
200 TABLET ORAL DAILY
Qty: 30 TABLET | Refills: 2 | Status: SHIPPED | OUTPATIENT
Start: 2023-04-18 | End: 2023-07-17

## 2023-03-21 RX ORDER — HYDROCODONE BITARTRATE AND ACETAMINOPHEN 5; 325 MG/1; MG/1
1 TABLET ORAL EVERY 6 HOURS PRN
Qty: 120 TABLET | Refills: 0 | Status: SHIPPED | OUTPATIENT
Start: 2023-04-01 | End: 2023-05-01

## 2023-03-21 RX ORDER — HYDROCODONE BITARTRATE AND ACETAMINOPHEN 5; 325 MG/1; MG/1
1 TABLET ORAL EVERY 6 HOURS PRN
Qty: 120 TABLET | Refills: 0 | Status: SHIPPED | OUTPATIENT
Start: 2023-05-01 | End: 2023-05-31

## 2023-03-21 ASSESSMENT — ENCOUNTER SYMPTOMS
WHEEZING: 0
SWOLLEN GLANDS: 0
CONSTIPATION: 0
CHANGE IN BOWEL HABIT: 0
VOMITING: 0
ANAL BLEEDING: 0
NAUSEA: 0
CHEST TIGHTNESS: 0
COUGH: 0
VISUAL CHANGE: 0
SORE THROAT: 0
DIARRHEA: 0
SHORTNESS OF BREATH: 0
ABDOMINAL PAIN: 0
CHOKING: 0
BLOOD IN STOOL: 0

## 2023-03-21 ASSESSMENT — PATIENT HEALTH QUESTIONNAIRE - PHQ9
1. LITTLE INTEREST OR PLEASURE IN DOING THINGS: 0
SUM OF ALL RESPONSES TO PHQ QUESTIONS 1-9: 0
SUM OF ALL RESPONSES TO PHQ QUESTIONS 1-9: 0
2. FEELING DOWN, DEPRESSED OR HOPELESS: 0
SUM OF ALL RESPONSES TO PHQ9 QUESTIONS 1 & 2: 0
SUM OF ALL RESPONSES TO PHQ QUESTIONS 1-9: 0
SUM OF ALL RESPONSES TO PHQ QUESTIONS 1-9: 0

## 2023-03-21 ASSESSMENT — VISUAL ACUITY: OU: 1

## 2023-03-21 NOTE — PROGRESS NOTES
Patient is here for a follow up
Iris Lynn PA-C   Multiple Vitamin (MULTIVITAMIN PO) Take  by mouth. Yes Historical Provider, MD       Review of Systems     Review of Systems   Constitutional:  Positive for fatigue. Negative for chills, diaphoresis, fever and unexpected weight change. HENT:  Negative for congestion and sore throat. Respiratory:  Negative for cough, choking, chest tightness, shortness of breath and wheezing. Cardiovascular:  Negative for chest pain, palpitations and leg swelling. Gastrointestinal:  Negative for abdominal pain, anal bleeding, anorexia, blood in stool, change in bowel habit, constipation, diarrhea, nausea and vomiting. Endocrine: Negative. Musculoskeletal:  Negative for arthralgias, joint swelling, myalgias and neck pain. Skin: Negative. Negative for rash. Neurological:  Negative for dizziness, vertigo, weakness, numbness and headaches. Psychiatric/Behavioral:  Negative for sleep disturbance. All other systems reviewed and are negative. Objective     /78 (Site: Left Upper Arm, Position: Sitting, Cuff Size: Medium Adult)   Pulse 65   SpO2 97%   Physical Exam  Vitals and nursing note reviewed. Constitutional:       General: He is not in acute distress. Appearance: He is well-developed. He is not ill-appearing. Comments: Seated in wheelchair   Eyes:      General: Lids are normal. Vision grossly intact. Cardiovascular:      Rate and Rhythm: Normal rate and regular rhythm. Heart sounds: Normal heart sounds, S1 normal and S2 normal. No murmur heard. No friction rub. No gallop. Pulmonary:      Effort: Pulmonary effort is normal. No respiratory distress. Breath sounds: Normal breath sounds. No wheezing. Abdominal:      General: Bowel sounds are normal.      Palpations: Abdomen is soft. There is no mass. Tenderness: There is no abdominal tenderness. There is no guarding. Musculoskeletal:         General: Normal range of motion.    Skin:

## 2023-03-23 ENCOUNTER — CARE COORDINATION (OUTPATIENT)
Dept: CARE COORDINATION | Age: 73
End: 2023-03-23

## 2023-03-23 ENCOUNTER — TELEPHONE (OUTPATIENT)
Dept: FAMILY MEDICINE CLINIC | Age: 73
End: 2023-03-23

## 2023-03-23 NOTE — TELEPHONE ENCOUNTER
Home health does not do shingles vaccines as far as I know. what diagnosis would I be referring him to home health for?

## 2023-03-23 NOTE — TELEPHONE ENCOUNTER
Patient contacted office and asked for referral to [facility]. He states he had homecare before but they discharged him.      He states he needs a homecare nurse to come give him his shingles vaccine

## 2023-03-23 NOTE — CARE COORDINATION
(Comment: COPD, CHF)          Goals Addressed    None         Future Appointments   Date Time Provider Dana Rueda   4/3/2023  1:45 PM [place]   4/18/2023  2:00 PM SCHEDULE, [facility] RESPIRATORY SPEC Resp Spec TOLP   6/28/2023  2:15 PM [facility] [address] [facility]

## 2023-03-24 NOTE — TELEPHONE ENCOUNTER
Pt notified. Pt states the shingles vaccine was delivered from his pharmacy. Pt will come into the office with vaccine to complete.

## 2023-03-30 ENCOUNTER — TELEPHONE (OUTPATIENT)
Dept: FAMILY MEDICINE CLINIC | Age: 73
End: 2023-03-30

## 2023-03-30 NOTE — TELEPHONE ENCOUNTER
Patient called stating the pharmacy cannot fill norco script until 4/1. States they delivery this to him and wont receive it until late Saturday. Patient would like to know if we can give the pharmacy an ok to fill early so he can get the script sooner.

## 2023-04-04 ENCOUNTER — CARE COORDINATION (OUTPATIENT)
Dept: CARE COORDINATION | Age: 73
End: 2023-04-04

## 2023-04-04 ASSESSMENT — ENCOUNTER SYMPTOMS: DYSPNEA ASSOCIATED WITH: EXERTION

## 2023-04-04 NOTE — CARE COORDINATION
SaO2: No  Have you had a recent diagnosis of pneumonia either by PCP or at a hospital?: No              Offered patient enrollment in the Remote Patient Monitoring (RPM) program for in-home monitoring: Patient declined. Lab Results       None            Care Coordination Interventions    Referral from Primary Care Provider: No  Suggested Interventions and [employer] on Wheels: Completed (Comment: Mobile Meals)  Medi Set or Pill Pack: Completed (Comment: [facility])  Transportation Support: Completed  Zone Management Tools: In Process (Comment: COPD, CHF)          Goals Addressed                   This Visit's Progress     Conditions and Symptoms   On track     I will schedule office visits, as directed by my provider. I will keep my appointment or reschedule if I have to cancel. I will notify my provider of any barriers to my plan of care. I will follow my Zone Management tool to seek urgent or emergent care. I will notify my provider of any symptoms that indicate a worsening of my condition. COPD, CHF    Barriers: lack of motivation, overwhelmed by complexity of regimen, and lack of education  Plan for overcoming my barriers: ACM calls, home health visits, education  Confidence: 7/10  Anticipated Goal Completion Date: 3/10/23                Future Appointments   Date Time Provider Dana Rueda   4/18/2023  2:00 PM SCHEDULE, [facility] RESPIRATORY SPEC Resp Spec [facility]   6/28/2023  2:15 PM Alicia Chamberlain, [facility]

## 2023-04-21 DIAGNOSIS — R42 VERTIGO: ICD-10-CM

## 2023-04-21 DIAGNOSIS — H81.03 MENIERE'S DISEASE OF BOTH EARS: ICD-10-CM

## 2023-04-24 RX ORDER — MECLIZINE HYDROCHLORIDE 25 MG/1
TABLET ORAL
Qty: 90 TABLET | Refills: 2 | Status: SHIPPED | OUTPATIENT
Start: 2023-04-24

## 2023-04-26 ENCOUNTER — CARE COORDINATION (OUTPATIENT)
Dept: CARE COORDINATION | Age: 73
End: 2023-04-26

## 2023-04-26 ASSESSMENT — ENCOUNTER SYMPTOMS: DYSPNEA ASSOCIATED WITH: EXERTION

## 2023-04-28 DIAGNOSIS — S72.453S SUPRACONDYLAR FRACTURE OF DISTAL END OF FEMUR WITHOUT INTRACONDYLAR EXTENSION, SEQUELA: ICD-10-CM

## 2023-04-28 DIAGNOSIS — D50.9 IRON DEFICIENCY ANEMIA, UNSPECIFIED IRON DEFICIENCY ANEMIA TYPE: ICD-10-CM

## 2023-04-28 RX ORDER — FERROUS SULFATE 325(65) MG
TABLET ORAL
Qty: 90 TABLET | Refills: 1 | Status: SHIPPED | OUTPATIENT
Start: 2023-04-28

## 2023-04-28 RX ORDER — HYDROCODONE BITARTRATE AND ACETAMINOPHEN 5; 325 MG/1; MG/1
1 TABLET ORAL EVERY 6 HOURS PRN
Qty: 120 TABLET | Refills: 0 | Status: SHIPPED | OUTPATIENT
Start: 2023-05-01 | End: 2023-05-31

## 2023-04-28 NOTE — TELEPHONE ENCOUNTER
Last visit: 3/21/23  Last Med refill: 11/16/22  Does patient have enough medication for 72 hours: Yes    Next Visit Date:  Future Appointments   Date Time Provider Dana Rueda   5/3/2023 11:15 AM SCHEDULE, [facility]   5/12/2023  2:30 PM SCHEDULE, [facility] ECHO [facility]LE [facility]   6/28/2023  2:15 PM Alicia Jones MD [facility]   7/6/2023 10:00 AM SCHEDULE, [facility] RESPIRATORY SPEC Resp Spec [facility]   7/13/2023  2:00 PM Frederick Anderson MD [facility]LE [facility]       Health Maintenance   Topic Date Due    AAA screen  Never done    Annual Wellness Visit (AWV)  Never done    Lipids  02/19/2023    Shingles vaccine (2 of 2) 03/13/2023    Depression Screen  03/21/2024    Colorectal Cancer Screen  06/16/2025    DTaP/Tdap/Td vaccine (2 - Td or Tdap) 03/23/2030    Flu vaccine  Completed    Pneumococcal 65+ years Vaccine  Completed    COVID-19 Vaccine  Completed    Hepatitis C screen  Completed    Hepatitis A vaccine  Aged Out    Hib vaccine  Aged Out    Meningococcal (ACWY) vaccine  Aged Out    A1C test (Diabetic or Prediabetic)  Discontinued       Hemoglobin A1C (%)   Date Value   03/21/2023 5.4   02/19/2022 6.0   05/05/2020 6.4 (H)             ( goal A1C is < 7)   No results found for: LABMICR  LDL Cholesterol (mg/dL)   Date Value   02/19/2022 97   05/05/2020 56       (goal LDL is <100)   AST (U/L)   Date Value   11/14/2022 28     ALT (U/L)   Date Value   11/14/2022 15     BUN (mg/dL)   Date Value   12/06/2022 16     BP Readings from Last 3 Encounters:   03/21/23 128/78   12/07/22 130/72   11/14/22 (!) 140/90          (goal 120/80)    All Future Testing planned in [facility]  Lab Frequency Next Occurrence   Lipid Panel Once 04/21/2023   Comprehensive Metabolic Panel Once 54/19/1801   Iron and TIBC Once 04/21/2023   Ferritin Once 04/21/2023   TSH With Reflex Ft4 Once 04/21/2023               Patient Active Problem List:     Meniere's disease     Narcolepsy     PND

## 2023-05-22 NOTE — PLAN OF CARE
BRONCHOSPASM/BRONCHOCONSTRICTION   [x]  IMPROVE AERATION/BREATH SOUNDS  [x]   ADMINISTER BRONCHODILATOR THERAPY AS APPROPRIATE  [x]   ASSESS BREATH SOUNDS  []   IMPLEMENT AEROSOL/MDI PROTOCOL  [x]   PATIENT EDUCATION AS NEEDED    PROVIDE ADEQUATE OXYGENATION WITH ACCEPTABLE SP02/ABG'S  [x]  IDENTIFY APPROPRIATE OXYGEN THERAPY  [x]   MONITOR SP02/ABG'S AS NEEDED   [x]   PATIENT EDUCATION AS NEEDED Pt called in to let us now that the cardiologist within 3 weeks from today to call back and we'll see what we can do. The doctor She was referred to could not schedule her until 7/11/2023.

## 2023-05-24 DIAGNOSIS — T40.2X5A THERAPEUTIC OPIOID-INDUCED CONSTIPATION (OIC): ICD-10-CM

## 2023-05-24 DIAGNOSIS — I42.9 CARDIOMYOPATHY, UNSPECIFIED TYPE (HCC): ICD-10-CM

## 2023-05-24 DIAGNOSIS — K59.03 THERAPEUTIC OPIOID-INDUCED CONSTIPATION (OIC): ICD-10-CM

## 2023-05-24 RX ORDER — DOCUSATE SODIUM 100 MG/1
CAPSULE, LIQUID FILLED ORAL
Qty: 60 CAPSULE | Refills: 5 | Status: SHIPPED | OUTPATIENT
Start: 2023-05-24

## 2023-05-24 RX ORDER — CARVEDILOL 6.25 MG/1
TABLET ORAL
Qty: 60 TABLET | Refills: 5 | Status: SHIPPED | OUTPATIENT
Start: 2023-05-24

## 2023-05-24 NOTE — TELEPHONE ENCOUNTER
Last visit: 3/21/23  Last Med refill: 11/14/22  Does patient have enough medication for 72 hours: No:     Next Visit Date:  Future Appointments   Date Time Provider Dana Rueda   6/15/2023  4:30 PM Alicia Huizar MD [facility] FP [facility]PP   7/6/2023 10:00 AM SCHEDULE, [facility] RESPIRATORY SPEC Resp Spec [facility]   7/13/2023  2:00 PM Stan Pimentel MD [facility]       Health Maintenance   Topic Date Due    AAA screen  Never done    Annual Wellness Visit (AWV)  Never done    Lipids  02/19/2023    Shingles vaccine (2 of 2) 03/13/2023    Depression Screen  03/21/2024    Colorectal Cancer Screen  06/16/2025    DTaP/Tdap/Td vaccine (2 - Td or Tdap) 03/23/2030    Flu vaccine  Completed    Pneumococcal 65+ years Vaccine  Completed    COVID-19 Vaccine  Completed    Hepatitis C screen  Completed    Hepatitis A vaccine  Aged Out    Hib vaccine  Aged Out    Meningococcal (ACWY) vaccine  Aged Out    A1C test (Diabetic or Prediabetic)  Discontinued       Hemoglobin A1C (%)   Date Value   03/21/2023 5.4   02/19/2022 6.0   05/05/2020 6.4 (H)             ( goal A1C is < 7)   No results found for: LABMICR  LDL Cholesterol (mg/dL)   Date Value   02/19/2022 97   05/05/2020 56       (goal LDL is <100)   AST (U/L)   Date Value   11/14/2022 28     ALT (U/L)   Date Value   11/14/2022 15     BUN (mg/dL)   Date Value   12/06/2022 16     BP Readings from Last 3 Encounters:   03/21/23 128/78   12/07/22 130/72   11/14/22 (!) 140/90          (goal 120/80)    All Future Testing planned in [facility]  Lab Frequency Next Occurrence   Lipid Panel Once 04/21/2023   Comprehensive Metabolic Panel Once 23/99/6588   Iron and TIBC Once 04/21/2023   Ferritin Once 04/21/2023   TSH With Reflex Ft4 Once 04/21/2023               Patient Active Problem List:     Meniere's disease     Narcolepsy     PND (post-nasal drip)     Transaminasemia     Cardiomyopathy ([address]     HTN (hypertension)     Chronic obstructive pulmonary disease ([facility]

## 2023-06-20 DIAGNOSIS — G47.429 NARCOLEPSY DUE TO UNDERLYING CONDITION WITHOUT CATAPLEXY: ICD-10-CM

## 2023-06-20 DIAGNOSIS — S72.453S SUPRACONDYLAR FRACTURE OF DISTAL END OF FEMUR WITHOUT INTRACONDYLAR EXTENSION, SEQUELA: ICD-10-CM

## 2023-06-20 NOTE — TELEPHONE ENCOUNTER
Patient contacted office requesting refills:  -Norco  -Nuvigil  -Centrum Silver vitamins      [facility]      Patient stated due to this month having 30 days, he is asking if the Meena Jackson can be released/filled date for 6/30.  He stated he normally gets medications delivered to him around 6 pm.       Patient had to reschedule appts due to provider being out of office and due to weather blocking way to appt

## 2023-06-20 NOTE — TELEPHONE ENCOUNTER
Last visit: 03/21/23  Last Med refill:   Does patient have enough medication for 72 hours: Yes    Next Visit Date:  Future Appointments   Date Time Provider Dana Rueda   7/6/2023 10:00 AM SCHEDULE, [facility] RESPIRATORY SPEC Resp Spec [facility]   7/12/2023  1:45 PM SCHEDULE, [facility] E[facility] TOLE [facility]   7/13/2023  2:00 PM Emily Edwards MD [facility] TOLE [facility]   7/26/2023  5:30 PM Alicia Dunlap  [address] Maintenance   Topic Date Due    AAA screen  Never done    Annual Wellness Visit (AWV)  Never done    Lipids  02/19/2023    Shingles vaccine (2 of 2) 03/13/2023    Depression Screen  03/21/2024    Colorectal Cancer Screen  06/16/2025    DTaP/Tdap/Td vaccine (2 - Td or Tdap) 03/23/2030    Flu vaccine  Completed    Pneumococcal 65+ years Vaccine  Completed    COVID-19 Vaccine  Completed    Hepatitis C screen  Completed    Hepatitis A vaccine  Aged Out    Hib vaccine  Aged Out    Meningococcal (ACWY) vaccine  Aged Out    A1C test (Diabetic or Prediabetic)  Discontinued       Hemoglobin A1C (%)   Date Value   03/21/2023 5.4   02/19/2022 6.0   05/05/2020 6.4 (H)             ( goal A1C is < 7)   No results found for: LABMICR  LDL Cholesterol (mg/dL)   Date Value   02/19/2022 97   05/05/2020 56       (goal LDL is <100)   AST (U/L)   Date Value   11/14/2022 28     ALT (U/L)   Date Value   11/14/2022 15     BUN (mg/dL)   Date Value   12/06/2022 16     BP Readings from Last 3 Encounters:   03/21/23 128/78   12/07/22 130/72   11/14/22 (!) 140/90          (goal 120/80)    All Future Testing planned in [facility]  Lab Frequency Next Occurrence   Lipid Panel Once 04/21/2023   Comprehensive Metabolic Panel Once 81/49/9689   Iron and TIBC Once 04/21/2023   Ferritin Once 04/21/2023   TSH With Reflex Ft4 Once 04/21/2023               Patient Active Problem List:     Meniere's disease     Narcolepsy     PND (post-nasal drip)     Transaminasemia     [facility]     HTN

## 2023-06-21 RX ORDER — MULTIVIT-MIN/FA/LYCOPEN/LUTEIN .4-300-25
1 TABLET ORAL DAILY
Qty: 90 TABLET | Refills: 1 | Status: SHIPPED | OUTPATIENT
Start: 2023-06-21

## 2023-06-21 RX ORDER — HYDROCODONE BITARTRATE AND ACETAMINOPHEN 5; 325 MG/1; MG/1
1 TABLET ORAL EVERY 6 HOURS PRN
Qty: 120 TABLET | Refills: 0 | Status: SHIPPED | OUTPATIENT
Start: 2023-08-19 | End: 2023-09-18

## 2023-06-21 RX ORDER — HYDROCODONE BITARTRATE AND ACETAMINOPHEN 5; 325 MG/1; MG/1
1 TABLET ORAL EVERY 6 HOURS PRN
Qty: 120 TABLET | Refills: 0 | Status: SHIPPED | OUTPATIENT
Start: 2023-06-21 | End: 2023-07-21

## 2023-06-21 RX ORDER — HYDROCODONE BITARTRATE AND ACETAMINOPHEN 5; 325 MG/1; MG/1
1 TABLET ORAL EVERY 6 HOURS PRN
Qty: 120 TABLET | Refills: 0 | Status: SHIPPED | OUTPATIENT
Start: 2023-07-20 | End: 2023-08-19

## 2023-06-21 RX ORDER — ARMODAFINIL 200 MG/1
200 TABLET ORAL DAILY
Qty: 90 TABLET | Refills: 0 | Status: SHIPPED | OUTPATIENT
Start: 2023-06-21 | End: 2023-09-19

## 2023-06-22 ENCOUNTER — TELEPHONE (OUTPATIENT)
Dept: FAMILY MEDICINE CLINIC | Age: 73
End: 2023-06-22

## 2023-06-22 DIAGNOSIS — L60.9 NAIL PROBLEM: Primary | ICD-10-CM

## 2023-06-26 RX ORDER — CLOPIDOGREL BISULFATE 75 MG/1
TABLET ORAL
Qty: 30 TABLET | Refills: 5 | Status: SHIPPED | OUTPATIENT
Start: 2023-06-26

## 2023-06-26 RX ORDER — ATORVASTATIN CALCIUM 40 MG/1
TABLET, FILM COATED ORAL
Qty: 30 TABLET | Refills: 5 | Status: SHIPPED | OUTPATIENT
Start: 2023-06-26

## 2023-06-26 RX ORDER — PNV NO.95/FERROUS FUM/FOLIC AC 28MG-0.8MG
TABLET ORAL
Qty: 30 TABLET | Refills: 5 | Status: SHIPPED | OUTPATIENT
Start: 2023-06-26

## 2023-07-01 DIAGNOSIS — Z87.81 HISTORY OF HIP FRACTURE: ICD-10-CM

## 2023-07-03 NOTE — TELEPHONE ENCOUNTER
Last visit: 3/21/23  Last Med refill: 1/11/23  Does patient have enough medication for 72 hours: Yes    Next Visit Date:  Future Appointments   Date Time Provider [address]   7/6/2023 10:00 AM SCHEDULE, [facility] RESPIRATORY SPEC Resp Spec [facility]   7/12/2023  1:45 PM SCHEDULE, [facility] E[facility] TOLE [facility]   7/13/2023  2:00 PM Chad Neff MD [facility]LE [facility]   7/26/2023  5:30 PM Alicia Yoon MD [address] Maintenance   Topic Date Due    AAA screen  Never done    Annual Wellness Visit (AWV)  Never done    Lipids  02/19/2023    Shingles vaccine (2 of 2) 03/13/2023    Flu vaccine (1) 08/01/2023    Depression Screen  03/21/2024    Colorectal Cancer Screen  06/16/2025    DTaP/Tdap/Td vaccine (2 - Td or Tdap) 03/23/2030    Pneumococcal 65+ years Vaccine  Completed    COVID-19 Vaccine  Completed    Hepatitis C screen  Completed    Hepatitis A vaccine  Aged Out    Hib vaccine  Aged Out    Meningococcal (ACWY) vaccine  Aged Out    A1C test (Diabetic or Prediabetic)  Discontinued       Hemoglobin A1C (%)   Date Value   03/21/2023 5.4   02/19/2022 6.0   05/05/2020 6.4 (H)             ( goal A1C is < 7)   No results found for: LABMICR  LDL Cholesterol (mg/dL)   Date Value   02/19/2022 97   05/05/2020 56       (goal LDL is <100)   AST (U/L)   Date Value   11/14/2022 28     ALT (U/L)   Date Value   11/14/2022 15     BUN (mg/dL)   Date Value   12/06/2022 16     BP Readings from Last 3 Encounters:   03/21/23 128/78   12/07/22 130/72   11/14/22 (!) 140/90          (goal 120/80)    All Future Testing planned in [facility]  Lab Frequency Next Occurrence   Lipid Panel Once 04/21/2023   Comprehensive Metabolic Panel Once 49/12/3756   Iron and TIBC Once 04/21/2023   Ferritin Once 04/21/2023   TSH With Reflex Ft4 Once 04/21/2023               Patient Active Problem List:     Meniere's disease     Narcolepsy     PND (post-nasal drip)     Transaminasemia     Cardiomyopathy ([address])

## 2023-07-06 ENCOUNTER — TELEPHONE (OUTPATIENT)
Dept: PULMONOLOGY | Age: 73
End: 2023-07-06

## 2023-07-06 ENCOUNTER — OFFICE VISIT (OUTPATIENT)
Dept: PULMONOLOGY | Age: 73
End: 2023-07-06
Payer: COMMERCIAL

## 2023-07-06 VITALS
OXYGEN SATURATION: 96 % | DIASTOLIC BLOOD PRESSURE: 76 MMHG | SYSTOLIC BLOOD PRESSURE: 124 MMHG | BODY MASS INDEX: 24.34 KG/M2 | RESPIRATION RATE: 18 BRPM | HEIGHT: 70 IN | WEIGHT: 170 LBS | HEART RATE: 64 BPM

## 2023-07-06 DIAGNOSIS — J44.9 CHRONIC OBSTRUCTIVE PULMONARY DISEASE, UNSPECIFIED COPD TYPE (HCC): Primary | ICD-10-CM

## 2023-07-06 PROCEDURE — 3074F SYST BP LT 130 MM HG: CPT | Performed by: INTERNAL MEDICINE

## 2023-07-06 PROCEDURE — 99214 OFFICE O/P EST MOD 30 MIN: CPT | Performed by: INTERNAL MEDICINE

## 2023-07-06 PROCEDURE — 3078F DIAST BP <80 MM HG: CPT | Performed by: INTERNAL MEDICINE

## 2023-07-06 PROCEDURE — 1123F ACP DISCUSS/DSCN MKR DOCD: CPT | Performed by: INTERNAL MEDICINE

## 2023-07-06 RX ORDER — LEVOFLOXACIN 500 MG/1
500 TABLET, FILM COATED ORAL DAILY
Qty: 10 TABLET | Refills: 0 | Status: SHIPPED | OUTPATIENT
Start: 2023-07-06 | End: 2023-07-16

## 2023-07-06 NOTE — PROGRESS NOTES
03/16/22 2158   RT Protocol   History Pulmonary Disease 1   Respiratory pattern 2   Breath sounds 2   Cough 1   Indications for Bronchodilator Therapy Decreased or absent breath sounds   Bronchodilator Assessment Score 6   OBS/CDU   RESIDENT NOTE      Patients PCP is:  MONIKA GERARDO MD        SUBJECTIVE      No acute events overnight. PHYSICAL EXAM      Constitutional: He is oriented to person, place, and time and well-developed, well-nourished, and in no distress. No distress. HENT:   Head: Normocephalic and atraumatic. Right Ear: External ear normal.   Left Ear: External ear normal.   Nose: Nose normal.   Mouth/Throat: Oropharynx is clear and moist. No oropharyngeal exudate. The right TM is clear. The left TM is unable to be visualized secondary to cerumen impaction. Eyes: EOM are normal. Pupils are equal, round, and reactive to light. Right eye exhibits no discharge. Left eye exhibits no discharge. Neck: No JVD present. Nontender   Cardiovascular: Normal rate, regular rhythm, normal heart sounds and intact distal pulses. No murmur heard. Pulmonary/Chest: Effort normal and breath sounds normal. No stridor. No respiratory distress. He has no wheezes. He has no rales. He exhibits no tenderness. Abdominal: Soft. There is no tenderness. There is no guarding. Musculoskeletal: He exhibits edema and tenderness. Negative Latrice's sign. No palpable cords. No calf asymmetry. R knee is ecchymotic, diffusely swollen, and diffusely tender with decreased ROM. Has pain and ecchymosis extending distally to the mid lower medial aspect of the R leg. L knee shows no signs of trauma but is mildly tender to palpation with full ROM. Distal cap refill and pulses are intact. No hip pain. No pelvis pain. Neurological: He is alert and oriented to person, place, and time. No cranial nerve deficit. He exhibits normal muscle tone. Coordination normal. GCS score is 15. Skin: Skin is warm and dry.  No rash

## 2023-07-06 NOTE — TELEPHONE ENCOUNTER
Received a questionnaire from [facility]. This has been filled out and faxed back.

## 2023-07-07 NOTE — TELEPHONE ENCOUNTER
Lorenzo THOMASON (PilarTrista Leonard)  Rx #: [id number]  Spiriva Respimat 2. 5MCG/ACT aerosol     Form  [facility] Electronic PA Form (4667 NCPDP)  Created  20 hours ago  Sent to Plan  20 hours ago  Plan Response  20 hours ago  Submit Clinical Questions  20 hours ago  Determination  Favorable  14 hours ago  Message from [employer]  Your PA request has been approved. Additional information will be provided in the approval communication.  (Message [phone])

## 2023-07-17 DIAGNOSIS — S72.453S SUPRACONDYLAR FRACTURE OF DISTAL END OF FEMUR WITHOUT INTRACONDYLAR EXTENSION, SEQUELA: ICD-10-CM

## 2023-07-17 NOTE — TELEPHONE ENCOUNTER
Last visit: 03/21/2023  Last Med refill: 06/21/2023  Does patient have enough medication for 72 hours: Yes    Next Visit Date:  Future Appointments   Date Time Provider [address]   7/26/2023  5:30 PM Alicia Cyr MD [facility] FP [facility]   8/10/2023 10:00 AM SCHEDULE, [facility] RESPIRATORY SPEC Resp Spec [facility]   9/7/2023  2:15 PM Michael Cervantes MD [facility]LE [facility]       Health Maintenance   Topic Date Due    AAA screen  Never done    Annual Wellness Visit (AWV)  Never done    Lipids  02/19/2023    Shingles vaccine (2 of 2) 03/13/2023    Flu vaccine (1) 08/01/2023    Depression Screen  03/21/2024    Colorectal Cancer Screen  06/16/2025    DTaP/Tdap/Td vaccine (2 - Td or Tdap) 03/23/2030    Pneumococcal 65+ years Vaccine  Completed    COVID-19 Vaccine  Completed    Hepatitis C screen  Completed    Hepatitis A vaccine  Aged Out    Hib vaccine  Aged Out    Meningococcal (ACWY) vaccine  Aged Out    A1C test (Diabetic or Prediabetic)  Discontinued       Hemoglobin A1C (%)   Date Value   03/21/2023 5.4   02/19/2022 6.0   05/05/2020 6.4 (H)             ( goal A1C is < 7)   No results found for: LABMICR  LDL Cholesterol (mg/dL)   Date Value   02/19/2022 97   05/05/2020 56       (goal LDL is <100)   AST (U/L)   Date Value   11/14/2022 28     ALT (U/L)   Date Value   11/14/2022 15     BUN (mg/dL)   Date Value   12/06/2022 16     BP Readings from Last 3 Encounters:   07/06/23 124/76   03/21/23 128/78   12/07/22 130/72          (goal 120/80)    All Future Testing planned in [facility]  Lab Frequency Next Occurrence   Lipid Panel Once 04/21/2023   Comprehensive Metabolic Panel Once 21/52/7555   Iron and TIBC Once 04/21/2023   Ferritin Once 04/21/2023   TSH With Reflex Ft4 Once 04/21/2023   XR CHEST (2 VW) Once 07/06/2023               Patient Active Problem List:     Meniere's disease     Narcolepsy     PND (post-nasal drip)     Transaminasemia     Cardiomyopathy ([address])     HTN (hypertension)     Chronic

## 2023-07-18 RX ORDER — ALBUTEROL SULFATE 90 UG/1
AEROSOL, METERED RESPIRATORY (INHALATION)
Qty: 8.5 G | Refills: 5 | Status: SHIPPED | OUTPATIENT
Start: 2023-07-18

## 2023-07-18 RX ORDER — HYDROCODONE BITARTRATE AND ACETAMINOPHEN 5; 325 MG/1; MG/1
1 TABLET ORAL EVERY 6 HOURS PRN
Qty: 120 TABLET | Refills: 0 | OUTPATIENT
Start: 2023-07-23 | End: 2023-08-22

## 2023-07-18 NOTE — TELEPHONE ENCOUNTER
Last visit: 3/21/23  Last Med refill: 11/29/14  Does patient have enough medication for 72 hours: No:     Next Visit Date:  Future Appointments   Date Time Provider [address]   7/26/2023  5:30 PM Alicia Cyr MD [facility] FP [email or web address]   8/10/2023 10:00 AM SCHEDULE, [facility] RESPIRATORY SPEC Resp Spec [facility]   9/7/2023  2:15 PM Michael Cervantes MD [facility]       Health Maintenance   Topic Date Due    AAA screen  Never done    Annual Wellness Visit (AWV)  Never done    Lipids  02/19/2023    Shingles vaccine (2 of 2) 03/13/2023    Flu vaccine (1) 08/01/2023    Depression Screen  03/21/2024    Colorectal Cancer Screen  06/16/2025    DTaP/Tdap/Td vaccine (2 - Td or Tdap) 03/23/2030    Pneumococcal 65+ years Vaccine  Completed    COVID-19 Vaccine  Completed    Hepatitis C screen  Completed    Hepatitis A vaccine  Aged Out    Hib vaccine  Aged Out    Meningococcal (ACWY) vaccine  Aged Out    A1C test (Diabetic or Prediabetic)  Discontinued       Hemoglobin A1C (%)   Date Value   03/21/2023 5.4   02/19/2022 6.0   05/05/2020 6.4 (H)             ( goal A1C is < 7)   No results found for: LABMICR  LDL Cholesterol (mg/dL)   Date Value   02/19/2022 97   05/05/2020 56       (goal LDL is <100)   AST (U/L)   Date Value   11/14/2022 28     ALT (U/L)   Date Value   11/14/2022 15     BUN (mg/dL)   Date Value   12/06/2022 16     BP Readings from Last 3 Encounters:   07/06/23 124/76   03/21/23 128/78   12/07/22 130/72          (goal 120/80)    All Future Testing planned in [facility]  Lab Frequency Next Occurrence   Lipid Panel Once 04/21/2023   Comprehensive Metabolic Panel Once 10/89/0010   Iron and TIBC Once 04/21/2023   Ferritin Once 04/21/2023   TSH With Reflex Ft4 Once 04/21/2023   XR CHEST (2 VW) Once 07/06/2023               Patient Active Problem List:     Meniere's disease     Narcolepsy     PND (post-nasal drip)     Transaminasemia     Cardiomyopathy ([address])     HTN (hypertension)     Chronic obstructive

## 2023-07-20 RX ORDER — FLUTICASONE PROPIONATE 50 MCG
BLISTER, WITH INHALATION DEVICE INHALATION
Refills: 0 | OUTPATIENT
Start: 2023-07-20

## 2023-07-20 NOTE — TELEPHONE ENCOUNTER
LAST VISIT: 7/6/23  NEXT VISIT: 8/10/23    No record of prescribing. Medication refused. Please make any changes needed. Thank you.

## 2023-08-07 DIAGNOSIS — H81.03 MENIERE'S DISEASE OF BOTH EARS: ICD-10-CM

## 2023-08-07 DIAGNOSIS — R42 VERTIGO: ICD-10-CM

## 2023-08-07 RX ORDER — MECLIZINE HYDROCHLORIDE 25 MG/1
TABLET ORAL
Qty: 90 TABLET | Refills: 3 | Status: SHIPPED | OUTPATIENT
Start: 2023-08-07

## 2023-08-07 NOTE — TELEPHONE ENCOUNTER
Dementia with behavioral disturbance (HCC)     S/p bare metal coronary artery stent     Supracondylar fracture of distal end of femur without intracondylar extension, sequela     Closed fracture of right distal femur (HCC)     Presence of permanent cardiac pacemaker     Anemia, normocytic normochromic     History of hip fracture     Atelectasis     Chronic systolic (congestive) heart failure     Opioid dependence with current use ([address])

## 2023-08-09 ENCOUNTER — HOSPITAL ENCOUNTER (OUTPATIENT)
Age: 73
Discharge: HOME | End: 2023-08-11
Payer: COMMERCIAL

## 2023-08-09 ENCOUNTER — HOSPITAL ENCOUNTER (OUTPATIENT)
Dept: GENERAL RADIOLOGY | Age: 73
Discharge: HOME | End: 2023-08-11
Payer: COMMERCIAL

## 2023-08-09 DIAGNOSIS — J44.1 COPD EXACERBATION (HCC): ICD-10-CM

## 2023-08-09 PROCEDURE — 71046 X-RAY EXAM CHEST 2 VIEWS: CPT

## 2023-08-10 ENCOUNTER — OFFICE VISIT (OUTPATIENT)
Dept: PULMONOLOGY | Age: 73
End: 2023-08-10
Payer: COMMERCIAL

## 2023-08-10 VITALS
DIASTOLIC BLOOD PRESSURE: 74 MMHG | RESPIRATION RATE: 14 BRPM | HEIGHT: 70 IN | HEART RATE: 62 BPM | OXYGEN SATURATION: 96 % | SYSTOLIC BLOOD PRESSURE: 140 MMHG | BODY MASS INDEX: 25.05 KG/M2 | WEIGHT: 175 LBS

## 2023-08-10 DIAGNOSIS — J44.9 CHRONIC OBSTRUCTIVE PULMONARY DISEASE, UNSPECIFIED COPD TYPE (HCC): Primary | ICD-10-CM

## 2023-08-10 PROCEDURE — 3078F DIAST BP <80 MM HG: CPT | Performed by: INTERNAL MEDICINE

## 2023-08-10 PROCEDURE — 1123F ACP DISCUSS/DSCN MKR DOCD: CPT | Performed by: INTERNAL MEDICINE

## 2023-08-10 PROCEDURE — 94726 PLETHYSMOGRAPHY LUNG VOLUMES: CPT | Performed by: INTERNAL MEDICINE

## 2023-08-10 PROCEDURE — 99214 OFFICE O/P EST MOD 30 MIN: CPT | Performed by: INTERNAL MEDICINE

## 2023-08-10 PROCEDURE — 3077F SYST BP >= 140 MM HG: CPT | Performed by: INTERNAL MEDICINE

## 2023-08-10 PROCEDURE — 94010 BREATHING CAPACITY TEST: CPT | Performed by: INTERNAL MEDICINE

## 2023-08-10 PROCEDURE — 94729 DIFFUSING CAPACITY: CPT | Performed by: INTERNAL MEDICINE

## 2023-08-10 RX ORDER — FLUTICASONE PROPIONATE 110 UG/1
2 AEROSOL, METERED RESPIRATORY (INHALATION) 2 TIMES DAILY
Qty: 12 G | Refills: 3 | Status: SHIPPED | OUTPATIENT
Start: 2023-08-10 | End: 2024-08-09

## 2023-08-10 RX ORDER — FLUTICASONE PROPIONATE 220 UG/1
2 AEROSOL, METERED RESPIRATORY (INHALATION) 2 TIMES DAILY
Qty: 3 EACH | Refills: 3 | Status: SHIPPED | OUTPATIENT
Start: 2023-08-10 | End: 2023-11-08

## 2023-08-10 NOTE — PROGRESS NOTES
No intake/output data recorded. REASON FOR THE CONSULTATION:  Chronic respiratory failure  COPD  CVA  Vertigo    Hypertension  HISTORY OF PRESENT ILLNESS:    Juno Ramos is a 72y.o. year old male here for evaluation of chronic obstructive lung disease due to chronic bronchitis and emphysema he has been a chronic smoker. Given up smoking. H he has developed chronic respiratory failure secondary to lung disease. He is not on home oxygen. A pulmonary function study done in the office today revealed advanced degree of chronic obstructive lung disease consistent with a clinical diagnosis. He is not a CO2 retainer. Patient was recently admitted to the hospital and treated with bronchodilators and steroids and antibiotics. He at this time is taking albuterol and Spiriva. He has run out of inhaled steroid and long-acting beta agonist in the form of Symbicort. He has been rinsing his mouth and throat after using Symbicort whenever he use it. Has not noted any pedal edema no thromboembolic process. Denies any chest pain no fever or chills    Has history of hypertension that is well-controlled. No history of angina coronary artery disease. He has history of vertigo. He was noted to have some ischemic ischemic changes in the brain last evaluation in the hospital.  He already had COVID-vaccine flu vaccine Pneumovax and Pneumovax.         LUNG CANCER SCREENING     CRITERIA MET    []     CT ORDERED  []      CRITERIA NOT MET   [x]      REFUSED                    []        REASON CRITERIA NOT MET     SMOKING LESS THAN 30 PY  []      AGE LESS THAN 55 or GREATER 77 YEARS  []      QUIT SMOKING 15 YEARS OR GREATER   []      RECENT CT WITH IN 11 MONTHS    []      LIFE EXPECTANCY < 5 YEARS   []      SIGNS  AND SYMPTOMS OF LUNG CANCER   []         Immunization   Immunization History   Administered Date(s) Administered    COVID-19, MODERNA BLUE border, Primary or Immunocompromised, (age 12y+), IM, 100 mcg/0.5mL
Patient performed PFT with good effort and understanding   Pulmonary function study dictated by Dr. Miguelina Garza  Patient Sol Stains  Date of study 8/10/2023  MR number [id number]    Diagnosis COPD    Ventilatory function revealed severe reduction in the vital capacity and FEV1. FEV1 FVC ratio is markedly decreased lung capacity is especially the residual volume is increased RV/TLC ratio is increased diffusion capacity is severely reduced. Flow volume loops severely abnormal consistent with COPD    Impression    Pulmonary function study reveals advanced degree of chronic obstructive lung disease consistent with COPD.   A clinical correlation should be obtained dictated by Dr. Miguelina Garza MD dictation over thank you
01/19/2012 04:20 AM    HGB 12.8 12/06/2022 03:45 PM    HCT 42.6 12/06/2022 03:45 PM    MCV 92.4 12/06/2022 03:45 PM    MCH 27.8 12/06/2022 03:45 PM    MCHC 30.0 12/06/2022 03:45 PM    RDW 18.5 12/06/2022 03:45 PM     12/06/2022 03:45 PM     01/19/2012 04:20 AM    MPV 9.8 12/06/2022 03:45 PM     CMP:    Lab Results   Component Value Date/Time     12/06/2022 03:45 PM    K 4.9 12/06/2022 03:45 PM     12/06/2022 03:45 PM    CO2 22 12/06/2022 03:45 PM    BUN 16 12/06/2022 03:45 PM    CREATININE 0.79 12/06/2022 03:45 PM    GFRAA >60 03/20/2022 04:28 AM    LABGLOM >60 12/06/2022 03:45 PM    GLUCOSE 111 12/06/2022 03:45 PM    GLUCOSE 95 01/19/2012 04:20 AM    PROT 7.3 11/14/2022 02:20 PM    LABALBU 4.1 11/14/2022 02:20 PM    LABALBU 4.6 01/19/2012 04:20 AM    CALCIUM 9.6 12/06/2022 03:45 PM    BILITOT 0.2 11/14/2022 02:20 PM    ALKPHOS 155 12/06/2022 01:30 PM    ALKPHOS 173 11/14/2022 02:20 PM    AST 28 11/14/2022 02:20 PM    ALT 15 11/14/2022 02:20 PM     MRI Brain  IMPRESSION:   1. No acute or subacute ischemic insult noted. No abnormal enhancing   intracranial mass or acute hemorrhage seen. 2. Chronic bilateral thalamic and basal ganglia lacunar infarcts. Moderate chronic small vessel disease. CT brain:   1. No acute infarct or hemorrhage. 2. Old left thalamic and left basal ganglia infarcts. 3. Possible sinusitis. X-ray R tib-fib:   1. Soft tissue swelling inferior to the patella which appears new from   the prior exam. The cause of this is unclear. This could be posttraumatic. 2. No definite acute osseous abnormality is identified. 3. Stable deformity of the visualized osseous structures. Atherosclerotic   calcification the vasculature. 4. Total right knee arthroplasty hardware and hardware at the distal   tibiofibular syndesmosis is again noted without significant change. X-ray R knee:   1.  Soft tissue swelling inferior to the patella which appears increased   since the prior

## 2023-08-16 ENCOUNTER — TELEPHONE (OUTPATIENT)
Dept: FAMILY MEDICINE CLINIC | Age: 73
End: 2023-08-16

## 2023-08-16 NOTE — TELEPHONE ENCOUNTER
Patient is requesting for okay to have Norco filled on 08/18/23 instead of 08/19/23. He states gets delivery between 5-6 in the evening and if he has to wait until Saturday he will be out of his medication.     Pharmacy    [address] [place] [address] - [address] [place] [id number]   Phone:  [phone]  Fax:  [phone]

## 2023-08-17 ENCOUNTER — TELEPHONE (OUTPATIENT)
Dept: FAMILY MEDICINE CLINIC | Age: 73
End: 2023-08-17

## 2023-08-17 DIAGNOSIS — M79.671 FOOT PAIN, BILATERAL: Primary | ICD-10-CM

## 2023-08-17 DIAGNOSIS — M79.672 FOOT PAIN, BILATERAL: Primary | ICD-10-CM

## 2023-08-17 NOTE — TELEPHONE ENCOUNTER
Received a call from [facility] office. They are asking for a referral. States patient has appt with them tomorrow. States he is being seen for pain in his feet.     [facility] for referral?  Fax is [phone]

## 2023-08-21 RX ORDER — FOLIC ACID 1 MG/1
TABLET ORAL
Qty: 30 TABLET | Refills: 5 | Status: SHIPPED | OUTPATIENT
Start: 2023-08-21

## 2023-08-21 RX ORDER — LEVOTHYROXINE SODIUM 0.05 MG/1
TABLET ORAL
Qty: 30 TABLET | Refills: 5 | Status: SHIPPED | OUTPATIENT
Start: 2023-08-21

## 2023-08-21 RX ORDER — POTASSIUM CHLORIDE 20 MEQ/1
TABLET, EXTENDED RELEASE ORAL
Qty: 30 TABLET | Refills: 5 | Status: SHIPPED | OUTPATIENT
Start: 2023-08-21

## 2023-08-21 RX ORDER — AMIODARONE HYDROCHLORIDE 200 MG/1
TABLET ORAL
Qty: 30 TABLET | Refills: 5 | Status: SHIPPED | OUTPATIENT
Start: 2023-08-21

## 2023-09-06 ENCOUNTER — OFFICE VISIT (OUTPATIENT)
Dept: FAMILY MEDICINE CLINIC | Age: 73
End: 2023-09-06

## 2023-09-06 VITALS
OXYGEN SATURATION: 95 % | HEART RATE: 64 BPM | DIASTOLIC BLOOD PRESSURE: 64 MMHG | TEMPERATURE: 98.6 F | SYSTOLIC BLOOD PRESSURE: 120 MMHG

## 2023-09-06 DIAGNOSIS — Z13.6 SCREENING FOR CARDIOVASCULAR CONDITION: ICD-10-CM

## 2023-09-06 DIAGNOSIS — S72.453S SUPRACONDYLAR FRACTURE OF DISTAL END OF FEMUR WITHOUT INTRACONDYLAR EXTENSION, SEQUELA: ICD-10-CM

## 2023-09-06 DIAGNOSIS — Z87.891 PERSONAL HISTORY OF TOBACCO USE, PRESENTING HAZARDS TO HEALTH: ICD-10-CM

## 2023-09-06 DIAGNOSIS — Q80.9 XERODERMA: ICD-10-CM

## 2023-09-06 DIAGNOSIS — I42.9 CARDIOMYOPATHY, UNSPECIFIED TYPE (HCC): ICD-10-CM

## 2023-09-06 DIAGNOSIS — M24.561 FLEXION CONTRACTURE OF RIGHT KNEE: ICD-10-CM

## 2023-09-06 DIAGNOSIS — Z23 NEED FOR INFLUENZA VACCINATION: ICD-10-CM

## 2023-09-06 DIAGNOSIS — R53.1 RIGHT SIDED WEAKNESS: ICD-10-CM

## 2023-09-06 DIAGNOSIS — R06.09 DYSPNEA ON EXERTION: ICD-10-CM

## 2023-09-06 DIAGNOSIS — G47.429 NARCOLEPSY DUE TO UNDERLYING CONDITION WITHOUT CATAPLEXY: ICD-10-CM

## 2023-09-06 DIAGNOSIS — Z00.00 INITIAL MEDICARE ANNUAL WELLNESS VISIT: Primary | ICD-10-CM

## 2023-09-06 DIAGNOSIS — Z71.89 ACP (ADVANCE CARE PLANNING): ICD-10-CM

## 2023-09-06 DIAGNOSIS — Z79.899 HIGH RISK MEDICATION USE: ICD-10-CM

## 2023-09-06 RX ORDER — PRAMOXINE HYDROCHLORIDE 10 MG/G
1 CREAM TOPICAL DAILY PRN
Qty: 340 G | Refills: 1 | Status: SHIPPED | OUTPATIENT
Start: 2023-09-06

## 2023-09-06 ASSESSMENT — LIFESTYLE VARIABLES
HAVE YOU OR SOMEONE ELSE BEEN INJURED AS A RESULT OF YOUR DRINKING: 0
HOW OFTEN DURING THE LAST YEAR HAVE YOU BEEN UNABLE TO REMEMBER WHAT HAPPENED THE NIGHT BEFORE BECAUSE YOU HAD BEEN DRINKING: 0
HOW OFTEN DURING THE LAST YEAR HAVE YOU HAD A FEELING OF GUILT OR REMORSE AFTER DRINKING: 0
HOW MANY STANDARD DRINKS CONTAINING ALCOHOL DO YOU HAVE ON A TYPICAL DAY: 1 OR 2
HOW OFTEN DURING THE LAST YEAR HAVE YOU FOUND THAT YOU WERE NOT ABLE TO STOP DRINKING ONCE YOU HAD STARTED: 0
HOW OFTEN DURING THE LAST YEAR HAVE YOU NEEDED AN ALCOHOLIC DRINK FIRST THING IN THE MORNING TO GET YOURSELF GOING AFTER A NIGHT OF HEAVY DRINKING: 0
HOW OFTEN DURING THE LAST YEAR HAVE YOU FAILED TO DO WHAT WAS NORMALLY EXPECTED FROM YOU BECAUSE OF DRINKING: 0
HOW OFTEN DO YOU HAVE A DRINK CONTAINING ALCOHOL: 4 OR MORE TIMES A WEEK
HAS A RELATIVE, FRIEND, DOCTOR, OR ANOTHER HEALTH PROFESSIONAL EXPRESSED CONCERN ABOUT YOUR DRINKING OR SUGGESTED YOU CUT DOWN: 0

## 2023-09-06 ASSESSMENT — PATIENT HEALTH QUESTIONNAIRE - PHQ9
SUM OF ALL RESPONSES TO PHQ QUESTIONS 1-9: 1
SUM OF ALL RESPONSES TO PHQ QUESTIONS 1-9: 1
SUM OF ALL RESPONSES TO PHQ9 QUESTIONS 1 & 2: 1
SUM OF ALL RESPONSES TO PHQ QUESTIONS 1-9: 1
SUM OF ALL RESPONSES TO PHQ QUESTIONS 1-9: 1
2. FEELING DOWN, DEPRESSED OR HOPELESS: 1
1. LITTLE INTEREST OR PLEASURE IN DOING THINGS: 0

## 2023-09-06 NOTE — PROGRESS NOTES
Medicare Annual Wellness Visit    Jocelyn Lombard is here for Medicare AWV    Assessment & Plan   Initial Medicare annual wellness visit  Need for influenza vaccination  -     Influenza, FLUAD, (age 72 y+), IM, Preservative Free, 0.5 mL  Supracondylar fracture of distal end of femur without intracondylar extension, sequela  -     HYDROcodone-acetaminophen (NORCO) 5-325 MG per tablet; Take 1 tablet by mouth every 6 hours as needed for Pain for up to 30 days. , Disp-120 tablet, R-0Normal  Narcolepsy due to underlying condition without cataplexy  -     Armodafinil 200 MG TABS; Take 200 mg by mouth daily for 90 days. Max Daily Amount: 200 mg, Disp-90 tablet, R-0Normal  Xeroderma  -     Pramoxine HCl (CERAVE ITCH RELIEF) 1 % CREA; Apply 1 applicator topically daily as needed (Dry itchy skin), Disp-340 g, R-1Normal  Cardiomyopathy, unspecified type [facility])  -     External Referral To Home Health  Right sided weakness  -     External Referral To Home Health  Dyspnea on exertion  -     External Referral To Home Health  High risk medication use  -     Drug Screen, Pain; Future  ACP (advance care planning)  -     [place] Advanced Care Planning (16-30 minutes) [01527]  Personal history of tobacco use, presenting hazards to health  -     [place] Smoking and Tobacco Use Cessation (Intermediate): 3-10 MINUTES [86745]  Screening for cardiovascular condition  -     [place] Intensive Behavior Counseling for Cardiovascular Diseases, 8-15 minutes []  Flexion contracture of right knee    Recommendations for Preventive Services Due: see orders and patient instructions/AVS.  Recommended screening schedule for the next 5-10 years is provided to the patient in written form: see Patient Instructions/AVS.     No follow-ups on file. Subjective   The following acute and/or chronic problems were also addressed today:  COPD - seen pulm, has been started on inhalers - flovent, spiriva and albuterol. Breathing is good.    Has not had labs drawn since last

## 2023-09-07 RX ORDER — ARMODAFINIL 200 MG/1
200 TABLET ORAL DAILY
Qty: 90 TABLET | Refills: 0 | Status: SHIPPED | OUTPATIENT
Start: 2023-09-20 | End: 2023-12-19

## 2023-09-07 RX ORDER — HYDROCODONE BITARTRATE AND ACETAMINOPHEN 5; 325 MG/1; MG/1
1 TABLET ORAL EVERY 6 HOURS PRN
Qty: 120 TABLET | Refills: 0 | Status: SHIPPED | OUTPATIENT
Start: 2023-09-17 | End: 2023-10-17

## 2023-09-11 ENCOUNTER — TELEPHONE (OUTPATIENT)
Dept: FAMILY MEDICINE CLINIC | Age: 73
End: 2023-09-11

## 2023-09-11 NOTE — TELEPHONE ENCOUNTER
Pharmacy called stating insurance does not cover cerave cream. Please send over script for cerave lotion 1%

## 2023-09-12 RX ORDER — PRAMOXINE HYDROCHLORIDE 10 MG/ML
LOTION TOPICAL
Qty: 1 EACH | Refills: 5 | Status: SHIPPED | OUTPATIENT
Start: 2023-09-12

## 2023-09-13 RX ORDER — LANOLIN ALCOHOL/MO/W.PET/CERES
CREAM (GRAM) TOPICAL
Qty: 454 G | Refills: 1 | Status: SHIPPED | OUTPATIENT
Start: 2023-09-13

## 2023-09-13 NOTE — TELEPHONE ENCOUNTER
Patient is requesting to have Dorcas Rued filled on 0915/2023 due to pharmacy is closed on Sundays and only deliver till 3:00 on Saturdays.    I checked with the pharmacy and his Dorcas Rued was last filled and delivered on 08/17/2023    Patient states the Minerin cream is covered by INS

## 2023-09-18 ENCOUNTER — OFFICE VISIT (OUTPATIENT)
Dept: ORTHOPEDIC SURGERY | Age: 73
End: 2023-09-18
Payer: COMMERCIAL

## 2023-09-18 VITALS — BODY MASS INDEX: 25.05 KG/M2 | WEIGHT: 175 LBS | HEIGHT: 70 IN

## 2023-09-18 DIAGNOSIS — S72.453S SUPRACONDYLAR FRACTURE OF DISTAL END OF FEMUR WITHOUT INTRACONDYLAR EXTENSION, SEQUELA: ICD-10-CM

## 2023-09-18 DIAGNOSIS — S72.141S CLOSED DISPLACED INTERTROCHANTERIC FRACTURE OF RIGHT FEMUR, SEQUELA: ICD-10-CM

## 2023-09-18 DIAGNOSIS — M24.561 FLEXION CONTRACTURE OF KNEE, RIGHT: Primary | ICD-10-CM

## 2023-09-18 PROCEDURE — 1123F ACP DISCUSS/DSCN MKR DOCD: CPT | Performed by: PHYSICIAN ASSISTANT

## 2023-09-18 PROCEDURE — 99214 OFFICE O/P EST MOD 30 MIN: CPT | Performed by: PHYSICIAN ASSISTANT

## 2023-09-18 ASSESSMENT — ENCOUNTER SYMPTOMS
COUGH: 0
COLOR CHANGE: 0
VOMITING: 0
SHORTNESS OF BREATH: 0

## 2023-09-18 NOTE — PROGRESS NOTES
Date: 9/18/2023  History: History of TFN placement right hip  Comparison: 4/1/2022. Findings: Low AP pelvis, AP Right  hip, cross table lateral xrays Right hip done in the office today shows TFN in good position without signs complication. Evidence of lateral fixation plate and screws and cerclage wires across the proximal femoral diaphysis is also appreciated. Evidence of prior total knee arthroplasty identified. Components are in good position without signs of loosening. Abundant bony callus appreciated within the right hip intertrochanteric fracture. No evidence of fracture, subluxation, dislocation, radiopaque foreign body, radiopaque foreign tumor is noted. Impression: Right total hip TFN in good position without complications noted. XR FEMUR RIGHT (MIN 2 VIEWS)    Result Date: 9/18/2023  History: Right distal femoral replacing total knee arthroplasty hinge with fixation fracture right femur. Comparison: 12/19/2022. Findings: AP and lateral full-length x-rays of the right femur done in the office today shows distal femoral replacing total knee arthroplasty in good position without complications. Hardware along the lateral aspect of the femur is also in good position without complications. Intertrochanteric hip fracture appears to be healing with abundant bony callus formation and hardware in good position without complications. No change when compared to imaging from 12/19/2022. Impression: Right femur x-rays as described above. Assessment:      1. Flexion contracture of knee, right    2. Closed displaced intertrochanteric fracture of right femur, sequela    3. Supracondylar fracture of distal end of femur without intracondylar extension, sequela         Plan:      Wagner Barillas is a 72 y.o. male here for evaluation of right knee range of motion restrictions.   Patient had a right distal femoral replacement on 4/13/2021 with Dr. South Barajas DO and also had a right hip open reduction

## 2023-09-20 NOTE — TELEPHONE ENCOUNTER
Last visit: 9/6/23  Last Med refill: 6/15/23  Does patient have enough medication for 72 hours: No:     Next Visit Date:  Future Appointments   Date Time Provider [address]   10/12/2023  1:30 PM Alessia Alfred MD [facility]   11/1/2023 11:20 AM SCHEDULE, [facility] [place]   11/7/2023  2:00 PM Gladys Gamino MD Resp Spec [place]   12/6/2023  2:30 PM Alicia Hernandez MD [address] Maintenance   Topic Date Due    AAA screen  Never done    COVID-19 Vaccine (4 - Moderna series) 01/19/2023    Lipids  02/19/2023    Shingles vaccine (2 of 2) 03/13/2023    Depression Screen  09/06/2024    Annual Wellness Visit (AWV)  09/06/2024    Colorectal Cancer Screen  06/16/2025    DTaP/Tdap/Td vaccine (2 - Td or Tdap) 03/23/2030    Flu vaccine  Completed    Pneumococcal 65+ years Vaccine  Completed    Hepatitis C screen  Completed    Hepatitis A vaccine  Aged Out    Hepatitis B vaccine  Aged Out    Hib vaccine  Aged Out    Meningococcal (ACWY) vaccine  Aged Out    A1C test (Diabetic or Prediabetic)  Discontinued       Hemoglobin A1C (%)   Date Value   03/21/2023 5.4   02/19/2022 6.0   05/05/2020 6.4 (H)             ( goal A1C is < 7)   No components found for: \"LABMICR\"  LDL Cholesterol (mg/dL)   Date Value   02/19/2022 97   05/05/2020 56       (goal LDL is <100)   AST (U/L)   Date Value   11/14/2022 28     ALT (U/L)   Date Value   11/14/2022 15     BUN (mg/dL)   Date Value   12/06/2022 16     BP Readings from Last 3 Encounters:   09/06/23 120/64   08/10/23 (!) 140/74   07/06/23 124/76          (goal 120/80)    All Future Testing planned in [facility]  Lab Frequency Next Occurrence   Lipid Panel Once 04/21/2023   Comprehensive Metabolic Panel Once 42/64/3850   Iron and TIBC Once 04/21/2023   Ferritin Once 04/21/2023   TSH With Reflex Ft4 Once 04/21/2023   Transthoracic echocardiogram (TTE) complete with contrast, bubble, strain, and 3D PRN Once 08/11/2023

## 2023-09-25 ENCOUNTER — TELEPHONE (OUTPATIENT)
Dept: FAMILY MEDICINE CLINIC | Age: 73
End: 2023-09-25

## 2023-09-25 NOTE — TELEPHONE ENCOUNTER
Ok to give order, though they should verify that it has been preserved under the right conditions before administering it to him

## 2023-09-25 NOTE — TELEPHONE ENCOUNTER
Pt called stating he needs an order for his home care nurse through [address] to be able to give him his second shingles vacc at [facility], states he has the vacc, at home already

## 2023-09-26 RX ORDER — ZOSTER VACCINE RECOMBINANT, ADJUVANTED 50 MCG/0.5
0.5 KIT INTRAMUSCULAR ONCE
Qty: 0.5 ML | Refills: 0 | Status: SHIPPED | OUTPATIENT
Start: 2023-09-26 | End: 2023-09-26

## 2023-09-26 NOTE — TELEPHONE ENCOUNTER
Spoke with Rach Gonzalez the care coodinator for pt through [facility]. She states she needs a written order faxed to [phone] as well as a verbal.     Please review order pending.

## 2023-09-27 ENCOUNTER — TELEPHONE (OUTPATIENT)
Dept: FAMILY MEDICINE CLINIC | Age: 73
End: 2023-09-27

## 2023-09-27 RX ORDER — GUAIFENESIN 600 MG/1
TABLET ORAL
Qty: 120 TABLET | Refills: 5 | Status: SHIPPED | OUTPATIENT
Start: 2023-09-27

## 2023-09-27 NOTE — TELEPHONE ENCOUNTER
Matt Rosario is calling to request a refill on the following medication(s):    Medication Request:  Requested Prescriptions     Pending Prescriptions Disp Refills    SM MUCUS RELIEF 600 MG extended release tablet [Pharmacy Med Name: SM MUCUS RELIEF  MG TAB] 120 tablet 5     Sig: TAKE 2 TAB BY MOUTH EVERY 12 HOURS (COUGH)       Last Visit Date (If Applicable):  6/8/3260    Next Visit Date:    12/6/2023

## 2023-10-07 DIAGNOSIS — S72.453S SUPRACONDYLAR FRACTURE OF DISTAL END OF FEMUR WITHOUT INTRACONDYLAR EXTENSION, SEQUELA: ICD-10-CM

## 2023-10-09 RX ORDER — HYDROCODONE BITARTRATE AND ACETAMINOPHEN 5; 325 MG/1; MG/1
1 TABLET ORAL EVERY 6 HOURS PRN
Qty: 120 TABLET | Refills: 0 | Status: SHIPPED | OUTPATIENT
Start: 2023-10-15 | End: 2023-11-14

## 2023-10-09 NOTE — TELEPHONE ENCOUNTER
Roberta Durant is calling to request a refill on the following medication(s):    Medication Request:  Requested Prescriptions     Pending Prescriptions Disp Refills    HYDROcodone-acetaminophen (NORCO) 5-325 MG per tablet [Pharmacy Med Name: HYDROCODON-APAP  5-325MG] 120 tablet 0     Sig: TAKE 1 TAB BY MOUTH EVERY 6 HOURS AS NEEDED (PAIN) FOR UP TO 30 DAYS.  REDUCE DOSES TAKEN AS PAIN BECOMES MANAGEABLE       Last Visit Date (If Applicable):  8/2/4302    Next Visit Date:    12/6/2023

## 2023-10-11 ENCOUNTER — PATIENT MESSAGE (OUTPATIENT)
Dept: FAMILY MEDICINE CLINIC | Age: 73
End: 2023-10-11

## 2023-10-11 ENCOUNTER — TELEPHONE (OUTPATIENT)
Dept: FAMILY MEDICINE CLINIC | Age: 73
End: 2023-10-11

## 2023-10-11 NOTE — TELEPHONE ENCOUNTER
From: Alex Smoker Comes  To: Dr. Kailey Wright  Sent: 10/11/2023 3:36 PM EDT  Subject: Brando Chapman DR, I CALLED TODAY THIS AFTERNOON AND I'M HAVING A NARCOLEPSY EPISODE AND I'VE TRIED TOO LONG FIGHTING IT     DO I GIVEN UP AND GOING TO SLEEP ALSO MY SALIVA IS DROOLING AND I'LL TALK TO THE NURSE TOMORROW  ALSO IF YOU READ UP ABOUT MY NARCOLEPSY IT'S CALLED AUTO MODE THANKS AGAIN FOR YOUR HELP AND SUPPORT

## 2023-10-19 DIAGNOSIS — D50.9 IRON DEFICIENCY ANEMIA, UNSPECIFIED IRON DEFICIENCY ANEMIA TYPE: ICD-10-CM

## 2023-10-20 RX ORDER — FERROUS SULFATE 325(65) MG
TABLET ORAL
Qty: 30 TABLET | Refills: 5 | Status: SHIPPED | OUTPATIENT
Start: 2023-10-20

## 2023-10-20 RX ORDER — FUROSEMIDE 20 MG/1
TABLET ORAL
Qty: 30 TABLET | Refills: 5 | Status: SHIPPED | OUTPATIENT
Start: 2023-10-20

## 2023-10-20 NOTE — TELEPHONE ENCOUNTER
Beti Chiu is calling to request a refill on the following medication(s):    Medication Request:  Requested Prescriptions     Pending Prescriptions Disp Refills    FEROSUL 325 (65 Fe) MG tablet [Pharmacy Med Name: FERROUS SULFATE 325 MG TABLET] 30 tablet 5     Sig: TAKE 1 TAB BY MOUTH EVERY MORNING WITH BREAKFAST    furosemide (LASIX) 20 MG tablet [Pharmacy Med Name: FUROSEMIDE 20 MG TABLET] 30 tablet 5     Sig: TAKE 1 TAB BY MOUTH ONCE EVERY DAY (CHF)       Last Visit Date (If Applicable):  0/7/5358    Next Visit Date:    12/6/2023

## 2023-11-08 DIAGNOSIS — S72.453S SUPRACONDYLAR FRACTURE OF DISTAL END OF FEMUR WITHOUT INTRACONDYLAR EXTENSION, SEQUELA: ICD-10-CM

## 2023-11-08 NOTE — TELEPHONE ENCOUNTER
Last visit: 09/06/23  Last Med refill: 10/15/23  Does patient have enough medication for 72 hours: Yes    Next Visit Date:  Future Appointments   Date Time Provider [address]   11/20/2023  4:30 PM Julia Hunt MD [facility]   11/28/2023  3:00 PM [facility] ECHO 1 [facility] Radiolog   12/6/2023  2:30 PM Doretha Vargas MD [facility]   12/20/2023  2:00 PM Bettye Done, DO Sports Med [facility]   2/13/2024  3:30 PM Marianna Young MD [facility] Maintenance   Topic Date Due    AAA screen  Never done    Lipids  02/19/2023    Shingles vaccine (2 of 2) 03/13/2023    COVID-19 Vaccine (4 - 2023-24 season) 09/01/2023    Depression Screen  09/06/2024    Annual Wellness Visit (AWV)  09/06/2024    Colorectal Cancer Screen  06/16/2025    DTaP/Tdap/Td vaccine (2 - Td or Tdap) 03/23/2030    Flu vaccine  Completed    Pneumococcal 65+ years Vaccine  Completed    Hepatitis C screen  Completed    Hepatitis A vaccine  Aged Out    Hepatitis B vaccine  Aged Out    Hib vaccine  Aged Out    Meningococcal (ACWY) vaccine  Aged Out    A1C test (Diabetic or Prediabetic)  Discontinued       Hemoglobin A1C (%)   Date Value   03/21/2023 5.4   02/19/2022 6.0   05/05/2020 6.4 (H)             ( goal A1C is < 7)   No components found for: \"LABMICR\"  LDL Cholesterol (mg/dL)   Date Value   02/19/2022 97   05/05/2020 56       (goal LDL is <100)   AST (U/L)   Date Value   11/14/2022 28     ALT (U/L)   Date Value   11/14/2022 15     BUN (mg/dL)   Date Value   12/06/2022 16     BP Readings from Last 3 Encounters:   09/06/23 120/64   08/10/23 (!) 140/74   07/06/23 124/76          (goal 120/80)    All Future Testing planned in [facility]  Lab Frequency Next Occurrence   Lipid Panel Once 04/21/2023   Comprehensive Metabolic Panel Once 42/53/0910   Iron and TIBC Once 04/21/2023   Ferritin Once 04/21/2023   TSH With Reflex Ft4 Once 04/21/2023   Transthoracic echocardiogram (TTE) complete with contrast,

## 2023-11-09 RX ORDER — HYDROCODONE BITARTRATE AND ACETAMINOPHEN 5; 325 MG/1; MG/1
1 TABLET ORAL EVERY 6 HOURS PRN
Qty: 120 TABLET | Refills: 0 | Status: SHIPPED | OUTPATIENT
Start: 2023-11-15 | End: 2023-12-15

## 2023-11-26 DIAGNOSIS — I42.9 CARDIOMYOPATHY, UNSPECIFIED TYPE (HCC): ICD-10-CM

## 2023-11-26 DIAGNOSIS — T40.2X5A THERAPEUTIC OPIOID-INDUCED CONSTIPATION (OIC): ICD-10-CM

## 2023-11-26 DIAGNOSIS — K59.03 THERAPEUTIC OPIOID-INDUCED CONSTIPATION (OIC): ICD-10-CM

## 2023-11-27 ENCOUNTER — OFFICE VISIT (OUTPATIENT)
Dept: FAMILY MEDICINE CLINIC | Age: 73
End: 2023-11-27
Payer: COMMERCIAL

## 2023-11-27 ENCOUNTER — TELEPHONE (OUTPATIENT)
Dept: FAMILY MEDICINE CLINIC | Age: 73
End: 2023-11-27

## 2023-11-27 VITALS
SYSTOLIC BLOOD PRESSURE: 120 MMHG | OXYGEN SATURATION: 95 % | DIASTOLIC BLOOD PRESSURE: 64 MMHG | HEART RATE: 64 BPM | TEMPERATURE: 98.4 F

## 2023-11-27 DIAGNOSIS — I50.22 CHRONIC SYSTOLIC (CONGESTIVE) HEART FAILURE (HCC): Primary | ICD-10-CM

## 2023-11-27 DIAGNOSIS — G47.429 NARCOLEPSY DUE TO UNDERLYING CONDITION WITHOUT CATAPLEXY: ICD-10-CM

## 2023-11-27 DIAGNOSIS — S72.453S SUPRACONDYLAR FRACTURE OF DISTAL END OF FEMUR WITHOUT INTRACONDYLAR EXTENSION, SEQUELA: ICD-10-CM

## 2023-11-27 DIAGNOSIS — R53.1 RIGHT SIDED WEAKNESS: ICD-10-CM

## 2023-11-27 DIAGNOSIS — F11.20 OPIOID DEPENDENCE WITH CURRENT USE (HCC): ICD-10-CM

## 2023-11-27 DIAGNOSIS — M24.561 FLEXION CONTRACTURE OF RIGHT KNEE: ICD-10-CM

## 2023-11-27 DIAGNOSIS — S72.401D CLOSED FRACTURE OF DISTAL END OF RIGHT FEMUR WITH ROUTINE HEALING, UNSPECIFIED FRACTURE MORPHOLOGY, SUBSEQUENT ENCOUNTER: ICD-10-CM

## 2023-11-27 PROCEDURE — 3078F DIAST BP <80 MM HG: CPT | Performed by: INTERNAL MEDICINE

## 2023-11-27 PROCEDURE — 99214 OFFICE O/P EST MOD 30 MIN: CPT | Performed by: INTERNAL MEDICINE

## 2023-11-27 PROCEDURE — 1123F ACP DISCUSS/DSCN MKR DOCD: CPT | Performed by: INTERNAL MEDICINE

## 2023-11-27 PROCEDURE — 3074F SYST BP LT 130 MM HG: CPT | Performed by: INTERNAL MEDICINE

## 2023-11-27 RX ORDER — NAPROXEN SODIUM 220 MG
220 TABLET ORAL PRN
COMMUNITY
Start: 2023-09-21

## 2023-11-27 RX ORDER — DOCUSATE SODIUM 100 MG/1
CAPSULE, LIQUID FILLED ORAL
Qty: 60 CAPSULE | Refills: 5 | Status: SHIPPED | OUTPATIENT
Start: 2023-11-27

## 2023-11-27 RX ORDER — CARVEDILOL 6.25 MG/1
TABLET ORAL
Qty: 60 TABLET | Refills: 5 | Status: SHIPPED | OUTPATIENT
Start: 2023-11-27

## 2023-11-27 RX ORDER — MULTIVIT-MIN/FA/LYCOPEN/LUTEIN .4-300-25
TABLET ORAL
Qty: 30 TABLET | Refills: 5 | Status: SHIPPED | OUTPATIENT
Start: 2023-11-27

## 2023-11-27 RX ORDER — TIOTROPIUM BROMIDE INHALATION SPRAY 3.12 UG/1
SPRAY, METERED RESPIRATORY (INHALATION)
Qty: 1 EACH | Refills: 8 | Status: SHIPPED | OUTPATIENT
Start: 2023-11-27

## 2023-11-27 RX ORDER — CLOPIDOGREL BISULFATE 75 MG/1
TABLET ORAL
Qty: 30 TABLET | Refills: 5 | Status: SHIPPED | OUTPATIENT
Start: 2023-11-27

## 2023-11-27 SDOH — ECONOMIC STABILITY: FOOD INSECURITY: WITHIN THE PAST 12 MONTHS, YOU WORRIED THAT YOUR FOOD WOULD RUN OUT BEFORE YOU GOT MONEY TO BUY MORE.: NEVER TRUE

## 2023-11-27 SDOH — ECONOMIC STABILITY: FOOD INSECURITY: WITHIN THE PAST 12 MONTHS, THE FOOD YOU BOUGHT JUST DIDN'T LAST AND YOU DIDN'T HAVE MONEY TO GET MORE.: NEVER TRUE

## 2023-11-27 SDOH — ECONOMIC STABILITY: INCOME INSECURITY: HOW HARD IS IT FOR YOU TO PAY FOR THE VERY BASICS LIKE FOOD, HOUSING, MEDICAL CARE, AND HEATING?: NOT VERY HARD

## 2023-11-27 ASSESSMENT — ENCOUNTER SYMPTOMS
CHOKING: 0
SHORTNESS OF BREATH: 0
DIARRHEA: 0
COUGH: 0
NAUSEA: 0
CHEST TIGHTNESS: 0
CONSTIPATION: 0
ANAL BLEEDING: 0
VOMITING: 0
ABDOMINAL PAIN: 0
WHEEZING: 0
BLOOD IN STOOL: 0

## 2023-11-27 NOTE — TELEPHONE ENCOUNTER
LAST VISIT: 8/10/23  NEXT VISIT: 2/13/24    Per last dictation patient is on Spiriva. Please sign for refill if ok. Thank you.

## 2023-11-27 NOTE — TELEPHONE ENCOUNTER
Last visit: 09/06/2023  Last Med refill: 10/20/2023  Does patient have enough medication for 72 hours: Yes    Next Visit Date:  Future Appointments   Date Time Provider [address]   11/27/2023  1:00 PM Kailey Wright MD [facility]   11/28/2023  3:00 PM [facility] ECHO 1 [facility] Radiolog   12/6/2023  2:30 PM Cj Vargas MD [facility]   12/20/2023  2:00 PM Teofilo Carlson DO Sports Med [facility]   2/13/2024  3:30 PM Eva Ludwig MD [facility] Maintenance   Topic Date Due    AAA screen  Never done    Lipids  02/19/2023    Shingles vaccine (2 of 2) 03/13/2023    COVID-19 Vaccine (4 - 2023-24 season) 09/01/2023    Depression Screen  09/06/2024    Annual Wellness Visit (AWV)  09/06/2024    Colorectal Cancer Screen  06/16/2025    DTaP/Tdap/Td vaccine (2 - Td or Tdap) 03/23/2030    Flu vaccine  Completed    Pneumococcal 65+ years Vaccine  Completed    Hepatitis C screen  Completed    Hepatitis A vaccine  Aged Out    Hepatitis B vaccine  Aged Out    Hib vaccine  Aged Out    Meningococcal (ACWY) vaccine  Aged Out    A1C test (Diabetic or Prediabetic)  Discontinued       Hemoglobin A1C (%)   Date Value   03/21/2023 5.4   02/19/2022 6.0   05/05/2020 6.4 (H)             ( goal A1C is < 7)   No components found for: \"LABMICR\"  LDL Cholesterol (mg/dL)   Date Value   02/19/2022 97   05/05/2020 56       (goal LDL is <100)   AST (U/L)   Date Value   11/14/2022 28     ALT (U/L)   Date Value   11/14/2022 15     BUN (mg/dL)   Date Value   12/06/2022 16     BP Readings from Last 3 Encounters:   09/06/23 120/64   08/10/23 (!) 140/74   07/06/23 124/76          (goal 120/80)    All Future Testing planned in [facility]  Lab Frequency Next Occurrence   Lipid Panel Once 04/21/2023   Comprehensive Metabolic Panel Once 51/53/2798   Iron and TIBC Once 04/21/2023   Ferritin Once 04/21/2023   TSH With Reflex Ft4 Once 04/21/2023   Transthoracic echocardiogram (TTE) complete with

## 2023-11-27 NOTE — TELEPHONE ENCOUNTER
Last visit: 09/06/2023  Last Med refill: 10/20/2023  Does patient have enough medication for 72 hours: Yes    Next Visit Date:  Future Appointments   Date Time Provider [address]   11/27/2023  1:00 PM Jaxson Corbett MD [facility]   11/28/2023  3:00 PM ST[facility] ECHO 1 [facility] Radiolog   12/6/2023  2:30 PM Sukhjinder Vargas MD [facility]   12/20/2023  2:00 PM Maru Choudhary DO Sports Med [facility]   2/13/2024  3:30 PM Kiersten Quezada MD [facility]   Topic Date Due    AAA screen  Never done    Lipids  02/19/2023    Shingles vaccine (2 of 2) 03/13/2023    COVID-19 Vaccine (4 - 2023-24 season) 09/01/2023    Depression Screen  09/06/2024    Annual Wellness Visit (AWV)  09/06/2024    Colorectal Cancer Screen  06/16/2025    DTaP/Tdap/Td vaccine (2 - Td or Tdap) 03/23/2030    Flu vaccine  Completed    Pneumococcal 65+ years Vaccine  Completed    Hepatitis C screen  Completed    Hepatitis A vaccine  Aged Out    Hepatitis B vaccine  Aged Out    Hib vaccine  Aged Out    Meningococcal (ACWY) vaccine  Aged Out    A1C test (Diabetic or Prediabetic)  Discontinued       Hemoglobin A1C (%)   Date Value   03/21/2023 5.4   02/19/2022 6.0   05/05/2020 6.4 (H)             ( goal A1C is < 7)   No components found for: \"LABMICR\"  LDL Cholesterol (mg/dL)   Date Value   02/19/2022 97   05/05/2020 56       (goal LDL is <100)   AST (U/L)   Date Value   11/14/2022 28     ALT (U/L)   Date Value   11/14/2022 15     BUN (mg/dL)   Date Value   12/06/2022 16     BP Readings from Last 3 Encounters:   09/06/23 120/64   08/10/23 (!) 140/74   07/06/23 124/76          (goal 120/80)    All Future Testing planned in [facility]  Lab Frequency Next Occurrence   Lipid Panel Once 04/21/2023   Comprehensive Metabolic Panel Once 64/44/2133   Iron and TIBC Once 04/21/2023   Ferritin Once 04/21/2023   TSH With Reflex Ft4 Once 04/21/2023   Transthoracic echocardiogram (TTE) complete with

## 2023-11-27 NOTE — PROGRESS NOTES
narcolepsy         BP Readings from Last 3 Encounters:   11/27/23 120/64   09/06/23 120/64   08/10/23 (!) 140/74              Past Medical History:   Diagnosis Date    Acute postoperative anemia due to expected blood loss     ADHD (attention deficit hyperactivity disorder)     ADHD (attention deficit hyperactivity disorder)     Atypical chest pain     Broken femur (HCC)     right    Chronic bronchitis (HCC)     COPD (chronic obstructive pulmonary disease) (HCC)     Hypertension     Hyponatremia     Meniere's disease     Narcolepsy     Nasal fracture     PND (post-nasal drip) 04/21/2014    Pneumonia 04/01/2022    SOB (shortness of breath)     Tibia fracture     right tibial plateau fx, right syndesmotic fx    Transaminasemia 04/21/2014      Past Surgical History:   Procedure Laterality Date    ANKLE SURGERY      open reduction internal fixation of the right ankle & tibial plateau fx    CORONARY ANGIOPLASTY WITH STENT PLACEMENT N/A 10/10/2015    CYST REMOVAL      right side of face    FEMUR FRACTURE SURGERY Right 3/8/2022    RIGHT TFN HIP  INSERTION ,OPEN REDUCTION INTERNAL FIXATION RIGHT HIP  C-ARM, SYNTHES, CABLES , performed by Shiloh Palma DO at [address] Right 03/08/2022    RIGHT TFN HIP  INSERTION ,OPEN REDUCTION INTERNAL FIXATION RIGHT HIP  C-ARM, SYNTHES, CABLES     KNEE ARTHROPLASTY Right 04/13/2021    DISTAL FEMUR REPLACEMENT performed by Shiloh Palma DO at [address] [facility] Right     total knee       Family History   Problem Relation Age of Onset    Heart Disease Mother         heart attack    Heart Disease Father        Social History     Tobacco Use    Smoking status: Every Day     Packs/day: 0.10     Years: 38.00     Additional pack years: 0.00     Total pack years: 3.80     Types: Cigarettes    Smokeless tobacco: Never    Tobacco comments:     7/6/23 patient smokes pack/week   Substance Use Topics    Alcohol use: Yes     Comment: 1-2 beers occasionally, less than

## 2023-11-27 NOTE — TELEPHONE ENCOUNTER
Patient called stating on his AVS his Amiodarone was stopped. Patient would like to know why. Explained to patient it was marked in September that he was not taking. Patient states he takes this every day. States his armodafinil, flovent inhaler and nasal spray were also d/c but states he still uses these.

## 2023-11-29 RX ORDER — FLUTICASONE PROPIONATE 220 UG/1
1 AEROSOL, METERED RESPIRATORY (INHALATION) 2 TIMES DAILY
COMMUNITY

## 2023-11-29 RX ORDER — AMIODARONE HYDROCHLORIDE 200 MG/1
200 TABLET ORAL DAILY
COMMUNITY

## 2023-11-29 RX ORDER — ARMODAFINIL 200 MG/1
TABLET ORAL
COMMUNITY

## 2023-12-06 ENCOUNTER — OFFICE VISIT (OUTPATIENT)
Dept: FAMILY MEDICINE CLINIC | Age: 73
End: 2023-12-06
Payer: COMMERCIAL

## 2023-12-06 VITALS
OXYGEN SATURATION: 96 % | DIASTOLIC BLOOD PRESSURE: 72 MMHG | SYSTOLIC BLOOD PRESSURE: 120 MMHG | HEART RATE: 66 BPM | TEMPERATURE: 98.1 F

## 2023-12-06 DIAGNOSIS — I50.22 CHRONIC SYSTOLIC (CONGESTIVE) HEART FAILURE (HCC): ICD-10-CM

## 2023-12-06 DIAGNOSIS — F03.918 DEMENTIA WITH BEHAVIORAL DISTURBANCE (HCC): ICD-10-CM

## 2023-12-06 DIAGNOSIS — S72.453S SUPRACONDYLAR FRACTURE OF DISTAL END OF FEMUR WITHOUT INTRACONDYLAR EXTENSION, SEQUELA: ICD-10-CM

## 2023-12-06 DIAGNOSIS — J44.9 CHRONIC OBSTRUCTIVE PULMONARY DISEASE, UNSPECIFIED COPD TYPE (HCC): ICD-10-CM

## 2023-12-06 DIAGNOSIS — F11.20 OPIOID DEPENDENCE WITH CURRENT USE (HCC): ICD-10-CM

## 2023-12-06 DIAGNOSIS — G47.429 NARCOLEPSY DUE TO UNDERLYING CONDITION WITHOUT CATAPLEXY: Primary | ICD-10-CM

## 2023-12-06 PROCEDURE — 3074F SYST BP LT 130 MM HG: CPT | Performed by: INTERNAL MEDICINE

## 2023-12-06 PROCEDURE — 99214 OFFICE O/P EST MOD 30 MIN: CPT | Performed by: INTERNAL MEDICINE

## 2023-12-06 PROCEDURE — 3078F DIAST BP <80 MM HG: CPT | Performed by: INTERNAL MEDICINE

## 2023-12-06 PROCEDURE — 1123F ACP DISCUSS/DSCN MKR DOCD: CPT | Performed by: INTERNAL MEDICINE

## 2023-12-06 RX ORDER — ARMODAFINIL 200 MG/1
TABLET ORAL
Qty: 30 TABLET | Status: CANCELLED | OUTPATIENT
Start: 2023-12-06

## 2023-12-06 ASSESSMENT — ENCOUNTER SYMPTOMS
SHORTNESS OF BREATH: 1
BLOOD IN STOOL: 0
DIARRHEA: 0
NAUSEA: 0
CONSTIPATION: 0
CHOKING: 0
CHEST TIGHTNESS: 0
WHEEZING: 0
COUGH: 0
ABDOMINAL PAIN: 0
VOMITING: 0
ANAL BLEEDING: 0

## 2023-12-06 ASSESSMENT — VISUAL ACUITY: OU: 1

## 2023-12-06 NOTE — PROGRESS NOTES
[facility]  [address] [address]  Dept: [phone]  Dept Fax: [phone]      Luisa Coyle is a 72 y.o. male who presents today for hismedical conditions/complaints as noted below. Luisa Coyle is c/o of Congestive Heart Failure (F/u/Pt has a echo scheduled for tomorrow ), Hypertension (F/u), and Back Pain (Has an appt with Dr Mago Bates on 12/20/2023 )        Assessment/Plan:     1. Narcolepsy due to underlying condition without cataplexy  2. Supracondylar fracture of distal end of femur without intracondylar extension, sequela  -     HYDROcodone-acetaminophen (NORCO) 5-325 MG per tablet; Take 1 tablet by mouth every 6 hours as needed for Pain for up to 30 days. Max Daily Amount: 4 tablets, Disp-120 tablet, R-0Normal  -     HYDROcodone-acetaminophen (NORCO) 5-325 MG per tablet; Take 1 tablet by mouth every 6 hours as needed for Pain for up to 30 days. Intended supply: 30 days Max Daily Amount: 4 tablets, Disp-120 tablet, R-0Normal  -     HYDROcodone-acetaminophen (NORCO) 5-325 MG per tablet; Take 1 tablet by mouth every 6 hours as needed for Pain for up to 30 days. Intended supply: 30 days Max Daily Amount: 4 tablets, Disp-120 tablet, R-0Normal  3. Opioid dependence with current use ([address])  4. Dementia with behavioral disturbance ([address])  5. Chronic systolic (congestive) heart failure  6. Chronic obstructive pulmonary disease, unspecified COPD type ([address])          No follow-ups on file. HPI     Called [facility] on Monday to find someone to work on his H[employer], they were supposed to call him back, called them again today   Has ortho appointment coming up for R hip pain   Has echo coming up   Will see pulm in two months     Congestive Heart Failure  Presents for follow-up visit. Associated symptoms include shortness of breath.  Pertinent negatives include no abdominal pain, chest pain, chest pressure, claudication, edema, fatigue, muscle weakness,

## 2023-12-11 ENCOUNTER — TELEPHONE (OUTPATIENT)
Dept: PULMONOLOGY | Age: 73
End: 2023-12-11

## 2023-12-11 RX ORDER — ARMODAFINIL 50 MG/1
TABLET ORAL
Qty: 90 TABLET | Status: CANCELLED | OUTPATIENT
Start: 2023-12-11

## 2023-12-11 NOTE — TELEPHONE ENCOUNTER
Patient called requesting a refill for Armodafinil. It looks like PCP has prescribed this previously. Per patient his PCP wants us to prescribe this patient for his narcolepsy. Please review and sign if accepted. Patient next visit is in Feb. Writer needs to call PCP regarding prescription.

## 2023-12-12 RX ORDER — HYDROCODONE BITARTRATE AND ACETAMINOPHEN 5; 325 MG/1; MG/1
1 TABLET ORAL EVERY 6 HOURS PRN
Qty: 120 TABLET | Refills: 0 | Status: SHIPPED | OUTPATIENT
Start: 2024-01-11 | End: 2024-02-10

## 2023-12-12 RX ORDER — HYDROCODONE BITARTRATE AND ACETAMINOPHEN 5; 325 MG/1; MG/1
1 TABLET ORAL EVERY 6 HOURS PRN
Qty: 120 TABLET | Refills: 0 | Status: SHIPPED | OUTPATIENT
Start: 2023-12-12 | End: 2024-01-11

## 2023-12-12 RX ORDER — HYDROCODONE BITARTRATE AND ACETAMINOPHEN 5; 325 MG/1; MG/1
1 TABLET ORAL EVERY 6 HOURS PRN
Qty: 120 TABLET | Refills: 0 | Status: SHIPPED | OUTPATIENT
Start: 2024-02-11 | End: 2024-03-12

## 2023-12-13 ENCOUNTER — TELEPHONE (OUTPATIENT)
Dept: PRIMARY CARE CLINIC | Age: 73
End: 2023-12-13

## 2023-12-13 NOTE — TELEPHONE ENCOUNTER
----- Message from Tyrone Archibald sent at 12/11/2023  3:20 PM EST -----  Subject: Referral Request    Reason for referral request? NAHEED Schulz from [facility] is calling in   requesting a script and prior auth for a power wheelchair. Provider patient wants to be referred to(if known):     Provider Phone Number(if known): Additional Information for Provider?  Please send script to [phone]  [facility] Phone# [phone] Fax# [phone]  ---------------------------------------------------------------------------  --------------  Tasneem EUBANKS    [phone]; OK to leave message on voicemail  ---------------------------------------------------------------------------  --------------

## 2023-12-13 NOTE — TELEPHONE ENCOUNTER
I noticed a script was faxed in November. Can you please refax to the number provided with office visit notes.

## 2023-12-13 NOTE — TELEPHONE ENCOUNTER
Patient called with a new fax number , stated previous one was wrong. [phone]. Writer reprinted and faxed.

## 2023-12-14 ENCOUNTER — TELEPHONE (OUTPATIENT)
Dept: FAMILY MEDICINE CLINIC | Age: 73
End: 2023-12-14

## 2023-12-14 NOTE — TELEPHONE ENCOUNTER
If they could take over managing his narcolepsy after his visit in February I would appreciate it - he keeps asking for adjustments and mentioning testing done in the 70's, and this is not my area of expertise.  I will refill it until his visit with them in February

## 2023-12-14 NOTE — TELEPHONE ENCOUNTER
Sue Ahmadi from [facility] called and states medication Armodafinil was never prescribed by them. Sue Ahmadi states after reviewing his chart they do not have anything regarding narcolepsy. If Dr. Mercy Crook would like pulmonary to take over mediation it will have to be discussed at his appointment in February.

## 2023-12-14 NOTE — TELEPHONE ENCOUNTER
Called PCP to let them know we have no diagnosis of narcolepsy in our office notes. Patient can discuss narcolepsy/treatment with doctor at next visit but at this time we will not refill Armodafinil as we do not have a diagnosis from our office. PCP will administer this medication if they wish until patient discusses with patient.

## 2023-12-22 NOTE — TELEPHONE ENCOUNTER
Last visit: 12/06/2023  Last Med refill: 11/28/2023  Does patient have enough medication for 72 hours: yes    Next Visit Date:  Future Appointments   Date Time Provider [address]   12/28/2023  2:00 PM [facility] ECHO 1 STCZ [place] [facility] Radiolog   1/31/2024  2:30 PM Balbina Robison, DO Sports Med [facility]   2/13/2024  3:30 PM Brinda Hernandez MD Resp Spec [facility]   4/8/2024  1:45 PM Slade Galindo MD [address] Maintenance   Topic Date Due    Low dose CT lung screening &/or counseling  Never done    Respiratory Syncytial Virus (RSV) Pregnant or age 61 yrs+ (3 - 1-dose 60+ series) Never done    AAA screen  Never done    Lipids  02/19/2023    Shingles vaccine (2 of 2) 03/13/2023    COVID-19 Vaccine (4 - 2023-24 season) 09/01/2023    Depression Screen  09/06/2024    Annual Wellness Visit (AWV)  09/06/2024    Colorectal Cancer Screen  06/16/2025    DTaP/Tdap/Td vaccine (2 - Td or Tdap) 03/23/2030    Flu vaccine  Completed    Pneumococcal 65+ years Vaccine  Completed    Hepatitis C screen  Completed    Hepatitis A vaccine  Aged Out    Hepatitis B vaccine  Aged Out    Hib vaccine  Aged Out    Polio vaccine  Aged Out    Meningococcal (ACWY) vaccine  Aged Out    A1C test (Diabetic or Prediabetic)  Discontinued       Hemoglobin A1C (%)   Date Value   03/21/2023 5.4   02/19/2022 6.0   05/05/2020 6.4 (H)             ( goal A1C is < 7)   No components found for: \"LABMICR\"  LDL Cholesterol (mg/dL)   Date Value   02/19/2022 97   05/05/2020 56       (goal LDL is <100)   AST (U/L)   Date Value   11/14/2022 28     ALT (U/L)   Date Value   11/14/2022 15     BUN (mg/dL)   Date Value   12/06/2022 16     BP Readings from Last 3 Encounters:   12/06/23 120/72   11/27/23 120/64   09/06/23 120/64          (goal 120/80)    All Future Testing planned in [facility]  Lab Frequency Next Occurrence   Lipid Panel Once 04/21/2023   Comprehensive Metabolic Panel Once 06/05/8718   Iron and TIBC Once 04/21/2023   Ferritin

## 2023-12-22 NOTE — TELEPHONE ENCOUNTER
[facility] called regarding patient's Flovent Inhaler as well as patient.  There is some confusion on this prescription as it looks like PCP office discontinued our prescription and ordered from their office, however their prescription was never processed to [facility].  Please review and sign if accepted.  Thank you

## 2023-12-28 DIAGNOSIS — G47.429 NARCOLEPSY DUE TO UNDERLYING CONDITION WITHOUT CATAPLEXY: Primary | ICD-10-CM

## 2023-12-28 RX ORDER — ATORVASTATIN CALCIUM 40 MG/1
TABLET, FILM COATED ORAL
Qty: 30 TABLET | Refills: 5 | Status: SHIPPED | OUTPATIENT
Start: 2023-12-28

## 2023-12-28 RX ORDER — PNV NO.95/FERROUS FUM/FOLIC AC 28MG-0.8MG
TABLET ORAL
Qty: 30 TABLET | Refills: 5 | Status: SHIPPED | OUTPATIENT
Start: 2023-12-28

## 2023-12-29 NOTE — TELEPHONE ENCOUNTER
Last visit: 12/06/2023  Last Med refill: 09/25/2023  Does patient have enough medication for 72 hours: No:     Next Visit Date:  Future Appointments   Date Time Provider Department Center   1/9/2024  1:30 PM [facility] ECHO 1 STCZ [place] [facility] Radiolog   1/31/2024  2:30 PM Saulo Nichole, DO Sports Med [facility]   2/13/2024  3:30 PM Sivakumar Mathew MD Resp Spec [facility]   4/8/2024  1:45 [facility]       Health Maintenance   Topic Date Due    Low dose CT lung screening &/or counseling  Never done    Respiratory Syncytial Virus (RSV) Pregnant or age 60 yrs+ (1 - 1-dose 60+ series) Never done    AAA screen  Never done    Lipids  02/19/2023    Shingles vaccine (2 of 2) 03/13/2023    COVID-19 Vaccine (4 - 2023-24 season) 09/01/2023    Depression Screen  09/06/2024    Annual Wellness Visit (AWV)  09/06/2024    Colorectal Cancer Screen  06/16/2025    DTaP/Tdap/Td vaccine (2 - Td or Tdap) 03/23/2030    Flu vaccine  Completed    Pneumococcal 65+ years Vaccine  Completed    Hepatitis C screen  Completed    Hepatitis A vaccine  Aged Out    Hepatitis B vaccine  Aged Out    Hib vaccine  Aged Out    Polio vaccine  Aged Out    Meningococcal (ACWY) vaccine  Aged Out    A1C test (Diabetic or Prediabetic)  Discontinued       Hemoglobin A1C (%)   Date Value   03/21/2023 5.4   02/19/2022 6.0   05/05/2020 6.4 (H)             ( goal A1C is < 7)   No components found for: \"LABMICR\"  LDL Cholesterol (mg/dL)   Date Value   02/19/2022 97   05/05/2020 56       (goal LDL is <100)   AST (U/L)   Date Value   11/14/2022 28     ALT (U/L)   Date Value   11/14/2022 15     BUN (mg/dL)   Date Value   12/06/2022 16     BP Readings from Last 3 Encounters:   12/06/23 120/72   11/27/23 120/64   09/06/23 120/64          (goal 120/80)    All Future Testing planned in [facility]  Lab Frequency Next Occurrence   Lipid Panel Once 04/21/2023   Comprehensive Metabolic Panel Once 04/21/2023   Iron and TIBC Once 04/21/2023   Ferritin

## 2023-12-30 RX ORDER — ARMODAFINIL 200 MG/1
TABLET ORAL
Qty: 45 TABLET | Refills: 0 | Status: SHIPPED | OUTPATIENT
Start: 2023-12-30 | End: 2024-02-13

## 2023-12-30 NOTE — TELEPHONE ENCOUNTER
Received page via perfect serve requesting refill of Armodafinil.  Per documentation will fill enough until appt with pulm 2/12/24. Further refills to come from specialist

## 2024-01-02 ENCOUNTER — TELEPHONE (OUTPATIENT)
Dept: PULMONOLOGY | Age: 74
End: 2024-01-02

## 2024-01-02 ENCOUNTER — TELEPHONE (OUTPATIENT)
Dept: ORTHOPEDIC SURGERY | Age: 74
End: 2024-01-02

## 2024-01-02 RX ORDER — LANOLIN ALCOHOL/MO/W.PET/CERES
CREAM (GRAM) TOPICAL
Qty: 454 G | Refills: 10 | Status: SHIPPED | OUTPATIENT
Start: 2024-01-02

## 2024-01-02 NOTE — TELEPHONE ENCOUNTER
Last visit: 12/06/2023  Last Med refill: 09/13/2023  Does patient have enough medication for 72 hours: Yes    Next Visit Date:  Future Appointments   Date Time Provider Department Center   1/9/2024  1:30 PM [facility] ECHO 1 STCZ [place] [facility] Radiolog   1/31/2024  2:30 PM Saulo Nichole, DO Sports Med [facility]   2/13/2024  3:30 PM Sivakumar Mathew MD Resp Spec [facility]   4/8/2024  1:45 [facility]       Health Maintenance   Topic Date Due    Low dose CT lung screening &/or counseling  Never done    Respiratory Syncytial Virus (RSV) Pregnant or age 60 yrs+ (1 - 1-dose 60+ series) Never done    AAA screen  Never done    Lipids  02/19/2023    Shingles vaccine (2 of 2) 03/13/2023    COVID-19 Vaccine (4 - 2023-24 season) 09/01/2023    Annual Wellness Visit (Medicare Advantage)  01/01/2024    Depression Screen  09/06/2024    Colorectal Cancer Screen  06/16/2025    DTaP/Tdap/Td vaccine (2 - Td or Tdap) 03/23/2030    Flu vaccine  Completed    Pneumococcal 65+ years Vaccine  Completed    Hepatitis C screen  Completed    Hepatitis A vaccine  Aged Out    Hepatitis B vaccine  Aged Out    Hib vaccine  Aged Out    Polio vaccine  Aged Out    Meningococcal (ACWY) vaccine  Aged Out    A1C test (Diabetic or Prediabetic)  Discontinued       Hemoglobin A1C (%)   Date Value   03/21/2023 5.4   02/19/2022 6.0   05/05/2020 6.4 (H)             ( goal A1C is < 7)   No components found for: \"LABMICR\"  LDL Cholesterol (mg/dL)   Date Value   02/19/2022 97   05/05/2020 56       (goal LDL is <100)   AST (U/L)   Date Value   11/14/2022 28     ALT (U/L)   Date Value   11/14/2022 15     BUN (mg/dL)   Date Value   12/06/2022 16     BP Readings from Last 3 Encounters:   12/06/23 120/72   11/27/23 120/64   09/06/23 120/64          (goal 120/80)    All Future Testing planned in [facility]  Lab Frequency Next Occurrence   Lipid Panel Once 04/21/2023   Comprehensive Metabolic Panel Once 04/21/2023   Iron and TIBC Once 04/21/2023

## 2024-01-03 RX ORDER — FLUTICASONE PROPIONATE 220 UG/1
1 AEROSOL, METERED RESPIRATORY (INHALATION) 2 TIMES DAILY
Qty: 3 EACH | Refills: 3 | Status: SHIPPED | OUTPATIENT
Start: 2024-01-03 | End: 2024-01-05

## 2024-01-03 NOTE — TELEPHONE ENCOUNTER
KOMAL THOMASON ([place]: [id number]) - [id number]  Spiriva Respimat 2.5MCG/ACT aerosol  Status: PA Response - ApprovedCreated: January 2nd, 2024 [id number]: January 2nd, 2024

## 2024-01-04 ENCOUNTER — TELEPHONE (OUTPATIENT)
Dept: PULMONOLOGY | Age: 74
End: 2024-01-04

## 2024-01-04 NOTE — TELEPHONE ENCOUNTER
Received a PA notice for Flovent inhaler according to Dr. Mathew's dictation from August 2023 patient is on Symbicort and Spiriva and nothing in that office visit note stating patient should be on Flovent.      A message was sent on 12/22/23 to Dr. Mathew to sign a script for Flovent.  I have asked the co-worker that placed the script to please look into this because the patient should not be on all three inhalers.  (This was confirmed with a [facility] respiratory therapists.)       Closing PA

## 2024-01-05 NOTE — TELEPHONE ENCOUNTER
Patient is on Spiriva.  Discontinued Flovent prescription.  Called [facility] and informed.

## 2024-01-05 NOTE — TELEPHONE ENCOUNTER
Patient is on Spiriva.  Discontinued Flovent.  Confirmed with Dr. Mathew.  Called [facility] and informed.

## 2024-01-15 DIAGNOSIS — H81.03 MENIERE'S DISEASE OF BOTH EARS: ICD-10-CM

## 2024-01-15 DIAGNOSIS — R42 VERTIGO: ICD-10-CM

## 2024-01-15 RX ORDER — MECLIZINE HYDROCHLORIDE 25 MG/1
TABLET ORAL
Qty: 90 TABLET | Refills: 2 | Status: SHIPPED | OUTPATIENT
Start: 2024-01-15

## 2024-01-15 NOTE — TELEPHONE ENCOUNTER
Cristino Cherry is calling to request a refill on the following medication(s):    Medication Request:  Requested Prescriptions     Pending Prescriptions Disp Refills    meclizine (ANTIVERT) 25 MG tablet [Pharmacy Med Name: MECLIZINE 25 MG TABLET] 90 tablet 2     Sig: TAKE 1 TAB BY MOUTH THREE TIMES A DAY AS NEEDED FOR DIZZINESS       Last Visit Date (If Applicable):  12/6/2023    Next Visit Date:    4/8/2024

## 2024-01-16 RX ORDER — FLUTICASONE PROPIONATE 50 MCG
BLISTER, WITH INHALATION DEVICE INHALATION
Refills: 0 | OUTPATIENT
Start: 2024-01-16

## 2024-01-16 NOTE — TELEPHONE ENCOUNTER
Patient called to check the status of this refill request. Please review and sign if you agree. Patient's next appt is 2/13/24.

## 2024-01-16 NOTE — TELEPHONE ENCOUNTER
Writer called patient to explain to him again per Dr. Mathew he is using Spiriva in place of Flovent.  No refill needed.  All questions answered.

## 2024-01-24 DIAGNOSIS — Z87.81 HISTORY OF HIP FRACTURE: ICD-10-CM

## 2024-01-24 NOTE — TELEPHONE ENCOUNTER
Last Visit:  Visit date not found     Next Visit Date:  Future Appointments   Date Time Provider Department Center   1/26/2024  2:30 PM [facility] ECHO 1 STCZ [place] [facility] Radiolog   1/31/2024  2:30 PM Saulo Nichole, DO Sports Med [facility]   2/13/2024  3:30 PM Sivakumar Mathew MD Resp Spec [facility]   4/8/2024  1:45 PM Alicia Valencia MD [facility]

## 2024-02-05 ENCOUNTER — TELEPHONE (OUTPATIENT)
Dept: FAMILY MEDICINE CLINIC | Age: 74
End: 2024-02-05

## 2024-02-05 NOTE — TELEPHONE ENCOUNTER
Patient called requesting to have medication filled on 2/10/2024 since pharmacy is closed on 2/11/2024.

## 2024-02-05 NOTE — TELEPHONE ENCOUNTER
The information for the need for tilt and recline was included in the order sent - see media   Will they approve one without that feature? He can see PM&R for second opinion and to get it approved

## 2024-02-05 NOTE — TELEPHONE ENCOUNTER
Per the letter an appeal has to be done by doctor with in 60 days.    The letter states the doctor can call to discuss with [facility] Jasso reviewer at  [phone]    PLEASE ADVISE

## 2024-02-05 NOTE — TELEPHONE ENCOUNTER
Received letter from [employer] stating power wheelchair and accessories have been denied     Letter attached

## 2024-02-12 DIAGNOSIS — G47.429 NARCOLEPSY DUE TO UNDERLYING CONDITION WITHOUT CATAPLEXY: ICD-10-CM

## 2024-02-12 RX ORDER — FLUTICASONE PROPIONATE 50 MCG
SPRAY, SUSPENSION (ML) NASAL
Qty: 16 G | Refills: 5 | OUTPATIENT
Start: 2024-02-12

## 2024-02-12 NOTE — TELEPHONE ENCOUNTER
Per Refill request from 12/30/2023:    Shelley Blackwell APRN - NP       12/30/23 11:32 AM  Note     Received page via perfect serve requesting refill of Armodafinil.  Per documentation will fill enough until appt with pulm 2/12/24. Further refills to come from specialist

## 2024-02-12 NOTE — TELEPHONE ENCOUNTER
Calling again in regards to the flonase nasal spray. Medication is not on his medication list. Pt states he uses this medication daily.

## 2024-02-12 NOTE — TELEPHONE ENCOUNTER
The original prescription was discontinued on 3/20/2023 by Alicia Valencia MD. Renewing this prescription may not be appropriate.

## 2024-02-12 NOTE — TELEPHONE ENCOUNTER
Last visit: 12/06/2023  Last Med refill: 12/30/2023  Does patient have enough medication for 72 hours: No:     Next Visit Date:  Future Appointments   Date Time Provider Department Center   2/13/2024  3:30 PM Sivakumar Mathew MD Resp Spec [facility]   4/8/2024  1:45 PM Alicia Valencia MD [facility]       Health Maintenance   Topic Date Due    Low dose CT lung screening &/or counseling  Never done    Respiratory Syncytial Virus (RSV) Pregnant or age 60 yrs+ (1 - 1-dose 60+ series) Never done    AAA screen  Never done    Lipids  02/19/2023    Shingles vaccine (2 of 2) 03/13/2023    COVID-19 Vaccine (4 - 2023-24 season) 09/01/2023    Annual Wellness Visit (Medicare Advantage)  01/01/2024    Depression Screen  09/06/2024    Colorectal Cancer Screen  06/16/2025    DTaP/Tdap/Td vaccine (2 - Td or Tdap) 03/23/2030    Flu vaccine  Completed    Pneumococcal 65+ years Vaccine  Completed    Hepatitis C screen  Completed    Hepatitis A vaccine  Aged Out    Hepatitis B vaccine  Aged Out    Hib vaccine  Aged Out    Polio vaccine  Aged Out    Meningococcal (ACWY) vaccine  Aged Out    A1C test (Diabetic or Prediabetic)  Discontinued       Hemoglobin A1C (%)   Date Value   03/21/2023 5.4   02/19/2022 6.0   05/05/2020 6.4 (H)             ( goal A1C is < 7)   No components found for: \"LABMICR\"  LDL Cholesterol (mg/dL)   Date Value   02/19/2022 97   05/05/2020 56       (goal LDL is <100)   AST (U/L)   Date Value   11/14/2022 28     ALT (U/L)   Date Value   11/14/2022 15     BUN (mg/dL)   Date Value   12/06/2022 16     BP Readings from Last 3 Encounters:   12/06/23 120/72   11/27/23 120/64   09/06/23 120/64          (goal 120/80)    All Future Testing planned in [facility]  Lab Frequency Next Occurrence   Lipid Panel Once 04/21/2023   Comprehensive Metabolic Panel Once 04/21/2023   Iron and TIBC Once 04/21/2023   Ferritin Once 04/21/2023   TSH With Reflex Ft4 Once 04/21/2023   Transthoracic echocardiogram (TTE) complete with

## 2024-02-13 ENCOUNTER — OFFICE VISIT (OUTPATIENT)
Dept: PULMONOLOGY | Age: 74
End: 2024-02-13
Payer: COMMERCIAL

## 2024-02-13 VITALS
DIASTOLIC BLOOD PRESSURE: 79 MMHG | BODY MASS INDEX: 25.77 KG/M2 | HEIGHT: 70 IN | SYSTOLIC BLOOD PRESSURE: 151 MMHG | WEIGHT: 180 LBS | HEART RATE: 61 BPM | OXYGEN SATURATION: 96 % | RESPIRATION RATE: 16 BRPM

## 2024-02-13 DIAGNOSIS — J44.9 CHRONIC OBSTRUCTIVE PULMONARY DISEASE, UNSPECIFIED COPD TYPE (HCC): ICD-10-CM

## 2024-02-13 DIAGNOSIS — G47.33 OSA (OBSTRUCTIVE SLEEP APNEA): ICD-10-CM

## 2024-02-13 DIAGNOSIS — G47.429 NARCOLEPSY DUE TO UNDERLYING CONDITION WITHOUT CATAPLEXY: Primary | ICD-10-CM

## 2024-02-13 PROCEDURE — 99214 OFFICE O/P EST MOD 30 MIN: CPT | Performed by: INTERNAL MEDICINE

## 2024-02-13 PROCEDURE — 3077F SYST BP >= 140 MM HG: CPT | Performed by: INTERNAL MEDICINE

## 2024-02-13 PROCEDURE — 1123F ACP DISCUSS/DSCN MKR DOCD: CPT | Performed by: INTERNAL MEDICINE

## 2024-02-13 PROCEDURE — 3078F DIAST BP <80 MM HG: CPT | Performed by: INTERNAL MEDICINE

## 2024-02-13 RX ORDER — ARMODAFINIL 150 MG/1
150 TABLET ORAL DAILY
Qty: 30 TABLET | Refills: 0 | Status: SHIPPED | OUTPATIENT
Start: 2024-02-13 | End: 2024-03-14

## 2024-02-13 RX ORDER — FLUTICASONE PROPIONATE 110 UG/1
2 AEROSOL, METERED RESPIRATORY (INHALATION) 2 TIMES DAILY
Qty: 12 G | Refills: 3 | Status: SHIPPED | OUTPATIENT
Start: 2024-02-13 | End: 2025-02-12

## 2024-02-13 RX ORDER — FLUTICASONE PROPIONATE 50 MCG
2 SPRAY, SUSPENSION (ML) NASAL DAILY
Qty: 48 G | Refills: 1 | Status: SHIPPED | OUTPATIENT
Start: 2024-02-13

## 2024-02-13 RX ORDER — ARMODAFINIL 200 MG/1
TABLET ORAL
Qty: 45 TABLET | Refills: 0 | OUTPATIENT
Start: 2024-02-13 | End: 2024-03-29

## 2024-02-13 NOTE — PROGRESS NOTES
No intake/output data recorded.  REASON FOR THE CONSULTATION:  Chronic respiratory failure  COPD  CVA      Hypertension  HISTORY OF PRESENT ILLNESS:    Cristino Cherry is a 74 y.o. year old male has chronic obstructive lung disease due to chronic bronchitis and emphysema secondary to smoking.  Unfortunately he continues smoke in spite of repeated advised to give up smoking.  Not a CO2 retainer.  He is not on home oxygen.    He very likely has cor pulmonale but no clinical heart failure.  He using Spiriva and albuterol.  He is also using inhaled steroid as Flovent.  He does rinse his mouth after using Flovent.    In the past he has been diagnosed to have sleep apnea and also possible narcolepsy.  He is on Nuvigil.  However he has not been reevaluated for many many years.  I am not sure about the diagnosis of narcolepsy.    He does have systemic hypertension which is well-controlled.  No angina no coronary artery disease.  He used to get his vertigo which is controlled..  Patient also has ischemic event in the brain and has been on anticoagulation.    He already had COVID-vaccine, flu vaccine and Pneumovax.      LUNG CANCER SCREENING     CRITERIA MET    []     CT ORDERED  []      CRITERIA NOT MET   [x]      REFUSED                    []        REASON CRITERIA NOT MET     SMOKING LESS THAN 30 PY  []      AGE LESS THAN 55 or GREATER 77 YEARS  []      QUIT SMOKING 15 YEARS OR GREATER   []      RECENT CT WITH IN 11 MONTHS    []      LIFE EXPECTANCY < 5 YEARS   []      SIGNS  AND SYMPTOMS OF LUNG CANCER   []         Immunization   Immunization History   Administered Date(s) Administered    COVID-19, MODERNA BLUE border, Primary or Immunocompromised, (age 12y+), IM, 100 mcg/0.5mL 06/10/2021, 12/02/2021    COVID-19, MODERNA Bivalent, (age 12y+), IM, 50 mcg/0.5 mL 09/19/2022    Influenza Virus Vaccine 10/09/2012, 11/18/2014, 10/15/2015, 10/01/2019    Influenza Whole 11/18/2014    Influenza, FLUAD, (age 65 y+), Adjuvanted,

## 2024-02-13 NOTE — PATIENT INSTRUCTIONS
@[facility]@        YOU ARE BEING REFERRED TO:    [facility] (a department of [facility])    [address] [place] [address] [address]  [place] [address]    Main Phone Number: [phone]    Phone number for scheduling sleep study: [phone]      Please contact the above numbers to schedule your sleep study.     Once you have completed the FIRST NIGHT you may be asked to return for a SECOND NIGHT if necessary.   After the SECOND NIGHT the sleep center will order a CPAP/BiPAP machine for you.     An order for the machine will be sent to a durable medical equipment company ([employer]).   The sleep center will provide you with the [employer] companies information.     Once you have your machine please contact our office to schedule a follow up appointment.   Your follow up will be scheduled for a date 30-90 days after you have received your machine.   At that follow up visit we will need to have a compliance download from your DME company.   Please contact your [employer] company to have the compliance download faxed to our office at   [phone] for your follow up appointment.       IF YOU HAVE ANY QUESTIONS ABOUT YOUR SLEEP STUDY   PLEASE CONTACT THE SLEEP CENTER.

## 2024-02-14 ENCOUNTER — TELEPHONE (OUTPATIENT)
Dept: PULMONOLOGY | Age: 74
End: 2024-02-14

## 2024-02-14 ENCOUNTER — TELEPHONE (OUTPATIENT)
Dept: FAMILY MEDICINE CLINIC | Age: 74
End: 2024-02-14

## 2024-02-14 DIAGNOSIS — G47.429 NARCOLEPSY DUE TO UNDERLYING CONDITION WITHOUT CATAPLEXY: ICD-10-CM

## 2024-02-14 NOTE — TELEPHONE ENCOUNTER
Patient called on this, states Flovent is no long being manufactured and that we are to prescribe the generic for it. He was informed that we are waiting to hear from his insurance company on what they will approve.

## 2024-02-14 NOTE — TELEPHONE ENCOUNTER
Patient calls in today and states he will be getting his wheel chair in 2 weeks.  He also states his Pulmonologist will be filling his inhalers etc for breathing.

## 2024-02-15 RX ORDER — LEVOTHYROXINE SODIUM 0.05 MG/1
TABLET ORAL
Qty: 30 TABLET | Refills: 5 | Status: SHIPPED | OUTPATIENT
Start: 2024-02-15

## 2024-02-15 RX ORDER — FLUTICASONE PROPIONATE 110 UG/1
2 AEROSOL, METERED RESPIRATORY (INHALATION) 2 TIMES DAILY
Qty: 12 G | Refills: 3 | Status: SHIPPED | OUTPATIENT
Start: 2024-02-15 | End: 2025-02-14

## 2024-02-15 RX ORDER — ARMODAFINIL 200 MG/1
200 TABLET ORAL DAILY
Qty: 30 TABLET | Refills: 0 | Status: SHIPPED | OUTPATIENT
Start: 2024-02-15 | End: 2024-05-15

## 2024-02-15 RX ORDER — POTASSIUM CHLORIDE 20 MEQ/1
TABLET, EXTENDED RELEASE ORAL
Qty: 30 TABLET | Refills: 5 | Status: SHIPPED | OUTPATIENT
Start: 2024-02-15

## 2024-02-15 RX ORDER — AMIODARONE HYDROCHLORIDE 200 MG/1
TABLET ORAL
Qty: 30 TABLET | Refills: 5 | Status: SHIPPED | OUTPATIENT
Start: 2024-02-15

## 2024-02-15 RX ORDER — FOLIC ACID 1 MG/1
TABLET ORAL
Qty: 30 TABLET | Refills: 5 | Status: SHIPPED | OUTPATIENT
Start: 2024-02-15

## 2024-02-15 NOTE — TELEPHONE ENCOUNTER
Last visit: 12/06/2023  Last Med refill: 01/13/2024  Does patient have enough medication for 72 hours: Yes    Next Visit Date:  Future Appointments   Date Time Provider Department Center   3/6/2024  7:30 PM STAZ SLEEP RM 1 [facility]  [place] [facility]   3/7/2024  7:30 AM STAZ MSLT [facility] [place] [facility]   4/8/2024  1:45 PM [facility]   6/11/2024  3:15 PM Sivakumar Mathew MD Resp Spec [facility]       Health Maintenance   Topic Date Due    Low dose CT lung screening &/or counseling  Never done    Respiratory Syncytial Virus (RSV) Pregnant or age 60 yrs+ (1 - 1-dose 60+ series) Never done    AAA screen  Never done    Lipids  02/19/2023    Shingles vaccine (2 of 2) 03/13/2023    COVID-19 Vaccine (4 - 2023-24 season) 09/01/2023    Annual Wellness Visit (Medicare Advantage)  01/01/2024    Depression Screen  09/06/2024    Colorectal Cancer Screen  06/16/2025    DTaP/Tdap/Td vaccine (2 - Td or Tdap) 03/23/2030    Flu vaccine  Completed    Pneumococcal 65+ years Vaccine  Completed    Hepatitis C screen  Completed    Hepatitis A vaccine  Aged Out    Hepatitis B vaccine  Aged Out    Hib vaccine  Aged Out    Polio vaccine  Aged Out    Meningococcal (ACWY) vaccine  Aged Out    A1C test (Diabetic or Prediabetic)  Discontinued       Hemoglobin A1C (%)   Date Value   03/21/2023 5.4   02/19/2022 6.0   05/05/2020 6.4 (H)             ( goal A1C is < 7)   No components found for: \"LABMICR\"  LDL Cholesterol (mg/dL)   Date Value   02/19/2022 97   05/05/2020 56       (goal LDL is <100)   AST (U/L)   Date Value   11/14/2022 28     ALT (U/L)   Date Value   11/14/2022 15     BUN (mg/dL)   Date Value   12/06/2022 16     BP Readings from Last 3 Encounters:   02/13/24 (!) 151/79   12/06/23 120/72   11/27/23 120/64          (goal 120/80)    All Future Testing planned in [facility]  Lab Frequency Next Occurrence   Lipid Panel Once 04/21/2023   Comprehensive Metabolic Panel Once 04/21/2023   Iron and TIBC Once 04/21/2023

## 2024-02-15 NOTE — TELEPHONE ENCOUNTER
KOMAL THOMASON ([place]: [id number])  PA Case ID #: [id number]  Rx #: [id number]  Need Help? Call us at [phone]  Outcome  Approved on February 14  Your PA request has been approved. Additional information will be provided in the approval communication. (Message 1145)  Authorization Expiration Date: 12/30/2024  Drug  Armodafinil 150MG tablets    Form  [employer] Electronic PA Form (2017 [employer])  Original Claim Info  [id number] &~~~~~~~~~(~~~~<<~~~~~~~~~~~~~

## 2024-02-16 ENCOUNTER — TELEPHONE (OUTPATIENT)
Dept: PULMONOLOGY | Age: 74
End: 2024-02-16

## 2024-02-16 NOTE — TELEPHONE ENCOUNTER
Patient is requesting a call regarding his medication and can be reached at [phone]. Okay to leave a message. Office was unavailable.

## 2024-02-16 NOTE — TELEPHONE ENCOUNTER
Spoke to patient, he was asking about the generic Flovent again. He was informed that we answered some questions and sent it in, just waiitng on the final decision.

## 2024-02-20 ENCOUNTER — TELEPHONE (OUTPATIENT)
Dept: FAMILY MEDICINE CLINIC | Age: 74
End: 2024-02-20

## 2024-02-20 DIAGNOSIS — M24.561 FLEXION CONTRACTURE OF RIGHT KNEE: Primary | ICD-10-CM

## 2024-02-20 DIAGNOSIS — R29.898 LEG WEAKNESS, BILATERAL: ICD-10-CM

## 2024-02-21 NOTE — TELEPHONE ENCOUNTER
That was just an FYI about the wheel chair    He said he used to do home PT but they haven't been out in a few months

## 2024-02-21 NOTE — TELEPHONE ENCOUNTER
How has his functional status changed since the last time he had physical therapy?  Is he having more difficulty walking or getting around the house?  Asking because I need to have a reason for physical therapy that will provide an improvement in his functional status

## 2024-02-22 NOTE — TELEPHONE ENCOUNTER
Called patient  and states he went down hill since his last PT.  He cannot get around with out his wheel Chair.  Cannot stand or walk.

## 2024-03-01 ENCOUNTER — PATIENT MESSAGE (OUTPATIENT)
Dept: FAMILY MEDICINE CLINIC | Age: 74
End: 2024-03-01

## 2024-03-01 NOTE — TELEPHONE ENCOUNTER
Pt called the office, pt states it started spreading once he started itching it, rash started about 430 this morning,     Pt advised he needs to be seen, pt stated he will use the triple antibiotic ointment he has and call us on Monday

## 2024-03-01 NOTE — TELEPHONE ENCOUNTER
From: Cristino BELL Comes  To: Dr. Alicia Valencia  Sent: 3/1/2024 10:10 AM EST  Subject: NEW PAINFUL RASH    AT 4:30 AM I ITCH SO BAD I HAD TO GET UP AND USED THE TRIPLE ANTIBIOTIC I HAD AND I NEED A SCRIPT TODAY

## 2024-03-04 NOTE — TELEPHONE ENCOUNTER
Last visit: 11/15/2023  Last Med refill: 12/06/2023  Does patient have enough medication for 72 hours: No:     Next Visit Date:  Future Appointments   Date Time Provider Department Center   3/20/2024  7:30 PM STAZ SLEEP RM 4 [facility]   3/21/2024  7:30 AM STAZ MSLT [facility]   4/8/2024  1[facility]   6/11/2024  3:15 PM Sivakumar Mathew MD Resp Spec [facility]       Health Maintenance   Topic Date Due    Low dose CT lung screening &/or counseling  Never done    Respiratory Syncytial Virus (RSV) Pregnant or age 60 yrs+ (1 - 1-dose 60+ series) Never done    AAA screen  Never done    Lipids  02/19/2023    Shingles vaccine (2 of 2) 03/13/2023    COVID-19 Vaccine (4 - 2023-24 season) 09/01/2023    Annual Wellness Visit (Medicare Advantage)  01/01/2024    Depression Screen  09/06/2024    Colorectal Cancer Screen  06/16/2025    DTaP/Tdap/Td vaccine (2 - Td or Tdap) 03/23/2030    Flu vaccine  Completed    Pneumococcal 65+ years Vaccine  Completed    Hepatitis C screen  Completed    Hepatitis A vaccine  Aged Out    Hepatitis B vaccine  Aged Out    Hib vaccine  Aged Out    Polio vaccine  Aged Out    Meningococcal (ACWY) vaccine  Aged Out    A1C test (Diabetic or Prediabetic)  Discontinued       Hemoglobin A1C (%)   Date Value   03/21/2023 5.4   02/19/2022 6.0   05/05/2020 6.4 (H)             ( goal A1C is < 7)   No components found for: \"LABMICR\"  LDL Cholesterol (mg/dL)   Date Value   02/19/2022 97   05/05/2020 56       (goal LDL is <100)   AST (U/L)   Date Value   11/14/2022 28     ALT (U/L)   Date Value   11/14/2022 15     BUN (mg/dL)   Date Value   12/06/2022 16     BP Readings from Last 3 Encounters:   02/13/24 (!) 151/79   12/06/23 120/72   11/27/23 120/64          (goal 120/80)    All Future Testing planned in [facility]  Lab Frequency Next Occurrence   Lipid Panel Once 04/21/2023   Comprehensive Metabolic Panel Once 04/21/2023   Iron and TIBC Once 04/21/2023

## 2024-03-06 DIAGNOSIS — S72.453S SUPRACONDYLAR FRACTURE OF DISTAL END OF FEMUR WITHOUT INTRACONDYLAR EXTENSION, SEQUELA: ICD-10-CM

## 2024-03-06 NOTE — TELEPHONE ENCOUNTER
Last visit: 12/06/2023  Last Med refill: 02/11/2024  Does patient have enough medication for 72 hours: Yes    Next Visit Date:  Future Appointments   Date Time Provider Department Center   3/20/2024  7:30 PM STAZ SLEEP RM 4 [facility]LE[facility] [place] [facility]   3/21/2024  7:30 AM STAZ MSLT [facility] [place] [facility]   4/8/2024  1:45 PM [facility]   6/11/2024  3:15 PM Sivakumar Mathew MD Resp Spec [facility]       Health Maintenance   Topic Date Due    Low dose CT lung screening &/or counseling  Never done    Respiratory Syncytial Virus (RSV) Pregnant or age 60 yrs+ (1 - 1-dose 60+ series) Never done    AAA screen  Never done    Lipids  02/19/2023    Shingles vaccine (2 of 2) 03/13/2023    COVID-19 Vaccine (4 - 2023-24 season) 09/01/2023    Annual Wellness Visit (Medicare Advantage)  01/01/2024    Depression Screen  09/06/2024    Colorectal Cancer Screen  06/16/2025    DTaP/Tdap/Td vaccine (2 - Td or Tdap) 03/23/2030    Flu vaccine  Completed    Pneumococcal 65+ years Vaccine  Completed    Hepatitis C screen  Completed    Hepatitis A vaccine  Aged Out    Hepatitis B vaccine  Aged Out    Hib vaccine  Aged Out    Polio vaccine  Aged Out    Meningococcal (ACWY) vaccine  Aged Out    A1C test (Diabetic or Prediabetic)  Discontinued       Hemoglobin A1C (%)   Date Value   03/21/2023 5.4   02/19/2022 6.0   05/05/2020 6.4 (H)             ( goal A1C is < 7)   No components found for: \"LABMICR\"  LDL Cholesterol (mg/dL)   Date Value   02/19/2022 97   05/05/2020 56       (goal LDL is <100)   AST (U/L)   Date Value   11/14/2022 28     ALT (U/L)   Date Value   11/14/2022 15     BUN (mg/dL)   Date Value   12/06/2022 16     BP Readings from Last 3 Encounters:   02/13/24 (!) 151/79   12/06/23 120/72   11/27/23 120/64          (goal 120/80)    All Future Testing planned in [facility]  Lab Frequency Next Occurrence   Lipid Panel Once 04/21/2023   Comprehensive Metabolic Panel Once 04/21/2023   Iron and TIBC Once 04/21/2023

## 2024-03-07 DIAGNOSIS — S72.453S SUPRACONDYLAR FRACTURE OF DISTAL END OF FEMUR WITHOUT INTRACONDYLAR EXTENSION, SEQUELA: ICD-10-CM

## 2024-03-07 RX ORDER — HYDROCODONE BITARTRATE AND ACETAMINOPHEN 5; 325 MG/1; MG/1
TABLET ORAL
Qty: 120 TABLET | Refills: 0 | OUTPATIENT
Start: 2024-03-07

## 2024-03-07 RX ORDER — HYDROCODONE BITARTRATE AND ACETAMINOPHEN 5; 325 MG/1; MG/1
1 TABLET ORAL EVERY 6 HOURS PRN
Qty: 120 TABLET | Refills: 0 | Status: SHIPPED | OUTPATIENT
Start: 2024-03-09 | End: 2024-04-08

## 2024-03-15 DIAGNOSIS — G47.429 NARCOLEPSY DUE TO UNDERLYING CONDITION WITHOUT CATAPLEXY: ICD-10-CM

## 2024-03-15 RX ORDER — ARMODAFINIL 200 MG/1
200 TABLET ORAL DAILY
Qty: 90 TABLET | Refills: 0 | Status: SHIPPED | OUTPATIENT
Start: 2024-03-15 | End: 2024-06-13

## 2024-03-15 RX ORDER — ARMODAFINIL 200 MG/1
200 TABLET ORAL DAILY
Qty: 30 TABLET | Refills: 3 | Status: CANCELLED | OUTPATIENT
Start: 2024-03-15 | End: 2024-07-13

## 2024-03-15 NOTE — TELEPHONE ENCOUNTER
Patient called and stated that the script last sent for armodafinil was supposed to be written for a 90 day supple and that's what it stated on the script but it was only written for 30 tablets.  I pended a new script for your approval If you agree please sign  Patient was last seen 2/13/24

## 2024-03-15 NOTE — TELEPHONE ENCOUNTER
LAST VISIT: 2/13/24  NEXT VISIT: 6/11/24    Per last dictation patient is on this medication. Patient to have repeat sleep study with MSLT completed on 3/20/24. Per telephone encounter from 2/14/24 patient is currently on Armodafinil 200mg. Please sign for refill if ok. Thank you.

## 2024-03-21 RX ORDER — GUAIFENESIN 600 MG/1
TABLET, EXTENDED RELEASE ORAL
Qty: 120 TABLET | Refills: 5 | Status: SHIPPED | OUTPATIENT
Start: 2024-03-21

## 2024-04-01 ENCOUNTER — TELEPHONE (OUTPATIENT)
Dept: FAMILY MEDICINE CLINIC | Age: 74
End: 2024-04-01

## 2024-04-01 DIAGNOSIS — M79.671 FOOT PAIN, BILATERAL: Primary | ICD-10-CM

## 2024-04-01 DIAGNOSIS — L60.9 NAIL PROBLEM: ICD-10-CM

## 2024-04-01 DIAGNOSIS — M79.672 FOOT PAIN, BILATERAL: Primary | ICD-10-CM

## 2024-04-01 NOTE — TELEPHONE ENCOUNTER
Patient called stating his podiatrist is needing a new referral for the new year.  Referral placed and faxed.

## 2024-04-09 DIAGNOSIS — S72.453S SUPRACONDYLAR FRACTURE OF DISTAL END OF FEMUR WITHOUT INTRACONDYLAR EXTENSION, SEQUELA: ICD-10-CM

## 2024-04-09 RX ORDER — HYDROCODONE BITARTRATE AND ACETAMINOPHEN 5; 325 MG/1; MG/1
1 TABLET ORAL EVERY 6 HOURS PRN
Qty: 120 TABLET | Refills: 0 | Status: SHIPPED | OUTPATIENT
Start: 2024-04-09 | End: 2024-05-09

## 2024-04-09 NOTE — TELEPHONE ENCOUNTER
Last visit: 12/06/23  Last Med refill: 03/09/24  Does patient have enough medication for 72 hours: No: PATIENT STATES 1 DAY LEFT WILL BE OUT 04/10/24    Next Visit Date:  Future Appointments   Date Time Provider Department Center   4/17/2024  8:00 PM STAZ SLEEP RM 4 [facility]   4/18/2024  7:30 AM STAZ MSLT [facility]   5/2/2024  2:15 PM [facility]   6/11/2024  3:15 PM Sivakumar Mathew MD Resp Spec [facility]       Health Maintenance   Topic Date Due    Low dose CT lung screening &/or counseling  Never done    Respiratory Syncytial Virus (RSV) Pregnant or age 60 yrs+ (1 - 1-dose 60+ series) Never done    AAA screen  Never done    Lipids  02/19/2023    Shingles vaccine (2 of 2) 03/13/2023    COVID-19 Vaccine (4 - 2023-24 season) 09/01/2023    Annual Wellness Visit (Medicare Advantage)  01/01/2024    Depression Screen  04/05/2025    Colorectal Cancer Screen  06/16/2025    DTaP/Tdap/Td vaccine (2 - Td or Tdap) 03/23/2030    Flu vaccine  Completed    Pneumococcal 65+ years Vaccine  Completed    Hepatitis C screen  Completed    Hepatitis A vaccine  Aged Out    Hepatitis B vaccine  Aged Out    Hib vaccine  Aged Out    Polio vaccine  Aged Out    Meningococcal (ACWY) vaccine  Aged Out    A1C test (Diabetic or Prediabetic)  Discontinued       Hemoglobin A1C (%)   Date Value   03/21/2023 5.4   02/19/2022 6.0   05/05/2020 6.4 (H)             ( goal A1C is < 7)   No components found for: \"LABMICR\"  LDL Cholesterol (mg/dL)   Date Value   02/19/2022 97   05/05/2020 56       (goal LDL is <100)   AST (U/L)   Date Value   11/14/2022 28     ALT (U/L)   Date Value   11/14/2022 15     BUN (mg/dL)   Date Value   12/06/2022 16     BP Readings from Last 3 Encounters:   02/13/24 (!) 151/79   12/06/23 120/72   11/27/23 120/64          (goal 120/80)    All Future Testing planned in [facility]  Lab Frequency Next Occurrence   Transthoracic echocardiogram (TTE) complete with contrast, bubble,

## 2024-04-18 DIAGNOSIS — D50.9 IRON DEFICIENCY ANEMIA, UNSPECIFIED IRON DEFICIENCY ANEMIA TYPE: ICD-10-CM

## 2024-04-18 RX ORDER — FERROUS SULFATE 325(65) MG
TABLET ORAL
Qty: 30 TABLET | Refills: 5 | Status: SHIPPED | OUTPATIENT
Start: 2024-04-18

## 2024-04-18 NOTE — TELEPHONE ENCOUNTER
Last visit: 12/6/23  Last Med refill: 10/20/23  Does patient have enough medication for 72 hours: No:     Next Visit Date:  Future Appointments   Date Time Provider Department Center   5/2/2024  2:15 PM Alicia Valencia MD [facility]   5/14/2024  8:00 PM STAZ SLEEP RM 4 [facility]AZSLE[facility]   5/15/2024  7:30 AM STAZ MSLT [facility]   6/11/2024  3:15 PM Sivakumar Mathew MD Resp Spec [facility]       Health Maintenance   Topic Date Due    Low dose CT lung screening &/or counseling  Never done    Respiratory Syncytial Virus (RSV) Pregnant or age 60 yrs+ (1 - 1-dose 60+ series) Never done    AAA screen  Never done    Lipids  02/19/2023    Shingles vaccine (2 of 2) 03/13/2023    COVID-19 Vaccine (4 - 2023-24 season) 09/01/2023    Annual Wellness Visit (Medicare Advantage)  01/01/2024    Depression Screen  04/05/2025    Colorectal Cancer Screen  06/16/2025    DTaP/Tdap/Td vaccine (2 - Td or Tdap) 03/23/2030    Flu vaccine  Completed    Pneumococcal 65+ years Vaccine  Completed    Hepatitis C screen  Completed    Hepatitis A vaccine  Aged Out    Hepatitis B vaccine  Aged Out    Hib vaccine  Aged Out    Polio vaccine  Aged Out    Meningococcal (ACWY) vaccine  Aged Out    A1C test (Diabetic or Prediabetic)  Discontinued       Hemoglobin A1C (%)   Date Value   03/21/2023 5.4   02/19/2022 6.0   05/05/2020 6.4 (H)             ( goal A1C is < 7)   No components found for: \"LABMICR\"  LDL Cholesterol (mg/dL)   Date Value   02/19/2022 97   05/05/2020 56       (goal LDL is <100)   AST (U/L)   Date Value   11/14/2022 28     ALT (U/L)   Date Value   11/14/2022 15     BUN (mg/dL)   Date Value   12/06/2022 16     BP Readings from Last 3 Encounters:   02/13/24 (!) 151/79   12/06/23 120/72   11/27/23 120/64          (goal 120/80)    All Future Testing planned in [facility]  Lab Frequency Next Occurrence   Transthoracic echocardiogram (TTE) complete with contrast, bubble, strain, and 3D PRN Once 08/11/2023   Nuclear

## 2024-04-23 ENCOUNTER — HOSPITAL ENCOUNTER (OUTPATIENT)
Age: 74
Discharge: HOME | End: 2024-04-25
Attending: INTERNAL MEDICINE
Payer: COMMERCIAL

## 2024-04-23 DIAGNOSIS — Z98.61 POSTSURGICAL PERCUTANEOUS TRANSLUMINAL CORONARY ANGIOPLASTY STATUS: ICD-10-CM

## 2024-04-23 DIAGNOSIS — I25.10 ATHEROSCLEROSIS OF NATIVE CORONARY ARTERY WITHOUT ANGINA PECTORIS, UNSPECIFIED WHETHER NATIVE OR TRANSPLANTED HEART: ICD-10-CM

## 2024-04-23 DIAGNOSIS — R06.02 SOB (SHORTNESS OF BREATH): ICD-10-CM

## 2024-04-23 DIAGNOSIS — R06.09 DYSPNEA ON EXERTION: ICD-10-CM

## 2024-04-23 DIAGNOSIS — R94.31 EKG, ABNORMAL: ICD-10-CM

## 2024-04-23 LAB
ECHO AO ROOT DIAM: 2.8 CM
ECHO AV AREA PEAK VELOCITY: 2.5 CM2
ECHO AV AREA VTI: 2.4 CM2
ECHO AV MEAN GRADIENT: 3 MMHG
ECHO AV MEAN VELOCITY: 0.8 M/S
ECHO AV PEAK GRADIENT: 5 MMHG
ECHO AV PEAK VELOCITY: 1.2 M/S
ECHO AV VELOCITY RATIO: 0.75
ECHO AV VTI: 27.8 CM
ECHO EST RA PRESSURE: 8 MMHG
ECHO LA AREA 2C: 29.8 CM2
ECHO LA AREA 4C: 20.5 CM2
ECHO LA DIAMETER: 4.5 CM
ECHO LA MAJOR AXIS: 5.8 CM
ECHO LA MINOR AXIS: 7.4 CM
ECHO LA TO AORTIC ROOT RATIO: 1.61
ECHO LA VOL MOD A2C: 97 ML (ref 18–58)
ECHO LA VOL MOD A4C: 58 ML (ref 18–58)
ECHO LV E' LATERAL VELOCITY: 10 CM/S
ECHO LV E' SEPTAL VELOCITY: 12 CM/S
ECHO LV FRACTIONAL SHORTENING: 28 % (ref 28–44)
ECHO LV INTERNAL DIMENSION DIASTOLIC: 4.6 CM (ref 4.2–5.9)
ECHO LV INTERNAL DIMENSION SYSTOLIC: 3.3 CM
ECHO LV IVSD: 1.1 CM (ref 0.6–1)
ECHO LV MASS 2D: 181.2 G (ref 88–224)
ECHO LV POSTERIOR WALL DIASTOLIC: 1.1 CM (ref 0.6–1)
ECHO LV RELATIVE WALL THICKNESS RATIO: 0.48
ECHO LVOT AREA: 3.1 CM2
ECHO LVOT AV VTI INDEX: 0.76
ECHO LVOT DIAM: 2 CM
ECHO LVOT MEAN GRADIENT: 2 MMHG
ECHO LVOT PEAK GRADIENT: 3 MMHG
ECHO LVOT PEAK VELOCITY: 0.9 M/S
ECHO LVOT SV: 65.9 ML
ECHO LVOT VTI: 21 CM
ECHO MV A VELOCITY: 0.81 M/S
ECHO MV AREA VTI: 3.9 CM2
ECHO MV E DECELERATION TIME (DT): 194 MS
ECHO MV E VELOCITY: 0.65 M/S
ECHO MV E/A RATIO: 0.8
ECHO MV E/E' LATERAL: 6.5
ECHO MV E/E' RATIO (AVERAGED): 5.96
ECHO MV LVOT VTI INDEX: 0.81
ECHO MV MAX VELOCITY: 0.6 M/S
ECHO MV MEAN GRADIENT: 0 MMHG
ECHO MV MEAN VELOCITY: 0.3 M/S
ECHO MV PEAK GRADIENT: 1 MMHG
ECHO MV VTI: 17.1 CM
ECHO RA AREA 4C: 17.4 CM2
ECHO RA VOLUME: 40 ML
ECHO RIGHT VENTRICULAR SYSTOLIC PRESSURE (RVSP): 10 MMHG
ECHO RV TAPSE: 2.7 CM (ref 1.7–?)
ECHO TV REGURGITANT MAX VELOCITY: 0.71 M/S
ECHO TV REGURGITANT PEAK GRADIENT: 2 MMHG

## 2024-04-23 PROCEDURE — 93306 TTE W/DOPPLER COMPLETE: CPT

## 2024-04-23 PROCEDURE — 93306 TTE W/DOPPLER COMPLETE: CPT | Performed by: INTERNAL MEDICINE

## 2024-05-02 ENCOUNTER — OFFICE VISIT (OUTPATIENT)
Dept: FAMILY MEDICINE CLINIC | Age: 74
End: 2024-05-02
Payer: COMMERCIAL

## 2024-05-02 VITALS
DIASTOLIC BLOOD PRESSURE: 72 MMHG | SYSTOLIC BLOOD PRESSURE: 126 MMHG | OXYGEN SATURATION: 95 % | TEMPERATURE: 97.5 F | HEART RATE: 57 BPM

## 2024-05-02 DIAGNOSIS — R42 VERTIGO: ICD-10-CM

## 2024-05-02 DIAGNOSIS — I42.9 CARDIOMYOPATHY, UNSPECIFIED TYPE (HCC): ICD-10-CM

## 2024-05-02 DIAGNOSIS — E78.5 DYSLIPIDEMIA: ICD-10-CM

## 2024-05-02 DIAGNOSIS — D64.9 ANEMIA, NORMOCYTIC NORMOCHROMIC: ICD-10-CM

## 2024-05-02 DIAGNOSIS — J31.0 CHRONIC RHINITIS: ICD-10-CM

## 2024-05-02 DIAGNOSIS — H81.03 MENIERE'S DISEASE OF BOTH EARS: ICD-10-CM

## 2024-05-02 DIAGNOSIS — I10 PRIMARY HYPERTENSION: ICD-10-CM

## 2024-05-02 DIAGNOSIS — S72.453S SUPRACONDYLAR FRACTURE OF DISTAL END OF FEMUR WITHOUT INTRACONDYLAR EXTENSION, SEQUELA: ICD-10-CM

## 2024-05-02 DIAGNOSIS — J44.9 CHRONIC OBSTRUCTIVE PULMONARY DISEASE, UNSPECIFIED COPD TYPE (HCC): ICD-10-CM

## 2024-05-02 DIAGNOSIS — I50.22 CHRONIC SYSTOLIC (CONGESTIVE) HEART FAILURE (HCC): ICD-10-CM

## 2024-05-02 DIAGNOSIS — F11.20 OPIOID DEPENDENCE WITH CURRENT USE (HCC): ICD-10-CM

## 2024-05-02 DIAGNOSIS — F03.918 DEMENTIA WITH BEHAVIORAL DISTURBANCE (HCC): ICD-10-CM

## 2024-05-02 DIAGNOSIS — Z12.5 ENCOUNTER FOR SCREENING FOR MALIGNANT NEOPLASM OF PROSTATE: ICD-10-CM

## 2024-05-02 DIAGNOSIS — B96.89 ACUTE BACTERIAL SINUSITIS: Primary | ICD-10-CM

## 2024-05-02 DIAGNOSIS — J01.90 ACUTE BACTERIAL SINUSITIS: Primary | ICD-10-CM

## 2024-05-02 DIAGNOSIS — G47.429 NARCOLEPSY DUE TO UNDERLYING CONDITION WITHOUT CATAPLEXY: ICD-10-CM

## 2024-05-02 DIAGNOSIS — E03.9 ACQUIRED HYPOTHYROIDISM: ICD-10-CM

## 2024-05-02 PROCEDURE — 3074F SYST BP LT 130 MM HG: CPT | Performed by: INTERNAL MEDICINE

## 2024-05-02 PROCEDURE — 99214 OFFICE O/P EST MOD 30 MIN: CPT | Performed by: INTERNAL MEDICINE

## 2024-05-02 PROCEDURE — 1123F ACP DISCUSS/DSCN MKR DOCD: CPT | Performed by: INTERNAL MEDICINE

## 2024-05-02 PROCEDURE — 3078F DIAST BP <80 MM HG: CPT | Performed by: INTERNAL MEDICINE

## 2024-05-02 RX ORDER — CARVEDILOL 6.25 MG/1
6.25 TABLET ORAL 2 TIMES DAILY WITH MEALS
Qty: 60 TABLET | Refills: 5 | Status: SHIPPED | OUTPATIENT
Start: 2024-05-02

## 2024-05-02 RX ORDER — AMOXICILLIN 875 MG/1
875 TABLET, COATED ORAL 2 TIMES DAILY
Qty: 14 TABLET | Refills: 0 | Status: SHIPPED | OUTPATIENT
Start: 2024-05-02 | End: 2024-05-09

## 2024-05-02 RX ORDER — MECLIZINE HYDROCHLORIDE 25 MG/1
25 TABLET ORAL 3 TIMES DAILY PRN
Qty: 90 TABLET | Refills: 5 | Status: SHIPPED | OUTPATIENT
Start: 2024-05-02

## 2024-05-02 RX ORDER — ALBUTEROL SULFATE 90 UG/1
AEROSOL, METERED RESPIRATORY (INHALATION)
Qty: 8.5 G | Refills: 5 | Status: SHIPPED | OUTPATIENT
Start: 2024-05-02

## 2024-05-02 RX ORDER — CLOPIDOGREL BISULFATE 75 MG/1
75 TABLET ORAL DAILY
Qty: 30 TABLET | Refills: 5 | Status: SHIPPED | OUTPATIENT
Start: 2024-05-02

## 2024-05-02 RX ORDER — LORATADINE 10 MG/1
10 TABLET ORAL DAILY
Qty: 90 TABLET | Refills: 1 | Status: SHIPPED | OUTPATIENT
Start: 2024-05-02

## 2024-05-02 RX ORDER — FUROSEMIDE 20 MG/1
20 TABLET ORAL DAILY
Qty: 30 TABLET | Refills: 5 | Status: SHIPPED | OUTPATIENT
Start: 2024-05-02

## 2024-05-02 NOTE — PROGRESS NOTES
[facility]  [place] [address]  Dept: [phone]  Dept Fax: [phone]      Cristino Cherry is a 74 y.o. male who presents today for hismedical conditions/complaints as noted below.  Cristino Cherry is c/o of COPD (F/u), Hypertension (F/u), Other (Pt was told to ask PCP for a shot of Botox for RT knee, his OT told him to ask, would like a referral to ortho for this ), and Nasal Congestion (Pt has some greenish/brown mucus that comes of the RT side of his nose )        Assessment/Plan:     1. Acute bacterial sinusitis  -     amoxicillin (AMOXIL) 875 MG tablet; Take 1 tablet by mouth 2 times daily for 7 days, Disp-14 tablet, R-0Normal  2. Opioid dependence with current use (HCC)  3. Chronic systolic (congestive) heart failure (HCC)  -     clopidogrel (PLAVIX) 75 MG tablet; Take 1 tablet by mouth daily, Disp-30 tablet, R-5Normal  -     furosemide (LASIX) 20 MG tablet; Take 1 tablet by mouth daily, Disp-30 tablet, R-5Normal  4. Dementia with behavioral disturbance (HCC)  5. Chronic rhinitis  -     loratadine (CLARITIN) 10 MG tablet; Take 1 tablet by mouth daily, Disp-90 tablet, R-1Normal  6. Cardiomyopathy, unspecified type (HCC)  -     carvedilol (COREG) 6.25 MG tablet; Take 1 tablet by mouth 2 times daily (with meals), Disp-60 tablet, R-5Normal  -     clopidogrel (PLAVIX) 75 MG tablet; Take 1 tablet by mouth daily, Disp-30 tablet, R-5Normal  7. Chronic obstructive pulmonary disease, unspecified COPD type (HCC)  -     albuterol sulfate HFA (PROAIR HFA) 108 (90 Base) MCG/ACT inhaler; inhale 2 puffs every 4 to 6 hours if needed, Disp-8.5 g, R-5Normal  8. Supracondylar fracture of distal end of femur without intracondylar extension, sequela  9. Anemia, normocytic normochromic  -     CBC with Auto Differential; Future  10. Primary hypertension  -     Comprehensive Metabolic Panel; Future  -     furosemide (LASIX) 20 MG tablet; Take 1 tablet by mouth daily,

## 2024-05-03 ENCOUNTER — TELEPHONE (OUTPATIENT)
Dept: FAMILY MEDICINE CLINIC | Age: 74
End: 2024-05-03

## 2024-05-03 DIAGNOSIS — Z87.81 HISTORY OF HIP FRACTURE: ICD-10-CM

## 2024-05-03 NOTE — TELEPHONE ENCOUNTER
Patient called stating diclofenac gel was taken off his med list.  It looks like it was d/c due to patient choice.  Patient states he would still like to use this.  If patient is still okay to use he will need a refill.

## 2024-05-07 DIAGNOSIS — S72.453S SUPRACONDYLAR FRACTURE OF DISTAL END OF FEMUR WITHOUT INTRACONDYLAR EXTENSION, SEQUELA: ICD-10-CM

## 2024-05-07 NOTE — TELEPHONE ENCOUNTER
Last visit: 5/2/24  Last Med refill: 4/9/24  Does patient have enough medication for 72 hours: No:     Next Visit Date:  Future Appointments   Date Time Provider Department Center   5/13/2024  2:00 PM Mamie Andrews PA-C ORTHO SPECIA [facility]   5/14/2024  8:00 PM STAZ SLEEP RM 4 [facility]AZSLE[facility]   5/15/2024  7:30 AM STAZ MSLT [facility]   6/11/2024  3:15 PM [facility]   7/11/2024  2:00 PM Simone Palomo MD [facility]   9/3/2024  2:00 PM Alicia Valencia MD [facility]       Health Maintenance   Topic Date Due    Low dose CT lung screening &/or counseling  Never done    Respiratory Syncytial Virus (RSV) Pregnant or age 60 yrs+ (1 - 1-dose 60+ series) Never done    AAA screen  Never done    Lipids  02/19/2023    Shingles vaccine (2 of 2) 03/13/2023    COVID-19 Vaccine (4 - 2023-24 season) 09/01/2023    Annual Wellness Visit (Medicare Advantage)  01/01/2024    Depression Screen  04/05/2025    Colorectal Cancer Screen  06/16/2025    DTaP/Tdap/Td vaccine (2 - Td or Tdap) 03/23/2030    Flu vaccine  Completed    Pneumococcal 65+ years Vaccine  Completed    Hepatitis C screen  Completed    Hepatitis A vaccine  Aged Out    Hepatitis B vaccine  Aged Out    Hib vaccine  Aged Out    Polio vaccine  Aged Out    Meningococcal (ACWY) vaccine  Aged Out    A1C test (Diabetic or Prediabetic)  Discontinued       Hemoglobin A1C (%)   Date Value   03/21/2023 5.4   02/19/2022 6.0   05/05/2020 6.4 (H)             ( goal A1C is < 7)   No components found for: \"LABMICR\"  No components found for: \"LDLCHOLESTEROL\", \"LDLCALC\"    (goal LDL is <100)   AST (U/L)   Date Value   11/14/2022 28     ALT (U/L)   Date Value   11/14/2022 15     BUN (mg/dL)   Date Value   12/06/2022 16     BP Readings from Last 3 Encounters:   05/02/24 126/72   02/13/24 (!) 151/79   12/06/23 120/72          (goal 120/80)    All Future Testing planned in [facility]  Lab Frequency Next Occurrence   Nuclear

## 2024-05-08 RX ORDER — HYDROCODONE BITARTRATE AND ACETAMINOPHEN 5; 325 MG/1; MG/1
1 TABLET ORAL EVERY 6 HOURS PRN
Qty: 120 TABLET | Refills: 0 | Status: SHIPPED | OUTPATIENT
Start: 2024-05-10 | End: 2024-06-09

## 2024-05-09 ASSESSMENT — ENCOUNTER SYMPTOMS
CHEST TIGHTNESS: 0
ANAL BLEEDING: 0
CONSTIPATION: 0
COUGH: 0
DIARRHEA: 0
VOMITING: 0
NAUSEA: 0
SHORTNESS OF BREATH: 0
ABDOMINAL PAIN: 0
CHOKING: 0
BLOOD IN STOOL: 0
WHEEZING: 0

## 2024-05-09 ASSESSMENT — VISUAL ACUITY: OU: 1

## 2024-05-13 ENCOUNTER — TELEPHONE (OUTPATIENT)
Dept: FAMILY MEDICINE CLINIC | Age: 74
End: 2024-05-13

## 2024-05-13 DIAGNOSIS — S72.453S SUPRACONDYLAR FRACTURE OF DISTAL END OF FEMUR WITHOUT INTRACONDYLAR EXTENSION, SEQUELA: Primary | ICD-10-CM

## 2024-05-13 DIAGNOSIS — F03.918 DEMENTIA WITH BEHAVIORAL DISTURBANCE (HCC): ICD-10-CM

## 2024-05-13 DIAGNOSIS — I42.9 CARDIOMYOPATHY, UNSPECIFIED TYPE (HCC): ICD-10-CM

## 2024-05-13 NOTE — TELEPHONE ENCOUNTER
Jaquelin is requesting an order for [facility] for an aid. It will be for house cleaning, laundry and meal prep    [facility]   Ph:    [phone]  Fax:  [phone]

## 2024-05-14 ENCOUNTER — HOSPITAL ENCOUNTER (OUTPATIENT)
Dept: SLEEP CENTER | Age: 74
Discharge: HOME | End: 2024-05-16
Attending: INTERNAL MEDICINE
Payer: COMMERCIAL

## 2024-05-14 VITALS — WEIGHT: 180 LBS | HEIGHT: 70 IN | BODY MASS INDEX: 25.77 KG/M2

## 2024-05-14 PROCEDURE — 95810 POLYSOM 6/> YRS 4/> PARAM: CPT

## 2024-05-17 DIAGNOSIS — R52 PAIN: Primary | ICD-10-CM

## 2024-05-21 LAB — STATUS: NORMAL

## 2024-05-22 DIAGNOSIS — T40.2X5A THERAPEUTIC OPIOID-INDUCED CONSTIPATION (OIC): ICD-10-CM

## 2024-05-22 DIAGNOSIS — K59.03 THERAPEUTIC OPIOID-INDUCED CONSTIPATION (OIC): ICD-10-CM

## 2024-05-22 RX ORDER — MULTIVIT-MIN/FA/LYCOPEN/LUTEIN .4-300-25
TABLET ORAL
Qty: 30 TABLET | Refills: 5 | Status: SHIPPED | OUTPATIENT
Start: 2024-05-22

## 2024-05-22 RX ORDER — DOCUSATE SODIUM 100 MG/1
CAPSULE, LIQUID FILLED ORAL
Qty: 60 CAPSULE | Refills: 5 | Status: SHIPPED | OUTPATIENT
Start: 2024-05-22

## 2024-05-22 NOTE — TELEPHONE ENCOUNTER
Last visit: 05/02/2024  Last Med refill: 04/17/2024  Does patient have enough medication for 72 hours: No:     Next Visit Date:  Future Appointments   Date Time Provider Department Center   5/31/2024  2:00 PM Lisa Rodriguez PA-C ORTHO SPECIA [facility]   6/11/2024  3:15 PM Sivakumar Mathew MD Resp Spec [facility]   7/11/2024  2:00 PM Simone Palomo MD [facility]   9/3/2024  2:00 PM Alicia Valencia MD [facility]       Health Maintenance   Topic Date Due    Low dose CT lung screening &/or counseling  Never done    Respiratory Syncytial Virus (RSV) Pregnant or age 60 yrs+ (1 - 1-dose 60+ series) Never done    AAA screen  Never done    Lipids  02/19/2023    Shingles vaccine (2 of 2) 03/13/2023    COVID-19 Vaccine (4 - 2023-24 season) 09/01/2023    Annual Wellness Visit (Medicare Advantage)  01/01/2024    Depression Screen  04/05/2025    Colorectal Cancer Screen  06/16/2025    DTaP/Tdap/Td vaccine (2 - Td or Tdap) 03/23/2030    Flu vaccine  Completed    Pneumococcal 65+ years Vaccine  Completed    Hepatitis C screen  Completed    Hepatitis A vaccine  Aged Out    Hepatitis B vaccine  Aged Out    Hib vaccine  Aged Out    Polio vaccine  Aged Out    Meningococcal (ACWY) vaccine  Aged Out    A1C test (Diabetic or Prediabetic)  Discontinued       Hemoglobin A1C (%)   Date Value   03/21/2023 5.4   02/19/2022 6.0   05/05/2020 6.4 (H)             ( goal A1C is < 7)   No components found for: \"LABMICR\"  No components found for: \"LDLCHOLESTEROL\", \"LDLCALC\"    (goal LDL is <100)   AST (U/L)   Date Value   11/14/2022 28     ALT (U/L)   Date Value   11/14/2022 15     BUN (mg/dL)   Date Value   12/06/2022 16     BP Readings from Last 3 Encounters:   05/02/24 126/72   02/13/24 (!) 151/79   12/06/23 120/72          (goal 120/80)    All Future Testing planned in [facility]  Lab Frequency Next Occurrence   Nuclear stress test with myocardial perfusion Once 08/11/2023   Drug Screen, Pain Once 09/06/2023   Lipid,

## 2024-05-24 DIAGNOSIS — M25.561 RIGHT KNEE PAIN, UNSPECIFIED CHRONICITY: Primary | ICD-10-CM

## 2024-06-03 ENCOUNTER — TELEPHONE (OUTPATIENT)
Dept: FAMILY MEDICINE CLINIC | Age: 74
End: 2024-06-03

## 2024-06-03 DIAGNOSIS — R52 PAIN: Primary | ICD-10-CM

## 2024-06-03 DIAGNOSIS — M79.672 FOOT PAIN, BILATERAL: ICD-10-CM

## 2024-06-03 DIAGNOSIS — L60.8: ICD-10-CM

## 2024-06-03 DIAGNOSIS — M79.671 FOOT PAIN, BILATERAL: ICD-10-CM

## 2024-06-03 DIAGNOSIS — L60.9 NAIL PROBLEM: Primary | ICD-10-CM

## 2024-06-03 NOTE — TELEPHONE ENCOUNTER
Patient is requesting a referral to POD, Dr Cunha  He is having RT big toe bleeding.  The referral was placed and faxed in 04/2024.  I re-placed and re-faxed referral

## 2024-06-05 DIAGNOSIS — S72.453S SUPRACONDYLAR FRACTURE OF DISTAL END OF FEMUR WITHOUT INTRACONDYLAR EXTENSION, SEQUELA: ICD-10-CM

## 2024-06-05 RX ORDER — HYDROCODONE BITARTRATE AND ACETAMINOPHEN 5; 325 MG/1; MG/1
1 TABLET ORAL EVERY 6 HOURS PRN
Qty: 120 TABLET | Refills: 0 | Status: SHIPPED | OUTPATIENT
Start: 2024-06-07 | End: 2024-07-07

## 2024-06-05 RX ORDER — HYDROCODONE BITARTRATE AND ACETAMINOPHEN 5; 325 MG/1; MG/1
1 TABLET ORAL EVERY 6 HOURS PRN
Qty: 120 TABLET | Refills: 0 | Status: SHIPPED | OUTPATIENT
Start: 2024-07-07 | End: 2024-08-06

## 2024-06-05 NOTE — TELEPHONE ENCOUNTER
Last visit: 5/2/24  Last Med refill: 5/10/24  Does patient have enough medication for 72 hours: Yes    Next Visit Date:  Future Appointments   Date Time Provider Department Center   6/6/2024  1:15 PM [facility]   6/11/2024  3:15 PM Sivakumar Mathew MD Resp Spec [facility]   7/11/2024  2:00 PM Simone Palomo MD [facility]   9/3/2024  2:00 PM Alicia Valencia MD [facility]       Health Maintenance   Topic Date Due    Low dose CT lung screening &/or counseling  Never done    Respiratory Syncytial Virus (RSV) Pregnant or age 60 yrs+ (1 - 1-dose 60+ series) Never done    AAA screen  Never done    Lipids  02/19/2023    Shingles vaccine (2 of 2) 03/13/2023    COVID-19 Vaccine (4 - 2023-24 season) 09/01/2023    Annual Wellness Visit (Medicare Advantage)  01/01/2024    Depression Screen  04/05/2025    Colorectal Cancer Screen  06/16/2025    DTaP/Tdap/Td vaccine (2 - Td or Tdap) 03/23/2030    Flu vaccine  Completed    Pneumococcal 65+ years Vaccine  Completed    Hepatitis C screen  Completed    Hepatitis A vaccine  Aged Out    Hepatitis B vaccine  Aged Out    Hib vaccine  Aged Out    Polio vaccine  Aged Out    Meningococcal (ACWY) vaccine  Aged Out    A1C test (Diabetic or Prediabetic)  Discontinued       Hemoglobin A1C (%)   Date Value   03/21/2023 5.4   02/19/2022 6.0   05/05/2020 6.4 (H)             ( goal A1C is < 7)   No components found for: \"LABMICR\"  No components found for: \"LDLCHOLESTEROL\", \"LDLCALC\"    (goal LDL is <100)   AST (U/L)   Date Value   11/14/2022 28     ALT (U/L)   Date Value   11/14/2022 15     BUN (mg/dL)   Date Value   12/06/2022 16     BP Readings from Last 3 Encounters:   05/02/24 126/72   02/13/24 (!) 151/79   12/06/23 120/72          (goal 120/80)    All Future Testing planned in [facility]  Lab Frequency Next Occurrence   Nuclear stress test with myocardial perfusion Once 08/11/2023   Drug Screen, Pain Once 09/06/2023   Lipid, Fasting Once

## 2024-06-06 ENCOUNTER — OFFICE VISIT (OUTPATIENT)
Dept: ORTHOPEDIC SURGERY | Age: 74
End: 2024-06-06
Payer: COMMERCIAL

## 2024-06-06 VITALS — WEIGHT: 180 LBS | HEIGHT: 70 IN | BODY MASS INDEX: 25.77 KG/M2

## 2024-06-06 DIAGNOSIS — M25.561 CHRONIC PAIN OF RIGHT KNEE: ICD-10-CM

## 2024-06-06 DIAGNOSIS — G89.29 CHRONIC PAIN OF RIGHT KNEE: ICD-10-CM

## 2024-06-06 DIAGNOSIS — M24.561 FLEXION CONTRACTURE OF KNEE, RIGHT: Primary | ICD-10-CM

## 2024-06-06 PROCEDURE — 1123F ACP DISCUSS/DSCN MKR DOCD: CPT

## 2024-06-06 PROCEDURE — 99213 OFFICE O/P EST LOW 20 MIN: CPT

## 2024-06-07 ASSESSMENT — ENCOUNTER SYMPTOMS
COLOR CHANGE: 0
BACK PAIN: 0

## 2024-06-07 NOTE — PROGRESS NOTES
[facility]  [address]  [place] [address]  Dept: [phone]  Dept Fax: [phone]        Ambulatory Follow Up      Subjective:   Cristino Cherry is a 74 y.o. year old male who presents to our office today for routine followup regarding his   1. Flexion contracture of knee, right    2. Chronic pain of right knee    .    Chief Complaint   Patient presents with    Follow-up     RT knee pain, would like to have an injection        Date of surgery:     3/8/22: Right hip ORIF with TFN insertion  4/13/21: right distal femur replacement  2013: right TKA    HPI Cristino Cherry  is a 74 y.o.  male who presents today in follow for his right knee pain and flexion contracture.  The patient has a surgical history  significant for a right hip TFN insertion with open reduction internal fixation of the right hip due to an intertrochanteric fracture and this was done on 3/8/2022 by Dr. Saulo Nichole DO.  He also has a history of a right distal femur replacement on 4/13/2021.  Patient reports that he has had difficulty with straightening his knee ever since the distal femur replacement in 2021.  He has been working with home physical therapy.  He states that he does not have as much pain in the joint as he does along the muscles of the thigh.  He states that his muscles feel very tight.  He states that his physical therapist told him that he may want to consider Botox injections for the muscle tightness.  He has never had these done before.  He uses a wheelchair to get around and reports that he cannot place any weight onto the right leg.  He denies any swelling of the leg, redness, or warmth.  He also denies any fevers or chills.  He additionally denies any numbness or paresthesias.  He is not currently seeing pain management.  He is chronically on Norco 5 mg and this is prescribed by his family doctor.      Review of Systems   Constitutional:

## 2024-06-11 ENCOUNTER — OFFICE VISIT (OUTPATIENT)
Dept: PULMONOLOGY | Age: 74
End: 2024-06-11
Payer: COMMERCIAL

## 2024-06-11 VITALS
BODY MASS INDEX: 25.77 KG/M2 | OXYGEN SATURATION: 98 % | WEIGHT: 180 LBS | HEIGHT: 70 IN | SYSTOLIC BLOOD PRESSURE: 143 MMHG | DIASTOLIC BLOOD PRESSURE: 79 MMHG | HEART RATE: 60 BPM | RESPIRATION RATE: 17 BRPM

## 2024-06-11 DIAGNOSIS — J44.9 CHRONIC OBSTRUCTIVE PULMONARY DISEASE, UNSPECIFIED COPD TYPE (HCC): ICD-10-CM

## 2024-06-11 DIAGNOSIS — G47.429 NARCOLEPSY DUE TO UNDERLYING CONDITION WITHOUT CATAPLEXY: Primary | ICD-10-CM

## 2024-06-11 DIAGNOSIS — I10 PRIMARY HYPERTENSION: ICD-10-CM

## 2024-06-11 PROCEDURE — 1123F ACP DISCUSS/DSCN MKR DOCD: CPT | Performed by: INTERNAL MEDICINE

## 2024-06-11 PROCEDURE — 3078F DIAST BP <80 MM HG: CPT | Performed by: INTERNAL MEDICINE

## 2024-06-11 PROCEDURE — 3077F SYST BP >= 140 MM HG: CPT | Performed by: INTERNAL MEDICINE

## 2024-06-11 PROCEDURE — 99214 OFFICE O/P EST MOD 30 MIN: CPT | Performed by: INTERNAL MEDICINE

## 2024-06-11 RX ORDER — ALBUTEROL SULFATE 90 UG/1
2 AEROSOL, METERED RESPIRATORY (INHALATION) 4 TIMES DAILY PRN
Qty: 54 G | Refills: 1 | Status: SHIPPED | OUTPATIENT
Start: 2024-06-11

## 2024-06-11 RX ORDER — ARMODAFINIL 250 MG/1
250 TABLET ORAL DAILY
Qty: 30 TABLET | Refills: 3 | Status: SHIPPED | OUTPATIENT
Start: 2024-06-11 | End: 2024-07-11

## 2024-06-11 ASSESSMENT — SLEEP AND FATIGUE QUESTIONNAIRES
HOW LIKELY ARE YOU TO NOD OFF OR FALL ASLEEP WHILE SITTING AND TALKING TO SOMEONE: WOULD NEVER DOZE
HOW LIKELY ARE YOU TO NOD OFF OR FALL ASLEEP WHILE LYING DOWN TO REST IN THE AFTERNOON WHEN CIRCUMSTANCES PERMIT: HIGH CHANCE OF DOZING
HOW LIKELY ARE YOU TO NOD OFF OR FALL ASLEEP WHILE SITTING INACTIVE IN A PUBLIC PLACE: HIGH CHANCE OF DOZING
HOW LIKELY ARE YOU TO NOD OFF OR FALL ASLEEP WHILE SITTING QUIETLY AFTER LUNCH WITHOUT ALCOHOL: HIGH CHANCE OF DOZING
ESS TOTAL SCORE: 18
HOW LIKELY ARE YOU TO NOD OFF OR FALL ASLEEP WHILE WATCHING TV: HIGH CHANCE OF DOZING
HOW LIKELY ARE YOU TO NOD OFF OR FALL ASLEEP WHEN YOU ARE A PASSENGER IN A CAR FOR AN HOUR WITHOUT A BREAK: HIGH CHANCE OF DOZING
HOW LIKELY ARE YOU TO NOD OFF OR FALL ASLEEP IN A CAR, WHILE STOPPED FOR A FEW MINUTES IN TRAFFIC: WOULD NEVER DOZE
HOW LIKELY ARE YOU TO NOD OFF OR FALL ASLEEP WHILE SITTING AND READING: HIGH CHANCE OF DOZING

## 2024-06-11 NOTE — PROGRESS NOTES
of the use of CPAP.  I advised him that he must go to the sleep center for retitration and also get an MSLT done.    In the meantime we will continue Nuvigil to 50 mg daily in the morning.  I advised him to stop the Nuvigil about a week before he goes For the MSLT.    Continue treatment of hypertension as before.    Vertigo has improved.  No recurrence of ischemic episodes of the brain.    No evidence of acute exacerbation of COPD.  He already had COVID-vaccine flu vaccine and Pneumovax  Dictated by Dr. Mathew    I did give him prescription for Spiriva, albuterol.  Also gave prescription for Nuvigil.                Requested Prescriptions      No prescriptions requested or ordered in this encounter       There are no discontinued medications.    Cristino received counseling on the following healthy behaviors: nutrition, exercise and medication adherence    Patient given educational materials : see patient instruction       Discussed use, benefit, and side effects of prescribed medications.  Barriers to medication compliance addressed.      All patient questions answered.  Pt voiced understanding.   I hope this updates you on my evaluation and clinical thinking. Thank you for allowing me to participate in his care.     Sincerely,      Electronically signed by Sivakumar Mathew MD on   8/10/23 at 11:02 AM EDT       Please note that this chart was generated using voice recognition Dragon dictation software.  Although every effort was made to ensure the accuracy of this automated transcription, some errors in transcription may have occurred.

## 2024-06-11 NOTE — PATIENT INSTRUCTIONS
@[email or web address]@        YOU ARE BEING REFERRED TO:    [facility] (a department of [facility])    [address] [place] [address] [address]  [place] [address]    Main Phone Number: [phone]    Phone number for scheduling sleep study: [phone]      Please contact the above numbers to schedule your sleep study.     Once you have completed the FIRST NIGHT you may be asked to return for a SECOND NIGHT if necessary.   After the SECOND NIGHT the sleep center will order a CPAP/BiPAP machine for you.     An order for the machine will be sent to a durable medical equipment company ([employer]).   The sleep center will provide you with the [employer] companies information.     Once you have your machine please contact our office to schedule a follow up appointment.   Your follow up will be scheduled for a date 30-90 days after you have received your machine.   At that follow up visit we will need to have a compliance download from your DME company.   Please contact your [employer] company to have the compliance download faxed to our office at   [phone] for your follow up appointment.       IF YOU HAVE ANY QUESTIONS ABOUT YOUR SLEEP STUDY   PLEASE CONTACT THE SLEEP CENTER.

## 2024-06-12 NOTE — TELEPHONE ENCOUNTER
Patient called asking if you can send in script for vit d 3. States pharmacy told him his insurance should cover it.

## 2024-06-17 RX ORDER — PNV NO.95/FERROUS FUM/FOLIC AC 28MG-0.8MG
TABLET ORAL
Qty: 30 TABLET | Refills: 5 | Status: SHIPPED | OUTPATIENT
Start: 2024-06-17

## 2024-06-17 RX ORDER — ATORVASTATIN CALCIUM 40 MG/1
TABLET, FILM COATED ORAL
Qty: 30 TABLET | Refills: 5 | Status: SHIPPED | OUTPATIENT
Start: 2024-06-17

## 2024-06-17 NOTE — TELEPHONE ENCOUNTER
Last visit: 05/02/24  Last Med refill: 05/16/24  Does patient have enough medication for 72 hours: Yes    Next Visit Date:  Future Appointments   Date Time Provider Department Center   7/11/2024  2:00 PM Simone Palomo MD [facility]   7/24/2024  7:30 PM ST[facility] SLEEP RM 2 [facility]   7/25/2024  7:30 AM STAZ MSLT [facility]   8/29/2024  4:00 PM Armida Polanco MD [facility]   9/3/2024  2:00 [facility]   10/15/2024  2:40 PM Sivakumar Mathew MD Resp Spec [facility]       Health Maintenance   Topic Date Due    Low dose CT lung screening &/or counseling  Never done    Respiratory Syncytial Virus (RSV) Pregnant or age 60 yrs+ (1 - 1-dose 60+ series) Never done    AAA screen  Never done    Lipids  02/19/2023    Shingles vaccine (2 of 2) 03/13/2023    COVID-19 Vaccine (4 - 2023-24 season) 09/01/2023    Annual Wellness Visit (Medicare Advantage)  01/01/2024    Depression Screen  04/05/2025    Colorectal Cancer Screen  06/16/2025    DTaP/Tdap/Td vaccine (2 - Td or Tdap) 03/23/2030    Flu vaccine  Completed    Pneumococcal 65+ years Vaccine  Completed    Hepatitis C screen  Completed    Hepatitis A vaccine  Aged Out    Hepatitis B vaccine  Aged Out    Hib vaccine  Aged Out    Polio vaccine  Aged Out    Meningococcal (ACWY) vaccine  Aged Out    A1C test (Diabetic or Prediabetic)  Discontinued       Hemoglobin A1C (%)   Date Value   03/21/2023 5.4   02/19/2022 6.0   05/05/2020 6.4 (H)             ( goal A1C is < 7)   No components found for: \"LABMICR\"  No components found for: \"LDLCHOLESTEROL\", \"LDLCALC\"    (goal LDL is <100)   AST (U/L)   Date Value   11/14/2022 28     ALT (U/L)   Date Value   11/14/2022 15     BUN (mg/dL)   Date Value   12/06/2022 16     BP Readings from Last 3 Encounters:   06/11/24 (!) 143/79   05/02/24 126/72   02/13/24 (!) 151/79          (goal 120/80)    All Future Testing planned in [facility]  Lab Frequency Next Occurrence

## 2024-07-02 DIAGNOSIS — S72.453S SUPRACONDYLAR FRACTURE OF DISTAL END OF FEMUR WITHOUT INTRACONDYLAR EXTENSION, SEQUELA: ICD-10-CM

## 2024-07-02 RX ORDER — HYDROCODONE BITARTRATE AND ACETAMINOPHEN 5; 325 MG/1; MG/1
TABLET ORAL
Qty: 120 TABLET | Refills: 0 | OUTPATIENT
Start: 2024-07-02

## 2024-07-05 ENCOUNTER — TELEPHONE (OUTPATIENT)
Dept: FAMILY MEDICINE CLINIC | Age: 74
End: 2024-07-05

## 2024-07-05 NOTE — TELEPHONE ENCOUNTER
Patient contacted office stating that he is due for his pain medication on 7/7, but his pharmacy does not delivery on Sundays. He stated he normally gets his delivery in the afternoon and he needs the medication for Monday morning.    Patient is asking if our office can contact the pharmacy to ok fill for Saturday 7/6.    Advised patient provider is out of office on leave, and we have to check with covering providers

## 2024-07-31 ENCOUNTER — TELEPHONE (OUTPATIENT)
Dept: PULMONOLOGY | Age: 74
End: 2024-07-31

## 2024-07-31 DIAGNOSIS — G47.419 PRIMARY NARCOLEPSY WITHOUT CATAPLEXY: Primary | ICD-10-CM

## 2024-07-31 NOTE — TELEPHONE ENCOUNTER
Writer received a call from [facility] sleep center.  Patient has went for study twice but has taken his narcolepsy medication each time therefore they could not complete the study.  PSG was completed but not titration or MSLT.  Writer will follow up with Dr. Mathew on if he still needs MSLT, per sleep center they do not feel patient has sleep apnea and only has narcolepsy

## 2024-08-02 DIAGNOSIS — S72.453S SUPRACONDYLAR FRACTURE OF DISTAL END OF FEMUR WITHOUT INTRACONDYLAR EXTENSION, SEQUELA: ICD-10-CM

## 2024-08-02 RX ORDER — HYDROCODONE BITARTRATE AND ACETAMINOPHEN 5; 325 MG/1; MG/1
1 TABLET ORAL EVERY 6 HOURS PRN
Qty: 120 TABLET | Refills: 0 | Status: SHIPPED | OUTPATIENT
Start: 2024-08-02 | End: 2024-09-01

## 2024-08-02 NOTE — TELEPHONE ENCOUNTER
Last visit: 05/02/2024  Last Med refill: 07/06/2024  Does patient have enough medication for 72 hours: Yes    Next Visit Date:  Future Appointments   Date Time Provider Department Center   8/29/2024  4:00 PM Armida Polanco MD [facility]   9/3/2024  2:00 PM Alicia Valencia MD [facility] DEP   9/24/2024 11:00 AM Chilango Monge, APRN - CNP Resp Spec [facility]   1/30/2025  2:00 PM Simone Palomo MD [facility]       Health Maintenance   Topic Date Due    Lung Cancer Screening &/or Counseling  Never done    Respiratory Syncytial Virus (RSV) Pregnant or age 60 yrs+ (1 - 1-dose 60+ series) Never done    AAA screen  Never done    Lipids  02/19/2023    Shingles vaccine (2 of 2) 03/13/2023    COVID-19 Vaccine (4 - 2023-24 season) 09/01/2023    Annual Wellness Visit (Medicare Advantage)  01/01/2024    Flu vaccine (1) 08/01/2024    Depression Screen  04/05/2025    Colorectal Cancer Screen  06/16/2025    DTaP/Tdap/Td vaccine (2 - Td or Tdap) 03/23/2030    Pneumococcal 65+ years Vaccine  Completed    Hepatitis C screen  Completed    Hepatitis A vaccine  Aged Out    Hepatitis B vaccine  Aged Out    Hib vaccine  Aged Out    Polio vaccine  Aged Out    Meningococcal (ACWY) vaccine  Aged Out    A1C test (Diabetic or Prediabetic)  Discontinued       Hemoglobin A1C (%)   Date Value   03/21/2023 5.4   02/19/2022 6.0   05/05/2020 6.4 (H)             ( goal A1C is < 7)   No components found for: \"LABMICR\"  No components found for: \"LDLCHOLESTEROL\", \"LDLCALC\"    (goal LDL is <100)   AST (U/L)   Date Value   11/14/2022 28     ALT (U/L)   Date Value   11/14/2022 15     BUN (mg/dL)   Date Value   12/06/2022 16     BP Readings from Last 3 Encounters:   07/11/24 (!) 150/72   06/11/24 (!) 143/79   05/02/24 126/72          (goal 120/80)    All Future Testing planned in [facility]  Lab Frequency Next Occurrence   Nuclear stress test with myocardial perfusion Once 08/11/2023   Drug Screen, Pain Once 09/06/2023

## 2024-08-05 DIAGNOSIS — N39.3 MALE URINARY STRESS INCONTINENCE: ICD-10-CM

## 2024-08-05 DIAGNOSIS — S72.453S SUPRACONDYLAR FRACTURE OF DISTAL END OF FEMUR WITHOUT INTRACONDYLAR EXTENSION, SEQUELA: Primary | ICD-10-CM

## 2024-08-05 DIAGNOSIS — M24.561 FLEXION CONTRACTURE OF RIGHT KNEE: ICD-10-CM

## 2024-08-05 RX ORDER — MEDICAL SUPPLY, MISCELLANEOUS
EACH MISCELLANEOUS
Qty: 150 EACH | Refills: 5 | Status: SHIPPED | OUTPATIENT
Start: 2024-08-05

## 2024-08-05 NOTE — TELEPHONE ENCOUNTER
Patient is requesting an order for Adult wipes, large size gloves and male urinals (allowed 2 a month).  I spoke with Sari from "[employer] in regards to this request.  Sari states will not know if able to mail supplies without an order and insurance information.    "[employer]  Ph: [phone]  FX: [phone]

## 2024-08-06 NOTE — TELEPHONE ENCOUNTER
Per Dr. Mathew, Patient needs MSLT study done in order for him to manage any narcolepsy medications.  Patient recommended to be off medication for one week prior.  Order placed.  Please review and sign if accepted.  Writer will call patient to let him know

## 2024-08-19 RX ORDER — POTASSIUM CHLORIDE 20 MEQ/1
TABLET, EXTENDED RELEASE ORAL
Qty: 30 TABLET | Refills: 5 | Status: SHIPPED | OUTPATIENT
Start: 2024-08-19

## 2024-08-19 RX ORDER — AMIODARONE HYDROCHLORIDE 200 MG/1
TABLET ORAL
Qty: 30 TABLET | Refills: 5 | Status: SHIPPED | OUTPATIENT
Start: 2024-08-19

## 2024-08-19 RX ORDER — FOLIC ACID 1 MG/1
TABLET ORAL
Qty: 30 TABLET | Refills: 5 | Status: SHIPPED | OUTPATIENT
Start: 2024-08-19

## 2024-08-19 RX ORDER — LEVOTHYROXINE SODIUM 0.05 MG/1
TABLET ORAL
Qty: 30 TABLET | Refills: 5 | Status: SHIPPED | OUTPATIENT
Start: 2024-08-19

## 2024-08-19 NOTE — TELEPHONE ENCOUNTER
Last visit: 5/02/2024  Last Med refill: 07/17/2024  Does patient have enough medication for 72 hours: No:     Next Visit Date:  Future Appointments   Date Time Provider Department Center   8/29/2024  4:00 PM Armida Polanco MD [facility]   9/3/2024  2:00 PM Alicia Valencia MD [facility] ECC DEP   9/24/2024 11:00 AM Chilango Monge, APRN - CNP Resp Spec [facility]   1/30/2025  2:00 PM Simone Palomo MD [facility]       Health Maintenance   Topic Date Due    Lung Cancer Screening &/or Counseling  Never done    AAA screen  Never done    Lipids  02/19/2023    COVID-19 Vaccine (4 - 2023-24 season) 09/01/2023    Annual Wellness Visit (Medicare Advantage)  01/01/2024    Flu vaccine (1) 10/05/2024 (Originally 8/1/2024)    Shingles vaccine (2 of 2) 08/05/2025 (Originally 3/13/2023)    Respiratory Syncytial Virus (RSV) Pregnant or age 60 yrs+ (1 - 1-dose 60+ series) 08/05/2025 (Originally 1/7/2012)    Depression Screen  04/05/2025    Colorectal Cancer Screen  06/16/2025    DTaP/Tdap/Td vaccine (2 - Td or Tdap) 03/23/2030    Pneumococcal 65+ years Vaccine  Completed    Hepatitis C screen  Completed    Hepatitis A vaccine  Aged Out    Hepatitis B vaccine  Aged Out    Hib vaccine  Aged Out    Polio vaccine  Aged Out    Meningococcal (ACWY) vaccine  Aged Out    A1C test (Diabetic or Prediabetic)  Discontinued       Hemoglobin A1C (%)   Date Value   03/21/2023 5.4   02/19/2022 6.0   05/05/2020 6.4 (H)             ( goal A1C is < 7)   No components found for: \"LABMICR\"  No components found for: \"LDLCHOLESTEROL\", \"LDLCALC\"    (goal LDL is <100)   AST (U/L)   Date Value   11/14/2022 28     ALT (U/L)   Date Value   11/14/2022 15     BUN (mg/dL)   Date Value   12/06/2022 16     BP Readings from Last 3 Encounters:   07/11/24 (!) 150/72   06/11/24 (!) 143/79   05/02/24 126/72          (goal 120/80)    All Future Testing planned in [facility]  Lab Frequency Next Occurrence   Drug Screen, Pain Once

## 2024-08-20 DIAGNOSIS — G47.429 NARCOLEPSY DUE TO UNDERLYING CONDITION WITHOUT CATAPLEXY: ICD-10-CM

## 2024-08-20 DIAGNOSIS — G47.33 OSA (OBSTRUCTIVE SLEEP APNEA): Primary | ICD-10-CM

## 2024-08-26 ENCOUNTER — TELEPHONE (OUTPATIENT)
Dept: FAMILY MEDICINE CLINIC | Age: 74
End: 2024-08-26

## 2024-08-26 DIAGNOSIS — S72.453S SUPRACONDYLAR FRACTURE OF DISTAL END OF FEMUR WITHOUT INTRACONDYLAR EXTENSION, SEQUELA: ICD-10-CM

## 2024-08-26 RX ORDER — HYDROCODONE BITARTRATE AND ACETAMINOPHEN 5; 325 MG/1; MG/1
1 TABLET ORAL EVERY 6 HOURS PRN
Qty: 120 TABLET | Refills: 0 | Status: SHIPPED | OUTPATIENT
Start: 2024-09-01 | End: 2024-08-26 | Stop reason: SDUPTHER

## 2024-08-26 RX ORDER — HYDROCODONE BITARTRATE AND ACETAMINOPHEN 5; 325 MG/1; MG/1
1 TABLET ORAL EVERY 6 HOURS PRN
Qty: 120 TABLET | Refills: 0 | Status: SHIPPED | OUTPATIENT
Start: 2024-08-31 | End: 2024-09-30

## 2024-08-26 NOTE — TELEPHONE ENCOUNTER
Patient did NOT call YET. The script is to be filled 09/01/24. That is a Sunday and Monday is labor day so the pharmacy is closed. I did call to confirm. The last day it was filled was 08/02/24

## 2024-08-26 NOTE — TELEPHONE ENCOUNTER
[facility] called requesting if you can change date on Norco to 8/31/24 because they are closed on 9/1 and on Monday for labor day and resend please.

## 2024-09-03 ENCOUNTER — OFFICE VISIT (OUTPATIENT)
Dept: FAMILY MEDICINE CLINIC | Age: 74
End: 2024-09-03
Payer: COMMERCIAL

## 2024-09-03 VITALS — DIASTOLIC BLOOD PRESSURE: 84 MMHG | SYSTOLIC BLOOD PRESSURE: 130 MMHG | HEART RATE: 65 BPM | OXYGEN SATURATION: 99 %

## 2024-09-03 DIAGNOSIS — I50.22 CHRONIC SYSTOLIC (CONGESTIVE) HEART FAILURE (HCC): ICD-10-CM

## 2024-09-03 DIAGNOSIS — L85.3 DRY SKIN DERMATITIS: ICD-10-CM

## 2024-09-03 DIAGNOSIS — M24.561 FLEXION CONTRACTURE OF RIGHT KNEE: ICD-10-CM

## 2024-09-03 DIAGNOSIS — Z91.89 COMPLIANCE WITH MEDICATION REGIMEN: Primary | ICD-10-CM

## 2024-09-03 DIAGNOSIS — F11.20 OPIOID DEPENDENCE WITH CURRENT USE (HCC): ICD-10-CM

## 2024-09-03 DIAGNOSIS — F03.918 DEMENTIA WITH BEHAVIORAL DISTURBANCE (HCC): ICD-10-CM

## 2024-09-03 DIAGNOSIS — J30.1 SEASONAL ALLERGIC RHINITIS DUE TO POLLEN: ICD-10-CM

## 2024-09-03 DIAGNOSIS — I42.9 CARDIOMYOPATHY, UNSPECIFIED TYPE (HCC): ICD-10-CM

## 2024-09-03 DIAGNOSIS — S72.453S SUPRACONDYLAR FRACTURE OF DISTAL END OF FEMUR WITHOUT INTRACONDYLAR EXTENSION, SEQUELA: ICD-10-CM

## 2024-09-03 DIAGNOSIS — D50.9 IRON DEFICIENCY ANEMIA, UNSPECIFIED IRON DEFICIENCY ANEMIA TYPE: ICD-10-CM

## 2024-09-03 DIAGNOSIS — I10 PRIMARY HYPERTENSION: Primary | ICD-10-CM

## 2024-09-03 DIAGNOSIS — J44.9 CHRONIC OBSTRUCTIVE PULMONARY DISEASE, UNSPECIFIED COPD TYPE (HCC): ICD-10-CM

## 2024-09-03 PROCEDURE — 3075F SYST BP GE 130 - 139MM HG: CPT | Performed by: INTERNAL MEDICINE

## 2024-09-03 PROCEDURE — 1123F ACP DISCUSS/DSCN MKR DOCD: CPT | Performed by: INTERNAL MEDICINE

## 2024-09-03 PROCEDURE — 3079F DIAST BP 80-89 MM HG: CPT | Performed by: INTERNAL MEDICINE

## 2024-09-03 PROCEDURE — 99214 OFFICE O/P EST MOD 30 MIN: CPT | Performed by: INTERNAL MEDICINE

## 2024-09-03 RX ORDER — FERROUS SULFATE 325(65) MG
325 TABLET ORAL
Qty: 30 TABLET | Refills: 5 | Status: SHIPPED | OUTPATIENT
Start: 2024-09-03

## 2024-09-03 RX ORDER — FLUTICASONE PROPIONATE 50 MCG
2 SPRAY, SUSPENSION (ML) NASAL DAILY
Qty: 48 G | Refills: 1 | Status: SHIPPED | OUTPATIENT
Start: 2024-09-03

## 2024-09-03 RX ORDER — PRAMOXINE HYDROCHLORIDE 10 MG/ML
LOTION TOPICAL
Qty: 1 EACH | Refills: 5 | Status: SHIPPED | OUTPATIENT
Start: 2024-09-03

## 2024-09-03 RX ORDER — LANOLIN ALCOHOL/MO/W.PET/CERES
CREAM (GRAM) TOPICAL
Qty: 454 G | Refills: 10 | Status: SHIPPED | OUTPATIENT
Start: 2024-09-03

## 2024-09-03 ASSESSMENT — ENCOUNTER SYMPTOMS
CHOKING: 0
WHEEZING: 0
DIARRHEA: 0
CONSTIPATION: 0
ABDOMINAL PAIN: 0
ANAL BLEEDING: 0
CHEST TIGHTNESS: 0
BLOOD IN STOOL: 0
VOMITING: 0
NAUSEA: 0
COUGH: 0
SHORTNESS OF BREATH: 0

## 2024-09-03 ASSESSMENT — VISUAL ACUITY: OU: 1

## 2024-09-03 NOTE — PROGRESS NOTES
Age of Onset    Heart Disease Mother         heart attack    Heart Disease Father        Social History     Tobacco Use    Smoking status: Every Day     Current packs/day: 1.00     Average packs/day: 1 pack/day for 38.0 years (38.0 ttl pk-yrs)     Types: Cigarettes    Smokeless tobacco: Never    Tobacco comments:     7/6/23 patient smokes pack/week - 12/6/23 - smoking 5 cig/day, 2/13/24 Smokes 1-1.5  cigs a week   Substance Use Topics    Alcohol use: Yes     Comment: 1-2 beers occasionally, less than weekly        Allergies   Allergen Reactions    Ambien [Zolpidem] Hallucinations    Motrin [Ibuprofen Micronized]      Pt states he had blood in stool from motrin     Prior to Visit Medications    Medication Sig Taking? Authorizing Provider   HYDROcodone-acetaminophen (NORCO) 5-325 MG per tablet Take 1 tablet by mouth every 6 hours as needed for Pain for up to 30 days. Intended supply: 30 days Max Daily Amount: 4 tablets Yes Alicia Valencia MD   hydrocortisone 2.5 % ointment APPLY TOPICALLY TWICE A DAY TO AFFECTED AREA(S) Yes Alicia Valencia MD   potassium chloride (KLOR-CON M) 20 MEQ extended release tablet TAKE 1 TAB BY MOUTH ONCE EVERY DAY (HYPOKALEMIA) Yes Alicia Valencia MD   amiodarone (CORDARONE) 200 MG tablet TAKE 1 TAB BY MOUTH DAILY AT BEDTIME Yes Alicia Valencia MD   folic acid (FOLVITE) 1 MG tablet TAKE 1 TAB BY MOUTH ONCE EVERY DAY Yes Alicia Valencia MD   levothyroxine (SYNTHROID) 50 MCG tablet TAKE 1 TAB BY MOUTH ONCE EVERY DAY Yes Alicia Valencia MD   Incontinence Supply Disposable (COTTONELLE FRESH MOIST WIPES) MISC Use 1-2 wipes 3-4 times daily as needed Yes Alicia Valencia MD   Disposable Gloves (LATEX GLOVES LARGE) MISC Use one pair 3-4 times daily as needed Yes Alicia Valencia MD   Incontinence Supplies (MALE URINAL) MISC Use as needed to void Yes Alicia Valencia MD   atorvastatin (LIPITOR) 40 MG tablet TAKE 1 TAB BY MOUTH DAILY AT BEDTIME (CHOLESTEROL) Yes Erik

## 2024-09-03 NOTE — PATIENT INSTRUCTIONS
Please remember to come in fasting for your bloodwork at your visit with me in December.   Stop taking the naproxen

## 2024-09-05 ENCOUNTER — TELEPHONE (OUTPATIENT)
Dept: FAMILY MEDICINE CLINIC | Age: 74
End: 2024-09-05

## 2024-09-05 NOTE — TELEPHONE ENCOUNTER
Received call from Dr. Cee Cunha's office asking for a new referral. States they never received the previous one.  Previous referral faxed.

## 2024-09-07 DIAGNOSIS — J44.9 CHRONIC OBSTRUCTIVE PULMONARY DISEASE, UNSPECIFIED COPD TYPE (HCC): ICD-10-CM

## 2024-09-10 RX ORDER — TIOTROPIUM BROMIDE INHALATION SPRAY 3.12 UG/1
SPRAY, METERED RESPIRATORY (INHALATION)
Qty: 4 G | Refills: 4 | Status: SHIPPED | OUTPATIENT
Start: 2024-09-10

## 2024-09-10 RX ORDER — ALBUTEROL SULFATE 90 UG/1
2 AEROSOL, METERED RESPIRATORY (INHALATION) EVERY 6 HOURS PRN
Qty: 18 G | Refills: 3 | Status: SHIPPED | OUTPATIENT
Start: 2024-09-10

## 2024-09-13 ENCOUNTER — TELEPHONE (OUTPATIENT)
Dept: FAMILY MEDICINE CLINIC | Age: 74
End: 2024-09-13

## 2024-09-16 RX ORDER — GUAIFENESIN 600 MG/1
TABLET, EXTENDED RELEASE ORAL
Qty: 120 TABLET | Refills: 5 | Status: SHIPPED | OUTPATIENT
Start: 2024-09-16

## 2024-09-23 RX ORDER — HYDROCORTISONE 25 MG/G
OINTMENT TOPICAL
Qty: 85.05 G | Refills: 2 | Status: SHIPPED | OUTPATIENT
Start: 2024-09-23

## 2024-09-24 ENCOUNTER — HOSPITAL ENCOUNTER (OUTPATIENT)
Dept: SLEEP CENTER | Age: 74
Discharge: HOME | End: 2024-09-26
Attending: INTERNAL MEDICINE
Payer: COMMERCIAL

## 2024-09-24 VITALS — WEIGHT: 180 LBS | HEIGHT: 70 IN | BODY MASS INDEX: 25.77 KG/M2

## 2024-09-24 DIAGNOSIS — G47.33 OSA (OBSTRUCTIVE SLEEP APNEA): ICD-10-CM

## 2024-09-24 PROCEDURE — 95810 POLYSOM 6/> YRS 4/> PARAM: CPT

## 2024-09-25 ENCOUNTER — HOSPITAL ENCOUNTER (OUTPATIENT)
Dept: SLEEP CENTER | Age: 74
Discharge: HOME | End: 2024-09-27
Attending: INTERNAL MEDICINE
Payer: COMMERCIAL

## 2024-09-25 DIAGNOSIS — G47.429 NARCOLEPSY DUE TO UNDERLYING CONDITION WITHOUT CATAPLEXY: ICD-10-CM

## 2024-09-25 PROCEDURE — 95805 MULTIPLE SLEEP LATENCY TEST: CPT

## 2024-09-26 DIAGNOSIS — S72.453S SUPRACONDYLAR FRACTURE OF DISTAL END OF FEMUR WITHOUT INTRACONDYLAR EXTENSION, SEQUELA: ICD-10-CM

## 2024-09-26 NOTE — TELEPHONE ENCOUNTER
Last visit: 9/3/24  Last Med refill: 8/31/24  Does patient have enough medication for 72 hours: Yes    Next Visit Date:  Future Appointments   Date Time Provider Department Center   11/8/2024 11:15 AM Chilango Monge, APRN - CNP Resp Spec [facility]   12/11/2024  2:00 PM [facility]   1/30/2025  2:00 PM Simone Palomo MD [facility]       Health Maintenance   Topic Date Due    Lung Cancer Screening &/or Counseling  Never done    AAA screen  Never done    Lipids  02/19/2023    Annual Wellness Visit (Medicare Advantage)  01/01/2024    Flu vaccine (1) 10/05/2024 (Originally 8/1/2024)    Shingles vaccine (2 of 2) 08/05/2025 (Originally 3/13/2023)    Respiratory Syncytial Virus (RSV) Pregnant or age 60 yrs+ (1 - 1-dose 60+ series) 08/05/2025 (Originally 1/7/2012)    COVID-19 Vaccine (4 - 2023-24 season) 09/03/2025 (Originally 9/1/2024)    Depression Screen  04/05/2025    Colorectal Cancer Screen  06/16/2025    DTaP/Tdap/Td vaccine (2 - Td or Tdap) 03/23/2030    Pneumococcal 65+ years Vaccine  Completed    Hepatitis C screen  Completed    Hepatitis A vaccine  Aged Out    Hepatitis B vaccine  Aged Out    Hib vaccine  Aged Out    Polio vaccine  Aged Out    Meningococcal (ACWY) vaccine  Aged Out    A1C test (Diabetic or Prediabetic)  Discontinued       Hemoglobin A1C (%)   Date Value   03/21/2023 5.4   02/19/2022 6.0   05/05/2020 6.4 (H)             ( goal A1C is < 7)   No components found for: \"LABMICR\"  No components found for: \"LDLCHOLESTEROL\", \"LDLCALC\"    (goal LDL is <100)   AST (U/L)   Date Value   11/14/2022 28     ALT (U/L)   Date Value   11/14/2022 15     BUN (mg/dL)   Date Value   12/06/2022 16     BP Readings from Last 3 Encounters:   09/03/24 130/84   07/11/24 (!) 150/72   06/11/24 (!) 143/79          (goal 120/80)    All Future Testing planned in [facility]  Lab Frequency Next Occurrence   Lipid, Fasting Once 05/02/2024   CBC with Auto Differential Once 05/02/2024

## 2024-09-30 ENCOUNTER — TELEPHONE (OUTPATIENT)
Dept: FAMILY MEDICINE CLINIC | Age: 74
End: 2024-09-30

## 2024-09-30 DIAGNOSIS — S72.453S SUPRACONDYLAR FRACTURE OF DISTAL END OF FEMUR WITHOUT INTRACONDYLAR EXTENSION, SEQUELA: ICD-10-CM

## 2024-09-30 DIAGNOSIS — Z91.89 COMPLIANCE WITH MEDICATION REGIMEN: ICD-10-CM

## 2024-09-30 RX ORDER — HYDROCODONE BITARTRATE AND ACETAMINOPHEN 5; 325 MG/1; MG/1
1 TABLET ORAL EVERY 6 HOURS PRN
Qty: 120 TABLET | Refills: 0 | Status: SHIPPED | OUTPATIENT
Start: 2024-09-30 | End: 2024-09-30 | Stop reason: SDUPTHER

## 2024-09-30 RX ORDER — HYDROCODONE BITARTRATE AND ACETAMINOPHEN 5; 325 MG/1; MG/1
1 TABLET ORAL EVERY 6 HOURS PRN
Qty: 90 TABLET | Refills: 0 | Status: SHIPPED | OUTPATIENT
Start: 2024-09-30 | End: 2024-10-30

## 2024-09-30 NOTE — TELEPHONE ENCOUNTER
Per aditi, please cancel script at this time. Results showed \" no metabolites present for the hydrocodone.\"    Spoke to ed at pharmacy, script cancelled

## 2024-09-30 NOTE — TELEPHONE ENCOUNTER
----- Message from TIFFANIE Chen NP sent at 9/30/2024 10:10 AM EDT -----  Can you see where drug screen results are? Test was done 09/03/2024

## 2024-09-30 NOTE — TELEPHONE ENCOUNTER
Patient was notified of results and message that he will need to establish with pain management if he wants to continue with pain medication. He was informed that PCP will only manage Rx for 90 days starting today with an active wean schedule. Hydrocodone Rx reduced to 90 tablets sent in 09/30/2024  He was notified 2 times during conversation he will need to establish with pain management if he wants to continue on pain med

## 2024-10-01 LAB — STATUS: NORMAL

## 2024-10-02 ENCOUNTER — TELEPHONE (OUTPATIENT)
Dept: PULMONOLOGY | Age: 74
End: 2024-10-02

## 2024-10-02 DIAGNOSIS — J44.9 CHRONIC OBSTRUCTIVE PULMONARY DISEASE, UNSPECIFIED COPD TYPE (HCC): Primary | ICD-10-CM

## 2024-10-02 LAB — STATUS: NORMAL

## 2024-10-02 NOTE — TELEPHONE ENCOUNTER
Per MLST and PSG patient requires 1 L of oxygen at night. Placing order and sending to [facility]. Per Dr. Romero wants to see patient back in a month after being on oxygen. LVM for patient.

## 2024-10-03 ENCOUNTER — TELEPHONE (OUTPATIENT)
Dept: FAMILY MEDICINE CLINIC | Age: 74
End: 2024-10-03

## 2024-10-03 NOTE — TELEPHONE ENCOUNTER
Patient contacted office to inform that after study, he is now needing to be on oxygen at night ( 1 L). Patient stated he is all set with that, just wanted to inform the office.       Patient then stated he believes there was miscommunication or a \"goof up\". He stated he fasted before his appointment so he didn't take his medication that's why he thinks it didn't show. Patient then stated never mind, it doesn't even matter.

## 2024-10-04 ENCOUNTER — TELEPHONE (OUTPATIENT)
Dept: PULMONOLOGY | Age: 74
End: 2024-10-04

## 2024-10-04 NOTE — TELEPHONE ENCOUNTER
A call came in from Ulices who stated the oxygen cannula was falling off last night while he slept which woke him up on 3 occasions. Writer called Karen at [facility] to explain the problem. Karen stated [facility] has ordered a special teleflex flared cannula which will be delivered to the patient hopefully on Monday. Writer informed patient who expressed understanding.

## 2024-10-14 ENCOUNTER — TELEPHONE (OUTPATIENT)
Dept: PULMONOLOGY | Age: 74
End: 2024-10-14

## 2024-10-14 NOTE — TELEPHONE ENCOUNTER
Patient called asking if he would benefit from the RSV injection.  Patient does have a follow up with you 11/8/24  Please advise

## 2024-10-15 DIAGNOSIS — J31.0 CHRONIC RHINITIS: ICD-10-CM

## 2024-10-15 NOTE — TELEPHONE ENCOUNTER
Last visit: 09*03/24  Last Med refill: 09/18/24  Does patient have enough medication for 72 hours: Yes    Next Visit Date:  Future Appointments   Date Time Provider Department Center   11/8/2024 11:15 AM Chilango Monge, APRN - CNP Resp Spec [facility]   11/26/2024  1:00 PM Vadim Leavitt MD [facility]   12/11/2024  2:00 PM Alicia Valencia MD [facility] ECC DEP   1/30/2025  2:00 PM Simone Palomo MD [facility]       Health Maintenance   Topic Date Due    Lung Cancer Screening &/or Counseling  Never done    AAA screen  Never done    Lipids  02/19/2023    Annual Wellness Visit (Medicare Advantage)  01/01/2024    Flu vaccine (1) 08/01/2024    Shingles vaccine (2 of 2) 08/05/2025 (Originally 3/13/2023)    Respiratory Syncytial Virus (RSV) Pregnant or age 60 yrs+ (1 - 1-dose 60+ series) 08/05/2025 (Originally 1/7/2012)    COVID-19 Vaccine (4 - 2023-24 season) 09/03/2025 (Originally 9/1/2024)    Depression Screen  04/05/2025    Colorectal Cancer Screen  06/16/2025    DTaP/Tdap/Td vaccine (2 - Td or Tdap) 03/23/2030    Pneumococcal 65+ years Vaccine  Completed    Hepatitis C screen  Completed    Hepatitis A vaccine  Aged Out    Hepatitis B vaccine  Aged Out    Hib vaccine  Aged Out    Polio vaccine  Aged Out    Meningococcal (ACWY) vaccine  Aged Out    A1C test (Diabetic or Prediabetic)  Discontinued       Hemoglobin A1C (%)   Date Value   03/21/2023 5.4   02/19/2022 6.0   05/05/2020 6.4 (H)             ( goal A1C is < 7)   No components found for: \"LABMICR\"  No components found for: \"LDLCHOLESTEROL\", \"LDLCALC\"    (goal LDL is <100)   AST (U/L)   Date Value   11/14/2022 28     ALT (U/L)   Date Value   11/14/2022 15     BUN (mg/dL)   Date Value   12/06/2022 16     BP Readings from Last 3 Encounters:   09/03/24 130/84   07/11/24 (!) 150/72   06/11/24 (!) 143/79          (goal 120/80)    All Future Testing planned in [facility]  Lab Frequency Next Occurrence   Lipid, Fasting Once 05/02/2024

## 2024-10-16 RX ORDER — LORATADINE 10 MG/1
TABLET ORAL
Qty: 90 TABLET | Refills: 1 | Status: SHIPPED | OUTPATIENT
Start: 2024-10-16

## 2024-10-23 ENCOUNTER — TELEPHONE (OUTPATIENT)
Dept: PULMONOLOGY | Age: 74
End: 2024-10-23

## 2024-10-23 DIAGNOSIS — J44.9 CHRONIC OBSTRUCTIVE PULMONARY DISEASE, UNSPECIFIED COPD TYPE (HCC): Primary | ICD-10-CM

## 2024-10-30 DIAGNOSIS — S72.453S SUPRACONDYLAR FRACTURE OF DISTAL END OF FEMUR WITHOUT INTRACONDYLAR EXTENSION, SEQUELA: ICD-10-CM

## 2024-10-31 RX ORDER — HYDROCODONE BITARTRATE AND ACETAMINOPHEN 5; 325 MG/1; MG/1
1 TABLET ORAL EVERY 8 HOURS PRN
Qty: 60 TABLET | Refills: 0 | Status: SHIPPED | OUTPATIENT
Start: 2024-10-31 | End: 2024-11-30

## 2024-10-31 NOTE — TELEPHONE ENCOUNTER
Cristino Cherry is calling to request a refill on the following medication(s):    Last Visit Date (If Applicable):  9/3/2024    Next Visit Date:    12/11/2024    Medication Request:  Requested Prescriptions     Pending Prescriptions Disp Refills    HYDROcodone-acetaminophen (NORCO) 5-325 MG per tablet [Pharmacy Med Name: HYDROCODONE-ACETAMIN 5-325 MG] 90 tablet 0     Sig: TAKE 1 TAB BY MOUTH EVERY 6 HOURS AS NEEDED (PAIN) FOR UP TO 30 DAYS. MAX 4 TABS/DAY

## 2024-10-31 NOTE — TELEPHONE ENCOUNTER
Weaned down to 60 tabs this month, next month will be last refill of 30 tabs from this office as notified following his UDS.

## 2024-11-03 NOTE — PROGRESS NOTES
at 4:20 PM    Note completed with voice recognition software.  Typographical errors, occasional wrong word or sound alike substitutions may have occurred due to the inherent limitations of voice recognition software.  Read the chart carefully and recognize using context where substitutions may have occurred.  If any questions please do not hesitate to contact me for clarificationDiscussed with the patient the current USPSTF guidelines released March 9, 2021 for screening for lung cancer.    For adults aged 50 to 80 years who have a 20 pack-year smoking history and currently smoke or have quit within the past 15 years the grade B recommendation is to:  Screen for lung cancer with low-dose computed tomography (LDCT) every year.  Stop screening once a person has not smoked for 15 years or has a health problem that limits life expectancy or the ability to have lung surgery.    The patient  reports that he has been smoking cigarettes. He has a 38 pack-year smoking history. He has never used smokeless tobacco.. Discussed with patient the risks and benefits of screening, including over-diagnosis, false positive rate, and total radiation exposure.  The patient currently exhibits no signs or symptoms suggestive of lung cancer.  Discussed with patient the importance of compliance with yearly annual lung cancer screenings and willingness to undergo diagnosis and treatment if screening scan is positive.  In addition, the patient was counseled regarding the importance of remaining smoke free and/or total smoking cessation.    Also reviewed the following if the patient has Medicare that as of February 10, 2022, Medicare only covers LDCT screening in patients aged 50-77 with at least a 20 pack-year smoking history who currently smoke or have quit in the last 15 years

## 2024-11-08 ENCOUNTER — OFFICE VISIT (OUTPATIENT)
Dept: PULMONOLOGY | Age: 74
End: 2024-11-08

## 2024-11-08 VITALS
HEART RATE: 61 BPM | OXYGEN SATURATION: 96 % | SYSTOLIC BLOOD PRESSURE: 139 MMHG | HEIGHT: 70 IN | DIASTOLIC BLOOD PRESSURE: 84 MMHG | RESPIRATION RATE: 12 BRPM | BODY MASS INDEX: 25.77 KG/M2 | WEIGHT: 180 LBS

## 2024-11-08 DIAGNOSIS — Z87.891 PERSONAL HISTORY OF TOBACCO USE: Primary | ICD-10-CM

## 2024-11-08 DIAGNOSIS — J44.9 CHRONIC OBSTRUCTIVE PULMONARY DISEASE, UNSPECIFIED COPD TYPE (HCC): ICD-10-CM

## 2024-11-08 DIAGNOSIS — G47.429 NARCOLEPSY DUE TO UNDERLYING CONDITION WITHOUT CATAPLEXY: ICD-10-CM

## 2024-11-08 ASSESSMENT — ENCOUNTER SYMPTOMS
GASTROINTESTINAL NEGATIVE: 1
EYES NEGATIVE: 1
ALLERGIC/IMMUNOLOGIC NEGATIVE: 1

## 2024-11-13 DIAGNOSIS — Z87.81 HISTORY OF HIP FRACTURE: Primary | ICD-10-CM

## 2024-11-13 RX ORDER — DICLOFENAC 16.05 MG/ML
SOLUTION TOPICAL
Qty: 1 EACH | Refills: 0 | Status: SHIPPED | OUTPATIENT
Start: 2024-11-13

## 2024-11-13 NOTE — TELEPHONE ENCOUNTER
Received a letter in regards to formulary change effective 01/01/25    Covered alternative for:    Diclofen Gel 1% is going to be   Diclofenac Sol 1.5%

## 2024-11-15 DIAGNOSIS — I10 PRIMARY HYPERTENSION: ICD-10-CM

## 2024-11-15 DIAGNOSIS — K59.03 THERAPEUTIC OPIOID-INDUCED CONSTIPATION (OIC): ICD-10-CM

## 2024-11-15 DIAGNOSIS — I42.9 CARDIOMYOPATHY, UNSPECIFIED TYPE (HCC): ICD-10-CM

## 2024-11-15 DIAGNOSIS — T40.2X5A THERAPEUTIC OPIOID-INDUCED CONSTIPATION (OIC): ICD-10-CM

## 2024-11-15 DIAGNOSIS — I50.22 CHRONIC SYSTOLIC (CONGESTIVE) HEART FAILURE (HCC): ICD-10-CM

## 2024-11-15 NOTE — TELEPHONE ENCOUNTER
Last visit: 09/03/2024  Last Med refill: 05/02/2024 06/12/2024 05/02/2024 05/22/2024 05/22/2024 05/22/2024  Does patient have enough medication for 72 hours: No    Next Visit Date:  Future Appointments   Date Time Provider Department Center   11/19/2024  2:50 PM Ubaldo Sethi MD [facility]   11/26/2024  1:00 PM Vadim Leavitt MD [facility]   12/11/2024  2:00 PM Alicia Valencia MD [facility] ECC DEP   1/6/2025  1:30 PM STV CT ROOM 1 STVZ CT SCAN STV Radiolog   1/30/2025  2:00 PM Simone Palomo MD [facility]   5/8/2025 11:20 AM Sivakumar Mathew MD Resp Spec [facility]       Health Maintenance   Topic Date Due    Lung Cancer Screening &/or Counseling  Never done    AAA screen  Never done    Lipids  02/19/2023    Annual Wellness Visit (Medicare Advantage)  01/01/2024    Flu vaccine (1) 08/01/2024    Shingles vaccine (2 of 2) 08/05/2025 (Originally 3/13/2023)    Respiratory Syncytial Virus (RSV) Pregnant or age 60 yrs+ (1 - Risk 60-74 years 1-dose series) 08/05/2025 (Originally 1/7/2012)    COVID-19 Vaccine (4 - 2023-24 season) 09/03/2025 (Originally 9/1/2024)    Depression Screen  04/05/2025    Colorectal Cancer Screen  06/16/2025    DTaP/Tdap/Td vaccine (2 - Td or Tdap) 03/23/2030    Pneumococcal 65+ years Vaccine  Completed    Hepatitis C screen  Completed    Hepatitis A vaccine  Aged Out    Hepatitis B vaccine  Aged Out    Hib vaccine  Aged Out    Polio vaccine  Aged Out    Meningococcal (ACWY) vaccine  Aged Out    A1C test (Diabetic or Prediabetic)  Discontinued       Hemoglobin A1C (%)   Date Value   03/21/2023 5.4   02/19/2022 6.0   05/05/2020 6.4 (H)             ( goal A1C is < 7)   No components found for: \"LABMICR\"  No components found for: \"LDLCHOLESTEROL\", \"LDLCALC\"    (goal LDL is <100)   AST (U/L)   Date Value   11/14/2022 28     ALT (U/L)   Date Value   11/14/2022 15     BUN (mg/dL)   Date Value   12/06/2022 16     BP Readings from Last 3 Encounters:

## 2024-11-18 RX ORDER — DOCUSATE SODIUM 100 MG/1
CAPSULE, LIQUID FILLED ORAL
Qty: 60 CAPSULE | Refills: 5 | Status: SHIPPED | OUTPATIENT
Start: 2024-11-18

## 2024-11-18 RX ORDER — CARVEDILOL 6.25 MG/1
TABLET ORAL
Qty: 60 TABLET | Refills: 5 | Status: SHIPPED | OUTPATIENT
Start: 2024-11-18

## 2024-11-18 RX ORDER — CLOPIDOGREL BISULFATE 75 MG/1
TABLET ORAL
Qty: 30 TABLET | Refills: 5 | Status: SHIPPED | OUTPATIENT
Start: 2024-11-18

## 2024-11-18 RX ORDER — FUROSEMIDE 20 MG/1
TABLET ORAL
Qty: 30 TABLET | Refills: 5 | Status: SHIPPED | OUTPATIENT
Start: 2024-11-18

## 2024-11-18 RX ORDER — MULTIVIT-MIN/FA/LYCOPEN/LUTEIN .4-300-25
TABLET ORAL
Qty: 30 TABLET | Refills: 5 | Status: SHIPPED | OUTPATIENT
Start: 2024-11-18

## 2024-11-19 ENCOUNTER — INITIAL CONSULT (OUTPATIENT)
Dept: PAIN MANAGEMENT | Age: 74
End: 2024-11-19
Payer: COMMERCIAL

## 2024-11-19 VITALS — BODY MASS INDEX: 25.77 KG/M2 | WEIGHT: 180 LBS | HEIGHT: 70 IN

## 2024-11-19 DIAGNOSIS — R69 SEVERE COMORBID ILLNESS: ICD-10-CM

## 2024-11-19 DIAGNOSIS — Z79.891 CHRONIC PRESCRIPTION OPIATE USE: ICD-10-CM

## 2024-11-19 DIAGNOSIS — G89.4 CHRONIC PAIN SYNDROME: Primary | ICD-10-CM

## 2024-11-19 DIAGNOSIS — F10.10 ALCOHOL ABUSE: ICD-10-CM

## 2024-11-19 DIAGNOSIS — Z79.02 LONG TERM (CURRENT) USE OF ANTITHROMBOTICS/ANTIPLATELETS: ICD-10-CM

## 2024-11-19 PROCEDURE — 99203 OFFICE O/P NEW LOW 30 MIN: CPT | Performed by: ANESTHESIOLOGY

## 2024-11-19 PROCEDURE — 1123F ACP DISCUSS/DSCN MKR DOCD: CPT | Performed by: ANESTHESIOLOGY

## 2024-11-19 ASSESSMENT — ENCOUNTER SYMPTOMS
RESPIRATORY NEGATIVE: 1
CHEST TIGHTNESS: 0
SORE THROAT: 0
GASTROINTESTINAL NEGATIVE: 1
VOMITING: 0
NAUSEA: 0
SHORTNESS OF BREATH: 0
CONSTIPATION: 0
DIARRHEA: 0

## 2024-11-19 NOTE — PROGRESS NOTES
The patient is a 74 y.o.Declined male.    Chief Complaint   Patient presents with    New Patient    Leg Pain          Pain History  This is a 74-year-old gentleman with multiple major comorbidities  Coronary artery disease on long-term antiplatelet therapy with Plavix, status post pacemaker implant  Narcolepsy and obstructive sleep apnea  Had chronic pain involving multiple body sites  Prescribed Norco by primary care physician  Failed recent urine toxicology, tested positive for high level of alcohol  Referred to our clinic for recommendation regarding medication management  High risk of respiratory depression with combination of opioids and alcohol  Not a candidate for any interventional procedure  Will defer decision on for continuation of opioid to the prescribing physician, in my opinion it should be linked with complete abstinence from alcohol    Pain score today: 7  1. Location: RT leg  2. Radiation: hip  3. Character: \"just constant\"  5. Duration: doesn't stop   6. Onset: few years  7. Did an injury cause pain: broken bone  8. Aggravating factors: movement, sitting   9. Alleviating factors: norco, Voltaren lotion   10. Associated symptoms (numbness / tingling / weakness): weakness  -Where at: RT leg  -Down into finger tips or toes (specify which finger or toes): toes  -constant or intermitting:  constant  11. Red Flags: (weight loss / chills / loss of bladder or bowel control): no      Previous management history  1. Previous diagnostic workup: (Imaging/EMG)   CT, MRI, or Xray: Xray  What part of the body: hip/femur  What facility did they have it at: [facility]  What year or specific date: 09/20/2023  EMG:  no     2. Previous non interventional treatments tried:  chiropractor or physical therapy: PT  What part of the body: leg  What facility was it done at: [facility]  How long ago was it last tried: 2024- couple of months ago   Did it work: no- hit a plateau   Did they complete it: no      3. Previous Medications

## 2024-11-20 ENCOUNTER — TELEPHONE (OUTPATIENT)
Dept: FAMILY MEDICINE CLINIC | Age: 74
End: 2024-11-20

## 2024-11-20 NOTE — TELEPHONE ENCOUNTER
Pt called and states that he went and saw pain management yesterday and was told he is not a candidate for medication with their office and they were sending a letter to Dr. Valencia. I explained to patient due to a inconsistent drug screen, pt cut me off and stated \"its not inconsistent I drink alcohol and take my pills.\" I again tried to explain to patient that he was to est care with PM for medication management pt states \" that is to far to be going once a month its all the way out on [place].\" Pt started to get loud and yell \"you are not understanding.\" I placed the patient on hold and transferred to [place].

## 2024-12-10 RX ORDER — PNV NO.95/FERROUS FUM/FOLIC AC 28MG-0.8MG
TABLET ORAL
Qty: 30 TABLET | Refills: 6 | OUTPATIENT
Start: 2024-12-10

## 2024-12-10 RX ORDER — ATORVASTATIN CALCIUM 40 MG/1
TABLET, FILM COATED ORAL
Qty: 30 TABLET | Refills: 6 | OUTPATIENT
Start: 2024-12-10

## 2024-12-11 NOTE — TELEPHONE ENCOUNTER
Last visit: 09/03/2024  Last Med refill: 11/15/2024  Does patient have enough medication for 72 hours: No:     Next Visit Date:  Future Appointments   Date Time Provider Department Center   12/11/2024  2:00 PM Alicia Valencia MD [facility] ECC DEP   1/6/2025  1:30 PM STV CT ROOM 1 STVZ CT SCAN STV Radiolog   1/30/2025  2:00 PM Simone Palomo MD [facility]   2/26/2025  2:00 PM Vadim Leavitt MD [facility]   5/8/2025 11:20 AM Sivakumar Mathew MD Resp Spec [facility]       Health Maintenance   Topic Date Due    Lung Cancer Screening &/or Counseling  Never done    AAA screen  Never done    Lipids  02/19/2023    Flu vaccine (1) 08/01/2024    Shingles vaccine (2 of 2) 08/05/2025 (Originally 3/13/2023)    Respiratory Syncytial Virus (RSV) Pregnant or age 60 yrs+ (1 - Risk 60-74 years 1-dose series) 08/05/2025 (Originally 1/7/2012)    COVID-19 Vaccine (4 - 2023-24 season) 09/03/2025 (Originally 9/1/2024)    Depression Screen  04/05/2025    Colorectal Cancer Screen  06/16/2025    DTaP/Tdap/Td vaccine (2 - Td or Tdap) 03/23/2030    Pneumococcal 65+ years Vaccine  Completed    Hepatitis C screen  Completed    Hepatitis A vaccine  Aged Out    Hepatitis B vaccine  Aged Out    Hib vaccine  Aged Out    Polio vaccine  Aged Out    Meningococcal (ACWY) vaccine  Aged Out    A1C test (Diabetic or Prediabetic)  Discontinued       Hemoglobin A1C (%)   Date Value   03/21/2023 5.4   02/19/2022 6.0   05/05/2020 6.4 (H)             ( goal A1C is < 7)   No components found for: \"LABMICR\"  No components found for: \"LDLCHOLESTEROL\", \"LDLCALC\"    (goal LDL is <100)   AST (U/L)   Date Value   11/14/2022 28     ALT (U/L)   Date Value   11/14/2022 15     BUN (mg/dL)   Date Value   12/06/2022 16     BP Readings from Last 3 Encounters:   11/08/24 139/84   09/03/24 130/84   07/11/24 (!) 150/72          (goal 120/80)    All Future Testing planned in [facility]  Lab Frequency Next Occurrence   Lipid, Fasting Once

## 2024-12-12 RX ORDER — PNV NO.95/FERROUS FUM/FOLIC AC 28MG-0.8MG
TABLET ORAL
Qty: 30 TABLET | Refills: 6 | Status: SHIPPED | OUTPATIENT
Start: 2024-12-12

## 2024-12-12 RX ORDER — ATORVASTATIN CALCIUM 40 MG/1
TABLET, FILM COATED ORAL
Qty: 30 TABLET | Refills: 6 | Status: SHIPPED | OUTPATIENT
Start: 2024-12-12

## 2024-12-16 ENCOUNTER — TELEPHONE (OUTPATIENT)
Dept: FAMILY MEDICINE CLINIC | Age: 74
End: 2024-12-16

## 2024-12-16 RX ORDER — POTASSIUM CHLORIDE 750 MG/1
20 TABLET, EXTENDED RELEASE ORAL DAILY
Qty: 60 TABLET | Refills: 3 | Status: SHIPPED | OUTPATIENT
Start: 2024-12-16

## 2024-12-16 NOTE — TELEPHONE ENCOUNTER
Pt. Called asking could you send his potasium pills in  two 10 mg because he is unable to swallow the 20 meq tablet. Pt. States he want It sent to [facility] pharmacy.

## 2025-01-04 DIAGNOSIS — J44.9 CHRONIC OBSTRUCTIVE PULMONARY DISEASE, UNSPECIFIED COPD TYPE (HCC): ICD-10-CM

## 2025-01-06 RX ORDER — ALBUTEROL SULFATE 90 UG/1
AEROSOL, METERED RESPIRATORY (INHALATION)
Qty: 1 EACH | Refills: 4 | Status: SHIPPED | OUTPATIENT
Start: 2025-01-06

## 2025-01-06 NOTE — TELEPHONE ENCOUNTER
LAST VISIT: 11/8/24 with CAROL Monge  NEXT VISIT: 5/8/25 with Dr Mathew    Per last dictation patient to continue albuterol HFA. Please sign for refill if ok. Thank you.

## 2025-01-07 ENCOUNTER — TELEPHONE (OUTPATIENT)
Dept: PULMONOLOGY | Age: 75
End: 2025-01-07

## 2025-01-07 DIAGNOSIS — J44.9 CHRONIC OBSTRUCTIVE PULMONARY DISEASE, UNSPECIFIED COPD TYPE (HCC): Primary | ICD-10-CM

## 2025-01-08 ENCOUNTER — TELEPHONE (OUTPATIENT)
Dept: FAMILY MEDICINE CLINIC | Age: 75
End: 2025-01-08

## 2025-01-08 DIAGNOSIS — N39.3 MALE URINARY STRESS INCONTINENCE: ICD-10-CM

## 2025-01-08 DIAGNOSIS — M24.561 FLEXION CONTRACTURE OF RIGHT KNEE: ICD-10-CM

## 2025-01-08 DIAGNOSIS — M21.969 ACQUIRED FOOT DEFORMITY, UNSPECIFIED LATERALITY: Primary | ICD-10-CM

## 2025-01-08 DIAGNOSIS — S72.453S SUPRACONDYLAR FRACTURE OF DISTAL END OF FEMUR WITHOUT INTRACONDYLAR EXTENSION, SEQUELA: ICD-10-CM

## 2025-01-08 RX ORDER — MEDICAL SUPPLY, MISCELLANEOUS
EACH MISCELLANEOUS
Qty: 150 EACH | Refills: 5 | Status: SHIPPED | OUTPATIENT
Start: 2025-01-08

## 2025-01-08 NOTE — TELEPHONE ENCOUNTER
Spoke with pt in regards to new company for incontinence supplies. Pt states the new company is [employer] fax: [phone].     Pt also states the podiatrist Cee Cunha-needs a new referral every couple of months per insurance.

## 2025-01-31 ENCOUNTER — TELEPHONE (OUTPATIENT)
Dept: FAMILY MEDICINE CLINIC | Age: 75
End: 2025-01-31

## 2025-01-31 NOTE — TELEPHONE ENCOUNTER
Patient contacted office. He stated his Incontinence Supplies is no longer covered under old company. He stated our office should be received a fax from [facility]

## 2025-02-16 NOTE — TELEPHONE ENCOUNTER
Last visit: 9/03/2024  Last Med refill: 01/17/2025  Does patient have enough medication for 72 hours: No:     Next Visit Date:  Future Appointments   Date Time Provider Department Center   2/19/2025  1:00 PM STV CT ROOM 1 STVZ CT SCAN STV Radiolog   2/26/2025  2:00 PM Vadim Leavitt MD [facility]ha [facility]   5/8/2025 11:20 AM Sivakumar Mathew MD Resp Spec [facility]   8/14/2025  1:00 PM Simone Palomo MD [facility]       Health Maintenance   Topic Date Due    Lung Cancer Screening &/or Counseling  Never done    AAA screen  Never done    Lipids  02/19/2023    Flu vaccine (1) 08/01/2024    Shingles vaccine (2 of 2) 08/05/2025 (Originally 3/13/2023)    Respiratory Syncytial Virus (RSV) Pregnant or age 60 yrs+ (1 - Risk 60-74 years 1-dose series) 08/05/2025 (Originally 1/7/2012)    COVID-19 Vaccine (4 - 2024-25 season) 09/03/2025 (Originally 9/1/2024)    Depression Screen  04/05/2025    Colorectal Cancer Screen  06/16/2025    DTaP/Tdap/Td vaccine (2 - Td or Tdap) 03/23/2030    Pneumococcal 50+ years Vaccine  Completed    Hepatitis C screen  Completed    Hepatitis A vaccine  Aged Out    Hepatitis B vaccine  Aged Out    Hib vaccine  Aged Out    Polio vaccine  Aged Out    Meningococcal (ACWY) vaccine  Aged Out    A1C test (Diabetic or Prediabetic)  Discontinued       Hemoglobin A1C (%)   Date Value   03/21/2023 5.4   02/19/2022 6.0   05/05/2020 6.4 (H)             ( goal A1C is < 7)   No components found for: \"LABMICR\"  No components found for: \"LDLCHOLESTEROL\", \"LDLCALC\"    (goal LDL is <100)   AST (U/L)   Date Value   11/14/2022 28     ALT (U/L)   Date Value   11/14/2022 15     BUN (mg/dL)   Date Value   12/06/2022 16     BP Readings from Last 3 Encounters:   01/30/25 120/64   11/08/24 139/84   09/03/24 130/84          (goal 120/80)    All Future Testing planned in [facility]  Lab Frequency Next Occurrence   Lipid, Fasting Once 05/02/2024   CBC with Auto Differential Once 05/02/2024   Comprehensive

## 2025-02-17 RX ORDER — AMIODARONE HYDROCHLORIDE 200 MG/1
TABLET ORAL
Qty: 30 TABLET | Refills: 5 | Status: SHIPPED | OUTPATIENT
Start: 2025-02-17

## 2025-02-17 RX ORDER — LEVOTHYROXINE SODIUM 50 UG/1
TABLET ORAL
Qty: 30 TABLET | Refills: 5 | Status: SHIPPED | OUTPATIENT
Start: 2025-02-17

## 2025-02-17 RX ORDER — FOLIC ACID 1 MG/1
TABLET ORAL
Qty: 30 TABLET | Refills: 5 | Status: SHIPPED | OUTPATIENT
Start: 2025-02-17

## 2025-02-24 ENCOUNTER — TELEPHONE (OUTPATIENT)
Dept: ADMINISTRATIVE | Age: 75
End: 2025-02-24

## 2025-02-24 DIAGNOSIS — J44.9 CHRONIC OBSTRUCTIVE PULMONARY DISEASE, UNSPECIFIED COPD TYPE (HCC): ICD-10-CM

## 2025-02-24 RX ORDER — ALBUTEROL SULFATE 90 UG/1
INHALANT RESPIRATORY (INHALATION)
Qty: 54 G | Refills: 0 | OUTPATIENT
Start: 2025-02-24

## 2025-02-24 RX ORDER — ALBUTEROL SULFATE 90 UG/1
2 AEROSOL, METERED RESPIRATORY (INHALATION) 4 TIMES DAILY PRN
Qty: 1 EACH | Refills: 4 | OUTPATIENT
Start: 2025-02-24

## 2025-02-24 NOTE — TELEPHONE ENCOUNTER
Last appointment: 11//24  Next appointment: 5/8/25  Last fill date: 1/6/25    Requested Prescriptions     Pending Prescriptions Disp Refills    VENTOLIN  (90 Base) MCG/ACT inhaler 1 each 4     Sig: Inhale 2 puffs into the lungs 4 times daily as needed for Wheezing       Writer refused requested prescription. Medication ordered 1/6/25 with 4 refills. Patient should contact his pharmacy.

## 2025-02-24 NOTE — TELEPHONE ENCOUNTER
Patient needs a refill on the following medications  VENTOLIN  (90 Base) MCG/ACT inhaler     [facility] is [facility]

## 2025-03-14 DIAGNOSIS — D50.9 IRON DEFICIENCY ANEMIA, UNSPECIFIED IRON DEFICIENCY ANEMIA TYPE: ICD-10-CM

## 2025-03-17 RX ORDER — GUAIFENESIN 600 MG/1
TABLET, EXTENDED RELEASE ORAL
Qty: 120 TABLET | Refills: 6 | Status: SHIPPED | OUTPATIENT
Start: 2025-03-17

## 2025-03-17 RX ORDER — POTASSIUM CHLORIDE 750 MG/1
TABLET, EXTENDED RELEASE ORAL
Qty: 60 TABLET | Refills: 6 | Status: SHIPPED | OUTPATIENT
Start: 2025-03-17

## 2025-03-17 RX ORDER — FERROUS SULFATE 325(65) MG
TABLET ORAL
Qty: 30 TABLET | Refills: 6 | Status: SHIPPED | OUTPATIENT
Start: 2025-03-17

## 2025-03-17 NOTE — TELEPHONE ENCOUNTER
Differential Once 05/02/2024   Comprehensive Metabolic Panel Once 05/02/2024   PSA Screening Once 05/02/2024   TSH With Reflex Ft4 Once 05/02/2024   CT Lung Screen (Initial/Annual/Baseline) Once 11/08/2024               Patient Active Problem List:     Meniere's disease     Narcolepsy     PND (post-nasal drip)     Transaminasemia     Cardiomyopathy (HCC)     HTN (hypertension)     Chronic obstructive pulmonary disease (HCC)     Dementia with behavioral disturbance (HCC)     S/p bare metal coronary artery stent     Supracondylar fracture of distal end of femur without intracondylar extension, sequela     Closed fracture of right distal femur (HCC)     Presence of permanent cardiac pacemaker     Anemia, normocytic normochromic     History of hip fracture     Atelectasis     Chronic systolic (congestive) heart failure     Opioid dependence with current use (HCC)     Flexion contracture of right knee     Xeroderma     Chronic prescription opiate use     Long term (current) use of antithrombotics/antiplatelets     Severe comorbid illness     Alcohol abuse     Chronic pain syndrome

## 2025-04-03 DIAGNOSIS — R42 VERTIGO: ICD-10-CM

## 2025-04-03 DIAGNOSIS — H81.03 MENIERE'S DISEASE OF BOTH EARS: ICD-10-CM

## 2025-04-03 NOTE — TELEPHONE ENCOUNTER
Differential Once 05/02/2024   Comprehensive Metabolic Panel Once 05/02/2024   PSA Screening Once 05/02/2024   TSH With Reflex Ft4 Once 05/02/2024   CT Lung Screen (Initial/Annual/Baseline) Once 11/08/2024               Patient Active Problem List:     Meniere's disease     Narcolepsy     PND (post-nasal drip)     Transaminasemia     Cardiomyopathy     HTN (hypertension)     Chronic obstructive pulmonary disease (HCC)     Dementia with behavioral disturbance (HCC)     S/p bare metal coronary artery stent     Supracondylar fracture of distal end of femur without intracondylar extension, sequela     Closed fracture of right distal femur     Presence of permanent cardiac pacemaker     Anemia, normocytic normochromic     History of hip fracture     Atelectasis     Chronic systolic (congestive) heart failure     Opioid dependence with current use (HCC)     Flexion contracture of right knee     Xeroderma     Chronic prescription opiate use     Long term (current) use of antithrombotics/antiplatelets     Severe comorbid illness     Alcohol abuse     Chronic pain syndrome

## 2025-04-07 RX ORDER — MECLIZINE HYDROCHLORIDE 25 MG/1
TABLET ORAL
Qty: 90 TABLET | Refills: 2 | Status: SHIPPED | OUTPATIENT
Start: 2025-04-07

## 2025-04-11 DIAGNOSIS — J31.0 CHRONIC RHINITIS: ICD-10-CM

## 2025-04-14 RX ORDER — LORATADINE 10 MG/1
TABLET ORAL
Qty: 90 TABLET | Refills: 0 | Status: SHIPPED | OUTPATIENT
Start: 2025-04-14

## 2025-04-14 NOTE — TELEPHONE ENCOUNTER
Last visit: 09/03/2024  Last Med refill: 03/14/2025  Does patient have enough medication for 72 hours: No:     Next Visit Date:  Future Appointments   Date Time Provider Department Center   4/16/2025  2:00 PM Vadim Leavitt MD [facility]   5/8/2025 11:20 AM Sivakumar Mathew MD Resp Spec [facility]   8/14/2025  1:00 PM Simone Palomo MD [facility]       Health Maintenance   Topic Date Due    Lung Cancer Screening &/or Counseling  Never done    AAA screen  Never done    Lipids  02/19/2023    Depression Screen  04/05/2025    Shingles vaccine (2 of 2) 08/05/2025 (Originally 3/13/2023)    Respiratory Syncytial Virus (RSV) Pregnant or age 60 yrs+ (1 - Risk 60-74 years 1-dose series) 08/05/2025 (Originally 1/7/2012)    COVID-19 Vaccine (4 - 2024-25 season) 09/03/2025 (Originally 9/1/2024)    Colorectal Cancer Screen  06/16/2025    Flu vaccine (Season Ended) 08/01/2025    DTaP/Tdap/Td vaccine (2 - Td or Tdap) 03/23/2030    Pneumococcal 50+ years Vaccine  Completed    Hepatitis C screen  Completed    Hepatitis A vaccine  Aged Out    Hepatitis B vaccine  Aged Out    Hib vaccine  Aged Out    Polio vaccine  Aged Out    Meningococcal (ACWY) vaccine  Aged Out    Meningococcal B vaccine  Aged Out    A1C test (Diabetic or Prediabetic)  Discontinued       Hemoglobin A1C (%)   Date Value   03/21/2023 5.4   02/19/2022 6.0   05/05/2020 6.4 (H)             ( goal A1C is < 7)   No components found for: \"LABMICR\"  No components found for: \"LDLCHOLESTEROL\", \"LDLCALC\"    (goal LDL is <100)   AST (U/L)   Date Value   11/14/2022 28     ALT (U/L)   Date Value   11/14/2022 15     BUN (mg/dL)   Date Value   12/06/2022 16     BP Readings from Last 3 Encounters:   01/30/25 120/64   11/08/24 139/84   09/03/24 130/84          (goal 120/80)    All Future Testing planned in [facility]  Lab Frequency Next Occurrence   Lipid, Fasting Once 05/02/2024   CBC with Auto Differential Once 05/02/2024   Comprehensive Metabolic

## 2025-04-16 ENCOUNTER — INITIAL CONSULT (OUTPATIENT)
Dept: PHYSICAL MEDICINE AND REHAB | Age: 75
End: 2025-04-16
Payer: COMMERCIAL

## 2025-04-16 VITALS
WEIGHT: 180 LBS | BODY MASS INDEX: 25.77 KG/M2 | SYSTOLIC BLOOD PRESSURE: 130 MMHG | HEIGHT: 70 IN | HEART RATE: 64 BPM | DIASTOLIC BLOOD PRESSURE: 62 MMHG

## 2025-04-16 DIAGNOSIS — M24.561 FLEXION CONTRACTURE OF RIGHT KNEE: Primary | ICD-10-CM

## 2025-04-16 PROCEDURE — 3075F SYST BP GE 130 - 139MM HG: CPT | Performed by: GENERAL PRACTICE

## 2025-04-16 PROCEDURE — 3078F DIAST BP <80 MM HG: CPT | Performed by: GENERAL PRACTICE

## 2025-04-16 PROCEDURE — 1123F ACP DISCUSS/DSCN MKR DOCD: CPT | Performed by: GENERAL PRACTICE

## 2025-04-16 PROCEDURE — 99204 OFFICE O/P NEW MOD 45 MIN: CPT | Performed by: GENERAL PRACTICE

## 2025-04-16 NOTE — PROGRESS NOTES
diclofenac sodium (VOLTAREN) 1 % GEL USE AS DIRECTED 4 TIMES A  g 5    naproxen sodium (ANAPROX) 220 MG tablet Take 1 tablet by mouth as needed       No current facility-administered medications for this visit.     Allergies   Allergen Reactions    Albuterol Sulfate      Only Ventolin JAYCE works     Ambien [Zolpidem] Hallucinations    Keflex [Cephalexin] Hives     Has tolerated cefazolin, ceftriaxone and cefepime multiple times; tolerates penicillins    Motrin [Ibuprofen Micronized]      Pt states he had blood in stool from motrin       Subjective:      Review of Systems  Constitutional: Negative for fever, chills andunexpected weight change.   HENT: Negative for trouble swallowing.    Respiratory: Negative for cough and shortness of breath.    Cardiovascular: Negative for chest pain.   Endocrine: Negative for polyuria.   Genitourinary: Negative for dysuria, urgency, frequency and difficulty urinating.   Musculoskeletal: Negative for back pain and arthralgias.   Neurological: Negative for headaches.     Objective:     Physical Exam               Physical Exam  /62   Pulse 64   Ht 1.778 m (5' 10\")   Wt 81.6 kg (180 lb)   BMI 25.83 kg/m²   Constitutional: He is oriented to person, place, and time. He appears well-developed and well-nourished.   HENT:   Head: Normocephalic.   Eyes: EOM are normal.   Pulmonary/Chest: Respirations WNL and unlabored.  Neurological: He is alert and oriented to person,place, and time. He has normal reflexes.   Skin: Skin is warm, dry, and intact with good turgor.   Psychiatric: He has a normal mood and affect. His behavior is normal. Thought content normal.   Nursing note and vitals reviewed.  MSK: Functional ROM impaired.  Strength 5/5 BUE, LLE.  Strength 5/5 right hip flexion, abduction, adduction.  Strength 5/5 right knee flexion/extension.  Strength 5/5 right ankle PF/DF    Modified Jarred scale: Mass 0 right hamstrings though spasticity testing limited due hard stop

## 2025-04-30 ENCOUNTER — HOSPITAL ENCOUNTER (INPATIENT)
Age: 75
LOS: 4 days | Discharge: HOME HEALTH | DRG: 603 | End: 2025-05-04
Attending: EMERGENCY MEDICINE | Admitting: STUDENT IN AN ORGANIZED HEALTH CARE EDUCATION/TRAINING PROGRAM
Payer: COMMERCIAL

## 2025-04-30 ENCOUNTER — APPOINTMENT (OUTPATIENT)
Dept: GENERAL RADIOLOGY | Age: 75
DRG: 603 | End: 2025-04-30
Payer: COMMERCIAL

## 2025-04-30 DIAGNOSIS — L03.116 CELLULITIS OF LEFT LOWER EXTREMITY: Primary | ICD-10-CM

## 2025-04-30 DIAGNOSIS — R09.89 DIMINISHED PULSES IN LOWER EXTREMITY: ICD-10-CM

## 2025-04-30 PROBLEM — E78.5 HYPERLIPEMIA: Status: ACTIVE | Noted: 2025-04-30

## 2025-04-30 PROBLEM — I25.10 CAD (CORONARY ARTERY DISEASE): Status: ACTIVE | Noted: 2025-04-30

## 2025-04-30 PROBLEM — L03.90 CELLULITIS: Status: ACTIVE | Noted: 2025-04-30

## 2025-04-30 PROBLEM — L03.90 CELLULITIS: Status: RESOLVED | Noted: 2025-04-30 | Resolved: 2025-04-30

## 2025-04-30 PROBLEM — A49.02 MRSA INFECTION: Status: ACTIVE | Noted: 2025-04-30

## 2025-04-30 PROBLEM — I48.0 PAROXYSMAL A-FIB (HCC): Status: ACTIVE | Noted: 2025-04-30

## 2025-04-30 LAB
ANION GAP SERPL CALCULATED.3IONS-SCNC: 10 MMOL/L (ref 9–16)
BASOPHILS # BLD: 0.03 K/UL (ref 0–0.2)
BASOPHILS # BLD: 0.04 K/UL (ref 0–0.2)
BASOPHILS NFR BLD: 0 % (ref 0–2)
BASOPHILS NFR BLD: 0 % (ref 0–2)
BUN SERPL-MCNC: 15 MG/DL (ref 8–23)
CALCIUM SERPL-MCNC: 9.6 MG/DL (ref 8.6–10.4)
CHLORIDE SERPL-SCNC: 106 MMOL/L (ref 98–107)
CO2 SERPL-SCNC: 20 MMOL/L (ref 20–31)
CREAT SERPL-MCNC: 0.9 MG/DL (ref 0.7–1.2)
CRP SERPL HS-MCNC: 14.4 MG/L (ref 0–5)
CRP SERPL HS-MCNC: 15.6 MG/L (ref 0–5)
EOSINOPHIL # BLD: 0.48 K/UL (ref 0–0.44)
EOSINOPHIL # BLD: 0.48 K/UL (ref 0–0.44)
EOSINOPHILS RELATIVE PERCENT: 5 % (ref 1–4)
EOSINOPHILS RELATIVE PERCENT: 5 % (ref 1–4)
ERYTHROCYTE [DISTWIDTH] IN BLOOD BY AUTOMATED COUNT: 16.5 % (ref 11.8–14.4)
ERYTHROCYTE [DISTWIDTH] IN BLOOD BY AUTOMATED COUNT: 16.6 % (ref 11.8–14.4)
ERYTHROCYTE [SEDIMENTATION RATE] IN BLOOD BY PHOTOMETRIC METHOD: 26 MM/HR (ref 0–20)
ERYTHROCYTE [SEDIMENTATION RATE] IN BLOOD BY PHOTOMETRIC METHOD: 39 MM/HR (ref 0–20)
GFR, ESTIMATED: >90 ML/MIN/1.73M2
GLUCOSE SERPL-MCNC: 83 MG/DL (ref 74–99)
HCT VFR BLD AUTO: 28.2 % (ref 40.7–50.3)
HCT VFR BLD AUTO: 30.5 % (ref 40.7–50.3)
HGB BLD-MCNC: 10.3 G/DL (ref 13–17)
HGB BLD-MCNC: 9.7 G/DL (ref 13–17)
IMM GRANULOCYTES # BLD AUTO: 0.05 K/UL (ref 0–0.3)
IMM GRANULOCYTES # BLD AUTO: 0.07 K/UL (ref 0–0.3)
IMM GRANULOCYTES NFR BLD: 1 %
IMM GRANULOCYTES NFR BLD: 1 %
LYMPHOCYTES NFR BLD: 1.68 K/UL (ref 1.1–3.7)
LYMPHOCYTES NFR BLD: 2 K/UL (ref 1.1–3.7)
LYMPHOCYTES RELATIVE PERCENT: 17 % (ref 24–43)
LYMPHOCYTES RELATIVE PERCENT: 19 % (ref 24–43)
MCH RBC QN AUTO: 33.6 PG (ref 25.2–33.5)
MCH RBC QN AUTO: 33.7 PG (ref 25.2–33.5)
MCHC RBC AUTO-ENTMCNC: 33.8 G/DL (ref 28.4–34.8)
MCHC RBC AUTO-ENTMCNC: 34.4 G/DL (ref 28.4–34.8)
MCV RBC AUTO: 97.9 FL (ref 82.6–102.9)
MCV RBC AUTO: 99.3 FL (ref 82.6–102.9)
MONOCYTES NFR BLD: 0.97 K/UL (ref 0.1–1.2)
MONOCYTES NFR BLD: 1.06 K/UL (ref 0.1–1.2)
MONOCYTES NFR BLD: 10 % (ref 3–12)
MONOCYTES NFR BLD: 10 % (ref 3–12)
NEUTROPHILS NFR BLD: 65 % (ref 36–65)
NEUTROPHILS NFR BLD: 67 % (ref 36–65)
NEUTS SEG NFR BLD: 6.54 K/UL (ref 1.5–8.1)
NEUTS SEG NFR BLD: 6.88 K/UL (ref 1.5–8.1)
NRBC BLD-RTO: 0 PER 100 WBC
NRBC BLD-RTO: 0 PER 100 WBC
PLATELET # BLD AUTO: 220 K/UL (ref 138–453)
PLATELET # BLD AUTO: 258 K/UL (ref 138–453)
PMV BLD AUTO: 8.7 FL (ref 8.1–13.5)
PMV BLD AUTO: 8.7 FL (ref 8.1–13.5)
POTASSIUM SERPL-SCNC: 4.2 MMOL/L (ref 3.7–5.3)
RBC # BLD AUTO: 2.88 M/UL (ref 4.21–5.77)
RBC # BLD AUTO: 3.07 M/UL (ref 4.21–5.77)
RBC # BLD: ABNORMAL 10*6/UL
RBC # BLD: ABNORMAL 10*6/UL
SODIUM SERPL-SCNC: 136 MMOL/L (ref 136–145)
WBC OTHER # BLD: 10.5 K/UL (ref 3.5–11.3)
WBC OTHER # BLD: 9.8 K/UL (ref 3.5–11.3)

## 2025-04-30 PROCEDURE — 1200000000 HC SEMI PRIVATE

## 2025-04-30 PROCEDURE — 87070 CULTURE OTHR SPECIMN AEROBIC: CPT

## 2025-04-30 PROCEDURE — 80048 BASIC METABOLIC PNL TOTAL CA: CPT

## 2025-04-30 PROCEDURE — 85652 RBC SED RATE AUTOMATED: CPT

## 2025-04-30 PROCEDURE — 6370000000 HC RX 637 (ALT 250 FOR IP): Performed by: STUDENT IN AN ORGANIZED HEALTH CARE EDUCATION/TRAINING PROGRAM

## 2025-04-30 PROCEDURE — 87186 SC STD MICRODIL/AGAR DIL: CPT

## 2025-04-30 PROCEDURE — 2500000003 HC RX 250 WO HCPCS: Performed by: STUDENT IN AN ORGANIZED HEALTH CARE EDUCATION/TRAINING PROGRAM

## 2025-04-30 PROCEDURE — 6360000002 HC RX W HCPCS

## 2025-04-30 PROCEDURE — 6360000002 HC RX W HCPCS: Performed by: STUDENT IN AN ORGANIZED HEALTH CARE EDUCATION/TRAINING PROGRAM

## 2025-04-30 PROCEDURE — 99285 EMERGENCY DEPT VISIT HI MDM: CPT

## 2025-04-30 PROCEDURE — 86140 C-REACTIVE PROTEIN: CPT

## 2025-04-30 PROCEDURE — 87205 SMEAR GRAM STAIN: CPT

## 2025-04-30 PROCEDURE — 94760 N-INVAS EAR/PLS OXIMETRY 1: CPT

## 2025-04-30 PROCEDURE — G0545 PR INHERENT VISIT TO INPT: HCPCS | Performed by: INTERNAL MEDICINE

## 2025-04-30 PROCEDURE — APPSS30 APP SPLIT SHARED TIME 16-30 MINUTES

## 2025-04-30 PROCEDURE — 99223 1ST HOSP IP/OBS HIGH 75: CPT | Performed by: STUDENT IN AN ORGANIZED HEALTH CARE EDUCATION/TRAINING PROGRAM

## 2025-04-30 PROCEDURE — 96375 TX/PRO/DX INJ NEW DRUG ADDON: CPT

## 2025-04-30 PROCEDURE — 94640 AIRWAY INHALATION TREATMENT: CPT

## 2025-04-30 PROCEDURE — 86403 PARTICLE AGGLUT ANTBDY SCRN: CPT

## 2025-04-30 PROCEDURE — 85025 COMPLETE CBC W/AUTO DIFF WBC: CPT

## 2025-04-30 PROCEDURE — 73630 X-RAY EXAM OF FOOT: CPT

## 2025-04-30 PROCEDURE — 2580000003 HC RX 258: Performed by: STUDENT IN AN ORGANIZED HEALTH CARE EDUCATION/TRAINING PROGRAM

## 2025-04-30 PROCEDURE — 99222 1ST HOSP IP/OBS MODERATE 55: CPT | Performed by: INTERNAL MEDICINE

## 2025-04-30 PROCEDURE — 96365 THER/PROPH/DIAG IV INF INIT: CPT

## 2025-04-30 PROCEDURE — 6370000000 HC RX 637 (ALT 250 FOR IP)

## 2025-04-30 PROCEDURE — 2580000003 HC RX 258

## 2025-04-30 RX ORDER — BENZOCAINE/MENTHOL 6 MG-10 MG
LOZENGE MUCOUS MEMBRANE 2 TIMES DAILY
Status: DISCONTINUED | OUTPATIENT
Start: 2025-04-30 | End: 2025-05-04 | Stop reason: HOSPADM

## 2025-04-30 RX ORDER — MAGNESIUM SULFATE IN WATER 40 MG/ML
2000 INJECTION, SOLUTION INTRAVENOUS PRN
Status: DISCONTINUED | OUTPATIENT
Start: 2025-04-30 | End: 2025-05-04 | Stop reason: HOSPADM

## 2025-04-30 RX ORDER — LEVOTHYROXINE SODIUM 50 UG/1
50 TABLET ORAL DAILY
Status: DISCONTINUED | OUTPATIENT
Start: 2025-04-30 | End: 2025-05-04 | Stop reason: HOSPADM

## 2025-04-30 RX ORDER — ACETAMINOPHEN 650 MG/1
650 SUPPOSITORY RECTAL EVERY 6 HOURS PRN
Status: DISCONTINUED | OUTPATIENT
Start: 2025-04-30 | End: 2025-05-04 | Stop reason: HOSPADM

## 2025-04-30 RX ORDER — LANOLIN ALCOHOL/MO/W.PET/CERES
200 CREAM (GRAM) TOPICAL DAILY
Status: DISCONTINUED | OUTPATIENT
Start: 2025-04-30 | End: 2025-05-04 | Stop reason: HOSPADM

## 2025-04-30 RX ORDER — SODIUM CHLORIDE 0.9 % (FLUSH) 0.9 %
5-40 SYRINGE (ML) INJECTION EVERY 12 HOURS SCHEDULED
Status: DISCONTINUED | OUTPATIENT
Start: 2025-04-30 | End: 2025-05-04 | Stop reason: HOSPADM

## 2025-04-30 RX ORDER — MECLIZINE HCL 12.5 MG 12.5 MG/1
25 TABLET ORAL 3 TIMES DAILY PRN
Status: DISCONTINUED | OUTPATIENT
Start: 2025-04-30 | End: 2025-05-04 | Stop reason: HOSPADM

## 2025-04-30 RX ORDER — SODIUM CHLORIDE 9 MG/ML
INJECTION, SOLUTION INTRAVENOUS PRN
Status: DISCONTINUED | OUTPATIENT
Start: 2025-04-30 | End: 2025-05-04 | Stop reason: HOSPADM

## 2025-04-30 RX ORDER — POLYETHYLENE GLYCOL 3350 17 G/17G
17 POWDER, FOR SOLUTION ORAL DAILY PRN
Status: DISCONTINUED | OUTPATIENT
Start: 2025-04-30 | End: 2025-05-04 | Stop reason: HOSPADM

## 2025-04-30 RX ORDER — DOCUSATE SODIUM 100 MG/1
100 CAPSULE, LIQUID FILLED ORAL 2 TIMES DAILY PRN
Status: DISCONTINUED | OUTPATIENT
Start: 2025-04-30 | End: 2025-05-04 | Stop reason: HOSPADM

## 2025-04-30 RX ORDER — POTASSIUM CHLORIDE 1500 MG/1
40 TABLET, EXTENDED RELEASE ORAL PRN
Status: DISCONTINUED | OUTPATIENT
Start: 2025-04-30 | End: 2025-05-04 | Stop reason: HOSPADM

## 2025-04-30 RX ORDER — CETIRIZINE HYDROCHLORIDE 10 MG/1
10 TABLET ORAL DAILY
Status: DISCONTINUED | OUTPATIENT
Start: 2025-04-30 | End: 2025-05-04 | Stop reason: HOSPADM

## 2025-04-30 RX ORDER — CLOPIDOGREL BISULFATE 75 MG/1
75 TABLET ORAL DAILY
Status: DISCONTINUED | OUTPATIENT
Start: 2025-04-30 | End: 2025-05-04 | Stop reason: HOSPADM

## 2025-04-30 RX ORDER — ACETAMINOPHEN 325 MG/1
650 TABLET ORAL EVERY 6 HOURS PRN
Status: DISCONTINUED | OUTPATIENT
Start: 2025-04-30 | End: 2025-05-04 | Stop reason: HOSPADM

## 2025-04-30 RX ORDER — FLUTICASONE PROPIONATE 50 MCG
2 SPRAY, SUSPENSION (ML) NASAL DAILY
Status: DISCONTINUED | OUTPATIENT
Start: 2025-04-30 | End: 2025-05-04 | Stop reason: HOSPADM

## 2025-04-30 RX ORDER — ONDANSETRON 2 MG/ML
4 INJECTION INTRAMUSCULAR; INTRAVENOUS EVERY 6 HOURS PRN
Status: DISCONTINUED | OUTPATIENT
Start: 2025-04-30 | End: 2025-05-04 | Stop reason: HOSPADM

## 2025-04-30 RX ORDER — SODIUM CHLORIDE 0.9 % (FLUSH) 0.9 %
5-40 SYRINGE (ML) INJECTION PRN
Status: DISCONTINUED | OUTPATIENT
Start: 2025-04-30 | End: 2025-05-04 | Stop reason: HOSPADM

## 2025-04-30 RX ORDER — BENZONATATE 100 MG/1
100 CAPSULE ORAL 3 TIMES DAILY PRN
Status: DISCONTINUED | OUTPATIENT
Start: 2025-04-30 | End: 2025-05-04 | Stop reason: HOSPADM

## 2025-04-30 RX ORDER — GUAIFENESIN 600 MG/1
600 TABLET, EXTENDED RELEASE ORAL 2 TIMES DAILY
Status: DISCONTINUED | OUTPATIENT
Start: 2025-04-30 | End: 2025-05-04 | Stop reason: HOSPADM

## 2025-04-30 RX ORDER — ALBUTEROL SULFATE 90 UG/1
2 INHALANT RESPIRATORY (INHALATION)
Status: DISCONTINUED | OUTPATIENT
Start: 2025-04-30 | End: 2025-05-04 | Stop reason: HOSPADM

## 2025-04-30 RX ORDER — POTASSIUM CHLORIDE 7.45 MG/ML
10 INJECTION INTRAVENOUS PRN
Status: DISCONTINUED | OUTPATIENT
Start: 2025-04-30 | End: 2025-05-04 | Stop reason: HOSPADM

## 2025-04-30 RX ORDER — FOLIC ACID 1 MG/1
1 TABLET ORAL DAILY
Status: DISCONTINUED | OUTPATIENT
Start: 2025-04-30 | End: 2025-05-04 | Stop reason: HOSPADM

## 2025-04-30 RX ORDER — SODIUM CHLORIDE 9 MG/ML
INJECTION, SOLUTION INTRAVENOUS CONTINUOUS
Status: ACTIVE | OUTPATIENT
Start: 2025-04-30 | End: 2025-05-01

## 2025-04-30 RX ORDER — ALBUTEROL SULFATE 90 UG/1
2 INHALANT RESPIRATORY (INHALATION)
Status: DISCONTINUED | OUTPATIENT
Start: 2025-04-30 | End: 2025-04-30

## 2025-04-30 RX ORDER — FUROSEMIDE 20 MG/1
20 TABLET ORAL DAILY
Status: DISCONTINUED | OUTPATIENT
Start: 2025-04-30 | End: 2025-05-04 | Stop reason: HOSPADM

## 2025-04-30 RX ORDER — ENOXAPARIN SODIUM 100 MG/ML
40 INJECTION SUBCUTANEOUS DAILY
Status: DISCONTINUED | OUTPATIENT
Start: 2025-04-30 | End: 2025-05-04 | Stop reason: HOSPADM

## 2025-04-30 RX ORDER — ONDANSETRON 4 MG/1
4 TABLET, ORALLY DISINTEGRATING ORAL EVERY 8 HOURS PRN
Status: DISCONTINUED | OUTPATIENT
Start: 2025-04-30 | End: 2025-05-04 | Stop reason: HOSPADM

## 2025-04-30 RX ORDER — CARVEDILOL 6.25 MG/1
6.25 TABLET ORAL 2 TIMES DAILY
Status: DISCONTINUED | OUTPATIENT
Start: 2025-04-30 | End: 2025-05-04 | Stop reason: HOSPADM

## 2025-04-30 RX ORDER — ACETAMINOPHEN 500 MG
1000 TABLET ORAL ONCE
Status: COMPLETED | OUTPATIENT
Start: 2025-04-30 | End: 2025-04-30

## 2025-04-30 RX ORDER — ATORVASTATIN CALCIUM 40 MG/1
40 TABLET, FILM COATED ORAL NIGHTLY
Status: DISCONTINUED | OUTPATIENT
Start: 2025-04-30 | End: 2025-05-04 | Stop reason: HOSPADM

## 2025-04-30 RX ORDER — AMIODARONE HYDROCHLORIDE 200 MG/1
200 TABLET ORAL DAILY
Status: DISCONTINUED | OUTPATIENT
Start: 2025-04-30 | End: 2025-05-04 | Stop reason: HOSPADM

## 2025-04-30 RX ADMIN — TIOTROPIUM BROMIDE INHALATION SPRAY 2 PUFF: 3.12 SPRAY, METERED RESPIRATORY (INHALATION) at 08:00

## 2025-04-30 RX ADMIN — CARVEDILOL 6.25 MG: 6.25 TABLET, FILM COATED ORAL at 20:45

## 2025-04-30 RX ADMIN — SODIUM CHLORIDE: 9 INJECTION, SOLUTION INTRAVENOUS at 06:02

## 2025-04-30 RX ADMIN — SODIUM CHLORIDE, PRESERVATIVE FREE 10 ML: 5 INJECTION INTRAVENOUS at 20:45

## 2025-04-30 RX ADMIN — ALBUTEROL SULFATE 2 PUFF: 90 AEROSOL, METERED RESPIRATORY (INHALATION) at 21:15

## 2025-04-30 RX ADMIN — Medication 200 MG: at 09:35

## 2025-04-30 RX ADMIN — MECLIZINE 25 MG: 12.5 TABLET ORAL at 20:38

## 2025-04-30 RX ADMIN — AMIODARONE HYDROCHLORIDE 200 MG: 200 TABLET ORAL at 09:34

## 2025-04-30 RX ADMIN — GUAIFENESIN 600 MG: 600 TABLET, EXTENDED RELEASE ORAL at 09:35

## 2025-04-30 RX ADMIN — ENOXAPARIN SODIUM 40 MG: 100 INJECTION SUBCUTANEOUS at 09:35

## 2025-04-30 RX ADMIN — HYDROCORTISONE: 10 CREAM TOPICAL at 11:03

## 2025-04-30 RX ADMIN — ATORVASTATIN CALCIUM 40 MG: 40 TABLET, FILM COATED ORAL at 20:38

## 2025-04-30 RX ADMIN — SODIUM CHLORIDE 1250 MG: 0.9 INJECTION, SOLUTION INTRAVENOUS at 04:32

## 2025-04-30 RX ADMIN — VANCOMYCIN HYDROCHLORIDE 750 MG: 750 INJECTION, POWDER, LYOPHILIZED, FOR SOLUTION INTRAVENOUS at 16:54

## 2025-04-30 RX ADMIN — LEVOTHYROXINE SODIUM 50 MCG: 0.05 TABLET ORAL at 09:35

## 2025-04-30 RX ADMIN — CLOPIDOGREL BISULFATE 75 MG: 75 TABLET, FILM COATED ORAL at 09:35

## 2025-04-30 RX ADMIN — GUAIFENESIN 600 MG: 600 TABLET, EXTENDED RELEASE ORAL at 20:38

## 2025-04-30 RX ADMIN — FOLIC ACID 1 MG: 1 TABLET ORAL at 09:35

## 2025-04-30 RX ADMIN — MECLIZINE 25 MG: 12.5 TABLET ORAL at 09:43

## 2025-04-30 RX ADMIN — DICLOFENAC SODIUM 2 G: 10 GEL TOPICAL at 20:44

## 2025-04-30 RX ADMIN — MECLIZINE 25 MG: 12.5 TABLET ORAL at 13:28

## 2025-04-30 RX ADMIN — CETIRIZINE HYDROCHLORIDE 10 MG: 10 TABLET, FILM COATED ORAL at 11:03

## 2025-04-30 RX ADMIN — ACETAMINOPHEN 650 MG: 325 TABLET ORAL at 11:02

## 2025-04-30 RX ADMIN — HYDROCORTISONE: 10 CREAM TOPICAL at 20:47

## 2025-04-30 RX ADMIN — ACETAMINOPHEN 1000 MG: 500 TABLET ORAL at 02:24

## 2025-04-30 RX ADMIN — ACETAMINOPHEN 650 MG: 325 TABLET ORAL at 16:52

## 2025-04-30 RX ADMIN — FUROSEMIDE 20 MG: 20 TABLET ORAL at 09:35

## 2025-04-30 RX ADMIN — CARVEDILOL 6.25 MG: 6.25 TABLET, FILM COATED ORAL at 09:34

## 2025-04-30 RX ADMIN — PIPERACILLIN AND TAZOBACTAM 4500 MG: 4; .5 INJECTION, POWDER, FOR SOLUTION INTRAVENOUS at 03:56

## 2025-04-30 ASSESSMENT — PAIN DESCRIPTION - ORIENTATION: ORIENTATION: LEFT

## 2025-04-30 ASSESSMENT — PAIN DESCRIPTION - LOCATION: LOCATION: FOOT

## 2025-04-30 ASSESSMENT — PAIN DESCRIPTION - DESCRIPTORS: DESCRIPTORS: THROBBING

## 2025-04-30 ASSESSMENT — PAIN - FUNCTIONAL ASSESSMENT: PAIN_FUNCTIONAL_ASSESSMENT: 0-10

## 2025-04-30 ASSESSMENT — PAIN SCALES - GENERAL
PAINLEVEL_OUTOF10: 7
PAINLEVEL_OUTOF10: 5
PAINLEVEL_OUTOF10: 3

## 2025-04-30 NOTE — ED PROVIDER NOTES
[facility] EMERGENCY DEPARTMENT  Emergency Department Encounter  Emergency Medicine Resident     Pt Name:Cristino Cherry  MRN: [id number]  Birthdate 1950  Date of evaluation: 4/30/25  PCP:  Alicia Valencia MD  Note Started: 2:20 AM EDT      CHIEF COMPLAINT       Chief Complaint   Patient presents with    Foot Pain     L foot       HISTORY OF PRESENT ILLNESS  (Location/Symptom, Timing/Onset, Context/Setting, Quality, Duration, Modifying Factors, Severity.)      rCistino Cherry is a 75 y.o. male who presents with left foot pain that has been worsening for the last 2 days.    Podiatry patient, has had left foot pain and weeping going up to the mid shin.    Stated that he has symptoms from previous trauma to the foot, does follow with podiatry, and was getting his nails cut the other day and had his last podiatry appointment a couple weeks ago.    There does seem to be ulceration with drainage, some is purulent and the wounds are not well-healing.    Concern for infection/osteomyelitis, x-rays, labs and inflammatory markers, consulting podiatry.    PAST MEDICAL / SURGICAL / SOCIAL / FAMILY HISTORY      has a past medical history of Acute postoperative anemia due to expected blood loss, ADHD (attention deficit hyperactivity disorder), ADHD (attention deficit hyperactivity disorder), Atypical chest pain, Broken femur (HCC), Chronic bronchitis (HCC), COPD (chronic obstructive pulmonary disease) (HCC), Hypertension, Hyponatremia, Meniere's disease, Narcolepsy, Nasal fracture, PND (post-nasal drip), Pneumonia, SOB (shortness of breath), Tibia fracture, and Transaminasemia.     has a past surgical history that includes cyst removal; Ankle surgery; knee surgery (Right); Coronary angioplasty with stent (N/A, 10/10/2015); Knee Arthroplasty (Right, 04/13/2021); hip surgery (Right, 03/08/2022); and Femur fracture surgery (Right, 3/8/2022).      Social History     Socioeconomic History    Marital status:

## 2025-04-30 NOTE — ED PROVIDER NOTES
[facility]     Emergency Department     Faculty Attestation    I performed a history and physical examination of the patient and discussed management with the resident. I have reviewed and agree with the resident’s findings including all diagnostic interpretations, and treatment plans as written. Any areas of disagreement are noted on the chart. I was personally present for the key portions of any procedures. I have documented in the chart those procedures where I was not present during the key portions. I have reviewed the emergency nurses triage note. I agree with the chief complaint, past medical history, past surgical history, allergies, medications, social and family history as documented unless otherwise noted below. Documentation of the HPI, Physical Exam and Medical Decision Making performed by vikki is based on my personal performance of the HPI, PE and MDM. For Physician Assistant/ Nurse Practitioner cases/documentation I have personally evaluated this patient and have completed at least one if not all key elements of the E/M (history, physical exam, and MDM). Additional findings are as noted.    Note Started: 2:00 AM EDT     74 yo M pt c/o L foot pain, dropped object on L foot 2 wk prior, no fever,   PE vss gcs 15 swelling dorsal left foot, open lesion lateral left foot, mild yellow drainage, neurovascularly intact left lower extremity, mild surrounding erythema,  --- podiatry request vanc / zosyn & inter med  EKG Interpretation    Interpreted by me      CRITICAL CARE: There was a high probability of clinically significant/life threatening deterioration in this patient's condition which required my urgent intervention.  Total critical care time was 0 minutes.  This excludes any time for separately reportable procedures.       Herb Khan DO  04/30/25 0201       Herb Bonilla DO  04/30/25 0350

## 2025-04-30 NOTE — CONSULTS
[facility] - Initial Consult Note  Today's Date and Time: 4/30/2025, 11:13 AM    Impression :   Chronic systolic congestive heart failure  Essential hypertension  Hyperlipidemia  Ménière's disease  COPD  Anemia  ICD placement  Left foot pain and left leg cellulitis    Recommendations:   Vancomycin  Wound care  Monitor clinical response    Medical Decision Making/Summary/Discussion:4/30/2025     Daily Elements of Decision Making provided by Consulting Physician:    Note: I have independently performed the steps listed below as part of the medical decision making and evaluation.    Review of current Problems:  Today I am seeing and examining the patient for the following problems:  Chronic systolic congestive heart failure  Essential hypertension  Hyperlipidemia  Ménière's disease  COPD  Anemia  ICD placement  Left foot pain and left leg cellulitis  Evaluation of Patient:  Patient with above listed problems  Evaluated for cellulitis  Please see daily details in Interval Changes Section  Changes in physical exam:  Left foot pain and cellulitis  Please see daily details in Physical Exam section and in Interval Changes Section  Changes in ROS:  Unchanged  Please see daily details in Review of Symptoms section and in Interval Changes Section  Discussion with Referring Physician or Service  Dr. Hodge  Laboratory and Radiologic Studies with personal review of radiologic studies  Laboratory  Radiology  Microbiology  Please see Details in daily Interval Changes Section devoted to Lab, Micro and Radiology and in Medical Decision Laboratory, Imaging and Cultures sections.  Review of Infection Control and Prevention measures:  Contact isolation: MRSA  Please see daily details in Infection Control Recommendations section  Administer medications as ordered:  Vancomycin  Review of Antimicrobial Stewardship Measures:  Targeted therapy  PK dosing  Please see daily details in Antimicrobial 
tablet TAKE 1 TAB BY MOUTH ONCE EVERY DAY  Patient not taking: Reported on 4/30/2025 2/17/25   Alicia Valencia MD   Disposable Gloves (LATEX GLOVES LARGE) MISC Use one pair 3-4 times daily as needed  Patient not taking: Reported on 4/30/2025 1/8/25   Alicia Valencia MD   diclofenac Sodium 1.5 % SOLN Use twice daily as needed for pain  Patient not taking: Reported on 4/30/2025 11/13/24   Alicia Valencia MD   Pramoxine HCl (CERAVE ITCH RELIEF) 1 % LOTN Use topically twice daily  Patient not taking: Reported on 4/30/2025 9/3/24   Alicia Valencia MD   naproxen sodium (ANAPROX) 220 MG tablet Take 1 tablet by mouth as needed  Patient not taking: Reported on 4/30/2025 9/21/23   ProviderIlia MD     Allergies  Albuterol sulfate, Ambien [zolpidem], Keflex [cephalexin], and Motrin [ibuprofen micronized]  Family History  family history includes Heart Disease in his father and mother.    Social History   reports that he has been smoking cigarettes. He has a 38 pack-year smoking history. He has never used smokeless tobacco.   reports current alcohol use.   reports no history of drug use.    Vitals:  Patient Vitals for the past 8 hrs:   BP Temp Temp src Pulse Resp SpO2 Height Weight   04/30/25 2116 -- -- -- 69 21 95 % -- --   04/30/25 2045 116/64 -- -- -- -- -- -- --   04/30/25 1938 116/64 97.3 °F (36.3 °C) Oral 64 16 (!) 87 % -- --   04/30/25 1815 -- -- -- -- -- -- 1.778 m (5' 10\") 81.6 kg (179 lb 14.3 oz)   04/30/25 1800 -- 97.6 °F (36.4 °C) Oral 65 18 -- -- --   04/30/25 1745 -- 97.6 °F (36.4 °C) Oral -- -- -- -- --   04/30/25 1550 (!) 119/50 98.2 °F (36.8 °C) Oral 78 -- -- -- --     I&O:  I/O last 3 completed shifts:  In: -   Out: 300 [Urine:300]  CBC:  Recent Labs     04/30/25 0225 04/30/25 0558   WBC 10.5 9.8   HGB 10.3* 9.7*   HCT 30.5* 28.2*    220   CRP 15.6* 14.4*      BMP:  Recent Labs     04/30/25 0225      K 4.2      CO2 20   BUN 15   CREATININE 0.9   GLUCOSE 83   CALCIUM 9.6

## 2025-04-30 NOTE — H&P
[facility]  Office: [phone]  Shelton Valadez DO, Jorge Wright DO, Erwin Ruelas DO, Shekhar Oakes DO, Ninfa Huitron MD, Earline Millan MD, Janet Moreno MD, Sigrid Sweeney MD,  Eduardo Mcnulty MD, Poncho Conway MD, Bogdan Mazariegos MD,  Carmelina Sheppard DO, Chinyere Hamlin MD, Geremias Potts MD, Bryce Valadez DO, Camila Begum MD,  Ariel Hodge DO, Haily Valladares MD, Lora Jiménez MD, Louie Segal MD,  Anders Ramos MD, Darcie Andrea MD, Terry Meléndez MD, Clifford Connor MD, Ishan Joshi MD, Kathya Anderson MD, Segundo Galindo DO, Betty Farrell MD, Cuong Moncada MD, Carmelina Sethi MD, Mohsin Reza, MD, Juanito Wise MD, Shirley Waterhouse, CNP,  Velma Barcenas, CNP, Segundo Vazquez, CNP,  Monica Barnett, DNP, Radha Santos, CNP, Shreya Ramirez, CNP, Adali Zee, CNP, Rachael Berry, CNP, Zahraa Smith, PA-C, Laura Rico, CNP, Rodrigo Sanchez, CNP,  Karen Valencia, CNP, Sommer Hernandez, CNP, Ru Guardado, PA-C, Jasmyn Junior, CNP,  Carrie Cevallos, CNS, Amelia Oliva, CNP, Ava Damon, CNP,   Pilar Lopez, CNP         [facility]   IN-PATIENT SERVICE   [facility]    HISTORY AND PHYSICAL EXAMINATION            Date:   4/30/2025  Patient name:  Cristino Cherry  Date of admission:  4/30/2025  1:53 AM  MRN:   [id number]  Account:  [id number]  YOB: 1950  PCP:    Alicia Valencia MD  Room:   [facility]  Code Status:    Full Code    Chief Complaint:     Chief Complaint   Patient presents with    Foot Pain     L foot       History Obtained From:     patient, electronic medical record    History of Present Illness:     Cristino Cherry is a 75 y.o. Declined male with past medical history of chronic systolic congestive heart failure, hypertension, hyperlipidemia, Ménière's disease, COPD, placement of ICD, and anemia who presents with Foot Pain (L foot)   and is admitted to the hospital for the management of Left leg cellulitis.    Patient reports
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
## 2025-04-30 NOTE — PLAN OF CARE
I signed up for this patient in error. I did not see this patient. I did not participate in the evlauation or treatment of this patient.    Electronically signed by Silviano Herrmann DO on 4/30/2025 at 1:55 AM

## 2025-05-01 ENCOUNTER — APPOINTMENT (OUTPATIENT)
Dept: VASCULAR LAB | Age: 75
DRG: 603 | End: 2025-05-01
Payer: COMMERCIAL

## 2025-05-01 PROBLEM — A49.01 STAPH AUREUS INFECTION: Status: ACTIVE | Noted: 2025-05-01

## 2025-05-01 LAB
BASOPHILS # BLD: 0.05 K/UL (ref 0–0.2)
BASOPHILS NFR BLD: 1 % (ref 0–2)
CRP SERPL HS-MCNC: 46.5 MG/L (ref 0–5)
ECHO BSA: 2.01 M2
EOSINOPHIL # BLD: 0.62 K/UL (ref 0–0.44)
EOSINOPHILS RELATIVE PERCENT: 6 % (ref 1–4)
ERYTHROCYTE [DISTWIDTH] IN BLOOD BY AUTOMATED COUNT: 17 % (ref 11.8–14.4)
ERYTHROCYTE [SEDIMENTATION RATE] IN BLOOD BY PHOTOMETRIC METHOD: 35 MM/HR (ref 0–20)
HCT VFR BLD AUTO: 31.6 % (ref 40.7–50.3)
HGB BLD-MCNC: 10.5 G/DL (ref 13–17)
IMM GRANULOCYTES # BLD AUTO: 0.08 K/UL (ref 0–0.3)
IMM GRANULOCYTES NFR BLD: 1 %
LYMPHOCYTES NFR BLD: 1.68 K/UL (ref 1.1–3.7)
LYMPHOCYTES RELATIVE PERCENT: 17 % (ref 24–43)
MCH RBC QN AUTO: 34.2 PG (ref 25.2–33.5)
MCHC RBC AUTO-ENTMCNC: 33.2 G/DL (ref 28.4–34.8)
MCV RBC AUTO: 102.9 FL (ref 82.6–102.9)
MONOCYTES NFR BLD: 0.85 K/UL (ref 0.1–1.2)
MONOCYTES NFR BLD: 9 % (ref 3–12)
NEUTROPHILS NFR BLD: 66 % (ref 36–65)
NEUTS SEG NFR BLD: 6.72 K/UL (ref 1.5–8.1)
NRBC BLD-RTO: 0 PER 100 WBC
PLATELET # BLD AUTO: 258 K/UL (ref 138–453)
PMV BLD AUTO: 8.5 FL (ref 8.1–13.5)
RBC # BLD AUTO: 3.07 M/UL (ref 4.21–5.77)
RBC # BLD: ABNORMAL 10*6/UL
VANCOMYCIN TROUGH SERPL-MCNC: 19.4 UG/ML (ref 10–20)
VAS LEFT ANKLE BP: 254 MMHG
VAS LEFT ARM BP: 135 MMHG
VAS LEFT DORSALIS PEDIS BP: 254 MMHG
VAS LEFT PTA BP: 254 MMHG
VAS LEFT TBI: 0.61
VAS LEFT TOE PRESSURE: 85 MMHG
VAS RIGHT ANKLE BP: 254 MMHG
VAS RIGHT ARM BP: 139 MMHG
VAS RIGHT DORSALIS PEDIS BP: 254 MMHG
VAS RIGHT PTA BP: 254 MMHG
VAS RIGHT TBI: 0
VAS RIGHT TOE PRESSURE: 0 MMHG
WBC OTHER # BLD: 10 K/UL (ref 3.5–11.3)

## 2025-05-01 PROCEDURE — 6370000000 HC RX 637 (ALT 250 FOR IP): Performed by: STUDENT IN AN ORGANIZED HEALTH CARE EDUCATION/TRAINING PROGRAM

## 2025-05-01 PROCEDURE — 2500000003 HC RX 250 WO HCPCS: Performed by: STUDENT IN AN ORGANIZED HEALTH CARE EDUCATION/TRAINING PROGRAM

## 2025-05-01 PROCEDURE — G0545 PR INHERENT VISIT TO INPT: HCPCS | Performed by: INTERNAL MEDICINE

## 2025-05-01 PROCEDURE — 85652 RBC SED RATE AUTOMATED: CPT

## 2025-05-01 PROCEDURE — 99232 SBSQ HOSP IP/OBS MODERATE 35: CPT | Performed by: STUDENT IN AN ORGANIZED HEALTH CARE EDUCATION/TRAINING PROGRAM

## 2025-05-01 PROCEDURE — 85025 COMPLETE CBC W/AUTO DIFF WBC: CPT

## 2025-05-01 PROCEDURE — 94640 AIRWAY INHALATION TREATMENT: CPT

## 2025-05-01 PROCEDURE — 2580000003 HC RX 258: Performed by: STUDENT IN AN ORGANIZED HEALTH CARE EDUCATION/TRAINING PROGRAM

## 2025-05-01 PROCEDURE — 93923 UPR/LXTR ART STDY 3+ LVLS: CPT

## 2025-05-01 PROCEDURE — 80202 ASSAY OF VANCOMYCIN: CPT

## 2025-05-01 PROCEDURE — 6360000002 HC RX W HCPCS: Performed by: STUDENT IN AN ORGANIZED HEALTH CARE EDUCATION/TRAINING PROGRAM

## 2025-05-01 PROCEDURE — 93923 UPR/LXTR ART STDY 3+ LVLS: CPT | Performed by: SURGERY

## 2025-05-01 PROCEDURE — 1200000000 HC SEMI PRIVATE

## 2025-05-01 PROCEDURE — 99232 SBSQ HOSP IP/OBS MODERATE 35: CPT | Performed by: INTERNAL MEDICINE

## 2025-05-01 PROCEDURE — 36415 COLL VENOUS BLD VENIPUNCTURE: CPT

## 2025-05-01 PROCEDURE — 86140 C-REACTIVE PROTEIN: CPT

## 2025-05-01 RX ADMIN — ATORVASTATIN CALCIUM 40 MG: 40 TABLET, FILM COATED ORAL at 21:03

## 2025-05-01 RX ADMIN — FUROSEMIDE 20 MG: 20 TABLET ORAL at 10:10

## 2025-05-01 RX ADMIN — ACETAMINOPHEN 650 MG: 325 TABLET ORAL at 14:00

## 2025-05-01 RX ADMIN — SODIUM CHLORIDE, PRESERVATIVE FREE 10 ML: 5 INJECTION INTRAVENOUS at 10:11

## 2025-05-01 RX ADMIN — Medication 200 MG: at 10:10

## 2025-05-01 RX ADMIN — CARVEDILOL 6.25 MG: 6.25 TABLET, FILM COATED ORAL at 21:04

## 2025-05-01 RX ADMIN — GUAIFENESIN 600 MG: 600 TABLET, EXTENDED RELEASE ORAL at 21:03

## 2025-05-01 RX ADMIN — VANCOMYCIN HYDROCHLORIDE 750 MG: 750 INJECTION, POWDER, LYOPHILIZED, FOR SOLUTION INTRAVENOUS at 17:02

## 2025-05-01 RX ADMIN — SODIUM CHLORIDE, PRESERVATIVE FREE 10 ML: 5 INJECTION INTRAVENOUS at 21:04

## 2025-05-01 RX ADMIN — HYDROCORTISONE: 10 CREAM TOPICAL at 10:12

## 2025-05-01 RX ADMIN — LEVOTHYROXINE SODIUM 50 MCG: 0.05 TABLET ORAL at 06:47

## 2025-05-01 RX ADMIN — FOLIC ACID 1 MG: 1 TABLET ORAL at 10:11

## 2025-05-01 RX ADMIN — CLOPIDOGREL BISULFATE 75 MG: 75 TABLET, FILM COATED ORAL at 10:11

## 2025-05-01 RX ADMIN — CETIRIZINE HYDROCHLORIDE 10 MG: 10 TABLET, FILM COATED ORAL at 10:11

## 2025-05-01 RX ADMIN — MECLIZINE 25 MG: 12.5 TABLET ORAL at 14:00

## 2025-05-01 RX ADMIN — HYDROCORTISONE: 10 CREAM TOPICAL at 21:05

## 2025-05-01 RX ADMIN — CARVEDILOL 6.25 MG: 6.25 TABLET, FILM COATED ORAL at 10:11

## 2025-05-01 RX ADMIN — ACETAMINOPHEN 650 MG: 325 TABLET ORAL at 00:43

## 2025-05-01 RX ADMIN — FLUTICASONE PROPIONATE 2 SPRAY: 50 SPRAY, METERED NASAL at 10:10

## 2025-05-01 RX ADMIN — VANCOMYCIN HYDROCHLORIDE 750 MG: 750 INJECTION, POWDER, LYOPHILIZED, FOR SOLUTION INTRAVENOUS at 04:00

## 2025-05-01 RX ADMIN — SODIUM CHLORIDE: 9 INJECTION, SOLUTION INTRAVENOUS at 01:36

## 2025-05-01 RX ADMIN — AMIODARONE HYDROCHLORIDE 200 MG: 200 TABLET ORAL at 10:11

## 2025-05-01 RX ADMIN — GUAIFENESIN 600 MG: 600 TABLET, EXTENDED RELEASE ORAL at 10:11

## 2025-05-01 RX ADMIN — ENOXAPARIN SODIUM 40 MG: 100 INJECTION SUBCUTANEOUS at 10:10

## 2025-05-01 RX ADMIN — ALBUTEROL SULFATE 2 PUFF: 90 AEROSOL, METERED RESPIRATORY (INHALATION) at 21:15

## 2025-05-01 RX ADMIN — ACETAMINOPHEN 650 MG: 325 TABLET ORAL at 20:07

## 2025-05-01 ASSESSMENT — PAIN DESCRIPTION - LOCATION: LOCATION: LEG

## 2025-05-01 ASSESSMENT — PAIN DESCRIPTION - ORIENTATION: ORIENTATION: LEFT

## 2025-05-01 ASSESSMENT — PAIN SCALES - GENERAL
PAINLEVEL_OUTOF10: 5
PAINLEVEL_OUTOF10: 7
PAINLEVEL_OUTOF10: 2
PAINLEVEL_OUTOF10: 5
PAINLEVEL_OUTOF10: 3

## 2025-05-01 ASSESSMENT — PAIN DESCRIPTION - DESCRIPTORS: DESCRIPTORS: ACHING

## 2025-05-01 NOTE — PLAN OF CARE
Problem: Safety - Adult  Goal: Free from fall injury  5/1/2025 1820 by Alfred Chavarria RN  Outcome: Progressing     Problem: Chronic Conditions and Co-morbidities  Goal: Patient's chronic conditions and co-morbidity symptoms are monitored and maintained or improved  5/1/2025 1820 by Alfred Chavarria RN  Outcome: Progressing     Problem: Discharge Planning  Goal: Discharge to home or other facility with appropriate resources  5/1/2025 1820 by Alfred Chavarria RN  Outcome: Progressing     Problem: Pain  Goal: Verbalizes/displays adequate comfort level or baseline comfort level  5/1/2025 1820 by Alfred Chavarria RN  Outcome: Progressing

## 2025-05-01 NOTE — DISCHARGE INSTRUCTIONS
Podiatry:  - Please make a follow-up visit with Dr. Alvarez outpatient 1 week after discharge  - Please keep your lower extremity wounds dry clean and covered at all times until follow-up visit  - Weightbearing status:weightbearing as tolerated in a surgical shoe  - Please take all prescribed medications as instructed  - Please restart all home medications as prescribed  - Please return to the hospital if any of the following local signs of infection arise: Increased/streaking redness, pain, swelling, drainage or malodor  - Please return to the hospital for any the following systemic signs of infection arise: Nausea, vomiting, fever, chills, diarrhea, shortness of breath or chest pain

## 2025-05-01 NOTE — PLAN OF CARE
Problem: Safety - Adult  Goal: Free from fall injury  5/1/2025 0540 by July Alaniz RN  Outcome: Progressing  4/30/2025 1829 by Alfred Chavarria RN  Outcome: Progressing     Problem: Chronic Conditions and Co-morbidities  Goal: Patient's chronic conditions and co-morbidity symptoms are monitored and maintained or improved  5/1/2025 0540 by July Alaniz RN  Outcome: Progressing  4/30/2025 1829 by Alfred Chavarria RN  Outcome: Progressing     Problem: Discharge Planning  Goal: Discharge to home or other facility with appropriate resources  5/1/2025 0540 by July Alaniz RN  Outcome: Progressing  4/30/2025 1829 by Alfred Chavarria RN  Outcome: Progressing     Problem: Pain  Goal: Verbalizes/displays adequate comfort level or baseline comfort level  5/1/2025 0540 by July Alaniz RN  Outcome: Progressing  4/30/2025 1829 by Alfred Chavarria RN  Outcome: Progressing     Problem: Skin/Tissue Integrity  Goal: Skin integrity remains intact  Description: 1.  Monitor for areas of redness and/or skin breakdown2.  Assess vascular access sites hourly3.  Every 4-6 hours minimum:  Change oxygen saturation probe site4.  Every 4-6 hours:  If on nasal continuous positive airway pressure, respiratory therapy assess nares and determine need for appliance change or resting period  Outcome: Progressing

## 2025-05-02 LAB
ANION GAP SERPL CALCULATED.3IONS-SCNC: 9 MMOL/L (ref 9–16)
BASOPHILS # BLD: 0.04 K/UL (ref 0–0.2)
BASOPHILS NFR BLD: 0 % (ref 0–2)
BUN SERPL-MCNC: 8 MG/DL (ref 8–23)
CALCIUM SERPL-MCNC: 9.1 MG/DL (ref 8.6–10.4)
CHLORIDE SERPL-SCNC: 112 MMOL/L (ref 98–107)
CO2 SERPL-SCNC: 20 MMOL/L (ref 20–31)
CREAT SERPL-MCNC: 0.6 MG/DL (ref 0.7–1.2)
CRP SERPL HS-MCNC: 40.3 MG/L (ref 0–5)
EOSINOPHIL # BLD: 0.54 K/UL (ref 0–0.44)
EOSINOPHILS RELATIVE PERCENT: 5 % (ref 1–4)
ERYTHROCYTE [DISTWIDTH] IN BLOOD BY AUTOMATED COUNT: 17.4 % (ref 11.8–14.4)
ERYTHROCYTE [SEDIMENTATION RATE] IN BLOOD BY PHOTOMETRIC METHOD: 36 MM/HR (ref 0–20)
GFR, ESTIMATED: >90 ML/MIN/1.73M2
GLUCOSE SERPL-MCNC: 95 MG/DL (ref 74–99)
HCT VFR BLD AUTO: 30.7 % (ref 40.7–50.3)
HGB BLD-MCNC: 10.1 G/DL (ref 13–17)
IMM GRANULOCYTES # BLD AUTO: 0.07 K/UL (ref 0–0.3)
IMM GRANULOCYTES NFR BLD: 1 %
LYMPHOCYTES NFR BLD: 1.64 K/UL (ref 1.1–3.7)
LYMPHOCYTES RELATIVE PERCENT: 16 % (ref 24–43)
MAGNESIUM SERPL-MCNC: 1.9 MG/DL (ref 1.6–2.4)
MCH RBC QN AUTO: 33.7 PG (ref 25.2–33.5)
MCHC RBC AUTO-ENTMCNC: 32.9 G/DL (ref 28.4–34.8)
MCV RBC AUTO: 102.3 FL (ref 82.6–102.9)
MICROORGANISM SPEC CULT: ABNORMAL
MICROORGANISM SPEC CULT: ABNORMAL
MICROORGANISM/AGENT SPEC: ABNORMAL
MICROORGANISM/AGENT SPEC: ABNORMAL
MONOCYTES NFR BLD: 1.12 K/UL (ref 0.1–1.2)
MONOCYTES NFR BLD: 11 % (ref 3–12)
NEUTROPHILS NFR BLD: 67 % (ref 36–65)
NEUTS SEG NFR BLD: 6.59 K/UL (ref 1.5–8.1)
NRBC BLD-RTO: 0 PER 100 WBC
PLATELET # BLD AUTO: 264 K/UL (ref 138–453)
PMV BLD AUTO: 8.6 FL (ref 8.1–13.5)
POTASSIUM SERPL-SCNC: 3.2 MMOL/L (ref 3.7–5.3)
RBC # BLD AUTO: 3 M/UL (ref 4.21–5.77)
RBC # BLD: ABNORMAL 10*6/UL
SODIUM SERPL-SCNC: 141 MMOL/L (ref 136–145)
SPECIMEN DESCRIPTION: ABNORMAL
WBC OTHER # BLD: 10 K/UL (ref 3.5–11.3)

## 2025-05-02 PROCEDURE — 2500000003 HC RX 250 WO HCPCS: Performed by: STUDENT IN AN ORGANIZED HEALTH CARE EDUCATION/TRAINING PROGRAM

## 2025-05-02 PROCEDURE — 85652 RBC SED RATE AUTOMATED: CPT

## 2025-05-02 PROCEDURE — 97163 PT EVAL HIGH COMPLEX 45 MIN: CPT

## 2025-05-02 PROCEDURE — 99232 SBSQ HOSP IP/OBS MODERATE 35: CPT | Performed by: STUDENT IN AN ORGANIZED HEALTH CARE EDUCATION/TRAINING PROGRAM

## 2025-05-02 PROCEDURE — 80048 BASIC METABOLIC PNL TOTAL CA: CPT

## 2025-05-02 PROCEDURE — 86140 C-REACTIVE PROTEIN: CPT

## 2025-05-02 PROCEDURE — 94760 N-INVAS EAR/PLS OXIMETRY 1: CPT

## 2025-05-02 PROCEDURE — 6360000002 HC RX W HCPCS: Performed by: STUDENT IN AN ORGANIZED HEALTH CARE EDUCATION/TRAINING PROGRAM

## 2025-05-02 PROCEDURE — 94640 AIRWAY INHALATION TREATMENT: CPT

## 2025-05-02 PROCEDURE — 99232 SBSQ HOSP IP/OBS MODERATE 35: CPT | Performed by: INTERNAL MEDICINE

## 2025-05-02 PROCEDURE — 6370000000 HC RX 637 (ALT 250 FOR IP): Performed by: STUDENT IN AN ORGANIZED HEALTH CARE EDUCATION/TRAINING PROGRAM

## 2025-05-02 PROCEDURE — 1200000000 HC SEMI PRIVATE

## 2025-05-02 PROCEDURE — 97166 OT EVAL MOD COMPLEX 45 MIN: CPT

## 2025-05-02 PROCEDURE — 36415 COLL VENOUS BLD VENIPUNCTURE: CPT

## 2025-05-02 PROCEDURE — G0545 PR INHERENT VISIT TO INPT: HCPCS | Performed by: INTERNAL MEDICINE

## 2025-05-02 PROCEDURE — 85025 COMPLETE CBC W/AUTO DIFF WBC: CPT

## 2025-05-02 PROCEDURE — 83735 ASSAY OF MAGNESIUM: CPT

## 2025-05-02 PROCEDURE — 2700000000 HC OXYGEN THERAPY PER DAY

## 2025-05-02 PROCEDURE — 97535 SELF CARE MNGMENT TRAINING: CPT

## 2025-05-02 PROCEDURE — 97530 THERAPEUTIC ACTIVITIES: CPT

## 2025-05-02 PROCEDURE — 2580000003 HC RX 258: Performed by: STUDENT IN AN ORGANIZED HEALTH CARE EDUCATION/TRAINING PROGRAM

## 2025-05-02 RX ORDER — ALBUTEROL SULFATE 0.83 MG/ML
2.5 SOLUTION RESPIRATORY (INHALATION) EVERY 6 HOURS PRN
Status: DISCONTINUED | OUTPATIENT
Start: 2025-05-02 | End: 2025-05-04 | Stop reason: HOSPADM

## 2025-05-02 RX ADMIN — POTASSIUM CHLORIDE 40 MEQ: 1500 TABLET, EXTENDED RELEASE ORAL at 12:42

## 2025-05-02 RX ADMIN — ATORVASTATIN CALCIUM 40 MG: 40 TABLET, FILM COATED ORAL at 20:06

## 2025-05-02 RX ADMIN — MECLIZINE 25 MG: 12.5 TABLET ORAL at 19:42

## 2025-05-02 RX ADMIN — DICLOFENAC SODIUM 2 G: 10 GEL TOPICAL at 19:42

## 2025-05-02 RX ADMIN — TIOTROPIUM BROMIDE INHALATION SPRAY 2 PUFF: 3.12 SPRAY, METERED RESPIRATORY (INHALATION) at 08:25

## 2025-05-02 RX ADMIN — GUAIFENESIN 600 MG: 600 TABLET, EXTENDED RELEASE ORAL at 20:06

## 2025-05-02 RX ADMIN — CETIRIZINE HYDROCHLORIDE 10 MG: 10 TABLET, FILM COATED ORAL at 08:39

## 2025-05-02 RX ADMIN — HYDROCORTISONE: 10 CREAM TOPICAL at 19:43

## 2025-05-02 RX ADMIN — ACETAMINOPHEN 650 MG: 325 TABLET ORAL at 19:41

## 2025-05-02 RX ADMIN — SODIUM CHLORIDE, PRESERVATIVE FREE 10 ML: 5 INJECTION INTRAVENOUS at 08:39

## 2025-05-02 RX ADMIN — FOLIC ACID 1 MG: 1 TABLET ORAL at 08:39

## 2025-05-02 RX ADMIN — LEVOTHYROXINE SODIUM 50 MCG: 0.05 TABLET ORAL at 06:08

## 2025-05-02 RX ADMIN — CLOPIDOGREL BISULFATE 75 MG: 75 TABLET, FILM COATED ORAL at 08:39

## 2025-05-02 RX ADMIN — ENOXAPARIN SODIUM 40 MG: 100 INJECTION SUBCUTANEOUS at 08:39

## 2025-05-02 RX ADMIN — FLUTICASONE PROPIONATE 2 SPRAY: 50 SPRAY, METERED NASAL at 08:40

## 2025-05-02 RX ADMIN — GUAIFENESIN 600 MG: 600 TABLET, EXTENDED RELEASE ORAL at 08:39

## 2025-05-02 RX ADMIN — CARVEDILOL 6.25 MG: 6.25 TABLET, FILM COATED ORAL at 20:06

## 2025-05-02 RX ADMIN — ACETAMINOPHEN 650 MG: 325 TABLET ORAL at 09:56

## 2025-05-02 RX ADMIN — ALBUTEROL SULFATE 2 PUFF: 90 AEROSOL, METERED RESPIRATORY (INHALATION) at 08:25

## 2025-05-02 RX ADMIN — SODIUM CHLORIDE 1500 MG: 0.9 INJECTION, SOLUTION INTRAVENOUS at 18:01

## 2025-05-02 RX ADMIN — FUROSEMIDE 20 MG: 20 TABLET ORAL at 08:39

## 2025-05-02 RX ADMIN — ALBUTEROL SULFATE 2 PUFF: 90 AEROSOL, METERED RESPIRATORY (INHALATION) at 21:38

## 2025-05-02 RX ADMIN — HYDROCORTISONE: 10 CREAM TOPICAL at 08:40

## 2025-05-02 RX ADMIN — CARVEDILOL 6.25 MG: 6.25 TABLET, FILM COATED ORAL at 08:39

## 2025-05-02 RX ADMIN — Medication 200 MG: at 08:39

## 2025-05-02 RX ADMIN — ALBUTEROL SULFATE 2 PUFF: 90 AEROSOL, METERED RESPIRATORY (INHALATION) at 15:26

## 2025-05-02 RX ADMIN — AMIODARONE HYDROCHLORIDE 200 MG: 200 TABLET ORAL at 08:39

## 2025-05-02 ASSESSMENT — PAIN SCALES - GENERAL
PAINLEVEL_OUTOF10: 7
PAINLEVEL_OUTOF10: 4
PAINLEVEL_OUTOF10: 8
PAINLEVEL_OUTOF10: 6
PAINLEVEL_OUTOF10: 7
PAINLEVEL_OUTOF10: 4

## 2025-05-02 ASSESSMENT — PAIN DESCRIPTION - DESCRIPTORS: DESCRIPTORS: ACHING

## 2025-05-02 ASSESSMENT — PAIN DESCRIPTION - LOCATION: LOCATION: HEAD

## 2025-05-02 NOTE — PLAN OF CARE
Problem: Safety - Adult  Goal: Free from fall injury  5/2/2025 0424 by July Alaniz RN  Outcome: Progressing  5/1/2025 1820 by Alfred Chavarria RN  Outcome: Progressing     Problem: Chronic Conditions and Co-morbidities  Goal: Patient's chronic conditions and co-morbidity symptoms are monitored and maintained or improved  5/2/2025 0424 by July Alaniz RN  Outcome: Progressing  5/1/2025 1820 by Alfred Chavarria RN  Outcome: Progressing     Problem: Discharge Planning  Goal: Discharge to home or other facility with appropriate resources  5/2/2025 0424 by July Alaniz RN  Outcome: Progressing  5/1/2025 1820 by Alfred Chavarria RN  Outcome: Progressing     Problem: Pain  Goal: Verbalizes/displays adequate comfort level or baseline comfort level  5/2/2025 0424 by July Alaniz RN  Outcome: Progressing  5/1/2025 1820 by Alfred Chavarria RN  Outcome: Progressing     Problem: Skin/Tissue Integrity  Goal: Skin integrity remains intact  Description: 1.  Monitor for areas of redness and/or skin breakdown2.  Assess vascular access sites hourly3.  Every 4-6 hours minimum:  Change oxygen saturation probe site4.  Every 4-6 hours:  If on nasal continuous positive airway pressure, respiratory therapy assess nares and determine need for appliance change or resting period  Outcome: Progressing

## 2025-05-02 NOTE — CARE COORDINATION
Case Management Assessment  Initial Evaluation    Date/Time of Evaluation: 5/2/2025 4:44 PM  Assessment Completed by: Almaz Martinez RN    If patient is discharged prior to next notation, then this note serves as note for discharge by case management.    Patient Name: Cristino Thomason                   YOB: 1950  Diagnosis: Left leg cellulitis [L03.116]  Cellulitis of left lower extremity [L03.116]  Diminished pulses in lower extremity [R09.89]                   Date / Time: 4/30/2025  1:53 AM    Patient Admission Status: Inpatient   Readmission Risk (Low < 19, Mod (19-27), High > 27): Readmission Risk Score: 15.6    Current PCP: Alicia Valencia MD  PCP verified by CM? Yes    Chart Reviewed: Yes      History Provided by: Patient  Patient Orientation: Alert and Oriented, Person, Place, Situation    Patient Cognition: Alert    Hospitalization in the last 30 days (Readmission):  No    If yes, Readmission Assessment in CM Navigator will be completed.    Advance Directives:      Code Status: Full Code   Patient's Primary Decision Maker is: Named in Scanned ACP Document    Primary Decision Maker: FERMÍN THOMASON JR - Child - [phone]    Discharge Planning:    Patient lives with: Alone Type of Home: House  Primary Care Giver: Self  Patient Support Systems include: Home Care Staff, Children   Current Financial resources: Medicaid, Medicare  Current community resources:    Current services prior to admission: Durable Medical Equipment, Oxygen Therapy, Other (Comment) (Aide for housework and grocery shopping)            Current DME: Oxygen Therapy (Comment), Wheelchair, Other (Comment) (slide board, mannual and electric wheelchair. Home O2 @ night via [employer])            Type of Home Care services:  Housekeeping    ADLS  Prior functional level: Assistance with the following:, Shopping, Housework  Current functional level: Housework, Shopping, Assistance with the following:    PT AM-PAC: 13

## 2025-05-03 LAB
ANION GAP SERPL CALCULATED.3IONS-SCNC: 7 MMOL/L (ref 9–16)
BASOPHILS # BLD: 0 K/UL (ref 0–0.2)
BASOPHILS NFR BLD: 0 % (ref 0–2)
BUN SERPL-MCNC: 7 MG/DL (ref 8–23)
CALCIUM SERPL-MCNC: 9.1 MG/DL (ref 8.6–10.4)
CHLORIDE SERPL-SCNC: 113 MMOL/L (ref 98–107)
CO2 SERPL-SCNC: 21 MMOL/L (ref 20–31)
CREAT SERPL-MCNC: 0.7 MG/DL (ref 0.7–1.2)
CRP SERPL HS-MCNC: 28.9 MG/L (ref 0–5)
EOSINOPHIL # BLD: 0.39 K/UL (ref 0–0.4)
EOSINOPHILS RELATIVE PERCENT: 4 % (ref 1–4)
ERYTHROCYTE [DISTWIDTH] IN BLOOD BY AUTOMATED COUNT: 17.8 % (ref 11.8–14.4)
ERYTHROCYTE [SEDIMENTATION RATE] IN BLOOD BY PHOTOMETRIC METHOD: 5 MM/HR (ref 0–20)
GFR, ESTIMATED: >90 ML/MIN/1.73M2
GLUCOSE SERPL-MCNC: 104 MG/DL (ref 74–99)
HCT VFR BLD AUTO: 32.6 % (ref 40.7–50.3)
HGB BLD-MCNC: 10.1 G/DL (ref 13–17)
IMM GRANULOCYTES # BLD AUTO: 0.1 K/UL (ref 0–0.3)
IMM GRANULOCYTES NFR BLD: 1 %
LYMPHOCYTES NFR BLD: 1.55 K/UL (ref 1–4.8)
LYMPHOCYTES RELATIVE PERCENT: 16 % (ref 24–44)
MCH RBC QN AUTO: 34.6 PG (ref 25.2–33.5)
MCHC RBC AUTO-ENTMCNC: 31 G/DL (ref 28.4–34.8)
MCV RBC AUTO: 111.6 FL (ref 82.6–102.9)
MONOCYTES NFR BLD: 1.16 K/UL (ref 0.1–0.8)
MONOCYTES NFR BLD: 12 % (ref 1–7)
MORPHOLOGY: ABNORMAL
MORPHOLOGY: ABNORMAL
NEUTROPHILS NFR BLD: 67 % (ref 36–66)
NEUTS SEG NFR BLD: 6.5 K/UL (ref 1.8–7.7)
NRBC BLD-RTO: 0 PER 100 WBC
PLATELET # BLD AUTO: 253 K/UL (ref 138–453)
PMV BLD AUTO: 8.5 FL (ref 8.1–13.5)
POTASSIUM SERPL-SCNC: 3.7 MMOL/L (ref 3.7–5.3)
RBC # BLD AUTO: 2.92 M/UL (ref 4.21–5.77)
SODIUM SERPL-SCNC: 141 MMOL/L (ref 136–145)
WBC OTHER # BLD: 9.7 K/UL (ref 3.5–11.3)

## 2025-05-03 PROCEDURE — 6360000002 HC RX W HCPCS: Performed by: STUDENT IN AN ORGANIZED HEALTH CARE EDUCATION/TRAINING PROGRAM

## 2025-05-03 PROCEDURE — 6370000000 HC RX 637 (ALT 250 FOR IP): Performed by: STUDENT IN AN ORGANIZED HEALTH CARE EDUCATION/TRAINING PROGRAM

## 2025-05-03 PROCEDURE — 80048 BASIC METABOLIC PNL TOTAL CA: CPT

## 2025-05-03 PROCEDURE — 36415 COLL VENOUS BLD VENIPUNCTURE: CPT

## 2025-05-03 PROCEDURE — 1200000000 HC SEMI PRIVATE

## 2025-05-03 PROCEDURE — 99232 SBSQ HOSP IP/OBS MODERATE 35: CPT | Performed by: STUDENT IN AN ORGANIZED HEALTH CARE EDUCATION/TRAINING PROGRAM

## 2025-05-03 PROCEDURE — 85025 COMPLETE CBC W/AUTO DIFF WBC: CPT

## 2025-05-03 PROCEDURE — 2500000003 HC RX 250 WO HCPCS: Performed by: STUDENT IN AN ORGANIZED HEALTH CARE EDUCATION/TRAINING PROGRAM

## 2025-05-03 PROCEDURE — 85652 RBC SED RATE AUTOMATED: CPT

## 2025-05-03 PROCEDURE — 86140 C-REACTIVE PROTEIN: CPT

## 2025-05-03 PROCEDURE — 2580000003 HC RX 258: Performed by: STUDENT IN AN ORGANIZED HEALTH CARE EDUCATION/TRAINING PROGRAM

## 2025-05-03 PROCEDURE — 94640 AIRWAY INHALATION TREATMENT: CPT

## 2025-05-03 RX ORDER — BENZONATATE 100 MG/1
100 CAPSULE ORAL 3 TIMES DAILY PRN
Qty: 21 CAPSULE | Refills: 0 | Status: SHIPPED | OUTPATIENT
Start: 2025-05-03 | End: 2025-05-10

## 2025-05-03 RX ADMIN — DOCUSATE SODIUM 100 MG: 100 CAPSULE, LIQUID FILLED ORAL at 09:47

## 2025-05-03 RX ADMIN — LEVOTHYROXINE SODIUM 50 MCG: 0.05 TABLET ORAL at 06:53

## 2025-05-03 RX ADMIN — SODIUM CHLORIDE 1500 MG: 0.9 INJECTION, SOLUTION INTRAVENOUS at 18:07

## 2025-05-03 RX ADMIN — HYDROCORTISONE: 10 CREAM TOPICAL at 21:08

## 2025-05-03 RX ADMIN — ALBUTEROL SULFATE 2 PUFF: 90 AEROSOL, METERED RESPIRATORY (INHALATION) at 21:15

## 2025-05-03 RX ADMIN — ATORVASTATIN CALCIUM 40 MG: 40 TABLET, FILM COATED ORAL at 21:06

## 2025-05-03 RX ADMIN — GUAIFENESIN 600 MG: 600 TABLET, EXTENDED RELEASE ORAL at 21:07

## 2025-05-03 RX ADMIN — DICLOFENAC SODIUM 2 G: 10 GEL TOPICAL at 21:08

## 2025-05-03 RX ADMIN — MECLIZINE 25 MG: 12.5 TABLET ORAL at 21:06

## 2025-05-03 RX ADMIN — SODIUM CHLORIDE, PRESERVATIVE FREE 10 ML: 5 INJECTION INTRAVENOUS at 21:07

## 2025-05-03 RX ADMIN — CLOPIDOGREL BISULFATE 75 MG: 75 TABLET, FILM COATED ORAL at 09:54

## 2025-05-03 RX ADMIN — TIOTROPIUM BROMIDE INHALATION SPRAY 2 PUFF: 3.12 SPRAY, METERED RESPIRATORY (INHALATION) at 09:35

## 2025-05-03 RX ADMIN — CETIRIZINE HYDROCHLORIDE 10 MG: 10 TABLET, FILM COATED ORAL at 09:54

## 2025-05-03 RX ADMIN — POLYETHYLENE GLYCOL 3350 17 G: 17 POWDER, FOR SOLUTION ORAL at 09:46

## 2025-05-03 RX ADMIN — ACETAMINOPHEN 650 MG: 325 TABLET ORAL at 16:16

## 2025-05-03 RX ADMIN — FUROSEMIDE 20 MG: 20 TABLET ORAL at 09:54

## 2025-05-03 RX ADMIN — FOLIC ACID 1 MG: 1 TABLET ORAL at 09:54

## 2025-05-03 RX ADMIN — CARVEDILOL 6.25 MG: 6.25 TABLET, FILM COATED ORAL at 21:07

## 2025-05-03 RX ADMIN — AMIODARONE HYDROCHLORIDE 200 MG: 200 TABLET ORAL at 09:53

## 2025-05-03 RX ADMIN — ALBUTEROL SULFATE 2 PUFF: 90 AEROSOL, METERED RESPIRATORY (INHALATION) at 09:35

## 2025-05-03 RX ADMIN — Medication 200 MG: at 09:54

## 2025-05-03 RX ADMIN — FLUTICASONE PROPIONATE 2 SPRAY: 50 SPRAY, METERED NASAL at 10:07

## 2025-05-03 RX ADMIN — GUAIFENESIN 600 MG: 600 TABLET, EXTENDED RELEASE ORAL at 09:53

## 2025-05-03 RX ADMIN — CARVEDILOL 6.25 MG: 6.25 TABLET, FILM COATED ORAL at 09:54

## 2025-05-03 RX ADMIN — ACETAMINOPHEN 650 MG: 325 TABLET ORAL at 10:06

## 2025-05-03 RX ADMIN — SODIUM CHLORIDE, PRESERVATIVE FREE 10 ML: 5 INJECTION INTRAVENOUS at 10:07

## 2025-05-03 ASSESSMENT — PAIN DESCRIPTION - ORIENTATION
ORIENTATION: LEFT
ORIENTATION: RIGHT;LEFT

## 2025-05-03 ASSESSMENT — PAIN DESCRIPTION - LOCATION
LOCATION: LEG
LOCATION: LEG

## 2025-05-03 ASSESSMENT — PAIN DESCRIPTION - DESCRIPTORS
DESCRIPTORS: ACHING;DISCOMFORT
DESCRIPTORS: ACHING

## 2025-05-03 ASSESSMENT — PAIN SCALES - GENERAL
PAINLEVEL_OUTOF10: 5
PAINLEVEL_OUTOF10: 7
PAINLEVEL_OUTOF10: 8
PAINLEVEL_OUTOF10: 7
PAINLEVEL_OUTOF10: 7

## 2025-05-03 NOTE — PLAN OF CARE
Problem: Safety - Adult  Goal: Free from fall injury  Outcome: Progressing     Problem: Chronic Conditions and Co-morbidities  Goal: Patient's chronic conditions and co-morbidity symptoms are monitored and maintained or improved  Outcome: Progressing     Problem: Discharge Planning  Goal: Discharge to home or other facility with appropriate resources  Outcome: Progressing     Problem: Pain  Goal: Verbalizes/displays adequate comfort level or baseline comfort level  Outcome: Progressing     Problem: Skin/Tissue Integrity  Goal: Skin integrity remains intact  Description: 1.  Monitor for areas of redness and/or skin breakdown2.  Assess vascular access sites hourly3.  Every 4-6 hours minimum:  Change oxygen saturation probe site4.  Every 4-6 hours:  If on nasal continuous positive airway pressure, respiratory therapy assess nares and determine need for appliance change or resting period  Outcome: Progressing     Problem: Respiratory - Adult  Goal: Achieves optimal ventilation and oxygenation  Outcome: Progressing

## 2025-05-03 NOTE — DISCHARGE INSTR - COC
Continuity of Care Form    Patient Name: Cristino Cherry   :  1950  MRN:  [id number]    Admit date:  2025  Discharge date:  24    Code Status Order: Full Code   Advance Directives:     Admitting Physician:  Kathya Anderson MD  PCP: Alicia Valencia MD    Discharging Nurse: Christian  [facility] Hospital Unit/Room#: 0241/0241-01  Discharging Unit Phone Number: [phone]    Emergency Contact:   Extended Emergency Contact Information  Primary Emergency Contact: FERMÍN CHERRY JR  Home Phone: [phone]  Mobile Phone: [phone]  Relation: Child    Past Surgical History:  Past Surgical History:   Procedure Laterality Date    ANKLE SURGERY      open reduction internal fixation of the right ankle & tibial plateau fx    CORONARY ANGIOPLASTY WITH STENT PLACEMENT N/A 10/10/2015    CYST REMOVAL      right side of face    FEMUR FRACTURE SURGERY Right 3/8/2022    RIGHT TFN HIP  INSERTION ,OPEN REDUCTION INTERNAL FIXATION RIGHT HIP  C-ARM, SYNTHES, CABLES , performed by Saulo Nichole DO at [facility] OR    HIP SURGERY Right 2022    RIGHT TFN HIP  INSERTION ,OPEN REDUCTION INTERNAL FIXATION RIGHT HIP  C-ARM, SYNTHES, CABLES     KNEE ARTHROPLASTY Right 2021    DISTAL FEMUR REPLACEMENT performed by Saulo Nichole DO at [facility] OR    KNEE SURGERY Right     total knee       Immunization History:   Immunization History   Administered Date(s) Administered    COVID-19, MODERNA BLUE border, Primary or Immunocompromised, (age 12y+), IM, 100 mcg/0.5mL 06/10/2021, 2021    COVID-19, MODERNA Bivalent, (age 12y+), IM, 50 mcg/0.5 mL 2022    Influenza Virus Vaccine 10/09/2012, 2014, 10/15/2015, 10/01/2019    Influenza Whole 2014    Influenza, FLUAD, (age 65 y+), IM, Quadv, 0.5mL 2022, 2023    Pneumococcal, PCV-13, PREVNAR 13, (age 6w+), IM, 0.5mL 2016    Pneumococcal, PPSV23, PNEUMOVAX 23, (age 2y+), SC/IM, 0.5mL 2022    TDaP, ADACEL (age 10y-64y), BOOSTRIX (age

## 2025-05-03 NOTE — PLAN OF CARE
Problem: Safety - Adult  Goal: Free from fall injury  5/3/2025 1752 by Christian Hutton RN  Outcome: Progressing  5/3/2025 0554 by July Alaniz RN  Outcome: Progressing     Problem: Chronic Conditions and Co-morbidities  Goal: Patient's chronic conditions and co-morbidity symptoms are monitored and maintained or improved  5/3/2025 1752 by Christian Hutton RN  Outcome: Progressing  5/3/2025 0554 by July Alaniz RN  Outcome: Progressing     Problem: Discharge Planning  Goal: Discharge to home or other facility with appropriate resources  5/3/2025 1752 by Christian Hutton RN  Outcome: Progressing  5/3/2025 0554 by July Alaniz RN  Outcome: Progressing     Problem: Pain  Goal: Verbalizes/displays adequate comfort level or baseline comfort level  5/3/2025 1752 by Christian Hutton RN  Outcome: Progressing  5/3/2025 0554 by July Alaniz RN  Outcome: Progressing     Problem: Skin/Tissue Integrity  Goal: Skin integrity remains intact  Description: 1.  Monitor for areas of redness and/or skin breakdown2.  Assess vascular access sites hourly3.  Every 4-6 hours minimum:  Change oxygen saturation probe site4.  Every 4-6 hours:  If on nasal continuous positive airway pressure, respiratory therapy assess nares and determine need for appliance change or resting period  5/3/2025 1752 by Christian Hutton RN  Outcome: Progressing  5/3/2025 0554 by July Alaniz RN  Outcome: Progressing     Problem: Respiratory - Adult  Goal: Achieves optimal ventilation and oxygenation  5/3/2025 1752 by Christian Hutton RN  Outcome: Progressing  5/3/2025 0554 by July Alaniz RN  Outcome: Progressing

## 2025-05-04 VITALS
SYSTOLIC BLOOD PRESSURE: 121 MMHG | WEIGHT: 179.9 LBS | RESPIRATION RATE: 18 BRPM | OXYGEN SATURATION: 97 % | DIASTOLIC BLOOD PRESSURE: 69 MMHG | HEART RATE: 74 BPM | HEIGHT: 70 IN | TEMPERATURE: 97.7 F | BODY MASS INDEX: 25.75 KG/M2

## 2025-05-04 LAB
ANION GAP SERPL CALCULATED.3IONS-SCNC: 8 MMOL/L (ref 9–16)
BASOPHILS # BLD: 0 K/UL (ref 0–0.2)
BASOPHILS NFR BLD: 0 % (ref 0–2)
BUN SERPL-MCNC: 7 MG/DL (ref 8–23)
CALCIUM SERPL-MCNC: 9.6 MG/DL (ref 8.6–10.4)
CHLORIDE SERPL-SCNC: 111 MMOL/L (ref 98–107)
CO2 SERPL-SCNC: 24 MMOL/L (ref 20–31)
CREAT SERPL-MCNC: 0.7 MG/DL (ref 0.7–1.2)
CRP SERPL HS-MCNC: 20.8 MG/L (ref 0–5)
EOSINOPHIL # BLD: 0.19 K/UL (ref 0–0.4)
EOSINOPHILS RELATIVE PERCENT: 2 % (ref 1–4)
ERYTHROCYTE [DISTWIDTH] IN BLOOD BY AUTOMATED COUNT: 18 % (ref 11.8–14.4)
ERYTHROCYTE [SEDIMENTATION RATE] IN BLOOD BY PHOTOMETRIC METHOD: 38 MM/HR (ref 0–20)
GFR, ESTIMATED: >90 ML/MIN/1.73M2
GLUCOSE SERPL-MCNC: 98 MG/DL (ref 74–99)
HCT VFR BLD AUTO: 33.4 % (ref 40.7–50.3)
HGB BLD-MCNC: 10.3 G/DL (ref 13–17)
IMM GRANULOCYTES # BLD AUTO: 0.1 K/UL (ref 0–0.3)
IMM GRANULOCYTES NFR BLD: 1 %
LYMPHOCYTES NFR BLD: 1.55 K/UL (ref 1–4.8)
LYMPHOCYTES RELATIVE PERCENT: 16 % (ref 24–44)
MAGNESIUM SERPL-MCNC: 1.9 MG/DL (ref 1.6–2.4)
MCH RBC QN AUTO: 34.9 PG (ref 25.2–33.5)
MCHC RBC AUTO-ENTMCNC: 30.8 G/DL (ref 28.4–34.8)
MCV RBC AUTO: 113.2 FL (ref 82.6–102.9)
MONOCYTES NFR BLD: 1.07 K/UL (ref 0.1–0.8)
MONOCYTES NFR BLD: 11 % (ref 1–7)
MORPHOLOGY: ABNORMAL
MORPHOLOGY: ABNORMAL
NEUTROPHILS NFR BLD: 70 % (ref 36–66)
NEUTS SEG NFR BLD: 6.79 K/UL (ref 1.8–7.7)
NRBC BLD-RTO: 0 PER 100 WBC
PLATELET # BLD AUTO: 275 K/UL (ref 138–453)
PMV BLD AUTO: 8.6 FL (ref 8.1–13.5)
POTASSIUM SERPL-SCNC: 3.4 MMOL/L (ref 3.7–5.3)
RBC # BLD AUTO: 2.95 M/UL (ref 4.21–5.77)
SODIUM SERPL-SCNC: 143 MMOL/L (ref 136–145)
VANCOMYCIN SERPL-MCNC: 30.9 UG/ML (ref 5–40)
WBC OTHER # BLD: 9.7 K/UL (ref 3.5–11.3)

## 2025-05-04 PROCEDURE — 6370000000 HC RX 637 (ALT 250 FOR IP): Performed by: STUDENT IN AN ORGANIZED HEALTH CARE EDUCATION/TRAINING PROGRAM

## 2025-05-04 PROCEDURE — 36415 COLL VENOUS BLD VENIPUNCTURE: CPT

## 2025-05-04 PROCEDURE — 94640 AIRWAY INHALATION TREATMENT: CPT

## 2025-05-04 PROCEDURE — 86140 C-REACTIVE PROTEIN: CPT

## 2025-05-04 PROCEDURE — 85025 COMPLETE CBC W/AUTO DIFF WBC: CPT

## 2025-05-04 PROCEDURE — 80048 BASIC METABOLIC PNL TOTAL CA: CPT

## 2025-05-04 PROCEDURE — 99232 SBSQ HOSP IP/OBS MODERATE 35: CPT | Performed by: INTERNAL MEDICINE

## 2025-05-04 PROCEDURE — 80202 ASSAY OF VANCOMYCIN: CPT

## 2025-05-04 PROCEDURE — 2500000003 HC RX 250 WO HCPCS: Performed by: STUDENT IN AN ORGANIZED HEALTH CARE EDUCATION/TRAINING PROGRAM

## 2025-05-04 PROCEDURE — 99239 HOSP IP/OBS DSCHRG MGMT >30: CPT | Performed by: STUDENT IN AN ORGANIZED HEALTH CARE EDUCATION/TRAINING PROGRAM

## 2025-05-04 PROCEDURE — 83735 ASSAY OF MAGNESIUM: CPT

## 2025-05-04 PROCEDURE — 85652 RBC SED RATE AUTOMATED: CPT

## 2025-05-04 RX ORDER — LINEZOLID 600 MG/1
600 TABLET, FILM COATED ORAL 2 TIMES DAILY
Qty: 14 TABLET | Refills: 0 | Status: SHIPPED | OUTPATIENT
Start: 2025-05-04 | End: 2025-05-11

## 2025-05-04 RX ADMIN — FLUTICASONE PROPIONATE 2 SPRAY: 50 SPRAY, METERED NASAL at 10:14

## 2025-05-04 RX ADMIN — ALBUTEROL SULFATE 2 PUFF: 90 AEROSOL, METERED RESPIRATORY (INHALATION) at 07:52

## 2025-05-04 RX ADMIN — LEVOTHYROXINE SODIUM 50 MCG: 0.05 TABLET ORAL at 06:25

## 2025-05-04 RX ADMIN — CLOPIDOGREL BISULFATE 75 MG: 75 TABLET, FILM COATED ORAL at 10:14

## 2025-05-04 RX ADMIN — SODIUM CHLORIDE, PRESERVATIVE FREE 10 ML: 5 INJECTION INTRAVENOUS at 10:15

## 2025-05-04 RX ADMIN — GUAIFENESIN 600 MG: 600 TABLET, EXTENDED RELEASE ORAL at 10:14

## 2025-05-04 RX ADMIN — CETIRIZINE HYDROCHLORIDE 10 MG: 10 TABLET, FILM COATED ORAL at 10:14

## 2025-05-04 RX ADMIN — MECLIZINE 25 MG: 12.5 TABLET ORAL at 10:28

## 2025-05-04 RX ADMIN — TIOTROPIUM BROMIDE INHALATION SPRAY 2 PUFF: 3.12 SPRAY, METERED RESPIRATORY (INHALATION) at 07:52

## 2025-05-04 RX ADMIN — FOLIC ACID 1 MG: 1 TABLET ORAL at 10:14

## 2025-05-04 RX ADMIN — CARVEDILOL 6.25 MG: 6.25 TABLET, FILM COATED ORAL at 10:14

## 2025-05-04 RX ADMIN — AMIODARONE HYDROCHLORIDE 200 MG: 200 TABLET ORAL at 10:14

## 2025-05-04 RX ADMIN — DICLOFENAC SODIUM 2 G: 10 GEL TOPICAL at 06:25

## 2025-05-04 RX ADMIN — FUROSEMIDE 20 MG: 20 TABLET ORAL at 10:14

## 2025-05-04 RX ADMIN — Medication 200 MG: at 10:14

## 2025-05-04 ASSESSMENT — PAIN SCALES - GENERAL: PAINLEVEL_OUTOF10: 5

## 2025-05-04 NOTE — PLAN OF CARE
Problem: Safety - Adult  Goal: Free from fall injury  5/4/2025 0433 by July Alaniz RN  Outcome: Progressing  5/3/2025 1752 by Christian Hutton RN  Outcome: Progressing     Problem: Chronic Conditions and Co-morbidities  Goal: Patient's chronic conditions and co-morbidity symptoms are monitored and maintained or improved  5/4/2025 0433 by July Alaniz RN  Outcome: Progressing  5/3/2025 1752 by Christian Hutton RN  Outcome: Progressing     Problem: Discharge Planning  Goal: Discharge to home or other facility with appropriate resources  5/4/2025 0433 by July Alaniz RN  Outcome: Progressing  5/3/2025 1752 by Christian Hutton RN  Outcome: Progressing     Problem: Pain  Goal: Verbalizes/displays adequate comfort level or baseline comfort level  5/4/2025 0433 by July Alaniz RN  Outcome: Progressing  5/3/2025 1752 by Christian Hutton RN  Outcome: Progressing     Problem: Skin/Tissue Integrity  Goal: Skin integrity remains intact  Description: 1.  Monitor for areas of redness and/or skin breakdown2.  Assess vascular access sites hourly3.  Every 4-6 hours minimum:  Change oxygen saturation probe site4.  Every 4-6 hours:  If on nasal continuous positive airway pressure, respiratory therapy assess nares and determine need for appliance change or resting period  5/4/2025 0433 by July Alaniz RN  Outcome: Progressing  5/3/2025 1752 by Christian Hutton RN  Outcome: Progressing     Problem: Respiratory - Adult  Goal: Achieves optimal ventilation and oxygenation  5/4/2025 0433 by July Alaniz RN  Outcome: Progressing  5/3/2025 1752 by Christian Hutton RN  Outcome: Progressing
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
## 2025-05-04 NOTE — PLAN OF CARE
Problem: Safety - Adult  Goal: Free from fall injury  5/4/2025 1532 by Christian Hutton RN  Outcome: Adequate for Discharge  5/4/2025 0433 by July Alaniz RN  Outcome: Progressing     Problem: Chronic Conditions and Co-morbidities  Goal: Patient's chronic conditions and co-morbidity symptoms are monitored and maintained or improved  5/4/2025 1532 by Christian Hutton RN  Outcome: Adequate for Discharge  5/4/2025 0433 by July Alaniz RN  Outcome: Progressing     Problem: Discharge Planning  Goal: Discharge to home or other facility with appropriate resources  5/4/2025 1532 by Christian Hutton RN  Outcome: Adequate for Discharge  5/4/2025 0433 by July Alaniz RN  Outcome: Progressing     Problem: Pain  Goal: Verbalizes/displays adequate comfort level or baseline comfort level  5/4/2025 1532 by Christian Hutton RN  Outcome: Adequate for Discharge  5/4/2025 0433 by July Alaniz RN  Outcome: Progressing     Problem: Skin/Tissue Integrity  Goal: Skin integrity remains intact  Description: 1.  Monitor for areas of redness and/or skin breakdown2.  Assess vascular access sites hourly3.  Every 4-6 hours minimum:  Change oxygen saturation probe site4.  Every 4-6 hours:  If on nasal continuous positive airway pressure, respiratory therapy assess nares and determine need for appliance change or resting period  5/4/2025 1532 by Christian Hutton RN  Outcome: Adequate for Discharge  5/4/2025 0433 by July Alaniz RN  Outcome: Progressing     Problem: Respiratory - Adult  Goal: Achieves optimal ventilation and oxygenation  5/4/2025 1532 by Christian Hutton RN  Outcome: Adequate for Discharge  5/4/2025 0433 by July Alaniz RN  Outcome: Progressing

## 2025-05-04 NOTE — DISCHARGE SUMMARY
[facility]  Office: [phone]  Shelton Valadez DO, Jorge Wright DO, Erwin Ruelas DO, Shekhar Oakes DO, Ninfa Huitron MD, Earline Millan MD, Janet Moreno MD, Sigrid Sweeney MD,  Eduardo Mcnulty MD, Poncho Conway MD, Bogdan Mazariegos MD,  Carmelina Sheppard DO, Chinyere Hamlin MD, Geremias Potts MD, Bryce Valaedz DO, Camila Begum MD,  Ariel Hodge DO, Haily Valladares MD, Lora Jiménez MD, Louie Segal MD,  Anders Ramos MD, Darcie Andrea MD, Terry Meléndez MD, Clifford Connor MD, Ishan Joshi MD, Kathya Anderson MD, Segundo Galindo DO, Betty Farrell MD, Cuong Moncada MD, Carmelina Sethi MD, Mohsin Reza, MD, Juanito Wise MD, Shirley Waterhouse, CNP,  Velma Barcenas, CNP, Segundo Vazquez, CNP,  Monica Barnett, DNP, Radha Santos, CNP, Shreya Ramirez, CNP, Adali Zee, CNP, Rachael Berry, CNP, Zahraa Smith, PA-C, Laura Rico, CNP, Rodrigo Sanchez, CNP,  Karen Valencia, CNP, Sommer Hernandez, CNP, Ru Guardado, PA-C, Jasmyn Junior, CNP,  Carrie Cevallos, CNS, Amelia Oliva, CNP, Ava Damon, CNP,   Pilar Lopez, CNP         [facility]   IN-PATIENT SERVICE   [facility]    Discharge Summary     Patient ID: Cristino Cherry  :  1950   MRN: [id number]     ACCOUNT:  [id number]   Patient's PCP: Alicia Valencia MD  Admit Date: 2025   Discharge Date: 2025     Length of Stay: 4  Code Status:  Full Code  Admitting Physician: Kathya Anderson MD  Discharge Physician: Kathya Anderson MD     Active Discharge Diagnoses:     Hospital Problem Lists:  Principal Problem:    Cellulitis of left lower extremity  Active Problems:    Chronic systolic (congestive) heart failure    Meniere's disease    COPD (chronic obstructive pulmonary disease) ([facility])    HTN (hypertension)    Chronic obstructive pulmonary disease ([facility])    Anemia, normocytic normochromic    Hyperlipemia    CAD (coronary artery disease)    Paroxysmal A-fib ([facility])    MRSA infection    Staph

## 2025-05-04 NOTE — CARE COORDINATION
Case Management   Daily Progress Note       Patient Name: Cristino Cherry                   YOB: 1950  Diagnosis: Left leg cellulitis [L03.116]  Cellulitis of left lower extremity [L03.116]  Diminished pulses in lower extremity [R09.89]                       GMLOS: 3.1 days  Length of Stay: 4  days    Anticipated Discharge Date: Ready for discharge    Readmission Risk (Low < 19, Mod (19-27), High > 27): Readmission Risk Score: 16.2        Current Transitional Plan    [] Home Independently    [x] Home with HC    [] Skilled Nursing Facility    [] Acute Rehabilitation    [] Long Term Acute Care (LTAC)    [] Other:     Plan for the Stay (Medical Management) :          Workflow Continuation (Additional Notes) :  1130 needs [employer] to go home-called [employer]-they need 24 hour notice. Set up ride for 5-5-2025 for 3PM.1140 called [facility]-they need 24 hour notice. 1143 called [facility] and talked with Gurmeet-she can tranpsport today at 3PM. 1150 called Melly with [facility]-informed her of patient's discharge-they will pull needed information from the chart. 1155 called [employer] and cancelled trip.      Sommer Gimenez RN  May 4, 2025

## 2025-05-05 ENCOUNTER — TELEPHONE (OUTPATIENT)
Dept: FAMILY MEDICINE CLINIC | Age: 75
End: 2025-05-05

## 2025-05-05 ENCOUNTER — CARE COORDINATION (OUTPATIENT)
Dept: CARE COORDINATION | Age: 75
End: 2025-05-05

## 2025-05-05 DIAGNOSIS — L03.116 CELLULITIS OF LEFT LOWER EXTREMITY: Primary | ICD-10-CM

## 2025-05-05 NOTE — TELEPHONE ENCOUNTER
Care Transitions Initial Follow Up Call    Outreach made within 2 business days of discharge: Yes    Patient: Cristino Cherry Patient : 1950   MRN: [id number]  Reason for Admission: foot wound  Discharge Date: 25       Spoke with: Ulices    Discharge department/facility: [facility] Patient Contact:  Was patient able to fill all prescriptions: Yes  Was patient instructed to bring all medications to the follow-up visit: Yes  Is patient taking all medications as directed in the discharge summary? Yes  Does patient understand their discharge instructions: Yes  Does patient have questions or concerns that need addressed prior to 7-14 day follow up office visit: no    Additional needs identified to be addressed with provider               Scheduled appointment with PCP within 7-14 days    Follow Up  Future Appointments   Date Time Provider Department Center   2025  1:30 PM [facility] CT ROOM 1 [facility] CT SCAN [facility] Radiolog   2025 11:20 AM Sivakumar Mathew MD Resp Spec [facility]   2025  1:45 PM Alicia Valencia MD [facility] ECC DEP   2025  1:00 PM Simone Palomo MD AFL [facility] Dean MA

## 2025-05-05 NOTE — CARE COORDINATION
Care Transitions Note    Initial Call - Call within 2 business days of discharge: Yes    Patient Current Location:  Home: [address] [place] [address]    Care Transition Nurse contacted the patient by telephone to perform post hospital discharge assessment, verified name and  as identifiers.  Provided introduction to self, and explanation of the Care Transition Nurse role.    Patient: Cristino Cherry    Patient : 1950   MRN: [id number]    Reason for Admission: Cellulitis of left lower extremity ...  Discharge Date: 25  RURS: Readmission Risk Score: 15.6      Last Discharge Facility       Date Complaint Diagnosis Description Type Department Provider    25 Foot Pain Cellulitis of left lower extremity ... ED to Hosp-Admission (Discharged) (ADMITTED) [facility] Kathya Anderson MD; Herb Bonilla...            Was this an external facility discharge? No    Additional needs identified to be addressed with provider                 Method of communication with provider:    .    Patients top risk factors for readmission: functional cognitive ability, functional physical ability, and medication management    Interventions to address risk factors:   Education: Record daily weights.  Notify your doctor if you have a weight gain 2 pounds in a day, or 3-5 pounds in 1 week. Notify your doctor for  increased shortness of breath, or swelling in legs or feet.  Follow a low sodium diet.  Resume normal activity unless otherwise instructed by your doctor.  If you have a bacterial infection, your doctor may prescribe an antibiotic. Antibiotics are powerful drugs and  when used correctly, they can save lives. But like all medications, they can have side effects.  FINISH TAKING ALL YOUR ANTIBIOTICS  Feeling better doesn't mean your infection is gone and if you stop taking your antibiotics without your  physician's direction, your infection could still be active in your body, causing you to develop an antibiotic  resistant

## 2025-05-05 NOTE — TELEPHONE ENCOUNTER
OK to order wound care. Are the Tubi  going to be knee high? How long do they need to be, and how often are they to be changed?

## 2025-05-05 NOTE — TELEPHONE ENCOUNTER
Marsha is calling in regards to Ulices was in the hospital because of wounds, recent discharge  Marsha feels Ulices would benefit from Tubi , he is having a hard time with changing dressings. She would also like to get a referral for wound clinic. He does see POD.

## 2025-05-06 ENCOUNTER — TELEPHONE (OUTPATIENT)
Dept: FAMILY MEDICINE CLINIC | Age: 75
End: 2025-05-06

## 2025-05-06 NOTE — TELEPHONE ENCOUNTER
Patient is calling asking for an order to be placed for the following:  XL pull-ups  Box of large latex gloves  box of XL tape on briefs  Wipes    Please advise

## 2025-05-06 NOTE — TELEPHONE ENCOUNTER
States they are knee high. States the hospital wants them changed daily but she is not sure if that is necessary so that is why she is wanting patient to be seen at a wound care clinic.  States if you are agreeable to the Tubi , [facility] is able to take a verbal order and they will get them ordered.    [phone]

## 2025-05-06 NOTE — TELEPHONE ENCOUNTER
Tamika with [facility] wound care called stating that they have reached out to patient to schedule for would care. Patient refused stating that he is extremely handicap, and has a hard time getting  around and has home care that is taking care of the wound.    Wilmer [facility] wound care [phone]

## 2025-05-06 NOTE — TELEPHONE ENCOUNTER
Wound care referral was made at the request of home health - so he needs to speak with them before deciding the care they are able to provide is adequate

## 2025-05-09 DIAGNOSIS — Z87.81 HISTORY OF HIP FRACTURE: ICD-10-CM

## 2025-05-09 NOTE — TELEPHONE ENCOUNTER
Last Visit:  9/3/2024     Next Visit Date:  Future Appointments   Date Time Provider Department Center   5/19/2025  1:45 PM Alicia Valencia MD [facility]   5/28/2025  3:00 PM [facility] CT ROOM 1 [facility] CT SCAN [facility] Radiolog   6/5/2025 12:20 PM Sivakumar Mathew MD Resp Spec [facility]   8/14/2025  1:00 PM Simone Palomo MD [facility] TOLE [facility] CARIN C

## 2025-05-13 ENCOUNTER — TELEPHONE (OUTPATIENT)
Dept: FAMILY MEDICINE CLINIC | Age: 75
End: 2025-05-13

## 2025-05-13 NOTE — TELEPHONE ENCOUNTER
Salt water gargling, throat lozenges, etc. - supportive care advised. We can check for other things when he is seen next week if still persistent

## 2025-05-13 NOTE — TELEPHONE ENCOUNTER
Ulices called about a sore throat he has developed since his has completed his antibiotics from the hospital. No other symptoms just a sore throat.    Next appointment is on Monday 5/19/25.    Please call him back with advise on what to do.    Thank you.

## 2025-05-14 ENCOUNTER — CARE COORDINATION (OUTPATIENT)
Dept: CARE COORDINATION | Age: 75
End: 2025-05-14

## 2025-05-14 NOTE — CARE COORDINATION
Care Transitions Note    Follow Up Call     Attempted to reach patient for transitions of care follow up.  Unable to reach patient.      Outreach Attempts:   HIPAA compliant voicemail left for patient.     Care Summary Note:     Follow Up Appointment:   Future Appointments         Provider Specialty Dept Phone    5/19/2025 1:45 PM Alicia Valencia MD Family Medicine [phone]    5/28/2025 3:00 PM (Arrive by 2:45 PM) [facility] CT ROOM 1 Radiology [phone]    6/5/2025 12:20 PM Sivakumar Mathew MD Pulmonology [phone]    8/14/2025 1:00 PM Simone Palomo MD Cardiology [phone]            Plan for follow-up on next business day.  based on severity of symptoms and risk factors. Plan for next call:  2nd attempt sub call    Khushbu Ryan RN

## 2025-05-15 ENCOUNTER — TELEPHONE (OUTPATIENT)
Age: 75
End: 2025-05-15

## 2025-05-15 ENCOUNTER — CARE COORDINATION (OUTPATIENT)
Dept: CARE COORDINATION | Age: 75
End: 2025-05-15

## 2025-05-15 DIAGNOSIS — I50.22 CHRONIC SYSTOLIC (CONGESTIVE) HEART FAILURE (HCC): ICD-10-CM

## 2025-05-15 DIAGNOSIS — K59.03 THERAPEUTIC OPIOID-INDUCED CONSTIPATION (OIC): ICD-10-CM

## 2025-05-15 DIAGNOSIS — T40.2X5A THERAPEUTIC OPIOID-INDUCED CONSTIPATION (OIC): ICD-10-CM

## 2025-05-15 DIAGNOSIS — I42.9 CARDIOMYOPATHY, UNSPECIFIED TYPE (HCC): ICD-10-CM

## 2025-05-15 DIAGNOSIS — I10 PRIMARY HYPERTENSION: ICD-10-CM

## 2025-05-15 NOTE — TELEPHONE ENCOUNTER
Received call from Jennifer with [facility] OT. She called to report that patient had a \"slide\" from bed during transfer from bed to chair. Patient had to call EMS to assist in getting up to chair.      Jennifer PH: [phone]

## 2025-05-15 NOTE — CARE COORDINATION
Completed    Occupational Therapy: Completed       Specialty Service Referral: Declined    Other Interventions:              Follow Up Appointment:   Reviewed upcoming appointment(s).  Future Appointments         Provider Specialty Dept Phone    5/19/2025 1:45 PM Alicia Valencia MD Family Medicine [phone]    5/28/2025 3:00 PM (Arrive by 2:45 PM) [facility] CT ROOM 1 Radiology [phone]    6/5/2025 12:20 PM Sivakumar Mathew MD Pulmonology [phone]    8/14/2025 1:00 PM Simone Palomo MD Cardiology [phone]            Care Transition Nurse provided contact information.  Plan for follow-up call in 6-10 days based on severity of symptoms and risk factors.  Plan for next call: self management-review PCP progress notes for any treatment or medication changes, review ACP, check progress of wound healing.      Nataly Nichole RN

## 2025-05-16 RX ORDER — MULTIVIT-MIN/FA/LYCOPEN/LUTEIN .4-300-25
TABLET ORAL
Qty: 30 TABLET | Refills: 11 | Status: SHIPPED | OUTPATIENT
Start: 2025-05-16

## 2025-05-16 RX ORDER — CARVEDILOL 6.25 MG/1
TABLET ORAL
Qty: 60 TABLET | Refills: 11 | Status: SHIPPED | OUTPATIENT
Start: 2025-05-16

## 2025-05-16 RX ORDER — FUROSEMIDE 20 MG/1
TABLET ORAL
Qty: 30 TABLET | Refills: 11 | Status: SHIPPED | OUTPATIENT
Start: 2025-05-16

## 2025-05-16 RX ORDER — DOCUSATE SODIUM 100 MG/1
CAPSULE, LIQUID FILLED ORAL
Qty: 60 CAPSULE | Refills: 11 | Status: SHIPPED | OUTPATIENT
Start: 2025-05-16

## 2025-05-16 RX ORDER — CLOPIDOGREL BISULFATE 75 MG/1
TABLET ORAL
Qty: 30 TABLET | Refills: 11 | Status: SHIPPED | OUTPATIENT
Start: 2025-05-16

## 2025-05-16 NOTE — TELEPHONE ENCOUNTER
Ok to continue monitoring as long as he has no injury. He should look into getting a life alert button if he doesn't already have one

## 2025-05-16 NOTE — TELEPHONE ENCOUNTER
Last visit: 09/03/2024  Last Med refill: 04/11/2025  Does patient have enough medication for 72 hours: No:     Next Visit Date:  Future Appointments   Date Time Provider Department Center   5/19/2025  1:45 PM Alicia Valencia MD [facility] ECC DEP   5/28/2025  3:00 PM [facility] CT ROOM 1 [facility] CT SCAN [facility] Radiolog   6/5/2025 12:20 PM Sivakumar Mathew MD Resp Spec [facility]   8/14/2025  1:00 PM Simone Palomo MD [facility] TOLE AFL DEL ROSARIO C       Health Maintenance   Topic Date Due    Lung Cancer Screening &/or Counseling  Never done    AAA screen  Never done    Lipids  02/19/2023    Annual Wellness Visit (Medicare Advantage)  01/01/2025    Depression Screen  04/05/2025    Colorectal Cancer Screen  06/16/2025    Shingles vaccine (2 of 2) 08/05/2025 (Originally 3/13/2023)    Respiratory Syncytial Virus (RSV) Pregnant or age 60 yrs+ (1 - Risk 60-74 years 1-dose series) 08/05/2025 (Originally 1/7/2012)    COVID-19 Vaccine (4 - 2024-25 season) 09/03/2025 (Originally 9/1/2024)    Flu vaccine (Season Ended) 08/01/2025    DTaP/Tdap/Td vaccine (2 - Td or Tdap) 03/23/2030    Pneumococcal 50+ years Vaccine  Completed    Hepatitis C screen  Completed    Hepatitis A vaccine  Aged Out    Hepatitis B vaccine  Aged Out    Hib vaccine  Aged Out    Polio vaccine  Aged Out    Meningococcal (ACWY) vaccine  Aged Out    Meningococcal B vaccine  Aged Out    A1C test (Diabetic or Prediabetic)  Discontinued       Hemoglobin A1C (%)   Date Value   03/21/2023 5.4   02/19/2022 6.0   05/05/2020 6.4 (H)             ( goal A1C is < 7)   No components found for: \"LABMICR\"  No components found for: \"LDLCHOLESTEROL\", \"LDLCALC\"    (goal LDL is <100)   AST (U/L)   Date Value   11/14/2022 28     ALT (U/L)   Date Value   11/14/2022 15     BUN (mg/dL)   Date Value   05/04/2025 7 (L)     BP Readings from Last 3 Encounters:   05/04/25 121/69   04/16/25 130/62   01/30/25 120/64          (goal 120/80)    All Future Testing planned in [facility]  Lab Frequency

## 2025-05-21 ENCOUNTER — OFFICE VISIT (OUTPATIENT)
Dept: FAMILY MEDICINE CLINIC | Age: 75
End: 2025-05-21
Payer: COMMERCIAL

## 2025-05-21 VITALS
HEIGHT: 70 IN | HEART RATE: 66 BPM | SYSTOLIC BLOOD PRESSURE: 116 MMHG | OXYGEN SATURATION: 99 % | DIASTOLIC BLOOD PRESSURE: 62 MMHG | WEIGHT: 167 LBS | BODY MASS INDEX: 23.91 KG/M2

## 2025-05-21 DIAGNOSIS — K21.9 GASTROESOPHAGEAL REFLUX DISEASE WITHOUT ESOPHAGITIS: ICD-10-CM

## 2025-05-21 DIAGNOSIS — L03.116 CELLULITIS OF LEFT LOWER EXTREMITY: ICD-10-CM

## 2025-05-21 DIAGNOSIS — Z09 HOSPITAL DISCHARGE FOLLOW-UP: Primary | ICD-10-CM

## 2025-05-21 PROCEDURE — 1123F ACP DISCUSS/DSCN MKR DOCD: CPT | Performed by: STUDENT IN AN ORGANIZED HEALTH CARE EDUCATION/TRAINING PROGRAM

## 2025-05-21 PROCEDURE — 99214 OFFICE O/P EST MOD 30 MIN: CPT | Performed by: STUDENT IN AN ORGANIZED HEALTH CARE EDUCATION/TRAINING PROGRAM

## 2025-05-21 PROCEDURE — 3074F SYST BP LT 130 MM HG: CPT | Performed by: STUDENT IN AN ORGANIZED HEALTH CARE EDUCATION/TRAINING PROGRAM

## 2025-05-21 PROCEDURE — 3078F DIAST BP <80 MM HG: CPT | Performed by: STUDENT IN AN ORGANIZED HEALTH CARE EDUCATION/TRAINING PROGRAM

## 2025-05-21 RX ORDER — FAMOTIDINE 20 MG/1
20 TABLET, FILM COATED ORAL DAILY
Qty: 30 TABLET | Refills: 3 | Status: SHIPPED | OUTPATIENT
Start: 2025-05-21

## 2025-05-21 RX ORDER — POTASSIUM CHLORIDE 750 MG/1
10 TABLET, EXTENDED RELEASE ORAL DAILY
COMMUNITY
Start: 2025-05-15

## 2025-05-21 RX ORDER — IBUPROFEN 200 MG
CAPSULE ORAL 4 TIMES DAILY
COMMUNITY
Start: 2025-02-17

## 2025-05-21 ASSESSMENT — PATIENT HEALTH QUESTIONNAIRE - PHQ9
2. FEELING DOWN, DEPRESSED OR HOPELESS: SEVERAL DAYS
1. LITTLE INTEREST OR PLEASURE IN DOING THINGS: NOT AT ALL
SUM OF ALL RESPONSES TO PHQ QUESTIONS 1-9: 1

## 2025-05-21 NOTE — PROGRESS NOTES
Cristino Cherry (:  1950) is a 75 y.o. male,Established patient, here for evaluation of the following chief complaint(s):  Follow-Up from Hospital ([facility] - - Cellulitis L lower extremity )         Assessment & Plan  Hospital discharge follow-up   Seen in hospital on 2025 due to left foot cellulitis   Patient ws discharged on one week of Zyvox after ID consultation and was recommended to follow up with podiatry which patient has not yet scheduled  -patient has taken full course of antibiotics   -podiatry referral given to patient          Cellulitis of left lower extremity   S/p hospital stay was seen for left foot cellulitis and osteomyelitis was ruled out   Patient ws discharged on one week of Zyvox after ID consultation and was recommended to follow up with podiatry which patient has not yet scheduled  -patient has taken full course of antibiotics   -podiatry referral given to patient   -Home care nurse following up at home and cleaning area  - PE: no induration, erythema or tenderness noted   Discussed importance of following up with podiatry, all concerns were addressed          Gastroesophageal reflux disease without esophagitis   Acid reflux ongoing for a few weeks   Discussed diet and not eating at night at least 2 hours prior to going to bed  Discussed food and drinks that can flare  Will start on pepcid and patient to follow up with pcp            No follow-ups on file.       Subjective   75 yr old here for follow up eft foot wounds with cellulitis, was evaluated in the hospital. Per patient has yet to schedule his appointment with podiatry and was given one week of zyvox which patient has beenand took. Per patient does have a home care nurse coming and checking his wound. Feels that it has significantly improved from prior. Denies any fever, discharge, no tenderness. Swelling has improved along with redness. In addition patient having acid reflux symptoms which have not been going away

## 2025-05-22 NOTE — TELEPHONE ENCOUNTER
LAST VISIT: 11/8/24 with CAROL Monge  NEXT VISIT: 6/5/25 with Dr Mathew    Per last dictation patient to continue this medication. Please sign for refill if ok. Thank you.

## 2025-05-23 ENCOUNTER — CARE COORDINATION (OUTPATIENT)
Dept: CARE COORDINATION | Age: 75
End: 2025-05-23

## 2025-05-23 RX ORDER — TIOTROPIUM BROMIDE INHALATION SPRAY 3.12 UG/1
SPRAY, METERED RESPIRATORY (INHALATION)
Qty: 3 EACH | Refills: 1 | Status: SHIPPED | OUTPATIENT
Start: 2025-05-23

## 2025-05-23 NOTE — CARE COORDINATION
(Arrive by 2:45 PM) [facility] CT ROOM 1 Radiology [phone]    6/5/2025 12:20 PM Sivakumar Mathew MD Pulmonology [phone]    6/25/2025 2:00 PM Alicia Valencia MD Family Medicine [phone]    8/14/2025 1:00 PM Simone Palomo MD Cardiology [phone]            Patient graduated from the Care Transitions program on 05/23/25.      Patient has agreed to contact primary care provider and/or specialist for any further questions, concerns, or needs.    Amanda Morales LPN

## 2025-05-27 ENCOUNTER — TELEPHONE (OUTPATIENT)
Dept: PODIATRY | Age: 75
End: 2025-05-27

## 2025-05-28 NOTE — ASSESSMENT & PLAN NOTE
S/p hospital stay was seen for left foot cellulitis and osteomyelitis was ruled out   Patient ws discharged on one week of Zyvox after ID consultation and was recommended to follow up with podiatry which patient has not yet scheduled  -patient has taken full course of antibiotics   -podiatry referral given to patient   -Home care nurse following up at home and cleaning area  - PE: no induration, erythema or tenderness noted   Discussed importance of following up with podiatry, all concerns were addressed

## 2025-06-04 ENCOUNTER — TELEPHONE (OUTPATIENT)
Dept: FAMILY MEDICINE CLINIC | Age: 75
End: 2025-06-04

## 2025-06-05 ENCOUNTER — TELEPHONE (OUTPATIENT)
Dept: FAMILY MEDICINE CLINIC | Age: 75
End: 2025-06-05

## 2025-06-05 DIAGNOSIS — E87.6 DIURETIC-INDUCED HYPOKALEMIA: Primary | ICD-10-CM

## 2025-06-05 DIAGNOSIS — T50.2X5A DIURETIC-INDUCED HYPOKALEMIA: Primary | ICD-10-CM

## 2025-06-05 NOTE — TELEPHONE ENCOUNTER
Pt would like his potassium to be sent over in liquid form. He is unable to swallow the pills.   Writer could not find RX in system.

## 2025-06-06 RX ORDER — POTASSIUM CHLORIDE 20 MEQ/15ML
10 SOLUTION ORAL DAILY
Qty: 225 ML | Refills: 2 | Status: SHIPPED | OUTPATIENT
Start: 2025-06-06 | End: 2025-09-04

## 2025-06-10 RX ORDER — HYDROCORTISONE 25 MG/G
OINTMENT TOPICAL
Qty: 85.05 G | Refills: 2 | Status: SHIPPED | OUTPATIENT
Start: 2025-06-10

## 2025-06-10 NOTE — TELEPHONE ENCOUNTER
[facility]  Lab Frequency Next Occurrence   CT Lung Screen (Initial/Annual/Baseline) Once 11/08/2024               Patient Active Problem List:     Meniere's disease     COPD (chronic obstructive pulmonary disease) ([facility])     Narcolepsy     PND (post-nasal drip)     Transaminasemia     Cardiomyopathy ([facility])     HTN (hypertension)     Chronic obstructive pulmonary disease ([facility])     Dementia with behavioral disturbance ([facility])     S/p bare metal coronary artery stent     Supracondylar fracture of distal end of femur without intracondylar extension, sequela     Closed fracture of right distal femur ([facility])     Presence of permanent cardiac pacemaker     Anemia, normocytic normochromic     History of hip fracture     Atelectasis     Chronic systolic (congestive) heart failure     Opioid dependence with current use ([facility])     Flexion contracture of right knee     Xeroderma     Chronic prescription opiate use     Long term (current) use of antithrombotics/antiplatelets     Severe comorbid illness     Alcohol abuse     Chronic pain syndrome     Cellulitis of left lower extremity     Hyperlipemia     CAD (coronary artery disease)     Paroxysmal A-fib ([facility])     MRSA infection     Staph aureus infection

## 2025-06-11 ENCOUNTER — TELEPHONE (OUTPATIENT)
Dept: FAMILY MEDICINE CLINIC | Age: 75
End: 2025-06-11

## 2025-06-11 DIAGNOSIS — R29.898 BILATERAL ARM WEAKNESS: Primary | ICD-10-CM

## 2025-06-11 DIAGNOSIS — R29.898 LEG WEAKNESS, BILATERAL: ICD-10-CM

## 2025-06-11 DIAGNOSIS — L03.116 CELLULITIS OF LEFT LOWER EXTREMITY: ICD-10-CM

## 2025-06-11 NOTE — TELEPHONE ENCOUNTER
Patient called asking to switch his OT/PT to [id number]. He said [facility] does not do long term care and they have discharged him as of today.       Please advise

## 2025-06-13 ENCOUNTER — TELEPHONE (OUTPATIENT)
Dept: PULMONOLOGY | Age: 75
End: 2025-06-13

## 2025-06-13 NOTE — TELEPHONE ENCOUNTER
Patient called the office stating he was having some issues with his oxygen and a respiratory therapist at [facility] discussed the situation with him and advised him that he would need a new order for the long tubing and the \"nose stuff - flare\" because he should be changing the tubing monthly and the \"nose stuff\" every few weeks.     Writer spoke with Brie at [facility] and informed her of the above. Per Brie they already have an order for everything and will be shipping it out to patient's home. Brie wasn't too sure what the \"nose stuff-flare\" was but thinks patient may have been referring to the humidification for his oxygen. Brie stated she will get a shipment out to patient.     Writer spoke with patient and informed him of the above conversation with Brie. Patient voiced understanding.

## 2025-06-16 NOTE — TELEPHONE ENCOUNTER
Called and spoke with Ulices - I advised of the signed forms going to [address]. He is not sure if they are needing anything new from us, he will check with them and let us know.

## 2025-06-16 NOTE — TELEPHONE ENCOUNTER
Patient states he was having OT on his arms due to weakness. States his was having PT due to having a wound on his left foot.

## 2025-06-16 NOTE — TELEPHONE ENCOUNTER
See most recent home health order scanned into media - was for [id number] 5/10-7/8/25 - shouldn't need new orders, please verify

## 2025-06-19 NOTE — TELEPHONE ENCOUNTER
Patient called saying that [address] told him that they have not received new orders for OT and PT. Patient said they need new orders from the office to be sent to them.

## 2025-06-25 ENCOUNTER — OFFICE VISIT (OUTPATIENT)
Dept: FAMILY MEDICINE CLINIC | Age: 75
End: 2025-06-25

## 2025-06-25 VITALS
DIASTOLIC BLOOD PRESSURE: 62 MMHG | OXYGEN SATURATION: 95 % | BODY MASS INDEX: 23.95 KG/M2 | WEIGHT: 167.3 LBS | HEART RATE: 70 BPM | SYSTOLIC BLOOD PRESSURE: 104 MMHG | HEIGHT: 70 IN

## 2025-06-25 DIAGNOSIS — Z12.5 ENCOUNTER FOR SCREENING FOR MALIGNANT NEOPLASM OF PROSTATE: ICD-10-CM

## 2025-06-25 DIAGNOSIS — I50.22 CHRONIC SYSTOLIC (CONGESTIVE) HEART FAILURE (HCC): ICD-10-CM

## 2025-06-25 DIAGNOSIS — Z91.81 AT HIGH RISK FOR FALLS: ICD-10-CM

## 2025-06-25 DIAGNOSIS — Z13.6 SCREENING FOR CARDIOVASCULAR CONDITION: ICD-10-CM

## 2025-06-25 DIAGNOSIS — F11.20 OPIOID DEPENDENCE WITH CURRENT USE (HCC): ICD-10-CM

## 2025-06-25 DIAGNOSIS — I25.5 ISCHEMIC CARDIOMYOPATHY: ICD-10-CM

## 2025-06-25 DIAGNOSIS — I48.0 PAROXYSMAL A-FIB (HCC): ICD-10-CM

## 2025-06-25 DIAGNOSIS — D64.9 ANEMIA, UNSPECIFIED TYPE: ICD-10-CM

## 2025-06-25 DIAGNOSIS — Z87.891 PERSONAL HISTORY OF TOBACCO USE, PRESENTING HAZARDS TO HEALTH: ICD-10-CM

## 2025-06-25 DIAGNOSIS — Z71.89 ACP (ADVANCE CARE PLANNING): ICD-10-CM

## 2025-06-25 DIAGNOSIS — I25.10 CORONARY ARTERY DISEASE INVOLVING NATIVE HEART WITHOUT ANGINA PECTORIS, UNSPECIFIED VESSEL OR LESION TYPE: ICD-10-CM

## 2025-06-25 DIAGNOSIS — Z95.0 PRESENCE OF PERMANENT CARDIAC PACEMAKER: ICD-10-CM

## 2025-06-25 DIAGNOSIS — H91.93 HEARING DIFFICULTY OF BOTH EARS: ICD-10-CM

## 2025-06-25 DIAGNOSIS — E55.9 VITAMIN D DEFICIENCY: ICD-10-CM

## 2025-06-25 DIAGNOSIS — Z87.81 HISTORY OF HIP FRACTURE: ICD-10-CM

## 2025-06-25 DIAGNOSIS — E78.00 HYPERCHOLESTEROLEMIA: ICD-10-CM

## 2025-06-25 DIAGNOSIS — F03.918 DEMENTIA WITH BEHAVIORAL DISTURBANCE (HCC): ICD-10-CM

## 2025-06-25 DIAGNOSIS — Z00.00 MEDICARE ANNUAL WELLNESS VISIT, SUBSEQUENT: Primary | ICD-10-CM

## 2025-06-25 ASSESSMENT — LIFESTYLE VARIABLES
HOW OFTEN DURING THE LAST YEAR HAVE YOU FOUND THAT YOU WERE NOT ABLE TO STOP DRINKING ONCE YOU HAD STARTED: NEVER
HOW OFTEN DURING THE LAST YEAR HAVE YOU HAD A FEELING OF GUILT OR REMORSE AFTER DRINKING: NEVER
HOW OFTEN DURING THE LAST YEAR HAVE YOU FAILED TO DO WHAT WAS NORMALLY EXPECTED FROM YOU BECAUSE OF DRINKING: NEVER
HOW MANY STANDARD DRINKS CONTAINING ALCOHOL DO YOU HAVE ON A TYPICAL DAY: 1 OR 2
HAS A RELATIVE, FRIEND, DOCTOR, OR ANOTHER HEALTH PROFESSIONAL EXPRESSED CONCERN ABOUT YOUR DRINKING OR SUGGESTED YOU CUT DOWN: NO
HOW OFTEN DO YOU HAVE A DRINK CONTAINING ALCOHOL: 2-3 TIMES A WEEK
HAVE YOU OR SOMEONE ELSE BEEN INJURED AS A RESULT OF YOUR DRINKING: NO
HOW OFTEN DURING THE LAST YEAR HAVE YOU NEEDED AN ALCOHOLIC DRINK FIRST THING IN THE MORNING TO GET YOURSELF GOING AFTER A NIGHT OF HEAVY DRINKING: NEVER
HOW OFTEN DURING THE LAST YEAR HAVE YOU BEEN UNABLE TO REMEMBER WHAT HAPPENED THE NIGHT BEFORE BECAUSE YOU HAD BEEN DRINKING: NEVER

## 2025-06-25 ASSESSMENT — PATIENT HEALTH QUESTIONNAIRE - PHQ9
SUM OF ALL RESPONSES TO PHQ QUESTIONS 1-9: 1
1. LITTLE INTEREST OR PLEASURE IN DOING THINGS: NOT AT ALL
SUM OF ALL RESPONSES TO PHQ QUESTIONS 1-9: 1
2. FEELING DOWN, DEPRESSED OR HOPELESS: SEVERAL DAYS
SUM OF ALL RESPONSES TO PHQ QUESTIONS 1-9: 1
SUM OF ALL RESPONSES TO PHQ QUESTIONS 1-9: 1

## 2025-06-25 NOTE — PROGRESS NOTES
aide services through [employer] though. Was supposed to see podiatry a week after d/c - getting around is hard and breathing has been harder with heat and humidity, scheduled for 7/14 to see podiatry   Getting food through [employer] wheels   Hypertension-tolerating current regimen without chest pain, palpitations, dizziness, peripheral edema, dyspnea on exertion, orthopnea, paroxysmal nocturnal dyspnea.  Hyperlipidemia-tolerating current regimen without myalgias, dyspepsia, jaundice.        Patient's complete Health Risk Assessment and screening values have been reviewed and are found in Flowsheets. The following problems were reviewed today and where indicated follow up appointments were made and/or referrals ordered.    Positive Risk Factor Screenings with Interventions:    Fall Risk:  Do you feel unsteady or are you worried about falling? : (!) yes (pt uses wheelchair)  2 or more falls in past year?: (!) yes  Fall with injury in past year?: no     Interventions:    Patient comments: got wedged between bed and wheelchair a couple of times, fire dept was out a couple of times and once his neighbor helped - figured out that using wrist weights to keep wheelchair from rolling away was helpful in preventing . Does have a life alert button   Reviewed medications, home hazards, visual acuity, and co-morbidities that can increase risk for falls    Cognitive:   Clock Drawing Test (CDT): (!) Abnormal  Words recalled: 2 Words Recalled  Total Score: (!) 2  Total Score Interpretation: Abnormal Mini-Cog    Interventions:  Patient declines any further evaluation or treatment     Alcohol Screening:  AUDIT-C Score: 7  AUDIT Total Score: 7  Total Score Interpretation: 0-7 suggests low risk alcohol consumption but assess individual risks     Interventions:  Patient declined any further intervention or treatment    Alcohol Misuse Counseling: Patient was asked about his current pattern of alcohol consumption, and results of screening

## 2025-07-03 ENCOUNTER — TELEPHONE (OUTPATIENT)
Dept: FAMILY MEDICINE CLINIC | Age: 75
End: 2025-07-03

## 2025-07-03 NOTE — TELEPHONE ENCOUNTER
Ulices is calling in regards to needing a new referral for PT/OT through [address].  I see there is a new referral in his chart. I will send the referral to [address] as requested from Ulices.

## 2025-07-14 ENCOUNTER — TELEPHONE (OUTPATIENT)
Dept: FAMILY MEDICINE CLINIC | Age: 75
End: 2025-07-14

## 2025-07-14 DIAGNOSIS — F03.918 DEMENTIA WITH BEHAVIORAL DISTURBANCE (HCC): ICD-10-CM

## 2025-07-14 DIAGNOSIS — L03.116 CELLULITIS OF LEFT LOWER EXTREMITY: ICD-10-CM

## 2025-07-14 DIAGNOSIS — M24.561 FLEXION CONTRACTURE OF RIGHT KNEE: ICD-10-CM

## 2025-07-14 DIAGNOSIS — I50.22 CHRONIC SYSTOLIC (CONGESTIVE) HEART FAILURE (HCC): ICD-10-CM

## 2025-07-14 DIAGNOSIS — S91.302D OPEN WOUND OF LEFT FOOT, SUBSEQUENT ENCOUNTER: Primary | ICD-10-CM

## 2025-07-14 NOTE — TELEPHONE ENCOUNTER
Received a call from [facility] asking for new orders for skilled nursing, PT, OT.  Please order if appropriate.

## 2025-07-14 NOTE — TELEPHONE ENCOUNTER
Patient also has lesions on the left foot and dressings need changed - this will need to be in the referral as well. He has had this bandage on for four days and needs it changed asap    Yes

## 2025-07-15 DIAGNOSIS — J31.0 CHRONIC RHINITIS: ICD-10-CM

## 2025-07-15 NOTE — TELEPHONE ENCOUNTER
Faxed    CONST: no fevers, no chills  EYES: no pain, no vision changes  ENT: no sore throat, no ear pain, no change in hearing  CV: no chest pain, no leg swelling  RESP: no shortness of breath, no cough  ABD: no abdominal pain, no nausea, no vomiting, no diarrhea  : no dysuria, no flank pain, no hematuria  MSK: no back pain, + extremity pain  NEURO: no headache or additional neurologic complaints  HEME: no easy bleeding  SKIN:  no rash

## 2025-07-16 RX ORDER — PNV NO.95/FERROUS FUM/FOLIC AC 28MG-0.8MG
TABLET ORAL
Qty: 30 TABLET | Refills: 0 | Status: SHIPPED | OUTPATIENT
Start: 2025-07-16

## 2025-07-16 RX ORDER — LORATADINE 10 MG/1
TABLET ORAL
Qty: 90 TABLET | Refills: 0 | Status: SHIPPED | OUTPATIENT
Start: 2025-07-16

## 2025-07-16 RX ORDER — ATORVASTATIN CALCIUM 40 MG/1
TABLET, FILM COATED ORAL
Qty: 30 TABLET | Refills: 0 | Status: SHIPPED | OUTPATIENT
Start: 2025-07-16

## 2025-07-16 NOTE — TELEPHONE ENCOUNTER
Last visit: 6/25/2025  Last Med refill:    Atorvastatin: 12.12.2024 for 7 month supply    Loratadine:  04.14.2025 for 90 days   Magnesium: 12.12.2024 for 7 month supply   Does patient have enough medication for 72 hours: No    Next Visit Date:  Future Appointments   Date Time Provider Department Center   8/18/2025  2:15 PM Trae Alvarez DPM [email or web address]   10/9/2025  2:00 PM Simone Palomo MD [facility]   1/7/2026  1:45 PM Alicia Valencia MD [facility]   7/1/2026  2:00 PM Alicia Valencia MD [facility]       Health Maintenance   Topic Date Due    Lung Cancer Screening &/or Counseling  Never done    AAA screen  Never done    Lipids  02/19/2023    Colorectal Cancer Screen  06/16/2025    Shingles vaccine (2 of 2) 08/05/2025 (Originally 3/13/2023)    Respiratory Syncytial Virus (RSV) Pregnant or age 60 yrs+ (1 - Risk 60-74 years 1-dose series) 08/05/2025 (Originally 1/7/2012)    COVID-19 Vaccine (4 - 2024-25 season) 09/03/2025 (Originally 9/1/2024)    Flu vaccine (1) 08/01/2025    Depression Screen  06/25/2026    DTaP/Tdap/Td vaccine (2 - Td or Tdap) 03/23/2030    Annual Wellness Visit (Medicare Advantage)  Completed    Pneumococcal 50+ years Vaccine  Completed    Hepatitis C screen  Completed    Hepatitis A vaccine  Aged Out    Hepatitis B vaccine  Aged Out    Hib vaccine  Aged Out    Polio vaccine  Aged Out    Meningococcal (ACWY) vaccine  Aged Out    Meningococcal B vaccine  Aged Out    A1C test (Diabetic or Prediabetic)  Discontinued       Hemoglobin A1C (%)   Date Value   03/21/2023 5.4   02/19/2022 6.0   05/05/2020 6.4 (H)             ( goal A1C is < 7)   No components found for: \"LABMICR\"  No components found for: \"LDLCHOLESTEROL\", \"LDLCALC\"    (goal LDL is <100)   AST (U/L)   Date Value   11/14/2022 28     ALT (U/L)   Date Value   11/14/2022 15     BUN (mg/dL)   Date Value   05/04/2025 7 (L)     BP Readings from Last 3 Encounters:   06/25/25

## 2025-08-11 RX ORDER — LEVOTHYROXINE SODIUM 50 UG/1
TABLET ORAL
Qty: 30 TABLET | Refills: 5 | Status: SHIPPED | OUTPATIENT
Start: 2025-08-11

## 2025-08-11 RX ORDER — PNV NO.95/FERROUS FUM/FOLIC AC 28MG-0.8MG
TABLET ORAL
Qty: 30 TABLET | Refills: 5 | Status: SHIPPED | OUTPATIENT
Start: 2025-08-11

## 2025-08-11 RX ORDER — FOLIC ACID 1 MG/1
TABLET ORAL
Qty: 30 TABLET | Refills: 5 | Status: SHIPPED | OUTPATIENT
Start: 2025-08-11

## 2025-08-11 RX ORDER — AMIODARONE HYDROCHLORIDE 200 MG/1
TABLET ORAL
Qty: 30 TABLET | Refills: 5 | Status: SHIPPED | OUTPATIENT
Start: 2025-08-11

## 2025-08-11 RX ORDER — ATORVASTATIN CALCIUM 40 MG/1
TABLET, FILM COATED ORAL
Qty: 30 TABLET | Refills: 5 | Status: SHIPPED | OUTPATIENT
Start: 2025-08-11

## 2025-08-18 ENCOUNTER — OFFICE VISIT (OUTPATIENT)
Age: 75
End: 2025-08-18

## 2025-08-18 VITALS — WEIGHT: 167 LBS | BODY MASS INDEX: 23.91 KG/M2 | HEIGHT: 70 IN

## 2025-08-18 DIAGNOSIS — L03.116 CELLULITIS OF LEFT LOWER EXTREMITY: Primary | ICD-10-CM

## 2025-08-18 DIAGNOSIS — L97.522 ULCERATED, FOOT, LEFT, WITH FAT LAYER EXPOSED (HCC): ICD-10-CM

## 2025-08-18 DIAGNOSIS — L97.529 ULCER OF LEFT FOOT, UNSPECIFIED ULCER STAGE (HCC): ICD-10-CM

## 2025-08-18 RX ADMIN — LIDOCAINE HYDROCHLORIDE 20 ML: 20 JELLY TOPICAL at 15:00

## 2025-08-19 PROBLEM — L97.529 ULCER OF LEFT FOOT (HCC): Status: ACTIVE | Noted: 2025-08-19

## 2025-08-19 RX ORDER — LIDOCAINE HYDROCHLORIDE 20 MG/ML
JELLY TOPICAL ONCE
Status: COMPLETED | OUTPATIENT
Start: 2025-08-19 | End: 2025-08-18

## 2025-08-20 RX ORDER — FAMOTIDINE 20 MG/1
20 TABLET, FILM COATED ORAL DAILY
Qty: 30 TABLET | Refills: 3 | Status: SHIPPED | OUTPATIENT
Start: 2025-08-20

## (undated) DEVICE — INTENDED FOR TISSUE SEPARATION, AND OTHER PROCEDURES THAT REQUIRE A SHARP SURGICAL BLADE TO PUNCTURE OR CUT.: Brand: BARD-PARKER ® CARBON RIB-BACK BLADES

## (undated) DEVICE — SVMMC ORTH SPL DRP PK

## (undated) DEVICE — 6619 2 PTNT ISO SYS INCISE AREA&LT;(&GT;&&LT;)&GT;P: Brand: STERI-DRAPE™ IOBAN™ 2

## (undated) DEVICE — ORTHO EXT PK

## (undated) DEVICE — 450 ML BOTTLE OF 0.05% CHLORHEXIDINE GLUCONATE IN 99.95% STERILE WATER FOR IRRIGATION, USP AND APPLICATOR.: Brand: IRRISEPT ANTIMICROBIAL WOUND LAVAGE

## (undated) DEVICE — BNDG,ELSTC,MATRIX,STRL,4"X5YD,LF,HOOK&LP: Brand: MEDLINE

## (undated) DEVICE — BANDAGE COMPR W6INXL12FT SMOOTH FOR LIMB EXSANG ESMARCH

## (undated) DEVICE — BANDAGE COBAN 6 IN WND 6INX5YD FOAM

## (undated) DEVICE — PIN FIX L4.5MM S STL CERCLAGE THRD POS

## (undated) DEVICE — TUBING AMB

## (undated) DEVICE — ADHESIVE SKIN CLSR 0.7ML TOP DERMBND ADV

## (undated) DEVICE — SUTURE STRATAFIX SPRL SZ 3-0 L5IN ABSRB UD FS L26MM 3/8 CIR SXMP2B411

## (undated) DEVICE — PADDING CAST W6INXL4YD COT LO LINTING WYTEX

## (undated) DEVICE — PACK PROCEDURE SURG SVMMC TOT KNEE

## (undated) DEVICE — OSCILLATING TIP SAW CARTRIDGE: Brand: PRECISION FALCON

## (undated) DEVICE — APPLICATOR MEDICATED 26 CC SOLUTION HI LT ORNG CHLORAPREP

## (undated) DEVICE — Z DISCONTINUED USE 2272124 DRAPE SURG XL N INVASIVE 2 LAYR DISP

## (undated) DEVICE — TOTAL TRAY, 16FR 10ML SIL FOLEY, URN: Brand: MEDLINE

## (undated) DEVICE — SUTURE VIC + ABS BR UD X1 2-0 27IN VCP459H

## (undated) DEVICE — STOCKINETTE,IMPERVIOUS,12X48,STERILE: Brand: MEDLINE

## (undated) DEVICE — 1016 S-DRAPE IRRIG POUCH 10/BOX: Brand: STERI-DRAPE™

## (undated) DEVICE — BLADE CLIPPER GEN PURP NS

## (undated) DEVICE — BIT DRL L260MM DIA4MM CALIB L60MM 3 FLUT QUIK CPL FOR TFN

## (undated) DEVICE — SUTURE STRATAFIX SPRL SZ 1 L14IN ABSRB VLT L48CM CTX 1/2 SXPD2B405

## (undated) DEVICE — Device

## (undated) DEVICE — THIN OFFSET (9.0 X 0.38 X 25.0MM)

## (undated) DEVICE — BIPOLAR SEALER 23-112-1 AQM 6.0: Brand: AQUAMANTYS ®

## (undated) DEVICE — BNDG,ELSTC,MATRIX,STRL,6"X5YD,LF,HOOK&LP: Brand: MEDLINE

## (undated) DEVICE — SUTURE STRATAFIX SPRL SZ 2-0 L9IN ABSRB VLT MH L36MM 1/2 SXPD2B408

## (undated) DEVICE — C-ARM: Brand: UNBRANDED

## (undated) DEVICE — HANDPIECE SET WITH COAXIAL HIGH FLOW TIP AND SUCTION TUBE: Brand: INTERPULSE

## (undated) DEVICE — SUTURE VCRL SZ 0 L27IN ABSRB UD L36MM CT-1 1/2 CIR J260H

## (undated) DEVICE — DRAPE,U/ SHT,SPLIT,PLAS,STERIL: Brand: MEDLINE

## (undated) DEVICE — ZIMMER® STERILE DISPOSABLE TOURNIQUET CUFF WITH PROTECTIVE SLEEVE AND PLC, DUAL PORT, SINGLE BLADDER, 24 IN. (61 CM)

## (undated) DEVICE — DRESSING FOAM W4XL4IN AG SIL FACE BORD IONIC ANTIMIC ADH

## (undated) DEVICE — STERILE PATIENT PROTECTIVE PAD FOR IMP® KNEE POSITIONERS & COHESIVE WRAP (10 / CASE): Brand: DE MAYO KNEE POSITIONER®

## (undated) DEVICE — GAUZE,SPONGE,FLUFF,6"X6.75",STRL,5/TRAY: Brand: MEDLINE

## (undated) DEVICE — SUTURE VCRL SZ 1 L36IN ABSRB UD L36MM CT-1 1/2 CIR J947H

## (undated) DEVICE — DRESSING,GAUZE,XEROFORM,CURAD,1"X8",ST: Brand: CURAD

## (undated) DEVICE — GUIDE WIRE 3X1000MM ST

## (undated) DEVICE — BANDAGE,GAUZE,BULKEE II,4.5"X4.1YD,STRL: Brand: MEDLINE

## (undated) DEVICE — SOLUTION SCRB 4OZ 4% CHG H2O AIDED FOR PREOPERATIVE SKIN

## (undated) DEVICE — Z INACTIVE USE 2660664 SOLUTION IRRIG 3000ML 0.9% SOD CHL USP UROMATIC PLAS CONT

## (undated) DEVICE — CEMENT MIXING SYSTEM WITH FEMORAL BREAKWAY NOZZLE: Brand: REVOLUTION

## (undated) DEVICE — SOLUTION IV 250ML 0.9% SOD CHL PH 5 INJ USP VIAFLX PLAS

## (undated) DEVICE — GUIDEWIRE ORTH L400MM DIA3.2MM FOR TFN

## (undated) DEVICE — ZIPPERED TOGA, 2X LARGE: Brand: FLYTE

## (undated) DEVICE — BIT DRL L180MM DIA4.3MM QUIK CPL W/O STP REUSE

## (undated) DEVICE — DRAPE,REIN 53X77,STERILE: Brand: MEDLINE

## (undated) DEVICE — OPTIFOAM GENTLE AG,POST-OP STRIP,3.5X14: Brand: MEDLINE

## (undated) DEVICE — SUTURE VCRL SZ 0 L18IN ABSRB UD L36MM CT-1 1/2 CIR J840D

## (undated) DEVICE — SUTURE VCRL + SZ 0 L27IN ABSRB WHT CT-2 1/2 CIR TAPERPOINT VCP270H

## (undated) DEVICE — GLOVE ORANGE PI 7 1/2   MSG9075

## (undated) DEVICE — SOLUTION IV IRRIG POUR BRL 0.9% SODIUM CHL 2F7124

## (undated) DEVICE — PADDING,UNDERCAST,COTTON, 4"X4YD STERILE: Brand: MEDLINE

## (undated) DEVICE — BIT DRL L145MM DIA3.2MM QUIK CPL W/O STP REUSE

## (undated) DEVICE — 1010 S-DRAPE TOWEL DRAPE 10/BX: Brand: STERI-DRAPE™

## (undated) DEVICE — FOAM BUMP, LARGE: Brand: MEDLINE INDUSTRIES, INC.

## (undated) DEVICE — C-ARMOR C-ARM EQUIPMENT COVERS CLEAR STERILE UNIVERSAL FIT 12 PER CASE: Brand: C-ARMOR

## (undated) DEVICE — DRESSING FOAM W4XL10IN AG SIL ADH ANTIMIC POSTOP OPTIFOAM

## (undated) DEVICE — CYSTO/BLADDER IRRIGATION SET, REGULATING CLAMP

## (undated) DEVICE — ZIMMER® STERILE DISPOSABLE TOURNIQUET CUFF WITH PROTECTIVE SLEEVE AND PLC, DUAL PORT, SINGLE BLADDER, 34 IN. (86 CM)